# Patient Record
Sex: FEMALE | Race: WHITE | Employment: OTHER | URBAN - METROPOLITAN AREA
[De-identification: names, ages, dates, MRNs, and addresses within clinical notes are randomized per-mention and may not be internally consistent; named-entity substitution may affect disease eponyms.]

---

## 2017-01-06 ENCOUNTER — HOSPITAL ENCOUNTER (OUTPATIENT)
Dept: INFUSION THERAPY | Age: 72
Discharge: HOME | End: 2017-01-06

## 2017-02-01 NOTE — TELEPHONE ENCOUNTER
Patient asked if she needs another year's worth of this medicine, or just a few months to last her till her next office visit? Please call her and let her know what to expect at [phone].

## 2017-02-03 ENCOUNTER — APPOINTMENT (OUTPATIENT)
Dept: INFUSION THERAPY | Age: 72
End: 2017-02-03

## 2017-03-01 RX ORDER — LAMOTRIGINE 25 MG/1
500 TABLET ORAL ONCE
Status: DISPENSED | OUTPATIENT
Start: 2017-03-03 | End: 2017-03-03

## 2017-03-02 NOTE — PROGRESS NOTES
Ms. Olita Fraction called the [facility] this morning, Thursday, 3-2-17, and stated that she is still out of town, and therefore, will NOT be coming to the [facility] tomorrow, Friday, 3-3-17, for her monthly port flush and Faslodex injection. Ms. Emma Coburn did say though, that she will be returning home in time for her April appointment, so, she was made aware, that her April appointment for monthly port flush and Faslodex, is on Friday, 4-7-17, at 1000, and she agreed to the appointment.

## 2017-03-03 ENCOUNTER — HOSPITAL ENCOUNTER (OUTPATIENT)
Dept: INFUSION THERAPY | Age: 72
Discharge: HOME | End: 2017-03-03

## 2017-04-05 RX ORDER — LAMOTRIGINE 25 MG/1
500 TABLET ORAL ONCE
Status: COMPLETED | OUTPATIENT
Start: 2017-04-07 | End: 2017-04-07

## 2017-04-07 ENCOUNTER — HOSPITAL ENCOUNTER (OUTPATIENT)
Dept: INFUSION THERAPY | Age: 72
Discharge: HOME | End: 2017-04-07
Payer: MEDICARE

## 2017-04-07 VITALS
HEIGHT: 64 IN | TEMPERATURE: 97 F | HEART RATE: 82 BPM | WEIGHT: 155 LBS | DIASTOLIC BLOOD PRESSURE: 68 MMHG | SYSTOLIC BLOOD PRESSURE: 141 MMHG | BODY MASS INDEX: 26.46 KG/M2 | RESPIRATION RATE: 16 BRPM

## 2017-04-07 PROCEDURE — 74011250636 HC RX REV CODE- 250/636: Performed by: INTERNAL MEDICINE

## 2017-04-07 PROCEDURE — 77030012965 HC NDL HUBR BBMI -A

## 2017-04-07 PROCEDURE — 96523 IRRIG DRUG DELIVERY DEVICE: CPT

## 2017-04-07 PROCEDURE — 96402 CHEMO HORMON ANTINEOPL SQ/IM: CPT

## 2017-04-07 RX ORDER — HEPARIN SODIUM (PORCINE) LOCK FLUSH IV SOLN 100 UNIT/ML 100 UNIT/ML
500 SOLUTION INTRAVENOUS AS NEEDED
Status: DISPENSED | OUTPATIENT
Start: 2017-04-07 | End: 2017-04-08

## 2017-04-07 RX ORDER — SODIUM CHLORIDE 0.9 % (FLUSH) 0.9 %
10-40 SYRINGE (ML) INJECTION AS NEEDED
Status: DISCONTINUED | OUTPATIENT
Start: 2017-04-07 | End: 2017-04-10 | Stop reason: HOSPADM

## 2017-04-07 RX ADMIN — Medication 20 ML: at 10:45

## 2017-04-07 RX ADMIN — FULVESTRANT 500 MG: 50 INJECTION INTRAMUSCULAR at 10:54

## 2017-04-07 RX ADMIN — HEPARIN SODIUM (PORCINE) LOCK FLUSH IV SOLN 100 UNIT/ML 500 UNITS: 100 SOLUTION at 10:46

## 2017-04-07 NOTE — PROGRESS NOTES
[facility] Progress Note    Date: 2017    Name: Maranda Powell    MRN: [id number]         : 1945      Ms. Figueroa was assessed and education was provided. Ms. Ed De La Torre vitals were reviewed and patient was observed for 5 minutes prior to treatment. Visit Vitals    /68 (BP 1 Location: Left arm, BP Patient Position: At rest;Sitting)    Pulse 82    Temp 97 °F (36.1 °C)    Resp 16    Ht 5' 4\" (1.626 m)    Wt 70.3 kg (155 lb)    Breastfeeding No    BMI 26.61 kg/m2       Faxlodex 500 mg IM given in divided dose , one injection in each gluteus max. Monthly port flush done with brisk blood return obtained. Patient armband removed and shredded. Ms. Víctor Duque was discharged from [place] [address] in stable condition at 1005. She is to return on 17 at 1000 for her next appointment for monthly Faslodex and monthly port flush.

## 2017-04-11 ENCOUNTER — OFFICE VISIT (OUTPATIENT)
Dept: ONCOLOGY | Age: 72
End: 2017-04-11

## 2017-04-11 ENCOUNTER — HOSPITAL ENCOUNTER (OUTPATIENT)
Dept: LAB | Age: 72
Discharge: HOME | End: 2017-04-11
Payer: MEDICARE

## 2017-04-11 ENCOUNTER — HOSPITAL ENCOUNTER (OUTPATIENT)
Dept: ONCOLOGY | Age: 72
Discharge: HOME | End: 2017-04-11

## 2017-04-11 VITALS
BODY MASS INDEX: 26.8 KG/M2 | TEMPERATURE: 98.1 F | HEIGHT: 64 IN | WEIGHT: 157 LBS | SYSTOLIC BLOOD PRESSURE: 130 MMHG | HEART RATE: 87 BPM | DIASTOLIC BLOOD PRESSURE: 59 MMHG

## 2017-04-11 DIAGNOSIS — T45.1X5A ANEMIA DUE TO ANTINEOPLASTIC CHEMOTHERAPY: ICD-10-CM

## 2017-04-11 DIAGNOSIS — C50.311 BREAST CANCER OF LOWER-INNER QUADRANT OF RIGHT FEMALE BREAST (HCC): ICD-10-CM

## 2017-04-11 DIAGNOSIS — G63 NEUROPATHY ASSOCIATED WITH CANCER (HCC): ICD-10-CM

## 2017-04-11 DIAGNOSIS — D64.81 ANEMIA DUE TO ANTINEOPLASTIC CHEMOTHERAPY: ICD-10-CM

## 2017-04-11 DIAGNOSIS — C50.919 METASTATIC BREAST CANCER (HCC): ICD-10-CM

## 2017-04-11 DIAGNOSIS — C80.1 NEUROPATHY ASSOCIATED WITH CANCER (HCC): ICD-10-CM

## 2017-04-11 DIAGNOSIS — D72.819 CHRONIC LEUKOPENIA: ICD-10-CM

## 2017-04-11 DIAGNOSIS — C50.311 BREAST CANCER OF LOWER-INNER QUADRANT OF RIGHT FEMALE BREAST (HCC): Primary | ICD-10-CM

## 2017-04-11 LAB
ALBUMIN SERPL BCP-MCNC: 3.6 G/DL (ref 3.4–5)
ALBUMIN/GLOB SERPL: 1.2 {RATIO} (ref 0.8–1.7)
ALP SERPL-CCNC: 72 U/L (ref 45–117)
ALT SERPL-CCNC: 17 U/L (ref 13–56)
ANION GAP BLD CALC-SCNC: 8 MMOL/L (ref 3–18)
AST SERPL W P-5'-P-CCNC: 18 U/L (ref 15–37)
BASO+EOS+MONOS # BLD AUTO: 0.2 K/UL (ref 0–2.3)
BASO+EOS+MONOS # BLD AUTO: 7 % (ref 0.1–17)
BILIRUB SERPL-MCNC: 0.6 MG/DL (ref 0.2–1)
BUN SERPL-MCNC: 16 MG/DL (ref 7–18)
BUN/CREAT SERPL: 18 (ref 12–20)
CALCIUM SERPL-MCNC: 8.7 MG/DL (ref 8.5–10.1)
CHLORIDE SERPL-SCNC: 104 MMOL/L (ref 100–108)
CO2 SERPL-SCNC: 29 MMOL/L (ref 21–32)
CREAT SERPL-MCNC: 0.89 MG/DL (ref 0.6–1.3)
DIFFERENTIAL METHOD BLD: ABNORMAL
ERYTHROCYTE [DISTWIDTH] IN BLOOD BY AUTOMATED COUNT: 16.8 % (ref 11.5–14.5)
FERRITIN SERPL-MCNC: 174 NG/ML (ref 8–388)
GLOBULIN SER CALC-MCNC: 3 G/DL (ref 2–4)
GLUCOSE SERPL-MCNC: 92 MG/DL (ref 74–99)
HCT VFR BLD AUTO: 27.4 % (ref 36–48)
HGB BLD-MCNC: 9.4 G/DL (ref 12–16)
IRON SATN MFR SERPL: 23 %
IRON SERPL-MCNC: 72 UG/DL (ref 50–175)
LYMPHOCYTES # BLD AUTO: 18 % (ref 14–44)
LYMPHOCYTES # BLD: 0.4 K/UL (ref 1.1–5.9)
MCH RBC QN AUTO: 36.7 PG (ref 25–35)
MCHC RBC AUTO-ENTMCNC: 34.3 G/DL (ref 31–37)
MCV RBC AUTO: 107 FL (ref 78–102)
NEUTS SEG # BLD: 1.8 K/UL (ref 1.8–9.5)
NEUTS SEG NFR BLD AUTO: 75 % (ref 40–70)
PLATELET # BLD AUTO: 224 K/UL (ref 140–440)
POTASSIUM SERPL-SCNC: 4.4 MMOL/L (ref 3.5–5.5)
PROT SERPL-MCNC: 6.6 G/DL (ref 6.4–8.2)
RBC # BLD AUTO: 2.56 M/UL (ref 4.1–5.1)
SODIUM SERPL-SCNC: 141 MMOL/L (ref 136–145)
TIBC SERPL-MCNC: 308 UG/DL (ref 250–450)
WBC # BLD AUTO: 2.4 K/UL (ref 4.5–13)

## 2017-04-11 PROCEDURE — 82728 ASSAY OF FERRITIN: CPT | Performed by: INTERNAL MEDICINE

## 2017-04-11 PROCEDURE — 86300 IMMUNOASSAY TUMOR CA 15-3: CPT | Performed by: INTERNAL MEDICINE

## 2017-04-11 PROCEDURE — 36415 COLL VENOUS BLD VENIPUNCTURE: CPT | Performed by: INTERNAL MEDICINE

## 2017-04-11 PROCEDURE — 80053 COMPREHEN METABOLIC PANEL: CPT | Performed by: INTERNAL MEDICINE

## 2017-04-11 PROCEDURE — 83540 ASSAY OF IRON: CPT | Performed by: INTERNAL MEDICINE

## 2017-04-11 NOTE — PROGRESS NOTES
Hematology/Oncology  Progress Note    Name: Zandra Daniels  Date: 2017  : 1945    PCP: Candelaria Escalera MD     Ms. Denis Lincoln is a 70 y.o.  female who was seen for management of her metastatic breast cancer with associated complications of chemotherapy. Current therapy: Fulvestrant 500 mg subcutaneous monthly and Ibrance 125 mg by mouth daily. Subjective:     Mrs. Denis Lincoln is a 79-year-old woman who has slowly progressive metastatic breast cancer. She has previously completed external beam radiation therapy to lesions in her ribs. Additionally she is currently receiving systemic therapy with fulvestrant and Ibrance. Currently she is tolerating the systemic therapy reasonably well and has not experienced any nausea or emesis. She is no longer complaining of mouth discomfort but she does report some fatigue and occasional weakness. She continues to have SOB intermittently. Her most recent CT scans done in October showed stability with no evidence of disease progression. Since 2016 she has been spending time in [place] and now returns to this area for the summer months. She has no new complaints or concerns to report except for the ongoing fatigue and weakness. Past medical history, family history, and social history: these were reviewed and remains unchanged.     Past Medical History   Diagnosis Date    Biliary colic     Breast CA ([facility] [address] 2012    Cancer [facility])      Right Breast    Chemotherapy convalescence or palliative care     Neuropathy     Radiation therapy complication     Thyroid disease      Past Surgical History   Procedure Laterality Date    Pr breast surgery procedure unlisted  10/2002     Right-Lesion    Hx mastectomy  1991     Right    Pr breast surgery procedure unlisted  2004     Right-Lesion    Hx lap cholecystectomy  13     Social History     Social History    Marital status:      Spouse name: N/A    Number of children: N/A    Years of education: N/A     Occupational History    Not on file. Social History Main Topics    Smoking status: Former Smoker    Smokeless tobacco: Not on file    Alcohol use 0.0 oz/week     1 - 2 Glasses of wine per week      Comment: socially, occassionally    Drug use: No    Sexual activity: Not on file     Other Topics Concern    Not on file     Social History Narrative     Family History   Problem Relation Age of Onset    Cancer Maternal Aunt      Hodgkin's disease    Breast Cancer Paternal Aunt     Cancer Father      Prostate, lung, brain     Current Outpatient Prescriptions   Medication Sig Dispense Refill    gabapentin (NEURONTIN) 300 mg capsule take 1 capsule by mouth three times a day 270 Cap 3    BENZONATATE (TESSALON PERLE PO) Take  by mouth. Uses prn cough      palbociclib (IBRANCE) 125 mg cap Take 125 mg by mouth daily. Take 1 tab po every day for 21 days on and 7 days off q 28 days  Indications: HORMONE RECEPTOR(+), HER2(-) ADVANCED BREAST CANCER 21 Cap 6    albuterol (PROVENTIL HFA, VENTOLIN HFA, PROAIR HFA) 90 mcg/actuation inhaler Take 2 Puffs by inhalation every six (6) hours as needed for Wheezing. Indications: BRONCHOSPASTIC PULMONARY DISEASE 3 Inhaler 3    ibuprofen (MOTRIN) 800 mg tablet Take 800 mg by mouth two (2) times a day. Indications: OSTEOARTHRITIS      Cholecalciferol, Vitamin D3, 5,000 unit Tab Take  by mouth daily.  thyroid, Pork, (ARMOUR THYROID) 60 mg tablet Take 90 mg by mouth daily.  L-Mfolate-B6 Phos-Methyl-B12 (NEURPATH-B) 3-35-2 mg Tab Take  by mouth two (2) times a day.  warfarin (COUMADIN) 1 mg tablet Take 1 mg by mouth daily.          Facility-Administered Medications Ordered in Other Visits   Medication Dose Route Frequency Provider Last Rate Last Dose    sodium chloride (NS) flush 10-40 mL  10-40 mL IntraVENous PRN Juan Nunez MD   40 mL at 12/02/16 1055    heparin (porcine) 100 unit/mL injection 500 Units  500 Units IntraVENous CHRIS Lawton MD   500 Units at 12/02/16 1055       Review of Systems  Constitutional: The patient has complaints of some fatigue and occasional weakness. HEENT: The patient denies recent head trauma, eye pain, blurred vision,  hearing deficit, she report of  oropharyngeal mucosal pain and  throat discomfort. Lymphatics: The patient denies palpable peripheral lymphadenopathy. Hematologic: The patient denies having bruising, bleeding, or progressive fatigue. Respiratory: The patient is currently experiencing shortness of breath. Cardiovascular: The patient denies having leg pain, leg swelling, heart palpitations,  chest pain, or lightheadedness. The patient denies having dyspnea on exertion. Gastrointestinal: The patient denies having nausea, emesis, or diarrhea. The patient denies having any hematemesis or blood in the stool. Genitourinary: Patient denies having urinary urgency, frequency, or dysuria. The patient denies having blood in the urine. Psychological: The patient denies having symptoms of nervousness, anxiety, depression, or thoughts of harming self. Skin: Patient denies having skin rashes, skin, ulcerations, or unexplained itching or pruritus. Musculoskeletal: The patient denies having pain in the joints or bones. Objective:     Visit Vitals    /72    Pulse 78    Temp 98 °F (36.7 °C)    Ht 5' 4\" (1.626 m)    Wt 71.7 kg (158 lb)    BMI 27.12 kg/m2     ECOG PS=1, Pain score=1/10   Physical Exam:   Gen. Appearance: The patient is in no acute distress. Skin: There is no bruise or rash. HEENT: The exam is unremarkable. Neck: Supple without lymphadenopathy or thyromegaly. Lungs: Clear to auscultation and percussion; there are no wheezes or rhonchi. Heart: Regular rate and rhythm; there are no murmurs, gallops, or rubs. Anterior chest wall and breast: There is no evidence of local recurrence of disease. Abdomen:  Bowel sounds are present and normal.  There is no guarding, tenderness, or hepatosplenomegaly. Extremities: There is no clubbing, cyanosis, or edema. Neurologic: There are no focal neurologic deficits. Lymphatics: There is no palpable peripheral lymphadenopathy. Musculoskeletal: The patient has full range of motion at all joints. There is no evidence of joint deformity or effusions. There is no focal joint tenderness. Psychological/psychiatric: There is no clinical evidence of anxiety, depression, or melancholy. Lab data:      Results for orders placed or performed during the hospital encounter of 12/02/16   CBC WITH 3 PART DIFF     Status: Abnormal   Result Value Ref Range Status    WBC 2.0 (L) 4.5 - 13.0 K/uL Final    RBC 2.50 (L) 4.10 - 5.10 M/uL Final    HGB 9.1 (L) 12.0 - 16.0 g/dL Final    HCT 26.8 (L) 36 - 48 % Final    .2 (H) 78 - 102 FL Final    MCH 36.4 (H) 25.0 - 35.0 PG Final    MCHC 34.0 31 - 37 g/dL Final    RDW 17.0 (H) 11.5 - 14.5 % Final    PLATELET 176 (L) 022 - 440 K/uL Final    NEUTROPHILS 78 (H) 40 - 70 % Final    MIXED CELLS 8 0.1 - 17 % Final    LYMPHOCYTES 14 14 - 44 % Final    ABS. NEUTROPHILS 1.5 (L) 1.8 - 9.5 K/UL Final    ABS. MIXED CELLS 0.2 0.0 - 2.3 K/uL Final    ABS. LYMPHOCYTES 0.3 (L) 1.1 - 5.9 K/UL Final     Comment: Test performed at [place] [address] Location. Results Reviewed by Medical Director. DF AUTOMATED   Final           Assessment:     1. Breast cancer of lower-inner quadrant of right female breast ([address]    2. Neuropathy associated with cancer ([address]    3. Metastatic breast cancer ([address]    4. Chemotherapy induced neutropenia (HCC)    5. Antineoplastic chemotherapy induced anemia      Plan:   Metastatic breast cancer: The patient has recently completed radiation treatment to the rib lesions. The combination ofFulvestrant 500 mg subcutaneous monthly  will be continued. Ibrance 140 mg by mouth daily will be continued as well.   I have explained to the patient that the CT scan dated October 13, 2016 showed no new disease. There was no lymphadenopathy and there was stable findings in the right lung. Additionally there was a stable distribution of bone findings with no evidence of disease progression. At this time I will schedule repeat CT scans of the chest, abdomen, and pelvis since it has been 6 months since she last had scans done. Additionally we will also schedule a bone scan to rule out any evidence of disease progression. The most recent CA-27-29 level from 12/3/2016 was 52 U/mL. Neuropathy associated with cancer: I will continue her Neurontin at 400 mg 3 times daily. Chemotherapy-induced neutropenia (persisting problem): The current CBC shows that her WBC count is 2.4. The absolute neutrophil count is 1.8. If the absolute neutrophil count declines to 0.5 she will be started on Neupogen or Neulasta. She will get her labs drawn once a month. Chemotherapy-induced anemia (persisting problem): The new chemotherapy regimen has significantly decreased her hemoglobin to 9.4 g/dL with hematocrit of 27.4%. At this time I will check iron profile and ferritin levels. I have recommended that the patient continue taking ferrous sulfate 1 tablet daily. Procrit at a dose of 60,000 units subcutaneous will be provided now that her hemoglobin has declined below 10 g/dL hematocrit is below 30%. Chemotherapy-induced thrombocytopenia : The patient is continuing the current dose of Ibrance 140 mg daily. We have explained to her that the medication was responsible for her thrombocytopenia. However over the past 4 months her platelet counts have normalized to 224,000. We will monitor her every 2 weeks and if the platelet count should decline below 30,000 the medicine will be held until there is complete recovery. Her current Platelet for today is 128,000    I will plan to have the patient return to the clinic in 3 weeks to review test results.   Orders Placed This Encounter    COMPLETE CBC & AUTO DIFF WBC  InHouse CBC ([employer])     Standing Status:   Future     Number of Occurrences:   1     Standing Expiration Date:   12/9/2016    FERRITIN     Standing Status:   Future     Standing Expiration Date:   68/1/0725    METABOLIC PANEL, COMPREHENSIVE     Standing Status:   Future     Standing Expiration Date:   12/3/2017    IRON PROFILE     Standing Status:   Future     Standing Expiration Date:   12/3/2017    CA 27.29     Standing Status:   Future     Standing Expiration Date:   12/3/2017       Yazmin Treviño MD  4/11/2017      Please note: This document has been produced using voice recognition software. Unrecognized errors in transcription may be present.

## 2017-04-11 NOTE — PATIENT INSTRUCTIONS
Breast Cancer: Care Instructions  Your Care Instructions  Breast cancer occurs when abnormal cells grow out of control in the breast. These cancer cells can spread within the breast, to nearby lymph nodes and other tissues, and to other parts of the body. Being treated for cancer can weaken your body, and you may feel very tired. Get the rest your body needs so you can feel better. Finding out that you have cancer is scary. You may feel many emotions and may need some help coping. Seek out family, friends, and counselors for support. You also can do things at home to make yourself feel better while you go through treatment. Call the [employer] (4-770.652.9983) or visit its website at [employer] for more information. Follow-up care is a key part of your treatment and safety. Be sure to make and go to all appointments, and call your doctor if you are having problems. It's also a good idea to know your test results and keep a list of the medicines you take. How can you care for yourself at home? · Take your medicines exactly as prescribed. Call your doctor if you think you are having a problem with your medicine. You may get medicine for nausea and vomiting if you have these side effects. · Follow your doctor's instructions to relieve pain. Pain from cancer and surgery can almost always be controlled. Use pain medicine when you first notice pain, before it becomes severe. · Eat healthy food. If you do not feel like eating, try to eat food that has protein and extra calories to keep up your strength and prevent weight loss. Drink liquid meal replacements for extra calories and protein. Try to eat your main meal early. · Get some physical activity every day, but do not get too tired. Keep doing the hobbies you enjoy as your energy allows. · Do not smoke. Smoking can make your cancer worse. If you need help quitting, talk to your doctor about stop-smoking programs and medicines.  These can increase your chances of quitting for good. · Take steps to control your stress and workload. Learn relaxation techniques. ¨ Share your feelings. Stress and tension affect our emotions. By expressing your feelings to others, you may be able to understand and cope with them. ¨ Consider joining a support group. Talking about a problem with your spouse, a good friend, or other people with similar problems is a good way to reduce tension and stress. ¨ Express yourself through art. Try writing, crafts, dance, or art to relieve stress. Some dance, writing, or art groups may be available just for people who have cancer. ¨ Be kind to your body and mind. Getting enough sleep, eating a healthy diet, and taking time to do things you enjoy can contribute to an overall feeling of balance in your life and can help reduce stress. ¨ Get help if you need it. Discuss your concerns with your doctor or counselor. · If you are vomiting or have diarrhea:  ¨ Drink plenty of fluids (enough so that your urine is light yellow or clear like water) to prevent dehydration. Choose water and other caffeine-free clear liquids. If you have kidney, heart, or liver disease and have to limit fluids, talk with your doctor before you increase the amount of fluids you drink. ¨ When you are able to eat, try clear soups, mild foods, and liquids until all symptoms are gone for 12 to 48 hours. Other good choices include dry toast, crackers, cooked cereal, and gelatin dessert, such as Jell-O.  · If you have not already done so, prepare a list of advance directives. Advance directives are instructions to your doctor and family members about what kind of care you want if you become unable to speak or express yourself. When should you call for help? Call your doctor now or seek immediate medical care if:  · You have a fever. · Any part of your breast becomes red, tender, swollen, or hot.   · You have pain, redness, or swelling in the arm on the same side as your breast cancer. Watch closely for changes in your health, and be sure to contact your doctor if:  · You have pain that is not controlled by medicine. · You have nausea or vomiting. · You are constipated or have diarrhea. Where can you learn more? Go to http://mandie-elizabeth.info/. Enter V321 in the search box to learn more about \"Breast Cancer: Care Instructions. \"  Current as of: July 26, 2016  Content Version: 11.2  © 7637-6392 [employer]. Care instructions adapted under license by [employer] (which disclaims liability or warranty for this information). If you have questions about a medical condition or this instruction, always ask your healthcare professional. [address] any warranty or liability for your use of this information. Breast Cancer: Care Instructions  Your Care Instructions  Breast cancer occurs when abnormal cells grow out of control in the breast. These cancer cells can spread within the breast, to nearby lymph nodes and other tissues, and to other parts of the body. Being treated for cancer can weaken your body, and you may feel very tired. Get the rest your body needs so you can feel better. Finding out that you have cancer is scary. You may feel many emotions and may need some help coping. Seek out family, friends, and counselors for support. You also can do things at home to make yourself feel better while you go through treatment. Call the [employer] (5-777.228.6481) or visit its website at [employer] [employer] for more information. Follow-up care is a key part of your treatment and safety. Be sure to make and go to all appointments, and call your doctor if you are having problems. It's also a good idea to know your test results and keep a list of the medicines you take. How can you care for yourself at home? · Take your medicines exactly as prescribed.  Call your doctor if you think you are having a problem with your medicine. You may get medicine for nausea and vomiting if you have these side effects. · Follow your doctor's instructions to relieve pain. Pain from cancer and surgery can almost always be controlled. Use pain medicine when you first notice pain, before it becomes severe. · Eat healthy food. If you do not feel like eating, try to eat food that has protein and extra calories to keep up your strength and prevent weight loss. Drink liquid meal replacements for extra calories and protein. Try to eat your main meal early. · Get some physical activity every day, but do not get too tired. Keep doing the hobbies you enjoy as your energy allows. · Do not smoke. Smoking can make your cancer worse. If you need help quitting, talk to your doctor about stop-smoking programs and medicines. These can increase your chances of quitting for good. · Take steps to control your stress and workload. Learn relaxation techniques. ¨ Share your feelings. Stress and tension affect our emotions. By expressing your feelings to others, you may be able to understand and cope with them. ¨ Consider joining a support group. Talking about a problem with your spouse, a good friend, or other people with similar problems is a good way to reduce tension and stress. ¨ Express yourself through art. Try writing, crafts, dance, or art to relieve stress. Some dance, writing, or art groups may be available just for people who have cancer. ¨ Be kind to your body and mind. Getting enough sleep, eating a healthy diet, and taking time to do things you enjoy can contribute to an overall feeling of balance in your life and can help reduce stress. ¨ Get help if you need it. Discuss your concerns with your doctor or counselor. · If you are vomiting or have diarrhea:  ¨ Drink plenty of fluids (enough so that your urine is light yellow or clear like water) to prevent dehydration. Choose water and other caffeine-free clear liquids. If you have kidney, heart, or liver disease and have to limit fluids, talk with your doctor before you increase the amount of fluids you drink. ¨ When you are able to eat, try clear soups, mild foods, and liquids until all symptoms are gone for 12 to 48 hours. Other good choices include dry toast, crackers, cooked cereal, and gelatin dessert, such as Jell-O.  · If you have not already done so, prepare a list of advance directives. Advance directives are instructions to your doctor and family members about what kind of care you want if you become unable to speak or express yourself. When should you call for help? Call your doctor now or seek immediate medical care if:  · You have a fever. · Any part of your breast becomes red, tender, swollen, or hot. · You have pain, redness, or swelling in the arm on the same side as your breast cancer. Watch closely for changes in your health, and be sure to contact your doctor if:  · You have pain that is not controlled by medicine. · You have nausea or vomiting. · You are constipated or have diarrhea. Where can you learn more? Go to http://mandie-elizabeth.info/. Enter V321 in the search box to learn more about \"Breast Cancer: Care Instructions. \"  Current as of: July 26, 2016  Content Version: 11.2  © 9028-1941 [employer]. Care instructions adapted under license by [employer] (which disclaims liability or warranty for this information). If you have questions about a medical condition or this instruction, always ask your healthcare professional. [place] [address] any warranty or liability for your use of this information.

## 2017-04-11 NOTE — MR AVS SNAPSHOT
Visit Information Date & Time Provider Department Dept. Phone Encounter #  
 4/11/2017 11:45 AM Leo Thakkar [facility] Office [phone] [id number] Follow-up Instructions Return in about 3 weeks (around 5/2/2017). Your Appointments 5/2/2017 12:00 PM  
Office Visit with MD Evan Mccle[address] [facility]) Appt Note: CT RESULTS  
 [facility] [address] [facility] [address]  
[phone]  
  
   
 [facility] [address] 5/31/2017 10:00 AM  
Follow Up with Vipul Haji MD  
[address] [facility]) Appt Note: f/u mammo on 5/30  
 [address] [facility] [address] [facility] [address]  
[phone]  
  
   
 [address] [facility] [address] [facility] [address] Upcoming Health Maintenance Date Due Hepatitis C Screening 1945 DTaP/Tdap/Td series (1 - Tdap) 10/18/1966 FOBT Q 1 YEAR AGE 50-75 10/18/1995 ZOSTER VACCINE AGE 60> 10/18/2005 GLAUCOMA SCREENING Q2Y 10/18/2010 Pneumococcal 65+ High/Highest Risk (1 of 2 - PCV13) 10/18/2010 MEDICARE YEARLY EXAM 10/18/2010 BREAST CANCER SCRN MAMMOGRAM 5/24/2018 Allergies as of 4/11/2017  Review Complete On: 4/7/2017 By: Kamryn Gonzales RN Severity Noted Reaction Type Reactions Codeine  08/09/2012    Palpitations Darvocet A500 [Propoxyphene N-acetaminophen]  08/09/2012   Side Effect Nausea and Vomiting Darvon [Propoxyphene]  04/09/2014   Systemic Nausea and Vomiting Current Immunizations  Reviewed on 4/7/2017 Name Date Influenza Vaccine 11/14/2016, 9/14/2015, 1/7/2015, 12/9/2013, 1/16/2013 Influenza Vaccine Whole 2/1/2012 Not reviewed this visit You Were Diagnosed With   
  
 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  
 130/59 87 98.1 °F (36.7 °C) 5' 4\" (1.626 m) 157 lb (71.2 kg) 26.95 kg/m2 OB Status Smoking Status Menopause Former Smoker BMI and BSA Data Body Mass Index Body Surface Area  
 26.95 kg/m 2 1.79 m 2 Preferred Pharmacy Pharmacy Name Phone [address] [facility] [phone] Your Updated Medication List  
  
   
This list is accurate as of: 4/11/17 12:19 PM.  Always use your most recent med list.  
  
  
  
  
 albuterol 90 mcg/actuation inhaler Commonly known as:  PROVENTIL HFA, VENTOLIN HFA, PROAIR HFA Take 2 Puffs by inhalation every six (6) hours as needed for Wheezing. Indications: BRONCHOSPASTIC PULMONARY DISEASE  
  
 ARMOUR THYROID 60 mg tablet Generic drug:  thyroid (Pork) Take 90 mg by mouth daily. cholecalciferol (VITAMIN D3) 5,000 unit Tab tablet Commonly known as:  VITAMIN D3 Take  by mouth daily. * gabapentin 300 mg capsule Commonly known as:  NEURONTIN  
take 1 capsule by mouth three times a day * gabapentin 100 mg capsule Commonly known as:  NEURONTIN Take 1 Cap by mouth three (3) times daily. MOTRIN 800 mg tablet Generic drug:  ibuprofen Take 800 mg by mouth two (2) times a day. Indications: OSTEOARTHRITIS  
  
 NEURPATH-B 3-35-2 mg Tab tab Generic drug:  L-Mfolate-B6 Phos-Methyl-B12 Take  by mouth two (2) times a day. palbociclib 125 mg Cap Commonly known as:  [employer]  
 Take 125 mg by mouth daily. Take 1 tab po every day for 21 days on and 7 days off q 28 days  Indications: HORMONE RECEPTOR(+), HER2(-) ADVANCED BREAST CANCER  
  
 TESSALON PERLE PO Take  by mouth. Uses prn cough  
  
 warfarin 1 mg tablet Commonly known as:  COUMADIN Take 1 mg by mouth daily. * Notice: This list has 2 medication(s) that are the same as other medications prescribed for you. Read the directions carefully, and ask your doctor or other care provider to review them with you. We Performed the Following COMPLETE CBC & AUTO DIFF WBC [49915 CPT(R)] Follow-up Instructions Return in about 3 weeks (around 5/2/2017). To-Do List   
 04/11/2017 Lab:  CBC WITH 3 PART DIFF   
  
 05/05/2017 10:00 AM  
  Appointment with [address] at [place] [address] ([phone])  
  
 05/30/2017 11:30 AM  
  Appointment with [facility] 2 at [address] [place] [address] ([phone]) OUTSIDE FILMS  - Any outside films related to the study being scheduled should be brought with you on the day of the exam.  If this cannot be done there may be a delay in the reading of the study. MEDICATIONS  - Patient must bring a complete list of all medications currently taking to include prescriptions, over-the-counter meds, herbals, vitamins & any dietary supplements  GENERAL INSTRUCTIONS  - On the day of your exam do not use any bath powder, deodorant or lotions on the armpit area. -Tenderness of breasts may cause an increase of discomfort during procedure. If you are experiencing breast tenderness on the day of your appointment and would like to reschedule, please call [phone].  
  
 06/02/2017 10:00 AM  
  Appointment with [address] at [place] [address] ([phone]) Patient Instructions Breast Cancer: Care Instructions Your Care Instructions Breast cancer occurs when abnormal cells grow out of control in the breast. These cancer cells can spread within the breast, to nearby lymph nodes and other tissues, and to other parts of the body. Being treated for cancer can weaken your body, and you may feel very tired. Get the rest your body needs so you can feel better. Finding out that you have cancer is scary. You may feel many emotions and may need some help coping. Seek out family, friends, and counselors for support. You also can do things at home to make yourself feel better while you go through treatment. Call the [employer] Rachell Delong ([phone]) or visit its website at [address] [employer] [employer]. [employer]eeo for more information. Follow-up care is a key part of your treatment and safety. Be sure to make and go to all appointments, and call your doctor if you are having problems. It's also a good idea to know your test results and keep a list of the medicines you take. How can you care for yourself at home? · Take your medicines exactly as prescribed. Call your doctor if you think you are having a problem with your medicine. You may get medicine for nausea and vomiting if you have these side effects. · Follow your doctor's instructions to relieve pain. Pain from cancer and surgery can almost always be controlled. Use pain medicine when you first notice pain, before it becomes severe. · Eat healthy food. If you do not feel like eating, try to eat food that has protein and extra calories to keep up your strength and prevent weight loss. Drink liquid meal replacements for extra calories and protein. Try to eat your main meal early. · Get some physical activity every day, but do not get too tired. Keep doing the hobbies you enjoy as your energy allows. · Do not smoke. Smoking can make your cancer worse. If you need help quitting, talk to your doctor about stop-smoking programs and medicines. These can increase your chances of quitting for good. · Take steps to control your stress and workload. Learn relaxation techniques. ¨ Share your feelings. Stress and tension affect our emotions. By expressing your feelings to others, you may be able to understand and cope with them. ¨ Consider joining a support group. Talking about a problem with your spouse, a good friend, or other people with similar problems is a good way to reduce tension and stress. ¨ Express yourself through art. Try writing, crafts, dance, or art to relieve stress. Some dance, writing, or art groups may be available just for people who have cancer. ¨ Be kind to your body and mind. Getting enough sleep, eating a healthy diet, and taking time to do things you enjoy can contribute to an overall feeling of balance in your life and can help reduce stress. ¨ Get help if you need it. Discuss your concerns with your doctor or counselor. · If you are vomiting or have diarrhea: ¨ Drink plenty of fluids (enough so that your urine is light yellow or clear like water) to prevent dehydration. Choose water and other caffeine-free clear liquids. If you have kidney, heart, or liver disease and have to limit fluids, talk with your doctor before you increase the amount of fluids you drink. ¨ When you are able to eat, try clear soups, mild foods, and liquids until all symptoms are gone for 12 to 48 hours. Other good choices include dry toast, crackers, cooked cereal, and gelatin dessert, such as Jell-O. · If you have not already done so, prepare a list of advance directives. Advance directives are instructions to your doctor and family members about what kind of care you want if you become unable to speak or express yourself. When should you call for help? Call your doctor now or seek immediate medical care if: 
· You have a fever. · Any part of your breast becomes red, tender, swollen, or hot. · You have pain, redness, or swelling in the arm on the same side as your breast cancer. Watch closely for changes in your health, and be sure to contact your doctor if: · You have pain that is not controlled by medicine. · You have nausea or vomiting. · You are constipated or have diarrhea. Where can you learn more? Go to http://mandie-elizabeth.info/. Enter V321 in the search box to learn more about \"Breast Cancer: Care Instructions. \" Current as of: July 26, 2016 Content Version: 11.2 © 8859-1932 [employer]. Care instructions adapted under license by [employer] (which disclaims liability or warranty for this information). If you have questions about a medical condition or this instruction, always ask your healthcare professional. [place] [address] any warranty or liability for your use of this information. Breast Cancer: Care Instructions Your Care Instructions Breast cancer occurs when abnormal cells grow out of control in the breast. These cancer cells can spread within the breast, to nearby lymph nodes and other tissues, and to other parts of the body. Being treated for cancer can weaken your body, and you may feel very tired. Get the rest your body needs so you can feel better. Finding out that you have cancer is scary. You may feel many emotions and may need some help coping. Seek out family, friends, and counselors for support. You also can do things at home to make yourself feel better while you go through treatment. Call the [employer] (6-248.754.3346) or visit its website at [address]. [employer]Dubba for more information. Follow-up care is a key part of your treatment and safety. Be sure to make and go to all appointments, and call your doctor if you are having problems. It's also a good idea to know your test results and keep a list of the medicines you take. How can you care for yourself at home? · Take your medicines exactly as prescribed. Call your doctor if you think you are having a problem with your medicine. You may get medicine for nausea and vomiting if you have these side effects. · Follow your doctor's instructions to relieve pain. Pain from cancer and surgery can almost always be controlled. Use pain medicine when you first notice pain, before it becomes severe. · Eat healthy food. If you do not feel like eating, try to eat food that has protein and extra calories to keep up your strength and prevent weight loss. Drink liquid meal replacements for extra calories and protein. Try to eat your main meal early. · Get some physical activity every day, but do not get too tired. Keep doing the hobbies you enjoy as your energy allows. · Do not smoke. Smoking can make your cancer worse. If you need help quitting, talk to your doctor about stop-smoking programs and medicines. These can increase your chances of quitting for good. · Take steps to control your stress and workload. Learn relaxation techniques. ¨ Share your feelings. Stress and tension affect our emotions. By expressing your feelings to others, you may be able to understand and cope with them. ¨ Consider joining a support group. Talking about a problem with your spouse, a good friend, or other people with similar problems is a good way to reduce tension and stress. ¨ Express yourself through art. Try writing, crafts, dance, or art to relieve stress. Some dance, writing, or art groups may be available just for people who have cancer. ¨ Be kind to your body and mind. Getting enough sleep, eating a healthy diet, and taking time to do things you enjoy can contribute to an overall feeling of balance in your life and can help reduce stress. ¨ Get help if you need it. Discuss your concerns with your doctor or counselor. · If you are vomiting or have diarrhea: ¨ Drink plenty of fluids (enough so that your urine is light yellow or clear like water) to prevent dehydration. Choose water and other caffeine-free clear liquids.  If you have kidney, heart, or liver disease and have to limit fluids, talk with your doctor before you increase the amount of fluids you drink. ¨ When you are able to eat, try clear soups, mild foods, and liquids until all symptoms are gone for 12 to 48 hours. Other good choices include dry toast, crackers, cooked cereal, and gelatin dessert, such as Jell-O. · If you have not already done so, prepare a list of advance directives. Advance directives are instructions to your doctor and family members about what kind of care you want if you become unable to speak or express yourself. When should you call for help? Call your doctor now or seek immediate medical care if: 
· You have a fever. · Any part of your breast becomes red, tender, swollen, or hot. · You have pain, redness, or swelling in the arm on the same side as your breast cancer. Watch closely for changes in your health, and be sure to contact your doctor if: 
· You have pain that is not controlled by medicine. · You have nausea or vomiting. · You are constipated or have diarrhea. Where can you learn more? Go to http://mandie-elizabeth.info/. Enter V321 in the search box to learn more about \"Breast Cancer: Care Instructions. \" Current as of: July 26, 2016 Content Version: 11.2 © 0078-3203 [employer]. Care instructions adapted under license by [employer] (which disclaims liability or warranty for this information). If you have questions about a medical condition or this instruction, always ask your healthcare professional. [place] [address] any warranty or liability for your use of this information. Please provide this summary of care documentation to your next provider. Your primary care clinician is listed as LISA QUINN. If you have any questions after today's visit, please call [phone].

## 2017-04-12 LAB — CANCER AG27-29 SERPL-ACNC: 54.8 U/ML (ref 0–38.6)

## 2017-04-12 NOTE — PROGRESS NOTES
Patient recently had a CT scan of th chest, abdomen and pelvis. Results pending. She has an appointment on 5/2/17 to discuss the results to the recent scan.

## 2017-04-26 ENCOUNTER — HOSPITAL ENCOUNTER (OUTPATIENT)
Dept: CT IMAGING | Age: 72
Discharge: HOME | End: 2017-04-26
Attending: INTERNAL MEDICINE
Payer: MEDICARE

## 2017-04-26 ENCOUNTER — HOSPITAL ENCOUNTER (OUTPATIENT)
Dept: NUCLEAR MEDICINE | Age: 72
Discharge: HOME | End: 2017-04-26
Attending: INTERNAL MEDICINE
Payer: MEDICARE

## 2017-04-26 DIAGNOSIS — C50.919 METASTATIC BREAST CANCER (HCC): ICD-10-CM

## 2017-04-26 PROCEDURE — 74011636320 HC RX REV CODE- 636/320: Performed by: INTERNAL MEDICINE

## 2017-04-26 PROCEDURE — 74177 CT ABD & PELVIS W/CONTRAST: CPT

## 2017-04-26 PROCEDURE — 78306 BONE IMAGING WHOLE BODY: CPT

## 2017-04-26 RX ADMIN — DIATRIZOATE MEGLUMINE AND DIATRIZOATE SODIUM 30 ML: 600; 100 SOLUTION ORAL; RECTAL at 08:12

## 2017-04-26 RX ADMIN — IOPAMIDOL 100 ML: 612 INJECTION, SOLUTION INTRAVENOUS at 11:07

## 2017-05-02 ENCOUNTER — OFFICE VISIT (OUTPATIENT)
Dept: ONCOLOGY | Age: 72
End: 2017-05-02

## 2017-05-02 ENCOUNTER — HOSPITAL ENCOUNTER (OUTPATIENT)
Dept: ONCOLOGY | Age: 72
Discharge: HOME | End: 2017-05-02

## 2017-05-02 VITALS
HEART RATE: 84 BPM | WEIGHT: 153 LBS | SYSTOLIC BLOOD PRESSURE: 117 MMHG | DIASTOLIC BLOOD PRESSURE: 61 MMHG | BODY MASS INDEX: 26.26 KG/M2 | TEMPERATURE: 97.9 F

## 2017-05-02 DIAGNOSIS — C50.311 BREAST CANCER OF LOWER-INNER QUADRANT OF RIGHT FEMALE BREAST (HCC): Primary | ICD-10-CM

## 2017-05-02 DIAGNOSIS — C50.311 BREAST CANCER OF LOWER-INNER QUADRANT OF RIGHT FEMALE BREAST (HCC): ICD-10-CM

## 2017-05-02 NOTE — PROGRESS NOTES
Hematology/medical oncology progress note    5/2/2017  Emani Arredondo  YOB: 1945    PCP: Dr. Linda Krishnan    Diagnosis: Metastatic breast cancer    Mrs. Cristina Pratt is a 70-year-old woman who has a history of metastatic breast cancer. She is currently on therapy with Ibrance in combination with exemestane. She was recently referred for CT scans of the chest, abdomen, pelvis and a bone scan. I have explained to the patient I have personally reviewed the imaging studies. The study show that she still has a small degree of right-sided pleural effusion but it is mildly decreased when compared to her previous CT scans. She has some chronic atelectasis in the posterior basal segments of the right lower lobe in the right middle lobe. There was no evidence of an acute process of metastatic disease in the lungs liver or adrenal glands. Additionally there was no evidence of pathologic lymphadenopathy or new lymphadenopathy in the mediastinum, abdomen, or pelvis. There were some sclerotic changes in the anterior ends of ribs including the right fifth, sixth, and seventh ribs. There were a few sclerotic foci in the pelvic bones which may represent metastatic disease but there was no evidence of any new or lytic metastatic lesions in the bones. These findings were on the CT scan. The bone scan showed no new foci of increased activity. She has previously been treated with radiation therapy to the right fifth, sixth, and seventh ribs. She has also received previous radiation treatment to T12. There was a stable increased activity in the 3 ribs and T12. But no new foci of increased activity was evident. The patient is having a mild degree of discomfort especially following prolonged activity in the lower back.   For this reason and the fact that she has previously received radiation treatment to T12 I have proposed referring her to the proton therapy center to see if she would be a candidate for proton therapy to the lower thoracic spine. The patient had her questions answered to her satisfaction. I have recommended that she continue to take the high brands and exemestane combination. I will plan to see her back in clinic in about 6 weeks. Total time 30 minutes, greater than 50% of the time was in counseling and coordination of care.     Lata Bahena MD, Sheryle Osler

## 2017-05-02 NOTE — PATIENT INSTRUCTIONS
Breast Cancer: Care Instructions  Your Care Instructions  Breast cancer occurs when abnormal cells grow out of control in the breast. These cancer cells can spread within the breast, to nearby lymph nodes and other tissues, and to other parts of the body. Being treated for cancer can weaken your body, and you may feel very tired. Get the rest your body needs so you can feel better. Finding out that you have cancer is scary. You may feel many emotions and may need some help coping. Seek out family, friends, and counselors for support. You also can do things at home to make yourself feel better while you go through treatment. Call the [employer] (4-770.505.4696) or visit its website at [employer] for more information. Follow-up care is a key part of your treatment and safety. Be sure to make and go to all appointments, and call your doctor if you are having problems. It's also a good idea to know your test results and keep a list of the medicines you take. How can you care for yourself at home? · Take your medicines exactly as prescribed. Call your doctor if you think you are having a problem with your medicine. You may get medicine for nausea and vomiting if you have these side effects. · Follow your doctor's instructions to relieve pain. Pain from cancer and surgery can almost always be controlled. Use pain medicine when you first notice pain, before it becomes severe. · Eat healthy food. If you do not feel like eating, try to eat food that has protein and extra calories to keep up your strength and prevent weight loss. Drink liquid meal replacements for extra calories and protein. Try to eat your main meal early. · Get some physical activity every day, but do not get too tired. Keep doing the hobbies you enjoy as your energy allows. · Do not smoke. Smoking can make your cancer worse. If you need help quitting, talk to your doctor about stop-smoking programs and medicines.  These can increase your chances of quitting for good. · Take steps to control your stress and workload. Learn relaxation techniques. ¨ Share your feelings. Stress and tension affect our emotions. By expressing your feelings to others, you may be able to understand and cope with them. ¨ Consider joining a support group. Talking about a problem with your spouse, a good friend, or other people with similar problems is a good way to reduce tension and stress. ¨ Express yourself through art. Try writing, crafts, dance, or art to relieve stress. Some dance, writing, or art groups may be available just for people who have cancer. ¨ Be kind to your body and mind. Getting enough sleep, eating a healthy diet, and taking time to do things you enjoy can contribute to an overall feeling of balance in your life and can help reduce stress. ¨ Get help if you need it. Discuss your concerns with your doctor or counselor. · If you are vomiting or have diarrhea:  ¨ Drink plenty of fluids (enough so that your urine is light yellow or clear like water) to prevent dehydration. Choose water and other caffeine-free clear liquids. If you have kidney, heart, or liver disease and have to limit fluids, talk with your doctor before you increase the amount of fluids you drink. ¨ When you are able to eat, try clear soups, mild foods, and liquids until all symptoms are gone for 12 to 48 hours. Other good choices include dry toast, crackers, cooked cereal, and gelatin dessert, such as Jell-O.  · If you have not already done so, prepare a list of advance directives. Advance directives are instructions to your doctor and family members about what kind of care you want if you become unable to speak or express yourself. When should you call for help? Call your doctor now or seek immediate medical care if:  · You have a fever. · Any part of your breast becomes red, tender, swollen, or hot.   · You have pain, redness, or swelling in the arm on the same side as your breast cancer. Watch closely for changes in your health, and be sure to contact your doctor if:  · You have pain that is not controlled by medicine. · You have nausea or vomiting. · You are constipated or have diarrhea. Where can you learn more? Go to http://mandie-elizabeth.info/. Enter V321 in the search box to learn more about \"Breast Cancer: Care Instructions. \"  Current as of: July 26, 2016  Content Version: 11.2  © 2822-7201 [employer]. Care instructions adapted under license by [employer] (which disclaims liability or warranty for this information). If you have questions about a medical condition or this instruction, always ask your healthcare professional. [address] any warranty or liability for your use of this information.

## 2017-05-02 NOTE — MR AVS SNAPSHOT
Visit Information Date & Time Provider Department Dept. Phone Encounter #  
 5/2/2017 12:00 PM Ez Slade Candelariamelodybonniemohit [address] Office [phone] Your Appointments 5/31/2017 10:00 AM  
Follow Up with Philip Lott MD  
[address]) Appt Note: f/u mammo on 5/30  
 [address] [facility] [address] [facility] [address]  
[phone]  
  
   
 [address] [address]  
  
    
 6/2/2017 11:30 AM  
Office Visit with MD Sarah Kinsey [address]) Appt Note: OV  
 [address] [facility] [address]  
[phone]  
  
   
 [address] Upcoming Health Maintenance Date Due Hepatitis C Screening 1945 DTaP/Tdap/Td series (1 - Tdap) 10/18/1966 FOBT Q 1 YEAR AGE 50-75 10/18/1995 ZOSTER VACCINE AGE 60> 10/18/2005 GLAUCOMA SCREENING Q2Y 10/18/2010 Pneumococcal 65+ High/Highest Risk (1 of 2 - PCV13) 10/18/2010 MEDICARE YEARLY EXAM 10/18/2010 INFLUENZA AGE 9 TO ADULT 8/1/2017 BREAST CANCER SCRN MAMMOGRAM 5/24/2018 Allergies as of 5/2/2017  Review Complete On: 4/23/2017 By: Ez Slade MD  
  
 Severity Noted Reaction Type Reactions Codeine  08/09/2012    Palpitations Darvocet A500 [Propoxyphene N-acetaminophen]  08/09/2012   Side Effect Nausea and Vomiting Darvon [Propoxyphene]  04/09/2014   Systemic Nausea and Vomiting Current Immunizations  Reviewed on 4/7/2017 Name Date Influenza Vaccine 11/14/2016, 9/14/2015, 1/7/2015, 12/9/2013, 1/16/2013 Influenza Vaccine Whole 2/1/2012 Not reviewed this visit You Were Diagnosed With   
  
 Codes Comments Breast cancer of lower-inner quadrant of right female breast ([facility] [address]    -  Primary ICD-10-CM: U57.446 ICD-9-CM: 174.3 Vitals BP Pulse Temp Weight(growth percentile) BMI OB Status 117/61 84 97.9 °F (36.6 °C) 153 lb (69.4 kg) 26.26 kg/m2 Menopause Smoking Status Former Smoker BMI and BSA Data Body Mass Index Body Surface Area  
 26.26 kg/m 2 1.77 m 2 Preferred Pharmacy Pharmacy Name Phone [address] [phone] Your Updated Medication List  
  
   
This list is accurate as of: 5/2/17 12:36 PM.  Always use your most recent med list.  
  
  
  
  
 albuterol 90 mcg/actuation inhaler Commonly known as:  PROVENTIL HFA, VENTOLIN HFA, PROAIR HFA Take 2 Puffs by inhalation every six (6) hours as needed for Wheezing. Indications: BRONCHOSPASTIC PULMONARY DISEASE  
  
 ARMOUR THYROID 60 mg tablet Generic drug:  thyroid (Pork) Take 90 mg by mouth daily. cholecalciferol (VITAMIN D3) 5,000 unit Tab tablet Commonly known as:  VITAMIN D3 Take  by mouth daily. * gabapentin 300 mg capsule Commonly known as:  NEURONTIN  
take 1 capsule by mouth three times a day * gabapentin 100 mg capsule Commonly known as:  NEURONTIN Take 1 Cap by mouth three (3) times daily. * gabapentin 300 mg capsule Commonly known as:  NEURONTIN  
TAKE 1 CAPSULE THREE TIMES A DAY MOTRIN 800 mg tablet Generic drug:  ibuprofen Take 800 mg by mouth two (2) times a day. Indications: OSTEOARTHRITIS  
  
 NEURPATH-B 3-35-2 mg Tab tab Generic drug:  L-Mfolate-B6 Phos-Methyl-B12 Take  by mouth two (2) times a day. palbociclib 125 mg Cap Commonly known as:  [employer] Take 125 mg by mouth daily. Take 1 tab po every day for 21 days on and 7 days off q 28 days  Indications: HORMONE RECEPTOR(+), HER2(-) ADVANCED BREAST CANCER  
  
 TESSALON PERLE PO Take  by mouth. Uses prn cough  
  
 warfarin 1 mg tablet Commonly known as:  COUMADIN Take 1 mg by mouth daily. * Notice:   This list has 3 medication(s) that are the same as other medications prescribed for you. Read the directions carefully, and ask your doctor or other care provider to review them with you. We Performed the Following COMPLETE CBC & AUTO DIFF WBC [78477 CPT(R)] To-Do List   
 05/02/2017 Lab:  CBC WITH 3 PART DIFF   
  
 05/05/2017 10:00 AM  
  Appointment with [address] at [place] [address] ([phone])  
  
 05/30/2017 11:30 AM  
  Appointment with [facility] 2 at [address] [place] [address] ([phone]) OUTSIDE FILMS  - Any outside films related to the study being scheduled should be brought with you on the day of the exam.  If this cannot be done there may be a delay in the reading of the study. MEDICATIONS  - Patient must bring a complete list of all medications currently taking to include prescriptions, over-the-counter meds, herbals, vitamins & any dietary supplements  GENERAL INSTRUCTIONS  - On the day of your exam do not use any bath powder, deodorant or lotions on the armpit area. -Tenderness of breasts may cause an increase of discomfort during procedure. If you are experiencing breast tenderness on the day of your appointment and would like to reschedule, please call [phone].  
  
 06/02/2017 10:00 AM  
  Appointment with [address] at [place] [address] ([phone]) Please provide this summary of care documentation to your next provider. Your primary care clinician is listed as LISA QUINN. If you have any questions after today's visit, please call [phone].

## 2017-05-03 ENCOUNTER — TELEPHONE (OUTPATIENT)
Dept: ONCOLOGY | Age: 72
End: 2017-05-03

## 2017-05-03 RX ORDER — LAMOTRIGINE 25 MG/1
500 TABLET ORAL ONCE
Status: DISPENSED | OUTPATIENT
Start: 2017-05-05 | End: 2017-05-05

## 2017-05-03 NOTE — TELEPHONE ENCOUNTER
Called patient to advise prescription for [facility] is printed, ready for . Explnd per MA she attempted 2 x to send electronically to [facility]; however it was not accepted. Cannot process it electronically. Patient's spouse was upset, he said he can't do anything with it, and that our office will just have to work it out. Spoke to Mrs. Figueroa because she called while I was typing this message, she apologized for her  getting so upset with me and she said she just wants to be sure she gets 2 mo supply. She said the way the prescription is written is important and said it should say 42 instead of 63 for the quantity. She wants you to try it again with this change. Explained to her same info that per MA the electronic prescription was not accepted and we are unable to process it electronically. She said that she doesn't think she can get it filled anywhere and that it won't do her any good to have a printed prescription. Advised her I will give this info to our MA.

## 2017-05-04 NOTE — TELEPHONE ENCOUNTER
I called the number that was provide by the patient and per the Rep at [address] [place] [address] I could do a 90 day supply. But per the pt she wants a 2 month supply. But the RX still PRINTED the pt must  RX and mail it to [facility]. There is NOTHING else I can do.

## 2017-05-05 ENCOUNTER — HOSPITAL ENCOUNTER (OUTPATIENT)
Dept: INFUSION THERAPY | Age: 72
Discharge: HOME | End: 2017-05-05
Payer: MEDICARE

## 2017-05-05 VITALS
HEART RATE: 76 BPM | HEIGHT: 64 IN | DIASTOLIC BLOOD PRESSURE: 72 MMHG | WEIGHT: 152 LBS | SYSTOLIC BLOOD PRESSURE: 135 MMHG | TEMPERATURE: 97.5 F | BODY MASS INDEX: 25.95 KG/M2 | RESPIRATION RATE: 16 BRPM

## 2017-05-05 PROCEDURE — 77030012965 HC NDL HUBR BBMI -A

## 2017-05-05 PROCEDURE — 96523 IRRIG DRUG DELIVERY DEVICE: CPT

## 2017-05-05 PROCEDURE — 96402 CHEMO HORMON ANTINEOPL SQ/IM: CPT

## 2017-05-05 PROCEDURE — 74011250636 HC RX REV CODE- 250/636: Performed by: INTERNAL MEDICINE

## 2017-05-05 RX ORDER — HEPARIN SODIUM (PORCINE) LOCK FLUSH IV SOLN 100 UNIT/ML 100 UNIT/ML
SOLUTION INTRAVENOUS
Status: DISPENSED
Start: 2017-05-05 | End: 2017-05-05

## 2017-05-05 RX ORDER — HEPARIN SODIUM (PORCINE) LOCK FLUSH IV SOLN 100 UNIT/ML 100 UNIT/ML
500 SOLUTION INTRAVENOUS AS NEEDED
Status: ACTIVE | OUTPATIENT
Start: 2017-05-05 | End: 2017-05-06

## 2017-05-05 RX ORDER — SODIUM CHLORIDE 0.9 % (FLUSH) 0.9 %
10-40 SYRINGE (ML) INJECTION AS NEEDED
Status: DISPENSED | OUTPATIENT
Start: 2017-05-05 | End: 2017-05-05

## 2017-05-05 RX ADMIN — HEPARIN SODIUM (PORCINE) LOCK FLUSH IV SOLN 100 UNIT/ML 500 UNITS: 100 SOLUTION at 10:46

## 2017-05-05 RX ADMIN — Medication 20 ML: at 10:44

## 2017-05-05 NOTE — PROGRESS NOTES
[facility] Progress Note    Date: May 5, 2017    Name: Yamilet Augustin    MRN: [id number]         : 1945      Ms. Figueroa was assessed and education was provided. Ms. Felecia Garay vitals were reviewed and patient was observed for 5 minutes prior to treatment. Visit Vitals    /72 (BP 1 Location: Left arm, BP Patient Position: At rest;Sitting)    Pulse 76    Temp 97.5 °F (36.4 °C)    Resp 16    Ht 5' 4\" (1.626 m)    Wt 68.9 kg (152 lb)    Breastfeeding No    BMI 26.09 kg/m2       Monthly port flush done with brisk blood return. Faslodex held for one week per order of May El NP, because of eye infection. Ms Deepti Freedman has gone to her eye doctor and isauro start Rx today. Patient armband removed and shredded. Ms. Deepti Freedman was discharged from [place] [address] in stable condition at 1100. She is to return on 17 at 1000 for her next appointment for monthly faslodex, monthly port flush and lab draw from [facility] for office visit. Margaux Mills RN  May 5, 2017

## 2017-05-10 RX ORDER — LAMOTRIGINE 25 MG/1
500 TABLET ORAL ONCE
Status: COMPLETED | OUTPATIENT
Start: 2017-05-12 | End: 2017-05-12

## 2017-05-12 ENCOUNTER — HOSPITAL ENCOUNTER (OUTPATIENT)
Dept: INFUSION THERAPY | Age: 72
Discharge: HOME | End: 2017-05-12
Payer: MEDICARE

## 2017-05-12 VITALS
HEIGHT: 64 IN | BODY MASS INDEX: 26.29 KG/M2 | TEMPERATURE: 97.9 F | WEIGHT: 154 LBS | DIASTOLIC BLOOD PRESSURE: 71 MMHG | RESPIRATION RATE: 16 BRPM | SYSTOLIC BLOOD PRESSURE: 131 MMHG | HEART RATE: 81 BPM

## 2017-05-12 PROCEDURE — 96402 CHEMO HORMON ANTINEOPL SQ/IM: CPT

## 2017-05-12 PROCEDURE — 74011250636 HC RX REV CODE- 250/636: Performed by: INTERNAL MEDICINE

## 2017-05-12 RX ORDER — SULFACETAMIDE SODIUM 100 MG/G
OINTMENT OPHTHALMIC 3 TIMES DAILY
COMMUNITY
End: 2021-05-06

## 2017-05-12 RX ADMIN — FULVESTRANT 500 MG: 50 INJECTION INTRAMUSCULAR at 15:35

## 2017-05-12 NOTE — PROGRESS NOTES
[facility] Progress Note    Date: May 12, 2017    Name: Susannah Khoury    MRN: [id number]         : 1945      Ms. Figueroa arrived in the [facility] today, at 1515, in stable condition, here for Monthly Faslodex (Fulvestrant) IM injections. She was assessed and education was provided. Upon assessment today, it was noted that her right eye, appeared completely healed. No redness or edema was noted, and Ms. Figueroa had no complaints of pain. She did state however, that her eye MD instructed her to continue the use of the Sulfacetamide Opthalmic Ointment TID, for about 1 more week. Ms. Zazueta Counter vitals were reviewed. Visit Vitals    /71 (BP 1 Location: Left arm, BP Patient Position: At rest;Sitting)    Pulse 81    Temp 97.9 °F (36.6 °C)    Resp 16    Ht 5' 4\" (1.626 m)    Wt 69.9 kg (154 lb)    BMI 26.43 kg/m2             Faslodex (Fulvestrant), Total Dose of 500 mg, was administered IM, in 2 equally divided doses, at 1535, as ordered, and without incident. (250 mg was administered IM, in her right gluteal muscle, and 250 mg was administered IM, in her left gluteal muscle.)           Ms. Alia Duncan tolerated well, and had no complaints. Ms. Alia Duncan was discharged from [place] [address] in stable condition at [phone]. Sanya Souza She is to return in 1 month, on Friday, 17,  at 1000,  for her next appointment, for her next Monthly Faslodex IM injections, and port flush.      Avis Topete RN  May 12, 2017  3:33 PM

## 2017-05-18 DIAGNOSIS — C80.1 NEUROPATHY ASSOCIATED WITH CANCER (HCC): Primary | ICD-10-CM

## 2017-05-18 DIAGNOSIS — G63 NEUROPATHY ASSOCIATED WITH CANCER (HCC): Primary | ICD-10-CM

## 2017-05-18 RX ORDER — GABAPENTIN 100 MG/1
100 CAPSULE ORAL 3 TIMES DAILY
Qty: 90 CAP | Refills: 6 | Status: SHIPPED | OUTPATIENT
Start: 2017-05-18 | End: 2018-06-12 | Stop reason: SDUPTHER

## 2017-05-23 ENCOUNTER — TELEPHONE ANTICOAG (OUTPATIENT)
Dept: ONCOLOGY | Age: 72
End: 2017-05-23

## 2017-05-30 ENCOUNTER — HOSPITAL ENCOUNTER (OUTPATIENT)
Dept: MAMMOGRAPHY | Age: 72
Discharge: HOME | End: 2017-05-30
Payer: MEDICARE

## 2017-05-30 DIAGNOSIS — Z12.31 VISIT FOR SCREENING MAMMOGRAM: ICD-10-CM

## 2017-05-30 PROCEDURE — 77063 BREAST TOMOSYNTHESIS BI: CPT

## 2017-06-02 ENCOUNTER — OFFICE VISIT (OUTPATIENT)
Dept: SURGERY | Age: 72
End: 2017-06-02

## 2017-06-02 ENCOUNTER — APPOINTMENT (OUTPATIENT)
Dept: INFUSION THERAPY | Age: 72
End: 2017-06-02
Payer: MEDICARE

## 2017-06-02 VITALS
DIASTOLIC BLOOD PRESSURE: 70 MMHG | SYSTOLIC BLOOD PRESSURE: 140 MMHG | HEART RATE: 84 BPM | TEMPERATURE: 96.2 F | RESPIRATION RATE: 18 BRPM

## 2017-06-02 DIAGNOSIS — C50.919 METASTATIC BREAST CANCER (HCC): Primary | ICD-10-CM

## 2017-06-02 NOTE — PROGRESS NOTES
Progress Note    Patient: Darling [place]  MRN: [id number]  SSN: [id number]   YOB: 1945  Age: 70 y.o. 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 Overall she looks great and her recent mammogram was normal on the left. She has had a mastectomy on the right and that shows no sign of recurrence. She is in the midst of working up to proton therapy for her spine met. She has no breast health complaints. Past Medical History:   Diagnosis Date    Biliary colic     Breast CA ([facility] [address] 2012    Cancer [facility]) 1991    Right Breast    Chemotherapy convalescence or palliative care     Neuropathy     Radiation therapy complication     Thyroid disease      Past Surgical History:   Procedure Laterality Date    BREAST SURGERY PROCEDURE UNLISTED  10/2002    Right-Lesion    BREAST SURGERY PROCEDURE UNLISTED  11/2004    Right-Lesion    HX LAP CHOLECYSTECTOMY  9-19-13    HX MASTECTOMY  1991    Right     Allergies   Allergen Reactions    Codeine Palpitations    Darvocet A500 [Propoxyphene N-Acetaminophen] Nausea and Vomiting    Darvon [Propoxyphene] Nausea and Vomiting     Current Outpatient Prescriptions   Medication Sig Dispense Refill    gabapentin (NEURONTIN) 100 mg capsule Take 1 Cap by mouth three (3) times daily. 90 Cap 6    palbociclib (IBRANCE) 125 mg cap Take 125 mg by mouth daily. Take 1 tab po every day for 21 days on and 7 days off q 28 days  Indications: HORMONE RECEPTOR(+), HER2(-) ADVANCED BREAST CANCER 42 Cap 6    gabapentin (NEURONTIN) 300 mg capsule TAKE 1 CAPSULE THREE TIMES A  Cap 2    ibuprofen (MOTRIN) 800 mg tablet Take 800 mg by mouth two (2) times a day. Indications: OSTEOARTHRITIS      Cholecalciferol, Vitamin D3, 5,000 unit Tab Take  by mouth daily.         thyroid, Pork, (ARMOUR THYROID) 60 mg tablet Take 90 mg by mouth daily.  L-Mfolate-B6 Phos-Methyl-B12 (NEURPATH-B) 3-35-2 mg Tab Take  by mouth two (2) times a day.  warfarin (COUMADIN) 1 mg tablet Take 1 mg by mouth daily.  sulfacetamide (BLEPH-10) 10 % ophthalmic ointment Administer  to right eye three (3) times daily. Social History     Social History    Marital status:      Spouse name: N/A    Number of children: N/A    Years of education: N/A     Occupational History    Not on file. Social History Main Topics    Smoking status: Former Smoker     Quit date: 6/2/1991    Smokeless tobacco: Not on file    Alcohol use 0.0 oz/week     1 - 2 Glasses of wine per week      Comment: socially, occassionally    Drug use: No    Sexual activity: Not on file     Other Topics Concern    Not on file     Social History Narrative     Family History   Problem Relation Age of Onset    Cancer Maternal Aunt      Hodgkin's disease    Breast Cancer Paternal Aunt     Cancer Father      Prostate, lung, brain         Review of systems:  Patient denies any reflux, emesis, abdominal pain, change in bowel habits, hematochezia, melena, fever, weight loss, fatigue chills, dermatitis, abnormal moles, change in vision, vertigo, epistaxis, dysphagia, hoarseness, chest pain, palpitations, hypertension, edema, cough, shortness of breath, wheezing, hemoptysis, snoring, hematuria, diabetes, thyroid disease, anemia, bruising, history of blood transfusion, dizziness, headache, or fainting.     Physical Examination    Well developed well nourished female in no apparent distress  Visit Vitals    /70    Pulse 84    Temp 96.2 °F (35.7 °C)    Resp 18      Head: normocephalic, atraumatic  Mouth: Clear, no overt lesions, oral mucosa pink and moist  Neck: supple, no masses, no adenopathy or carotid bruits, trachea midline  Resp: clear to auscultation bilaterally, no wheeze, rhonchi or rales, excursions normal and symmetrical  Cardio: Regular rate and rhythm, no murmurs, clicks, gallops or rubs, no edema or varicosities  Abdomen: soft, nontender, nondistended, normoactive bowel sounds, no hernias, no hepatosplenomegaly,   Back: Deferred  Extremeties: warm, well-perfused, no tenderness or swelling, normal gait/station  Neuro: sensation and strength grossly intact and symmetrical  Psych: alert and oriented to person, place and time  Breast exam right breast mastectomy wound without sign of recurrence. Left breast without dominant mass, skin change, nipple discharge, or lymphadenopathy    IMPRESSION  Metastatic breast cancer stable at present    PLAN  No orders of the defined types were placed in this encounter.     Follow-up in a year with a mammogram  Ama Desir MD

## 2017-06-07 RX ORDER — LAMOTRIGINE 25 MG/1
500 TABLET ORAL ONCE
Status: COMPLETED | OUTPATIENT
Start: 2017-06-09 | End: 2017-06-09

## 2017-06-09 ENCOUNTER — HOSPITAL ENCOUNTER (OUTPATIENT)
Dept: INFUSION THERAPY | Age: 72
Discharge: HOME | End: 2017-06-09
Payer: MEDICARE

## 2017-06-09 ENCOUNTER — HOSPITAL ENCOUNTER (OUTPATIENT)
Dept: ONCOLOGY | Age: 72
Discharge: HOME | End: 2017-06-09

## 2017-06-09 ENCOUNTER — HOSPITAL ENCOUNTER (OUTPATIENT)
Dept: LAB | Age: 72
Discharge: HOME | End: 2017-06-09
Payer: MEDICARE

## 2017-06-09 ENCOUNTER — CLINICAL SUPPORT (OUTPATIENT)
Dept: ONCOLOGY | Age: 72
End: 2017-06-09

## 2017-06-09 ENCOUNTER — OFFICE VISIT (OUTPATIENT)
Dept: ONCOLOGY | Age: 72
End: 2017-06-09

## 2017-06-09 VITALS
DIASTOLIC BLOOD PRESSURE: 65 MMHG | RESPIRATION RATE: 18 BRPM | TEMPERATURE: 97.2 F | SYSTOLIC BLOOD PRESSURE: 124 MMHG | BODY MASS INDEX: 26.78 KG/M2 | HEART RATE: 83 BPM | WEIGHT: 156 LBS

## 2017-06-09 VITALS
TEMPERATURE: 97.2 F | BODY MASS INDEX: 25.95 KG/M2 | DIASTOLIC BLOOD PRESSURE: 65 MMHG | HEART RATE: 83 BPM | HEIGHT: 64 IN | RESPIRATION RATE: 18 BRPM | WEIGHT: 152 LBS | SYSTOLIC BLOOD PRESSURE: 124 MMHG

## 2017-06-09 DIAGNOSIS — C50.311 BREAST CANCER OF LOWER-INNER QUADRANT OF RIGHT FEMALE BREAST (HCC): ICD-10-CM

## 2017-06-09 DIAGNOSIS — T45.1X5A CHEMOTHERAPY-INDUCED THROMBOCYTOPENIA: ICD-10-CM

## 2017-06-09 DIAGNOSIS — C50.311 BREAST CANCER OF LOWER-INNER QUADRANT OF RIGHT FEMALE BREAST (HCC): Primary | ICD-10-CM

## 2017-06-09 DIAGNOSIS — G63 NEUROPATHY ASSOCIATED WITH CANCER (HCC): ICD-10-CM

## 2017-06-09 DIAGNOSIS — D64.81 ANEMIA DUE TO ANTINEOPLASTIC CHEMOTHERAPY: ICD-10-CM

## 2017-06-09 DIAGNOSIS — C50.919 METASTATIC BREAST CANCER (HCC): ICD-10-CM

## 2017-06-09 DIAGNOSIS — C80.1 NEUROPATHY ASSOCIATED WITH CANCER (HCC): ICD-10-CM

## 2017-06-09 DIAGNOSIS — T45.1X5A ANEMIA DUE TO ANTINEOPLASTIC CHEMOTHERAPY: ICD-10-CM

## 2017-06-09 DIAGNOSIS — D69.59 CHEMOTHERAPY-INDUCED THROMBOCYTOPENIA: ICD-10-CM

## 2017-06-09 LAB
ALBUMIN SERPL BCP-MCNC: 3.8 G/DL (ref 3.4–5)
ALBUMIN/GLOB SERPL: 1.3 {RATIO} (ref 0.8–1.7)
ALP SERPL-CCNC: 75 U/L (ref 45–117)
ALT SERPL-CCNC: 18 U/L (ref 13–56)
ANION GAP BLD CALC-SCNC: 8 MMOL/L (ref 3–18)
AST SERPL W P-5'-P-CCNC: 16 U/L (ref 15–37)
BASO+EOS+MONOS # BLD AUTO: 0.1 K/UL (ref 0–2.3)
BASO+EOS+MONOS # BLD AUTO: 2 % (ref 0.1–17)
BILIRUB SERPL-MCNC: 0.6 MG/DL (ref 0.2–1)
BUN SERPL-MCNC: 20 MG/DL (ref 7–18)
BUN/CREAT SERPL: 19 (ref 12–20)
CALCIUM SERPL-MCNC: 9.3 MG/DL (ref 8.5–10.1)
CHLORIDE SERPL-SCNC: 105 MMOL/L (ref 100–108)
CO2 SERPL-SCNC: 27 MMOL/L (ref 21–32)
CREAT SERPL-MCNC: 1.03 MG/DL (ref 0.6–1.3)
DIFFERENTIAL METHOD BLD: ABNORMAL
ERYTHROCYTE [DISTWIDTH] IN BLOOD BY AUTOMATED COUNT: 16.3 % (ref 11.5–14.5)
FERRITIN SERPL-MCNC: 156 NG/ML (ref 8–388)
GLOBULIN SER CALC-MCNC: 3 G/DL (ref 2–4)
GLUCOSE SERPL-MCNC: 121 MG/DL (ref 74–99)
HCT VFR BLD AUTO: 28.6 % (ref 36–48)
HGB BLD-MCNC: 9.8 G/DL (ref 12–16)
IRON SATN MFR SERPL: 25 %
IRON SERPL-MCNC: 80 UG/DL (ref 50–175)
LYMPHOCYTES # BLD AUTO: 14 % (ref 14–44)
LYMPHOCYTES # BLD: 0.3 K/UL (ref 1.1–5.9)
MCH RBC QN AUTO: 36.4 PG (ref 25–35)
MCHC RBC AUTO-ENTMCNC: 34.3 G/DL (ref 31–37)
MCV RBC AUTO: 106.3 FL (ref 78–102)
NEUTS SEG # BLD: 2 K/UL (ref 1.8–9.5)
NEUTS SEG NFR BLD AUTO: 84 % (ref 40–70)
PLATELET # BLD AUTO: 238 K/UL (ref 140–440)
POTASSIUM SERPL-SCNC: 3.9 MMOL/L (ref 3.5–5.5)
PROT SERPL-MCNC: 6.8 G/DL (ref 6.4–8.2)
RBC # BLD AUTO: 2.69 M/UL (ref 4.1–5.1)
SODIUM SERPL-SCNC: 140 MMOL/L (ref 136–145)
TIBC SERPL-MCNC: 318 UG/DL (ref 250–450)
WBC # BLD AUTO: 2.4 K/UL (ref 4.5–13)

## 2017-06-09 PROCEDURE — 86300 IMMUNOASSAY TUMOR CA 15-3: CPT | Performed by: INTERNAL MEDICINE

## 2017-06-09 PROCEDURE — 36415 COLL VENOUS BLD VENIPUNCTURE: CPT

## 2017-06-09 PROCEDURE — 77030012965 HC NDL HUBR BBMI -A

## 2017-06-09 PROCEDURE — 83540 ASSAY OF IRON: CPT | Performed by: INTERNAL MEDICINE

## 2017-06-09 PROCEDURE — 96523 IRRIG DRUG DELIVERY DEVICE: CPT

## 2017-06-09 PROCEDURE — 82728 ASSAY OF FERRITIN: CPT | Performed by: INTERNAL MEDICINE

## 2017-06-09 PROCEDURE — 80053 COMPREHEN METABOLIC PANEL: CPT | Performed by: INTERNAL MEDICINE

## 2017-06-09 PROCEDURE — 74011250636 HC RX REV CODE- 250/636

## 2017-06-09 PROCEDURE — 74011250636 HC RX REV CODE- 250/636: Performed by: INTERNAL MEDICINE

## 2017-06-09 PROCEDURE — 96372 THER/PROPH/DIAG INJ SC/IM: CPT

## 2017-06-09 RX ORDER — HEPARIN SODIUM (PORCINE) LOCK FLUSH IV SOLN 100 UNIT/ML 100 UNIT/ML
SOLUTION INTRAVENOUS
Status: COMPLETED
Start: 2017-06-09 | End: 2017-06-09

## 2017-06-09 RX ORDER — SODIUM CHLORIDE 0.9 % (FLUSH) 0.9 %
10-40 SYRINGE (ML) INJECTION AS NEEDED
Status: DISCONTINUED | OUTPATIENT
Start: 2017-06-09 | End: 2017-06-13 | Stop reason: HOSPADM

## 2017-06-09 RX ORDER — HEPARIN SODIUM (PORCINE) LOCK FLUSH IV SOLN 100 UNIT/ML 100 UNIT/ML
500 SOLUTION INTRAVENOUS AS NEEDED
Status: ACTIVE | OUTPATIENT
Start: 2017-06-09 | End: 2017-06-10

## 2017-06-09 RX ADMIN — Medication 20 ML: at 10:23

## 2017-06-09 RX ADMIN — HEPARIN SODIUM (PORCINE) LOCK FLUSH IV SOLN 100 UNIT/ML 500 UNITS: 100 SOLUTION at 10:24

## 2017-06-09 RX ADMIN — Medication 10 ML: at 10:22

## 2017-06-09 RX ADMIN — FULVESTRANT 500 MG: 50 INJECTION INTRAMUSCULAR at 10:50

## 2017-06-09 NOTE — PROGRESS NOTES
Hematology/Oncology  Progress Note    Name: Levon Cooks  Date: 2017  : 1945    PCP: Marizol Mares MD     Ms. Jac Iqbal is a 70 y.o.  female who was seen for management of her metastatic breast cancer with associated complications of chemotherapy. Current therapy: Fulvestrant 500 mg subcutaneous monthly and Ibrance 125 mg by mouth daily. Subjective:     Mrs. Jac Iqbal is a 70-year-old woman who has slowly progressive metastatic breast cancer. She has previously completed external beam radiation therapy to lesions in her ribs. Additionally she is currently receiving systemic therapy with fulvestrant and Ibrance. Currently she is tolerating the systemic therapy reasonably well and has not experienced any nausea or emesis. She is no longer complaining of mouth discomfort but she does report some fatigue and occasional weakness. She continues to have SOB intermittently. Her most recent CT scans done in October showed stability with no evidence of disease progression. The most recent CT scans from 2017 have previously been reviewed with the patient. She has continued her current therapy. She was referred to the proton therapy [place] to see if additional radiation treatment to her areas of bone involvement could be recommended using proton therapy. Past medical history, family history, and social history: these were reviewed and remains unchanged.     Past Medical History   Diagnosis Date    Biliary colic     Breast CA ([address]     Cancer [facility])      Right Breast    Chemotherapy convalescence or palliative care     Neuropathy     Radiation therapy complication     Thyroid disease      Past Surgical History   Procedure Laterality Date    Pr breast surgery procedure unlisted  10/2002     Right-Lesion    Hx mastectomy  1991     Right    Pr breast surgery procedure unlisted  2004     Right-Lesion    Hx lap cholecystectomy  13     Social History     Social History  Marital status:      Spouse name: N/A    Number of children: N/A    Years of education: N/A     Occupational History    Not on file. Social History Main Topics    Smoking status: Former Smoker    Smokeless tobacco: Not on file    Alcohol use 0.0 oz/week     1 - 2 Glasses of wine per week      Comment: socially, occassionally    Drug use: No    Sexual activity: Not on file     Other Topics Concern    Not on file     Social History Narrative     Family History   Problem Relation Age of Onset    Cancer Maternal Aunt      Hodgkin's disease    Breast Cancer Paternal Aunt     Cancer Father      Prostate, lung, brain     Current Outpatient Prescriptions   Medication Sig Dispense Refill    gabapentin (NEURONTIN) 300 mg capsule take 1 capsule by mouth three times a day 270 Cap 3    BENZONATATE (TESSALON PERLE PO) Take  by mouth. Uses prn cough      palbociclib (IBRANCE) 125 mg cap Take 125 mg by mouth daily. Take 1 tab po every day for 21 days on and 7 days off q 28 days  Indications: HORMONE RECEPTOR(+), HER2(-) ADVANCED BREAST CANCER 21 Cap 6    albuterol (PROVENTIL HFA, VENTOLIN HFA, PROAIR HFA) 90 mcg/actuation inhaler Take 2 Puffs by inhalation every six (6) hours as needed for Wheezing. Indications: BRONCHOSPASTIC PULMONARY DISEASE 3 Inhaler 3    ibuprofen (MOTRIN) 800 mg tablet Take 800 mg by mouth two (2) times a day. Indications: OSTEOARTHRITIS      Cholecalciferol, Vitamin D3, 5,000 unit Tab Take  by mouth daily.  thyroid, Pork, (ARMOUR THYROID) 60 mg tablet Take 90 mg by mouth daily.  L-Mfolate-B6 Phos-Methyl-B12 (NEURPATH-B) 3-35-2 mg Tab Take  by mouth two (2) times a day.  warfarin (COUMADIN) 1 mg tablet Take 1 mg by mouth daily.          Facility-Administered Medications Ordered in Other Visits   Medication Dose Route Frequency Provider Last Rate Last Dose    sodium chloride (NS) flush 10-40 mL  10-40 mL IntraVENous PRN Lisa Harvey MD   40 mL at 12/02/16 1055    heparin (porcine) 100 unit/mL injection 500 Units  500 Units IntraVENous PRN Daron Marrero MD   500 Units at 12/02/16 1055       Review of Systems  Constitutional: The patient has complaints of some fatigue and occasional weakness. HEENT: The patient denies recent head trauma, eye pain, blurred vision,  hearing deficit, she report of  oropharyngeal mucosal pain and  throat discomfort. Lymphatics: The patient denies palpable peripheral lymphadenopathy. Hematologic: The patient denies having bruising, bleeding, or progressive fatigue. Respiratory: The patient is currently experiencing shortness of breath. Cardiovascular: The patient denies having leg pain, leg swelling, heart palpitations,  chest pain, or lightheadedness. The patient denies having dyspnea on exertion. Gastrointestinal: The patient denies having nausea, emesis, or diarrhea. The patient denies having any hematemesis or blood in the stool. Genitourinary: Patient denies having urinary urgency, frequency, or dysuria. The patient denies having blood in the urine. Psychological: The patient denies having symptoms of nervousness, anxiety, depression, or thoughts of harming self. Skin: Patient denies having skin rashes, skin, ulcerations, or unexplained itching or pruritus. Musculoskeletal: The patient denies having pain in the joints or bones. Objective:     Visit Vitals    /72    Pulse 78    Temp 98 °F (36.7 °C)    Ht 5' 4\" (1.626 m)    Wt 71.7 kg (158 lb)    BMI 27.12 kg/m2     ECOG PS=1, Pain score=1/10   Physical Exam:   Gen. Appearance: The patient is in no acute distress. Skin: There is no bruise or rash. HEENT: The exam is unremarkable. Neck: Supple without lymphadenopathy or thyromegaly. Lungs: Clear to auscultation and percussion; there are no wheezes or rhonchi. Heart: Regular rate and rhythm; there are no murmurs, gallops, or rubs.   Anterior chest wall and breast: There is no evidence of local recurrence of disease. Abdomen: Bowel sounds are present and normal.  There is no guarding, tenderness, or hepatosplenomegaly. Extremities: There is no clubbing, cyanosis, or edema. Neurologic: There are no focal neurologic deficits. Lymphatics: There is no palpable peripheral lymphadenopathy. Musculoskeletal: The patient has full range of motion at all joints. There is no evidence of joint deformity or effusions. There is no focal joint tenderness. Psychological/psychiatric: There is no clinical evidence of anxiety, depression, or melancholy. Lab data:      Results for orders placed or performed during the hospital encounter of 12/02/16   CBC WITH 3 PART DIFF     Status: Abnormal   Result Value Ref Range Status    WBC 2.0 (L) 4.5 - 13.0 K/uL Final    RBC 2.50 (L) 4.10 - 5.10 M/uL Final    HGB 9.1 (L) 12.0 - 16.0 g/dL Final    HCT 26.8 (L) 36 - 48 % Final    .2 (H) 78 - 102 FL Final    MCH 36.4 (H) 25.0 - 35.0 PG Final    MCHC 34.0 31 - 37 g/dL Final    RDW 17.0 (H) 11.5 - 14.5 % Final    PLATELET 420 (L) 277 - 440 K/uL Final    NEUTROPHILS 78 (H) 40 - 70 % Final    MIXED CELLS 8 0.1 - 17 % Final    LYMPHOCYTES 14 14 - 44 % Final    ABS. NEUTROPHILS 1.5 (L) 1.8 - 9.5 K/UL Final    ABS. MIXED CELLS 0.2 0.0 - 2.3 K/uL Final    ABS. LYMPHOCYTES 0.3 (L) 1.1 - 5.9 K/UL Final     Comment: Test performed at [address]. Results Reviewed by Medical Director. DF AUTOMATED   Final           Assessment:     1. Breast cancer of lower-inner quadrant of right female breast ([address]    2. Neuropathy associated with cancer ([address]    3. Metastatic breast cancer ([address]    4. Chemotherapy induced neutropenia (HCC)    5. Antineoplastic chemotherapy induced anemia      Plan:   Metastatic breast cancer: The combination ofFulvestrant 500 mg subcutaneous monthly  will be continued. Ibrance 140 mg by mouth daily will be continued as well.   The patient was previously referred to the proton therapy center and did have an initial consultation but they have not called her back for follow-up to discuss whether or not additional radiation therapy in the form of protimes can be used to treat the areas of bone involvement. We will contact proton therapy service to find out status on their assessment. I have explained to the patient that her most recent CA-27-29 level from 4/11/2017 was 54.8 U/mL. Neuropathy associated with cancer: I will continue her Neurontin at 400 mg 3 times daily. Chemotherapy-induced neutropenia (persisting problem): The current CBC shows that her WBC count is 2.4. The absolute neutrophil count is 2. If the absolute neutrophil count declines to 0.5 she will be started on Neupogen or Neulasta. She will get her labs drawn once a month. Chemotherapy-induced anemia (persisting problem): The new chemotherapy regimen has significantly decreased her hemoglobin to 9.8 g/dL with hematocrit of 28.6%. At this time I will check iron profile and ferritin levels. I have recommended that the patient continue taking ferrous sulfate 1 tablet daily. Procrit at a dose of 60,000 units subcutaneous will be provided now that her hemoglobin has declined below 10 g/dL hematocrit is below 30%. Chemotherapy-induced thrombocytopenia : The patient is continuing the current dose of Ibrance 140 mg daily. We have explained to her that the medication was responsible for her thrombocytopenia. However over the past 5 months her platelet counts have normalized and remained normal at the current rate of 238,000. We will continue to monitor platelet counts at 6 week intervals. I will plan to have the patient return to the clinic in 3 weeks to review test results.   Orders Placed This Encounter    COMPLETE CBC & AUTO DIFF WBC    InHouse CBC ([email or web address])     Standing Status:   Future     Number of Occurrences:   1     Standing Expiration Date:   12/9/2016    FERRITIN     Standing Status:   Future     Standing Expiration Date:   77/1/0209    METABOLIC PANEL, COMPREHENSIVE     Standing Status:   Future     Standing Expiration Date:   12/3/2017    IRON PROFILE     Standing Status:   Future     Standing Expiration Date:   12/3/2017    CA 27.29     Standing Status:   Future     Standing Expiration Date:   12/3/2017       Dayan Sandy MD  6/9/2017      Please note: This document has been produced using voice recognition software. Unrecognized errors in transcription may be present.

## 2017-06-09 NOTE — PROGRESS NOTES
Hematology/Oncology  Progress Note    Name: Revonda Meigs  Date: 2017  : 1945    PCP: Oanh Gonzales MD     Ms. Jessica Tavera is a 70 y.o.  female who was seen for management of her metastatic breast cancer with associated complications of chemotherapy. Current therapy: Fulvestrant 500 mg subcutaneous monthly and Ibrance 125 mg by mouth daily. Subjective:     Mrs. Jessica Tavera is a 77-year-old woman who has slowly progressive metastatic breast cancer. She has previously completed external beam radiation therapy to lesions in her ribs. Additionally she is currently receiving systemic therapy with fulvestrant and Ibrance. Currently she is tolerating the systemic therapy reasonably well and has not experienced any nausea or emesis. She is no longer complaining of mouth discomfort but she does report some fatigue and occasional weakness. She continues to have SOB intermittently. Her most recent CT scans done in October showed stability with no evidence of disease progression. Since 2016 she has been spending time in [place] and now returns to this area for the summer months. She has no new complaints or concerns to report except for the ongoing fatigue and weakness. Past medical history, family history, and social history: these were reviewed and remains unchanged.     Past Medical History   Diagnosis Date    Biliary colic     Breast CA ([facility] [address] 2012    Cancer [facility])      Right Breast    Chemotherapy convalescence or palliative care     Neuropathy     Radiation therapy complication     Thyroid disease      Past Surgical History   Procedure Laterality Date    Pr breast surgery procedure unlisted  10/2002     Right-Lesion    Hx mastectomy  1991     Right    Pr breast surgery procedure unlisted  2004     Right-Lesion    Hx lap cholecystectomy  13     Social History     Social History    Marital status:      Spouse name: N/A    Number of children: N/A    Years of education: N/A     Occupational History    Not on file. Social History Main Topics    Smoking status: Former Smoker    Smokeless tobacco: Not on file    Alcohol use 0.0 oz/week     1 - 2 Glasses of wine per week      Comment: socially, occassionally    Drug use: No    Sexual activity: Not on file     Other Topics Concern    Not on file     Social History Narrative     Family History   Problem Relation Age of Onset    Cancer Maternal Aunt      Hodgkin's disease    Breast Cancer Paternal Aunt     Cancer Father      Prostate, lung, brain     Current Outpatient Prescriptions   Medication Sig Dispense Refill    gabapentin (NEURONTIN) 300 mg capsule take 1 capsule by mouth three times a day 270 Cap 3    BENZONATATE (TESSALON PERLE PO) Take  by mouth. Uses prn cough      palbociclib (IBRANCE) 125 mg cap Take 125 mg by mouth daily. Take 1 tab po every day for 21 days on and 7 days off q 28 days  Indications: HORMONE RECEPTOR(+), HER2(-) ADVANCED BREAST CANCER 21 Cap 6    albuterol (PROVENTIL HFA, VENTOLIN HFA, PROAIR HFA) 90 mcg/actuation inhaler Take 2 Puffs by inhalation every six (6) hours as needed for Wheezing. Indications: BRONCHOSPASTIC PULMONARY DISEASE 3 Inhaler 3    ibuprofen (MOTRIN) 800 mg tablet Take 800 mg by mouth two (2) times a day. Indications: OSTEOARTHRITIS      Cholecalciferol, Vitamin D3, 5,000 unit Tab Take  by mouth daily.  thyroid, Pork, (ARMOUR THYROID) 60 mg tablet Take 90 mg by mouth daily.  L-Mfolate-B6 Phos-Methyl-B12 (NEURPATH-B) 3-35-2 mg Tab Take  by mouth two (2) times a day.  warfarin (COUMADIN) 1 mg tablet Take 1 mg by mouth daily.          Facility-Administered Medications Ordered in Other Visits   Medication Dose Route Frequency Provider Last Rate Last Dose    sodium chloride (NS) flush 10-40 mL  10-40 mL IntraVENous PRN Laron Newman MD   40 mL at 12/02/16 1055    heparin (porcine) 100 unit/mL injection 500 Units  500 Units IntraVENous CHRIS Alonso MD   500 Units at 12/02/16 1055       Review of Systems  Constitutional: The patient has complaints of some fatigue and occasional weakness. HEENT: The patient denies recent head trauma, eye pain, blurred vision,  hearing deficit, she report of  oropharyngeal mucosal pain and  throat discomfort. Lymphatics: The patient denies palpable peripheral lymphadenopathy. Hematologic: The patient denies having bruising, bleeding, or progressive fatigue. Respiratory: The patient is currently experiencing shortness of breath. Cardiovascular: The patient denies having leg pain, leg swelling, heart palpitations,  chest pain, or lightheadedness. The patient denies having dyspnea on exertion. Gastrointestinal: The patient denies having nausea, emesis, or diarrhea. The patient denies having any hematemesis or blood in the stool. Genitourinary: Patient denies having urinary urgency, frequency, or dysuria. The patient denies having blood in the urine. Psychological: The patient denies having symptoms of nervousness, anxiety, depression, or thoughts of harming self. Skin: Patient denies having skin rashes, skin, ulcerations, or unexplained itching or pruritus. Musculoskeletal: The patient denies having pain in the joints or bones. Objective:     Visit Vitals    /72    Pulse 78    Temp 98 °F (36.7 °C)    Ht 5' 4\" (1.626 m)    Wt 71.7 kg (158 lb)    BMI 27.12 kg/m2     ECOG PS=1, Pain score=1/10   Physical Exam:   Gen. Appearance: The patient is in no acute distress. Skin: There is no bruise or rash. HEENT: The exam is unremarkable. Neck: Supple without lymphadenopathy or thyromegaly. Lungs: Clear to auscultation and percussion; there are no wheezes or rhonchi. Heart: Regular rate and rhythm; there are no murmurs, gallops, or rubs. Anterior chest wall and breast: There is no evidence of local recurrence of disease. Abdomen:  Bowel sounds are present and normal.  There is no guarding, tenderness, or hepatosplenomegaly. Extremities: There is no clubbing, cyanosis, or edema. Neurologic: There are no focal neurologic deficits. Lymphatics: There is no palpable peripheral lymphadenopathy. Musculoskeletal: The patient has full range of motion at all joints. There is no evidence of joint deformity or effusions. There is no focal joint tenderness. Psychological/psychiatric: There is no clinical evidence of anxiety, depression, or melancholy. Lab data:      Results for orders placed or performed during the hospital encounter of 12/02/16   CBC WITH 3 PART DIFF     Status: Abnormal   Result Value Ref Range Status    WBC 2.0 (L) 4.5 - 13.0 K/uL Final    RBC 2.50 (L) 4.10 - 5.10 M/uL Final    HGB 9.1 (L) 12.0 - 16.0 g/dL Final    HCT 26.8 (L) 36 - 48 % Final    .2 (H) 78 - 102 FL Final    MCH 36.4 (H) 25.0 - 35.0 PG Final    MCHC 34.0 31 - 37 g/dL Final    RDW 17.0 (H) 11.5 - 14.5 % Final    PLATELET 223 (L) 801 - 440 K/uL Final    NEUTROPHILS 78 (H) 40 - 70 % Final    MIXED CELLS 8 0.1 - 17 % Final    LYMPHOCYTES 14 14 - 44 % Final    ABS. NEUTROPHILS 1.5 (L) 1.8 - 9.5 K/UL Final    ABS. MIXED CELLS 0.2 0.0 - 2.3 K/uL Final    ABS. LYMPHOCYTES 0.3 (L) 1.1 - 5.9 K/UL Final     Comment: Test performed at [place] [address] Location. Results Reviewed by Medical Director. DF AUTOMATED   Final           Assessment:     1. Breast cancer of lower-inner quadrant of right female breast ([address]    2. Neuropathy associated with cancer ([address]    3. Metastatic breast cancer ([address]    4. Chemotherapy induced neutropenia (HCC)    5. Antineoplastic chemotherapy induced anemia      Plan:   Metastatic breast cancer: The patient has recently completed radiation treatment to the rib lesions. The combination ofFulvestrant 500 mg subcutaneous monthly  will be continued. Ibrance 140 mg by mouth daily will be continued as well.   I have explained to the patient that the CT scan dated October 13, 2016 showed no new disease. There was no lymphadenopathy and there was stable findings in the right lung. Additionally there was a stable distribution of bone findings with no evidence of disease progression. At this time I will schedule repeat CT scans of the chest, abdomen, and pelvis since it has been 6 months since she last had scans done. Additionally we will also schedule a bone scan to rule out any evidence of disease progression. The most recent CA-27-29 level from 12/3/2016 was 52 U/mL. Neuropathy associated with cancer: I will continue her Neurontin at 400 mg 3 times daily. Chemotherapy-induced neutropenia (persisting problem): The current CBC shows that her WBC count is 2.4. The absolute neutrophil count is 1.8. If the absolute neutrophil count declines to 0.5 she will be started on Neupogen or Neulasta. She will get her labs drawn once a month. Chemotherapy-induced anemia (persisting problem): The new chemotherapy regimen has significantly decreased her hemoglobin to 9.4 g/dL with hematocrit of 27.4%. At this time I will check iron profile and ferritin levels. I have recommended that the patient continue taking ferrous sulfate 1 tablet daily. Procrit at a dose of 60,000 units subcutaneous will be provided now that her hemoglobin has declined below 10 g/dL hematocrit is below 30%. Chemotherapy-induced thrombocytopenia : The patient is continuing the current dose of Ibrance 140 mg daily. We have explained to her that the medication was responsible for her thrombocytopenia. However over the past 4 months her platelet counts have normalized to 224,000. We will monitor her every 2 weeks and if the platelet count should decline below 30,000 the medicine will be held until there is complete recovery. Her current Platelet for today is 128,000    I will plan to have the patient return to the clinic in 3 weeks to review test results.   Orders Placed This Encounter    COMPLETE CBC & AUTO DIFF WBC  InHouse CBC ([facility])     Standing Status:   Future     Number of Occurrences:   1     Standing Expiration Date:   12/9/2016    FERRITIN     Standing Status:   Future     Standing Expiration Date:   73/2/3301    METABOLIC PANEL, COMPREHENSIVE     Standing Status:   Future     Standing Expiration Date:   12/3/2017    IRON PROFILE     Standing Status:   Future     Standing Expiration Date:   12/3/2017    CA 27.29     Standing Status:   Future     Standing Expiration Date:   12/3/2017       Petra Cortes MD  4/11/2017      Please note: This document has been produced using voice recognition software. Unrecognized errors in transcription may be present. Hematology/medical oncology progress note    5/2/2017  Jaycee Mack  YOB: 1945    PCP: Dr. Fidelia Vázquez    Diagnosis: Metastatic breast cancer    Mrs. Soheila Geiger is a 77-year-old woman who has a history of metastatic breast cancer. She is currently on therapy with Ibrance in combination with exemestane. She was recently referred for CT scans of the chest, abdomen, pelvis and a bone scan. I have explained to the patient I have personally reviewed the imaging studies. The study show that she still has a small degree of right-sided pleural effusion but it is mildly decreased when compared to her previous CT scans. She has some chronic atelectasis in the posterior basal segments of the right lower lobe in the right middle lobe. There was no evidence of an acute process of metastatic disease in the lungs liver or adrenal glands. Additionally there was no evidence of pathologic lymphadenopathy or new lymphadenopathy in the mediastinum, abdomen, or pelvis. There were some sclerotic changes in the anterior ends of ribs including the right fifth, sixth, and seventh ribs.   There were a few sclerotic foci in the pelvic bones which may represent metastatic disease but there was no evidence of any new or lytic metastatic lesions in the bones. These findings were on the CT scan. The bone scan showed no new foci of increased activity. She has previously been treated with radiation therapy to the right fifth, sixth, and seventh ribs. She has also received previous radiation treatment to T12. There was a stable increased activity in the 3 ribs and T12. But no new foci of increased activity was evident. The patient is having a mild degree of discomfort especially following prolonged activity in the lower back. For this reason and the fact that she has previously received radiation treatment to T12 I have proposed referring her to the proton therapy center to see if she would be a candidate for proton therapy to the lower thoracic spine. The patient had her questions answered to her satisfaction. I have recommended that she continue to take the high brands and exemestane combination. I will plan to see her back in clinic in about 6 weeks. Total time 30 minutes, greater than 50% of the time was in counseling and coordination of care.     Rosi Daley MD, [place]

## 2017-06-09 NOTE — MR AVS SNAPSHOT
Visit Information Date & Time Provider Department Dept. Phone Encounter #  
 6/9/2017 11:15 AM [facility] Office [phone] [id number] Follow-up Instructions Return in about 6 weeks (around 7/21/2017). Your Appointments 7/21/2017  1:45 PM  
Office Visit with All Lincoln MD  
[facility] [address] [place] [address]-[facility]) Appt Note: 6 wk fu  
 [facility] [address] [facility] [address] [facility] [address] [phone]  
  
   
 [facility] [address] [facility] [address]  [address] [facility] Upcoming Health Maintenance Date Due Hepatitis C Screening 1945 DTaP/Tdap/Td series (1 - Tdap) 10/18/1966 FOBT Q 1 YEAR AGE 50-75 10/18/1995 ZOSTER VACCINE AGE 60> 10/18/2005 GLAUCOMA SCREENING Q2Y 10/18/2010 Pneumococcal 65+ High/Highest Risk (1 of 2 - PCV13) 10/18/2010 MEDICARE YEARLY EXAM 10/18/2010 INFLUENZA AGE 9 TO ADULT 8/1/2017 BREAST CANCER SCRN MAMMOGRAM 5/30/2019 Allergies as of 6/9/2017  Review Complete On: 6/9/2017 By: All Lincoln MD  
  
 Severity Noted Reaction Type Reactions Codeine  08/09/2012    Palpitations Darvocet A500 [Propoxyphene N-acetaminophen]  08/09/2012   Side Effect Nausea and Vomiting Darvon [Propoxyphene]  04/09/2014   Systemic Nausea and Vomiting Current Immunizations  Reviewed on 5/12/2017 Name Date Influenza Vaccine 11/14/2016, 9/14/2015, 1/7/2015, 12/9/2013, 1/16/2013 Influenza Vaccine Whole 2/1/2012 Not reviewed this visit You Were Diagnosed With   
  
 Codes Comments Breast cancer of lower-inner quadrant of right female breast ([facility] [address]    -  Primary ICD-10-CM: Z12.384 ICD-9-CM: 174.3 Neuropathy associated with cancer ([facility] [address]     ICD-10-CM: C80.1, G63 ICD-9-CM: 199.1, 357.3 [facility]     ICD-10-CM: C50.919, C79.9 ICD-9-CM: 174.9, 199.1 Anemia due to antineoplastic chemotherapy     ICD-10-CM: D64.81 ICD-9-CM: 285.3, E933.1 Chemotherapy-induced thrombocytopenia     ICD-10-CM: D69.59, T45.1X5A 
ICD-9-CM: 287.49, E933.1 Vitals OB Status Smoking Status Menopause Former Smoker Preferred Pharmacy Pharmacy Name Phone [address] [phone] Your Updated Medication List  
  
   
This list is accurate as of: 6/9/17 11:23 AM.  Always use your most recent med list.  
  
  
  
  
 ARMOUR THYROID 60 mg tablet Generic drug:  thyroid (Pork) Take 90 mg by mouth daily. cholecalciferol (VITAMIN D3) 5,000 unit Tab tablet Commonly known as:  VITAMIN D3 Take  by mouth daily. * gabapentin 300 mg capsule Commonly known as:  NEURONTIN  
TAKE 1 CAPSULE THREE TIMES A DAY  
  
 * gabapentin 100 mg capsule Commonly known as:  NEURONTIN Take 1 Cap by mouth three (3) times daily. MOTRIN 800 mg tablet Generic drug:  ibuprofen Take 800 mg by mouth two (2) times a day. Indications: OSTEOARTHRITIS  
  
 NEURPATH-B 3-35-2 mg Tab tab Generic drug:  L-Mfolate-B6 Phos-Methyl-B12 Take  by mouth two (2) times a day. palbociclib 125 mg Cap Commonly known as:  [employer] Take 125 mg by mouth daily. Take 1 tab po every day for 21 days on and 7 days off q 28 days  Indications: HORMONE RECEPTOR(+), HER2(-) ADVANCED BREAST CANCER  
  
 sulfacetamide 10 % ophthalmic ointment Commonly known as:  BLEPH-10 Administer  to right eye three (3) times daily. warfarin 1 mg tablet Commonly known as:  COUMADIN Take 1 mg by mouth daily. * Notice: This list has 2 medication(s) that are the same as other medications prescribed for you. Read the directions carefully, and ask your doctor or other care provider to review them with you. Follow-up Instructions Return in about 6 weeks (around 7/21/2017). To-Do List   
 06/09/2017 Lab:  CA 27.29   
  
 06/09/2017   Lab:  FERRITIN   
  
 06/09/2017 Lab:  IRON PROFILE   
  
 06/09/2017 Lab:  METABOLIC PANEL, COMPREHENSIVE   
  
 07/07/2017 10:00 AM  
  Appointment with [address] at [facility] 19 ([phone]) Introducing [facility] SERVICES! Akira Sparks introduces [employer] patient portal. Now you can access parts of your medical record, email your doctor's office, and request medication refills online. 1. In your internet browser, go to [email or web address] 2. Click on the First Time User? Click Here link in the Sign In box. You will see the New Member Sign Up page. 3. Enter your [employer] Access Code exactly as it appears below. You will not need to use this code after youve completed the sign-up process. If you do not sign up before the expiration date, you must request a new code. · [employer] Access Code: [email or web address] Expires: 8/31/2017  2:39 PM 
 
4. Enter the last four digits of your Social Security Number (xxxx) and Date of Birth (mm/dd/yyyy) as indicated and click Submit. You will be taken to the next sign-up page. 5. Create a [employer] ID. This will be your [employer] login ID and cannot be changed, so think of one that is secure and easy to remember. 6. Create a [employer] password. You can change your password at any time. 7. Enter your Password Reset Question and Answer. This can be used at a later time if you forget your password. 8. Enter your e-mail address. You will receive e-mail notification when new information is available in [address] 9. Click Sign Up. You can now view and download portions of your medical record. 10. Click the Download Summary menu link to download a portable copy of your medical information. If you have questions, please visit the Frequently Asked Questions section of the [employer] website. Remember, [employer] is NOT to be used for urgent needs. For medical emergencies, dial 911. Now available from your iPhone and Android! Please provide this summary of care documentation to your next provider. Your primary care clinician is listed as LISA QUINN. If you have any questions after today's visit, please call [phone].

## 2017-06-09 NOTE — PROGRESS NOTES
[facility] Progress Note    Date: 2017    Name: Han Gupta    MRN: [id number]         : 1945      Ms. Figueroa was assessed and education was provided. Ms. Vimal Izaguirre vitals were reviewed and patient was observed for 5 minutes prior to treatment. Visit Vitals    /65 (BP 1 Location: Left arm, BP Patient Position: Sitting)    Pulse 83    Temp 97.2 °F (36.2 °C)    Resp 18    Ht 5' 4\" (1.626 m)    Wt 68.9 kg (152 lb)    Breastfeeding No    BMI 26.09 kg/m2       Faxlodex 500 mg IM given in divided dose , one injection in each gluteus max. Gauze and band aid applied to site. Monthly port flush done with no blood return obtained. Patient has a office visit scheduled for 1100 am and will not be able to have cathflo done today. mediport heparinized, and removed. Band aid applied to site. Office visit labs obtained via left AC x 1 attempt using a butterfly needle. Gauze and band aid applied to site. Patient armband removed and shredded. Ms. Kaylene Wynn was discharged from [place] [address] in stable condition at 1055. She is to return on 17 at 1000 for her next appointment for monthly Faslodex and monthly port flush.

## 2017-06-10 LAB — CANCER AG27-29 SERPL-ACNC: 51.8 U/ML (ref 0–38.6)

## 2017-06-23 ENCOUNTER — APPOINTMENT (OUTPATIENT)
Dept: MRI IMAGING | Age: 72
End: 2017-06-23
Attending: RADIOLOGY
Payer: MEDICARE

## 2017-06-23 ENCOUNTER — HOSPITAL ENCOUNTER (OUTPATIENT)
Dept: MRI IMAGING | Age: 72
Discharge: HOME | End: 2017-06-23
Attending: RADIOLOGY
Payer: MEDICARE

## 2017-06-23 VITALS — WEIGHT: 150 LBS | BODY MASS INDEX: 25.75 KG/M2

## 2017-06-23 DIAGNOSIS — C79.51 BONE METASTASIS (HCC): ICD-10-CM

## 2017-06-23 DIAGNOSIS — C50.911 MALIGNANT NEOPLASM OF RIGHT BREAST (HCC): ICD-10-CM

## 2017-06-23 PROCEDURE — 74011250636 HC RX REV CODE- 250/636: Performed by: RADIOLOGY

## 2017-06-23 PROCEDURE — 72157 MRI CHEST SPINE W/O & W/DYE: CPT

## 2017-06-23 PROCEDURE — A9585 GADOBUTROL INJECTION: HCPCS | Performed by: RADIOLOGY

## 2017-06-23 RX ADMIN — GADOBUTROL 6 ML: 604.72 INJECTION INTRAVENOUS at 15:00

## 2017-06-30 ENCOUNTER — HOSPITAL ENCOUNTER (OUTPATIENT)
Dept: MRI IMAGING | Age: 72
Discharge: HOME | End: 2017-06-30
Attending: RADIOLOGY
Payer: MEDICARE

## 2017-06-30 DIAGNOSIS — C50.911 MALIGNANT NEOPLASM OF RIGHT BREAST (HCC): ICD-10-CM

## 2017-06-30 DIAGNOSIS — C79.51 BONE METASTASIS (HCC): ICD-10-CM

## 2017-06-30 PROCEDURE — A9585 GADOBUTROL INJECTION: HCPCS | Performed by: RADIOLOGY

## 2017-06-30 PROCEDURE — 72158 MRI LUMBAR SPINE W/O & W/DYE: CPT

## 2017-06-30 PROCEDURE — 74011250636 HC RX REV CODE- 250/636: Performed by: RADIOLOGY

## 2017-06-30 RX ADMIN — GADOBUTROL 6 ML: 604.72 INJECTION INTRAVENOUS at 18:15

## 2017-07-05 RX ORDER — LAMOTRIGINE 25 MG/1
500 TABLET ORAL ONCE
Status: COMPLETED | OUTPATIENT
Start: 2017-07-07 | End: 2017-07-07

## 2017-07-07 ENCOUNTER — HOSPITAL ENCOUNTER (OUTPATIENT)
Dept: INFUSION THERAPY | Age: 72
Discharge: HOME | End: 2017-07-07
Payer: MEDICARE

## 2017-07-07 VITALS
SYSTOLIC BLOOD PRESSURE: 122 MMHG | WEIGHT: 152 LBS | HEIGHT: 64 IN | RESPIRATION RATE: 18 BRPM | DIASTOLIC BLOOD PRESSURE: 66 MMHG | BODY MASS INDEX: 25.95 KG/M2 | TEMPERATURE: 96.3 F | HEART RATE: 80 BPM

## 2017-07-07 PROCEDURE — 96402 CHEMO HORMON ANTINEOPL SQ/IM: CPT

## 2017-07-07 PROCEDURE — 96523 IRRIG DRUG DELIVERY DEVICE: CPT

## 2017-07-07 PROCEDURE — 74011250636 HC RX REV CODE- 250/636: Performed by: INTERNAL MEDICINE

## 2017-07-07 PROCEDURE — 77030012965 HC NDL HUBR BBMI -A

## 2017-07-07 RX ORDER — SODIUM CHLORIDE 0.9 % (FLUSH) 0.9 %
10-40 SYRINGE (ML) INJECTION AS NEEDED
Status: DISCONTINUED | OUTPATIENT
Start: 2017-07-07 | End: 2017-07-11 | Stop reason: HOSPADM

## 2017-07-07 RX ORDER — HEPARIN SODIUM (PORCINE) LOCK FLUSH IV SOLN 100 UNIT/ML 100 UNIT/ML
SOLUTION INTRAVENOUS
Status: DISCONTINUED
Start: 2017-07-07 | End: 2017-07-07

## 2017-07-07 RX ORDER — HEPARIN SODIUM (PORCINE) LOCK FLUSH IV SOLN 100 UNIT/ML 100 UNIT/ML
500 SOLUTION INTRAVENOUS AS NEEDED
Status: ACTIVE | OUTPATIENT
Start: 2017-07-07 | End: 2017-07-08

## 2017-07-07 RX ADMIN — FULVESTRANT 500 MG: 50 INJECTION INTRAMUSCULAR at 10:50

## 2017-07-07 RX ADMIN — Medication 20 ML: at 10:31

## 2017-07-07 RX ADMIN — HEPARIN SODIUM (PORCINE) LOCK FLUSH IV SOLN 100 UNIT/ML 500 UNITS: 100 SOLUTION at 10:32

## 2017-07-07 NOTE — PROGRESS NOTES
[facility] Progress Note    Date: 2017    Name: Susannah Khoury    MRN: [id number]         : 1945      Ms. Figueroa was assessed and education was provided. Ms. Carlita Mishra vitals were reviewed and patient was observed for 5 minutes prior to treatment. Visit Vitals    /66 (BP 1 Location: Left arm, BP Patient Position: At rest;Sitting)    Pulse 80    Temp 96.3 °F (35.7 °C)    Resp 18    Ht 5' 4\" (1.626 m)    Wt 68.9 kg (152 lb)    Breastfeeding No    BMI 26.09 kg/m2     Faslodex 500 mg divided into two syringes given IM. One injection in right gluteus klaudia and one injection in left gluteus klaudia. Monthly port flush done with brisk blood return obtained. Patient armband removed and shredded. Ms. Alia Duncan was discharged from [place] [address] in stable condition at 12. She is to return on 17 at 1000 for her next appointment for monthly port flush and faslodex injection.     Ana Webb RN  2017  11:17 AM

## 2017-07-21 ENCOUNTER — HOSPITAL ENCOUNTER (OUTPATIENT)
Dept: LAB | Age: 72
Discharge: HOME | End: 2017-07-21
Payer: MEDICARE

## 2017-07-21 ENCOUNTER — HOSPITAL ENCOUNTER (OUTPATIENT)
Dept: ONCOLOGY | Age: 72
Discharge: HOME | End: 2017-07-21

## 2017-07-21 ENCOUNTER — OFFICE VISIT (OUTPATIENT)
Dept: ONCOLOGY | Age: 72
End: 2017-07-21

## 2017-07-21 VITALS
BODY MASS INDEX: 26.12 KG/M2 | TEMPERATURE: 98.3 F | HEART RATE: 77 BPM | SYSTOLIC BLOOD PRESSURE: 125 MMHG | WEIGHT: 153 LBS | DIASTOLIC BLOOD PRESSURE: 59 MMHG | HEIGHT: 64 IN

## 2017-07-21 DIAGNOSIS — T45.1X5A ANEMIA DUE TO ANTINEOPLASTIC CHEMOTHERAPY: ICD-10-CM

## 2017-07-21 DIAGNOSIS — C50.311 BREAST CANCER OF LOWER-INNER QUADRANT OF RIGHT FEMALE BREAST (HCC): ICD-10-CM

## 2017-07-21 DIAGNOSIS — C50.919 METASTATIC BREAST CANCER (HCC): ICD-10-CM

## 2017-07-21 DIAGNOSIS — G63 NEUROPATHY ASSOCIATED WITH CANCER (HCC): Primary | ICD-10-CM

## 2017-07-21 DIAGNOSIS — C80.1 NEUROPATHY ASSOCIATED WITH CANCER (HCC): Primary | ICD-10-CM

## 2017-07-21 DIAGNOSIS — C80.1 NEUROPATHY ASSOCIATED WITH CANCER (HCC): ICD-10-CM

## 2017-07-21 DIAGNOSIS — D69.59 CHEMOTHERAPY-INDUCED THROMBOCYTOPENIA: ICD-10-CM

## 2017-07-21 DIAGNOSIS — D64.81 ANEMIA DUE TO ANTINEOPLASTIC CHEMOTHERAPY: ICD-10-CM

## 2017-07-21 DIAGNOSIS — T45.1X5A CHEMOTHERAPY-INDUCED THROMBOCYTOPENIA: ICD-10-CM

## 2017-07-21 DIAGNOSIS — T45.1X5A CHEMOTHERAPY INDUCED NEUTROPENIA (HCC): ICD-10-CM

## 2017-07-21 DIAGNOSIS — D72.819 CHRONIC LEUKOPENIA: ICD-10-CM

## 2017-07-21 DIAGNOSIS — D70.1 CHEMOTHERAPY INDUCED NEUTROPENIA (HCC): ICD-10-CM

## 2017-07-21 DIAGNOSIS — G63 NEUROPATHY ASSOCIATED WITH CANCER (HCC): ICD-10-CM

## 2017-07-21 LAB
ALBUMIN SERPL BCP-MCNC: 3.8 G/DL (ref 3.4–5)
ALBUMIN/GLOB SERPL: 1.2 {RATIO} (ref 0.8–1.7)
ALP SERPL-CCNC: 73 U/L (ref 45–117)
ALT SERPL-CCNC: 17 U/L (ref 13–56)
ANION GAP BLD CALC-SCNC: 9 MMOL/L (ref 3–18)
AST SERPL W P-5'-P-CCNC: 12 U/L (ref 15–37)
BASO+EOS+MONOS # BLD AUTO: 0.1 K/UL (ref 0–2.3)
BASO+EOS+MONOS # BLD AUTO: 7 % (ref 0.1–17)
BILIRUB SERPL-MCNC: 0.4 MG/DL (ref 0.2–1)
BUN SERPL-MCNC: 22 MG/DL (ref 7–18)
BUN/CREAT SERPL: 21 (ref 12–20)
CALCIUM SERPL-MCNC: 8.8 MG/DL (ref 8.5–10.1)
CHLORIDE SERPL-SCNC: 106 MMOL/L (ref 100–108)
CO2 SERPL-SCNC: 26 MMOL/L (ref 21–32)
CREAT SERPL-MCNC: 1.05 MG/DL (ref 0.6–1.3)
DIFFERENTIAL METHOD BLD: ABNORMAL
ERYTHROCYTE [DISTWIDTH] IN BLOOD BY AUTOMATED COUNT: 16.7 % (ref 11.5–14.5)
FERRITIN SERPL-MCNC: 179 NG/ML (ref 8–388)
GLOBULIN SER CALC-MCNC: 3.2 G/DL (ref 2–4)
GLUCOSE SERPL-MCNC: 84 MG/DL (ref 74–99)
HCT VFR BLD AUTO: 28.1 % (ref 36–48)
HGB BLD-MCNC: 9.8 G/DL (ref 12–16)
IRON SATN MFR SERPL: 28 %
IRON SERPL-MCNC: 89 UG/DL (ref 50–175)
LYMPHOCYTES # BLD AUTO: 19 % (ref 14–44)
LYMPHOCYTES # BLD: 0.4 K/UL (ref 1.1–5.9)
MCH RBC QN AUTO: 37.7 PG (ref 25–35)
MCHC RBC AUTO-ENTMCNC: 34.9 G/DL (ref 31–37)
MCV RBC AUTO: 108.1 FL (ref 78–102)
NEUTS SEG # BLD: 1.6 K/UL (ref 1.8–9.5)
NEUTS SEG NFR BLD AUTO: 74 % (ref 40–70)
PLATELET # BLD AUTO: 118 K/UL (ref 140–440)
POTASSIUM SERPL-SCNC: 4.1 MMOL/L (ref 3.5–5.5)
PROT SERPL-MCNC: 7 G/DL (ref 6.4–8.2)
RBC # BLD AUTO: 2.6 M/UL (ref 4.1–5.1)
SODIUM SERPL-SCNC: 141 MMOL/L (ref 136–145)
TIBC SERPL-MCNC: 323 UG/DL (ref 250–450)
WBC # BLD AUTO: 2.1 K/UL (ref 4.5–13)

## 2017-07-21 PROCEDURE — 80053 COMPREHEN METABOLIC PANEL: CPT | Performed by: INTERNAL MEDICINE

## 2017-07-21 PROCEDURE — 86300 IMMUNOASSAY TUMOR CA 15-3: CPT | Performed by: INTERNAL MEDICINE

## 2017-07-21 PROCEDURE — 82728 ASSAY OF FERRITIN: CPT | Performed by: INTERNAL MEDICINE

## 2017-07-21 PROCEDURE — 83540 ASSAY OF IRON: CPT | Performed by: INTERNAL MEDICINE

## 2017-07-21 PROCEDURE — 36415 COLL VENOUS BLD VENIPUNCTURE: CPT | Performed by: INTERNAL MEDICINE

## 2017-07-21 NOTE — PATIENT INSTRUCTIONS
Breast Cancer: Care Instructions  Your Care Instructions  Breast cancer occurs when abnormal cells grow out of control in the breast. These cancer cells can spread within the breast, to nearby lymph nodes and other tissues, and to other parts of the body. Being treated for cancer can weaken your body, and you may feel very tired. Get the rest your body needs so you can feel better. Finding out that you have cancer is scary. You may feel many emotions and may need some help coping. Seek out family, friends, and counselors for support. You also can do things at home to make yourself feel better while you go through treatment. Call the [employer] (5-112.409.5357) or visit its website at [employer] for more information. Follow-up care is a key part of your treatment and safety. Be sure to make and go to all appointments, and call your doctor if you are having problems. It's also a good idea to know your test results and keep a list of the medicines you take. How can you care for yourself at home? · Take your medicines exactly as prescribed. Call your doctor if you think you are having a problem with your medicine. You may get medicine for nausea and vomiting if you have these side effects. · Follow your doctor's instructions to relieve pain. Pain from cancer and surgery can almost always be controlled. Use pain medicine when you first notice pain, before it becomes severe. · Eat healthy food. If you do not feel like eating, try to eat food that has protein and extra calories to keep up your strength and prevent weight loss. Drink liquid meal replacements for extra calories and protein. Try to eat your main meal early. · Get some physical activity every day, but do not get too tired. Keep doing the hobbies you enjoy as your energy allows. · Do not smoke. Smoking can make your cancer worse. If you need help quitting, talk to your doctor about stop-smoking programs and medicines.  These can increase your chances of quitting for good. · Take steps to control your stress and workload. Learn relaxation techniques. ¨ Share your feelings. Stress and tension affect our emotions. By expressing your feelings to others, you may be able to understand and cope with them. ¨ Consider joining a support group. Talking about a problem with your spouse, a good friend, or other people with similar problems is a good way to reduce tension and stress. ¨ Express yourself through art. Try writing, crafts, dance, or art to relieve stress. Some dance, writing, or art groups may be available just for people who have cancer. ¨ Be kind to your body and mind. Getting enough sleep, eating a healthy diet, and taking time to do things you enjoy can contribute to an overall feeling of balance in your life and can help reduce stress. ¨ Get help if you need it. Discuss your concerns with your doctor or counselor. · If you are vomiting or have diarrhea:  ¨ Drink plenty of fluids (enough so that your urine is light yellow or clear like water) to prevent dehydration. Choose water and other caffeine-free clear liquids. If you have kidney, heart, or liver disease and have to limit fluids, talk with your doctor before you increase the amount of fluids you drink. ¨ When you are able to eat, try clear soups, mild foods, and liquids until all symptoms are gone for 12 to 48 hours. Other good choices include dry toast, crackers, cooked cereal, and gelatin dessert, such as Jell-O.  · If you have not already done so, prepare a list of advance directives. Advance directives are instructions to your doctor and family members about what kind of care you want if you become unable to speak or express yourself. When should you call for help? Call your doctor now or seek immediate medical care if:  · You have a fever. · Any part of your breast becomes red, tender, swollen, or hot.   · You have pain, redness, or swelling in the arm on the same side as your breast cancer. Watch closely for changes in your health, and be sure to contact your doctor if:  · You have pain that is not controlled by medicine. · You have nausea or vomiting. · You are constipated or have diarrhea. Where can you learn more? Go to http://mandie-elizabeth.info/. Enter V321 in the search box to learn more about \"Breast Cancer: Care Instructions. \"  Current as of: July 26, 2016  Content Version: 11.3  © 8329-6155 [employer]. Care instructions adapted under license by [employer] (which disclaims liability or warranty for this information). If you have questions about a medical condition or this instruction, always ask your healthcare professional. [address] any warranty or liability for your use of this information.

## 2017-07-21 NOTE — MR AVS SNAPSHOT
Visit Information Date & Time Provider Department Dept. Phone Encounter #  
 7/21/2017  1:45 PM [facility] Office [phone] [id number] Follow-up Instructions Return in about 8 weeks (around 9/15/2017). Your Appointments 9/22/2017 11:15 AM  
[facility] Appt Note: 2 month check [facility] [address] [address]  
[phone]  
  
   
 [facility] [address] Upcoming Health Maintenance Date Due Hepatitis C Screening 1945 DTaP/Tdap/Td series (1 - Tdap) 10/18/1966 FOBT Q 1 YEAR AGE 50-75 10/18/1995 ZOSTER VACCINE AGE 60> 8/18/2005 GLAUCOMA SCREENING Q2Y 10/18/2010 Pneumococcal 65+ High/Highest Risk (1 of 2 - PCV13) 10/18/2010 MEDICARE YEARLY EXAM 10/18/2010 INFLUENZA AGE 9 TO ADULT 8/1/2017 BREAST CANCER SCRN MAMMOGRAM 5/30/2019 Allergies as of 7/21/2017  Review Complete On: 7/7/2017 By: Colleen Wayne RN Severity Noted Reaction Type Reactions Codeine  08/09/2012    Palpitations Darvocet A500 [Propoxyphene N-acetaminophen]  08/09/2012   Side Effect Nausea and Vomiting Darvon [Propoxyphene]  04/09/2014   Systemic Nausea and Vomiting Current Immunizations  Reviewed on 7/7/2017 Name Date Influenza Vaccine 11/14/2016, 9/14/2015, 1/7/2015, 12/9/2013, 1/16/2013 Influenza Vaccine Whole 2/1/2012 Not reviewed this visit You Were Diagnosed With   
  
 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 
ICD-9-CM: 288.03, E933.1 Anemia due to antineoplastic chemotherapy     ICD-10-CM: D64.81 ICD-9-CM: 285.3, E933.1 Chemotherapy-induced thrombocytopenia     ICD-10-CM: D69.59, T45.1X5A 
ICD-9-CM: 287.49, E933.1 Vitals BP Pulse Temp Height(growth percentile) Weight(growth percentile) BMI  
 125/59 77 98.3 °F (36.8 °C) 5' 4\" (1.626 m) 153 lb (69.4 kg) 26.26 kg/m2 OB Status Smoking Status Menopause Former Smoker BMI and BSA Data Body Mass Index Body Surface Area  
 26.26 kg/m 2 1.77 m 2 Preferred Pharmacy Pharmacy Name Phone [address] [facility] [phone] Your Updated Medication List  
  
   
This list is accurate as of: 7/21/17  3:16 PM.  Always use your most recent med list.  
  
  
  
  
 ARMOUR THYROID 60 mg tablet Generic drug:  thyroid (Pork) Take 90 mg by mouth daily. cholecalciferol (VITAMIN D3) 5,000 unit Tab tablet Commonly known as:  VITAMIN D3 Take  by mouth daily. * gabapentin 300 mg capsule Commonly known as:  NEURONTIN  
TAKE 1 CAPSULE THREE TIMES A DAY  
  
 * gabapentin 100 mg capsule Commonly known as:  NEURONTIN Take 1 Cap by mouth three (3) times daily. MOTRIN 800 mg tablet Generic drug:  ibuprofen Take 800 mg by mouth two (2) times a day. Indications: OSTEOARTHRITIS  
  
 NEURPATH-B 3-35-2 mg Tab tab Generic drug:  L-Mfolate-B6 Phos-Methyl-B12 Take  by mouth two (2) times a day. palbociclib 125 mg Cap Commonly known as:  [employer] Take 125 mg by mouth daily. Take 1 tab po every day for 21 days on and 7 days off q 28 days  Indications: HORMONE RECEPTOR(+), HER2(-) ADVANCED BREAST CANCER  
  
 sulfacetamide 10 % ophthalmic ointment Commonly known as:  BLEPH-10 Administer  to right eye three (3) times daily. warfarin 1 mg tablet Commonly known as:  COUMADIN Take 1 mg by mouth daily. * Notice: This list has 2 medication(s) that are the same as other medications prescribed for you. Read the directions carefully, and ask your doctor or other care provider to review them with you. We Performed the Following CA 27.29 [56340 CPT(R)] COMPLETE CBC & AUTO DIFF WBC [23024 CPT(R)] FERRITIN [01008 CPT(R)] IRON PROFILE [id number] CPT(R)] METABOLIC PANEL, COMPREHENSIVE [69829 CPT(R)] Follow-up Instructions Return in about 8 weeks (around 9/15/2017). To-Do List   
 07/21/2017 Lab:  CBC WITH 3 PART DIFF   
  
 08/04/2017 10:00 AM  
  Appointment with [address] at [place] [address] ([phone]) 09/01/2017 10:00 AM  
  Appointment with [address] at [place] [address] ([phone])  
  
 05/31/2018 11:00 AM  
  Appointment with [facility] 2 at [address] [place] [address] ([phone]) OUTSIDE FILMS  - Any outside films related to the study being scheduled should be brought with you on the day of the exam.  If this cannot be done there may be a delay in the reading of the study. MEDICATIONS  - Patient must bring a complete list of all medications currently taking to include prescriptions, over-the-counter meds, herbals, vitamins & any dietary supplements  GENERAL INSTRUCTIONS  - On the day of your exam do not use any bath powder, deodorant or lotions on the armpit area. -Tenderness of breasts may cause an increase of discomfort during procedure. If you are experiencing breast tenderness on the day of your appointment and would like to reschedule, please call [phone]. Patient Instructions Breast Cancer: Care Instructions Your Care Instructions Breast cancer occurs when abnormal cells grow out of control in the breast. These cancer cells can spread within the breast, to nearby lymph nodes and other tissues, and to other parts of the body. Being treated for cancer can weaken your body, and you may feel very tired. Get the rest your body needs so you can feel better. Finding out that you have cancer is scary. You may feel many emotions and may need some help coping. Seek out family, friends, and counselors for support. You also can do things at home to make yourself feel better while you go through treatment. Call the [employer] (1-421.290.5570) or visit its website at [address] [employer] for more information. Follow-up care is a key part of your treatment and safety. Be sure to make and go to all appointments, and call your doctor if you are having problems. It's also a good idea to know your test results and keep a list of the medicines you take. How can you care for yourself at home? · Take your medicines exactly as prescribed. Call your doctor if you think you are having a problem with your medicine. You may get medicine for nausea and vomiting if you have these side effects. · Follow your doctor's instructions to relieve pain. Pain from cancer and surgery can almost always be controlled. Use pain medicine when you first notice pain, before it becomes severe. · Eat healthy food. If you do not feel like eating, try to eat food that has protein and extra calories to keep up your strength and prevent weight loss. Drink liquid meal replacements for extra calories and protein. Try to eat your main meal early. · Get some physical activity every day, but do not get too tired. Keep doing the hobbies you enjoy as your energy allows. · Do not smoke. Smoking can make your cancer worse. If you need help quitting, talk to your doctor about stop-smoking programs and medicines. These can increase your chances of quitting for good. · Take steps to control your stress and workload. Learn relaxation techniques. ¨ Share your feelings. Stress and tension affect our emotions.  By expressing your feelings to others, you may be able to understand and cope with them. ¨ Consider joining a support group. Talking about a problem with your spouse, a good friend, or other people with similar problems is a good way to reduce tension and stress. ¨ Express yourself through art. Try writing, crafts, dance, or art to relieve stress. Some dance, writing, or art groups may be available just for people who have cancer. ¨ Be kind to your body and mind. Getting enough sleep, eating a healthy diet, and taking time to do things you enjoy can contribute to an overall feeling of balance in your life and can help reduce stress. ¨ Get help if you need it. Discuss your concerns with your doctor or counselor. · If you are vomiting or have diarrhea: ¨ Drink plenty of fluids (enough so that your urine is light yellow or clear like water) to prevent dehydration. Choose water and other caffeine-free clear liquids. If you have kidney, heart, or liver disease and have to limit fluids, talk with your doctor before you increase the amount of fluids you drink. ¨ When you are able to eat, try clear soups, mild foods, and liquids until all symptoms are gone for 12 to 48 hours. Other good choices include dry toast, crackers, cooked cereal, and gelatin dessert, such as Jell-O. · If you have not already done so, prepare a list of advance directives. Advance directives are instructions to your doctor and family members about what kind of care you want if you become unable to speak or express yourself. When should you call for help? Call your doctor now or seek immediate medical care if: 
· You have a fever. · Any part of your breast becomes red, tender, swollen, or hot. · You have pain, redness, or swelling in the arm on the same side as your breast cancer. Watch closely for changes in your health, and be sure to contact your doctor if: 
· You have pain that is not controlled by medicine. · You have nausea or vomiting. · You are constipated or have diarrhea. Where can you learn more? Go to http://mandie-elizabeth.info/. Enter V321 in the search box to learn more about \"Breast Cancer: Care Instructions. \" Current as of: July 26, 2016 Content Version: 11.3 © 9975-4199 [employer]. Care instructions adapted under license by [employer] (which disclaims liability or warranty for this information). If you have questions about a medical condition or this instruction, always ask your healthcare professional. [address] any warranty or liability for your use of this information. Please provide this summary of care documentation to your next provider. Your primary care clinician is listed as LISA QUINN. If you have any questions after today's visit, please call [phone].

## 2017-07-21 NOTE — PROGRESS NOTES
Hematology/Oncology  Progress Note    Name: Amalia Clark  Date: 2017  : 1945    PCP: Darrin Arce MD     Ms. Lottie Mckeon is a 70 y.o.  female who was seen for management of her metastatic breast cancer with associated complications of chemotherapy. Current therapy: Fulvestrant 500 mg subcutaneous monthly and Ibrance 125 mg by mouth daily. Subjective:     Mrs. Lottie Mckeon is a 19-year-old woman who has slowly progressive metastatic breast cancer. She has previously completed external beam radiation therapy to lesions in her ribs. Additionally she is currently receiving systemic therapy with fulvestrant and Ibrance. Currently she is tolerating the systemic therapy reasonably well and has not experienced any nausea or emesis. She is no longer complaining of mouth discomfort but she does report some fatigue and occasional weakness. She continues to have SOB intermittently. Her most recent CT scans done in October showed stability with no evidence of disease progression. The most recent MRI revealed involvement of her thoracic spine at T12 with malignancy. She is currently undergoing proton therapy. Past medical history, family history, and social history: these were reviewed and remains unchanged. Past Medical History   Diagnosis Date    Biliary colic     Breast CA ([facility] [address] 2012    Cancer [facility])      Right Breast    Chemotherapy convalescence or palliative care     Neuropathy     Radiation therapy complication     Thyroid disease      Past Surgical History   Procedure Laterality Date    Pr breast surgery procedure unlisted  10/2002     Right-Lesion    Hx mastectomy       Right    Pr breast surgery procedure unlisted  2004     Right-Lesion    Hx lap cholecystectomy  13     Social History     Social History    Marital status:      Spouse name: N/A    Number of children: N/A    Years of education: N/A     Occupational History    Not on file.      Social History Main Topics    Smoking status: Former Smoker    Smokeless tobacco: Not on file    Alcohol use 0.0 oz/week     1 - 2 Glasses of wine per week      Comment: socially, occassionally    Drug use: No    Sexual activity: Not on file     Other Topics Concern    Not on file     Social History Narrative     Family History   Problem Relation Age of Onset    Cancer Maternal Aunt      Hodgkin's disease    Breast Cancer Paternal Aunt     Cancer Father      Prostate, lung, brain     Current Outpatient Prescriptions   Medication Sig Dispense Refill    gabapentin (NEURONTIN) 300 mg capsule take 1 capsule by mouth three times a day 270 Cap 3    BENZONATATE (TESSALON PERLE PO) Take  by mouth. Uses prn cough      palbociclib (IBRANCE) 125 mg cap Take 125 mg by mouth daily. Take 1 tab po every day for 21 days on and 7 days off q 28 days  Indications: HORMONE RECEPTOR(+), HER2(-) ADVANCED BREAST CANCER 21 Cap 6    albuterol (PROVENTIL HFA, VENTOLIN HFA, PROAIR HFA) 90 mcg/actuation inhaler Take 2 Puffs by inhalation every six (6) hours as needed for Wheezing. Indications: BRONCHOSPASTIC PULMONARY DISEASE 3 Inhaler 3    ibuprofen (MOTRIN) 800 mg tablet Take 800 mg by mouth two (2) times a day. Indications: OSTEOARTHRITIS      Cholecalciferol, Vitamin D3, 5,000 unit Tab Take  by mouth daily.  thyroid, Pork, (ARMOUR THYROID) 60 mg tablet Take 90 mg by mouth daily.  L-Mfolate-B6 Phos-Methyl-B12 (NEURPATH-B) 3-35-2 mg Tab Take  by mouth two (2) times a day.  warfarin (COUMADIN) 1 mg tablet Take 1 mg by mouth daily.          Facility-Administered Medications Ordered in Other Visits   Medication Dose Route Frequency Provider Last Rate Last Dose    sodium chloride (NS) flush 10-40 mL  10-40 mL IntraVENous PRN Gustavo Lamb MD   40 mL at 12/02/16 1055    heparin (porcine) 100 unit/mL injection 500 Units  500 Units IntraVENous PRN Gustavo Lamb MD   500 Units at 12/02/16 1055       Review of Systems  Constitutional: The patient has complaints of some fatigue and occasional weakness. HEENT: The patient denies recent head trauma, eye pain, blurred vision,  hearing deficit, she report of  oropharyngeal mucosal pain and  throat discomfort. Lymphatics: The patient denies palpable peripheral lymphadenopathy. Hematologic: The patient denies having bruising, bleeding, or progressive fatigue. Respiratory: The patient is currently experiencing shortness of breath. Cardiovascular: The patient denies having leg pain, leg swelling, heart palpitations,  chest pain, or lightheadedness. The patient denies having dyspnea on exertion. Gastrointestinal: The patient denies having nausea, emesis, or diarrhea. The patient denies having any hematemesis or blood in the stool. Genitourinary: Patient denies having urinary urgency, frequency, or dysuria. The patient denies having blood in the urine. Psychological: The patient denies having symptoms of nervousness, anxiety, depression, or thoughts of harming self. Skin: Patient denies having skin rashes, skin, ulcerations, or unexplained itching or pruritus. Musculoskeletal: The patient denies having pain in the joints or bones. Objective:     Visit Vitals    /72    Pulse 78    Temp 98 °F (36.7 °C)    Ht 5' 4\" (1.626 m)    Wt 71.7 kg (158 lb)    BMI 27.12 kg/m2     ECOG PS=1, Pain score=1/10   Physical Exam:   Gen. Appearance: The patient is in no acute distress. Skin: There is no bruise or rash. HEENT: The exam is unremarkable. Neck: Supple without lymphadenopathy or thyromegaly. Lungs: Clear to auscultation and percussion; there are no wheezes or rhonchi. Heart: Regular rate and rhythm; there are no murmurs, gallops, or rubs. Anterior chest wall and breast: There is no evidence of local recurrence of disease. Abdomen: Bowel sounds are present and normal.  There is no guarding, tenderness, or hepatosplenomegaly. Extremities:  There is no clubbing, cyanosis, or edema. Neurologic: There are no focal neurologic deficits. Lymphatics: There is no palpable peripheral lymphadenopathy. Musculoskeletal: The patient has full range of motion at all joints. There is no evidence of joint deformity or effusions. There is no focal joint tenderness. Psychological/psychiatric: There is no clinical evidence of anxiety, depression, or melancholy. Lab data:      Results for orders placed or performed during the hospital encounter of 12/02/16   CBC WITH 3 PART DIFF     Status: Abnormal   Result Value Ref Range Status    WBC 2.0 (L) 4.5 - 13.0 K/uL Final    RBC 2.50 (L) 4.10 - 5.10 M/uL Final    HGB 9.1 (L) 12.0 - 16.0 g/dL Final    HCT 26.8 (L) 36 - 48 % Final    .2 (H) 78 - 102 FL Final    MCH 36.4 (H) 25.0 - 35.0 PG Final    MCHC 34.0 31 - 37 g/dL Final    RDW 17.0 (H) 11.5 - 14.5 % Final    PLATELET 194 (L) 143 - 440 K/uL Final    NEUTROPHILS 78 (H) 40 - 70 % Final    MIXED CELLS 8 0.1 - 17 % Final    LYMPHOCYTES 14 14 - 44 % Final    ABS. NEUTROPHILS 1.5 (L) 1.8 - 9.5 K/UL Final    ABS. MIXED CELLS 0.2 0.0 - 2.3 K/uL Final    ABS. LYMPHOCYTES 0.3 (L) 1.1 - 5.9 K/UL Final     Comment: Test performed at [address] Results Reviewed by Medical Director. DF AUTOMATED   Final           Assessment:     1. Breast cancer of lower-inner quadrant of right female breast ([address]    2. Neuropathy associated with cancer ([address]    3. Metastatic breast cancer ([address]    4. Chemotherapy induced neutropenia (HCC)    5. Antineoplastic chemotherapy induced anemia      Plan:   Metastatic breast cancer: The combination ofFulvestrant 500 mg subcutaneous monthly  will be continued. Ibrance 140 mg by mouth daily will be continued as well. The patient was previously referred to the proton therapy center and is currently undergoing treatment. She has completed 4 of the 14 treatments that have been recommended.   She is tolerating the proton therapy reasonably well. Her most recent CA 27.29 level was 51.8 U/mL. Neuropathy associated with cancer: I will continue her Neurontin at 400 mg 3 times daily. Chemotherapy-induced neutropenia (persisting problem): The current CBC shows that her WBC count is 2.4. The absolute neutrophil count is 2. If the absolute neutrophil count declines to 0.5 she will be started on Neupogen or Neulasta. She will get her labs drawn once a month. Chemotherapy-induced anemia (persisting problem): The new chemotherapy regimen has significantly decreased her hemoglobin to 9.8 g/dL with hematocrit of 28.1 at this time I will check iron profile and ferritin levels. I have recommended that the patient continue taking ferrous sulfate 1 tablet daily. Procrit at a dose of 60,000 units subcutaneous will be provided now that her hemoglobin has declined below 10 g/dL hematocrit is below 30%. Chemotherapy-induced thrombocytopenia : The patient is continuing the current dose of Ibrance 140 mg daily. We have explained to her that the medication was responsible for her thrombocytopenia. However over the past 5 months her platelet counts have normalized and remained normal at the current rate of 238,000. We will continue to monitor platelet counts at 6 week intervals. The patient return to clinic in 8 weeks. Orders Placed This Encounter    COMPLETE CBC & AUTO DIFF WBC    InHouse CBC (Dot)     Standing Status:   Future     Number of Occurrences:   1     Standing Expiration Date:   12/9/2016    FERRITIN     Standing Status:   Future     Standing Expiration Date:   15/5/9359    METABOLIC PANEL, COMPREHENSIVE     Standing Status:   Future     Standing Expiration Date:   12/3/2017    IRON PROFILE     Standing Status:   Future     Standing Expiration Date:   12/3/2017    CA 27.29     Standing Status:   Future     Standing Expiration Date:   12/3/2017       Josue Pierre MD  7/21/2017      Please note:  This document has been produced using voice recognition software. Unrecognized errors in transcription may be present.

## 2017-07-22 LAB — CANCER AG27-29 SERPL-ACNC: 44.6 U/ML (ref 0–38.6)

## 2017-07-24 ENCOUNTER — TELEPHONE (OUTPATIENT)
Dept: ONCOLOGY | Age: 72
End: 2017-07-24

## 2017-07-24 NOTE — TELEPHONE ENCOUNTER
Pt called to inform me that she will be out of town for the second procrit injection.   Pt procrit injection will start 8/4/17

## 2017-07-24 NOTE — TELEPHONE ENCOUNTER
Patient called asked to speak to Akbar November, she said it regards PROCRIT for August 4. Patient is at [phone], she said please call her as soon as possible.

## 2017-08-02 RX ORDER — LAMOTRIGINE 25 MG/1
500 TABLET ORAL ONCE
Status: COMPLETED | OUTPATIENT
Start: 2017-08-04 | End: 2017-08-04

## 2017-08-04 ENCOUNTER — HOSPITAL ENCOUNTER (OUTPATIENT)
Dept: ONCOLOGY | Age: 72
Discharge: HOME | End: 2017-08-04

## 2017-08-04 ENCOUNTER — CLINICAL SUPPORT (OUTPATIENT)
Dept: ONCOLOGY | Age: 72
End: 2017-08-04

## 2017-08-04 ENCOUNTER — HOSPITAL ENCOUNTER (OUTPATIENT)
Dept: INFUSION THERAPY | Age: 72
Discharge: HOME | End: 2017-08-04
Payer: MEDICARE

## 2017-08-04 VITALS
BODY MASS INDEX: 25.61 KG/M2 | HEIGHT: 64 IN | HEART RATE: 69 BPM | WEIGHT: 150 LBS | SYSTOLIC BLOOD PRESSURE: 113 MMHG | OXYGEN SATURATION: 97 % | RESPIRATION RATE: 16 BRPM | TEMPERATURE: 97.4 F | DIASTOLIC BLOOD PRESSURE: 58 MMHG

## 2017-08-04 DIAGNOSIS — T45.1X5A ANEMIA DUE TO CHEMOTHERAPY: Primary | ICD-10-CM

## 2017-08-04 DIAGNOSIS — T45.1X5A ANEMIA DUE TO CHEMOTHERAPY: ICD-10-CM

## 2017-08-04 DIAGNOSIS — D64.81 ANEMIA DUE TO CHEMOTHERAPY: Primary | ICD-10-CM

## 2017-08-04 DIAGNOSIS — D64.81 ANEMIA DUE TO CHEMOTHERAPY: ICD-10-CM

## 2017-08-04 LAB
BASO+EOS+MONOS # BLD AUTO: 0.2 K/UL (ref 0–2.3)
BASO+EOS+MONOS # BLD AUTO: 7 % (ref 0.1–17)
DIFFERENTIAL METHOD BLD: ABNORMAL
ERYTHROCYTE [DISTWIDTH] IN BLOOD BY AUTOMATED COUNT: 16.8 % (ref 11.5–14.5)
HCT VFR BLD AUTO: 27.8 % (ref 36–48)
HGB BLD-MCNC: 9.4 G/DL (ref 12–16)
LYMPHOCYTES # BLD AUTO: 14 % (ref 14–44)
LYMPHOCYTES # BLD: 0.4 K/UL (ref 1.1–5.9)
MCH RBC QN AUTO: 36.3 PG (ref 25–35)
MCHC RBC AUTO-ENTMCNC: 33.8 G/DL (ref 31–37)
MCV RBC AUTO: 107.3 FL (ref 78–102)
NEUTS SEG # BLD: 2 K/UL (ref 1.8–9.5)
NEUTS SEG NFR BLD AUTO: 79 % (ref 40–70)
PLATELET # BLD AUTO: 214 K/UL (ref 140–440)
RBC # BLD AUTO: 2.59 M/UL (ref 4.1–5.1)
WBC # BLD AUTO: 2.6 K/UL (ref 4.5–13)

## 2017-08-04 PROCEDURE — 96402 CHEMO HORMON ANTINEOPL SQ/IM: CPT

## 2017-08-04 PROCEDURE — 77030012965 HC NDL HUBR BBMI -A

## 2017-08-04 PROCEDURE — 74011250636 HC RX REV CODE- 250/636: Performed by: INTERNAL MEDICINE

## 2017-08-04 PROCEDURE — 36591 DRAW BLOOD OFF VENOUS DEVICE: CPT

## 2017-08-04 PROCEDURE — 96372 THER/PROPH/DIAG INJ SC/IM: CPT

## 2017-08-04 RX ORDER — HEPARIN SODIUM (PORCINE) LOCK FLUSH IV SOLN 100 UNIT/ML 100 UNIT/ML
SOLUTION INTRAVENOUS
Status: DISCONTINUED
Start: 2017-08-04 | End: 2017-08-04

## 2017-08-04 RX ORDER — FEXOFENADINE HCL AND PSEUDOEPHEDRINE HCI 180; 240 MG/1; MG/1
1 TABLET, EXTENDED RELEASE ORAL
COMMUNITY
End: 2022-01-01

## 2017-08-04 RX ORDER — HEPARIN SODIUM (PORCINE) LOCK FLUSH IV SOLN 100 UNIT/ML 100 UNIT/ML
500 SOLUTION INTRAVENOUS AS NEEDED
Status: ACTIVE | OUTPATIENT
Start: 2017-08-04 | End: 2017-08-05

## 2017-08-04 RX ORDER — SODIUM CHLORIDE 0.9 % (FLUSH) 0.9 %
10-40 SYRINGE (ML) INJECTION AS NEEDED
Status: DISCONTINUED | OUTPATIENT
Start: 2017-08-04 | End: 2017-08-08 | Stop reason: HOSPADM

## 2017-08-04 RX ADMIN — Medication 20 ML: at 11:06

## 2017-08-04 RX ADMIN — FULVESTRANT 500 MG: 50 INJECTION INTRAMUSCULAR at 11:21

## 2017-08-04 RX ADMIN — ERYTHROPOIETIN 40000 UNITS: 40000 INJECTION, SOLUTION INTRAVENOUS; SUBCUTANEOUS at 11:38

## 2017-08-04 RX ADMIN — ERYTHROPOIETIN 20000 UNITS: 20000 INJECTION, SOLUTION INTRAVENOUS; SUBCUTANEOUS at 11:39

## 2017-08-04 RX ADMIN — HEPARIN SODIUM (PORCINE) LOCK FLUSH IV SOLN 100 UNIT/ML 500 UNITS: 100 SOLUTION at 11:06

## 2017-08-04 NOTE — PROGRESS NOTES
[facility] Progress Note    Date: 2017    Name: Adali Shultz    MRN: [id number]         : 1945      Ms. Figueroa was assessed and education was provided. Ms. Merritt Elliott vitals were reviewed and patient was observed for 5 minutes prior to treatment. Visit Vitals    /58 (BP 1 Location: Left arm, BP Patient Position: At rest;Sitting)    Pulse 69    Temp 97.4 °F (36.3 °C)    Resp 16    Ht 5' 4\" (1.626 m)    Wt 68 kg (150 lb)    SpO2 97%    Breastfeeding No    BMI 25.75 kg/m2       Lab results were obtained and reviewed. Recent Results (from the past 12 hour(s))   CBC WITH 3 PART DIFF    Collection Time: 17 11:17 AM   Result Value Ref Range    WBC 2.6 (L) 4.5 - 13.0 K/uL    RBC 2.59 (L) 4.10 - 5.10 M/uL    HGB 9.4 (L) 12.0 - 16.0 g/dL    HCT 27.8 (L) 36 - 48 %    .3 (H) 78 - 102 FL    MCH 36.3 (H) 25.0 - 35.0 PG    MCHC 33.8 31 - 37 g/dL    RDW 16.8 (H) 11.5 - 14.5 %    PLATELET 803 471 - 907 K/uL    NEUTROPHILS 79 (H) 40 - 70 %    MIXED CELLS 7 0.1 - 17 %    LYMPHOCYTES 14 14 - 44 %    ABS. NEUTROPHILS 2.0 1.8 - 9.5 K/UL    ABS. MIXED CELLS 0.2 0.0 - 2.3 K/uL    ABS. LYMPHOCYTES 0.4 (L) 1.1 - 5.9 K/UL    DF AUTOMATED       CBC drawn from port after brisk blood return obtained. Procrit 60,000 units SQ given for H&H 9.4/27. 8. Faslodex 500 mg IM given in divided dose. 250 mg syringe given IM in right gluteus max and 250 mg syringe given IM in left gluteus max. Patient armband removed and shredded. Ms. Tana Rothman was discharged from [place] [address] in stable condition at 1145. She is to return on 17 at 1100 for her next appointment however, she states she likely will be out of town. Her next appt after that is 17 at 1000 for monthly port flush, CBC/Procrit and Faslodex monthly.     Ericka Eisenmenger, RN  2017  3:09 PM

## 2017-08-29 RX ORDER — LAMOTRIGINE 25 MG/1
500 TABLET ORAL ONCE
Status: DISPENSED | OUTPATIENT
Start: 2017-09-01 | End: 2017-09-01

## 2017-09-01 ENCOUNTER — HOSPITAL ENCOUNTER (OUTPATIENT)
Dept: ONCOLOGY | Age: 72
Discharge: HOME | End: 2017-09-01

## 2017-09-01 ENCOUNTER — HOSPITAL ENCOUNTER (OUTPATIENT)
Dept: INFUSION THERAPY | Age: 72
Discharge: HOME | End: 2017-09-01
Payer: MEDICARE

## 2017-09-01 ENCOUNTER — CLINICAL SUPPORT (OUTPATIENT)
Dept: ONCOLOGY | Age: 72
End: 2017-09-01

## 2017-09-01 VITALS
SYSTOLIC BLOOD PRESSURE: 123 MMHG | TEMPERATURE: 97.3 F | BODY MASS INDEX: 26.12 KG/M2 | WEIGHT: 153 LBS | HEART RATE: 79 BPM | HEIGHT: 64 IN | DIASTOLIC BLOOD PRESSURE: 61 MMHG | RESPIRATION RATE: 16 BRPM

## 2017-09-01 DIAGNOSIS — D64.81 ANEMIA DUE TO CHEMOTHERAPY: Primary | ICD-10-CM

## 2017-09-01 DIAGNOSIS — D64.81 ANEMIA DUE TO CHEMOTHERAPY: ICD-10-CM

## 2017-09-01 DIAGNOSIS — T45.1X5A ANEMIA DUE TO CHEMOTHERAPY: ICD-10-CM

## 2017-09-01 DIAGNOSIS — T45.1X5A ANEMIA DUE TO CHEMOTHERAPY: Primary | ICD-10-CM

## 2017-09-01 LAB
BASO+EOS+MONOS # BLD AUTO: 0.2 K/UL (ref 0–2.3)
BASO+EOS+MONOS # BLD AUTO: 7 % (ref 0.1–17)
DIFFERENTIAL METHOD BLD: ABNORMAL
ERYTHROCYTE [DISTWIDTH] IN BLOOD BY AUTOMATED COUNT: 16.4 % (ref 11.5–14.5)
HCT VFR BLD AUTO: 26.5 % (ref 36–48)
HGB BLD-MCNC: 8.9 G/DL (ref 12–16)
LYMPHOCYTES # BLD: 0.3 K/UL (ref 1.1–5.9)
LYMPHOCYTES NFR BLD: 12 % (ref 14–44)
MCH RBC QN AUTO: 36.2 PG (ref 25–35)
MCHC RBC AUTO-ENTMCNC: 33.6 G/DL (ref 31–37)
MCV RBC AUTO: 107.7 FL (ref 78–102)
NEUTS SEG # BLD: 2.2 K/UL (ref 1.8–9.5)
NEUTS SEG NFR BLD: 81 % (ref 40–70)
PLATELET # BLD AUTO: 213 K/UL (ref 140–440)
RBC # BLD AUTO: 2.46 M/UL (ref 4.1–5.1)
WBC # BLD AUTO: 2.7 K/UL (ref 4.5–13)

## 2017-09-01 PROCEDURE — 96372 THER/PROPH/DIAG INJ SC/IM: CPT

## 2017-09-01 PROCEDURE — 36591 DRAW BLOOD OFF VENOUS DEVICE: CPT

## 2017-09-01 PROCEDURE — 77030012965 HC NDL HUBR BBMI -A

## 2017-09-01 PROCEDURE — 74011250636 HC RX REV CODE- 250/636: Performed by: INTERNAL MEDICINE

## 2017-09-01 RX ORDER — HEPARIN SODIUM (PORCINE) LOCK FLUSH IV SOLN 100 UNIT/ML 100 UNIT/ML
500 SOLUTION INTRAVENOUS AS NEEDED
Status: ACTIVE | OUTPATIENT
Start: 2017-09-01 | End: 2017-09-02

## 2017-09-01 RX ORDER — SODIUM CHLORIDE 0.9 % (FLUSH) 0.9 %
10-40 SYRINGE (ML) INJECTION AS NEEDED
Status: DISCONTINUED | OUTPATIENT
Start: 2017-09-01 | End: 2017-09-05 | Stop reason: HOSPADM

## 2017-09-01 RX ORDER — HEPARIN SODIUM (PORCINE) LOCK FLUSH IV SOLN 100 UNIT/ML 100 UNIT/ML
SOLUTION INTRAVENOUS
Status: DISPENSED
Start: 2017-09-01 | End: 2017-09-02

## 2017-09-01 RX ADMIN — ERYTHROPOIETIN 20000 UNITS: 20000 INJECTION, SOLUTION INTRAVENOUS; SUBCUTANEOUS at 14:59

## 2017-09-01 RX ADMIN — Medication 20 ML: at 14:37

## 2017-09-01 RX ADMIN — HEPARIN SODIUM (PORCINE) LOCK FLUSH IV SOLN 100 UNIT/ML 500 UNITS: 100 SOLUTION at 14:37

## 2017-09-01 RX ADMIN — ERYTHROPOIETIN 40000 UNITS: 40000 INJECTION, SOLUTION INTRAVENOUS; SUBCUTANEOUS at 14:59

## 2017-09-01 NOTE — PROGRESS NOTES
[address] [facility] Progress Note    Date: 2017    Name: Shakir Acosta    MRN: [id number]         : 1945      Ms. Figueroa was assessed and education was provided. Ms. Isabela [place] vitals were reviewed and patient was observed for 5 minutes prior to treatment. Visit Vitals    /61 (BP 1 Location: Left leg, BP Patient Position: At rest;Sitting)    Pulse 79    Temp 97.3 °F (36.3 °C)    Resp 16    Ht 5' 4\" (1.626 m)    Wt 69.4 kg (153 lb)    Breastfeeding No    BMI 26.26 kg/m2       Lab results were obtained and reviewed. Recent Results (from the past 12 hour(s))   CBC WITH 3 PART DIFF    Collection Time: 17  2:45 PM   Result Value Ref Range    WBC 2.7 (L) 4.5 - 13.0 K/uL    RBC 2.46 (L) 4.10 - 5.10 M/uL    HGB 8.9 (L) 12.0 - 16.0 g/dL    HCT 26.5 (L) 36 - 48 %    .7 (H) 78 - 102 FL    MCH 36.2 (H) 25.0 - 35.0 PG    MCHC 33.6 31 - 37 g/dL    RDW 16.4 (H) 11.5 - 14.5 %    PLATELET 873 317 - 781 K/uL    NEUTROPHILS 81 (H) 40 - 70 %    MIXED CELLS 7 0.1 - 17 %    LYMPHOCYTES 12 (L) 14 - 44 %    ABS. NEUTROPHILS 2.2 1.8 - 9.5 K/UL    ABS. MIXED CELLS 0.2 0.0 - 2.3 K/uL    ABS. LYMPHOCYTES 0.3 (L) 1.1 - 5.9 K/UL    DF AUTOMATED         Procrit 60,000 units given SQ for H&H 8.9/26.5    Faslodex held and will resume 10/6/17 at 1000 because of hardness and discomfort at previous injection sites both upper outer quadrants of buttocks. Order obtained from Dr De La Cruz Delay to hold Faslodex today and resume on 10/6/17. Patient armband removed and shredded. Ms. Kori Avila was discharged from [place] [address] in stable condition at 1510. She is to return on 9/15/17 at 1100 for her next appointment for CBC/Procrit Q 2 weeks.     Nikkie Lawson RN  2017  3:43 PM

## 2017-09-15 ENCOUNTER — CLINICAL SUPPORT (OUTPATIENT)
Dept: ONCOLOGY | Age: 72
End: 2017-09-15

## 2017-09-15 ENCOUNTER — HOSPITAL ENCOUNTER (OUTPATIENT)
Dept: INFUSION THERAPY | Age: 72
Discharge: HOME | End: 2017-09-15
Payer: MEDICARE

## 2017-09-15 ENCOUNTER — HOSPITAL ENCOUNTER (OUTPATIENT)
Dept: ONCOLOGY | Age: 72
Discharge: HOME | End: 2017-09-15

## 2017-09-15 VITALS
OXYGEN SATURATION: 98 % | HEIGHT: 64 IN | HEART RATE: 77 BPM | BODY MASS INDEX: 25.44 KG/M2 | RESPIRATION RATE: 16 BRPM | TEMPERATURE: 97.1 F | DIASTOLIC BLOOD PRESSURE: 60 MMHG | WEIGHT: 149 LBS | SYSTOLIC BLOOD PRESSURE: 106 MMHG

## 2017-09-15 DIAGNOSIS — D64.81 ANTINEOPLASTIC CHEMOTHERAPY INDUCED ANEMIA: ICD-10-CM

## 2017-09-15 DIAGNOSIS — T45.1X5A ANTINEOPLASTIC CHEMOTHERAPY INDUCED ANEMIA: Primary | ICD-10-CM

## 2017-09-15 DIAGNOSIS — T45.1X5A ANTINEOPLASTIC CHEMOTHERAPY INDUCED ANEMIA: ICD-10-CM

## 2017-09-15 DIAGNOSIS — D64.81 ANTINEOPLASTIC CHEMOTHERAPY INDUCED ANEMIA: Primary | ICD-10-CM

## 2017-09-15 LAB
BASO+EOS+MONOS # BLD AUTO: 0.1 K/UL (ref 0–2.3)
BASO+EOS+MONOS # BLD AUTO: 8 % (ref 0.1–17)
DIFFERENTIAL METHOD BLD: ABNORMAL
ERYTHROCYTE [DISTWIDTH] IN BLOOD BY AUTOMATED COUNT: 17 % (ref 11.5–14.5)
HCT VFR BLD AUTO: 28.4 % (ref 36–48)
HGB BLD-MCNC: 9.6 G/DL (ref 12–16)
LYMPHOCYTES # BLD: 0.3 K/UL (ref 1.1–5.9)
LYMPHOCYTES NFR BLD: 15 % (ref 14–44)
MCH RBC QN AUTO: 36.5 PG (ref 25–35)
MCHC RBC AUTO-ENTMCNC: 33.8 G/DL (ref 31–37)
MCV RBC AUTO: 108 FL (ref 78–102)
NEUTS SEG # BLD: 1.3 K/UL (ref 1.8–9.5)
NEUTS SEG NFR BLD: 77 % (ref 40–70)
PLATELET # BLD AUTO: 106 K/UL (ref 140–440)
RBC # BLD AUTO: 2.63 M/UL (ref 4.1–5.1)
WBC # BLD AUTO: 1.7 K/UL (ref 4.5–13)

## 2017-09-15 PROCEDURE — 74011250636 HC RX REV CODE- 250/636: Performed by: INTERNAL MEDICINE

## 2017-09-15 PROCEDURE — 36415 COLL VENOUS BLD VENIPUNCTURE: CPT

## 2017-09-15 PROCEDURE — 96372 THER/PROPH/DIAG INJ SC/IM: CPT

## 2017-09-15 RX ADMIN — ERYTHROPOIETIN 40000 UNITS: 40000 INJECTION, SOLUTION INTRAVENOUS; SUBCUTANEOUS at 11:32

## 2017-09-15 RX ADMIN — ERYTHROPOIETIN 20000 UNITS: 20000 INJECTION, SOLUTION INTRAVENOUS; SUBCUTANEOUS at 11:32

## 2017-09-15 NOTE — PROGRESS NOTES
[facility] Progress Note    Date: September 15, 2017    Name: Nikos Aceves    MRN: [id number]         : 1945      Ms. Figueroa was assessed and education was provided. Ms. Earline More vitals were reviewed and patient was observed for 5 minutes prior to treatment. Visit Vitals    /60 (BP 1 Location: Left arm, BP Patient Position: At rest;Sitting)    Pulse 77    Temp 97.1 °F (36.2 °C)    Resp 16    Ht 5' 4\" (1.626 m)    Wt 67.6 kg (149 lb)    SpO2 98%    BMI 25.58 kg/m2       Lab results were obtained and reviewed. Recent Results (from the past 12 hour(s))   CBC WITH 3 PART DIFF    Collection Time: 09/15/17 11:13 AM   Result Value Ref Range    WBC 1.7 (L) 4.5 - 13.0 K/uL    RBC 2.63 (L) 4.10 - 5.10 M/uL    HGB 9.6 (L) 12.0 - 16.0 g/dL    HCT 28.4 (L) 36 - 48 %    .0 (H) 78 - 102 FL    MCH 36.5 (H) 25.0 - 35.0 PG    MCHC 33.8 31 - 37 g/dL    RDW 17.0 (H) 11.5 - 14.5 %    PLATELET 132 (L) 457 - 440 K/uL    NEUTROPHILS 77 (H) 40 - 70 %    MIXED CELLS 8 0.1 - 17 %    LYMPHOCYTES 15 14 - 44 %    ABS. NEUTROPHILS 1.3 (L) 1.8 - 9.5 K/UL    ABS. MIXED CELLS 0.1 0.0 - 2.3 K/uL    ABS. LYMPHOCYTES 0.3 (L) 1.1 - 5.9 K/UL    DF AUTOMATED       Procrit 60,000 units given SQ for H&H 9.6/28.4. Low WBC count shown to Aram Hassan NP. No orders given. Ms Jarvis Ahmadi stated that she is on day 23 of 21 day oral chemo cycle. Patient armband removed and shredded. Ms. Jarvis Ahmadi was discharged from [place] [address] in stable condition at [phone]. She is to return on 17 at 1000 for her next appointment for CBC/Procrit Q 2 weeks.     Juarez Howard RN  September 15, 2017  11:40 AM

## 2017-09-20 ENCOUNTER — HOSPITAL ENCOUNTER (OUTPATIENT)
Dept: NUCLEAR MEDICINE | Age: 72
Discharge: HOME | End: 2017-09-20
Attending: RADIOLOGY
Payer: MEDICARE

## 2017-09-20 DIAGNOSIS — C79.52 SECONDARY MALIGNANT NEOPLASM OF BONE AND BONE MARROW (HCC): ICD-10-CM

## 2017-09-20 DIAGNOSIS — C50.911 MALIGNANT NEOPLASM OF RIGHT BREAST (HCC): ICD-10-CM

## 2017-09-20 DIAGNOSIS — C79.51 SECONDARY MALIGNANT NEOPLASM OF BONE AND BONE MARROW (HCC): ICD-10-CM

## 2017-09-20 PROCEDURE — 78306 BONE IMAGING WHOLE BODY: CPT

## 2017-09-26 ENCOUNTER — HOSPITAL ENCOUNTER (OUTPATIENT)
Dept: ONCOLOGY | Age: 72
Discharge: HOME | End: 2017-09-26

## 2017-09-26 ENCOUNTER — OFFICE VISIT (OUTPATIENT)
Dept: ONCOLOGY | Age: 72
End: 2017-09-26

## 2017-09-26 VITALS
TEMPERATURE: 98.2 F | WEIGHT: 150.8 LBS | BODY MASS INDEX: 25.88 KG/M2 | DIASTOLIC BLOOD PRESSURE: 56 MMHG | HEART RATE: 86 BPM | SYSTOLIC BLOOD PRESSURE: 123 MMHG

## 2017-09-26 DIAGNOSIS — C50.919 METASTATIC BREAST CANCER (HCC): ICD-10-CM

## 2017-09-26 DIAGNOSIS — D72.819 CHRONIC LEUKOPENIA: ICD-10-CM

## 2017-09-26 DIAGNOSIS — C80.1 NEUROPATHY ASSOCIATED WITH CANCER (HCC): ICD-10-CM

## 2017-09-26 DIAGNOSIS — G63 NEUROPATHY ASSOCIATED WITH CANCER (HCC): Primary | ICD-10-CM

## 2017-09-26 DIAGNOSIS — G63 NEUROPATHY ASSOCIATED WITH CANCER (HCC): ICD-10-CM

## 2017-09-26 DIAGNOSIS — T45.1X5A ANEMIA DUE TO ANTINEOPLASTIC CHEMOTHERAPY: ICD-10-CM

## 2017-09-26 DIAGNOSIS — D64.81 ANEMIA DUE TO ANTINEOPLASTIC CHEMOTHERAPY: ICD-10-CM

## 2017-09-26 DIAGNOSIS — C50.311 BREAST CANCER OF LOWER-INNER QUADRANT OF RIGHT FEMALE BREAST (HCC): ICD-10-CM

## 2017-09-26 DIAGNOSIS — C80.1 NEUROPATHY ASSOCIATED WITH CANCER (HCC): Primary | ICD-10-CM

## 2017-09-26 NOTE — PROGRESS NOTES
Hematology/medical oncology progress note    9/26/2017  Dina Figueroa  YOB: 1945    PCP: Dr. Allen Arreaga    Diagnosis: Metastatic breast cancer    Mrs. Lottie Mckeon is a 70-year-old woman with metastatic breast with bone involvement. Recently she completed proton therapy to areas of involved bone for symptomatic relief. A posttreatment whole body bone scan was completed on 9/20/2017. I have explained to the patient that the bone scan shows decreased overall intensity of uptake in multiple foci suggestive of a favorable response to therapy. This involved the right fifth, sixth, and seventh ribs anteriorly also the uptake noted at T12-L1 was significantly decreased as well. I have recommended that the patient continue her current treatment with Ibrance and exemestane. She had her questions answered to her satisfaction. I will plan to see her back in clinic in 2 months. Total time 25 minutes, greater than 50% of the time was in counseling and coordination of care. Rolo Patel MD, Zaira Dia

## 2017-09-26 NOTE — PATIENT INSTRUCTIONS
Breast Cancer: Care Instructions  Your Care Instructions  Breast cancer occurs when abnormal cells grow out of control in the breast. These cancer cells can spread within the breast, to nearby lymph nodes and other tissues, and to other parts of the body. Being treated for cancer can weaken your body, and you may feel very tired. Get the rest your body needs so you can feel better. Finding out that you have cancer is scary. You may feel many emotions and may need some help coping. Seek out family, friends, and counselors for support. You also can do things at home to make yourself feel better while you go through treatment. Call the [facility] (6-237.177.8830) or visit its website at [employer] for more information. Follow-up care is a key part of your treatment and safety. Be sure to make and go to all appointments, and call your doctor if you are having problems. It's also a good idea to know your test results and keep a list of the medicines you take. How can you care for yourself at home? · Take your medicines exactly as prescribed. Call your doctor if you think you are having a problem with your medicine. You may get medicine for nausea and vomiting if you have these side effects. · Follow your doctor's instructions to relieve pain. Pain from cancer and surgery can almost always be controlled. Use pain medicine when you first notice pain, before it becomes severe. · Eat healthy food. If you do not feel like eating, try to eat food that has protein and extra calories to keep up your strength and prevent weight loss. Drink liquid meal replacements for extra calories and protein. Try to eat your main meal early. · Get some physical activity every day, but do not get too tired. Keep doing the hobbies you enjoy as your energy allows. · Do not smoke. Smoking can make your cancer worse. If you need help quitting, talk to your doctor about stop-smoking programs and medicines.  These can increase your chances of quitting for good. · Take steps to control your stress and workload. Learn relaxation techniques. ¨ Share your feelings. Stress and tension affect our emotions. By expressing your feelings to others, you may be able to understand and cope with them. ¨ Consider joining a support group. Talking about a problem with your spouse, a good friend, or other people with similar problems is a good way to reduce tension and stress. ¨ Express yourself through art. Try writing, crafts, dance, or art to relieve stress. Some dance, writing, or art groups may be available just for people who have cancer. ¨ Be kind to your body and mind. Getting enough sleep, eating a healthy diet, and taking time to do things you enjoy can contribute to an overall feeling of balance in your life and can help reduce stress. ¨ Get help if you need it. Discuss your concerns with your doctor or counselor. · If you are vomiting or have diarrhea:  ¨ Drink plenty of fluids (enough so that your urine is light yellow or clear like water) to prevent dehydration. Choose water and other caffeine-free clear liquids. If you have kidney, heart, or liver disease and have to limit fluids, talk with your doctor before you increase the amount of fluids you drink. ¨ When you are able to eat, try clear soups, mild foods, and liquids until all symptoms are gone for 12 to 48 hours. Other good choices include dry toast, crackers, cooked cereal, and gelatin dessert, such as Jell-O.  · If you have not already done so, prepare a list of advance directives. Advance directives are instructions to your doctor and family members about what kind of care you want if you become unable to speak or express yourself. When should you call for help? Call your doctor now or seek immediate medical care if:  · You have a fever. · Any part of your breast becomes red, tender, swollen, or hot.   · You have pain, redness, or swelling in the arm on the same side as your breast cancer. Watch closely for changes in your health, and be sure to contact your doctor if:  · You have pain that is not controlled by medicine. · You have nausea or vomiting. · You are constipated or have diarrhea. Where can you learn more? Go to http://mandie-elizabeth.info/. Enter V321 in the search box to learn more about \"Breast Cancer: Care Instructions. \"  Current as of: July 26, 2016  Content Version: 11.3  © 7995-8360 [employer]. Care instructions adapted under license by [employer] (which disclaims liability or warranty for this information). If you have questions about a medical condition or this instruction, always ask your healthcare professional. [address] any warranty or liability for your use of this information.

## 2017-09-26 NOTE — MR AVS SNAPSHOT
Visit Information Date & Time Provider Department Dept. Phone Encounter #  
 9/26/2017  3:30 PM Marylee Harms, [place] [address] Office [phone] [id number] Follow-up Instructions Return in about 2 months (around 11/26/2017). Your Appointments 11/28/2017  3:30 PM  
Office Visit with Marylee Harms, MD [address] [place] [address]) Appt Note: 1 [facility] [address] [place] [id number] [phone]  
  
   
 [facility] [address] Upcoming Health Maintenance Date Due Hepatitis C Screening 1945 DTaP/Tdap/Td series (1 - Tdap) 10/18/1966 FOBT Q 1 YEAR AGE 50-75 10/18/1995 ZOSTER VACCINE AGE 60> 8/18/2005 GLAUCOMA SCREENING Q2Y 10/18/2010 Pneumococcal 65+ High/Highest Risk (1 of 2 - PCV13) 10/18/2010 MEDICARE YEARLY EXAM 10/18/2010 INFLUENZA AGE 9 TO ADULT 8/1/2017 BREAST CANCER SCRN MAMMOGRAM 5/30/2019 Allergies as of 9/26/2017  Review Complete On: 9/15/2017 By: Kim Keyes RN Severity Noted Reaction Type Reactions Codeine  08/09/2012    Palpitations Darvocet A500 [Propoxyphene N-acetaminophen]  08/09/2012   Side Effect Nausea and Vomiting Darvon [Propoxyphene]  04/09/2014   Systemic Nausea and Vomiting Current Immunizations  Reviewed on 9/15/2017 Name Date Influenza Vaccine 11/14/2016, 9/14/2015, 1/7/2015, 12/9/2013, 1/16/2013 Influenza Vaccine Whole 2/1/2012 Not reviewed this visit You Were Diagnosed With   
  
 Codes Comments Neuropathy associated with cancer ([facility]    -  Primary ICD-10-CM: C80.1, G63 ICD-9-CM: 199.1, 357.3 Breast cancer of lower-inner quadrant of right female breast [facility])     ICD-10-CM: C50.311 ICD-9-CM: 174.3 Metastatic breast cancer ([facility]     ICD-10-CM: C50.919, C79.9 ICD-9-CM: 174.9, 199.1 Chronic leukopenia     ICD-10-CM: D72.819 ICD-9-CM: 288.50 Anemia due to antineoplastic chemotherapy     ICD-10-CM: D64.81 ICD-9-CM: 285.3, E933.1 Vitals BP Pulse Temp Weight(growth percentile) BMI OB Status 123/56 (BP 1 Location: Left arm, BP Patient Position: Sitting) 86 98.2 °F (36.8 °C) (Oral) 150 lb 12.8 oz (68.4 kg) 25.88 kg/m2 Menopause Smoking Status Former Smoker BMI and BSA Data Body Mass Index Body Surface Area  
 25.88 kg/m 2 1.76 m 2 Preferred Pharmacy Pharmacy Name Phone [facility] [phone] Your Updated Medication List  
  
   
This list is accurate as of: 9/26/17 11:59 PM.  Always use your most recent med list. ALLEGRA-D 24 HOUR 180-240 mg per tablet Generic drug:  fexofenadine-pseudoephedrine Take 1 Tab by mouth daily. ARMOUR THYROID 60 mg tablet Generic drug:  thyroid (Pork) Take 90 mg by mouth daily. cholecalciferol (VITAMIN D3) 5,000 unit Tab tablet Commonly known as:  VITAMIN D3 Take  by mouth daily. * gabapentin 300 mg capsule Commonly known as:  NEURONTIN  
TAKE 1 CAPSULE THREE TIMES A DAY  
  
 * gabapentin 100 mg capsule Commonly known as:  NEURONTIN Take 1 Cap by mouth three (3) times daily. MOTRIN 800 mg tablet Generic drug:  ibuprofen Take 800 mg by mouth two (2) times a day. Indications: OSTEOARTHRITIS  
  
 NEURPATH-B 3-35-2 mg Tab tab Generic drug:  L-Mfolate-B6 Phos-Methyl-B12 Take  by mouth two (2) times a day. palbociclib 125 mg Cap Commonly known as:  [employer] Take 125 mg by mouth daily. Take 1 tab po every day for 21 days on and 7 days off q 28 days  Indications: HORMONE RECEPTOR(+), HER2(-) ADVANCED BREAST CANCER  
  
 sulfacetamide 10 % ophthalmic ointment Commonly known as:  BLEPH-10 Administer  to right eye three (3) times daily. warfarin 1 mg tablet Commonly known as:  COUMADIN Take 1 mg by mouth daily. * Notice: This list has 2 medication(s) that are the same as other medications prescribed for you. Read the directions carefully, and ask your doctor or other care provider to review them with you. We Performed the Following COMPLETE CBC & AUTO DIFF WBC [34274 CPT(R)] Follow-up Instructions Return in about 2 months (around 11/26/2017). To-Do List   
 09/26/2017 Lab:  CBC WITH 3 PART DIFF   
  
 09/29/2017 10:00 AM  
  Appointment with [address] at [place] [address] ([phone]) 10/06/2017 10:00 AM  
  Appointment with [address] at [place] [address] ([phone]) 10/13/2017 11:00 AM  
  Appointment with [address] at [place] [address] ([phone])  
  
 10/27/2017 11:00 AM  
  Appointment with [address] at [place] [address] ([phone])  
  
 11/03/2017 11:00 AM  
  Appointment with [address] at [place] [address] ([phone])  
  
 11/10/2017 11:00 AM  
  Appointment with [address] at [place] [address] ([phone])  
  
 05/31/2018 11:00 AM  
  Appointment with [facility] 2 at [address] [place] [address] ([phone]) OUTSIDE FILMS  - Any outside films related to the study being scheduled should be brought with you on the day of the exam.  If this cannot be done there may be a delay in the reading of the study. MEDICATIONS  - Patient must bring a complete list of all medications currently taking to include prescriptions, over-the-counter meds, herbals, vitamins & any dietary supplements  GENERAL INSTRUCTIONS  - On the day of your exam do not use any bath powder, deodorant or lotions on the armpit area. -Tenderness of breasts may cause an increase of discomfort during procedure. If you are experiencing breast tenderness on the day of your appointment and would like to reschedule, please call [phone]. Patient Instructions Breast Cancer: Care Instructions Your Care Instructions Breast cancer occurs when abnormal cells grow out of control in the breast. These cancer cells can spread within the breast, to nearby lymph nodes and other tissues, and to other parts of the body. Being treated for cancer can weaken your body, and you may feel very tired. Get the rest your body needs so you can feel better. Finding out that you have cancer is scary. You may feel many emotions and may need some help coping. Seek out family, friends, and counselors for support. You also can do things at home to make yourself feel better while you go through treatment. Call the [employer] (3-714.777.1834) or visit its website at [address] for more information. Follow-up care is a key part of your treatment and safety. Be sure to make and go to all appointments, and call your doctor if you are having problems. It's also a good idea to know your test results and keep a list of the medicines you take. How can you care for yourself at home? · Take your medicines exactly as prescribed. Call your doctor if you think you are having a problem with your medicine. You may get medicine for nausea and vomiting if you have these side effects. · Follow your doctor's instructions to relieve pain. Pain from cancer and surgery can almost always be controlled. Use pain medicine when you first notice pain, before it becomes severe. · Eat healthy food. If you do not feel like eating, try to eat food that has protein and extra calories to keep up your strength and prevent weight loss. Drink liquid meal replacements for extra calories and protein. Try to eat your main meal early. · Get some physical activity every day, but do not get too tired. Keep doing the hobbies you enjoy as your energy allows. · Do not smoke. Smoking can make your cancer worse.  If you need help quitting, talk to your doctor about stop-smoking programs and medicines. These can increase your chances of quitting for good. · Take steps to control your stress and workload. Learn relaxation techniques. ¨ Share your feelings. Stress and tension affect our emotions. By expressing your feelings to others, you may be able to understand and cope with them. ¨ Consider joining a support group. Talking about a problem with your spouse, a good friend, or other people with similar problems is a good way to reduce tension and stress. ¨ Express yourself through art. Try writing, crafts, dance, or art to relieve stress. Some dance, writing, or art groups may be available just for people who have cancer. ¨ Be kind to your body and mind. Getting enough sleep, eating a healthy diet, and taking time to do things you enjoy can contribute to an overall feeling of balance in your life and can help reduce stress. ¨ Get help if you need it. Discuss your concerns with your doctor or counselor. · If you are vomiting or have diarrhea: ¨ Drink plenty of fluids (enough so that your urine is light yellow or clear like water) to prevent dehydration. Choose water and other caffeine-free clear liquids. If you have kidney, heart, or liver disease and have to limit fluids, talk with your doctor before you increase the amount of fluids you drink. ¨ When you are able to eat, try clear soups, mild foods, and liquids until all symptoms are gone for 12 to 48 hours. Other good choices include dry toast, crackers, cooked cereal, and gelatin dessert, such as Jell-O. · If you have not already done so, prepare a list of advance directives. Advance directives are instructions to your doctor and family members about what kind of care you want if you become unable to speak or express yourself. When should you call for help? Call your doctor now or seek immediate medical care if: 
· You have a fever. · Any part of your breast becomes red, tender, swollen, or hot. · You have pain, redness, or swelling in the arm on the same side as your breast cancer. Watch closely for changes in your health, and be sure to contact your doctor if: 
· You have pain that is not controlled by medicine. · You have nausea or vomiting. · You are constipated or have diarrhea. Where can you learn more? Go to http://mandie-elizabeth.info/. Enter V321 in the search box to learn more about \"Breast Cancer: Care Instructions. \" Current as of: July 26, 2016 Content Version: 11.3 © 1149-8073 [employer]. Care instructions adapted under license by [employer] (which disclaims liability or warranty for this information). If you have questions about a medical condition or this instruction, always ask your healthcare professional. [address] any warranty or liability for your use of this information. Please provide this summary of care documentation to your next provider. Your primary care clinician is listed as Andrei Aguirre. If you have any questions after today's visit, please call [phone].

## 2017-09-29 ENCOUNTER — HOSPITAL ENCOUNTER (OUTPATIENT)
Dept: ONCOLOGY | Age: 72
Discharge: HOME | End: 2017-09-29

## 2017-09-29 ENCOUNTER — HOSPITAL ENCOUNTER (OUTPATIENT)
Dept: INFUSION THERAPY | Age: 72
Discharge: HOME | End: 2017-09-29
Payer: MEDICARE

## 2017-09-29 ENCOUNTER — CLINICAL SUPPORT (OUTPATIENT)
Dept: ONCOLOGY | Age: 72
End: 2017-09-29

## 2017-09-29 VITALS
WEIGHT: 152 LBS | RESPIRATION RATE: 16 BRPM | HEIGHT: 64 IN | SYSTOLIC BLOOD PRESSURE: 104 MMHG | TEMPERATURE: 96.9 F | HEART RATE: 78 BPM | DIASTOLIC BLOOD PRESSURE: 58 MMHG | BODY MASS INDEX: 25.95 KG/M2

## 2017-09-29 DIAGNOSIS — T45.1X5A ANEMIA DUE TO CHEMOTHERAPY: Primary | ICD-10-CM

## 2017-09-29 DIAGNOSIS — T45.1X5A ANEMIA DUE TO CHEMOTHERAPY: ICD-10-CM

## 2017-09-29 DIAGNOSIS — D64.81 ANEMIA DUE TO CHEMOTHERAPY: Primary | ICD-10-CM

## 2017-09-29 DIAGNOSIS — D64.81 ANEMIA DUE TO CHEMOTHERAPY: ICD-10-CM

## 2017-09-29 LAB
BASO+EOS+MONOS # BLD AUTO: 0.2 K/UL (ref 0–2.3)
BASO+EOS+MONOS # BLD AUTO: 9 % (ref 0.1–17)
DIFFERENTIAL METHOD BLD: ABNORMAL
ERYTHROCYTE [DISTWIDTH] IN BLOOD BY AUTOMATED COUNT: 16.7 % (ref 11.5–14.5)
HCT VFR BLD AUTO: 29.3 % (ref 36–48)
HGB BLD-MCNC: 9.8 G/DL (ref 12–16)
LYMPHOCYTES # BLD: 0.3 K/UL (ref 1.1–5.9)
LYMPHOCYTES NFR BLD: 14 % (ref 14–44)
MCH RBC QN AUTO: 35.6 PG (ref 25–35)
MCHC RBC AUTO-ENTMCNC: 33.4 G/DL (ref 31–37)
MCV RBC AUTO: 106.5 FL (ref 78–102)
NEUTS SEG # BLD: 1.8 K/UL (ref 1.8–9.5)
NEUTS SEG NFR BLD: 77 % (ref 40–70)
PLATELET # BLD AUTO: 218 K/UL (ref 140–440)
RBC # BLD AUTO: 2.75 M/UL (ref 4.1–5.1)
WBC # BLD AUTO: 2.3 K/UL (ref 4.5–13)

## 2017-09-29 PROCEDURE — 74011250636 HC RX REV CODE- 250/636: Performed by: INTERNAL MEDICINE

## 2017-09-29 PROCEDURE — 96372 THER/PROPH/DIAG INJ SC/IM: CPT

## 2017-09-29 PROCEDURE — 36415 COLL VENOUS BLD VENIPUNCTURE: CPT

## 2017-09-29 RX ADMIN — ERYTHROPOIETIN 40000 UNITS: 40000 INJECTION, SOLUTION INTRAVENOUS; SUBCUTANEOUS at 10:42

## 2017-09-29 RX ADMIN — ERYTHROPOIETIN 20000 UNITS: 20000 INJECTION, SOLUTION INTRAVENOUS; SUBCUTANEOUS at 10:42

## 2017-09-29 NOTE — PROGRESS NOTES
[facility] Progress Note    Date: 2017    Name: Hakeem Velasquez    MRN: [id number]         : 1945      Ms. Figueroa was assessed and education was provided. Ms. Padma Chaudhary vitals were reviewed and patient was observed for 5 minutes prior to treatment. Visit Vitals    /58 (BP 1 Location: Left arm, BP Patient Position: At rest;Sitting)    Pulse 78    Temp 96.9 °F (36.1 °C)    Resp 16    Ht 5' 4\" (1.626 m)    Wt 68.9 kg (152 lb)    BMI 26.09 kg/m2       Lab results were obtained and reviewed. Recent Results (from the past 12 hour(s))   CBC WITH 3 PART DIFF    Collection Time: 17 10:30 AM   Result Value Ref Range    WBC 2.3 (L) 4.5 - 13.0 K/uL    RBC 2.75 (L) 4.10 - 5.10 M/uL    HGB 9.8 (L) 12.0 - 16.0 g/dL    HCT 29.3 (L) 36 - 48 %    .5 (H) 78 - 102 FL    MCH 35.6 (H) 25.0 - 35.0 PG    MCHC 33.4 31 - 37 g/dL    RDW 16.7 (H) 11.5 - 14.5 %    PLATELET 092 622 - 543 K/uL    NEUTROPHILS 77 (H) 40 - 70 %    MIXED CELLS 9 0.1 - 17 %    LYMPHOCYTES 14 14 - 44 %    ABS. NEUTROPHILS 1.8 1.8 - 9.5 K/UL    ABS. MIXED CELLS 0.2 0.0 - 2.3 K/uL    ABS. LYMPHOCYTES 0.3 (L) 1.1 - 5.9 K/UL    DF AUTOMATED         Procrit 60,000 units given for H&H 9.8/29. 3. Patient armband removed and shredded. Ms. Lamon Denver was discharged from [place] [address] in stable condition at 1050. She is to return on 10/6/17 at 1000 for her next appointment for Faslodex monthly, Monthly port flush.     Reba Witt RN  2017  10:51 AM

## 2017-10-04 RX ORDER — LAMOTRIGINE 25 MG/1
500 TABLET ORAL ONCE
Status: COMPLETED | OUTPATIENT
Start: 2017-10-06 | End: 2017-10-06

## 2017-10-06 ENCOUNTER — HOSPITAL ENCOUNTER (OUTPATIENT)
Dept: LAB | Age: 72
Discharge: HOME | End: 2017-10-06
Payer: MEDICARE

## 2017-10-06 ENCOUNTER — HOSPITAL ENCOUNTER (OUTPATIENT)
Dept: INFUSION THERAPY | Age: 72
Discharge: HOME | End: 2017-10-06
Payer: MEDICARE

## 2017-10-06 VITALS
BODY MASS INDEX: 25.95 KG/M2 | WEIGHT: 152 LBS | HEART RATE: 68 BPM | DIASTOLIC BLOOD PRESSURE: 65 MMHG | RESPIRATION RATE: 16 BRPM | SYSTOLIC BLOOD PRESSURE: 115 MMHG | TEMPERATURE: 97.2 F | HEIGHT: 64 IN

## 2017-10-06 LAB
ALBUMIN SERPL-MCNC: 3.7 G/DL (ref 3.4–5)
ALBUMIN/GLOB SERPL: 1.3 {RATIO} (ref 0.8–1.7)
ALP SERPL-CCNC: 66 U/L (ref 45–117)
ALT SERPL-CCNC: 17 U/L (ref 13–56)
ANION GAP SERPL CALC-SCNC: 10 MMOL/L (ref 3–18)
AST SERPL-CCNC: 17 U/L (ref 15–37)
BILIRUB SERPL-MCNC: 0.4 MG/DL (ref 0.2–1)
BUN SERPL-MCNC: 21 MG/DL (ref 7–18)
BUN/CREAT SERPL: 20 (ref 12–20)
CALCIUM SERPL-MCNC: 8.8 MG/DL (ref 8.5–10.1)
CHLORIDE SERPL-SCNC: 106 MMOL/L (ref 100–108)
CO2 SERPL-SCNC: 24 MMOL/L (ref 21–32)
CREAT SERPL-MCNC: 1.07 MG/DL (ref 0.6–1.3)
GLOBULIN SER CALC-MCNC: 2.9 G/DL (ref 2–4)
GLUCOSE SERPL-MCNC: 81 MG/DL (ref 74–99)
POTASSIUM SERPL-SCNC: 4.3 MMOL/L (ref 3.5–5.5)
PROT SERPL-MCNC: 6.6 G/DL (ref 6.4–8.2)
SODIUM SERPL-SCNC: 140 MMOL/L (ref 136–145)

## 2017-10-06 PROCEDURE — 74011250636 HC RX REV CODE- 250/636: Performed by: INTERNAL MEDICINE

## 2017-10-06 PROCEDURE — 77030012965 HC NDL HUBR BBMI -A

## 2017-10-06 PROCEDURE — 96402 CHEMO HORMON ANTINEOPL SQ/IM: CPT

## 2017-10-06 RX ORDER — HEPARIN SODIUM (PORCINE) LOCK FLUSH IV SOLN 100 UNIT/ML 100 UNIT/ML
SOLUTION INTRAVENOUS
Status: DISPENSED
Start: 2017-10-06 | End: 2017-10-06

## 2017-10-06 RX ORDER — HEPARIN SODIUM (PORCINE) LOCK FLUSH IV SOLN 100 UNIT/ML 100 UNIT/ML
500 SOLUTION INTRAVENOUS AS NEEDED
Status: ACTIVE | OUTPATIENT
Start: 2017-10-06 | End: 2017-10-07

## 2017-10-06 RX ORDER — SODIUM CHLORIDE 0.9 % (FLUSH) 0.9 %
10-40 SYRINGE (ML) INJECTION AS NEEDED
Status: DISCONTINUED | OUTPATIENT
Start: 2017-10-06 | End: 2017-10-10 | Stop reason: HOSPADM

## 2017-10-06 RX ADMIN — FULVESTRANT 500 MG: 50 INJECTION INTRAMUSCULAR at 11:08

## 2017-10-06 RX ADMIN — HEPARIN SODIUM (PORCINE) LOCK FLUSH IV SOLN 100 UNIT/ML 500 UNITS: 100 SOLUTION at 10:56

## 2017-10-06 RX ADMIN — Medication 20 ML: at 10:56

## 2017-10-06 NOTE — PROGRESS NOTES
[facility] Progress Note    Date: 2017    Name: Gilmer Ricketts              MRN: [id number]              : 1945    Chemotherapy Cycle: C14. Ms. Ethan Leal was assessed and education was provided. Ms. Daphne Moreno vitals were reviewed and patient was observed for 5 minutes prior to treatment. Visit Vitals    /65 (BP 1 Location: Left arm, BP Patient Position: At rest;Sitting)    Pulse 68    Temp 97.2 °F (36.2 °C)    Resp 16    Ht 5' 4\" (1.626 m)    Wt 68.9 kg (152 lb)    Breastfeeding No    BMI 26.09 kg/m2       Lab results from 17 were obtained and reviewed. CMP drawn and given to lab. Monthly port flush done and brisk blood return obtained. Faslodex 500 mg given IM. This was administered as two 5 ml injections IM, slowly over at least 2 minutes, one in each buttock. Patient armband removed and shredded. Ms. Ethan Leal was discharged from [place] [address] in stable condition at 1125. She is to return on 10/13/17 at 1100 for her next appointment for CBC/Procrit Q 2 weeks.      Aaliyah Jimenes RN  2017  3:06 PM

## 2017-10-13 ENCOUNTER — HOSPITAL ENCOUNTER (OUTPATIENT)
Dept: INFUSION THERAPY | Age: 72
Discharge: HOME | End: 2017-10-13
Payer: MEDICARE

## 2017-10-13 ENCOUNTER — HOSPITAL ENCOUNTER (OUTPATIENT)
Dept: ONCOLOGY | Age: 72
Discharge: HOME | End: 2017-10-13

## 2017-10-13 ENCOUNTER — CLINICAL SUPPORT (OUTPATIENT)
Dept: ONCOLOGY | Age: 72
End: 2017-10-13

## 2017-10-13 VITALS
TEMPERATURE: 97.3 F | SYSTOLIC BLOOD PRESSURE: 115 MMHG | HEART RATE: 71 BPM | RESPIRATION RATE: 16 BRPM | DIASTOLIC BLOOD PRESSURE: 66 MMHG

## 2017-10-13 DIAGNOSIS — D64.81 ANEMIA DUE TO CHEMOTHERAPY: Primary | ICD-10-CM

## 2017-10-13 DIAGNOSIS — T45.1X5A ANEMIA DUE TO CHEMOTHERAPY: ICD-10-CM

## 2017-10-13 DIAGNOSIS — T45.1X5A ANEMIA DUE TO CHEMOTHERAPY: Primary | ICD-10-CM

## 2017-10-13 DIAGNOSIS — D64.81 ANEMIA DUE TO CHEMOTHERAPY: ICD-10-CM

## 2017-10-13 LAB
BASO+EOS+MONOS # BLD AUTO: 0.1 K/UL (ref 0–2.3)
BASO+EOS+MONOS # BLD AUTO: 6 % (ref 0.1–17)
DIFFERENTIAL METHOD BLD: ABNORMAL
ERYTHROCYTE [DISTWIDTH] IN BLOOD BY AUTOMATED COUNT: 16.7 % (ref 11.5–14.5)
HCT VFR BLD AUTO: 30 % (ref 36–48)
HGB BLD-MCNC: 10.4 G/DL (ref 12–16)
LYMPHOCYTES # BLD: 0.3 K/UL (ref 1.1–5.9)
LYMPHOCYTES NFR BLD: 14 % (ref 14–44)
MCH RBC QN AUTO: 37 PG (ref 25–35)
MCHC RBC AUTO-ENTMCNC: 34.7 G/DL (ref 31–37)
MCV RBC AUTO: 106.8 FL (ref 78–102)
NEUTS SEG # BLD: 1.8 K/UL (ref 1.8–9.5)
NEUTS SEG NFR BLD: 80 % (ref 40–70)
PLATELET # BLD AUTO: 119 K/UL (ref 140–440)
RBC # BLD AUTO: 2.81 M/UL (ref 4.1–5.1)
WBC # BLD AUTO: 2.2 K/UL (ref 4.5–13)

## 2017-10-13 PROCEDURE — 36415 COLL VENOUS BLD VENIPUNCTURE: CPT

## 2017-10-13 RX ORDER — TRIAMCINOLONE ACETONIDE 5 MG/G
CREAM TOPICAL 2 TIMES DAILY
COMMUNITY
End: 2017-12-01

## 2017-10-13 NOTE — PROGRESS NOTES
[facility] Progress Note    Date: 2017    Name: Edward Doe    MRN: [id number]         : 1945      Ms. Figueroa was assessed and education was provided. Ms. Diana Huitron vitals were reviewed and patient was observed for 5 minutes prior to treatment. Visit Vitals    /66 (BP 1 Location: Left arm, BP Patient Position: At rest;Sitting)    Pulse 71    Temp 97.3 °F (36.3 °C)    Resp 16       Lab results were obtained and reviewed. Recent Results (from the past 12 hour(s))   CBC WITH 3 PART DIFF    Collection Time: 10/13/17 11:12 AM   Result Value Ref Range    WBC 2.2 (L) 4.5 - 13.0 K/uL    RBC 2.81 (L) 4.10 - 5.10 M/uL    HGB 10.4 (L) 12.0 - 16.0 g/dL    HCT 30.0 (L) 36 - 48 %    .8 (H) 78 - 102 FL    MCH 37.0 (H) 25.0 - 35.0 PG    MCHC 34.7 31 - 37 g/dL    RDW 16.7 (H) 11.5 - 14.5 %    PLATELET 721 (L) 284 - 440 K/uL    NEUTROPHILS 80 (H) 40 - 70 %    MIXED CELLS 6 0.1 - 17 %    LYMPHOCYTES 14 14 - 44 %    ABS. NEUTROPHILS 1.8 1.8 - 9.5 K/UL    ABS. MIXED CELLS 0.1 0.0 - 2.3 K/uL    ABS. LYMPHOCYTES 0.3 (L) 1.1 - 5.9 K/UL    DF AUTOMATED       Procrit held for H&H 10.4/30.0. Ms. Gilbert Graff tolerated well, and had no complaints. Patient armband removed and shredded. Ms. Gilbert Graff was discharged from [place] [address] in stable condition at 1130. She is to return on 10/27/17 at 1100 for her next appointment for CBC/Procrit.     Sixto Brunson RN  2017  11:32 AM

## 2017-10-27 ENCOUNTER — HOSPITAL ENCOUNTER (OUTPATIENT)
Dept: INFUSION THERAPY | Age: 72
Discharge: HOME | End: 2017-10-27
Payer: MEDICARE

## 2017-10-27 ENCOUNTER — HOSPITAL ENCOUNTER (OUTPATIENT)
Dept: ONCOLOGY | Age: 72
Discharge: HOME | End: 2017-10-27

## 2017-10-27 ENCOUNTER — OFFICE VISIT (OUTPATIENT)
Dept: ONCOLOGY | Age: 72
End: 2017-10-27

## 2017-10-27 ENCOUNTER — HOSPITAL ENCOUNTER (OUTPATIENT)
Dept: LAB | Age: 72
Discharge: HOME | End: 2017-10-27
Payer: MEDICARE

## 2017-10-27 ENCOUNTER — CLINICAL SUPPORT (OUTPATIENT)
Dept: ONCOLOGY | Age: 72
End: 2017-10-27

## 2017-10-27 VITALS
WEIGHT: 155 LBS | RESPIRATION RATE: 16 BRPM | SYSTOLIC BLOOD PRESSURE: 113 MMHG | DIASTOLIC BLOOD PRESSURE: 67 MMHG | HEART RATE: 82 BPM | TEMPERATURE: 96.6 F | BODY MASS INDEX: 26.61 KG/M2

## 2017-10-27 VITALS
SYSTOLIC BLOOD PRESSURE: 113 MMHG | DIASTOLIC BLOOD PRESSURE: 67 MMHG | HEART RATE: 76 BPM | TEMPERATURE: 96.6 F | RESPIRATION RATE: 16 BRPM

## 2017-10-27 DIAGNOSIS — C50.311 MALIGNANT NEOPLASM OF LOWER-INNER QUADRANT OF RIGHT BREAST OF FEMALE, ESTROGEN RECEPTOR POSITIVE (HCC): Primary | ICD-10-CM

## 2017-10-27 DIAGNOSIS — D64.81 ANTINEOPLASTIC CHEMOTHERAPY INDUCED ANEMIA: ICD-10-CM

## 2017-10-27 DIAGNOSIS — T45.1X5A ANTINEOPLASTIC CHEMOTHERAPY INDUCED ANEMIA: ICD-10-CM

## 2017-10-27 DIAGNOSIS — T45.1X5A ANEMIA DUE TO ANTINEOPLASTIC CHEMOTHERAPY: ICD-10-CM

## 2017-10-27 DIAGNOSIS — Z17.0 MALIGNANT NEOPLASM OF LOWER-INNER QUADRANT OF RIGHT BREAST OF FEMALE, ESTROGEN RECEPTOR POSITIVE (HCC): Primary | ICD-10-CM

## 2017-10-27 DIAGNOSIS — D64.81 ANTINEOPLASTIC CHEMOTHERAPY INDUCED ANEMIA: Primary | ICD-10-CM

## 2017-10-27 DIAGNOSIS — D64.81 ANEMIA DUE TO ANTINEOPLASTIC CHEMOTHERAPY: ICD-10-CM

## 2017-10-27 DIAGNOSIS — T45.1X5A CHEMOTHERAPY INDUCED NEUTROPENIA (HCC): ICD-10-CM

## 2017-10-27 DIAGNOSIS — D72.819 CHRONIC LEUKOPENIA: ICD-10-CM

## 2017-10-27 DIAGNOSIS — C50.311 MALIGNANT NEOPLASM OF LOWER-INNER QUADRANT OF RIGHT BREAST OF FEMALE, ESTROGEN RECEPTOR POSITIVE (HCC): ICD-10-CM

## 2017-10-27 DIAGNOSIS — D70.1 CHEMOTHERAPY INDUCED NEUTROPENIA (HCC): ICD-10-CM

## 2017-10-27 DIAGNOSIS — T45.1X5A ANTINEOPLASTIC CHEMOTHERAPY INDUCED ANEMIA: Primary | ICD-10-CM

## 2017-10-27 DIAGNOSIS — C50.919 METASTATIC BREAST CANCER (HCC): ICD-10-CM

## 2017-10-27 DIAGNOSIS — Z17.0 MALIGNANT NEOPLASM OF LOWER-INNER QUADRANT OF RIGHT BREAST OF FEMALE, ESTROGEN RECEPTOR POSITIVE (HCC): ICD-10-CM

## 2017-10-27 DIAGNOSIS — D69.59 CHEMOTHERAPY-INDUCED THROMBOCYTOPENIA: ICD-10-CM

## 2017-10-27 DIAGNOSIS — T45.1X5A CHEMOTHERAPY-INDUCED THROMBOCYTOPENIA: ICD-10-CM

## 2017-10-27 LAB
ALBUMIN SERPL-MCNC: 3.8 G/DL (ref 3.4–5)
ALBUMIN/GLOB SERPL: 1.2 {RATIO} (ref 0.8–1.7)
ALP SERPL-CCNC: 72 U/L (ref 45–117)
ALT SERPL-CCNC: 19 U/L (ref 13–56)
ANION GAP SERPL CALC-SCNC: 7 MMOL/L (ref 3–18)
AST SERPL-CCNC: 13 U/L (ref 15–37)
BASO+EOS+MONOS # BLD AUTO: 0.2 K/UL (ref 0–2.3)
BASO+EOS+MONOS # BLD AUTO: 7 % (ref 0.1–17)
BILIRUB SERPL-MCNC: 0.5 MG/DL (ref 0.2–1)
BUN SERPL-MCNC: 16 MG/DL (ref 7–18)
BUN/CREAT SERPL: 14 (ref 12–20)
CALCIUM SERPL-MCNC: 9.3 MG/DL (ref 8.5–10.1)
CHLORIDE SERPL-SCNC: 105 MMOL/L (ref 100–108)
CO2 SERPL-SCNC: 28 MMOL/L (ref 21–32)
CREAT SERPL-MCNC: 1.16 MG/DL (ref 0.6–1.3)
DIFFERENTIAL METHOD BLD: ABNORMAL
ERYTHROCYTE [DISTWIDTH] IN BLOOD BY AUTOMATED COUNT: 16.9 % (ref 11.5–14.5)
FERRITIN SERPL-MCNC: 121 NG/ML (ref 8–388)
GLOBULIN SER CALC-MCNC: 3.3 G/DL (ref 2–4)
GLUCOSE SERPL-MCNC: 85 MG/DL (ref 74–99)
HCT VFR BLD AUTO: 30.9 % (ref 36–48)
HGB BLD-MCNC: 10.5 G/DL (ref 12–16)
IRON SATN MFR SERPL: 23 %
IRON SERPL-MCNC: 74 UG/DL (ref 50–175)
LYMPHOCYTES # BLD: 0.3 K/UL (ref 1.1–5.9)
LYMPHOCYTES NFR BLD: 11 % (ref 14–44)
MCH RBC QN AUTO: 36.7 PG (ref 25–35)
MCHC RBC AUTO-ENTMCNC: 34 G/DL (ref 31–37)
MCV RBC AUTO: 108 FL (ref 78–102)
NEUTS SEG # BLD: 2.1 K/UL (ref 1.8–9.5)
NEUTS SEG NFR BLD: 82 % (ref 40–70)
PLATELET # BLD AUTO: 187 K/UL (ref 140–440)
POTASSIUM SERPL-SCNC: 4.1 MMOL/L (ref 3.5–5.5)
PROT SERPL-MCNC: 7.1 G/DL (ref 6.4–8.2)
RBC # BLD AUTO: 2.86 M/UL (ref 4.1–5.1)
SODIUM SERPL-SCNC: 140 MMOL/L (ref 136–145)
TIBC SERPL-MCNC: 326 UG/DL (ref 250–450)
WBC # BLD AUTO: 2.6 K/UL (ref 4.5–13)

## 2017-10-27 PROCEDURE — 36415 COLL VENOUS BLD VENIPUNCTURE: CPT

## 2017-10-27 PROCEDURE — 36415 COLL VENOUS BLD VENIPUNCTURE: CPT | Performed by: INTERNAL MEDICINE

## 2017-10-27 PROCEDURE — 80053 COMPREHEN METABOLIC PANEL: CPT | Performed by: INTERNAL MEDICINE

## 2017-10-27 PROCEDURE — 83540 ASSAY OF IRON: CPT | Performed by: INTERNAL MEDICINE

## 2017-10-27 PROCEDURE — 86300 IMMUNOASSAY TUMOR CA 15-3: CPT | Performed by: INTERNAL MEDICINE

## 2017-10-27 PROCEDURE — 82728 ASSAY OF FERRITIN: CPT | Performed by: INTERNAL MEDICINE

## 2017-10-27 NOTE — PROGRESS NOTES
Hematology/Oncology  Progress Note    Name: Love Leon  Date: 2017  : 1945    PCP: Alyssa Hidalgo MD         Subjective:         Past medical history, family history, and social history: these were reviewed and remains unchanged. Past Medical History   Diagnosis Date    Biliary colic     Breast CA ([facility]     Cancer [facility])      Right Breast    Chemotherapy convalescence or palliative care     Neuropathy     Radiation therapy complication     Thyroid disease      Past Surgical History   Procedure Laterality Date    Pr breast surgery procedure unlisted  10/2002     Right-Lesion    Hx mastectomy       Right    Pr breast surgery procedure unlisted  2004     Right-Lesion    Hx lap cholecystectomy  13     Social History     Social History    Marital status:      Spouse name: N/A    Number of children: N/A    Years of education: N/A     Occupational History    Not on file. Social History Main Topics    Smoking status: Former Smoker    Smokeless tobacco: Not on file    Alcohol use 0.0 oz/week     1 - 2 Glasses of wine per week      Comment: socially, occassionally    Drug use: No    Sexual activity: Not on file     Other Topics Concern    Not on file     Social History Narrative     Family History   Problem Relation Age of Onset    Cancer Maternal Aunt      Hodgkin's disease    Breast Cancer Paternal Aunt     Cancer Father      Prostate, lung, brain     Current Outpatient Prescriptions   Medication Sig Dispense Refill    gabapentin (NEURONTIN) 300 mg capsule take 1 capsule by mouth three times a day 270 Cap 3    BENZONATATE (TESSALON PERLE PO) Take  by mouth. Uses prn cough      palbociclib (IBRANCE) 125 mg cap Take 125 mg by mouth daily.  Take 1 tab po every day for 21 days on and 7 days off q 28 days  Indications: HORMONE RECEPTOR(+), HER2(-) ADVANCED BREAST CANCER 21 Cap 6    albuterol (PROVENTIL HFA, VENTOLIN HFA, PROAIR HFA) 90 mcg/actuation inhaler Take 2 Puffs by inhalation every six (6) hours as needed for Wheezing. Indications: BRONCHOSPASTIC PULMONARY DISEASE 3 Inhaler 3    ibuprofen (MOTRIN) 800 mg tablet Take 800 mg by mouth two (2) times a day. Indications: OSTEOARTHRITIS      Cholecalciferol, Vitamin D3, 5,000 unit Tab Take  by mouth daily.  thyroid, [employer], (ARMOUR THYROID) 60 mg tablet Take 90 mg by mouth daily.  L-Mfolate-B6 Phos-Methyl-B12 (NEURPATH-B) 3-35-2 mg Tab Take  by mouth two (2) times a day.  warfarin (COUMADIN) 1 mg tablet Take 1 mg by mouth daily. Facility-Administered Medications Ordered in Other Visits   Medication Dose Route Frequency Provider Last Rate Last Dose    sodium chloride (NS) flush 10-40 mL  10-40 mL IntraVENous PRN Codi Rodriguez MD   40 mL at 12/02/16 1055    heparin (porcine) 100 unit/mL injection 500 Units  500 Units IntraVENous PRN Codi Rodriguez MD   500 Units at 12/02/16 1055       Review of Systems  Constitutional: The patient has complaints of some fatigue and occasional weakness. HEENT: The patient denies recent head trauma, eye pain, blurred vision,  hearing deficit, she report of  oropharyngeal mucosal pain and  throat discomfort. Lymphatics: The patient denies palpable peripheral lymphadenopathy. Hematologic: The patient denies having bruising, bleeding, or progressive fatigue. Respiratory: The patient is currently experiencing shortness of breath. Cardiovascular: The patient denies having leg pain, leg swelling, heart palpitations,  chest pain, or lightheadedness. The patient denies having dyspnea on exertion. Gastrointestinal: The patient denies having nausea, emesis, or diarrhea. The patient denies having any hematemesis or blood in the stool. Genitourinary: Patient denies having urinary urgency, frequency, or dysuria. The patient denies having blood in the urine.   Psychological: The patient denies having symptoms of nervousness, anxiety, depression, or thoughts of harming self. Skin: Patient denies having skin rashes, skin, ulcerations, or unexplained itching or pruritus. Musculoskeletal: The patient denies having pain in the joints or bones. Objective:     Visit Vitals    /72    Pulse 78    Temp 98 °F (36.7 °C)    Ht 5' 4\" (1.626 m)    Wt 71.7 kg (158 lb)    BMI 27.12 kg/m2     ECOG PS=1, Pain score=1/10   Physical Exam:   Gen. Appearance: The patient is in no acute distress. Skin: There is no bruise or rash. HEENT: The exam is unremarkable. Neck: Supple without lymphadenopathy or thyromegaly. Lungs: Clear to auscultation and percussion; there are no wheezes or rhonchi. Heart: Regular rate and rhythm; there are no murmurs, gallops, or rubs. Anterior chest wall and breast: There is no evidence of local recurrence of disease. Abdomen: Bowel sounds are present and normal.  There is no guarding, tenderness, or hepatosplenomegaly. Extremities: There is no clubbing, cyanosis, or edema. Neurologic: There are no focal neurologic deficits. Lymphatics: There is no palpable peripheral lymphadenopathy. Musculoskeletal: The patient has full range of motion at all joints. There is no evidence of joint deformity or effusions. There is no focal joint tenderness. Psychological/psychiatric: There is no clinical evidence of anxiety, depression, or melancholy.     Lab data:      Results for orders placed or performed during the hospital encounter of 12/02/16   CBC WITH 3 PART DIFF     Status: Abnormal   Result Value Ref Range Status    WBC 2.0 (L) 4.5 - 13.0 K/uL Final    RBC 2.50 (L) 4.10 - 5.10 M/uL Final    HGB 9.1 (L) 12.0 - 16.0 g/dL Final    HCT 26.8 (L) 36 - 48 % Final    .2 (H) 78 - 102 FL Final    MCH 36.4 (H) 25.0 - 35.0 PG Final    MCHC 34.0 31 - 37 g/dL Final    RDW 17.0 (H) 11.5 - 14.5 % Final    PLATELET 889 (L) 686 - 440 K/uL Final    NEUTROPHILS 78 (H) 40 - 70 % Final    MIXED CELLS 8 0.1 - 17 % Final LYMPHOCYTES 14 14 - 44 % Final    ABS. NEUTROPHILS 1.5 (L) 1.8 - 9.5 K/UL Final    ABS. MIXED CELLS 0.2 0.0 - 2.3 K/uL Final    ABS. LYMPHOCYTES 0.3 (L) 1.1 - 5.9 K/UL Final     Comment: Test performed at [place] [address] Location. Results Reviewed by Medical Director. DF AUTOMATED   Final           Assessment:     1. Breast cancer of lower-inner quadrant of right female breast ([facility]    2. Neuropathy associated with cancer ([facility]    3. Metastatic breast cancer ([facility]    4. Chemotherapy induced neutropenia (HCC)    5. Antineoplastic chemotherapy induced anemia      Plan:       The patient return to clinic in 8 weeks. Orders Placed This Encounter    COMPLETE CBC & AUTO DIFF WBC    InHouse CBC ([facility])     Standing Status:   Future     Number of Occurrences:   1     Standing Expiration Date:   12/9/2016    FERRITIN     Standing Status:   Future     Standing Expiration Date:   98/9/4784    METABOLIC PANEL, COMPREHENSIVE     Standing Status:   Future     Standing Expiration Date:   12/3/2017    IRON PROFILE     Standing Status:   Future     Standing Expiration Date:   12/3/2017    CA 27.29     Standing Status:   Future     Standing Expiration Date:   12/3/2017       Vonnie Toribio MD  7/21/2017      Please note: This document has been produced using voice recognition software. Unrecognized errors in transcription may be present.

## 2017-10-27 NOTE — PROGRESS NOTES
[facility] Progress Note    Date: 2017    Name: Luis A Lux    MRN: [id number]         : 1945      Ms. Figueroa was assessed and education was provided. Ms. Yohan Bray vitals were reviewed and patient was observed for 5 minutes prior to treatment. Visit Vitals    /67 (BP 1 Location: Left arm, BP Patient Position: At rest;Sitting)    Pulse 76    Temp 96.6 °F (35.9 °C)    Resp 16       Lab results were obtained and reviewed. Recent Results (from the past 12 hour(s))   CBC WITH 3 PART DIFF    Collection Time: 10/27/17 11:12 AM   Result Value Ref Range    WBC 2.6 (L) 4.5 - 13.0 K/uL    RBC 2.86 (L) 4.10 - 5.10 M/uL    HGB 10.5 (L) 12.0 - 16.0 g/dL    HCT 30.9 (L) 36 - 48 %    .0 (H) 78 - 102 FL    MCH 36.7 (H) 25.0 - 35.0 PG    MCHC 34.0 31 - 37 g/dL    RDW 16.9 (H) 11.5 - 14.5 %    PLATELET 309 905 - 287 K/uL    NEUTROPHILS 82 (H) 40 - 70 %    MIXED CELLS 7 0.1 - 17 %    LYMPHOCYTES 11 (L) 14 - 44 %    ABS. NEUTROPHILS 2.1 1.8 - 9.5 K/UL    ABS. MIXED CELLS 0.2 0.0 - 2.3 K/uL    ABS. LYMPHOCYTES 0.3 (L) 1.1 - 5.9 K/UL    DF AUTOMATED         Procrit held for H&H 10.530.9     Patient armband removed and shredded. Ms. Emma Coburn was discharged from [place] [address] in stable condition at 1135. She is to return on 11/10/17 at 1100 for her next appointment for CBC/Procrit.     Rox Norman RN  2017  1:59 PM

## 2017-10-27 NOTE — PROGRESS NOTES
Hematology/Oncology  Progress Note    Name: Moris Aly  Date: 10/27/2017  : 1945    PCP: Delvin Joe MD     Ms. Venkata Garcia is a 67 y.o.  female who was seen for management of her metastatic breast cancer with associated complications of chemotherapy. Current therapy: Fulvestrant 500 mg subcutaneous monthly and Ibrance 125 mg by mouth daily. Subjective:     Mrs. Venkata Garcia is a 66-year-old woman who has slowly progressive metastatic breast cancer. She has previously completed external beam radiation therapy to lesions in her ribs and more recently she completed proton therapy to areas of bone involvement with a significant benefit with regards to pain control. The posttherapy imaging studies showed a significant decrease in the intensity of uptake on the bone scan. .  Additionally she is currently receiving systemic therapy with fulvestrant and Ibrance. Currently she is tolerating the systemic therapy reasonably well and has not experienced any nausea or emesis. She is no longer complaining of mouth discomfort but she does report some fatigue and occasional weakness. She continues to have SOB intermittently. Past medical history, family history, and social history: these were reviewed and remains unchanged. Past Medical History:   Diagnosis Date    Biliary colic     Breast CA ([facility] [address] 2012    Cancer [facility])     Right Breast    Chemotherapy convalescence or palliative care     Neuropathy     Radiation therapy complication     Thyroid disease      Past Surgical History:   Procedure Laterality Date    BREAST SURGERY PROCEDURE UNLISTED  10/2002    Right-Lesion    BREAST SURGERY PROCEDURE UNLISTED  2004    Right-Lesion    HX LAP CHOLECYSTECTOMY  13    HX MASTECTOMY  1991    Right     Social History     Social History    Marital status:      Spouse name: N/A    Number of children: N/A    Years of education: N/A     Occupational History    Not on file. Social History Main Topics    Smoking status: Former Smoker     Quit date: 6/2/1991    Smokeless tobacco: Never Used    Alcohol use 0.0 oz/week     1 - 2 Glasses of wine per week      Comment: socially, occassionally    Drug use: No    Sexual activity: Not on file     Other Topics Concern    Not on file     Social History Narrative     Family History   Problem Relation Age of Onset    Cancer Maternal Aunt      Hodgkin's disease    Breast Cancer Paternal Aunt     Cancer Father      Prostate, lung, brain     Current Outpatient Prescriptions   Medication Sig Dispense Refill    triamcinolone (ARISTOCORT) 0.5 % topical cream two (2) times a day. use thin layer   Indications: apply to face bid      fexofenadine-pseudoephedrine (ALLEGRA-D 24 HOUR) 180-240 mg per tablet Take 1 Tab by mouth daily.  gabapentin (NEURONTIN) 100 mg capsule Take 1 Cap by mouth three (3) times daily. 90 Cap 6    sulfacetamide (BLEPH-10) 10 % ophthalmic ointment Administer  to right eye three (3) times daily.  palbociclib (IBRANCE) 125 mg cap Take 125 mg by mouth daily. Take 1 tab po every day for 21 days on and 7 days off q 28 days  Indications: HORMONE RECEPTOR(+), HER2(-) ADVANCED BREAST CANCER 42 Cap 6    gabapentin (NEURONTIN) 300 mg capsule TAKE 1 CAPSULE THREE TIMES A  Cap 2    ibuprofen (MOTRIN) 800 mg tablet Take 800 mg by mouth two (2) times a day. Indications: OSTEOARTHRITIS      Cholecalciferol, Vitamin D3, 5,000 unit Tab Take  by mouth daily.  thyroid, Pork, (ARMOUR THYROID) 60 mg tablet Take 90 mg by mouth daily.  L-Mfolate-B6 Phos-Methyl-B12 (NEURPATH-B) 3-35-2 mg Tab Take  by mouth two (2) times a day.  warfarin (COUMADIN) 1 mg tablet Take 1 mg by mouth daily.          Facility-Administered Medications Ordered in Other Visits   Medication Dose Route Frequency Provider Last Rate Last Dose    epoetin zac (EPOGEN;PROCRIT) injection 40,000 Units  40,000 Units SubCUTAneous ONCE Anjel Calvert MD        And    epoetin zac (EPOGEN;PROCRIT) injection 20,000 Units  20,000 Units SubCUTAneous Marylu Hopkins MD           Review of Systems  Constitutional: The patient has no acute distress or discomfort. HEENT: The patient denies recent head trauma, eye pain, blurred vision,  hearing deficit, oropharyngeal mucosal pain or lesions, and the patient denies throat pain or discomfort. Lymphatics: The patient denies palpable peripheral lymphadenopathy. Hematologic: The patient denies having bruising, bleeding, or progressive fatigue. Respiratory: Patient denies having shortness of breath, cough, sputum production, fever, or dyspnea on exertion. Cardiovascular: The patient denies having leg pain, leg swelling, heart palpitations, chest permit, chest pain, or lightheadedness. The patient denies having dyspnea on exertion. Gastrointestinal: The patient denies having nausea, emesis, or diarrhea. The patient denies having any hematemesis or blood in the stool. Genitourinary: Patient denies having urinary urgency, frequency, or dysuria. The patient denies having blood in the urine. Psychological: The patient denies having symptoms of nervousness, anxiety, depression, or thoughts of harming self. Skin: Patient denies having skin rashes, skin, ulcerations, or unexplained itching or pruritus. Musculoskeletal: The patient denies having pain in the joints or bones. Objective:     Visit Vitals    /67 (BP Patient Position: Sitting)    Pulse 82    Temp 96.6 °F (35.9 °C) (Oral)    Resp 16    Wt 70.3 kg (155 lb)    BMI 26.61 kg/m2     ECOG PS=0; Pain score=0/10    Physical Exam:   Gen. Appearance: The patient is in no acute distress. Skin: There is no bruise or rash. HEENT: The exam is unremarkable. Neck: Supple without lymphadenopathy or thyromegaly. Lungs: Clear to auscultation and percussion; there are no wheezes or rhonchi.   Heart: Regular rate and rhythm; there are no murmurs, gallops, or rubs. Abdomen: Bowel sounds are present and normal.  There is no guarding, tenderness, or hepatosplenomegaly. Extremities: There is no clubbing, cyanosis, or edema. Neurologic: There are no focal neurologic deficits. Lymphatics: There is no palpable peripheral lymphadenopathy. Musculoskeletal: The patient has full range of motion at all joints. There is no evidence of joint deformity or effusions. There is no focal joint tenderness. Psychological/psychiatric: There is no clinical evidence of anxiety, depression, or melancholy. Lab data:      Results for orders placed or performed during the hospital encounter of 10/27/17   CBC WITH 3 PART DIFF     Status: Abnormal   Result Value Ref Range Status    WBC 2.6 (L) 4.5 - 13.0 K/uL Final    RBC 2.86 (L) 4.10 - 5.10 M/uL Final    HGB 10.5 (L) 12.0 - 16.0 g/dL Final    HCT 30.9 (L) 36 - 48 % Final    .0 (H) 78 - 102 FL Final    MCH 36.7 (H) 25.0 - 35.0 PG Final    MCHC 34.0 31 - 37 g/dL Final    RDW 16.9 (H) 11.5 - 14.5 % Final    PLATELET 130 136 - 671 K/uL Final    NEUTROPHILS 82 (H) 40 - 70 % Final    MIXED CELLS 7 0.1 - 17 % Final    LYMPHOCYTES 11 (L) 14 - 44 % Final    ABS. NEUTROPHILS 2.1 1.8 - 9.5 K/UL Final    ABS. MIXED CELLS 0.2 0.0 - 2.3 K/uL Final    ABS. LYMPHOCYTES 0.3 (L) 1.1 - 5.9 K/UL Final     Comment: Test performed at [place] [address] Location. Results Reviewed by Medical Director. DF AUTOMATED   Final           Assessment:     1. Malignant neoplasm of lower-inner quadrant of right breast of female, estrogen receptor positive ([facility] [address]    2. Metastatic breast cancer ([address]    3. Chronic leukopenia    4. Chemotherapy induced neutropenia (HCC)    5. Anemia due to antineoplastic chemotherapy    6. Chemotherapy-induced thrombocytopenia      Plan:   Metastatic breast cancer: The combination ofFulvestrant 500 mg subcutaneous monthly  will be continued. Ibrance 140 mg by mouth daily will be continued as well. Since completing the proton therapy the patient has done well. Her current systemic treatment will be continued. The most recent CA-27-29 level from 7/21/2017 was 44.6 U/mL. I will recheck her values at this time. Neuropathy associated with cancer: I will continue her Neurontin at 400 mg 3 times daily. Chemotherapy-induced neutropenia/chronic leukopenia (persisting problem): The current CBC shows that her WBC count is 2.6. The absolute neutrophil count is 2.1. Her current hemoglobin and hematocrit are 10.5 g/dL and 30.9% respectively. .  If the absolute neutrophil count declines to 0.5 she will be started on Neupogen or Neulasta. She will get her labs drawn once a month. Chemotherapy-induced anemia (persisting problem): The new chemotherapy regimen has significantly decreased her hemoglobin. Her current hemoglobin is 10.5 g/dL with hematocrit of 30.9%. At this time I will check iron profile and ferritin levels. I have recommended that the patient continue taking ferrous sulfate 1 tablet daily. Procrit at a dose of 60,000 units subcutaneous will be provided now that her hemoglobin has declined below 10 g/dL hematocrit is below 30%. Chemotherapy-induced thrombocytopenia : The patient is continuing the current dose of Ibrance 140 mg daily. We have explained to her that the medication was responsible for her thrombocytopenia. However over the past 7 months her platelet counts have normalized and remained normal at the current rate of 187,000. We will continue to monitor platelet counts at 6 week intervals.     Orders Placed This Encounter    METABOLIC PANEL, COMPREHENSIVE     Standing Status:   Future     Standing Expiration Date:   10/28/2018    IRON PROFILE     Standing Status:   Future     Standing Expiration Date:   10/28/2018    FERRITIN     Standing Status:   Future     Standing Expiration Date:   10/28/2018    CA 27.29     Standing Status:   Future     Standing Expiration Date: 10/28/2018       Cristel Bustillos MD  10/27/2017      Please note: This document has been produced using voice recognition software. Unrecognized errors in transcription may be present.

## 2017-10-27 NOTE — PATIENT INSTRUCTIONS
Breast Cancer: Care Instructions  Your Care Instructions    Breast cancer occurs when abnormal cells grow out of control in the breast. These cancer cells can spread within the breast, to nearby lymph nodes and other tissues, and to other parts of the body. Being treated for cancer can weaken your body, and you may feel very tired. Get the rest your body needs so you can feel better. Finding out that you have cancer is scary. You may feel many emotions and may need some help coping. Seek out family, friends, and counselors for support. You also can do things at home to make yourself feel better while you go through treatment. Call the [employer] ([phone]) or visit its website at [email or web address] for more information. Follow-up care is a key part of your treatment and safety. Be sure to make and go to all appointments, and call your doctor if you are having problems. It's also a good idea to know your test results and keep a list of the medicines you take. How can you care for yourself at home? · Take your medicines exactly as prescribed. Call your doctor if you think you are having a problem with your medicine. You may get medicine for nausea and vomiting if you have these side effects. · Follow your doctor's instructions to relieve pain. Pain from cancer and surgery can almost always be controlled. Use pain medicine when you first notice pain, before it becomes severe. · Eat healthy food. If you do not feel like eating, try to eat food that has protein and extra calories to keep up your strength and prevent weight loss. Drink liquid meal replacements for extra calories and protein. Try to eat your main meal early. · Get some physical activity every day, but do not get too tired. Keep doing the hobbies you enjoy as your energy allows. · Do not smoke. Smoking can make your cancer worse. If you need help quitting, talk to your doctor about stop-smoking programs and medicines.  These can increase your chances of quitting for good. · Take steps to control your stress and workload. Learn relaxation techniques. ¨ Share your feelings. Stress and tension affect our emotions. By expressing your feelings to others, you may be able to understand and cope with them. ¨ Consider joining a support group. Talking about a problem with your spouse, a good friend, or other people with similar problems is a good way to reduce tension and stress. ¨ Express yourself through art. Try writing, crafts, dance, or art to relieve stress. Some dance, writing, or art groups may be available just for people who have cancer. ¨ Be kind to your body and mind. Getting enough sleep, eating a healthy diet, and taking time to do things you enjoy can contribute to an overall feeling of balance in your life and can help reduce stress. ¨ Get help if you need it. Discuss your concerns with your doctor or counselor. · If you are vomiting or have diarrhea:  ¨ Drink plenty of fluids (enough so that your urine is light yellow or clear like water) to prevent dehydration. Choose water and other caffeine-free clear liquids. If you have kidney, heart, or liver disease and have to limit fluids, talk with your doctor before you increase the amount of fluids you drink. ¨ When you are able to eat, try clear soups, mild foods, and liquids until all symptoms are gone for 12 to 48 hours. Other good choices include dry toast, crackers, cooked cereal, and gelatin dessert, such as Jell-O.  · If you have not already done so, prepare a list of advance directives. Advance directives are instructions to your doctor and family members about what kind of care you want if you become unable to speak or express yourself. When should you call for help? Call 911 anytime you think you may need emergency care. For example, call if:  ? · You passed out (lost consciousness). ?Call your doctor now or seek immediate medical care if:  ? · You have a fever. ? · You have abnormal bleeding. ? · You think you have an infection. ? · You have new or worse pain. ? · You have new symptoms, such as a cough, belly pain, vomiting, diarrhea, or a rash. ? Watch closely for changes in your health, and be sure to contact your doctor if:  ? · You are much more tired than usual.   ? · You have swollen glands in your armpits, groin, or neck. ? · You do not get better as expected. Where can you learn more? Go to http://mandie-elizabeth.info/. Enter V321 in the search box to learn more about \"Breast Cancer: Care Instructions. \"  Current as of: May 12, 2017  Content Version: 11.4  © 3714-4385 [employer]. Care instructions adapted under license by [employer] (which disclaims liability or warranty for this information). If you have questions about a medical condition or this instruction, always ask your healthcare professional. [address] any warranty or liability for your use of this information.

## 2017-10-27 NOTE — MR AVS SNAPSHOT
Visit Information Date & Time Provider Department Dept. Phone Encounter #  
 10/27/2017 12:00 PM Alina Becker 71 Office [id number] Follow-up Instructions Return in about 8 weeks (around 12/22/2017). Your Appointments 12/22/2017 12:00 PM  
Office Visit with MD Sarah Becker [address] [place] [address]-[facility]) Appt Note: 2 mo fu  
 [facility] [address] [place] [address] [address] [place] [address]  
[phone]  
  
   
 [address] Upcoming Health Maintenance Date Due Hepatitis C Screening 1945 DTaP/Tdap/Td series (1 - Tdap) 10/18/1966 FOBT Q 1 YEAR AGE 50-75 10/18/1995 ZOSTER VACCINE AGE 60> 8/18/2005 GLAUCOMA SCREENING Q2Y 10/18/2010 Pneumococcal 65+ High/Highest Risk (1 of 2 - PCV13) 10/18/2010 MEDICARE YEARLY EXAM 10/18/2010 INFLUENZA AGE 9 TO ADULT 8/1/2017 BREAST CANCER SCRN MAMMOGRAM 5/30/2019 Allergies as of 10/27/2017  Review Complete On: 10/27/2017 By: Heidi Beauchamp RN Severity Noted Reaction Type Reactions Codeine  08/09/2012    Palpitations Darvocet A500 [Propoxyphene N-acetaminophen]  08/09/2012   Side Effect Nausea and Vomiting Darvon [Propoxyphene]  04/09/2014   Systemic Nausea and Vomiting Current Immunizations  Reviewed on 10/27/2017 Name Date Influenza Vaccine 11/14/2016, 9/14/2015, 1/7/2015, 12/9/2013, 1/16/2013 Influenza Vaccine Whole 2/1/2012 Reviewed by Heidi Beauchamp RN on 10/27/2017 at 11:18 AM  
You Were Diagnosed With   
  
 Codes Comments Malignant neoplasm of lower-inner quadrant of right breast of female, estrogen receptor positive ([facility] [address] [facility]    -  Primary ICD-10-CM: C50.311, Z17.0 ICD-9-CM: 174.3, V86.0 Metastatic breast cancer ([facility] [address]     ICD-10-CM: D17.056 ICD-9-CM: 174.9 Chronic leukopenia     ICD-10-CM: D72.819 ICD-9-CM: 288.50 Chemotherapy induced neutropenia (HCC)     ICD-10-CM: D70.1, T45.1X5A 
ICD-9-CM: 288.03, E933.1 Anemia due to antineoplastic chemotherapy     ICD-10-CM: D64.81, T45.1X5A 
ICD-9-CM: 285.3, E933.1 Vitals BP Pulse Temp Resp Weight(growth percentile) BMI  
 113/67 (BP Patient Position: Sitting) 82 96.6 °F (35.9 °C) (Oral) 16 155 lb (70.3 kg) 26.61 kg/m2 OB Status Smoking Status Menopause Former Smoker BMI and BSA Data Body Mass Index Body Surface Area  
 26.61 kg/m 2 1.78 m 2 Preferred Pharmacy Pharmacy Name Phone [address] [facility] [phone] Your Updated Medication List  
  
   
This list is accurate as of: 10/27/17 12:21 PM.  Always use your most recent med list. ALLEGRA-D 24 HOUR 180-240 mg per tablet Generic drug:  fexofenadine-pseudoephedrine Take 1 Tab by mouth daily. ARMOUR THYROID 60 mg tablet Generic drug:  thyroid (Pork) Take 90 mg by mouth daily. cholecalciferol (VITAMIN D3) 5,000 unit Tab tablet Commonly known as:  VITAMIN D3 Take  by mouth daily. * gabapentin 300 mg capsule Commonly known as:  NEURONTIN  
TAKE 1 CAPSULE THREE TIMES A DAY  
  
 * gabapentin 100 mg capsule Commonly known as:  NEURONTIN Take 1 Cap by mouth three (3) times daily. MOTRIN 800 mg tablet Generic drug:  ibuprofen Take 800 mg by mouth two (2) times a day. Indications: OSTEOARTHRITIS  
  
 NEURPATH-B 3-35-2 mg Tab tab Generic drug:  L-Mfolate-B6 Phos-Methyl-B12 Take  by mouth two (2) times a day. palbociclib 125 mg Cap Commonly known as:  [employer] Take 125 mg by mouth daily. Take 1 tab po every day for 21 days on and 7 days off q 28 days  Indications: HORMONE RECEPTOR(+), HER2(-) ADVANCED BREAST CANCER  
  
 sulfacetamide 10 % ophthalmic ointment Commonly known as:  BLEPH-10 Administer  to right eye three (3) times daily. triamcinolone 0.5 % topical cream  
Commonly known as:  ARISTOCORT  
two (2) times a day. use thin layer   Indications: apply to face bid  
  
 warfarin 1 mg tablet Commonly known as:  COUMADIN Take 1 mg by mouth daily. * Notice: This list has 2 medication(s) that are the same as other medications prescribed for you. Read the directions carefully, and ask your doctor or other care provider to review them with you. Follow-up Instructions Return in about 8 weeks (around 12/22/2017). To-Do List   
 10/27/2017 Lab:  CA 27.29   
  
 10/27/2017 Lab:  FERRITIN   
  
 10/27/2017 Lab:  IRON PROFILE   
  
 10/27/2017 Lab:  METABOLIC PANEL, COMPREHENSIVE   
  
 11/03/2017 11:00 AM  
  Appointment [address] at [place] [address] ([phone])  
  
 11/10/2017 11:00 AM  
  Appointment with [address] at [place] [address] ([phone])  
  
 05/31/2018 11:00 AM  
  Appointment with [facility] 2 at [address] [place] [address] ([phone]) OUTSIDE FILMS  - Any outside films related to the study being scheduled should be brought with you on the day of the exam.  If this cannot be done there may be a delay in the reading of the study. MEDICATIONS  - Patient must bring a complete list of all medications currently taking to include prescriptions, over-the-counter meds, herbals, vitamins & any dietary supplements  GENERAL INSTRUCTIONS  - On the day of your exam do not use any bath powder, deodorant or lotions on the armpit area. -Tenderness of breasts may cause an increase of discomfort during procedure. If you are experiencing breast tenderness on the day of your appointment and would like to reschedule, please call [phone]. Patient Instructions Breast Cancer: Care Instructions Your Care Instructions Breast cancer occurs when abnormal cells grow out of control in the breast. These cancer cells can spread within the breast, to nearby lymph nodes and other tissues, and to other parts of the body. Being treated for cancer can weaken your body, and you may feel very tired. Get the rest your body needs so you can feel better. Finding out that you have cancer is scary. You may feel many emotions and may need some help coping. Seek out family, friends, and counselors for support. You also can do things at home to make yourself feel better while you go through treatment. Call the [employer] (5-421.324.5142) or visit its website at [address] for more information. Follow-up care is a key part of your treatment and safety. Be sure to make and go to all appointments, and call your doctor if you are having problems. It's also a good idea to know your test results and keep a list of the medicines you take. How can you care for yourself at home? · Take your medicines exactly as prescribed. Call your doctor if you think you are having a problem with your medicine. You may get medicine for nausea and vomiting if you have these side effects. · Follow your doctor's instructions to relieve pain. Pain from cancer and surgery can almost always be controlled. Use pain medicine when you first notice pain, before it becomes severe. · Eat healthy food. If you do not feel like eating, try to eat food that has protein and extra calories to keep up your strength and prevent weight loss. Drink liquid meal replacements for extra calories and protein. Try to eat your main meal early. · Get some physical activity every day, but do not get too tired. Keep doing the hobbies you enjoy as your energy allows. · Do not smoke. Smoking can make your cancer worse. If you need help quitting, talk to your doctor about stop-smoking programs and medicines. These can increase your chances of quitting for good. · Take steps to control your stress and workload. Learn relaxation techniques. ¨ Share your feelings. Stress and tension affect our emotions. By expressing your feelings to others, you may be able to understand and cope with them. ¨ Consider joining a support group. Talking about a problem with your spouse, a good friend, or other people with similar problems is a good way to reduce tension and stress. ¨ Express yourself through art. Try writing, crafts, dance, or art to relieve stress. Some dance, writing, or art groups may be available just for people who have cancer. ¨ Be kind to your body and mind. Getting enough sleep, eating a healthy diet, and taking time to do things you enjoy can contribute to an overall feeling of balance in your life and can help reduce stress. ¨ Get help if you need it. Discuss your concerns with your doctor or counselor. · If you are vomiting or have diarrhea: ¨ Drink plenty of fluids (enough so that your urine is light yellow or clear like water) to prevent dehydration. Choose water and other caffeine-free clear liquids. If you have kidney, heart, or liver disease and have to limit fluids, talk with your doctor before you increase the amount of fluids you drink. ¨ When you are able to eat, try clear soups, mild foods, and liquids until all symptoms are gone for 12 to 48 hours. Other good choices include dry toast, crackers, cooked cereal, and gelatin dessert, such as Jell-O. · If you have not already done so, prepare a list of advance directives. Advance directives are instructions to your doctor and family members about what kind of care you want if you become unable to speak or express yourself. When should you call for help? Call 911 anytime you think you may need emergency care. For example, call if: 
? · You passed out (lost consciousness). ?Call your doctor now or seek immediate medical care if: 
? · You have a fever. ? · You have abnormal bleeding. ? · You think you have an infection. ? · You have new or worse pain. ? · You have new symptoms, such as a cough, belly pain, vomiting, diarrhea, or a rash. ? Watch closely for changes in your health, and be sure to contact your doctor if: 
? · You are much more tired than usual.  
? · You have swollen glands in your armpits, groin, or neck. ? · You do not get better as expected. Where can you learn more? Go to http://mandie-elizabeth.info/. Enter V321 in the search box to learn more about \"Breast Cancer: Care Instructions. \" Current as of: May 12, 2017 Content Version: 11.4 © 2422-0384 [employer]. Care instructions adapted under license by [employer] (which disclaims liability or warranty for this information). If you have questions about a medical condition or this instruction, always ask your healthcare professional. [place] [address] any warranty or liability for your use of this information. Please provide this summary of care documentation to your next provider. Your primary care clinician is listed as [address] [place] [address]. If you have any questions after today's visit, please call [phone].

## 2017-10-28 LAB — CANCER AG27-29 SERPL-ACNC: 53.1 U/ML (ref 0–38.6)

## 2017-11-01 RX ORDER — LAMOTRIGINE 25 MG/1
500 TABLET ORAL ONCE
Status: COMPLETED | OUTPATIENT
Start: 2017-11-03 | End: 2017-11-03

## 2017-11-03 ENCOUNTER — APPOINTMENT (OUTPATIENT)
Dept: INFUSION THERAPY | Age: 72
End: 2017-11-03
Payer: MEDICARE

## 2017-11-03 ENCOUNTER — HOSPITAL ENCOUNTER (OUTPATIENT)
Dept: INFUSION THERAPY | Age: 72
Discharge: HOME | End: 2017-11-03
Payer: MEDICARE

## 2017-11-03 VITALS
TEMPERATURE: 98.3 F | HEIGHT: 64 IN | DIASTOLIC BLOOD PRESSURE: 69 MMHG | RESPIRATION RATE: 16 BRPM | BODY MASS INDEX: 26.12 KG/M2 | HEART RATE: 72 BPM | SYSTOLIC BLOOD PRESSURE: 129 MMHG | WEIGHT: 153 LBS

## 2017-11-03 PROCEDURE — 96523 IRRIG DRUG DELIVERY DEVICE: CPT

## 2017-11-03 PROCEDURE — 96402 CHEMO HORMON ANTINEOPL SQ/IM: CPT

## 2017-11-03 PROCEDURE — 74011250636 HC RX REV CODE- 250/636: Performed by: INTERNAL MEDICINE

## 2017-11-03 RX ORDER — HEPARIN SODIUM (PORCINE) LOCK FLUSH IV SOLN 100 UNIT/ML 100 UNIT/ML
500 SOLUTION INTRAVENOUS AS NEEDED
Status: DISPENSED | OUTPATIENT
Start: 2017-11-03 | End: 2017-11-04

## 2017-11-03 RX ORDER — SODIUM CHLORIDE 0.9 % (FLUSH) 0.9 %
10-40 SYRINGE (ML) INJECTION AS NEEDED
Status: DISCONTINUED | OUTPATIENT
Start: 2017-11-03 | End: 2017-11-07 | Stop reason: HOSPADM

## 2017-11-03 RX ADMIN — HEPARIN SODIUM (PORCINE) LOCK FLUSH IV SOLN 100 UNIT/ML 500 UNITS: 100 SOLUTION at 12:02

## 2017-11-03 RX ADMIN — Medication 20 ML: at 12:02

## 2017-11-03 RX ADMIN — FULVESTRANT 500 MG: 50 INJECTION INTRAMUSCULAR at 12:14

## 2017-11-03 NOTE — PROGRESS NOTES
[facility] Progress Note    Date: November 3, 2017    Name: Vinh Real              MRN: [id number]              : 1945    Chemotherapy Cycle:C15. Ms. Ravindra Quinonez was assessed and education was provided. Ms. Reilly Burnette vitals were reviewed and patient was observed for 5 minutes prior to treatment. Visit Vitals    /69 (BP 1 Location: Left arm, BP Patient Position: At rest;Sitting)    Pulse 72    Temp 98.3 °F (36.8 °C)    Resp 16    Ht 5' 4.17\" (1.63 m)    Wt 69.4 kg (153 lb)    Breastfeeding Yes    BMI 26.12 kg/m2       Monthly port flush done with brisk blood return obtained. Faslodex 500 mg given; 250 mg IM given in right gluteus klaudia and 250 mg IM given in left gluteus klaudia. Patient armband removed and shredded. Ms. Ravindra Quinonez was discharged from [place] [address] in stable condition at 1230. She is to return on 11/10/17 at 1100 for her next appointment for CBC/Procrit Q 2 wks.     Abhishek Paulino RN  November 3, 2017  12:34 PM

## 2017-11-10 ENCOUNTER — CLINICAL SUPPORT (OUTPATIENT)
Dept: ONCOLOGY | Age: 72
End: 2017-11-10

## 2017-11-10 ENCOUNTER — HOSPITAL ENCOUNTER (OUTPATIENT)
Dept: ONCOLOGY | Age: 72
Discharge: HOME | End: 2017-11-10

## 2017-11-10 ENCOUNTER — HOSPITAL ENCOUNTER (OUTPATIENT)
Dept: INFUSION THERAPY | Age: 72
Discharge: HOME | End: 2017-11-10
Payer: MEDICARE

## 2017-11-10 VITALS
TEMPERATURE: 97.1 F | SYSTOLIC BLOOD PRESSURE: 113 MMHG | HEART RATE: 73 BPM | DIASTOLIC BLOOD PRESSURE: 61 MMHG | RESPIRATION RATE: 16 BRPM

## 2017-11-10 DIAGNOSIS — T45.1X5A ANEMIA DUE TO CHEMOTHERAPY: Primary | ICD-10-CM

## 2017-11-10 DIAGNOSIS — D64.81 ANEMIA DUE TO CHEMOTHERAPY: Primary | ICD-10-CM

## 2017-11-10 DIAGNOSIS — D64.81 ANEMIA DUE TO CHEMOTHERAPY: ICD-10-CM

## 2017-11-10 DIAGNOSIS — T45.1X5A ANEMIA DUE TO CHEMOTHERAPY: ICD-10-CM

## 2017-11-10 LAB
BASO+EOS+MONOS # BLD AUTO: 0.1 K/UL (ref 0–2.3)
BASO+EOS+MONOS # BLD AUTO: 7 % (ref 0.1–17)
DIFFERENTIAL METHOD BLD: ABNORMAL
ERYTHROCYTE [DISTWIDTH] IN BLOOD BY AUTOMATED COUNT: 16.4 % (ref 11.5–14.5)
HCT VFR BLD AUTO: 28.2 % (ref 36–48)
HGB BLD-MCNC: 9.6 G/DL (ref 12–16)
LYMPHOCYTES # BLD: 0.3 K/UL (ref 1.1–5.9)
LYMPHOCYTES NFR BLD: 16 % (ref 14–44)
MCH RBC QN AUTO: 36.5 PG (ref 25–35)
MCHC RBC AUTO-ENTMCNC: 34 G/DL (ref 31–37)
MCV RBC AUTO: 107.2 FL (ref 78–102)
NEUTS SEG # BLD: 1.2 K/UL (ref 1.8–9.5)
NEUTS SEG NFR BLD: 76 % (ref 40–70)
PLATELET # BLD AUTO: 111 K/UL (ref 140–440)
RBC # BLD AUTO: 2.63 M/UL (ref 4.1–5.1)
WBC # BLD AUTO: 1.6 K/UL (ref 4.5–13)

## 2017-11-10 PROCEDURE — 74011250636 HC RX REV CODE- 250/636: Performed by: INTERNAL MEDICINE

## 2017-11-10 PROCEDURE — 96372 THER/PROPH/DIAG INJ SC/IM: CPT

## 2017-11-10 PROCEDURE — 36415 COLL VENOUS BLD VENIPUNCTURE: CPT

## 2017-11-10 RX ADMIN — ERYTHROPOIETIN 20000 UNITS: 20000 INJECTION, SOLUTION INTRAVENOUS; SUBCUTANEOUS at 12:02

## 2017-11-10 RX ADMIN — ERYTHROPOIETIN 40000 UNITS: 40000 INJECTION, SOLUTION INTRAVENOUS; SUBCUTANEOUS at 12:02

## 2017-11-10 NOTE — PROGRESS NOTES
[facility] Progress Note    Date: November 10, 2017    Name: Peyton Callow    MRN: [id number]         : 1945      Ms. Figueroa was assessed and education was provided. Ms. Tresa Gregg vitals were reviewed and patient was observed for 5 minutes prior to treatment. Visit Vitals    /61 (BP 1 Location: Left arm, BP Patient Position: At rest;Sitting)    Pulse 73    Temp 97.1 °F (36.2 °C)    Resp 16       Lab results were obtained and reviewed. Recent Results (from the past 12 hour(s))   CBC WITH 3 PART DIFF    Collection Time: 11/10/17 11:50 AM   Result Value Ref Range    WBC 1.6 (L) 4.5 - 13.0 K/uL    RBC 2.63 (L) 4.10 - 5.10 M/uL    HGB 9.6 (L) 12.0 - 16.0 g/dL    HCT 28.2 (L) 36 - 48 %    .2 (H) 78 - 102 FL    MCH 36.5 (H) 25.0 - 35.0 PG    MCHC 34.0 31 - 37 g/dL    RDW 16.4 (H) 11.5 - 14.5 %    PLATELET 481 (L) 508 - 440 K/uL    NEUTROPHILS 76 (H) 40 - 70 %    MIXED CELLS 7 0.1 - 17 %    LYMPHOCYTES 16 14 - 44 %    ABS. NEUTROPHILS 1.2 (L) 1.8 - 9.5 K/UL    ABS. MIXED CELLS 0.1 0.0 - 2.3 K/uL    ABS. LYMPHOCYTES 0.3 (L) 1.1 - 5.9 K/UL    DF AUTOMATED         Procrit 60,000 units given SQ for H&H 9.6/28.2. Patient armband removed and shredded. Ms. Jovita Velazquez was discharged from [place] [address] in stable condition at 1210. She is to return on 17 at 1300 for her next appointment for monthly Faslodex, monthly port flush and CBC/Procrit Q 2 wks.     Jeison Mathew RN  November 10, 2017  2:00 PM

## 2017-11-28 RX ORDER — LAMOTRIGINE 25 MG/1
500 TABLET ORAL ONCE
Status: COMPLETED | OUTPATIENT
Start: 2017-12-01 | End: 2017-12-01

## 2017-12-01 ENCOUNTER — HOSPITAL ENCOUNTER (OUTPATIENT)
Dept: LAB | Age: 72
Discharge: HOME | End: 2017-12-01
Payer: MEDICARE

## 2017-12-01 ENCOUNTER — APPOINTMENT (OUTPATIENT)
Dept: INFUSION THERAPY | Age: 72
End: 2017-12-01
Payer: MEDICARE

## 2017-12-01 ENCOUNTER — HOSPITAL ENCOUNTER (OUTPATIENT)
Dept: ONCOLOGY | Age: 72
Discharge: HOME | End: 2017-12-01

## 2017-12-01 ENCOUNTER — HOSPITAL ENCOUNTER (OUTPATIENT)
Dept: INFUSION THERAPY | Age: 72
Discharge: HOME | End: 2017-12-01
Payer: MEDICARE

## 2017-12-01 ENCOUNTER — CLINICAL SUPPORT (OUTPATIENT)
Dept: ONCOLOGY | Age: 72
End: 2017-12-01

## 2017-12-01 VITALS
BODY MASS INDEX: 26.46 KG/M2 | HEIGHT: 64 IN | SYSTOLIC BLOOD PRESSURE: 111 MMHG | TEMPERATURE: 97 F | RESPIRATION RATE: 16 BRPM | WEIGHT: 155 LBS | HEART RATE: 75 BPM | DIASTOLIC BLOOD PRESSURE: 67 MMHG

## 2017-12-01 DIAGNOSIS — T45.1X5A ANEMIA DUE TO CHEMOTHERAPY: Primary | ICD-10-CM

## 2017-12-01 DIAGNOSIS — T45.1X5A ANEMIA DUE TO CHEMOTHERAPY: ICD-10-CM

## 2017-12-01 DIAGNOSIS — D64.81 ANEMIA DUE TO CHEMOTHERAPY: Primary | ICD-10-CM

## 2017-12-01 DIAGNOSIS — D64.81 ANEMIA DUE TO CHEMOTHERAPY: ICD-10-CM

## 2017-12-01 LAB
ALBUMIN SERPL-MCNC: 3.7 G/DL (ref 3.4–5)
ALBUMIN/GLOB SERPL: 1.2 {RATIO} (ref 0.8–1.7)
ALP SERPL-CCNC: 68 U/L (ref 45–117)
ALT SERPL-CCNC: 17 U/L (ref 13–56)
AST SERPL-CCNC: 16 U/L (ref 15–37)
BASO+EOS+MONOS # BLD AUTO: 0.1 K/UL (ref 0–2.3)
BASO+EOS+MONOS # BLD AUTO: 7 % (ref 0.1–17)
BILIRUB DIRECT SERPL-MCNC: 0.2 MG/DL (ref 0–0.2)
BILIRUB SERPL-MCNC: 0.5 MG/DL (ref 0.2–1)
DIFFERENTIAL METHOD BLD: ABNORMAL
ERYTHROCYTE [DISTWIDTH] IN BLOOD BY AUTOMATED COUNT: 16.8 % (ref 11.5–14.5)
GLOBULIN SER CALC-MCNC: 3.1 G/DL (ref 2–4)
HCT VFR BLD AUTO: 27.1 % (ref 36–48)
HGB BLD-MCNC: 9.2 G/DL (ref 12–16)
LYMPHOCYTES # BLD: 0.3 K/UL (ref 1.1–5.9)
LYMPHOCYTES NFR BLD: 16 % (ref 14–44)
MCH RBC QN AUTO: 37.2 PG (ref 25–35)
MCHC RBC AUTO-ENTMCNC: 33.9 G/DL (ref 31–37)
MCV RBC AUTO: 109.7 FL (ref 78–102)
NEUTS SEG # BLD: 1.6 K/UL (ref 1.8–9.5)
NEUTS SEG NFR BLD: 77 % (ref 40–70)
PLATELET # BLD AUTO: 241 K/UL (ref 140–440)
PROT SERPL-MCNC: 6.8 G/DL (ref 6.4–8.2)
RBC # BLD AUTO: 2.47 M/UL (ref 4.1–5.1)
WBC # BLD AUTO: 2 K/UL (ref 4.5–13)

## 2017-12-01 PROCEDURE — 96372 THER/PROPH/DIAG INJ SC/IM: CPT

## 2017-12-01 PROCEDURE — 80076 HEPATIC FUNCTION PANEL: CPT | Performed by: INTERNAL MEDICINE

## 2017-12-01 PROCEDURE — 74011250636 HC RX REV CODE- 250/636: Performed by: INTERNAL MEDICINE

## 2017-12-01 PROCEDURE — 96402 CHEMO HORMON ANTINEOPL SQ/IM: CPT

## 2017-12-01 PROCEDURE — 36591 DRAW BLOOD OFF VENOUS DEVICE: CPT

## 2017-12-01 PROCEDURE — 74011250636 HC RX REV CODE- 250/636

## 2017-12-01 RX ORDER — HEPARIN SODIUM (PORCINE) LOCK FLUSH IV SOLN 100 UNIT/ML 100 UNIT/ML
SOLUTION INTRAVENOUS
Status: COMPLETED
Start: 2017-12-01 | End: 2017-12-01

## 2017-12-01 RX ORDER — SODIUM CHLORIDE 0.9 % (FLUSH) 0.9 %
10-40 SYRINGE (ML) INJECTION AS NEEDED
Status: DISCONTINUED | OUTPATIENT
Start: 2017-12-01 | End: 2017-12-05 | Stop reason: HOSPADM

## 2017-12-01 RX ORDER — HEPARIN SODIUM (PORCINE) LOCK FLUSH IV SOLN 100 UNIT/ML 100 UNIT/ML
500 SOLUTION INTRAVENOUS AS NEEDED
Status: ACTIVE | OUTPATIENT
Start: 2017-12-01 | End: 2017-12-02

## 2017-12-01 RX ADMIN — HEPARIN SODIUM (PORCINE) LOCK FLUSH IV SOLN 100 UNIT/ML 500 UNITS: 100 SOLUTION at 13:33

## 2017-12-01 RX ADMIN — FULVESTRANT 500 MG: 50 INJECTION INTRAMUSCULAR at 13:46

## 2017-12-01 RX ADMIN — ERYTHROPOIETIN 40000 UNITS: 40000 INJECTION, SOLUTION INTRAVENOUS; SUBCUTANEOUS at 14:00

## 2017-12-01 RX ADMIN — Medication 30 ML: at 13:32

## 2017-12-01 RX ADMIN — ERYTHROPOIETIN 20000 UNITS: 20000 INJECTION, SOLUTION INTRAVENOUS; SUBCUTANEOUS at 14:00

## 2017-12-01 NOTE — PROGRESS NOTES
[address] \[facility]\"" Progress Note    Date: 2017    Name: Melissa N[place]              MRN: [id number]              : 1945    Chemotherapy Cycle: C16 . Ms. Sita Griggs was assessed and education was provided. Ms. Opal Wu vitals were reviewed and patient was observed for 5 minutes prior to treatment. Visit Vitals    /67 (BP 1 Location: Left arm, BP Patient Position: At rest;Sitting)    Pulse 75    Temp 97 °F (36.1 °C)    Resp 16    Ht 5' 4\" (1.626 m)    Wt 70.3 kg (155 lb)    Breastfeeding Yes    BMI 26.61 kg/m2       Lab results were obtained and reviewed. Recent Results (from the past 12 hour(s))   CBC WITH 3 PART DIFF    Collection Time: 17  1:39 PM   Result Value Ref Range    WBC 2.0 (L) 4.5 - 13.0 K/uL    RBC 2.47 (L) 4.10 - 5.10 M/uL    HGB 9.2 (L) 12.0 - 16.0 g/dL    HCT 27.1 (L) 36 - 48 %    .7 (H) 78 - 102 FL    MCH 37.2 (H) 25.0 - 35.0 PG    MCHC 33.9 31 - 37 g/dL    RDW 16.8 (H) 11.5 - 14.5 %    PLATELET 015 924 - 578 K/uL    NEUTROPHILS 77 (H) 40 - 70 %    MIXED CELLS 7 0.1 - 17 %    LYMPHOCYTES 16 14 - 44 %    ABS. NEUTROPHILS 1.6 (L) 1.8 - 9.5 K/UL    ABS. MIXED CELLS 0.1 0.0 - 2.3 K/uL    ABS. LYMPHOCYTES 0.3 (L) 1.1 - 5.9 K/UL    DF AUTOMATED         Faslodex 500 mg in two pre-filled syringes given. Each syringe had 250 mg and given one in right upper outer gluteus klaudia and one in left upper outer gluteus klaudia    Procrit 60,000 units SQ given for H&H 9.227. 1. Patient armband removed and shredded. Ms. Sita Griggs was discharged from [place] [address] in stable condition at 1410. She is to return on 17 at 1600 for her next appointment for CBC/Procrit Q 2 weeks.      Guido Fraire RN  2017  3:09 PM

## 2017-12-14 ENCOUNTER — HOSPITAL ENCOUNTER (OUTPATIENT)
Dept: INFUSION THERAPY | Age: 72
Discharge: HOME | End: 2017-12-14
Payer: MEDICARE

## 2017-12-14 ENCOUNTER — HOSPITAL ENCOUNTER (OUTPATIENT)
Dept: ONCOLOGY | Age: 72
Discharge: HOME | End: 2017-12-14

## 2017-12-14 ENCOUNTER — CLINICAL SUPPORT (OUTPATIENT)
Dept: ONCOLOGY | Age: 72
End: 2017-12-14

## 2017-12-14 VITALS
HEART RATE: 85 BPM | OXYGEN SATURATION: 97 % | RESPIRATION RATE: 18 BRPM | DIASTOLIC BLOOD PRESSURE: 65 MMHG | SYSTOLIC BLOOD PRESSURE: 145 MMHG | TEMPERATURE: 96.9 F

## 2017-12-14 DIAGNOSIS — D64.81 ANEMIA DUE TO CHEMOTHERAPY: Primary | ICD-10-CM

## 2017-12-14 DIAGNOSIS — D64.81 ANEMIA DUE TO CHEMOTHERAPY: ICD-10-CM

## 2017-12-14 DIAGNOSIS — T45.1X5A ANEMIA DUE TO CHEMOTHERAPY: ICD-10-CM

## 2017-12-14 DIAGNOSIS — T45.1X5A ANEMIA DUE TO CHEMOTHERAPY: Primary | ICD-10-CM

## 2017-12-14 LAB
BASO+EOS+MONOS # BLD AUTO: 0.2 K/UL (ref 0–2.3)
BASO+EOS+MONOS # BLD AUTO: 10 % (ref 0.1–17)
DIFFERENTIAL METHOD BLD: ABNORMAL
ERYTHROCYTE [DISTWIDTH] IN BLOOD BY AUTOMATED COUNT: 17.3 % (ref 11.5–14.5)
HCT VFR BLD AUTO: 27.4 % (ref 36–48)
HGB BLD-MCNC: 9.3 G/DL (ref 12–16)
LYMPHOCYTES # BLD: 0.4 K/UL (ref 1.1–5.9)
LYMPHOCYTES NFR BLD: 21 % (ref 14–44)
MCH RBC QN AUTO: 36.9 PG (ref 25–35)
MCHC RBC AUTO-ENTMCNC: 33.9 G/DL (ref 31–37)
MCV RBC AUTO: 108.7 FL (ref 78–102)
NEUTS SEG # BLD: 1.1 K/UL (ref 1.8–9.5)
NEUTS SEG NFR BLD: 69 % (ref 40–70)
PLATELET # BLD AUTO: 125 K/UL (ref 140–440)
RBC # BLD AUTO: 2.52 M/UL (ref 4.1–5.1)
WBC # BLD AUTO: 1.7 K/UL (ref 4.5–13)

## 2017-12-14 PROCEDURE — 36415 COLL VENOUS BLD VENIPUNCTURE: CPT

## 2017-12-14 PROCEDURE — 74011250636 HC RX REV CODE- 250/636: Performed by: INTERNAL MEDICINE

## 2017-12-14 PROCEDURE — 96372 THER/PROPH/DIAG INJ SC/IM: CPT

## 2017-12-14 RX ADMIN — ERYTHROPOIETIN 40000 UNITS: 40000 INJECTION, SOLUTION INTRAVENOUS; SUBCUTANEOUS at 16:04

## 2017-12-14 RX ADMIN — ERYTHROPOIETIN 20000 UNITS: 20000 INJECTION, SOLUTION INTRAVENOUS; SUBCUTANEOUS at 16:04

## 2017-12-14 NOTE — PROGRESS NOTES
[address] [facility] Progress Note    Date: 2017    Name: Edward Doe    MRN: [id number]         : 1945     Procrit      Ms. Figueroa was assessed and education was provided. Ms. Diana Huitron vitals were reviewed and patient was observed for 5 minutes prior to treatment. Visit Vitals    /65 (BP 1 Location: Left arm, BP Patient Position: Sitting)    Pulse 85    Temp 96.9 °F (36.1 °C)    Resp 18    SpO2 97%     Blood drawn via left AC x 1 attempt for CBC. Gauze and tape applied. Lab results were obtained and reviewed. Recent Results (from the past 12 hour(s))   CBC WITH 3 PART DIFF    Collection Time: 17  3:47 PM   Result Value Ref Range    WBC 1.7 (L) 4.5 - 13.0 K/uL    RBC 2.52 (L) 4.10 - 5.10 M/uL    HGB 9.3 (L) 12.0 - 16.0 g/dL    HCT 27.4 (L) 36 - 48 %    .7 (H) 78 - 102 FL    MCH 36.9 (H) 25.0 - 35.0 PG    MCHC 33.9 31 - 37 g/dL    RDW 17.3 (H) 11.5 - 14.5 %    PLATELET 973 (L) 530 - 440 K/uL    NEUTROPHILS 69 40 - 70 %    MIXED CELLS 10 0.1 - 17 %    LYMPHOCYTES 21 14 - 44 %    ABS. NEUTROPHILS 1.1 (L) 1.8 - 9.5 K/UL    ABS. MIXED CELLS 0.2 0.0 - 2.3 K/uL    ABS. LYMPHOCYTES 0.4 (L) 1.1 - 5.9 K/UL    DF AUTOMATED         Procrit 60,000 units given SQ in the back of the left arm. Band-aid applied. Patient armband removed and shredded. Ms. Gilbert Graff was discharged from [place] [address] in stable condition at [phone]. She is to return on 17 at 1400 for her next appointment.     Sebastián Bach RN  2017

## 2017-12-19 NOTE — PROGRESS NOTES
I called Ms. Debbie Patel today, and made her aware, that [facility] for Friday, 12[facility]-17, here at the [facility] has been moved to the [place] [address] location, at 1400, and she agreed to the appointment location change.

## 2017-12-22 ENCOUNTER — OFFICE VISIT (OUTPATIENT)
Dept: ONCOLOGY | Age: 72
End: 2017-12-22

## 2017-12-22 ENCOUNTER — HOSPITAL ENCOUNTER (OUTPATIENT)
Dept: ONCOLOGY | Age: 72
Discharge: HOME | End: 2017-12-22

## 2017-12-22 ENCOUNTER — HOSPITAL ENCOUNTER (OUTPATIENT)
Dept: LAB | Age: 72
Discharge: HOME | End: 2017-12-22
Payer: MEDICARE

## 2017-12-22 VITALS
TEMPERATURE: 98 F | SYSTOLIC BLOOD PRESSURE: 118 MMHG | WEIGHT: 157 LBS | HEART RATE: 87 BPM | BODY MASS INDEX: 26.95 KG/M2 | DIASTOLIC BLOOD PRESSURE: 60 MMHG

## 2017-12-22 DIAGNOSIS — D64.81 ANEMIA DUE TO ANTINEOPLASTIC CHEMOTHERAPY: ICD-10-CM

## 2017-12-22 DIAGNOSIS — T45.1X5A CHEMOTHERAPY INDUCED NEUTROPENIA (HCC): ICD-10-CM

## 2017-12-22 DIAGNOSIS — T45.1X5A ANEMIA DUE TO ANTINEOPLASTIC CHEMOTHERAPY: ICD-10-CM

## 2017-12-22 DIAGNOSIS — G63 NEUROPATHY ASSOCIATED WITH CANCER (HCC): ICD-10-CM

## 2017-12-22 DIAGNOSIS — C50.919 METASTATIC BREAST CANCER (HCC): ICD-10-CM

## 2017-12-22 DIAGNOSIS — C50.311 MALIGNANT NEOPLASM OF LOWER-INNER QUADRANT OF RIGHT FEMALE BREAST, UNSPECIFIED ESTROGEN RECEPTOR STATUS (HCC): ICD-10-CM

## 2017-12-22 DIAGNOSIS — D72.819 CHRONIC LEUKOPENIA: ICD-10-CM

## 2017-12-22 DIAGNOSIS — C80.1 NEUROPATHY ASSOCIATED WITH CANCER (HCC): ICD-10-CM

## 2017-12-22 DIAGNOSIS — C50.311 MALIGNANT NEOPLASM OF LOWER-INNER QUADRANT OF RIGHT FEMALE BREAST, UNSPECIFIED ESTROGEN RECEPTOR STATUS (HCC): Primary | ICD-10-CM

## 2017-12-22 DIAGNOSIS — D70.1 CHEMOTHERAPY INDUCED NEUTROPENIA (HCC): ICD-10-CM

## 2017-12-22 LAB
ALBUMIN SERPL-MCNC: 3.7 G/DL (ref 3.4–5)
ALBUMIN/GLOB SERPL: 1.3 {RATIO} (ref 0.8–1.7)
ALP SERPL-CCNC: 76 U/L (ref 45–117)
ALT SERPL-CCNC: 18 U/L (ref 13–56)
ANION GAP SERPL CALC-SCNC: 8 MMOL/L (ref 3–18)
AST SERPL-CCNC: 17 U/L (ref 15–37)
BASO+EOS+MONOS # BLD AUTO: 0.3 K/UL (ref 0–2.3)
BASO+EOS+MONOS # BLD AUTO: 12 % (ref 0.1–17)
BILIRUB SERPL-MCNC: 0.5 MG/DL (ref 0.2–1)
BUN SERPL-MCNC: 19 MG/DL (ref 7–18)
BUN/CREAT SERPL: 17 (ref 12–20)
CALCIUM SERPL-MCNC: 8.7 MG/DL (ref 8.5–10.1)
CHLORIDE SERPL-SCNC: 107 MMOL/L (ref 100–108)
CO2 SERPL-SCNC: 28 MMOL/L (ref 21–32)
CREAT SERPL-MCNC: 1.13 MG/DL (ref 0.6–1.3)
DIFFERENTIAL METHOD BLD: ABNORMAL
ERYTHROCYTE [DISTWIDTH] IN BLOOD BY AUTOMATED COUNT: 18.1 % (ref 11.5–14.5)
FERRITIN SERPL-MCNC: 89 NG/ML (ref 8–388)
GLOBULIN SER CALC-MCNC: 2.8 G/DL (ref 2–4)
GLUCOSE SERPL-MCNC: 101 MG/DL (ref 74–99)
HCT VFR BLD AUTO: 30.1 % (ref 36–48)
HGB BLD-MCNC: 9.9 G/DL (ref 12–16)
IRON SATN MFR SERPL: 22 %
IRON SERPL-MCNC: 65 UG/DL (ref 50–175)
LYMPHOCYTES # BLD: 0.3 K/UL (ref 1.1–5.9)
LYMPHOCYTES NFR BLD: 14 % (ref 14–44)
MCH RBC QN AUTO: 36.3 PG (ref 25–35)
MCHC RBC AUTO-ENTMCNC: 32.9 G/DL (ref 31–37)
MCV RBC AUTO: 110.3 FL (ref 78–102)
NEUTS SEG # BLD: 1.7 K/UL (ref 1.8–9.5)
NEUTS SEG NFR BLD: 74 % (ref 40–70)
PLATELET # BLD AUTO: 181 K/UL (ref 140–440)
POTASSIUM SERPL-SCNC: 4.1 MMOL/L (ref 3.5–5.5)
PROT SERPL-MCNC: 6.5 G/DL (ref 6.4–8.2)
RBC # BLD AUTO: 2.73 M/UL (ref 4.1–5.1)
SODIUM SERPL-SCNC: 143 MMOL/L (ref 136–145)
TIBC SERPL-MCNC: 296 UG/DL (ref 250–450)
WBC # BLD AUTO: 2.3 K/UL (ref 4.5–13)

## 2017-12-22 PROCEDURE — 83540 ASSAY OF IRON: CPT | Performed by: INTERNAL MEDICINE

## 2017-12-22 PROCEDURE — 86300 IMMUNOASSAY TUMOR CA 15-3: CPT | Performed by: INTERNAL MEDICINE

## 2017-12-22 PROCEDURE — 80053 COMPREHEN METABOLIC PANEL: CPT | Performed by: INTERNAL MEDICINE

## 2017-12-22 PROCEDURE — 82728 ASSAY OF FERRITIN: CPT | Performed by: INTERNAL MEDICINE

## 2017-12-22 PROCEDURE — 36415 COLL VENOUS BLD VENIPUNCTURE: CPT | Performed by: INTERNAL MEDICINE

## 2017-12-22 NOTE — PATIENT INSTRUCTIONS
Breast Cancer: Care Instructions  Your Care Instructions    Breast cancer occurs when abnormal cells grow out of control in the breast. These cancer cells can spread within the breast, to nearby lymph nodes and other tissues, and to other parts of the body. Being treated for cancer can weaken your body, and you may feel very tired. Get the rest your body needs so you can feel better. Finding out that you have cancer is scary. You may feel many emotions and may need some help coping. Seek out family, friends, and counselors for support. You also can do things at home to make yourself feel better while you go through treatment. Call the [employer] (0-989.434.1439) or visit its website at [employer] for more information. Follow-up care is a key part of your treatment and safety. Be sure to make and go to all appointments, and call your doctor if you are having problems. It's also a good idea to know your test results and keep a list of the medicines you take. How can you care for yourself at home? · Take your medicines exactly as prescribed. Call your doctor if you think you are having a problem with your medicine. You may get medicine for nausea and vomiting if you have these side effects. · Follow your doctor's instructions to relieve pain. Pain from cancer and surgery can almost always be controlled. Use pain medicine when you first notice pain, before it becomes severe. · Eat healthy food. If you do not feel like eating, try to eat food that has protein and extra calories to keep up your strength and prevent weight loss. Drink liquid meal replacements for extra calories and protein. Try to eat your main meal early. · Get some physical activity every day, but do not get too tired. Keep doing the hobbies you enjoy as your energy allows. · Do not smoke. Smoking can make your cancer worse. If you need help quitting, talk to your doctor about stop-smoking programs and medicines.  These can increase your chances of quitting for good. · Take steps to control your stress and workload. Learn relaxation techniques. ¨ Share your feelings. Stress and tension affect our emotions. By expressing your feelings to others, you may be able to understand and cope with them. ¨ Consider joining a support group. Talking about a problem with your spouse, a good friend, or other people with similar problems is a good way to reduce tension and stress. ¨ Express yourself through art. Try writing, crafts, dance, or art to relieve stress. Some dance, writing, or art groups may be available just for people who have cancer. ¨ Be kind to your body and mind. Getting enough sleep, eating a healthy diet, and taking time to do things you enjoy can contribute to an overall feeling of balance in your life and can help reduce stress. ¨ Get help if you need it. Discuss your concerns with your doctor or counselor. · If you are vomiting or have diarrhea:  ¨ Drink plenty of fluids (enough so that your urine is light yellow or clear like water) to prevent dehydration. Choose water and other caffeine-free clear liquids. If you have kidney, heart, or liver disease and have to limit fluids, talk with your doctor before you increase the amount of fluids you drink. ¨ When you are able to eat, try clear soups, mild foods, and liquids until all symptoms are gone for 12 to 48 hours. Other good choices include dry toast, crackers, cooked cereal, and gelatin dessert, such as Jell-O.  · If you have not already done so, prepare a list of advance directives. Advance directives are instructions to your doctor and family members about what kind of care you want if you become unable to speak or express yourself. When should you call for help? Call 911 anytime you think you may need emergency care. For example, call if:  ? · You passed out (lost consciousness). ?Call your doctor now or seek immediate medical care if:  ? · You have a fever. ? · You have abnormal bleeding. ? · You think you have an infection. ? · You have new or worse pain. ? · You have new symptoms, such as a cough, belly pain, vomiting, diarrhea, or a rash. ? Watch closely for changes in your health, and be sure to contact your doctor if:  ? · You are much more tired than usual.   ? · You have swollen glands in your armpits, groin, or neck. ? · You do not get better as expected. Where can you learn more? Go to http://mandie-elizabeth.info/. Enter V321 in the search box to learn more about \"Breast Cancer: Care Instructions. \"  Current as of: May 12, 2017  Content Version: 11.4  © 3570-0598 [employer]. Care instructions adapted under license by [employer] (which disclaims liability or warranty for this information). If you have questions about a medical condition or this instruction, always ask your healthcare professional. [address] any warranty or liability for your use of this information.

## 2017-12-22 NOTE — MR AVS SNAPSHOT
Visit Information Date & Time Provider Department Dept. Phone Encounter #  
 12/22/2017 12:00 PM José Ruff [address] Office [phone] [id number] Follow-up Instructions Return in about 2 weeks (around 1/5/2018). Your Appointments 1/12/2018 11:45 AM  
Office Visit with José Ruff MD  
[address]) Appt Note: 3 week results [facility] [address] [place] [address]  
[phone]  
  
   
 [facility] [address] Upcoming Health Maintenance Date Due Hepatitis C Screening 1945 DTaP/Tdap/Td series (1 - Tdap) 10/18/1966 FOBT Q 1 YEAR AGE 50-75 10/18/1995 ZOSTER VACCINE AGE 60> 8/18/2005 GLAUCOMA SCREENING Q2Y 10/18/2010 Pneumococcal 65+ High/Highest Risk (1 of 2 - PCV13) 10/18/2010 MEDICARE YEARLY EXAM 10/18/2010 Allergies as of 12/22/2017  Review Complete On: 12/14/2017 By: Yung Ritchie RN Severity Noted Reaction Type Reactions Codeine  08/09/2012    Palpitations Darvocet A500 [Propoxyphene N-acetaminophen]  08/09/2012   Side Effect Nausea and Vomiting Darvon [Propoxyphene]  04/09/2014   Systemic Nausea and Vomiting Current Immunizations  Reviewed on 12/1/2017 Name Date Influenza Vaccine 10/18/2017, 11/14/2016, 9/14/2015, 1/7/2015, 12/9/2013, 1/16/2013 Influenza Vaccine Whole 2/1/2012 Not reviewed this visit You Were Diagnosed With   
  
 Codes Comments Malignant neoplasm of lower-inner quadrant of right female breast, unspecified estrogen receptor status ([facility] [address]    -  Primary ICD-10-CM: C50.311 ICD-9-CM: 174.3 Metastatic breast cancer ([facility] [address]     ICD-10-CM: I78.649 ICD-9-CM: 174.9 Chemotherapy induced neutropenia (HCC)     ICD-10-CM: D70.1, T45.1X5A 
ICD-9-CM: 288.03, E933.1 Anemia due to antineoplastic chemotherapy     ICD-10-CM: D64.81, T45.1X5A 
ICD-9-CM: 285.3, E933.1 Chronic leukopenia     ICD-10-CM: D72.819 ICD-9-CM: 288.50 Vitals BP Pulse Temp Weight(growth percentile) BMI OB Status 118/60 (BP 1 Location: Left arm, BP Patient Position: Sitting) 87 98 °F (36.7 °C) (Oral) 157 lb (71.2 kg) 26.95 kg/m2 Menopause Smoking Status Former Smoker BMI and BSA Data Body Mass Index Body Surface Area  
 26.95 kg/m 2 1.79 m 2 Preferred Pharmacy Pharmacy Name Phone [address] [facility] [phone] Your Updated Medication List  
  
   
This list is accurate as of: 12/22/17 12:54 PM.  Always use your most recent med list. ALLEGRA-D 24 HOUR 180-240 mg per tablet Generic drug:  fexofenadine-pseudoephedrine Take 1 Tab by mouth daily. ARMOUR THYROID 60 mg tablet Generic drug:  thyroid (Pork) Take 90 mg by mouth daily. cholecalciferol (VITAMIN D3) 5,000 unit Tab tablet Commonly known as:  VITAMIN D3 Take  by mouth daily. * gabapentin 300 mg capsule Commonly known as:  NEURONTIN  
TAKE 1 CAPSULE THREE TIMES A DAY  
  
 * gabapentin 100 mg capsule Commonly known as:  NEURONTIN Take 1 Cap by mouth three (3) times daily. MOTRIN 800 mg tablet Generic drug:  ibuprofen Take 800 mg by mouth two (2) times a day. Indications: OSTEOARTHRITIS  
  
 NEURPATH-B 3-35-2 mg Tab tab Generic drug:  L-Mfolate-B6 Phos-Methyl-B12 Take  by mouth two (2) times a day. palbociclib 125 mg Cap Commonly known as:  [employer] Take 125 mg by mouth daily. Take 1 tab po every day for 21 days on and 7 days off q 28 days  Indications: HORMONE RECEPTOR(+), HER2(-) ADVANCED BREAST CANCER  
  
 sulfacetamide 10 % ophthalmic ointment Commonly known as:  BLEPH-10 Administer  to right eye three (3) times daily. TETRACYCLINE PO Take  by mouth.  
  
 warfarin 1 mg tablet Commonly known as:  COUMADIN Take 1 mg by mouth daily. * Notice: This list has 2 medication(s) that are the same as other medications prescribed for you. Read the directions carefully, and ask your doctor or other care provider to review them with you. We Performed the Following COMPLETE CBC & AUTO DIFF WBC [72530 CPT(R)] Follow-up Instructions Return in about 2 weeks (around 1/5/2018). To-Do List   
 12/22/2017 Lab:  CBC WITH 3 PART DIFF   
  
 12/22/2017 Imaging:  CT CHEST ABD PELV W WO CONT   
  
 12/29/2017  2:00 PM  
  Appointment with HBV INFUSION NURSE 4 at [facility] OP INFUSION ([phone])  
  
 01/05/2018 Imaging:  NM BONE SCAN [facility] BODY   
  
 01/12/2018 1:00 PM  
  Appointment with [address] at [facility] ([phone])  
  
 01/26/2018 1:00 PM  
  Appointment with [address] at [facility] ([phone])  
  
 05/31/2018 11:00 AM  
  Appointment with [facility] RM 2 at [address] [place] [address] ([phone]) OUTSIDE FILMS  - Any outside films related to the study being scheduled should be brought with you on the day of the exam.  If this cannot be done there may be a delay in the reading of the study. MEDICATIONS  - Patient must bring a complete list of all medications currently taking to include prescriptions, over-the-counter meds, herbals, vitamins & any dietary supplements  GENERAL INSTRUCTIONS  - On the day of your exam do not use any bath powder, deodorant or lotions on the armpit area. -Tenderness of breasts may cause an increase of discomfort during procedure. If you are experiencing breast tenderness on the day of your appointment and would like to reschedule, please call [phone]. Patient Instructions Breast Cancer: Care Instructions Your Care Instructions Breast cancer occurs when abnormal cells grow out of control in the breast. These cancer cells can spread within the breast, to nearby lymph nodes and other tissues, and to other parts of the body. Being treated for cancer can weaken your body, and you may feel very tired. Get the rest your body needs so you can feel better. Finding out that you have cancer is scary. You may feel many emotions and may need some help coping. Seek out family, friends, and counselors for support. You also can do things at home to make yourself feel better while you go through treatment. Call the [employer] Rachell Prizzmbrittany ([phone]) or visit its website at [employer] [employer] 88tc88 for more information. Follow-up care is a key part of your treatment and safety. Be sure to make and go to all appointments, and call your doctor if you are having problems. It's also a good idea to know your test results and keep a list of the medicines you take. How can you care for yourself at home? · Take your medicines exactly as prescribed. Call your doctor if you think you are having a problem with your medicine. You may get medicine for nausea and vomiting if you have these side effects. · Follow your doctor's instructions to relieve pain. Pain from cancer and surgery can almost always be controlled. Use pain medicine when you first notice pain, before it becomes severe. · Eat healthy food. If you do not feel like eating, try to eat food that has protein and extra calories to keep up your strength and prevent weight loss. Drink liquid meal replacements for extra calories and protein. Try to eat your main meal early. · Get some physical activity every day, but do not get too tired. Keep doing the hobbies you enjoy as your energy allows. · Do not smoke. Smoking can make your cancer worse. If you need help quitting, talk to your doctor about stop-smoking programs and medicines. These can increase your chances of quitting for good. · Take steps to control your stress and workload. Learn relaxation techniques. ¨ Share your feelings. Stress and tension affect our emotions.  By expressing your feelings to others, you may be able to understand and cope with them. ¨ Consider joining a support group. Talking about a problem with your spouse, a good friend, or other people with similar problems is a good way to reduce tension and stress. ¨ Express yourself through art. Try writing, crafts, dance, or art to relieve stress. Some dance, writing, or art groups may be available just for people who have cancer. ¨ Be kind to your body and mind. Getting enough sleep, eating a healthy diet, and taking time to do things you enjoy can contribute to an overall feeling of balance in your life and can help reduce stress. ¨ Get help if you need it. Discuss your concerns with your doctor or counselor. · If you are vomiting or have diarrhea: ¨ Drink plenty of fluids (enough so that your urine is light yellow or clear like water) to prevent dehydration. Choose water and other caffeine-free clear liquids. If you have kidney, heart, or liver disease and have to limit fluids, talk with your doctor before you increase the amount of fluids you drink. ¨ When you are able to eat, try clear soups, mild foods, and liquids until all symptoms are gone for 12 to 48 hours. Other good choices include dry toast, crackers, cooked cereal, and gelatin dessert, such as Jell-O. · If you have not already done so, prepare a list of advance directives. Advance directives are instructions to your doctor and family members about what kind of care you want if you become unable to speak or express yourself. When should you call for help? Call 911 anytime you think you may need emergency care. For example, call if: 
? · You passed out (lost consciousness). ?Call your doctor now or seek immediate medical care if: 
? · You have a fever. ? · You have abnormal bleeding. ? · You think you have an infection. ? · You have new or worse pain.   
? · You have new symptoms, such as a cough, belly pain, vomiting, diarrhea, or a rash. ? Watch closely for changes in your health, and be sure to contact your doctor if: 
? · You are much more tired than usual.  
? · You have swollen glands in your armpits, groin, or neck. ? · You do not get better as expected. Where can you learn more? Go to http://mandie-elizabeth.info/. Enter V321 in the search box to learn more about \"Breast Cancer: Care Instructions. \" Current as of: May 12, 2017 Content Version: 11.4 © 0168-3941 [employer]. Care instructions adapted under license by [employer] (which disclaims liability or warranty for this information). If you have questions about a medical condition or this instruction, always ask your healthcare professional. [address] any warranty or liability for your use of this information. Please provide this summary of care documentation to your next provider. Your primary care clinician is listed as [address] [place] [address]. If you have any questions after today's visit, please call [phone].

## 2017-12-22 NOTE — PROGRESS NOTES
Hematology/Oncology  Progress Note    Name: Dayton Zamora  Date: 2017  : 1945    PCP: Silva Hill MD     Ms. Megan Holly is a 67 y.o.  female who was seen for management of her metastatic breast cancer with associated complications of chemotherapy. Current therapy: Fulvestrant 500 mg subcutaneous monthly and Ibrance 125 mg by mouth daily. Subjective:     Mrs. Megan Holly is a 79-year-old woman who has slowly progressive metastatic breast cancer. She has previously completed external beam radiation therapy to lesions in her ribs and more recently she completed proton therapy to areas of bone involvement with a significant benefit with regards to pain control. The posttherapy imaging studies showed a significant decrease in the intensity of uptake on the bone scan. .  Additionally she is currently receiving systemic therapy with fulvestrant and Ibrance. Currently she is tolerating the systemic therapy reasonably well and has not experienced any nausea or emesis. She is no longer complaining of mouth discomfort but she does report some fatigue and occasional weakness. She continues to have SOB intermittently. She also receives Procrit at 2 week intervals whenever her hemoglobin is below 10 g/dL and hematocrit is below 30%. Past medical history, family history, and social history: these were reviewed and remains unchanged.     Past Medical History:   Diagnosis Date    Biliary colic     Breast CA ([facility] [address] 2012    Cancer [facility])     Right Breast    Chemotherapy convalescence or palliative care     Neuropathy     Radiation therapy complication     Thyroid disease      Past Surgical History:   Procedure Laterality Date    BREAST SURGERY PROCEDURE UNLISTED  10/2002    Right-Lesion    BREAST SURGERY PROCEDURE UNLISTED  2004    Right-Lesion    HX LAP CHOLECYSTECTOMY  13    HX MASTECTOMY  1991    Right     Social History     Social History    Marital status:      Spouse name: N/A    Number of children: N/A    Years of education: N/A     Occupational History    Not on file. Social History Main Topics    Smoking status: Former Smoker     Quit date: 6/2/1991    Smokeless tobacco: Never Used    Alcohol use 0.0 oz/week     1 - 2 Glasses of wine per week      Comment: socially, occassionally    Drug use: No    Sexual activity: Not on file     Other Topics Concern    Not on file     Social History Narrative     Family History   Problem Relation Age of Onset    Cancer Maternal Aunt      Hodgkin's disease    Breast Cancer Paternal Aunt     Cancer Father      Prostate, lung, brain     Current Outpatient Prescriptions   Medication Sig Dispense Refill    TETRACYCLINE HCL (TETRACYCLINE PO) Take  by mouth.  fexofenadine-pseudoephedrine (ALLEGRA-D 24 HOUR) 180-240 mg per tablet Take 1 Tab by mouth daily.  gabapentin (NEURONTIN) 100 mg capsule Take 1 Cap by mouth three (3) times daily. 90 Cap 6    sulfacetamide (BLEPH-10) 10 % ophthalmic ointment Administer  to right eye three (3) times daily.  palbociclib (IBRANCE) 125 mg cap Take 125 mg by mouth daily. Take 1 tab po every day for 21 days on and 7 days off q 28 days  Indications: HORMONE RECEPTOR(+), HER2(-) ADVANCED BREAST CANCER 42 Cap 6    gabapentin (NEURONTIN) 300 mg capsule TAKE 1 CAPSULE THREE TIMES A  Cap 2    ibuprofen (MOTRIN) 800 mg tablet Take 800 mg by mouth two (2) times a day. Indications: OSTEOARTHRITIS      Cholecalciferol, Vitamin D3, 5,000 unit Tab Take  by mouth daily.  thyroid, [employer]ork, (ARMOUR THYROID) 60 mg tablet Take 90 mg by mouth daily.  L-Mfolate-B6 Phos-Methyl-B12 (NEURPATH-B) 3-35-2 mg Tab Take  by mouth two (2) times a day.  warfarin (COUMADIN) 1 mg tablet Take 1 mg by mouth daily. Review of Systems  Constitutional: The patient has no acute distress or discomfort.   HEENT: The patient denies recent head trauma, eye pain, blurred vision,  hearing deficit, oropharyngeal mucosal pain or lesions, and the patient denies throat pain or discomfort. Lymphatics: The patient denies palpable peripheral lymphadenopathy. Hematologic: The patient denies having bruising, bleeding, or progressive fatigue. Respiratory: Patient denies having shortness of breath, cough, sputum production, fever, or dyspnea on exertion. Cardiovascular: The patient denies having leg pain, leg swelling, heart palpitations, chest permit, chest pain, or lightheadedness. The patient denies having dyspnea on exertion. Gastrointestinal: The patient denies having nausea, emesis, or diarrhea. The patient denies having any hematemesis or blood in the stool. Genitourinary: Patient denies having urinary urgency, frequency, or dysuria. The patient denies having blood in the urine. Psychological: The patient denies having symptoms of nervousness, anxiety, depression, or thoughts of harming self. Skin: Patient denies having skin rashes, skin, ulcerations, or unexplained itching or pruritus. Musculoskeletal: The patient denies having pain in the joints or bones. Objective:     Visit Vitals    /60 (BP 1 Location: Left arm, BP Patient Position: Sitting)    Pulse 87    Temp 98 °F (36.7 °C) (Oral)    Wt 71.2 kg (157 lb)    BMI 26.95 kg/m2     ECOG PS=0; Pain score=0/10    Physical Exam:   Gen. Appearance: The patient is in no acute distress. Skin: There is no bruise or rash. HEENT: The exam is unremarkable. Neck: Supple without lymphadenopathy or thyromegaly. Lungs: Clear to auscultation and percussion; there are no wheezes or rhonchi. Heart: Regular rate and rhythm; there are no murmurs, gallops, or rubs. Abdomen: Bowel sounds are present and normal.  There is no guarding, tenderness, or hepatosplenomegaly. Extremities: There is no clubbing, cyanosis, or edema. Neurologic: There are no focal neurologic deficits. Lymphatics: There is no palpable peripheral lymphadenopathy. Musculoskeletal: The patient has full range of motion at all joints. There is no evidence of joint deformity or effusions. There is no focal joint tenderness. Psychological/psychiatric: There is no clinical evidence of anxiety, depression, or melancholy. Lab data:      Results for orders placed or performed during the hospital encounter of 12/22/17   CBC WITH 3 PART DIFF     Status: Abnormal   Result Value Ref Range Status    WBC 2.3 (L) 4.5 - 13.0 K/uL Final    RBC 2.73 (L) 4.10 - 5.10 M/uL Final    HGB 9.9 (L) 12.0 - 16.0 g/dL Final    HCT 30.1 (L) 36 - 48 % Final    .3 (H) 78 - 102 FL Final    MCH 36.3 (H) 25.0 - 35.0 PG Final    MCHC 32.9 31 - 37 g/dL Final    RDW 18.1 (H) 11.5 - 14.5 % Final    PLATELET 561 410 - 623 K/uL Final    NEUTROPHILS 74 (H) 40 - 70 % Final    MIXED CELLS 12 0.1 - 17 % Final    LYMPHOCYTES 14 14 - 44 % Final    ABS. NEUTROPHILS 1.7 (L) 1.8 - 9.5 K/UL Final    ABS. MIXED CELLS 0.3 0.0 - 2.3 K/uL Final    ABS. LYMPHOCYTES 0.3 (L) 1.1 - 5.9 K/UL Final     Comment: Test performed at [place] [address] Location. Results Reviewed by Medical Director. DF AUTOMATED   Final           Assessment:     1. Malignant neoplasm of lower-inner quadrant of right female breast, unspecified estrogen receptor status ([facility] [address]    2. Metastatic breast cancer ([facility] [address]    3. Chemotherapy induced neutropenia (HCC)    4. Anemia due to antineoplastic chemotherapy    5. Chronic leukopenia    6. Neuropathy associated with cancer [facility])      Plan:   Metastatic breast cancer: The combination ofFulvestrant 500 mg subcutaneous monthly  will be continued. Ibrance 140 mg by mouth daily will be continued as well. Since completing the proton therapy the patient has done well. Her current systemic treatment will be continued. The most recent CA-27-29 level from 10/27/2017 was 53.1 U/mL. I will recheck her values at this time.   The patient will be scheduled for CT scans through the chest, abdomen, and pelvis. Additionally a bone scan will also be requested. I will see her back in 2 weeks to review test results. Neuropathy associated with cancer: I will continue her Neurontin at 400 mg 3 times daily. Chemotherapy-induced neutropenia/chronic leukopenia (persisting problem): The current CBC shows that her WBC count is 2.3. The absolute neutrophil count is 1.7. Her current hemoglobin and hematocrit are 9.9 g/dL and 30.1`% respectively. .  If the absolute neutrophil count declines to 0.5 she will be started on Neupogen or Neulasta. She will get her labs drawn once a month. Chemotherapy-induced anemia (persisting problem): The new chemotherapy regimen has significantly decreased her hemoglobin. Her current hemoglobin is 10.9 g/dL with hematocrit of 30.1%. At this time I will check iron profile and ferritin levels. I have recommended that the patient continue taking ferrous sulfate 1 tablet daily. Procrit at a dose of 60,000 units subcutaneous will be provided now that her hemoglobin has declined below 10 g/dL hematocrit is below 30%. Chemotherapy-induced thrombocytopenia : The patient is continuing the current dose of Ibrance 140 mg daily. We have explained to her that the medication was responsible for her thrombocytopenia. However over the past 7 months her platelet counts have normalized and remained normal at the current rate of 181,000. We will continue to monitor platelet counts at 6 week intervals. Follow-up in 2 weeks to review test results    Orders Placed This Encounter    COMPLETE CBC & AUTO DIFF WBC    CT CHEST ABD PELV W WO CONT     IV contrast only per MD     Standing Status:   Future     Standing Expiration Date:   12/23/2018     Order Specific Question:   Is Patient Allergic to Contrast Dye? Answer:   No     Order Specific Question:   STAT Creatinine as indicated     Answer:   Yes     Order Specific Question:    This order utilizes IV contrast.  What additional contrast is needed? Answer:   None    NM BONE SCAN [facility] BODY     Standing Status:   Future     Standing Expiration Date:   1/22/2019    InHouse CBC ("[employer]     Standing Status:   Future     Number of Occurrences:   1     Standing Expiration Date:   28/34/2912    METABOLIC PANEL, COMPREHENSIVE     Standing Status:   Future     Standing Expiration Date:   12/23/2018    CA 27.29     Standing Status:   Future     Standing Expiration Date:   12/23/2018    IRON PROFILE     Standing Status:   Future     Standing Expiration Date:   12/23/2018    FERRITIN     Standing Status:   Future     Standing Expiration Date:   12/23/2018       Moris Ren MD  12/22/2017      Please note: This document has been produced using voice recognition software. Unrecognized errors in transcription may be present.

## 2017-12-23 LAB — CANCER AG27-29 SERPL-ACNC: 49.5 U/ML (ref 0–38.6)

## 2017-12-27 RX ORDER — LAMOTRIGINE 25 MG/1
500 TABLET ORAL ONCE
Status: COMPLETED | OUTPATIENT
Start: 2017-12-29 | End: 2017-12-29

## 2017-12-29 ENCOUNTER — HOSPITAL ENCOUNTER (OUTPATIENT)
Dept: INFUSION THERAPY | Age: 72
Discharge: HOME | End: 2017-12-29
Payer: MEDICARE

## 2017-12-29 VITALS
HEART RATE: 81 BPM | HEIGHT: 64 IN | SYSTOLIC BLOOD PRESSURE: 143 MMHG | DIASTOLIC BLOOD PRESSURE: 68 MMHG | TEMPERATURE: 98.5 F | WEIGHT: 155.4 LBS | BODY MASS INDEX: 26.53 KG/M2 | RESPIRATION RATE: 16 BRPM

## 2017-12-29 LAB
ALBUMIN SERPL-MCNC: 3.6 G/DL (ref 3.4–5)
ALBUMIN/GLOB SERPL: 1.2 {RATIO} (ref 0.8–1.7)
ALP SERPL-CCNC: 68 U/L (ref 45–117)
ALT SERPL-CCNC: 16 U/L (ref 13–56)
AST SERPL-CCNC: 17 U/L (ref 15–37)
BASO+EOS+MONOS # BLD AUTO: 0.1 K/UL (ref 0–2.3)
BASO+EOS+MONOS # BLD AUTO: 7 % (ref 0.1–17)
BILIRUB DIRECT SERPL-MCNC: 0.1 MG/DL (ref 0–0.2)
BILIRUB SERPL-MCNC: 0.5 MG/DL (ref 0.2–1)
DIFFERENTIAL METHOD BLD: ABNORMAL
ERYTHROCYTE [DISTWIDTH] IN BLOOD BY AUTOMATED COUNT: 17 % (ref 11.5–14.5)
GLOBULIN SER CALC-MCNC: 3.1 G/DL (ref 2–4)
HCT VFR BLD AUTO: 28.5 % (ref 36–48)
HGB BLD-MCNC: 9.6 G/DL (ref 12–16)
LYMPHOCYTES # BLD: 0.4 K/UL (ref 1.1–5.9)
LYMPHOCYTES NFR BLD: 17 % (ref 14–44)
MCH RBC QN AUTO: 36.9 PG (ref 25–35)
MCHC RBC AUTO-ENTMCNC: 33.7 G/DL (ref 31–37)
MCV RBC AUTO: 109.6 FL (ref 78–102)
NEUTS SEG # BLD: 1.6 K/UL (ref 1.8–9.5)
NEUTS SEG NFR BLD: 76 % (ref 40–70)
PLATELET # BLD AUTO: 251 K/UL (ref 140–440)
PROT SERPL-MCNC: 6.7 G/DL (ref 6.4–8.2)
RBC # BLD AUTO: 2.6 M/UL (ref 4.1–5.1)
WBC # BLD AUTO: 2.1 K/UL (ref 4.5–13)

## 2017-12-29 PROCEDURE — 74011250636 HC RX REV CODE- 250/636: Performed by: INTERNAL MEDICINE

## 2017-12-29 PROCEDURE — 96372 THER/PROPH/DIAG INJ SC/IM: CPT

## 2017-12-29 PROCEDURE — 80076 HEPATIC FUNCTION PANEL: CPT | Performed by: INTERNAL MEDICINE

## 2017-12-29 PROCEDURE — 85025 COMPLETE CBC W/AUTO DIFF WBC: CPT | Performed by: INTERNAL MEDICINE

## 2017-12-29 PROCEDURE — 77030012965 HC NDL HUBR BBMI -A

## 2017-12-29 PROCEDURE — 36591 DRAW BLOOD OFF VENOUS DEVICE: CPT

## 2017-12-29 PROCEDURE — 96402 CHEMO HORMON ANTINEOPL SQ/IM: CPT

## 2017-12-29 PROCEDURE — 74011250636 HC RX REV CODE- 250/636

## 2017-12-29 RX ORDER — HEPARIN SODIUM (PORCINE) LOCK FLUSH IV SOLN 100 UNIT/ML 100 UNIT/ML
500 SOLUTION INTRAVENOUS AS NEEDED
Status: ACTIVE | OUTPATIENT
Start: 2017-12-29 | End: 2017-12-30

## 2017-12-29 RX ORDER — HEPARIN SODIUM (PORCINE) LOCK FLUSH IV SOLN 100 UNIT/ML 100 UNIT/ML
SOLUTION INTRAVENOUS
Status: COMPLETED
Start: 2017-12-29 | End: 2017-12-29

## 2017-12-29 RX ORDER — SODIUM CHLORIDE 0.9 % (FLUSH) 0.9 %
10-40 SYRINGE (ML) INJECTION AS NEEDED
Status: DISCONTINUED | OUTPATIENT
Start: 2017-12-29 | End: 2018-01-02 | Stop reason: HOSPADM

## 2017-12-29 RX ADMIN — HEPARIN SODIUM (PORCINE) LOCK FLUSH IV SOLN 100 UNIT/ML 500 UNITS: 100 SOLUTION at 14:30

## 2017-12-29 RX ADMIN — Medication 40 ML: at 14:30

## 2017-12-29 RX ADMIN — ERYTHROPOIETIN 40000 UNITS: 40000 INJECTION, SOLUTION INTRAVENOUS; SUBCUTANEOUS at 15:00

## 2017-12-29 RX ADMIN — FULVESTRANT 500 MG: 50 INJECTION INTRAMUSCULAR at 14:45

## 2017-12-29 RX ADMIN — ERYTHROPOIETIN 20000 UNITS: 20000 INJECTION, SOLUTION INTRAVENOUS; SUBCUTANEOUS at 15:00

## 2017-12-29 NOTE — PROGRESS NOTES
[facility] Progress Note    Date: 2017    Name: Jolene Mckeon    MRN: [id number]         : 1945      Ms. Figueroa arrived in the [facility] today, at 1400, in stable condition, here for Q 28 Day (Q 4 Week) IM Faslodex Injections (Cycle 17 Faslodex), Q 2 Week, CBC/Procrit injection, and to have her [employer] She was assessed and education was provided. Ms. Tracey Lozano vitals were reviewed. Visit Vitals    /68 (BP 1 Location: Left arm, BP Patient Position: At rest;Sitting)    Pulse 81    Temp 98.5 °F (36.9 °C)    Resp 16    Ht 5' 4\" (1.626 m)    Wt 70.5 kg (155 lb 6.4 oz)    BMI 26.67 kg/m2           Her left chest single lumen port was accessed without incident at 1430, and blood was drawn for a CBC & Hepatic Function Panel, per order. Then, the port was flushed very well with NS & Heparin per protocol, and then the carbone needle was removed and gauze/bandaid was applied. (The CBC was processed in house, and the Hepatic Function Panel was sent out to be processed.)        Lab results were obtained and reviewed. Recent Results (from the past 12 hour(s))   CBC WITH 3 PART DIFF    Collection Time: 17  2:30 PM   Result Value Ref Range    WBC 2.1 (L) 4.5 - 13.0 K/uL    RBC 2.60 (L) 4.10 - 5.10 M/uL    HGB 9.6 (L) 12.0 - 16 g/dL    HCT 28.5 (L) 36 - 48 %    .6 (H) 78 - 102 FL    MCH 36.9 (H) 25.0 - 35.0 PG    MCHC 33.7 31 - 37 g/dL    RDW 17.0 (H) 11.5 - 14.5 %    PLATELET 111 607 - 375 K/uL    NEUTROPHILS 76 (H) 40 - 70 %    MIXED CELLS 7 0.1 - 17 %    LYMPHOCYTES 17 14 - 44 %    ABS. NEUTROPHILS 1.6 (L) 1.8 - 9.5 K/UL    ABS. MIXED CELLS 0.1 0.0 - 2.3 K/uL    ABS. LYMPHOCYTES 0.4 (L) 1.1 - 5.9 K/UL    DF AUTOMATED               Faslodex Total Dose of 500 mg, was administered IM at 1445, in 2 equally divided doses of 250 mg.  (The 1st 250 mg was administered IM in her right gluteal muscle, and the 2nd 250 mg was administered IM, in her left gluteal muscle.)          Procrit 60,000 units, was administered SQ, in her left arm, at 1500, as ordered, and without incident. Ms. Ravindra Quinonez tolerated well, and had no complaints. Ms. Ravindra Quinonez was discharged from [place] [address] in stable condition at 1510. Nikki Tsang She is to return in 2 weeks, on Friday, 1-12-18, at 1300,  for her next appointment, for Q 2 Week CBC/Procrit injection, in the [facility] location. And then, she is to return in 4 weeks, on Friday, 1-26-18, at 1300, for CBC/Procrit injection, IM Faslodex Injections, and to have her port flushed.      Eddi Moreno RN  December 29, 2017  2:15 PM

## 2018-01-03 ENCOUNTER — HOSPITAL ENCOUNTER (OUTPATIENT)
Dept: CT IMAGING | Age: 73
Discharge: HOME | End: 2018-01-03
Attending: INTERNAL MEDICINE
Payer: MEDICARE

## 2018-01-03 ENCOUNTER — HOSPITAL ENCOUNTER (OUTPATIENT)
Dept: NUCLEAR MEDICINE | Age: 73
Discharge: HOME | End: 2018-01-03
Attending: INTERNAL MEDICINE
Payer: MEDICARE

## 2018-01-03 DIAGNOSIS — C50.919 METASTATIC BREAST CANCER (HCC): ICD-10-CM

## 2018-01-03 DIAGNOSIS — C50.311 MALIGNANT NEOPLASM OF LOWER-INNER QUADRANT OF RIGHT FEMALE BREAST, UNSPECIFIED ESTROGEN RECEPTOR STATUS (HCC): ICD-10-CM

## 2018-01-03 PROCEDURE — 78306 BONE IMAGING WHOLE BODY: CPT

## 2018-01-03 PROCEDURE — 71260 CT THORAX DX C+: CPT

## 2018-01-03 PROCEDURE — 74011636320 HC RX REV CODE- 636/320: Performed by: INTERNAL MEDICINE

## 2018-01-03 RX ADMIN — IOPAMIDOL 80 ML: 612 INJECTION, SOLUTION INTRAVENOUS at 08:50

## 2018-01-12 ENCOUNTER — OFFICE VISIT (OUTPATIENT)
Dept: ONCOLOGY | Age: 73
End: 2018-01-12

## 2018-01-12 ENCOUNTER — HOSPITAL ENCOUNTER (OUTPATIENT)
Dept: ONCOLOGY | Age: 73
Discharge: HOME | End: 2018-01-12

## 2018-01-12 ENCOUNTER — HOSPITAL ENCOUNTER (OUTPATIENT)
Dept: INFUSION THERAPY | Age: 73
Discharge: HOME | End: 2018-01-12
Payer: MEDICARE

## 2018-01-12 VITALS
DIASTOLIC BLOOD PRESSURE: 66 MMHG | TEMPERATURE: 98.3 F | HEART RATE: 88 BPM | SYSTOLIC BLOOD PRESSURE: 128 MMHG | BODY MASS INDEX: 26.95 KG/M2 | WEIGHT: 157 LBS

## 2018-01-12 VITALS
SYSTOLIC BLOOD PRESSURE: 123 MMHG | HEART RATE: 72 BPM | RESPIRATION RATE: 16 BRPM | TEMPERATURE: 97.1 F | DIASTOLIC BLOOD PRESSURE: 63 MMHG

## 2018-01-12 DIAGNOSIS — T45.1X5A ANEMIA DUE TO CHEMOTHERAPY: ICD-10-CM

## 2018-01-12 DIAGNOSIS — D64.81 ANEMIA DUE TO CHEMOTHERAPY: ICD-10-CM

## 2018-01-12 DIAGNOSIS — C50.919 METASTATIC BREAST CANCER (HCC): ICD-10-CM

## 2018-01-12 DIAGNOSIS — C50.919 METASTATIC BREAST CANCER (HCC): Primary | ICD-10-CM

## 2018-01-12 LAB
BASO+EOS+MONOS # BLD AUTO: 0.2 K/UL (ref 0–2.3)
BASO+EOS+MONOS # BLD AUTO: 11 % (ref 0.1–17)
DIFFERENTIAL METHOD BLD: ABNORMAL
ERYTHROCYTE [DISTWIDTH] IN BLOOD BY AUTOMATED COUNT: 17.2 % (ref 11.5–14.5)
HCT VFR BLD AUTO: 29.2 % (ref 36–48)
HGB BLD-MCNC: 10.1 G/DL (ref 12–16)
LYMPHOCYTES # BLD: 0.3 K/UL (ref 1.1–5.9)
LYMPHOCYTES NFR BLD: 16 % (ref 14–44)
MCH RBC QN AUTO: 37.5 PG (ref 25–35)
MCHC RBC AUTO-ENTMCNC: 34.6 G/DL (ref 31–37)
MCV RBC AUTO: 108.6 FL (ref 78–102)
NEUTS SEG # BLD: 1.5 K/UL (ref 1.8–9.5)
NEUTS SEG NFR BLD: 73 % (ref 40–70)
PLATELET # BLD AUTO: 126 K/UL (ref 140–440)
RBC # BLD AUTO: 2.69 M/UL (ref 4.1–5.1)
WBC # BLD AUTO: 2 K/UL (ref 4.5–13)

## 2018-01-12 PROCEDURE — 36415 COLL VENOUS BLD VENIPUNCTURE: CPT

## 2018-01-12 NOTE — PROGRESS NOTES
[facility] Progress Note    Date: 2018    Name: Jennie Moreno    MRN: [id number]         : 1945      Ms. Figueroa was assessed and education was provided. Ms. Nhi Avila vitals were reviewed and patient was observed for 5 minutes prior to treatment. Visit Vitals    /63 (BP 1 Location: Left arm, BP Patient Position: At rest;Sitting)    Pulse 72    Temp 97.1 °F (36.2 °C)    Resp 16    Breastfeeding No       Lab results were obtained and reviewed. Recent Results (from the past 12 hour(s))   CBC WITH 3 PART DIFF    Collection Time: 18 11:57 AM   Result Value Ref Range    WBC 2.0 (L) 4.5 - 13.0 K/uL    RBC 2.69 (L) 4.10 - 5.10 M/uL    HGB 10.1 (L) 12.0 - 16.0 g/dL    HCT 29.2 (L) 36 - 48 %    .6 (H) 78 - 102 FL    MCH 37.5 (H) 25.0 - 35.0 PG    MCHC 34.6 31 - 37 g/dL    RDW 17.2 (H) 11.5 - 14.5 %    PLATELET 352 (L) 706 - 440 K/uL    NEUTROPHILS 73 (H) 40 - 70 %    MIXED CELLS 11 0.1 - 17 %    LYMPHOCYTES 16 14 - 44 %    ABS. NEUTROPHILS 1.5 (L) 1.8 - 9.5 K/UL    ABS. MIXED CELLS 0.2 0.0 - 2.3 K/uL    ABS. LYMPHOCYTES 0.3 (L) 1.1 - 5.9 K/UL    DF AUTOMATED         Procrit held for H&H 10..2. Patient armband removed and shredded. Ms. Christi Seals was discharged from [place] [address] in stable condition at 1220. She will return whenever she returns from [place] approx the middle of March. She will call when she returns to town.     Alanna Cruz RN  2018  12:21 PM

## 2018-01-12 NOTE — MR AVS SNAPSHOT
Visit Information Date & Time Provider Department Dept. Phone Encounter #  
 1/12/2018 11:45 AM Charles Mohs, [address] Office [phone] [id number] Follow-up Instructions Return in about 3 months (around 4/12/2018). Your Appointments 1/12/2018  1:00 PM  
Nurse Visit with Mignon AGUIRRE Office ([address]) Appt Note: [address] [facility] [address]  
[phone]  
  
   
 [facility] [address] [place] [address] 4/13/2018 11:45 AM  
Office Visit with Charles Mohs, MD [address]) Appt Note: 3 mo fu  
 [facility] [address] [facility] [address]  
[phone]  
  
   
 [facility] [address] [place] [address] Upcoming Health Maintenance Date Due Hepatitis C Screening 1945 DTaP/Tdap/Td series (1 - Tdap) 10/18/1966 FOBT Q 1 YEAR AGE 50-75 10/18/1995 ZOSTER VACCINE AGE 60> 8/18/2005 GLAUCOMA SCREENING Q2Y 10/18/2010 Pneumococcal 65+ High/Highest Risk (1 of 2 - PCV13) 10/18/2010 MEDICARE YEARLY EXAM 10/18/2010 BREAST CANCER SCRN MAMMOGRAM 5/30/2019 Allergies as of 1/12/2018  Review Complete On: 1/3/2018 By: Dawit Murray Severity Noted Reaction Type Reactions Codeine  08/09/2012    Palpitations Darvocet A500 [Propoxyphene N-acetaminophen]  08/09/2012   Side Effect Nausea and Vomiting Darvon [Propoxyphene]  04/09/2014   Systemic Nausea and Vomiting Current Immunizations  Reviewed on 12/29/2017 Name Date Influenza Vaccine 10/18/2017, 11/14/2016, 9/14/2015, 1/7/2015, 12/9/2013, 1/16/2013 Influenza Vaccine Whole 2/1/2012 Not reviewed this visit You Were Diagnosed With   
  
 Codes Comments Metastatic breast cancer ([facility] [address]    -  Primary ICD-10-CM: L44.154 ICD-9-CM: 174.9 Vitals BP Pulse Temp Weight(growth percentile) BMI OB Status 128/66 (BP 1 Location: Left arm) 88 98.3 °F (36.8 °C) 157 lb (71.2 kg) 26.95 kg/m2 Menopause Smoking Status Former Smoker BMI and BSA Data Body Mass Index Body Surface Area  
 26.95 kg/m 2 1.79 m 2 Preferred Pharmacy Pharmacy Name Phone [address] [phone] Your Updated Medication List  
  
   
This list is accurate as of: 1/12/18 11:52 AM.  Always use your most recent med list. ALLEGRA-D 24 HOUR 180-240 mg per tablet Generic drug:  fexofenadine-pseudoephedrine Take 1 Tab by mouth daily. ARMOUR THYROID 60 mg tablet Generic drug:  thyroid (Pork) Take 90 mg by mouth daily. cholecalciferol (VITAMIN D3) 5,000 unit Tab tablet Commonly known as:  VITAMIN D3 Take  by mouth daily. * gabapentin 300 mg capsule Commonly known as:  NEURONTIN  
TAKE 1 CAPSULE THREE TIMES A DAY  
  
 * gabapentin 100 mg capsule Commonly known as:  NEURONTIN Take 1 Cap by mouth three (3) times daily. MOTRIN 800 mg tablet Generic drug:  ibuprofen Take 800 mg by mouth two (2) times a day. Indications: OSTEOARTHRITIS  
  
 NEURPATH-B 3-35-2 mg Tab tab Generic drug:  L-Mfolate-B6 Phos-Methyl-B12 Take  by mouth two (2) times a day. palbociclib 125 mg Cap Commonly known as:  [employer] Take 125 mg by mouth daily. Take 1 tab po every day for 21 days on and 7 days off q 28 days  Indications: HORMONE RECEPTOR(+), HER2(-) ADVANCED BREAST CANCER  
  
 sulfacetamide 10 % ophthalmic ointment Commonly known as:  BLEPH-10 Administer  to right eye three (3) times daily. TETRACYCLINE PO Take  by mouth.  
  
 warfarin 1 mg tablet Commonly known as:  COUMADIN Take 1 mg by mouth daily. * Notice: This list has 2 medication(s) that are the same as other medications prescribed for you.  Read the directions carefully, and ask your doctor or other care provider to review them with you. Follow-up Instructions Return in about 3 months (around 4/12/2018). To-Do List   
 01/12/2018  1:00 PM  
  Appointment with [address] at [place] [address] ([phone])  
  
 01/26/2018 1:00 PM  
  Appointment with [address] at [place] [address] ([phone])  
  
 05/31/2018 11:00 AM  
  Appointment with [facility] at [address] [place] [address] ([phone]) OUTSIDE FILMS  - Any outside films related to the study being scheduled should be brought with you on the day of the exam.  If this cannot be done there may be a delay in the reading of the study. MEDICATIONS  - Patient must bring a complete list of all medications currently taking to include prescriptions, over-the-counter meds, herbals, vitamins & any dietary supplements  GENERAL INSTRUCTIONS  - On the day of your exam do not use any bath powder, deodorant or lotions on the armpit area. -Tenderness of breasts may cause an increase of discomfort during procedure. If you are experiencing breast tenderness on the day of your appointment and would like to reschedule, please call [phone]. Patient Instructions Breast Cancer: Care Instructions Your Care Instructions Breast cancer occurs when abnormal cells grow out of control in the breast. These cancer cells can spread within the breast, to nearby lymph nodes and other tissues, and to other parts of the body. Being treated for cancer can weaken your body, and you may feel very tired. Get the rest your body needs so you can feel better. Finding out that you have cancer is scary. You may feel many emotions and may need some help coping. Seek out family, friends, and counselors for support. You also can do things at home to make yourself feel better while you go through treatment.  Call the [employer] (0-429.413.4386) or visit its website at [employer]. org for more information. Follow-up care is a key part of your treatment and safety. Be sure to make and go to all appointments, and call your doctor if you are having problems. It's also a good idea to know your test results and keep a list of the medicines you take. How can you care for yourself at home? · Take your medicines exactly as prescribed. Call your doctor if you think you are having a problem with your medicine. You may get medicine for nausea and vomiting if you have these side effects. · Follow your doctor's instructions to relieve pain. Pain from cancer and surgery can almost always be controlled. Use pain medicine when you first notice pain, before it becomes severe. · Eat healthy food. If you do not feel like eating, try to eat food that has protein and extra calories to keep up your strength and prevent weight loss. Drink liquid meal replacements for extra calories and protein. Try to eat your main meal early. · Get some physical activity every day, but do not get too tired. Keep doing the hobbies you enjoy as your energy allows. · Do not smoke. Smoking can make your cancer worse. If you need help quitting, talk to your doctor about stop-smoking programs and medicines. These can increase your chances of quitting for good. · Take steps to control your stress and workload. Learn relaxation techniques. ¨ Share your feelings. Stress and tension affect our emotions. By expressing your feelings to others, you may be able to understand and cope with them. ¨ Consider joining a support group. Talking about a problem with your spouse, a good friend, or other people with similar problems is a good way to reduce tension and stress. ¨ Express yourself through art. Try writing, crafts, dance, or art to relieve stress. Some dance, writing, or art groups may be available just for people who have cancer. ¨ Be kind to your body and mind. Getting enough sleep, eating a healthy diet, and taking time to do things you enjoy can contribute to an overall feeling of balance in your life and can help reduce stress. ¨ Get help if you need it. Discuss your concerns with your doctor or counselor. · If you are vomiting or have diarrhea: ¨ Drink plenty of fluids (enough so that your urine is light yellow or clear like water) to prevent dehydration. Choose water and other caffeine-free clear liquids. If you have kidney, heart, or liver disease and have to limit fluids, talk with your doctor before you increase the amount of fluids you drink. ¨ When you are able to eat, try clear soups, mild foods, and liquids until all symptoms are gone for 12 to 48 hours. Other good choices include dry toast, crackers, cooked cereal, and gelatin dessert, such as Jell-O. · If you have not already done so, prepare a list of advance directives. Advance directives are instructions to your doctor and family members about what kind of care you want if you become unable to speak or express yourself. When should you call for help? Call 911 anytime you think you may need emergency care. For example, call if: 
? · You passed out (lost consciousness). ?Call your doctor now or seek immediate medical care if: 
? · You have a fever. ? · You have abnormal bleeding. ? · You think you have an infection. ? · You have new or worse pain. ? · You have new symptoms, such as a cough, belly pain, vomiting, diarrhea, or a rash. ? Watch closely for changes in your health, and be sure to contact your doctor if: 
? · You are much more tired than usual.  
? · You have swollen glands in your armpits, groin, or neck. ? · You do not get better as expected. Where can you learn more? Go to http://mandie-elizabeth.info/. Enter V321 in the search box to learn more about \"Breast Cancer: Care Instructions. \" Current as of: May 12, 2017 Content Version: 11.4 © 9825-7313 [employer]. Care instructions adapted under license by [employer] (which disclaims liability or warranty for this information). If you have questions about a medical condition or this instruction, always ask your healthcare professional. [address] any warranty or liability for your use of this information. Please provide this summary of care documentation to your next provider. Your primary care clinician is listed as Leonid Sal. If you have any questions after today's visit, please call [phone].

## 2018-01-12 NOTE — PROGRESS NOTES
Hematology/medical oncology progress note    1/12/2018  Sourav Jacobson  YOB: 1945    PCP: Dr. Austin Jane    Diagnosis: Metastatic breast cancer    Ms. Idania Aleman is a 70-year-old woman with metastatic breast cancer. On 1/3/2018 she had CT scans and a bone scan done to determine whether or not there was any evidence of disease progression. The whole body bone scan was compared to the bone scan done 9/20/2017 and the study showed a stable appearance of osseous metastatic disease. There were no new findings. The CT scan through the chest, abdomen, and pelvis done on 1/3/2018 again showed an unchanged small right pleural effusion. There was some evidence of volume loss with associated right middle lobe and right lung base opacities. There was unchanged nonobstructing bilateral nephrolithiasis. There was evidence of left colon and proximal sigmoid diverticulosis without diverticulitis. There was no change. The CT scan also documented stable bone metastasis. Based on these findings I have informed the patient that she should continue her current treatments. She is scheduled to go on her winter vacation to [place] and we will therefore expect her to be followed up in the clinic in [place] during her stay. She had her questions answered to her satisfaction. Total time 30 minutes, greater than 50% of the time was in counseling and coordination of care. Rolo Marin MD, Yifan Landry

## 2018-01-12 NOTE — PATIENT INSTRUCTIONS
Breast Cancer: Care Instructions  Your Care Instructions    Breast cancer occurs when abnormal cells grow out of control in the breast. These cancer cells can spread within the breast, to nearby lymph nodes and other tissues, and to other parts of the body. Being treated for cancer can weaken your body, and you may feel very tired. Get the rest your body needs so you can feel better. Finding out that you have cancer is scary. You may feel many emotions and may need some help coping. Seek out family, friends, and counselors for support. You also can do things at home to make yourself feel better while you go through treatment. Call the [employer] (0-883.138.3941) or visit its website at [employer] for more information. Follow-up care is a key part of your treatment and safety. Be sure to make and go to all appointments, and call your doctor if you are having problems. It's also a good idea to know your test results and keep a list of the medicines you take. How can you care for yourself at home? · Take your medicines exactly as prescribed. Call your doctor if you think you are having a problem with your medicine. You may get medicine for nausea and vomiting if you have these side effects. · Follow your doctor's instructions to relieve pain. Pain from cancer and surgery can almost always be controlled. Use pain medicine when you first notice pain, before it becomes severe. · Eat healthy food. If you do not feel like eating, try to eat food that has protein and extra calories to keep up your strength and prevent weight loss. Drink liquid meal replacements for extra calories and protein. Try to eat your main meal early. · Get some physical activity every day, but do not get too tired. Keep doing the hobbies you enjoy as your energy allows. · Do not smoke. Smoking can make your cancer worse. If you need help quitting, talk to your doctor about stop-smoking programs and medicines.  These can increase your chances of quitting for good. · Take steps to control your stress and workload. Learn relaxation techniques. ¨ Share your feelings. Stress and tension affect our emotions. By expressing your feelings to others, you may be able to understand and cope with them. ¨ Consider joining a support group. Talking about a problem with your spouse, a good friend, or other people with similar problems is a good way to reduce tension and stress. ¨ Express yourself through art. Try writing, crafts, dance, or art to relieve stress. Some dance, writing, or art groups may be available just for people who have cancer. ¨ Be kind to your body and mind. Getting enough sleep, eating a healthy diet, and taking time to do things you enjoy can contribute to an overall feeling of balance in your life and can help reduce stress. ¨ Get help if you need it. Discuss your concerns with your doctor or counselor. · If you are vomiting or have diarrhea:  ¨ Drink plenty of fluids (enough so that your urine is light yellow or clear like water) to prevent dehydration. Choose water and other caffeine-free clear liquids. If you have kidney, heart, or liver disease and have to limit fluids, talk with your doctor before you increase the amount of fluids you drink. ¨ When you are able to eat, try clear soups, mild foods, and liquids until all symptoms are gone for 12 to 48 hours. Other good choices include dry toast, crackers, cooked cereal, and gelatin dessert, such as Jell-O.  · If you have not already done so, prepare a list of advance directives. Advance directives are instructions to your doctor and family members about what kind of care you want if you become unable to speak or express yourself. When should you call for help? Call 911 anytime you think you may need emergency care. For example, call if:  ? · You passed out (lost consciousness). ?Call your doctor now or seek immediate medical care if:  ? · You have a fever. ? · You have abnormal bleeding. ? · You think you have an infection. ? · You have new or worse pain. ? · You have new symptoms, such as a cough, belly pain, vomiting, diarrhea, or a rash. ? Watch closely for changes in your health, and be sure to contact your doctor if:  ? · You are much more tired than usual.   ? · You have swollen glands in your armpits, groin, or neck. ? · You do not get better as expected. Where can you learn more? Go to http://mandie-elizabeth.info/. Enter V321 in the search box to learn more about \"Breast Cancer: Care Instructions. \"  Current as of: May 12, 2017  Content Version: 11.4  © 2015-4897 [employer]. Care instructions adapted under license by [employer] (which disclaims liability or warranty for this information). If you have questions about a medical condition or this instruction, always ask your healthcare professional. [address] any warranty or liability for your use of this information.

## 2018-01-26 ENCOUNTER — HOSPITAL ENCOUNTER (OUTPATIENT)
Dept: INFUSION THERAPY | Age: 73
End: 2018-01-26
Payer: MEDICARE

## 2018-03-19 RX ORDER — LAMOTRIGINE 25 MG/1
500 TABLET ORAL ONCE
Status: COMPLETED | OUTPATIENT
Start: 2018-03-23 | End: 2018-03-23

## 2018-03-21 RX ORDER — GABAPENTIN 300 MG/1
CAPSULE ORAL
Qty: 270 CAP | Refills: 2 | Status: SHIPPED | OUTPATIENT
Start: 2018-03-21 | End: 2019-11-06 | Stop reason: SDUPTHER

## 2018-03-23 ENCOUNTER — CLINICAL SUPPORT (OUTPATIENT)
Dept: ONCOLOGY | Age: 73
End: 2018-03-23

## 2018-03-23 ENCOUNTER — HOSPITAL ENCOUNTER (OUTPATIENT)
Dept: ONCOLOGY | Age: 73
Discharge: HOME | End: 2018-03-23

## 2018-03-23 ENCOUNTER — HOSPITAL ENCOUNTER (OUTPATIENT)
Dept: LAB | Age: 73
Discharge: HOME | End: 2018-03-23
Payer: MEDICARE

## 2018-03-23 ENCOUNTER — HOSPITAL ENCOUNTER (OUTPATIENT)
Dept: INFUSION THERAPY | Age: 73
Discharge: HOME | End: 2018-03-23
Payer: MEDICARE

## 2018-03-23 VITALS
HEIGHT: 64 IN | DIASTOLIC BLOOD PRESSURE: 80 MMHG | HEART RATE: 81 BPM | BODY MASS INDEX: 26.46 KG/M2 | TEMPERATURE: 97.5 F | SYSTOLIC BLOOD PRESSURE: 159 MMHG | RESPIRATION RATE: 16 BRPM | WEIGHT: 155 LBS

## 2018-03-23 DIAGNOSIS — T45.1X5A ANEMIA DUE TO CHEMOTHERAPY: Primary | ICD-10-CM

## 2018-03-23 DIAGNOSIS — D64.81 ANEMIA DUE TO CHEMOTHERAPY: Primary | ICD-10-CM

## 2018-03-23 DIAGNOSIS — D64.81 ANEMIA DUE TO CHEMOTHERAPY: ICD-10-CM

## 2018-03-23 DIAGNOSIS — T45.1X5A ANEMIA DUE TO CHEMOTHERAPY: ICD-10-CM

## 2018-03-23 LAB
ALBUMIN SERPL-MCNC: 3.9 G/DL (ref 3.4–5)
ALBUMIN/GLOB SERPL: 1.3 {RATIO} (ref 0.8–1.7)
ALP SERPL-CCNC: 75 U/L (ref 45–117)
ALT SERPL-CCNC: 20 U/L (ref 13–56)
AST SERPL-CCNC: 22 U/L (ref 15–37)
BASO+EOS+MONOS # BLD AUTO: 0.5 K/UL (ref 0–2.3)
BASO+EOS+MONOS # BLD AUTO: 15 % (ref 0.1–17)
BILIRUB DIRECT SERPL-MCNC: 0.2 MG/DL (ref 0–0.2)
BILIRUB SERPL-MCNC: 0.7 MG/DL (ref 0.2–1)
DIFFERENTIAL METHOD BLD: ABNORMAL
ERYTHROCYTE [DISTWIDTH] IN BLOOD BY AUTOMATED COUNT: 15.8 % (ref 11.5–14.5)
GLOBULIN SER CALC-MCNC: 3 G/DL (ref 2–4)
HCT VFR BLD AUTO: 29.5 % (ref 36–48)
HGB BLD-MCNC: 9.8 G/DL (ref 12–16)
LYMPHOCYTES # BLD: 0.5 K/UL (ref 1.1–5.9)
LYMPHOCYTES NFR BLD: 14 % (ref 14–44)
MCH RBC QN AUTO: 35 PG (ref 25–35)
MCHC RBC AUTO-ENTMCNC: 33.2 G/DL (ref 31–37)
MCV RBC AUTO: 105.4 FL (ref 78–102)
NEUTS SEG # BLD: 2.6 K/UL (ref 1.8–9.5)
NEUTS SEG NFR BLD: 71 % (ref 40–70)
PLATELET # BLD AUTO: 298 K/UL (ref 140–440)
PROT SERPL-MCNC: 6.9 G/DL (ref 6.4–8.2)
RBC # BLD AUTO: 2.8 M/UL (ref 4.1–5.1)
WBC # BLD AUTO: 3.6 K/UL (ref 4.5–13)

## 2018-03-23 PROCEDURE — 96523 IRRIG DRUG DELIVERY DEVICE: CPT

## 2018-03-23 PROCEDURE — 80076 HEPATIC FUNCTION PANEL: CPT | Performed by: INTERNAL MEDICINE

## 2018-03-23 PROCEDURE — 74011250636 HC RX REV CODE- 250/636: Performed by: INTERNAL MEDICINE

## 2018-03-23 PROCEDURE — 77030012965 HC NDL HUBR BBMI -A

## 2018-03-23 PROCEDURE — 96372 THER/PROPH/DIAG INJ SC/IM: CPT

## 2018-03-23 PROCEDURE — 96402 CHEMO HORMON ANTINEOPL SQ/IM: CPT

## 2018-03-23 RX ORDER — HEPARIN SODIUM (PORCINE) LOCK FLUSH IV SOLN 100 UNIT/ML 100 UNIT/ML
500 SOLUTION INTRAVENOUS AS NEEDED
Status: ACTIVE | OUTPATIENT
Start: 2018-03-23 | End: 2018-03-24

## 2018-03-23 RX ORDER — HEPARIN SODIUM (PORCINE) LOCK FLUSH IV SOLN 100 UNIT/ML 100 UNIT/ML
SOLUTION INTRAVENOUS
Status: DISPENSED
Start: 2018-03-23 | End: 2018-03-24

## 2018-03-23 RX ORDER — SODIUM CHLORIDE 0.9 % (FLUSH) 0.9 %
10-40 SYRINGE (ML) INJECTION AS NEEDED
Status: DISCONTINUED | OUTPATIENT
Start: 2018-03-23 | End: 2018-03-27 | Stop reason: HOSPADM

## 2018-03-23 RX ADMIN — ERYTHROPOIETIN 40000 UNITS: 40000 INJECTION, SOLUTION INTRAVENOUS; SUBCUTANEOUS at 17:12

## 2018-03-23 RX ADMIN — HEPARIN SODIUM (PORCINE) LOCK FLUSH IV SOLN 100 UNIT/ML 500 UNITS: 100 SOLUTION at 16:31

## 2018-03-23 RX ADMIN — ERYTHROPOIETIN 20000 UNITS: 20000 INJECTION, SOLUTION INTRAVENOUS; SUBCUTANEOUS at 17:12

## 2018-03-23 RX ADMIN — Medication 30 ML: at 16:31

## 2018-03-23 RX ADMIN — FULVESTRANT 500 MG: 50 INJECTION INTRAMUSCULAR at 17:00

## 2018-03-23 NOTE — PROGRESS NOTES
[facility] Progress Note    Date: 2018    Name: Rony Hernandez    MRN: [id number]         : 1945    Chemo C19 of . Ms. Taylor Benedict was assessed and education was provided. Ms. Ayana Dang vitals were reviewed and patient was observed for 5 minutes prior to treatment. Visit Vitals    /80 (BP 1 Location: Left arm, BP Patient Position: At rest;Sitting)    Pulse 81    Temp 97.5 °F (36.4 °C)    Resp 16    Ht 5' 4\" (1.626 m)    Wt 70.3 kg (155 lb)    Breastfeeding No    BMI 26.61 kg/m2       Lab results were obtained and reviewed. Recent Results (from the past 12 hour(s))   CBC WITH 3 PART DIFF    Collection Time: 18  4:53 PM   Result Value Ref Range    WBC 3.6 (L) 4.5 - 13.0 K/uL    RBC 2.80 (L) 4.10 - 5.10 M/uL    HGB 9.8 (L) 12.0 - 16.0 g/dL    HCT 29.5 (L) 36 - 48 %    .4 (H) 78 - 102 FL    MCH 35.0 25.0 - 35.0 PG    MCHC 33.2 31 - 37 g/dL    RDW 15.8 (H) 11.5 - 14.5 %    PLATELET 592 175 - 909 K/uL    NEUTROPHILS 71 (H) 40 - 70 %    MIXED CELLS 15 0.1 - 17 %    LYMPHOCYTES 14 14 - 44 %    ABS. NEUTROPHILS 2.6 1.8 - 9.5 K/UL    ABS. MIXED CELLS 0.5 0.0 - 2.3 K/uL    ABS. LYMPHOCYTES 0.5 (L) 1.1 - 5.9 K/UL    DF AUTOMATED         Procrit 60,000 units SQ given for H&H 9.8/29.5. Faslodex 500 mg given in prepackaged divided dose; 250 mg IM  in right gluteus max and 250 mg IM in left gluteus max    Ms Taylor Benedict stated that she had only one Faslodex injection in [place]. This was cycle 19. Hepatic panel drawn and given to lab. Patient armband removed and shredded. Ms. Taylor Benedict was discharged from [address] in stable condition at 1725. She is to return on 18 at 1000 for her next appointment.     Andrei Carreno RN  2018

## 2018-04-06 ENCOUNTER — HOSPITAL ENCOUNTER (OUTPATIENT)
Dept: ONCOLOGY | Age: 73
Discharge: HOME | End: 2018-04-06

## 2018-04-06 ENCOUNTER — CLINICAL SUPPORT (OUTPATIENT)
Dept: ONCOLOGY | Age: 73
End: 2018-04-06

## 2018-04-06 ENCOUNTER — HOSPITAL ENCOUNTER (OUTPATIENT)
Dept: INFUSION THERAPY | Age: 73
Discharge: HOME | End: 2018-04-06
Payer: MEDICARE

## 2018-04-06 VITALS
RESPIRATION RATE: 16 BRPM | TEMPERATURE: 97.5 F | HEART RATE: 74 BPM | SYSTOLIC BLOOD PRESSURE: 120 MMHG | DIASTOLIC BLOOD PRESSURE: 62 MMHG

## 2018-04-06 DIAGNOSIS — D64.81 ANEMIA DUE TO CHEMOTHERAPY: Primary | ICD-10-CM

## 2018-04-06 DIAGNOSIS — T45.1X5A ANEMIA DUE TO CHEMOTHERAPY: Primary | ICD-10-CM

## 2018-04-06 DIAGNOSIS — T45.1X5A ANEMIA DUE TO CHEMOTHERAPY: ICD-10-CM

## 2018-04-06 DIAGNOSIS — D64.81 ANEMIA DUE TO CHEMOTHERAPY: ICD-10-CM

## 2018-04-06 LAB
BASO+EOS+MONOS # BLD AUTO: 0.1 K/UL (ref 0–2.3)
BASO+EOS+MONOS # BLD AUTO: 3 % (ref 0.1–17)
DIFFERENTIAL METHOD BLD: ABNORMAL
ERYTHROCYTE [DISTWIDTH] IN BLOOD BY AUTOMATED COUNT: 16.2 % (ref 11.5–14.5)
HCT VFR BLD AUTO: 32.3 % (ref 36–48)
HGB BLD-MCNC: 10.8 G/DL (ref 12–16)
LYMPHOCYTES # BLD: 0.4 K/UL (ref 1.1–5.9)
LYMPHOCYTES NFR BLD: 16 % (ref 14–44)
MCH RBC QN AUTO: 35 PG (ref 25–35)
MCHC RBC AUTO-ENTMCNC: 33.4 G/DL (ref 31–37)
MCV RBC AUTO: 104.5 FL (ref 78–102)
NEUTS SEG # BLD: 1.8 K/UL (ref 1.8–9.5)
NEUTS SEG NFR BLD: 81 % (ref 40–70)
PLATELET # BLD AUTO: 225 K/UL (ref 140–440)
RBC # BLD AUTO: 3.09 M/UL (ref 4.1–5.1)
WBC # BLD AUTO: 2.3 K/UL (ref 4.5–13)

## 2018-04-06 PROCEDURE — 36415 COLL VENOUS BLD VENIPUNCTURE: CPT

## 2018-04-06 NOTE — PROGRESS NOTES
[facility] Progress Note    Date: 2018    Name: Toya Becerra    MRN: [id number]         : 1945      Ms. Figueroa was assessed and education was provided. Ms. Michael Chery vitals were reviewed and patient was observed for 5 minutes prior to treatment. Visit Vitals    /62 (BP 1 Location: Left arm, BP Patient Position: At rest;Sitting)    Pulse 74    Temp 97.5 °F (36.4 °C)    Resp 16    Breastfeeding No       Lab results were obtained and reviewed. Recent Results (from the past 12 hour(s))   CBC WITH 3 PART DIFF    Collection Time: 18 12:44 PM   Result Value Ref Range    WBC 2.3 (L) 4.5 - 13.0 K/uL    RBC 3.09 (L) 4.10 - 5.10 M/uL    HGB 10.8 (L) 12.0 - 16.0 g/dL    HCT 32.3 (L) 36 - 48 %    .5 (H) 78 - 102 FL    MCH 35.0 25.0 - 35.0 PG    MCHC 33.4 31 - 37 g/dL    RDW 16.2 (H) 11.5 - 14.5 %    PLATELET 164 330 - 173 K/uL    NEUTROPHILS 81 (H) 40 - 70 %    MIXED CELLS 3 0.1 - 17 %    LYMPHOCYTES 16 14 - 44 %    ABS. NEUTROPHILS 1.8 1.8 - 9.5 K/UL    ABS. MIXED CELLS 0.1 0.0 - 2.3 K/uL    ABS. LYMPHOCYTES 0.4 (L) 1.1 - 5.9 K/UL    DF AUTOMATED       Procrit not given for H&H 10.8/32. 3. Patient armband removed and shredded. Ms. Quita Villalobos was discharged from [place] [address] in stable condition at 1325. She is to return on 18 at 1000 for her next appointment for CBC/Procrit, Faslodex, monthly port flush.     Sarai Voung RN  2018

## 2018-04-13 ENCOUNTER — HOSPITAL ENCOUNTER (OUTPATIENT)
Dept: LAB | Age: 73
Discharge: HOME | End: 2018-04-13
Payer: MEDICARE

## 2018-04-13 ENCOUNTER — HOSPITAL ENCOUNTER (OUTPATIENT)
Dept: ONCOLOGY | Age: 73
Discharge: HOME | End: 2018-04-13

## 2018-04-13 ENCOUNTER — OFFICE VISIT (OUTPATIENT)
Dept: ONCOLOGY | Age: 73
End: 2018-04-13

## 2018-04-13 VITALS
SYSTOLIC BLOOD PRESSURE: 127 MMHG | HEART RATE: 77 BPM | TEMPERATURE: 97.9 F | WEIGHT: 153 LBS | BODY MASS INDEX: 26.26 KG/M2 | DIASTOLIC BLOOD PRESSURE: 68 MMHG

## 2018-04-13 DIAGNOSIS — C50.919 METASTATIC BREAST CANCER (HCC): ICD-10-CM

## 2018-04-13 DIAGNOSIS — T45.1X5A ANEMIA DUE TO ANTINEOPLASTIC CHEMOTHERAPY: ICD-10-CM

## 2018-04-13 DIAGNOSIS — C50.919 METASTATIC BREAST CANCER (HCC): Primary | ICD-10-CM

## 2018-04-13 DIAGNOSIS — Z17.0 MALIGNANT NEOPLASM OF LOWER-INNER QUADRANT OF RIGHT BREAST OF FEMALE, ESTROGEN RECEPTOR POSITIVE (HCC): ICD-10-CM

## 2018-04-13 DIAGNOSIS — T45.1X5A CHEMOTHERAPY-INDUCED THROMBOCYTOPENIA: ICD-10-CM

## 2018-04-13 DIAGNOSIS — D64.81 ANEMIA DUE TO ANTINEOPLASTIC CHEMOTHERAPY: ICD-10-CM

## 2018-04-13 DIAGNOSIS — G63 NEUROPATHY ASSOCIATED WITH CANCER (HCC): ICD-10-CM

## 2018-04-13 DIAGNOSIS — E55.9 VITAMIN D DEFICIENCY: ICD-10-CM

## 2018-04-13 DIAGNOSIS — D72.819 CHRONIC LEUKOPENIA: ICD-10-CM

## 2018-04-13 DIAGNOSIS — C50.311 MALIGNANT NEOPLASM OF LOWER-INNER QUADRANT OF RIGHT BREAST OF FEMALE, ESTROGEN RECEPTOR POSITIVE (HCC): ICD-10-CM

## 2018-04-13 DIAGNOSIS — D69.59 CHEMOTHERAPY-INDUCED THROMBOCYTOPENIA: ICD-10-CM

## 2018-04-13 DIAGNOSIS — C80.1 NEUROPATHY ASSOCIATED WITH CANCER (HCC): ICD-10-CM

## 2018-04-13 LAB
ALBUMIN SERPL-MCNC: 3.8 G/DL (ref 3.4–5)
ALBUMIN/GLOB SERPL: 1.3 {RATIO} (ref 0.8–1.7)
ALP SERPL-CCNC: 71 U/L (ref 45–117)
ALT SERPL-CCNC: 17 U/L (ref 13–56)
ANION GAP SERPL CALC-SCNC: 8 MMOL/L (ref 3–18)
AST SERPL-CCNC: 14 U/L (ref 15–37)
BASO+EOS+MONOS # BLD AUTO: 0.1 K/UL (ref 0–2.3)
BASO+EOS+MONOS # BLD AUTO: 5 % (ref 0.1–17)
BILIRUB SERPL-MCNC: 0.5 MG/DL (ref 0.2–1)
BUN SERPL-MCNC: 17 MG/DL (ref 7–18)
BUN/CREAT SERPL: 17 (ref 12–20)
CALCIUM SERPL-MCNC: 9 MG/DL (ref 8.5–10.1)
CHLORIDE SERPL-SCNC: 107 MMOL/L (ref 100–108)
CO2 SERPL-SCNC: 27 MMOL/L (ref 21–32)
CREAT SERPL-MCNC: 1.03 MG/DL (ref 0.6–1.3)
DIFFERENTIAL METHOD BLD: ABNORMAL
ERYTHROCYTE [DISTWIDTH] IN BLOOD BY AUTOMATED COUNT: 16.7 % (ref 11.5–14.5)
FERRITIN SERPL-MCNC: 168 NG/ML (ref 8–388)
GLOBULIN SER CALC-MCNC: 3 G/DL (ref 2–4)
GLUCOSE SERPL-MCNC: 86 MG/DL (ref 74–99)
HCT VFR BLD AUTO: 31.2 % (ref 36–48)
HGB BLD-MCNC: 10.6 G/DL (ref 12–16)
IRON SATN MFR SERPL: 38 %
IRON SERPL-MCNC: 127 UG/DL (ref 50–175)
LYMPHOCYTES # BLD: 0.4 K/UL (ref 1.1–5.9)
LYMPHOCYTES NFR BLD: 18 % (ref 14–44)
MCH RBC QN AUTO: 34.8 PG (ref 25–35)
MCHC RBC AUTO-ENTMCNC: 34 G/DL (ref 31–37)
MCV RBC AUTO: 102.3 FL (ref 78–102)
NEUTS SEG # BLD: 1.6 K/UL (ref 1.8–9.5)
NEUTS SEG NFR BLD: 77 % (ref 40–70)
PLATELET # BLD AUTO: 104 K/UL (ref 140–440)
POTASSIUM SERPL-SCNC: 4.1 MMOL/L (ref 3.5–5.5)
PROT SERPL-MCNC: 6.8 G/DL (ref 6.4–8.2)
RBC # BLD AUTO: 3.05 M/UL (ref 4.1–5.1)
SODIUM SERPL-SCNC: 142 MMOL/L (ref 136–145)
TIBC SERPL-MCNC: 338 UG/DL (ref 250–450)
WBC # BLD AUTO: 2.1 K/UL (ref 4.5–13)

## 2018-04-13 PROCEDURE — 82728 ASSAY OF FERRITIN: CPT | Performed by: INTERNAL MEDICINE

## 2018-04-13 PROCEDURE — 36415 COLL VENOUS BLD VENIPUNCTURE: CPT | Performed by: INTERNAL MEDICINE

## 2018-04-13 PROCEDURE — 86300 IMMUNOASSAY TUMOR CA 15-3: CPT | Performed by: INTERNAL MEDICINE

## 2018-04-13 PROCEDURE — 82306 VITAMIN D 25 HYDROXY: CPT | Performed by: INTERNAL MEDICINE

## 2018-04-13 PROCEDURE — 83540 ASSAY OF IRON: CPT | Performed by: INTERNAL MEDICINE

## 2018-04-13 PROCEDURE — 80053 COMPREHEN METABOLIC PANEL: CPT | Performed by: INTERNAL MEDICINE

## 2018-04-13 RX ORDER — LAMOTRIGINE 25 MG/1
500 TABLET ORAL ONCE
Status: DISCONTINUED | OUTPATIENT
Start: 2018-04-20 | End: 2018-04-20

## 2018-04-13 NOTE — PROGRESS NOTES
Hematology/Oncology  Progress Note    Name: José Jean Baptiste  Date: 2018  : 1945    PCP: Carrie Cook MD     Ms. Army Aguirre is a 67 y.o.  female who was seen for management of her metastatic breast cancer with associated complications of chemotherapy. Current therapy: Fulvestrant 500 mg subcutaneous monthly and Ibrance 125 mg by mouth daily. Subjective:     Mrs. Army Aguirre is a 60-year-old woman who has slowly progressive metastatic breast cancer. She has previously completed external beam radiation therapy to lesions in her ribs and more recently she completed proton therapy to areas of bone involvement with a significant benefit with regards to pain control. The posttherapy imaging studies showed a significant decrease in the intensity of uptake on the bone scan. .  Additionally she is currently receiving systemic therapy with fulvestrant and Ibrance. Currently she is tolerating the systemic therapy reasonably well and has not experienced any nausea or emesis. She is no longer complaining of mouth discomfort but she does report some fatigue and occasional weakness. She continues to have SOB intermittently. She also receives Procrit at 2 week intervals whenever her hemoglobin is below 10 g/dL and hematocrit is below 30%. She recently returned from a long vacation in [place] where she spent the winter months. Today she is complaining of some mild back discomfort and is wondering if she is developing shingles. Past medical history, family history, and social history: these were reviewed and remains unchanged.     Past Medical History:   Diagnosis Date    Biliary colic     Breast CA ([facility] 2012    Cancer [facility])     Right Breast    Chemotherapy convalescence or palliative care     Neuropathy     Radiation therapy complication     Thyroid disease      Past Surgical History:   Procedure Laterality Date    BREAST SURGERY PROCEDURE UNLISTED  10/2002    Right-Lesion    BREAST SURGERY PROCEDURE UNLISTED  11/2004    Right-Lesion    HX LAP CHOLECYSTECTOMY  9-19-13    HX MASTECTOMY  1991    Right     Social History     Social History    Marital status:      Spouse name: N/A    Number of children: N/A    Years of education: N/A     Occupational History    Not on file. Social History Main Topics    Smoking status: Former Smoker     Quit date: 6/2/1991    Smokeless tobacco: Never Used    Alcohol use 0.0 oz/week     1 - 2 Glasses of wine per week      Comment: socially, occassionally    Drug use: No    Sexual activity: Not on file     Other Topics Concern    Not on file     Social History Narrative     Family History   Problem Relation Age of Onset    Cancer Maternal Aunt      Hodgkin's disease    Breast Cancer Paternal Aunt     Cancer Father      Prostate, lung, brain     Current Outpatient Prescriptions   Medication Sig Dispense Refill    gabapentin (NEURONTIN) 300 mg capsule TAKE 1 CAPSULE THREE TIMES A  Cap 2    TETRACYCLINE HCL (TETRACYCLINE PO) Take  by mouth.  fexofenadine-pseudoephedrine (ALLEGRA-D 24 HOUR) 180-240 mg per tablet Take 1 Tab by mouth daily.  gabapentin (NEURONTIN) 100 mg capsule Take 1 Cap by mouth three (3) times daily. 90 Cap 6    sulfacetamide (BLEPH-10) 10 % ophthalmic ointment Administer  to right eye three (3) times daily.  palbociclib (IBRANCE) 125 mg cap Take 125 mg by mouth daily. Take 1 tab po every day for 21 days on and 7 days off q 28 days  Indications: HORMONE RECEPTOR(+), HER2(-) ADVANCED BREAST CANCER 42 Cap 6    ibuprofen (MOTRIN) 800 mg tablet Take 800 mg by mouth two (2) times a day. Indications: OSTEOARTHRITIS      Cholecalciferol, Vitamin D3, 5,000 unit Tab Take  by mouth daily.  thyroid, Pork, (ARMOUR THYROID) 60 mg tablet Take 90 mg by mouth daily.  L-Mfolate-B6 Phos-Methyl-B12 (NEURPATH-B) 3-35-2 mg Tab Take  by mouth two (2) times a day.         warfarin (COUMADIN) 1 mg tablet Take 1 mg by mouth daily. Facility-Administered Medications Ordered in Other Visits   Medication Dose Route Frequency Provider Last Rate Last Dose    [START ON 4/20/2018] fulvestrant (FASLODEX) injection 500 mg  500 mg IntraMUSCular ONCE Tray Ugalde MD           Review of Systems  Constitutional: The patient has no acute distress or discomfort. HEENT: The patient denies recent head trauma, eye pain, blurred vision,  hearing deficit, oropharyngeal mucosal pain or lesions, and the patient denies throat pain or discomfort. Lymphatics: The patient denies palpable peripheral lymphadenopathy. Hematologic: The patient denies having bruising, bleeding, or progressive fatigue. Respiratory: Patient denies having shortness of breath, cough, sputum production, fever, or dyspnea on exertion. Cardiovascular: The patient denies having leg pain, leg swelling, heart palpitations, chest permit, chest pain, or lightheadedness. The patient denies having dyspnea on exertion. Gastrointestinal: The patient denies having nausea, emesis, or diarrhea. The patient denies having any hematemesis or blood in the stool. Genitourinary: Patient denies having urinary urgency, frequency, or dysuria. The patient denies having blood in the urine. Psychological: The patient denies having symptoms of nervousness, anxiety, depression, or thoughts of harming self. Skin: Patient denies having skin rashes, skin, ulcerations, or unexplained itching or pruritus. Musculoskeletal: The patient denies having pain in the joints or bones. Objective:     Visit Vitals    /68    Pulse 77    Temp 97.9 °F (36.6 °C) (Oral)    Wt 69.4 kg (153 lb)    BMI 26.26 kg/m2     ECOG PS=0; Pain score=0/10    Physical Exam:   Gen. Appearance: The patient is in no acute distress. Skin: There is no bruise or rash. There is no evidence of cutaneous shingles over the lower back and flank. HEENT: The exam is unremarkable.   Neck: Supple without lymphadenopathy or thyromegaly. Lungs: Clear to auscultation and percussion; there are no wheezes or rhonchi. Heart: Regular rate and rhythm; there are no murmurs, gallops, or rubs. Abdomen: Bowel sounds are present and normal.  There is no guarding, tenderness, or hepatosplenomegaly. Extremities: There is no clubbing, cyanosis, or edema. Neurologic: There are no focal neurologic deficits. Lymphatics: There is no palpable peripheral lymphadenopathy. Musculoskeletal: The patient has full range of motion at all joints. There is no evidence of joint deformity or effusions. There is no focal joint tenderness. Psychological/psychiatric: There is no clinical evidence of anxiety, depression, or melancholy. Lab data:      Results for orders placed or performed during the hospital encounter of 04/13/18   CBC WITH 3 PART DIFF     Status: Abnormal   Result Value Ref Range Status    WBC 2.1 (L) 4.5 - 13.0 K/uL Final    RBC 3.05 (L) 4.10 - 5.10 M/uL Final    HGB 10.6 (L) 12.0 - 16.0 g/dL Final    HCT 31.2 (L) 36 - 48 % Final    .3 (H) 78 - 102 FL Final    MCH 34.8 25.0 - 35.0 PG Final    MCHC 34.0 31 - 37 g/dL Final    RDW 16.7 (H) 11.5 - 14.5 % Final    PLATELET 898 (L) 073 - 440 K/uL Final    NEUTROPHILS 77 (H) 40 - 70 % Final    MIXED CELLS 5 0.1 - 17 % Final    LYMPHOCYTES 18 14 - 44 % Final    ABS. NEUTROPHILS 1.6 (L) 1.8 - 9.5 K/UL Final    ABS. MIXED CELLS 0.1 0.0 - 2.3 K/uL Final    ABS. LYMPHOCYTES 0.4 (L) 1.1 - 5.9 K/UL Final     Comment: Test performed at [place] [address] Location. Results Reviewed by Medical Director. DF AUTOMATED   Final           Assessment:     1. Metastatic breast cancer ([address]    2. Chronic leukopenia    3. Malignant neoplasm of lower-inner quadrant of right breast of female, estrogen receptor positive ([address]    4. Neuropathy associated with cancer ([address]    5. Chemotherapy-induced thrombocytopenia    6. Anemia due to antineoplastic chemotherapy    7.  Vitamin D deficiency      Plan: Metastatic breast cancer: The combination ofFulvestrant 500 mg subcutaneous monthly  will be continued. Ibrance 140 mg by mouth daily will be continued as well. Since completing the proton therapy the patient has done well. Her current systemic treatment will be continued. The most recent CA-27-29 level from 10/27/2017 was 53.1 U/mL. I will recheck her values at this time. The patient will be started on monthly Xgeva due to the metastatic disease involving her bones. Pending results of her CA-27-29 level we may need to schedule her for repeat CT scans of the chest, abdomen, and pelvis. Neuropathy associated with cancer: I will continue her Neurontin at 400 mg 3 times daily. Chemotherapy-induced neutropenia/chronic leukopenia (persisting problem): The current CBC shows that her WBC count is 2.3. The absolute neutrophil count is 1.7. Her current hemoglobin and hematocrit are 9.9 g/dL and 30.1`% respectively. .  If the absolute neutrophil count declines to 0.5 she will be started on Neupogen or Neulasta. She will get her labs drawn once a month. Chemotherapy-induced anemia (persisting problem): The new chemotherapy regimen has significantly decreased her hemoglobin. Her current hemoglobin is 10.6 g/dL with hematocrit of 31.2 %. At this time I will check iron profile and ferritin levels. I have recommended that the patient continue taking ferrous sulfate 1 tablet daily. Procrit at a dose of 60,000 units subcutaneous will be provided now that her hemoglobin has declined below 10 g/dL hematocrit is below 30%. Chemotherapy-induced thrombocytopenia : The patient is continuing the current dose of Ibrance 140 mg daily. We have explained to her that the medication was responsible for her thrombocytopenia. The current platelet count is 259,570 we will continue to monitor platelet counts at 6 week intervals.     Follow-up in 6 weeks to review test results    Orders Placed This Encounter    COMPLETE CBC & AUTO DIFF WBC    InHouse CBC ([facility])     Standing Status:   Future     Number of Occurrences:   1     Standing Expiration Date:   1/16/5332    METABOLIC PANEL, COMPREHENSIVE     Standing Status:   Future     Standing Expiration Date:   4/14/2019    IRON PROFILE     Standing Status:   Future     Standing Expiration Date:   4/14/2019    FERRITIN     Standing Status:   Future     Standing Expiration Date:   4/14/2019    CA 27.29     Standing Status:   Future     Standing Expiration Date:   4/14/2019    VITAMIN D, 25 HYDROXY     Standing Status:   Future     Standing Expiration Date:   4/14/2019       Lavell Wise MD  4/13/2018      Please note: This document has been produced using voice recognition software. Unrecognized errors in transcription may be present.

## 2018-04-13 NOTE — MR AVS SNAPSHOT
[address] [facility] [address] [facility] [address] 
[phone] Patient: Pasha Dawson MRN: [id number] :1945 Visit Information Date & Time Provider Department Dept. Phone Encounter #  
 2018 11:45 AM [facility] Office [phone] [id number] Follow-up Instructions Return in about 3 months (around 2018). Your Appointments 2018 11:00 AM  
Follow Up with Petar Renner MD  
[address] [facility]) Appt Note: 1 year f/u after mammo [address] [facility] [address]  
[phone]  
  
   
 [address] [facility] [address] 2018 10:30 AM  
Office Visit with Joe Baez MD  
[address] [facility]) Appt Note: [address] [facility] [address]  
[phone]  
  
   
 [facility] [address] Upcoming Health Maintenance Date Due Hepatitis C Screening 1945 DTaP/Tdap/Td series (1 - Tdap) 10/18/1966 FOBT Q 1 YEAR AGE 50-75 10/18/1995 ZOSTER VACCINE AGE 60> 2005 GLAUCOMA SCREENING Q2Y 10/18/2010 Pneumococcal 65+ High/Highest Risk (1 of 2 - PCV13) 10/18/2010 MEDICARE YEARLY EXAM 3/14/2018 BREAST CANCER SCRN MAMMOGRAM 2019 Allergies as of 2018  Review Complete On: 2018 By: Joe Baez MD  
  
 Severity Noted Reaction Type Reactions Codeine  2012    Palpitations Darvocet A500 [Propoxyphene N-acetaminophen]  2012   Side Effect Nausea and Vomiting Darvon [Propoxyphene]  2014   Systemic Nausea and Vomiting Current Immunizations  Reviewed on 2018 Name Date Influenza Vaccine 10/18/2017, 2016, 2015, 2015, 2013, 2013 Influenza Vaccine Whole 2012 Not reviewed this visit You Were Diagnosed With   
  
 Codes Comments Metastatic breast cancer ([facility]    -  Primary ICD-10-CM: J59.774 ICD-9-CM: 174.9 Chronic leukopenia     ICD-10-CM: D72.819 ICD-9-CM: 288.50 Malignant neoplasm of lower-inner quadrant of right breast of female, estrogen receptor positive ([facility]     ICD-10-CM: C50.311, Z17.0 ICD-9-CM: 174.3, V86.0 Neuropathy associated with cancer ([facility]     ICD-10-CM: C80.1, G63 ICD-9-CM: 199.1, 357.3 Chemotherapy-induced thrombocytopenia     ICD-10-CM: D69.59, T45.1X5A 
ICD-9-CM: 287.49, E933.1 Anemia due to antineoplastic chemotherapy     ICD-10-CM: D64.81, T45.1X5A 
ICD-9-CM: 285.3, E933.1 Vitamin D deficiency     ICD-10-CM: E55.9 ICD-9-CM: 268.9 Vitals BP Pulse Temp Weight(growth percentile) BMI OB Status 127/68 77 97.9 °F (36.6 °C) (Oral) 153 lb (69.4 kg) 26.26 kg/m2 Menopause Smoking Status Former Smoker BMI and BSA Data Body Mass Index Body Surface Area  
 26.26 kg/m 2 1.77 m 2 Preferred Pharmacy Pharmacy Name Phone [facility], [facility] [phone] Your Updated Medication List  
  
   
This list is accurate as of 4/13/18 12:39 PM.  Always use your most recent med list. ALLEGRA-D 24 HOUR 180-240 mg per tablet Generic drug:  fexofenadine-pseudoephedrine Take 1 Tab by mouth daily. ARMOUR THYROID 60 mg tablet Generic drug:  thyroid (Pork) Take 90 mg by mouth daily. cholecalciferol (VITAMIN D3) 5,000 unit Tab tablet Commonly known as:  VITAMIN D3 Take  by mouth daily. * gabapentin 100 mg capsule Commonly known as:  NEURONTIN Take 1 Cap by mouth three (3) times daily. * gabapentin 300 mg capsule Commonly known as:  NEURONTIN  
TAKE 1 CAPSULE THREE TIMES A DAY MOTRIN 800 mg tablet Generic drug:  ibuprofen Take 800 mg by mouth two (2) times a day. Indications: OSTEOARTHRITIS  
  
 NEURPATH-B 3-35-2 mg Tab tab Generic drug:  L-Mfolate-B6 Phos-Methyl-B12 Take  by mouth two (2) times a day. palbociclib 125 mg Cap Commonly known as:  [employer] Take 125 mg by mouth daily. Take 1 tab po every day for 21 days on and 7 days off q 28 days  Indications: HORMONE RECEPTOR(+), HER2(-) ADVANCED BREAST CANCER  
  
 sulfacetamide 10 % ophthalmic ointment Commonly known as:  BLEPH-10 Administer  to right eye three (3) times daily. TETRACYCLINE PO Take  by mouth.  
  
 warfarin 1 mg tablet Commonly known as:  COUMADIN Take 1 mg by mouth daily. * Notice: This list has 2 medication(s) that are the same as other medications prescribed for you. Read the directions carefully, and ask your doctor or other care provider to review them with you. We Performed the Following COMPLETE CBC & AUTO DIFF WBC [87780 CPT(R)] Follow-up Instructions Return in about 3 months (around 7/13/2018). To-Do List   
 04/13/2018 Lab:  CBC WITH 3 PART DIFF   
  
 04/20/2018 10:00 AM  
  Appointment with [address] at [place] [address] ([phone])  
  
 05/04/2018 10:00 AM  
  Appointment with [address] at [place] [address] ([phone])  
  
 05/18/2018 10:00 AM  
  Appointment with [address] at [place] [address] ([phone])  
  
 05/31/2018 11:00 AM  
  Appointment with [facility] 2 at [address] [place] [address] ([phone]) OUTSIDE FILMS  - Any outside films related to the study being scheduled should be brought with you on the day of the exam.  If this cannot be done there may be a delay in the reading of the study.   MEDICATIONS  - Patient must bring a complete list of all medications currently taking to include prescriptions, over-the-counter meds, herbals, vitamins & any dietary supplements  GENERAL INSTRUCTIONS  - On the day of your exam do not use any bath powder, deodorant or lotions on the armpit area. -Tenderness of breasts may cause an increase of discomfort during procedure. If you are experiencing breast tenderness on the day of your appointment and would like to reschedule, please call [phone]. Please provide this summary of care documentation to your next provider. Your primary care clinician is listed as [address] [place] [address]. If you have any questions after today's visit, please call [phone].

## 2018-04-14 LAB
25(OH)D3 SERPL-MCNC: 48 NG/ML (ref 30–100)
CANCER AG27-29 SERPL-ACNC: 47.2 U/ML (ref 0–38.6)

## 2018-04-17 NOTE — PROGRESS NOTES
Ms. Luis Rodríguez called me back, as a result of the message that I left her earlier today, regarding changing her OPIC appointment location from here at the [facility] to the [place] [address] location, on Friday, 4-20-18. Ms. Luis Rodríguez stated that she will be going out of town on Thursday, early AM (4-19-18), and will not be able to make her 4-20-18 OPIC appointment at any location. Per her request, the [facility] appointment has been rescheduled to Tuesday, 4-24-18, at 1600, here at the [facility].

## 2018-04-17 NOTE — PROGRESS NOTES
I called and left a message on Ms. Figueroa's voicemail today, making her aware, that her OPIC appointment scheduled for Friday, 4-20-18, at 1000, here at the [facility], has been changed to the [place] [address] location at the same time.

## 2018-04-20 ENCOUNTER — HOSPITAL ENCOUNTER (OUTPATIENT)
Dept: INFUSION THERAPY | Age: 73
Discharge: HOME | End: 2018-04-20
Payer: MEDICARE

## 2018-04-20 PROBLEM — C79.52 SECONDARY MALIGNANT NEOPLASM OF BONE AND BONE MARROW (HCC): Status: ACTIVE | Noted: 2018-04-20

## 2018-04-20 PROBLEM — C79.51 SECONDARY MALIGNANT NEOPLASM OF BONE AND BONE MARROW (HCC): Status: ACTIVE | Noted: 2018-04-20

## 2018-04-20 RX ORDER — LAMOTRIGINE 25 MG/1
500 TABLET ORAL ONCE
Status: COMPLETED | OUTPATIENT
Start: 2018-04-24 | End: 2018-04-24

## 2018-04-24 ENCOUNTER — HOSPITAL ENCOUNTER (OUTPATIENT)
Dept: LAB | Age: 73
Discharge: HOME | End: 2018-04-24
Payer: MEDICARE

## 2018-04-24 ENCOUNTER — HOSPITAL ENCOUNTER (OUTPATIENT)
Dept: INFUSION THERAPY | Age: 73
Discharge: HOME | End: 2018-04-24
Payer: MEDICARE

## 2018-04-24 ENCOUNTER — HOSPITAL ENCOUNTER (OUTPATIENT)
Dept: ONCOLOGY | Age: 73
Discharge: HOME | End: 2018-04-24

## 2018-04-24 ENCOUNTER — CLINICAL SUPPORT (OUTPATIENT)
Dept: ONCOLOGY | Age: 73
End: 2018-04-24

## 2018-04-24 VITALS
DIASTOLIC BLOOD PRESSURE: 67 MMHG | WEIGHT: 156 LBS | HEART RATE: 74 BPM | RESPIRATION RATE: 16 BRPM | HEIGHT: 64 IN | TEMPERATURE: 96.7 F | SYSTOLIC BLOOD PRESSURE: 122 MMHG | BODY MASS INDEX: 26.63 KG/M2

## 2018-04-24 DIAGNOSIS — C50.919 METASTATIC BREAST CANCER (HCC): ICD-10-CM

## 2018-04-24 DIAGNOSIS — C50.919 METASTATIC BREAST CANCER (HCC): Primary | ICD-10-CM

## 2018-04-24 LAB
BASOPHILS # BLD: 0 K/UL (ref 0–0.06)
BASOPHILS NFR BLD: 2 % (ref 0–2)
DIFFERENTIAL METHOD BLD: ABNORMAL
EOSINOPHIL # BLD: 0 K/UL (ref 0–0.4)
EOSINOPHIL NFR BLD: 1 % (ref 0–5)
ERYTHROCYTE [DISTWIDTH] IN BLOOD BY AUTOMATED COUNT: 19 % (ref 11.6–14.5)
HCT VFR BLD AUTO: 27 % (ref 35–45)
HGB BLD-MCNC: 9.1 G/DL (ref 12–16)
LYMPHOCYTES # BLD: 0.4 K/UL (ref 0.9–3.6)
LYMPHOCYTES NFR BLD: 20 % (ref 21–52)
MCH RBC QN AUTO: 34.6 PG (ref 24–34)
MCHC RBC AUTO-ENTMCNC: 33.7 G/DL (ref 31–37)
MCV RBC AUTO: 102.7 FL (ref 74–97)
MONOCYTES # BLD: 0.4 K/UL (ref 0.05–1.2)
MONOCYTES NFR BLD: 20 % (ref 3–10)
NEUTS SEG # BLD: 1.1 K/UL (ref 1.8–8)
NEUTS SEG NFR BLD: 57 % (ref 40–73)
PLATELET # BLD AUTO: 152 K/UL (ref 135–420)
PMV BLD AUTO: 10.5 FL (ref 9.2–11.8)
RBC # BLD AUTO: 2.63 M/UL (ref 4.2–5.3)
WBC # BLD AUTO: 1.9 K/UL (ref 4.6–13.2)

## 2018-04-24 PROCEDURE — 96372 THER/PROPH/DIAG INJ SC/IM: CPT

## 2018-04-24 PROCEDURE — 80053 COMPREHEN METABOLIC PANEL: CPT | Performed by: INTERNAL MEDICINE

## 2018-04-24 PROCEDURE — 96402 CHEMO HORMON ANTINEOPL SQ/IM: CPT

## 2018-04-24 PROCEDURE — 74011250636 HC RX REV CODE- 250/636: Performed by: INTERNAL MEDICINE

## 2018-04-24 PROCEDURE — 77030012965 HC NDL HUBR BBMI -A

## 2018-04-24 PROCEDURE — 74011250636 HC RX REV CODE- 250/636

## 2018-04-24 RX ORDER — HEPARIN SODIUM (PORCINE) LOCK FLUSH IV SOLN 100 UNIT/ML 100 UNIT/ML
500 SOLUTION INTRAVENOUS AS NEEDED
Status: ACTIVE | OUTPATIENT
Start: 2018-04-24 | End: 2018-04-25

## 2018-04-24 RX ORDER — HEPARIN SODIUM (PORCINE) LOCK FLUSH IV SOLN 100 UNIT/ML 100 UNIT/ML
SOLUTION INTRAVENOUS
Status: COMPLETED
Start: 2018-04-24 | End: 2018-04-24

## 2018-04-24 RX ORDER — SODIUM CHLORIDE 0.9 % (FLUSH) 0.9 %
10-40 SYRINGE (ML) INJECTION AS NEEDED
Status: DISCONTINUED | OUTPATIENT
Start: 2018-04-24 | End: 2018-04-28 | Stop reason: HOSPADM

## 2018-04-24 RX ORDER — HEPARIN SODIUM (PORCINE) LOCK FLUSH IV SOLN 100 UNIT/ML 100 UNIT/ML
SOLUTION INTRAVENOUS
Status: DISPENSED
Start: 2018-04-24 | End: 2018-04-25

## 2018-04-24 RX ADMIN — FULVESTRANT 500 MG: 50 INJECTION INTRAMUSCULAR at 14:56

## 2018-04-24 RX ADMIN — Medication 30 ML: at 14:40

## 2018-04-24 RX ADMIN — ERYTHROPOIETIN 20000 UNITS: 20000 INJECTION, SOLUTION INTRAVENOUS; SUBCUTANEOUS at 15:12

## 2018-04-24 RX ADMIN — HEPARIN SODIUM (PORCINE) LOCK FLUSH IV SOLN 100 UNIT/ML 500 UNITS: 100 SOLUTION at 14:40

## 2018-04-24 RX ADMIN — ERYTHROPOIETIN 40000 UNITS: 40000 INJECTION, SOLUTION INTRAVENOUS; SUBCUTANEOUS at 15:12

## 2018-04-24 NOTE — PROGRESS NOTES
[address] [facility] Progress Note    Date: 2018    Name: Jessica Goncalves    MRN: [id number]         : 1945      Ms. Figueroa was assessed and education was provided. Ms. Frank Zhu vitals were reviewed and patient was observed for 5 minutes prior to treatment. Visit Vitals    /67 (BP 1 Location: Left arm, BP Patient Position: At rest;Sitting)    Pulse 74    Temp 96.7 °F (35.9 °C)    Resp 16    Ht 5' 4\" (1.626 m)    Wt 70.8 kg (156 lb)    BMI 26.78 kg/m2     Preliminary CBC results reviewed and scanned into chart. CMP drawn and scanned into chart. Procrit 60,000 SQ given for H&H 9.0/26.8. Faslodex 500 mg given in divided dose, 250 mg injected in right gluteus klaudia and 250 mg injected into left gluteus klaudia. Ms Zhen Voss refused Trav Sorensen. She stated that she wants to talk with Dr Agueda De Anda and research Trav Sorensen before starting on this med. I gave her CareNotes concerning Xgeva. Patient armband removed and shredded. Ms. Zhen Voss was discharged from [place] [address] in stable condition at 1530. She is to return on 18 at 1100 for her next appointment for Procrit Q 2 weeks.     Claus Evans RN  2018  3:47 PM

## 2018-04-25 LAB
ALBUMIN SERPL-MCNC: 3.7 G/DL (ref 3.4–5)
ALBUMIN/GLOB SERPL: 1.3 {RATIO} (ref 0.8–1.7)
ALP SERPL-CCNC: 70 U/L (ref 45–117)
ALT SERPL-CCNC: 15 U/L (ref 13–56)
ANION GAP SERPL CALC-SCNC: 8 MMOL/L (ref 3–18)
AST SERPL-CCNC: 18 U/L (ref 15–37)
BILIRUB SERPL-MCNC: 0.4 MG/DL (ref 0.2–1)
BUN SERPL-MCNC: 16 MG/DL (ref 7–18)
BUN/CREAT SERPL: 16 (ref 12–20)
CALCIUM SERPL-MCNC: 8.8 MG/DL (ref 8.5–10.1)
CHLORIDE SERPL-SCNC: 109 MMOL/L (ref 100–108)
CO2 SERPL-SCNC: 26 MMOL/L (ref 21–32)
CREAT SERPL-MCNC: 1.02 MG/DL (ref 0.6–1.3)
GLOBULIN SER CALC-MCNC: 2.8 G/DL (ref 2–4)
GLUCOSE SERPL-MCNC: 98 MG/DL (ref 74–99)
POTASSIUM SERPL-SCNC: 4 MMOL/L (ref 3.5–5.5)
PROT SERPL-MCNC: 6.5 G/DL (ref 6.4–8.2)
SODIUM SERPL-SCNC: 143 MMOL/L (ref 136–145)

## 2018-04-27 DIAGNOSIS — C50.919 METASTATIC BREAST CANCER (HCC): ICD-10-CM

## 2018-04-27 DIAGNOSIS — C79.52 SECONDARY MALIGNANT NEOPLASM OF BONE AND BONE MARROW (HCC): Primary | ICD-10-CM

## 2018-04-27 DIAGNOSIS — Z17.0 MALIGNANT NEOPLASM OF LOWER-INNER QUADRANT OF RIGHT BREAST OF FEMALE, ESTROGEN RECEPTOR POSITIVE (HCC): ICD-10-CM

## 2018-04-27 DIAGNOSIS — C79.51 SECONDARY MALIGNANT NEOPLASM OF BONE AND BONE MARROW (HCC): ICD-10-CM

## 2018-04-27 DIAGNOSIS — C79.52 SECONDARY MALIGNANT NEOPLASM OF BONE AND BONE MARROW (HCC): ICD-10-CM

## 2018-04-27 DIAGNOSIS — C79.51 SECONDARY MALIGNANT NEOPLASM OF BONE AND BONE MARROW (HCC): Primary | ICD-10-CM

## 2018-04-27 DIAGNOSIS — C50.311 MALIGNANT NEOPLASM OF LOWER-INNER QUADRANT OF RIGHT BREAST OF FEMALE, ESTROGEN RECEPTOR POSITIVE (HCC): ICD-10-CM

## 2018-05-01 NOTE — PROGRESS NOTES
Ms. Carter Fraction called today, to verify her next  [facility] appointment date & time. She was made aware, that her next [facility] appointment for Q 2 Week, CBC/Procrit injection is on Thursday, 5-10-18 at 1100, and she agreed to the appointment.

## 2018-05-04 ENCOUNTER — HOSPITAL ENCOUNTER (OUTPATIENT)
Dept: INFUSION THERAPY | Age: 73
Discharge: HOME | End: 2018-05-04
Payer: MEDICARE

## 2018-05-08 ENCOUNTER — APPOINTMENT (OUTPATIENT)
Dept: INFUSION THERAPY | Age: 73
End: 2018-05-08
Payer: MEDICARE

## 2018-05-10 ENCOUNTER — CLINICAL SUPPORT (OUTPATIENT)
Dept: ONCOLOGY | Age: 73
End: 2018-05-10

## 2018-05-10 ENCOUNTER — HOSPITAL ENCOUNTER (OUTPATIENT)
Dept: ONCOLOGY | Age: 73
Discharge: HOME | End: 2018-05-10

## 2018-05-10 ENCOUNTER — HOSPITAL ENCOUNTER (OUTPATIENT)
Dept: INFUSION THERAPY | Age: 73
Discharge: HOME | End: 2018-05-10
Payer: MEDICARE

## 2018-05-10 VITALS
SYSTOLIC BLOOD PRESSURE: 116 MMHG | DIASTOLIC BLOOD PRESSURE: 69 MMHG | RESPIRATION RATE: 16 BRPM | TEMPERATURE: 97.5 F | HEART RATE: 72 BPM

## 2018-05-10 DIAGNOSIS — T45.1X5A ANTINEOPLASTIC CHEMOTHERAPY INDUCED ANEMIA: ICD-10-CM

## 2018-05-10 DIAGNOSIS — D64.81 ANTINEOPLASTIC CHEMOTHERAPY INDUCED ANEMIA: Primary | ICD-10-CM

## 2018-05-10 DIAGNOSIS — D64.81 ANTINEOPLASTIC CHEMOTHERAPY INDUCED ANEMIA: ICD-10-CM

## 2018-05-10 DIAGNOSIS — T45.1X5A ANTINEOPLASTIC CHEMOTHERAPY INDUCED ANEMIA: Primary | ICD-10-CM

## 2018-05-10 LAB
BASO+EOS+MONOS # BLD AUTO: 0.2 K/UL (ref 0–2.3)
BASO+EOS+MONOS # BLD AUTO: 9 % (ref 0.1–17)
DIFFERENTIAL METHOD BLD: ABNORMAL
ERYTHROCYTE [DISTWIDTH] IN BLOOD BY AUTOMATED COUNT: 19.3 % (ref 11.5–14.5)
HCT VFR BLD AUTO: 29.2 % (ref 36–48)
HGB BLD-MCNC: 10 G/DL (ref 12–16)
LYMPHOCYTES # BLD: 0.3 K/UL (ref 1.1–5.9)
LYMPHOCYTES NFR BLD: 16 % (ref 14–44)
MCH RBC QN AUTO: 35.3 PG (ref 25–35)
MCHC RBC AUTO-ENTMCNC: 34.2 G/DL (ref 31–37)
MCV RBC AUTO: 103.2 FL (ref 78–102)
NEUTS SEG # BLD: 1.5 K/UL (ref 1.8–9.5)
NEUTS SEG NFR BLD: 75 % (ref 40–70)
PLATELET # BLD AUTO: 118 K/UL (ref 140–440)
RBC # BLD AUTO: 2.83 M/UL (ref 4.1–5.1)
WBC # BLD AUTO: 2 K/UL (ref 4.5–13)

## 2018-05-10 PROCEDURE — 36415 COLL VENOUS BLD VENIPUNCTURE: CPT

## 2018-05-10 NOTE — IP AVS SNAPSHOT
[facility] 
[address] [address] 
[phone] Patient: Davina Holly MRN: [id number] :1945 About your hospitalization You were admitted on:  May 10, 2018 You last received care in the:  [facility] You were discharged on:  May 10, 2018 Why you were hospitalized Your primary diagnosis was:  Not on File Follow-up Information None Your Scheduled Appointments Thursday May 24, 2018 11:00 AM EDT INFUSION 30 with [place] INFUSION CHAIR 10 [facility] ([address]) [facility] 
[address] [address] [phone] Thursday May 31, 2018 11:00 AM EDT  
RODOLFO MAMMO SCREENING with HBV RODOLFO RM 2 [address] [address] [address] [address] [phone] OUTSIDE FILMS  - Any outside films related to the study being scheduled should be brought with you on the day of the exam.  If this cannot be done there may be a delay in the reading of the study. MEDICATIONS  - Patient must bring a complete list of all medications currently taking to include prescriptions, over-the-counter meds, herbals, vitamins & any dietary supplements  GENERAL INSTRUCTIONS  - On the day of your exam do not use any bath powder, deodorant or lotions on the armpit area. -Tenderness of breasts may cause an increase of discomfort during procedure. If you are experiencing breast tenderness on the day of your appointment and would like to reschedule, please call [phone]. CHECK IN INSTRUCTIONS  Check in to Registration desk 15 minutes prior to your appointment. [address] (located on the second floor) Native, [address] 2018 11:00 AM EDT Follow Up with Doreen Henley MD  
 [address] [facility] (LINH King) [address] [facility] [address] [facility] [address]  
[phone] Thursday June 07, 2018 11:00 AM EDT INFUSION 30 with [place] INFUSION CHAIR [address] ([address]) [address] 
[facility] [address]  
[phone] Thursday June 21, 2018 11:00 AM EDT INFUSION 30 with [place] INFUSION CHAIR [address] ([address]) [address] 
[facility] [address]  
[phone] Discharge Orders None A check christina indicates which time of day the medication should be taken. My Medications ASK your doctor about these medications Instructions Each Dose to Equal  
 Morning Noon Evening Bedtime ALLEGRA-D 24 HOUR 180-240 mg per tablet Generic drug:  fexofenadine-pseudoephedrine Your last dose was: Your next dose is: Take 1 Tab by mouth daily. 1 Tab ARMOUR THYROID 60 mg tablet Generic drug:  thyroid (Pork) Your last dose was: Your next dose is: Take 90 mg by mouth daily. 90 mg  
    
   
   
   
  
 cholecalciferol (VITAMIN D3) 5,000 unit Tab tablet Commonly known as:  VITAMIN D3 Your last dose was: Your next dose is: Take  by mouth daily. * gabapentin 100 mg capsule Commonly known as:  NEURONTIN Your last dose was: Your next dose is: Take 1 Cap by mouth three (3) times daily. 100 mg  
    
   
   
   
  
 * gabapentin 300 mg capsule Commonly known as:  NEURONTIN Your last dose was: Your next dose is: TAKE 1 CAPSULE THREE TIMES A DAY MOTRIN 800 mg tablet Generic drug:  ibuprofen Your last dose was: Your next dose is: Take 800 mg by mouth two (2) times a day. Indications: OSTEOARTHRITIS  
 800 mg NEURPATH-B 3-35-2 mg Tab tab Generic drug:  L-Mfolate-B6 Phos-Methyl-B12 Your last dose was: Your next dose is: Take  by mouth two (2) times a day. * palbociclib 125 mg Cap Commonly known as:  [employer] Your last dose was: Your next dose is: Take 125 mg by mouth daily. Take 1 tab po every day for 21 days on and 7 days off q 28 days  Indications: HORMONE RECEPTOR(+), HER2(-) ADVANCED BREAST CANCER  
 125 mg  
    
   
   
   
  
 * palbociclib 100 mg Cap Commonly known as:  [employer] Your last dose was: Your next dose is: Take 1 Cap by mouth daily. For 21 days on and 7 days off every 28 days. 100 mg  
    
   
   
   
  
 sulfacetamide 10 % ophthalmic ointment Commonly known as:  BLEPH-10 Your last dose was: Your next dose is:    
   
   
 Administer  to right eye three (3) times daily. TETRACYCLINE PO Your last dose was: Your next dose is: Take  by mouth.  
     
   
   
   
  
 warfarin 1 mg tablet Commonly known as:  COUMADIN Your last dose was: Your next dose is: Take 1 mg by mouth daily. 1 mg * Notice: This list has 4 medication(s) that are the same as other medications prescribed for you. Read the directions carefully, and ask your doctor or other care provider to review them with you. Discharge Instructions None Introducing Janusz Kim As a [employer] patient, I wanted to make you aware of our electronic visit tool called Janusz Kim. [employer] 24/7 allows you to connect within minutes with a medical provider 24 hours a day, seven days a week via a mobile device or tablet or logging into a secure website from your computer. You can access [employer] from anywhere in the [place]. A virtual visit might be right for you when you have a simple condition and feel like you just dont want to get out of bed, or cant get away from work for an appointment, when your regular [facility] provider is not available (evenings, weekends or holidays), or when youre out of town and need minor care. Electronic visits cost only $49 and if the [employer] 24/[employer] provider determines a prescription is needed to treat your condition, one can be electronically transmitted to a nearby pharmacy*. Please take a moment to enroll today if you have not already done so. The enrollment process is free and takes just a few minutes. To enroll, please download the [employer] karina to your tablet or phone, or visit [email or web address] to enroll on your computer. And, as an [address] [place] [address] patient with a [facility] account, the results of your visits will be scanned into your electronic medical record and your primary care provider will be able to view the scanned results. We urge you to continue to see your regular [facility] provider for your ongoing medical care. And while your primary care provider may not be the one available when you seek a [employer] virtual visit, the peace of mind you get from getting a real diagnosis real time can be priceless. For more information on [employer], view our Frequently Asked Questions (FAQs) at [email or web address]. Sincerely, 
 
Georgi Webster MD 
Chief Medical Officer Kelle Velazquez *:  certain medications cannot be prescribed via [employer] Your Primary Care Physician (PCP) Primary Care Physician Office Phone Office Fax Estevan Vance [phone] You are allergic to the following Allergen Reactions Codeine Palpitations Darvocet A500 (Propoxyphene N-Acetaminophen) Nausea and Vomiting Darvon (Propoxyphene) Nausea and Vomiting Recent Documentation OB Status Smoking Status Menopause Former Smoker Emergency Contacts Name Discharge Info Relation Home Work Mobile Regis Figueroa DISCHARGE CAREGIVER [3] Spouse [3] [phone] Patient Belongings The following personal items are in your possession at time of discharge: 
                             
 
  
  
 Please provide this summary of care documentation to your next provider. Signatures-by signing, you are acknowledging that this After Visit Summary has been reviewed with you and you have received a copy. Patient Signature:  ____________________________________________________________ Date:  ____________________________________________________________  
  
Audrey [place] Provider Signature:  ____________________________________________________________ Date:  ____________________________________________________________

## 2018-05-10 NOTE — IP AVS SNAPSHOT
[address] [place] [address] Levon Gaspar [address] [phone] Patient: Jessica Goncalves MRN: [id number] :1945 A check christina indicates which time of day the medication should be taken. My Medications ASK your doctor about these medications Instructions Each Dose to Equal  
 Morning Noon Evening Bedtime ALLEGRA-D 24 HOUR 180-240 mg per tablet Generic drug:  fexofenadine-pseudoephedrine Your last dose was: Your next dose is: Take 1 Tab by mouth daily. 1 Tab ARMOUR THYROID 60 mg tablet Generic drug:  thyroid (Pork) Your last dose was: Your next dose is: Take 90 mg by mouth daily. 90 mg  
    
   
   
   
  
 cholecalciferol (VITAMIN D3) 5,000 unit Tab tablet Commonly known as:  VITAMIN D3 Your last dose was: Your next dose is: Take  by mouth daily. * gabapentin 100 mg capsule Commonly known as:  NEURONTIN Your last dose was: Your next dose is: Take 1 Cap by mouth three (3) times daily. 100 mg  
    
   
   
   
  
 * gabapentin 300 mg capsule Commonly known as:  NEURONTIN Your last dose was: Your next dose is: TAKE 1 CAPSULE THREE TIMES A DAY MOTRIN 800 mg tablet Generic drug:  ibuprofen Your last dose was: Your next dose is: Take 800 mg by mouth two (2) times a day. Indications: OSTEOARTHRITIS  
 800 mg NEURPATH-B 3-35-2 mg Tab tab Generic drug:  L-Mfolate-B6 Phos-Methyl-B12 Your last dose was: Your next dose is: Take  by mouth two (2) times a day. * palbociclib 125 mg Cap Commonly known as:  [employer] Your last dose was: Your next dose is: Take 125 mg by mouth daily. Take 1 tab po every day for 21 days on and 7 days off q 28 days  Indications: HORMONE RECEPTOR(+), HER2(-) ADVANCED BREAST CANCER  
 125 mg  
    
   
   
   
  
 * palbociclib 100 mg Cap Commonly known as:  [employer] Your last dose was: Your next dose is: Take 1 Cap by mouth daily. For 21 days on and 7 days off every 28 days. 100 mg  
    
   
   
   
  
 sulfacetamide 10 % ophthalmic ointment Commonly known as:  BLEPH-10 Your last dose was: Your next dose is:    
   
   
 Administer  to right eye three (3) times daily. TETRACYCLINE PO Your last dose was: Your next dose is: Take  by mouth.  
     
   
   
   
  
 warfarin 1 mg tablet Commonly known as:  COUMADIN Your last dose was: Your next dose is: Take 1 mg by mouth daily. 1 mg * Notice: This list has 4 medication(s) that are the same as other medications prescribed for you. Read the directions carefully, and ask your doctor or other care provider to review them with you.

## 2018-05-10 NOTE — PROGRESS NOTES
[facility] Progress Note    Date: May 10, 2018    Name: Guero Mueller    MRN: [id number]         : 1945      Ms. Figueroa was assessed and education was provided. Ms. Nicolas Ruiz vitals were reviewed and patient was observed for 5 minutes prior to treatment. Visit Vitals    /69 (BP 1 Location: Left arm, BP Patient Position: At rest;Sitting)    Pulse 72    Temp 97.5 °F (36.4 °C)    Resp 16    Breastfeeding No       Lab results were obtained and reviewed. Recent Results (from the past 12 hour(s))   CBC WITH 3 PART DIFF    Collection Time: 05/10/18 11:52 AM   Result Value Ref Range    WBC 2.0 (L) 4.5 - 13.0 K/uL    RBC 2.83 (L) 4.10 - 5.10 M/uL    HGB 10.0 (L) 12.0 - 16.0 g/dL    HCT 29.2 (L) 36 - 48 %    .2 (H) 78 - 102 FL    MCH 35.3 (H) 25.0 - 35.0 PG    MCHC 34.2 31 - 37 g/dL    RDW 19.3 (H) 11.5 - 14.5 %    PLATELET 147 (L) 362 - 440 K/uL    NEUTROPHILS 75 (H) 40 - 70 %    MIXED CELLS 9 0.1 - 17 %    LYMPHOCYTES 16 14 - 44 %    ABS. NEUTROPHILS 1.5 (L) 1.8 - 9.5 K/UL    ABS. MIXED CELLS 0.2 0.0 - 2.3 K/uL    ABS. LYMPHOCYTES 0.3 (L) 1.1 - 5.9 K/UL    DF AUTOMATED         Procrit not given. H&H = 10.0/29.2. Patient armband removed and shredded. Ms. Luis Rodríguez was discharged from [place] [address] in stable condition at 1205. She is to return on 18 at 1100 for her next appointment for CBC/Procrit.     Lillian Wray RN  May 10, 2018  2:08 PM

## 2018-05-17 RX ORDER — LAMOTRIGINE 25 MG/1
500 TABLET ORAL ONCE
Status: COMPLETED | OUTPATIENT
Start: 2018-05-24 | End: 2018-05-24

## 2018-05-18 ENCOUNTER — APPOINTMENT (OUTPATIENT)
Dept: INFUSION THERAPY | Age: 73
End: 2018-05-18
Payer: MEDICARE

## 2018-05-22 ENCOUNTER — APPOINTMENT (OUTPATIENT)
Dept: INFUSION THERAPY | Age: 73
End: 2018-05-22
Payer: MEDICARE

## 2018-05-24 ENCOUNTER — HOSPITAL ENCOUNTER (OUTPATIENT)
Dept: LAB | Age: 73
Discharge: HOME | End: 2018-05-24
Payer: MEDICARE

## 2018-05-24 ENCOUNTER — HOSPITAL ENCOUNTER (OUTPATIENT)
Dept: ONCOLOGY | Age: 73
Discharge: HOME | End: 2018-05-24

## 2018-05-24 ENCOUNTER — HOSPITAL ENCOUNTER (OUTPATIENT)
Dept: INFUSION THERAPY | Age: 73
Discharge: HOME | End: 2018-05-24
Payer: MEDICARE

## 2018-05-24 ENCOUNTER — CLINICAL SUPPORT (OUTPATIENT)
Dept: ONCOLOGY | Age: 73
End: 2018-05-24

## 2018-05-24 VITALS
SYSTOLIC BLOOD PRESSURE: 114 MMHG | RESPIRATION RATE: 16 BRPM | WEIGHT: 155 LBS | DIASTOLIC BLOOD PRESSURE: 63 MMHG | HEART RATE: 73 BPM | HEIGHT: 64 IN | TEMPERATURE: 97 F | BODY MASS INDEX: 26.46 KG/M2

## 2018-05-24 DIAGNOSIS — C50.919 METASTATIC BREAST CANCER (HCC): ICD-10-CM

## 2018-05-24 DIAGNOSIS — C50.919 METASTATIC BREAST CANCER (HCC): Primary | ICD-10-CM

## 2018-05-24 LAB
ALBUMIN SERPL-MCNC: 3.6 G/DL (ref 3.4–5)
ALBUMIN/GLOB SERPL: 1.3 {RATIO} (ref 0.8–1.7)
ALP SERPL-CCNC: 66 U/L (ref 45–117)
ALT SERPL-CCNC: 17 U/L (ref 13–56)
ANION GAP SERPL CALC-SCNC: 9 MMOL/L (ref 3–18)
AST SERPL-CCNC: 16 U/L (ref 15–37)
BASO+EOS+MONOS # BLD AUTO: 0.2 K/UL (ref 0–2.3)
BASO+EOS+MONOS # BLD AUTO: 8 % (ref 0.1–17)
BILIRUB SERPL-MCNC: 0.4 MG/DL (ref 0.2–1)
BUN SERPL-MCNC: 16 MG/DL (ref 7–18)
BUN/CREAT SERPL: 18 (ref 12–20)
CALCIUM SERPL-MCNC: 8.8 MG/DL (ref 8.5–10.1)
CHLORIDE SERPL-SCNC: 104 MMOL/L (ref 100–108)
CO2 SERPL-SCNC: 28 MMOL/L (ref 21–32)
CREAT SERPL-MCNC: 0.91 MG/DL (ref 0.6–1.3)
DIFFERENTIAL METHOD BLD: ABNORMAL
ERYTHROCYTE [DISTWIDTH] IN BLOOD BY AUTOMATED COUNT: 18.1 % (ref 11.5–14.5)
GLOBULIN SER CALC-MCNC: 2.7 G/DL (ref 2–4)
GLUCOSE SERPL-MCNC: 88 MG/DL (ref 74–99)
HCT VFR BLD AUTO: 27.9 % (ref 36–48)
HGB BLD-MCNC: 9.4 G/DL (ref 12–16)
LYMPHOCYTES # BLD: 0.3 K/UL (ref 1.1–5.9)
LYMPHOCYTES NFR BLD: 14 % (ref 14–44)
MCH RBC QN AUTO: 34.8 PG (ref 25–35)
MCHC RBC AUTO-ENTMCNC: 33.7 G/DL (ref 31–37)
MCV RBC AUTO: 103.3 FL (ref 78–102)
NEUTS SEG # BLD: 1.8 K/UL (ref 1.8–9.5)
NEUTS SEG NFR BLD: 78 % (ref 40–70)
PLATELET # BLD AUTO: 252 K/UL (ref 140–440)
POTASSIUM SERPL-SCNC: 3.9 MMOL/L (ref 3.5–5.5)
PROT SERPL-MCNC: 6.3 G/DL (ref 6.4–8.2)
RBC # BLD AUTO: 2.7 M/UL (ref 4.1–5.1)
SODIUM SERPL-SCNC: 141 MMOL/L (ref 136–145)
WBC # BLD AUTO: 2.3 K/UL (ref 4.5–13)

## 2018-05-24 PROCEDURE — 77030012965 HC NDL HUBR BBMI -A

## 2018-05-24 PROCEDURE — 96372 THER/PROPH/DIAG INJ SC/IM: CPT

## 2018-05-24 PROCEDURE — 80053 COMPREHEN METABOLIC PANEL: CPT | Performed by: INTERNAL MEDICINE

## 2018-05-24 PROCEDURE — 96402 CHEMO HORMON ANTINEOPL SQ/IM: CPT

## 2018-05-24 PROCEDURE — 36591 DRAW BLOOD OFF VENOUS DEVICE: CPT

## 2018-05-24 PROCEDURE — 74011250636 HC RX REV CODE- 250/636: Performed by: INTERNAL MEDICINE

## 2018-05-24 RX ORDER — SODIUM CHLORIDE 0.9 % (FLUSH) 0.9 %
10-40 SYRINGE (ML) INJECTION AS NEEDED
Status: DISCONTINUED | OUTPATIENT
Start: 2018-05-24 | End: 2018-05-28 | Stop reason: HOSPADM

## 2018-05-24 RX ORDER — HEPARIN 100 UNIT/ML
500 SYRINGE INTRAVENOUS ONCE
Status: COMPLETED | OUTPATIENT
Start: 2018-05-24 | End: 2018-05-24

## 2018-05-24 RX ADMIN — ERYTHROPOIETIN 40000 UNITS: 40000 INJECTION, SOLUTION INTRAVENOUS; SUBCUTANEOUS at 11:51

## 2018-05-24 RX ADMIN — Medication 30 ML: at 11:33

## 2018-05-24 RX ADMIN — ERYTHROPOIETIN 20000 UNITS: 20000 INJECTION, SOLUTION INTRAVENOUS; SUBCUTANEOUS at 11:51

## 2018-05-24 RX ADMIN — FULVESTRANT 500 MG: 50 INJECTION INTRAMUSCULAR at 12:10

## 2018-05-24 RX ADMIN — HEPARIN 500 UNITS: 100 SYRINGE at 11:34

## 2018-05-24 NOTE — PROGRESS NOTES
[facility] Progress Note    Date: May 24, 2018    Name: Codi Martinez    MRN: [id number]         : 1945      Ms. Figueroa was assessed and education was provided. Ms. Felix Tyler vitals were reviewed and patient was observed for 5 minutes prior to treatment. Visit Vitals    /63 (BP 1 Location: Left arm, BP Patient Position: At rest;Sitting)    Pulse 73    Temp 97 °F (36.1 °C)    Resp 16    Ht 5' 4\" (1.626 m)    Wt 70.3 kg (155 lb)    BMI 26.61 kg/m2       Lab results were obtained and reviewed. Recent Results (from the past 12 hour(s))   CBC WITH 3 PART DIFF    Collection Time: 18 11:40 AM   Result Value Ref Range    WBC 2.3 (L) 4.5 - 13.0 K/uL    RBC 2.70 (L) 4.10 - 5.10 M/uL    HGB 9.4 (L) 12.0 - 16.0 g/dL    HCT 27.9 (L) 36 - 48 %    .3 (H) 78 - 102 FL    MCH 34.8 25.0 - 35.0 PG    MCHC 33.7 31 - 37 g/dL    RDW 18.1 (H) 11.5 - 14.5 %    PLATELET 932 117 - 277 K/uL    NEUTROPHILS 78 (H) 40 - 70 %    MIXED CELLS 8 0.1 - 17 %    LYMPHOCYTES 14 14 - 44 %    ABS. NEUTROPHILS 1.8 1.8 - 9.5 K/UL    ABS. MIXED CELLS 0.2 0.0 - 2.3 K/uL    ABS. LYMPHOCYTES 0.3 (L) 1.1 - 5.9 K/UL    DF AUTOMATED         Procrit 60,000 units SQ given for H&H 9.4/27.9. Faslodex 500 mg given in divided dose 250 mg IM right gluteus max and 250 mg IM left glutues max. Ms Elenita Nichols refused Dayday Pastor until she can further discuss it with Dr Almanza. Patient armband removed and shredded. Ms. Marsh Snellen was discharged from [place] [address] in stable condition at 1225. She is to return on 18 at 1100 for her next appointment for CBC/Procrit Q 2 wks.     Roshni Carrizales RN  May 24, 2018  2:51 PM

## 2018-05-31 ENCOUNTER — HOSPITAL ENCOUNTER (OUTPATIENT)
Dept: MAMMOGRAPHY | Age: 73
Discharge: HOME | End: 2018-05-31
Payer: MEDICARE

## 2018-05-31 DIAGNOSIS — C50.919 METASTATIC BREAST CANCER (HCC): ICD-10-CM

## 2018-05-31 PROCEDURE — 77067 SCR MAMMO BI INCL CAD: CPT

## 2018-06-05 ENCOUNTER — APPOINTMENT (OUTPATIENT)
Dept: INFUSION THERAPY | Age: 73
End: 2018-06-05
Payer: MEDICARE

## 2018-06-07 ENCOUNTER — HOSPITAL ENCOUNTER (OUTPATIENT)
Dept: ONCOLOGY | Age: 73
Discharge: HOME | End: 2018-06-07

## 2018-06-07 ENCOUNTER — CLINICAL SUPPORT (OUTPATIENT)
Dept: ONCOLOGY | Age: 73
End: 2018-06-07

## 2018-06-07 ENCOUNTER — HOSPITAL ENCOUNTER (OUTPATIENT)
Dept: INFUSION THERAPY | Age: 73
Discharge: HOME | End: 2018-06-07
Payer: MEDICARE

## 2018-06-07 VITALS
HEIGHT: 64 IN | RESPIRATION RATE: 16 BRPM | HEART RATE: 71 BPM | WEIGHT: 155 LBS | BODY MASS INDEX: 26.46 KG/M2 | DIASTOLIC BLOOD PRESSURE: 68 MMHG | SYSTOLIC BLOOD PRESSURE: 118 MMHG | TEMPERATURE: 97.5 F

## 2018-06-07 DIAGNOSIS — T45.1X5A ANTINEOPLASTIC CHEMOTHERAPY INDUCED ANEMIA: ICD-10-CM

## 2018-06-07 DIAGNOSIS — D64.81 ANTINEOPLASTIC CHEMOTHERAPY INDUCED ANEMIA: Primary | ICD-10-CM

## 2018-06-07 DIAGNOSIS — T45.1X5A ANTINEOPLASTIC CHEMOTHERAPY INDUCED ANEMIA: Primary | ICD-10-CM

## 2018-06-07 DIAGNOSIS — D64.81 ANTINEOPLASTIC CHEMOTHERAPY INDUCED ANEMIA: ICD-10-CM

## 2018-06-07 LAB
BASO+EOS+MONOS # BLD AUTO: 0.2 K/UL (ref 0–2.3)
BASO+EOS+MONOS # BLD AUTO: 8 % (ref 0.1–17)
DIFFERENTIAL METHOD BLD: ABNORMAL
ERYTHROCYTE [DISTWIDTH] IN BLOOD BY AUTOMATED COUNT: 18.6 % (ref 11.5–14.5)
HCT VFR BLD AUTO: 30.1 % (ref 36–48)
HGB BLD-MCNC: 10.4 G/DL (ref 12–16)
LYMPHOCYTES # BLD: 0.4 K/UL (ref 1.1–5.9)
LYMPHOCYTES NFR BLD: 19 % (ref 14–44)
MCH RBC QN AUTO: 35.6 PG (ref 25–35)
MCHC RBC AUTO-ENTMCNC: 34.6 G/DL (ref 31–37)
MCV RBC AUTO: 103.1 FL (ref 78–102)
NEUTS SEG # BLD: 1.4 K/UL (ref 1.8–9.5)
NEUTS SEG NFR BLD: 74 % (ref 40–70)
PLATELET # BLD AUTO: 155 K/UL (ref 140–440)
RBC # BLD AUTO: 2.92 M/UL (ref 4.1–5.1)
WBC # BLD AUTO: 2 K/UL (ref 4.5–13)

## 2018-06-07 PROCEDURE — 36415 COLL VENOUS BLD VENIPUNCTURE: CPT

## 2018-06-07 NOTE — PROGRESS NOTES
[facility] Progress Note    Date: 2018    Name: Davina Holly    MRN: [id number]         : 1945      Ms. Figueroa was assessed and education was provided. Ms. Nata Gandhi vitals were reviewed and patient was observed for 5 minutes prior to treatment. Visit Vitals    /68 (BP 1 Location: Left arm, BP Patient Position: At rest;Sitting)    Pulse 71    Temp 97.5 °F (36.4 °C)    Resp 16    Ht 5' 4\" (1.626 m)    Wt 70.3 kg (155 lb)    Breastfeeding No    BMI 26.61 kg/m2       Lab results were obtained and reviewed. Recent Results (from the past 12 hour(s))   CBC WITH 3 PART DIFF    Collection Time: 18 11:07 AM   Result Value Ref Range    WBC 2.0 (L) 4.5 - 13.0 K/uL    RBC 2.92 (L) 4.10 - 5.10 M/uL    HGB 10.4 (L) 12.0 - 16.0 g/dL    HCT 30.1 (L) 36 - 48 %    .1 (H) 78 - 102 FL    MCH 35.6 (H) 25.0 - 35.0 PG    MCHC 34.6 31 - 37 g/dL    RDW 18.6 (H) 11.5 - 14.5 %    PLATELET 454 346 - 471 K/uL    NEUTROPHILS 74 (H) 40 - 70 %    MIXED CELLS 8 0.1 - 17 %    LYMPHOCYTES 19 14 - 44 %    ABS. NEUTROPHILS 1.4 (L) 1.8 - 9.5 K/UL    ABS. MIXED CELLS 0.2 0.0 - 2.3 K/uL    ABS. LYMPHOCYTES 0.4 (L) 1.1 - 5.9 K/UL    DF AUTOMATED         Procrit not given because H&H 10.4/30. 1. Patient armband removed and shredded. Ms. Olimpia Martinez was discharged from [place] [address] in stable condition at 1030. She is to return on 18 at 1100 for her next appointment for CBC/Procrit, Xgeva/CMP, Faslodex and monthy port flush.     Mayte Robert RN  2018  11:42 AM

## 2018-06-08 ENCOUNTER — OFFICE VISIT (OUTPATIENT)
Dept: SURGERY | Age: 73
End: 2018-06-08

## 2018-06-08 VITALS
BODY MASS INDEX: 26.46 KG/M2 | OXYGEN SATURATION: 97 % | HEIGHT: 64 IN | DIASTOLIC BLOOD PRESSURE: 60 MMHG | SYSTOLIC BLOOD PRESSURE: 128 MMHG | WEIGHT: 155 LBS | HEART RATE: 85 BPM | RESPIRATION RATE: 16 BRPM

## 2018-06-08 DIAGNOSIS — Z85.3 PERSONAL HISTORY OF MALIGNANT NEOPLASM OF BREAST: Primary | ICD-10-CM

## 2018-06-08 NOTE — PROGRESS NOTES
Progress Note    Patient: Sushil Regalado  MRN: [id number]  SSN: [id number]   YOB: 1945  Age: 67 y.o. Sex: female     Chief Complaint   Patient presents with    Follow-up     mammo       HPI    Ms Shruthi Obrien is a 77-year-old woman with history of a right mastectomy in 1991 for a T2 tumor but metastatic disease later in her life and so has gotten a lot of treatment for that. She recently had proton therapy which made her back feel much better and overall she is doing quite well. She is followed by Dr. Jamey Thompson and is under his treatment present. Her latest mammography shows a normal left breast.  Her right chest wall wound looks great and shows no sign of recurrence locally. Past Medical History:   Diagnosis Date    Biliary colic     Breast CA ([address] 2012    Cancer [facility]) 1991    Right Breast    Chemotherapy convalescence or palliative care     Neuropathy     Radiation therapy complication     Thyroid disease      Past Surgical History:   Procedure Laterality Date    BREAST SURGERY PROCEDURE UNLISTED  10/2002    Right-Lesion    BREAST SURGERY PROCEDURE UNLISTED  11/2004    Right-Lesion    HX LAP CHOLECYSTECTOMY  9-19-13    HX MASTECTOMY  1991    Right     Allergies   Allergen Reactions    Codeine Palpitations    Darvocet A500 [Propoxyphene N-Acetaminophen] Nausea and Vomiting    Darvon [Propoxyphene] Nausea and Vomiting     Current Outpatient Prescriptions   Medication Sig Dispense Refill    palbociclib (IBRANCE) 100 mg cap Take 1 Cap by mouth daily. For 21 days on and 7 days off every 28 days. 63 Cap 5    gabapentin (NEURONTIN) 300 mg capsule TAKE 1 CAPSULE THREE TIMES A  Cap 2    gabapentin (NEURONTIN) 100 mg capsule Take 1 Cap by mouth three (3) times daily. 90 Cap 6    Cholecalciferol, Vitamin D3, 5,000 unit Tab Take  by mouth daily.  thyroid, Pork, (ARMOUR THYROID) 60 mg tablet Take 90 mg by mouth daily.       L-Mfolate-B6 Phos-Methyl-B12 (NEURPATH-B) 3-35-2 mg Tab Take  by mouth two (2) times a day.  warfarin (COUMADIN) 1 mg tablet Take 1 mg by mouth daily.  TETRACYCLINE HCL (TETRACYCLINE PO) Take  by mouth.  fexofenadine-pseudoephedrine (ALLEGRA-D 24 HOUR) 180-240 mg per tablet Take 1 Tab by mouth daily.  sulfacetamide (BLEPH-10) 10 % ophthalmic ointment Administer  to right eye three (3) times daily.  palbociclib (IBRANCE) 125 mg cap Take 125 mg by mouth daily. Take 1 tab po every day for 21 days on and 7 days off q 28 days  Indications: HORMONE RECEPTOR(+), HER2(-) ADVANCED BREAST CANCER 42 Cap 6    ibuprofen (MOTRIN) 800 mg tablet Take 800 mg by mouth two (2) times a day. Indications: OSTEOARTHRITIS       Social History     Social History    Marital status:      Spouse name: N/A    Number of children: N/A    Years of education: N/A     Occupational History    Not on file. Social History Main Topics    Smoking status: Former Smoker     Quit date: 6/2/1991    Smokeless tobacco: Never Used    Alcohol use 0.0 oz/week     1 - 2 Glasses of wine per week      Comment: socially, occassionally    Drug use: No    Sexual activity: Not on file     Other Topics Concern    Not on file     Social History Narrative     Family History   Problem Relation Age of Onset    Cancer Maternal Aunt      Hodgkin's disease    Breast Cancer Paternal Aunt     Cancer Father      Prostate, lung, brain         Review of systems:  Patient denies any reflux, emesis, abdominal pain, change in bowel habits, hematochezia, melena, fever, weight loss, fatigue chills, dermatitis, abnormal moles, change in vision, vertigo, epistaxis, dysphagia, hoarseness, chest pain, palpitations, hypertension, edema, cough, shortness of breath, wheezing, hemoptysis, snoring, hematuria, diabetes, thyroid disease, anemia, bruising, history of blood transfusion, dizziness, headache, or fainting.     Physical Examination    Well developed well nourished female in no apparent distress  Visit Vitals    /60    Pulse 85    Resp 16    Ht 5' 4\" (1.626 m)    Wt 70.3 kg (155 lb)    SpO2 97%    BMI 26.61 kg/m2      Head: normocephalic, atraumatic  Mouth: Clear, no overt lesions, oral mucosa pink and moist  Neck: supple, no masses, no adenopathy or carotid bruits, trachea midline  Resp: clear to auscultation bilaterally, no wheeze, rhonchi or rales, excursions normal and symmetrical  Cardio: Regular rate and rhythm, no murmurs, clicks, gallops or rubs, no edema or varicosities  Abdomen: soft, nontender, nondistended, normoactive bowel sounds, no hernias, no hepatosplenomegaly,   Back: Deferred  Extremeties: warm, well-perfused, no tenderness or swelling, normal gait/station  Neuro: sensation and strength grossly intact and symmetrical  Psych: alert and oriented to person, place and time  Breast exam left breast exam without dominant mass, skin change, nipple discharge, or lymphadenopathy    IMPRESSION  Metastatic right breast cancer currently doing well    PLAN  No orders of the defined types were placed in this encounter.     Follow-up in 1 year with mammogram  Jared Aranda MD

## 2018-06-08 NOTE — MR AVS SNAPSHOT
[address] [facility] [address] 
[phone] Patient: Steven Bean MRN: [id number] :1945 Visit Information Date & Time Provider Department Dept. Phone Encounter #  
 2018 11:30 AM Jovani Dickerson MD [facility] [phone] Follow-up Instructions Return in about 1 year (around 2019). Follow-up and Disposition History Your Appointments 2018 10:30 AM  
Office Visit with Kristopher Pruett MD  
[address] [place]) Appt Note: [address] [facility] [address]  
[phone]  
  
   
 [facility] [address] [place] [address] Upcoming Health Maintenance Date Due Hepatitis C Screening 1945 DTaP/Tdap/Td series (1 - Tdap) 10/18/1966 FOBT Q 1 YEAR AGE 50-75 10/18/1995 ZOSTER VACCINE AGE 60> 2005 GLAUCOMA SCREENING Q2Y 10/18/2010 Pneumococcal 65+ High/Highest Risk (1 of 2 - PCV13) 10/18/2010 MEDICARE YEARLY EXAM 3/14/2018 Influenza Age 5 to Adult 2018 BREAST CANCER SCRN MAMMOGRAM 2020 Allergies as of 2018  Review Complete On: 2018 By: Jovani Dickerson MD  
  
 Severity Noted Reaction Type Reactions Codeine  2012    Palpitations Darvocet A500 [Propoxyphene N-acetaminophen]  2012   Side Effect Nausea and Vomiting Darvon [Propoxyphene]  2014   Systemic Nausea and Vomiting Current Immunizations  Reviewed on 2018 Name Date Influenza Vaccine 10/18/2017, 2016, 2015, 2015, 2013, 2013 Influenza Vaccine Whole 2012 Not reviewed this visit You Were Diagnosed With   
  
 Codes Comments Personal history of malignant neoplasm of breast    -  Primary ICD-10-CM: Z85.3 ICD-9-CM: V10.3 Vitals BP Pulse Resp Height(growth percentile) Weight(growth percentile) SpO2  
 128/60 85 16 5' 4\" (1.626 m) 155 lb (70.3 kg) 97% BMI OB Status Smoking Status 26.61 kg/m2 Menopause Former Smoker Vitals History BMI and BSA Data Body Mass Index Body Surface Area  
 26.61 kg/m 2 1.78 m 2 Preferred Pharmacy Pharmacy Name Phone April Velazquez [facility] [phone] Your Updated Medication List  
  
   
This list is accurate as of 6/8/18  1:32 PM.  Always use your most recent med list. ALLEGRA-D 24 HOUR 180-240 mg per tablet Generic drug:  fexofenadine-pseudoephedrine Take 1 Tab by mouth daily. ARMOUR THYROID 60 mg tablet Generic drug:  thyroid (Pork) Take 90 mg by mouth daily. cholecalciferol (VITAMIN D3) 5,000 unit Tab tablet Commonly known as:  VITAMIN D3 Take  by mouth daily. * gabapentin 100 mg capsule Commonly known as:  NEURONTIN Take 1 Cap by mouth three (3) times daily. * gabapentin 300 mg capsule Commonly known as:  NEURONTIN  
TAKE 1 CAPSULE THREE TIMES A DAY MOTRIN 800 mg tablet Generic drug:  ibuprofen Take 800 mg by mouth two (2) times a day. Indications: OSTEOARTHRITIS  
  
 NEURPATH-B 3-35-2 mg Tab tab Generic drug:  L-Mfolate-B6 Phos-Methyl-B12 Take  by mouth two (2) times a day. * palbociclib 125 mg Cap Commonly known as:  [employer] Take 125 mg by mouth daily. Take 1 tab po every day for 21 days on and 7 days off q 28 days  Indications: HORMONE RECEPTOR(+), HER2(-) ADVANCED BREAST CANCER * palbociclib 100 mg Cap Commonly known as:  [employer] Take 1 Cap by mouth daily. For 21 days on and 7 days off every 28 days. sulfacetamide 10 % ophthalmic ointment Commonly known as:  BLEPH-10 Administer  to right eye three (3) times daily. TETRACYCLINE PO Take  by mouth.  
  
 warfarin 1 mg tablet Commonly known as:  COUMADIN Take 1 mg by mouth daily. * Notice: This list has 4 medication(s) that are the same as other medications prescribed for you. Read the directions carefully, and ask your doctor or other care provider to review them with you. Follow-up Instructions Return in about 1 year (around 6/8/2019). To-Do List   
 06/21/2018 11:00 AM  
  Appointment with [employer] [address] [place] [address] ([phone]) 07/05/2018 1:00 PM  
  Appointment with [employer] [address] [facility] ([phone])  
  
 07/19/2018 11:00 AM  
  Appointment with [address] [place] [address] ([phone]) 06/08/2019 Imaging:  RODOLFO MAMMO LT DX INCL CAD Please provide this summary of care documentation to your next provider. Your primary care clinician is listed as Charleen Curiel. If you have any questions after today's visit, please call [phone].

## 2018-06-12 DIAGNOSIS — G63 NEUROPATHY ASSOCIATED WITH CANCER (HCC): ICD-10-CM

## 2018-06-12 DIAGNOSIS — C80.1 NEUROPATHY ASSOCIATED WITH CANCER (HCC): ICD-10-CM

## 2018-06-12 RX ORDER — GABAPENTIN 100 MG/1
100 CAPSULE ORAL 3 TIMES DAILY
Qty: 90 CAP | Refills: 6 | Status: SHIPPED | OUTPATIENT
Start: 2018-06-12 | End: 2018-11-29 | Stop reason: SDUPTHER

## 2018-06-14 RX ORDER — LAMOTRIGINE 25 MG/1
500 TABLET ORAL ONCE
Status: COMPLETED | OUTPATIENT
Start: 2018-06-21 | End: 2018-06-21

## 2018-06-19 ENCOUNTER — APPOINTMENT (OUTPATIENT)
Dept: INFUSION THERAPY | Age: 73
End: 2018-06-19
Payer: MEDICARE

## 2018-06-21 ENCOUNTER — HOSPITAL ENCOUNTER (OUTPATIENT)
Dept: ONCOLOGY | Age: 73
Discharge: HOME | End: 2018-06-21

## 2018-06-21 ENCOUNTER — HOSPITAL ENCOUNTER (OUTPATIENT)
Dept: INFUSION THERAPY | Age: 73
Discharge: HOME | End: 2018-06-21
Payer: MEDICARE

## 2018-06-21 ENCOUNTER — CLINICAL SUPPORT (OUTPATIENT)
Dept: ONCOLOGY | Age: 73
End: 2018-06-21

## 2018-06-21 ENCOUNTER — HOSPITAL ENCOUNTER (OUTPATIENT)
Dept: LAB | Age: 73
Discharge: HOME | End: 2018-06-21
Payer: MEDICARE

## 2018-06-21 VITALS
DIASTOLIC BLOOD PRESSURE: 62 MMHG | RESPIRATION RATE: 16 BRPM | HEART RATE: 77 BPM | SYSTOLIC BLOOD PRESSURE: 103 MMHG | TEMPERATURE: 97.2 F

## 2018-06-21 DIAGNOSIS — D64.81 ANTINEOPLASTIC CHEMOTHERAPY INDUCED ANEMIA: Primary | ICD-10-CM

## 2018-06-21 DIAGNOSIS — D64.81 ANTINEOPLASTIC CHEMOTHERAPY INDUCED ANEMIA: ICD-10-CM

## 2018-06-21 DIAGNOSIS — T45.1X5A ANTINEOPLASTIC CHEMOTHERAPY INDUCED ANEMIA: ICD-10-CM

## 2018-06-21 DIAGNOSIS — T45.1X5A ANTINEOPLASTIC CHEMOTHERAPY INDUCED ANEMIA: Primary | ICD-10-CM

## 2018-06-21 LAB
ALBUMIN SERPL-MCNC: 3.8 G/DL (ref 3.4–5)
ALBUMIN/GLOB SERPL: 1.2 {RATIO} (ref 0.8–1.7)
ALP SERPL-CCNC: 71 U/L (ref 45–117)
ALT SERPL-CCNC: 17 U/L (ref 13–56)
ANION GAP SERPL CALC-SCNC: 9 MMOL/L (ref 3–18)
AST SERPL-CCNC: 15 U/L (ref 15–37)
BASO+EOS+MONOS # BLD AUTO: 0.1 K/UL (ref 0–2.3)
BASO+EOS+MONOS # BLD AUTO: 4 % (ref 0.1–17)
BILIRUB SERPL-MCNC: 0.7 MG/DL (ref 0.2–1)
BUN SERPL-MCNC: 17 MG/DL (ref 7–18)
BUN/CREAT SERPL: 16 (ref 12–20)
CALCIUM SERPL-MCNC: 9.2 MG/DL (ref 8.5–10.1)
CHLORIDE SERPL-SCNC: 105 MMOL/L (ref 100–108)
CO2 SERPL-SCNC: 28 MMOL/L (ref 21–32)
CREAT SERPL-MCNC: 1.08 MG/DL (ref 0.6–1.3)
DIFFERENTIAL METHOD BLD: ABNORMAL
ERYTHROCYTE [DISTWIDTH] IN BLOOD BY AUTOMATED COUNT: 16.4 % (ref 11.5–14.5)
GLOBULIN SER CALC-MCNC: 3.1 G/DL (ref 2–4)
GLUCOSE SERPL-MCNC: 85 MG/DL (ref 74–99)
HCT VFR BLD AUTO: 31.1 % (ref 36–48)
HGB BLD-MCNC: 10.5 G/DL (ref 12–16)
LYMPHOCYTES # BLD: 0.3 K/UL (ref 1.1–5.9)
LYMPHOCYTES NFR BLD: 14 % (ref 14–44)
MAGNESIUM SERPL-MCNC: 2 MG/DL (ref 1.6–2.6)
MCH RBC QN AUTO: 35.2 PG (ref 25–35)
MCHC RBC AUTO-ENTMCNC: 33.8 G/DL (ref 31–37)
MCV RBC AUTO: 104.4 FL (ref 78–102)
NEUTS SEG # BLD: 2 K/UL (ref 1.8–9.5)
NEUTS SEG NFR BLD: 82 % (ref 40–70)
PHOSPHATE SERPL-MCNC: 2.9 MG/DL (ref 2.5–4.9)
PLATELET # BLD AUTO: 282 K/UL (ref 140–440)
POTASSIUM SERPL-SCNC: 4.4 MMOL/L (ref 3.5–5.5)
PROT SERPL-MCNC: 6.9 G/DL (ref 6.4–8.2)
RBC # BLD AUTO: 2.98 M/UL (ref 4.1–5.1)
SODIUM SERPL-SCNC: 142 MMOL/L (ref 136–145)
WBC # BLD AUTO: 2.4 K/UL (ref 4.5–13)

## 2018-06-21 PROCEDURE — 74011250636 HC RX REV CODE- 250/636

## 2018-06-21 PROCEDURE — 84100 ASSAY OF PHOSPHORUS: CPT | Performed by: INTERNAL MEDICINE

## 2018-06-21 PROCEDURE — 80053 COMPREHEN METABOLIC PANEL: CPT | Performed by: INTERNAL MEDICINE

## 2018-06-21 PROCEDURE — 74011250636 HC RX REV CODE- 250/636: Performed by: INTERNAL MEDICINE

## 2018-06-21 PROCEDURE — 83735 ASSAY OF MAGNESIUM: CPT | Performed by: INTERNAL MEDICINE

## 2018-06-21 PROCEDURE — 36591 DRAW BLOOD OFF VENOUS DEVICE: CPT

## 2018-06-21 PROCEDURE — 96402 CHEMO HORMON ANTINEOPL SQ/IM: CPT

## 2018-06-21 RX ORDER — SODIUM CHLORIDE 0.9 % (FLUSH) 0.9 %
10-40 SYRINGE (ML) INJECTION AS NEEDED
Status: DISCONTINUED | OUTPATIENT
Start: 2018-06-21 | End: 2018-06-25 | Stop reason: HOSPADM

## 2018-06-21 RX ORDER — HEPARIN 100 UNIT/ML
SYRINGE INTRAVENOUS
Status: COMPLETED
Start: 2018-06-21 | End: 2018-06-21

## 2018-06-21 RX ORDER — HEPARIN 100 UNIT/ML
500 SYRINGE INTRAVENOUS AS NEEDED
Status: DISCONTINUED | OUTPATIENT
Start: 2018-06-21 | End: 2018-06-25 | Stop reason: HOSPADM

## 2018-06-21 RX ADMIN — FULVESTRANT 500 MG: 50 INJECTION INTRAMUSCULAR at 11:43

## 2018-06-21 RX ADMIN — Medication 30 ML: at 11:19

## 2018-06-21 RX ADMIN — SODIUM CHLORIDE, PRESERVATIVE FREE 500 UNITS: 5 INJECTION INTRAVENOUS at 11:19

## 2018-06-21 RX ADMIN — HEPARIN 500 UNITS: 100 SYRINGE at 11:19

## 2018-06-21 NOTE — PROGRESS NOTES
[facility] Progress Note    Date: 2018    Name: Jennie Moreno    MRN: [id number]         : 1945      Ms. Figueroa was assessed and education was provided. Ms. Nhi Avila vitals were reviewed and patient was observed for 5 minutes prior to treatment. Visit Vitals    /62 (BP 1 Location: Left arm, BP Patient Position: Sitting)    Pulse 77    Temp 97.2 °F (36.2 °C)    Resp 16       Lab results were obtained and reviewed. Recent Results (from the past 12 hour(s))   CBC WITH 3 PART DIFF    Collection Time: 18 11:19 AM   Result Value Ref Range    WBC 2.4 (L) 4.5 - 13.0 K/uL    RBC 2.98 (L) 4.10 - 5.10 M/uL    HGB 10.5 (L) 12.0 - 16.0 g/dL    HCT 31.1 (L) 36 - 48 %    .4 (H) 78 - 102 FL    MCH 35.2 (H) 25.0 - 35.0 PG    MCHC 33.8 31 - 37 g/dL    RDW 16.4 (H) 11.5 - 14.5 %    PLATELET 547 932 - 810 K/uL    NEUTROPHILS 82 (H) 40 - 70 %    MIXED CELLS 4 0.1 - 17 %    LYMPHOCYTES 14 14 - 44 %    ABS. NEUTROPHILS 2.0 1.8 - 9.5 K/UL    ABS. MIXED CELLS 0.1 0.0 - 2.3 K/uL    ABS. LYMPHOCYTES 0.3 (L) 1.1 - 5.9 K/UL    DF AUTOMATED         Ms Maribell Thakkar refused Xgeva injection and stated that she wants to talk with Dr Ebonie Vu before she starts Breana Mandeep. Procrit not given because H&H = 10.5/31.1    Faslodex 500 mg total given: 250 mg IM right gluteus max and 250 mg IM in left gluteus max. Monthly port flush done when blood drawn from port. CMP, Magnesium and Phosphorous bloodwork sent to hospital via [employer]. Patient armband removed and shredded. Ms. Christi Seals was discharged from [place] [address] in stable condition at 1200. She is to return on 18 at 1300 for her next appointment for CBC/Procrit Q 2 wks.     Alanna Cruz RN  2018  5:19 PM

## 2018-07-03 ENCOUNTER — APPOINTMENT (OUTPATIENT)
Dept: INFUSION THERAPY | Age: 73
End: 2018-07-03
Payer: MEDICARE

## 2018-07-05 ENCOUNTER — HOSPITAL ENCOUNTER (OUTPATIENT)
Dept: INFUSION THERAPY | Age: 73
Discharge: HOME | End: 2018-07-05
Payer: MEDICARE

## 2018-07-05 ENCOUNTER — HOSPITAL ENCOUNTER (OUTPATIENT)
Dept: ONCOLOGY | Age: 73
Discharge: HOME | End: 2018-07-05

## 2018-07-05 ENCOUNTER — CLINICAL SUPPORT (OUTPATIENT)
Dept: ONCOLOGY | Age: 73
End: 2018-07-05

## 2018-07-05 VITALS
BODY MASS INDEX: 26.09 KG/M2 | WEIGHT: 152 LBS | SYSTOLIC BLOOD PRESSURE: 124 MMHG | DIASTOLIC BLOOD PRESSURE: 68 MMHG | RESPIRATION RATE: 16 BRPM | TEMPERATURE: 97.2 F | HEART RATE: 71 BPM

## 2018-07-05 DIAGNOSIS — T45.1X5A ANEMIA DUE TO CHEMOTHERAPY: ICD-10-CM

## 2018-07-05 DIAGNOSIS — D64.81 ANEMIA DUE TO CHEMOTHERAPY: ICD-10-CM

## 2018-07-05 DIAGNOSIS — D64.81 ANEMIA DUE TO CHEMOTHERAPY: Primary | ICD-10-CM

## 2018-07-05 DIAGNOSIS — T45.1X5A ANEMIA DUE TO CHEMOTHERAPY: Primary | ICD-10-CM

## 2018-07-05 LAB
BASO+EOS+MONOS # BLD AUTO: 0.2 K/UL (ref 0–2.3)
BASO+EOS+MONOS # BLD AUTO: 8 % (ref 0.1–17)
DIFFERENTIAL METHOD BLD: ABNORMAL
ERYTHROCYTE [DISTWIDTH] IN BLOOD BY AUTOMATED COUNT: 15.9 % (ref 11.5–14.5)
HCT VFR BLD AUTO: 28.1 % (ref 36–48)
HGB BLD-MCNC: 9.8 G/DL (ref 12–16)
LYMPHOCYTES # BLD: 0.4 K/UL (ref 1.1–5.9)
LYMPHOCYTES NFR BLD: 19 % (ref 14–44)
MCH RBC QN AUTO: 36.6 PG (ref 25–35)
MCHC RBC AUTO-ENTMCNC: 34.9 G/DL (ref 31–37)
MCV RBC AUTO: 104.9 FL (ref 78–102)
NEUTS SEG # BLD: 1.4 K/UL (ref 1.8–9.5)
NEUTS SEG NFR BLD: 73 % (ref 40–70)
PLATELET # BLD AUTO: 115 K/UL (ref 140–440)
RBC # BLD AUTO: 2.68 M/UL (ref 4.1–5.1)
WBC # BLD AUTO: 2 K/UL (ref 4.5–13)

## 2018-07-05 PROCEDURE — 36415 COLL VENOUS BLD VENIPUNCTURE: CPT

## 2018-07-05 PROCEDURE — 99211 OFF/OP EST MAY X REQ PHY/QHP: CPT

## 2018-07-05 PROCEDURE — 74011250636 HC RX REV CODE- 250/636: Performed by: INTERNAL MEDICINE

## 2018-07-05 PROCEDURE — 96372 THER/PROPH/DIAG INJ SC/IM: CPT

## 2018-07-05 RX ORDER — LIDOCAINE HYDROCHLORIDE AND HYDROCORTISONE ACETATE 30; 5 MG/G; MG/G
CREAM TOPICAL 2 TIMES DAILY
Qty: 85 G | Refills: 0 | Status: SHIPPED | OUTPATIENT
Start: 2018-07-05 | End: 2018-07-10 | Stop reason: SDUPTHER

## 2018-07-05 RX ORDER — LIDOCAINE HYDROCHLORIDE AND HYDROCORTISONE ACETATE 30; 5 MG/G; MG/G
CREAM TOPICAL 2 TIMES DAILY
Qty: 85 G | Refills: 0 | Status: SHIPPED | OUTPATIENT
Start: 2018-07-05 | End: 2018-07-05 | Stop reason: CLARIF

## 2018-07-05 RX ADMIN — ERYTHROPOIETIN 20000 UNITS: 20000 INJECTION, SOLUTION INTRAVENOUS; SUBCUTANEOUS at 13:55

## 2018-07-05 RX ADMIN — ERYTHROPOIETIN 40000 UNITS: 40000 INJECTION, SOLUTION INTRAVENOUS; SUBCUTANEOUS at 13:55

## 2018-07-05 NOTE — PROGRESS NOTES
[facility] Progress Note    Date: 2018    Name: Dominik Aguirre    MRN: [id number]         : 1945      Ms. Figueroa arrived in the [facility] today, at 1310, in stable condition, here for Q 2 Week, CBC/Procrit injection. She was assessed and education was provided. Upon arrival to the [facility] today, Ms. Ulysses Patel complained of a new onset intermittent pain/burning sensation to her Right hip & Right lower back region, which she stated started about 3 days after her last Faslodex injections. (Last Faslodex Injections were administered on 18). Edward Rdz NP was made aware, and came over to the [facility] to assess Ms. Figueroa. No redness, edema, or rashes were noted in the area where Ms. Figueroa complained of the pain & burning sensations. Jennie prescribed a topical lidocaine/hydrocortisone cream to be applied to the region prn, and Ms. Figueroa was made aware. Ms. Ulysses Patel was also instructed to report any worsening of the pain or any rashes or redness immediately, and she verbalized understanding. Ms. Antonella Mendieta vitals were reviewed. Visit Vitals    /68 (BP 1 Location: Left arm, BP Patient Position: At rest;Sitting)    Pulse 71    Temp 97.2 °F (36.2 °C)    Resp 16    Wt 68.9 kg (152 lb)    Breastfeeding No    BMI 26.09 kg/m2           Blood for a CBC was drawn from her left AC at 1340, without incident. Lab results were obtained and reviewed. Recent Results (from the past 12 hour(s))   CBC WITH 3 PART DIFF    Collection Time: 18  1:40 PM   Result Value Ref Range    WBC 2.0 (L) 4.5 - 13.0 K/uL    RBC 2.68 (L) 4.10 - 5.10 M/uL    HGB 9.8 (L) 12.0 - 16.0 g/dL    HCT 28.1 (L) 36 - 48 %    .9 (H) 78 - 102 FL    MCH 36.6 (H) 25.0 - 35.0 PG    MCHC 34.9 31 - 37 g/dL    RDW 15.9 (H) 11.5 - 14.5 %    PLATELET 506 (L) 295 - 440 K/uL    NEUTROPHILS 73 (H) 40 - 70 %    MIXED CELLS 8 0.1 - 17 %    LYMPHOCYTES 19 14 - 44 %    ABS. NEUTROPHILS 1.4 (L) 1.8 - 9.5 K/UL    ABS. MIXED CELLS 0.2 0.0 - 2.3 K/uL    ABS. LYMPHOCYTES 0.4 (L) 1.1 - 5.9 K/UL    DF AUTOMATED               Procrit 60,000 units, was administered SQ, in her left arm, at 1355, as ordered, and without incident. Ms. Laura Segal tolerated well, and had no complaints. Ms. Laura Segal was discharged from [place] [address] in stable condition at 1405. Benny Gillis She is to return in 2 weeks, on Friday, 7-19-18, at 1100,  for her next appointment, for Q 2 Week CBC/Procrit injection, and Q 4 Week, Faslodex Injections, Xgeva Injection, and Port Flush.      Milly Hyde RN  July 5, 2018  2:15 PM

## 2018-07-10 RX ORDER — LIDOCAINE HYDROCHLORIDE AND HYDROCORTISONE ACETATE 30; 5 MG/G; MG/G
CREAM TOPICAL 2 TIMES DAILY
Qty: 85 G | Refills: 3 | Status: ON HOLD | OUTPATIENT
Start: 2018-07-10 | End: 2022-01-01

## 2018-07-12 ENCOUNTER — TELEPHONE (OUTPATIENT)
Dept: ONCOLOGY | Age: 73
End: 2018-07-12

## 2018-07-12 RX ORDER — LAMOTRIGINE 25 MG/1
500 TABLET ORAL ONCE
Status: DISPENSED | OUTPATIENT
Start: 2018-07-19 | End: 2018-07-19

## 2018-07-12 NOTE — TELEPHONE ENCOUNTER
[facility] calling to see if you can prescribe an alternative for lydocaine cream 5% for the patient, prescribed by [place]. Please call the pharmacy at [phone] reference# [id number] within 48 hours to ensure refill. This was a voice mail message left on our answering machine.

## 2018-07-17 ENCOUNTER — APPOINTMENT (OUTPATIENT)
Dept: INFUSION THERAPY | Age: 73
End: 2018-07-17
Payer: MEDICARE

## 2018-07-17 ENCOUNTER — OFFICE VISIT (OUTPATIENT)
Dept: ONCOLOGY | Age: 73
End: 2018-07-17

## 2018-07-17 ENCOUNTER — HOSPITAL ENCOUNTER (OUTPATIENT)
Dept: LAB | Age: 73
Discharge: HOME | End: 2018-07-17
Payer: MEDICARE

## 2018-07-17 ENCOUNTER — HOSPITAL ENCOUNTER (OUTPATIENT)
Dept: ONCOLOGY | Age: 73
Discharge: HOME | End: 2018-07-17

## 2018-07-17 VITALS
HEART RATE: 80 BPM | SYSTOLIC BLOOD PRESSURE: 122 MMHG | DIASTOLIC BLOOD PRESSURE: 60 MMHG | WEIGHT: 150 LBS | BODY MASS INDEX: 25.75 KG/M2 | TEMPERATURE: 98.3 F

## 2018-07-17 DIAGNOSIS — C50.919 METASTATIC BREAST CANCER (HCC): Primary | ICD-10-CM

## 2018-07-17 DIAGNOSIS — G63 NEUROPATHY ASSOCIATED WITH CANCER (HCC): ICD-10-CM

## 2018-07-17 DIAGNOSIS — T45.1X5A CHEMOTHERAPY INDUCED NEUTROPENIA (HCC): ICD-10-CM

## 2018-07-17 DIAGNOSIS — D72.819 CHRONIC LEUKOPENIA: ICD-10-CM

## 2018-07-17 DIAGNOSIS — C50.919 METASTATIC BREAST CANCER (HCC): ICD-10-CM

## 2018-07-17 DIAGNOSIS — D64.81 ANEMIA DUE TO ANTINEOPLASTIC CHEMOTHERAPY: ICD-10-CM

## 2018-07-17 DIAGNOSIS — E55.9 VITAMIN D DEFICIENCY: ICD-10-CM

## 2018-07-17 DIAGNOSIS — T45.1X5A CHEMOTHERAPY-INDUCED THROMBOCYTOPENIA: ICD-10-CM

## 2018-07-17 DIAGNOSIS — D70.1 CHEMOTHERAPY INDUCED NEUTROPENIA (HCC): ICD-10-CM

## 2018-07-17 DIAGNOSIS — T45.1X5A ANEMIA DUE TO ANTINEOPLASTIC CHEMOTHERAPY: ICD-10-CM

## 2018-07-17 DIAGNOSIS — D69.59 CHEMOTHERAPY-INDUCED THROMBOCYTOPENIA: ICD-10-CM

## 2018-07-17 DIAGNOSIS — C80.1 NEUROPATHY ASSOCIATED WITH CANCER (HCC): ICD-10-CM

## 2018-07-17 LAB
ALBUMIN SERPL-MCNC: 3.8 G/DL (ref 3.4–5)
ALBUMIN/GLOB SERPL: 1.3 {RATIO} (ref 0.8–1.7)
ALP SERPL-CCNC: 68 U/L (ref 45–117)
ALT SERPL-CCNC: 22 U/L (ref 13–56)
ANION GAP SERPL CALC-SCNC: 6 MMOL/L (ref 3–18)
AST SERPL-CCNC: 22 U/L (ref 15–37)
BASO+EOS+MONOS # BLD AUTO: 0.3 K/UL (ref 0–2.3)
BASO+EOS+MONOS # BLD AUTO: 12 % (ref 0.1–17)
BILIRUB SERPL-MCNC: 0.6 MG/DL (ref 0.2–1)
BUN SERPL-MCNC: 16 MG/DL (ref 7–18)
BUN/CREAT SERPL: 17 (ref 12–20)
CALCIUM SERPL-MCNC: 9.1 MG/DL (ref 8.5–10.1)
CHLORIDE SERPL-SCNC: 108 MMOL/L (ref 100–108)
CO2 SERPL-SCNC: 28 MMOL/L (ref 21–32)
CREAT SERPL-MCNC: 0.95 MG/DL (ref 0.6–1.3)
DIFFERENTIAL METHOD BLD: ABNORMAL
ERYTHROCYTE [DISTWIDTH] IN BLOOD BY AUTOMATED COUNT: 15.8 % (ref 11.5–14.5)
FERRITIN SERPL-MCNC: 91 NG/ML (ref 8–388)
GLOBULIN SER CALC-MCNC: 2.9 G/DL (ref 2–4)
GLUCOSE SERPL-MCNC: 102 MG/DL (ref 74–99)
HCT VFR BLD AUTO: 32 % (ref 36–48)
HGB BLD-MCNC: 10.9 G/DL (ref 12–16)
IRON SATN MFR SERPL: 20 %
IRON SERPL-MCNC: 60 UG/DL (ref 50–175)
LYMPHOCYTES # BLD: 0.4 K/UL (ref 1.1–5.9)
LYMPHOCYTES NFR BLD: 16 % (ref 14–44)
MCH RBC QN AUTO: 35.9 PG (ref 25–35)
MCHC RBC AUTO-ENTMCNC: 34.1 G/DL (ref 31–37)
MCV RBC AUTO: 105.3 FL (ref 78–102)
NEUTS SEG # BLD: 2.1 K/UL (ref 1.8–9.5)
NEUTS SEG NFR BLD: 72 % (ref 40–70)
PLATELET # BLD AUTO: 239 K/UL (ref 140–440)
POTASSIUM SERPL-SCNC: 4.4 MMOL/L (ref 3.5–5.5)
PROT SERPL-MCNC: 6.7 G/DL (ref 6.4–8.2)
RBC # BLD AUTO: 3.04 M/UL (ref 4.1–5.1)
SODIUM SERPL-SCNC: 142 MMOL/L (ref 136–145)
TIBC SERPL-MCNC: 307 UG/DL (ref 250–450)
WBC # BLD AUTO: 2.8 K/UL (ref 4.5–13)

## 2018-07-17 PROCEDURE — 80053 COMPREHEN METABOLIC PANEL: CPT | Performed by: INTERNAL MEDICINE

## 2018-07-17 PROCEDURE — 82728 ASSAY OF FERRITIN: CPT | Performed by: INTERNAL MEDICINE

## 2018-07-17 PROCEDURE — 83540 ASSAY OF IRON: CPT | Performed by: INTERNAL MEDICINE

## 2018-07-17 PROCEDURE — 36415 COLL VENOUS BLD VENIPUNCTURE: CPT | Performed by: INTERNAL MEDICINE

## 2018-07-17 PROCEDURE — 86300 IMMUNOASSAY TUMOR CA 15-3: CPT | Performed by: INTERNAL MEDICINE

## 2018-07-17 PROCEDURE — 82306 VITAMIN D 25 HYDROXY: CPT | Performed by: INTERNAL MEDICINE

## 2018-07-17 NOTE — MR AVS SNAPSHOT
[address] [facility] [address] [facility] [address] 
[phone] Patient: Matias Sauceda MRN: [id number] :1945 Visit Information Date & Time Provider Department Dept. Phone Encounter #  
 2018 10:30 AM Glendon Hammans, MD [address]  [phone] [id number] Follow-up Instructions Return in about 3 months (around 10/17/2018). Your Appointments 10/16/2018 10:30 AM  
Office Visit with Glendon Hammans, MD [address]  [facility]) Appt Note: OV  
 [facility] [address] [facility] [address]  
[phone]  
  
   
 [facility] [address] Upcoming Health Maintenance Date Due Hepatitis C Screening 1945 DTaP/Tdap/Td series (1 - Tdap) 10/18/1966 FOBT Q 1 YEAR AGE 50-75 10/18/1995 ZOSTER VACCINE AGE 60> 2005 GLAUCOMA SCREENING Q2Y 10/18/2010 Pneumococcal 65+ High/Highest Risk (1 of 2 - PCV13) 10/18/2010 MEDICARE YEARLY EXAM 3/14/2018 Influenza Age 5 to Adult 2018 BREAST CANCER SCRN MAMMOGRAM 2020 Allergies as of 2018  Review Complete On: 2018 By: Glendon Hammans, MD  
  
 Severity Noted Reaction Type Reactions Codeine  2012    Palpitations Darvocet A500 [Propoxyphene N-acetaminophen]  2012   Side Effect Nausea and Vomiting Darvon [Propoxyphene]  2014   Systemic Nausea and Vomiting Current Immunizations  Reviewed on 2018 Name Date Influenza Vaccine 10/18/2017, 2016, 2015, 2015, 2013, 2013 Influenza Vaccine Whole 2012 Not reviewed this visit You Were Diagnosed With   
  
 Codes Comments [facility]    -  Primary ICD-10-CM: Z63.412 ICD-9-CM: 174.9 Neuropathy associated with cancer ([facility]     ICD-10-CM: C80.1, G63 ICD-9-CM: 199.1, 357.3 Vitamin D deficiency     ICD-10-CM: E55.9 ICD-9-CM: 268.9 Chronic leukopenia     ICD-10-CM: D72.819 ICD-9-CM: 288.50 Anemia due to antineoplastic chemotherapy     ICD-10-CM: D64.81, T45.1X5A 
ICD-9-CM: 285.3, E933.1 Chemotherapy induced neutropenia (HCC)     ICD-10-CM: D70.1, T45.1X5A 
ICD-9-CM: 288.03, E933.1 Chemotherapy-induced thrombocytopenia     ICD-10-CM: D69.59, T45.1X5A 
ICD-9-CM: 287.49, E933.1 Vitals BP Pulse Temp Weight(growth percentile) BMI OB Status 122/60 80 98.3 °F (36.8 °C) (Oral) 150 lb (68 kg) 25.75 kg/m2 Menopause Smoking Status Former Smoker BMI and BSA Data Body Mass Index Body Surface Area 25.75 kg/m 2 1.75 m 2 Preferred Pharmacy Pharmacy Name Phone [facility] [phone] Your Updated Medication List  
  
   
This list is accurate as of 7/17/18 11:49 AM.  Always use your most recent med list. ALLEGRA-D 24 HOUR 180-240 mg per tablet Generic drug:  fexofenadine-pseudoephedrine Take 1 Tab by mouth daily. ARMOUR THYROID 60 mg tablet Generic drug:  thyroid (Pork) Take 90 mg by mouth daily. cholecalciferol (VITAMIN D3) 5,000 unit Tab tablet Commonly known as:  VITAMIN D3 Take  by mouth daily. * gabapentin 300 mg capsule Commonly known as:  NEURONTIN  
TAKE 1 CAPSULE THREE TIMES A DAY  
  
 * gabapentin 100 mg capsule Commonly known as:  NEURONTIN Take 1 Cap by mouth three (3) times daily. lidocaine HCl-hydrocortison ac topical cream  
Apply  to affected area two (2) times a day. MOTRIN 800 mg tablet Generic drug:  ibuprofen Take 800 mg by mouth two (2) times a day. Indications: OSTEOARTHRITIS  
  
 NEURPATH-B 3-35-2 mg Tab tab Generic drug:  L-Mfolate-B6 Phos-Methyl-B12  
 Take  by mouth two (2) times a day. * palbociclib 125 mg Cap Commonly known as:  [employer] Take 125 mg by mouth daily. Take 1 tab po every day for 21 days on and 7 days off q 28 days  Indications: HORMONE RECEPTOR(+), HER2(-) ADVANCED BREAST CANCER * palbociclib 100 mg Cap Commonly known as:  [employer] Take 1 Cap by mouth daily. For 21 days on and 7 days off every 28 days. sulfacetamide 10 % ophthalmic ointment Commonly known as:  BLEPH-10 Administer  to right eye three (3) times daily. TETRACYCLINE PO Take  by mouth.  
  
 warfarin 1 mg tablet Commonly known as:  COUMADIN Take 1 mg by mouth daily. * Notice: This list has 4 medication(s) that are the same as other medications prescribed for you. Read the directions carefully, and ask your doctor or other care provider to review them with you. We Performed the Following COMPLETE CBC & AUTO DIFF WBC [28546 CPT(R)] Follow-up Instructions Return in about 3 months (around 10/17/2018). To-Do List   
 07/17/2018 Lab:  CBC WITH 3 PART DIFF   
  
 07/19/2018 11:00 AM  
  Appointment [address] at [place] [address] ([phone])  
  
 08/02/2018 11:00 AM  
  Appointment with [address] at [place] [address] ([phone])  
  
 08/16/2018 11:00 AM  
  Appointment with [address] at [place] [address] ([phone])  
  
 08/30/2018 11:00 AM  
  Appointment with [address] at [place] [address] ([phone]) 06/03/2019 10:00 AM  
  Appointment with [facility] 2 at [address] [place] [address] ([phone]) OUTSIDE FILMS  - Any outside films related to the study being scheduled should be brought with you on the day of the exam.  If this cannot be done there may be a delay in the reading of the study.   MEDICATIONS  - Patient must bring a complete list of all medications currently taking to include prescriptions, over-the-counter meds, herbals, vitamins & any dietary supplements  GENERAL INSTRUCTIONS  - On the day of your exam do not use any bath powder, deodorant or lotions on the armpit area. Patient Instructions Breast Cancer: Care Instructions Your Care Instructions Breast cancer occurs when abnormal cells grow out of control in the breast. These cancer cells can spread within the breast, to nearby lymph nodes and other tissues, and to other parts of the body. Being treated for cancer can weaken your body, and you may feel very tired. Get the rest your body needs so you can feel better. Finding out that you have cancer is scary. You may feel many emotions and may need some help coping. Seek out family, friends, and counselors for support. You also can do things at home to make yourself feel better while you go through treatment. Call the [employer] (4-947.289.6893) or visit its website at [address] [employer] for more information. Follow-up care is a key part of your treatment and safety. Be sure to make and go to all appointments, and call your doctor if you are having problems. It's also a good idea to know your test results and keep a list of the medicines you take. How can you care for yourself at home? · Take your medicines exactly as prescribed. Call your doctor if you think you are having a problem with your medicine. You may get medicine for nausea and vomiting if you have these side effects. · Follow your doctor's instructions to relieve pain. Pain from cancer and surgery can almost always be controlled. Use pain medicine when you first notice pain, before it becomes severe. · Eat healthy food. If you do not feel like eating, try to eat food that has protein and extra calories to keep up your strength and prevent weight loss. Drink liquid meal replacements for extra calories and protein.  Try to eat your main meal early. · Get some physical activity every day, but do not get too tired. Keep doing the hobbies you enjoy as your energy allows. · Do not smoke. Smoking can make your cancer worse. If you need help quitting, talk to your doctor about stop-smoking programs and medicines. These can increase your chances of quitting for good. · Take steps to control your stress and workload. Learn relaxation techniques. ¨ Share your feelings. Stress and tension affect our emotions. By expressing your feelings to others, you may be able to understand and cope with them. ¨ Consider joining a support group. Talking about a problem with your spouse, a good friend, or other people with similar problems is a good way to reduce tension and stress. ¨ Express yourself through art. Try writing, crafts, dance, or art to relieve stress. Some dance, writing, or art groups may be available just for people who have cancer. ¨ Be kind to your body and mind. Getting enough sleep, eating a healthy diet, and taking time to do things you enjoy can contribute to an overall feeling of balance in your life and can help reduce stress. ¨ Get help if you need it. Discuss your concerns with your doctor or counselor. · If you are vomiting or have diarrhea: ¨ Drink plenty of fluids (enough so that your urine is light yellow or clear like water) to prevent dehydration. Choose water and other caffeine-free clear liquids. If you have kidney, heart, or liver disease and have to limit fluids, talk with your doctor before you increase the amount of fluids you drink. ¨ When you are able to eat, try clear soups, mild foods, and liquids until all symptoms are gone for 12 to 48 hours. Other good choices include dry toast, crackers, cooked cereal, and gelatin dessert, such as Jell-O. · If you have not already done so, prepare a list of advance directives.  Advance directives are instructions to your doctor and family members about what kind of care you want if you become unable to speak or express yourself. When should you call for help? Call 911 anytime you think you may need emergency care. For example, call if: 
  · You passed out (lost consciousness).  
 Call your doctor now or seek immediate medical care if: 
  · You have a fever.  
  · You have abnormal bleeding.  
  · You think you have an infection.  
  · You have new or worse pain.  
  · You have new symptoms, such as a cough, belly pain, vomiting, diarrhea, or a rash.  
 Watch closely for changes in your health, and be sure to contact your doctor if: 
  · You are much more tired than usual.  
  · You have swollen glands in your armpits, groin, or neck.  
  · You do not get better as expected. Where can you learn more? Go to http://mandie-elizabeth.info/. Enter V321 in the search box to learn more about \"Breast Cancer: Care Instructions. \" Current as of: May 12, 2017 Content Version: 11.7 © 0993-2521 [employer]. Care instructions adapted under license by [employer] (which disclaims liability or warranty for this information). If you have questions about a medical condition or this instruction, always ask your healthcare professional. [place] [address] any warranty or liability for your use of this information. Introducing [facility] & HEALTH SERVICES! Dear Moni Rasmussen: 
Thank you for requesting a [employer] account. Our records indicate that you have previously registered for a [employer] account but its currently inactive. Please call our [employer] support line at [phone]. Additional Information If you have questions, please visit the Frequently Asked Questions section of the [employer] website at [email or web address] Remember, [employer] is NOT to be used for urgent needs. For medical emergencies, dial 911. Now available from your iPhone and Android! Please provide this summary of care documentation to your next provider. Your primary care clinician is listed as Allison Chacon. If you have any questions after today's visit, please call [phone].

## 2018-07-17 NOTE — PATIENT INSTRUCTIONS
Breast Cancer: Care Instructions Your Care Instructions Breast cancer occurs when abnormal cells grow out of control in the breast. These cancer cells can spread within the breast, to nearby lymph nodes and other tissues, and to other parts of the body. Being treated for cancer can weaken your body, and you may feel very tired. Get the rest your body needs so you can feel better. Finding out that you have cancer is scary. You may feel many emotions and may need some help coping. Seek out family, friends, and counselors for support. You also can do things at home to make yourself feel better while you go through treatment. Call the [employer] (1-284.700.8411) or visit its website at [employer] for more information. Follow-up care is a key part of your treatment and safety. Be sure to make and go to all appointments, and call your doctor if you are having problems. It's also a good idea to know your test results and keep a list of the medicines you take. How can you care for yourself at home? · Take your medicines exactly as prescribed. Call your doctor if you think you are having a problem with your medicine. You may get medicine for nausea and vomiting if you have these side effects. · Follow your doctor's instructions to relieve pain. Pain from cancer and surgery can almost always be controlled. Use pain medicine when you first notice pain, before it becomes severe. · Eat healthy food. If you do not feel like eating, try to eat food that has protein and extra calories to keep up your strength and prevent weight loss. Drink liquid meal replacements for extra calories and protein. Try to eat your main meal early. · Get some physical activity every day, but do not get too tired. Keep doing the hobbies you enjoy as your energy allows. · Do not smoke. Smoking can make your cancer worse. If you need help quitting, talk to your doctor about stop-smoking programs and medicines.  These can increase your chances of quitting for good. · Take steps to control your stress and workload. Learn relaxation techniques. ¨ Share your feelings. Stress and tension affect our emotions. By expressing your feelings to others, you may be able to understand and cope with them. ¨ Consider joining a support group. Talking about a problem with your spouse, a good friend, or other people with similar problems is a good way to reduce tension and stress. ¨ Express yourself through art. Try writing, crafts, dance, or art to relieve stress. Some dance, writing, or art groups may be available just for people who have cancer. ¨ Be kind to your body and mind. Getting enough sleep, eating a healthy diet, and taking time to do things you enjoy can contribute to an overall feeling of balance in your life and can help reduce stress. ¨ Get help if you need it. Discuss your concerns with your doctor or counselor. · If you are vomiting or have diarrhea: ¨ Drink plenty of fluids (enough so that your urine is light yellow or clear like water) to prevent dehydration. Choose water and other caffeine-free clear liquids. If you have kidney, heart, or liver disease and have to limit fluids, talk with your doctor before you increase the amount of fluids you drink. ¨ When you are able to eat, try clear soups, mild foods, and liquids until all symptoms are gone for 12 to 48 hours. Other good choices include dry toast, crackers, cooked cereal, and gelatin dessert, such as Jell-O. · If you have not already done so, prepare a list of advance directives. Advance directives are instructions to your doctor and family members about what kind of care you want if you become unable to speak or express yourself. When should you call for help? Call 911 anytime you think you may need emergency care.  For example, call if: 
  · You passed out (lost consciousness).  
 Call your doctor now or seek immediate medical care if: 
  · You have a fever.  
  · You have abnormal bleeding.  
  · You think you have an infection.  
  · You have new or worse pain.  
  · You have new symptoms, such as a cough, belly pain, vomiting, diarrhea, or a rash.  
 Watch closely for changes in your health, and be sure to contact your doctor if: 
  · You are much more tired than usual.  
  · You have swollen glands in your armpits, groin, or neck.  
  · You do not get better as expected. Where can you learn more? Go to http://mandie-elizabeth.info/. Enter V321 in the search box to learn more about \"Breast Cancer: Care Instructions. \" Current as of: May 12, 2017 Content Version: 11.7 © 3989-5521 [employer]. Care instructions adapted under license by [employer] (which disclaims liability or warranty for this information). If you have questions about a medical condition or this instruction, always ask your healthcare professional. [address] any warranty or liability for your use of this information.

## 2018-07-17 NOTE — PROGRESS NOTES
Hematology/Oncology  Progress Note    Name: Jorge A Luna  Date: 2018  : 1945    PCP: Timothy Vanessa MD     Ms. Georgiana Pineda is a 67 y.o.  female who was seen for management of her metastatic breast cancer with associated complications of chemotherapy. Current therapy: Fulvestrant 500 mg subcutaneous monthly and Ibrance 125 mg by mouth daily. Subjective:     Mrs. Georgiana Pineda is a 66-year-old woman who has slowly progressive metastatic breast cancer. She has previously completed external beam radiation therapy to lesions in her ribs and more recently she completed proton therapy to areas of bone involvement with a significant benefit with regards to pain control. The posttherapy imaging studies showed a significant decrease in the intensity of uptake on the bone scan. .  Additionally she is currently receiving systemic therapy with fulvestrant and Ibrance. Currently she is tolerating the systemic therapy reasonably well and has not experienced any nausea or emesis. She is no longer complaining of mouth discomfort but she does report some fatigue and occasional weakness. She continues to have SOB intermittently. She also receives Procrit at 2 week intervals whenever her hemoglobin is below 10 g/dL and hematocrit is below 30%. Today she is complaining that [facility] has not sent her medication in the mail. She is very angry. Additionally she feels that she is having some musculoskeletal pain at the site of her recent Faslodex injection and is requesting a delay in getting her next treatment. Past medical history, family history, and social history: these were reviewed and remains unchanged.     Past Medical History:   Diagnosis Date    Biliary colic     Breast CA ([facility] [address] 2012    Cancer [facility])     Right Breast    Chemotherapy convalescence or palliative care     Neuropathy     Radiation therapy complication     Thyroid disease      Past Surgical History:   Procedure Laterality Date    BREAST SURGERY PROCEDURE UNLISTED  10/2002    Right-Lesion    BREAST SURGERY PROCEDURE UNLISTED  11/2004    Right-Lesion    HX LAP CHOLECYSTECTOMY  9-19-13    HX MASTECTOMY  1991    Right     Social History     Social History    Marital status:      Spouse name: N/A    Number of children: N/A    Years of education: N/A     Occupational History    Not on file. Social History Main Topics    Smoking status: Former Smoker     Quit date: 6/2/1991    Smokeless tobacco: Never Used    Alcohol use 0.0 oz/week     1 - 2 Glasses of wine per week      Comment: socially, occassionally    Drug use: No    Sexual activity: Not on file     Other Topics Concern    Not on file     Social History Narrative     Family History   Problem Relation Age of Onset    Cancer Maternal Aunt      Hodgkin's disease    Breast Cancer Paternal Aunt     Cancer Father      Prostate, lung, brain     Current Outpatient Prescriptions   Medication Sig Dispense Refill    lidocaine HCl-hydrocortison ac topical cream Apply  to affected area two (2) times a day. 85 g 3    gabapentin (NEURONTIN) 100 mg capsule Take 1 Cap by mouth three (3) times daily. 90 Cap 6    palbociclib (IBRANCE) 100 mg cap Take 1 Cap by mouth daily. For 21 days on and 7 days off every 28 days. 63 Cap 5    gabapentin (NEURONTIN) 300 mg capsule TAKE 1 CAPSULE THREE TIMES A  Cap 2    TETRACYCLINE HCL (TETRACYCLINE PO) Take  by mouth.  fexofenadine-pseudoephedrine (ALLEGRA-D 24 HOUR) 180-240 mg per tablet Take 1 Tab by mouth daily.  sulfacetamide (BLEPH-10) 10 % ophthalmic ointment Administer  to right eye three (3) times daily.  palbociclib (IBRANCE) 125 mg cap Take 125 mg by mouth daily. Take 1 tab po every day for 21 days on and 7 days off q 28 days  Indications: HORMONE RECEPTOR(+), HER2(-) ADVANCED BREAST CANCER 42 Cap 6    ibuprofen (MOTRIN) 800 mg tablet Take 800 mg by mouth two (2) times a day.  Indications: OSTEOARTHRITIS  Cholecalciferol, Vitamin D3, 5,000 unit Tab Take  by mouth daily.  thyroid, Pork, (ARMOUR THYROID) 60 mg tablet Take 90 mg by mouth daily.  L-Mfolate-B6 Phos-Methyl-B12 (NEURPATH-B) 3-35-2 mg Tab Take  by mouth two (2) times a day.  warfarin (COUMADIN) 1 mg tablet Take 1 mg by mouth daily. Facility-Administered Medications Ordered in Other Visits   Medication Dose Route Frequency Provider Last Rate Last Dose    [START ON 7/19/2018] fulvestrant (FASLODEX) injection 500 mg  500 mg IntraMUSCular ONCE Asif Hollingsworth MD           Review of Systems  Constitutional: The patient has no acute distress or discomfort. HEENT: The patient denies recent head trauma, eye pain, blurred vision,  hearing deficit, oropharyngeal mucosal pain or lesions, and the patient denies throat pain or discomfort. Lymphatics: The patient denies palpable peripheral lymphadenopathy. Hematologic: The patient denies having bruising, bleeding, or progressive fatigue. Respiratory: Patient denies having shortness of breath, cough, sputum production, fever, or dyspnea on exertion. Cardiovascular: The patient denies having leg pain, leg swelling, heart palpitations, chest permit, chest pain, or lightheadedness. The patient denies having dyspnea on exertion. Gastrointestinal: The patient denies having nausea, emesis, or diarrhea. The patient denies having any hematemesis or blood in the stool. Genitourinary: Patient denies having urinary urgency, frequency, or dysuria. The patient denies having blood in the urine. Psychological: The patient denies having symptoms of nervousness, anxiety, depression, or thoughts of harming self. Skin: Patient denies having skin rashes, skin, ulcerations, or unexplained itching or pruritus. Musculoskeletal: The patient denies having pain in the joints or bones.       Objective:     Visit Vitals    /60    Pulse 80    Temp 98.3 °F (36.8 °C) (Oral)    Wt 68 kg (150 lb)    BMI 25.75 kg/m2     ECOG PS=0; Pain score=0/10    Physical Exam:   Gen. Appearance: The patient is in no acute distress. Skin: There is no bruise or rash. There is no evidence of cutaneous shingles over the lower back and flank. HEENT: The exam is unremarkable. Neck: Supple without lymphadenopathy or thyromegaly. Lungs: Clear to auscultation and percussion; there are no wheezes or rhonchi. Heart: Regular rate and rhythm; there are no murmurs, gallops, or rubs. Abdomen: Bowel sounds are present and normal.  There is no guarding, tenderness, or hepatosplenomegaly. Extremities: There is no clubbing, cyanosis, or edema. Neurologic: There are no focal neurologic deficits. Lymphatics: There is no palpable peripheral lymphadenopathy. Musculoskeletal: The patient has full range of motion at all joints. There is no evidence of joint deformity or effusions. There is no focal joint tenderness. Psychological/psychiatric: There is no clinical evidence of anxiety, depression, or melancholy. Lab data:      Results for orders placed or performed during the hospital encounter of 07/17/18   CBC WITH 3 PART DIFF     Status: Abnormal   Result Value Ref Range Status    WBC 2.8 (L) 4.5 - 13.0 K/uL Final    RBC 3.04 (L) 4.10 - 5.10 M/uL Final    HGB 10.9 (L) 12.0 - 16.0 g/dL Final    HCT 32.0 (L) 36 - 48 % Final    .3 (H) 78 - 102 FL Final    MCH 35.9 (H) 25.0 - 35.0 PG Final    MCHC 34.1 31 - 37 g/dL Final    RDW 15.8 (H) 11.5 - 14.5 % Final    PLATELET 991 344 - 144 K/uL Final    NEUTROPHILS 72 (H) 40 - 70 % Final    MIXED CELLS 12 0.1 - 17 % Final    LYMPHOCYTES 16 14 - 44 % Final    ABS. NEUTROPHILS 2.1 1.8 - 9.5 K/UL Final    ABS. MIXED CELLS 0.3 0.0 - 2.3 K/uL Final    ABS. LYMPHOCYTES 0.4 (L) 1.1 - 5.9 K/UL Final     Comment: Test performed at [address]. Results Reviewed by Medical Director. DF AUTOMATED   Final           Assessment:     1. Metastatic breast cancer ([facility]    2. Neuropathy associated with cancer ([facility] [address]    3. Vitamin D deficiency    4. Chronic leukopenia    5. Anemia due to antineoplastic chemotherapy    6. Chemotherapy induced neutropenia (HCC)    7. Chemotherapy-induced thrombocytopenia      Plan:   Metastatic breast cancer: The combination of Fulvestrant 500 mg subcutaneous monthly  will be continued. Ibrance 140 mg by mouth daily will be continued as well. We will contact [facility] to inquire about the delay in her medication. Because she is having pain over the injection site for her fulvestrant, she has been concerned that she may have shingles. We will hold off on giving the fulvestrant injections for 1 month. I have explained to the patient her most recent CA-27-29 level from 4/14/2018 was 47.2. We will recheck her values at this time. Neuropathy associated with cancer: I will continue her Neurontin at 400 mg 3 times daily. Chemotherapy-induced neutropenia/chronic leukopenia (persisting problem): The current CBC shows that her WBC count is 2.8. The absolute neutrophil count is 2.1. Her current hemoglobin and hematocrit are 10.9 and 32 `% respectively. .  If the absolute neutrophil count declines to 0.5 she will be started on Neupogen or Neulasta. She will get her labs drawn once a month. Chemotherapy-induced anemia (persisting problem): The new chemotherapy regimen has significantly decreased her hemoglobin. Her current hemoglobin is 10.6 g/dL with hematocrit of 31.2 %. At this time I will check iron profile and ferritin levels. I have recommended that the patient continue taking ferrous sulfate 1 tablet daily. Procrit at a dose of 60,000 units subcutaneous will be provided now that her hemoglobin has declined below 10 g/dL hematocrit is below 30%. Chemotherapy-induced thrombocytopenia : The patient is continuing the current dose of Ibrance 140 mg daily.   We have explained to her that the medication was responsible for her thrombocytopenia. The current platelet count is 850,717 we will continue to monitor platelet counts at 6 week intervals. Follow-up in 6 weeks to review test results    Orders Placed This Encounter    COMPLETE CBC & AUTO DIFF WBC    InHouse CBC ([facility])     Standing Status:   Future     Number of Occurrences:   1     Standing Expiration Date:   7/24/2018       Rohini Prieto MD  7/17/2018      Please note: This document has been produced using voice recognition software. Unrecognized errors in transcription may be present.

## 2018-07-18 ENCOUNTER — TELEPHONE (OUTPATIENT)
Dept: ONCOLOGY | Age: 73
End: 2018-07-18

## 2018-07-18 LAB — 25(OH)D3 SERPL-MCNC: 49.2 NG/ML (ref 30–100)

## 2018-07-19 ENCOUNTER — HOSPITAL ENCOUNTER (OUTPATIENT)
Dept: INFUSION THERAPY | Age: 73
Discharge: HOME | End: 2018-07-19
Payer: MEDICARE

## 2018-07-19 VITALS
RESPIRATION RATE: 16 BRPM | TEMPERATURE: 97.8 F | DIASTOLIC BLOOD PRESSURE: 66 MMHG | HEART RATE: 70 BPM | SYSTOLIC BLOOD PRESSURE: 129 MMHG

## 2018-07-19 LAB — CANCER AG27-29 SERPL-ACNC: 48.7 U/ML (ref 0–38.6)

## 2018-07-19 PROCEDURE — 77030012965 HC NDL HUBR BBMI -A

## 2018-07-19 PROCEDURE — 74011250636 HC RX REV CODE- 250/636

## 2018-07-19 PROCEDURE — 96523 IRRIG DRUG DELIVERY DEVICE: CPT

## 2018-07-19 PROCEDURE — 99211 OFF/OP EST MAY X REQ PHY/QHP: CPT

## 2018-07-19 RX ORDER — SODIUM CHLORIDE 0.9 % (FLUSH) 0.9 %
10-40 SYRINGE (ML) INJECTION AS NEEDED
Status: DISCONTINUED | OUTPATIENT
Start: 2018-07-19 | End: 2018-07-23 | Stop reason: HOSPADM

## 2018-07-19 RX ORDER — HEPARIN 100 UNIT/ML
SYRINGE INTRAVENOUS
Status: COMPLETED
Start: 2018-07-19 | End: 2018-07-19

## 2018-07-19 RX ORDER — HEPARIN 100 UNIT/ML
500 SYRINGE INTRAVENOUS AS NEEDED
Status: DISCONTINUED | OUTPATIENT
Start: 2018-07-19 | End: 2018-07-23 | Stop reason: HOSPADM

## 2018-07-19 RX ADMIN — HEPARIN 500 UNITS: 100 SYRINGE at 12:30

## 2018-07-19 RX ADMIN — Medication 30 ML: at 12:30

## 2018-07-19 RX ADMIN — SODIUM CHLORIDE, PRESERVATIVE FREE 500 UNITS: 5 INJECTION INTRAVENOUS at 12:30

## 2018-07-19 NOTE — PROGRESS NOTES
[facility] Progress Note    Date: 2018    Name: Fallon Rdz    MRN: [id number]         : 1945      Ms. Figueroa arrived in the [facility] today, at 1125, in stable condition, here for Q 2 Week, CBC/Procrit injection, Q 4 Week Faslodex Injections, Q 4 Week Xgeva Injection, and Monthly Port Flush. She was assessed and education was provided. Upon arrival to the [facility] today, Ms. Neo Edmond complained again of continued intermittent pain/burning sensation to her Right hip & Right lower back region, which she stated started about 2-3 days after her last Faslodex injections. (Last Faslodex Injections were administered on 18). Angel Rivera NP was made aware, and came over to the [facility] to assess Ms. Figueroa. No redness, edema, or rashes were noted in the area where Ms. Figueroa complained of the pain & burning sensations. Jennie had prescribed a topical lidocaine/hydrocortisone cream to be applied to the region prn on Ms. Figueroa's last [facility] visit, but Ms. Figueroa stated today, that the cream had provided little to no relief to her pain. Ms. Neo Edmond stated that she really didn't want the Faslodex injections today, because she was afraid they would add further to her pain and discomfort. Therefore, order was received from Angel Rivera NP, to [facility] the Faslodex Injections for today, and resume in 4 weeks, per original order. Also, Ms. Neo Edmond stated that she also didn't want to take the Xgeva injection today, because she stated that she already had too many medications in her system and didn't want to complicate her symptoms any further. Angel Rivera NP was made aware, that Ms. Neo Edmond has continued to refuse the Laban Pies since it was ordered to begin in 2018. No new order was received. Ms. Gerardo Perez vitals were reviewed.   Visit Vitals    /66 (BP 1 Location: Left arm, BP Patient Position: At rest;Sitting)    Pulse 70    Temp 97.8 °F (36.6 °C)    Resp 16           No blood for a CBC or CMP was drawn today, because Ms. Imelda Das had just had a CBC & CMP drawn on Tuesday, 7-17-18, during her office visit with Dr. Juan. The 7-17-18 CBC results were as follows:      Results for PRANEETH FOSTER ANG    Ref. Range 7/17/2018 10:53   WBC Latest Ref Range: 4.5 - 13.0 K/uL 2.8 (L)   RBC Latest Ref Range: 4.10 - 5.10 M/uL 3.04 (L)   HGB Latest Ref Range: 12.0 - 16.0 g/dL 10.9 (L)   HCT Latest Ref Range: 36 - 48 % 32.0 (L)   MCV Latest Ref Range: 78 - 102 .3 (H)   MCH Latest Ref Range: 25.0 - 35.0 PG 35.9 (H)   MCHC Latest Ref Range: 31 - 37 g/dL 34.1   RDW Latest Ref Range: 11.5 - 14.5 % 15.8 (H)   PLATELET Latest Ref Range: 140 - 440 K/uL 239   NEUTROPHILS Latest Ref Range: 40 - 70 % 72 (H)   MIXED CELLS Latest Ref Range: 0.1 - 17 % 12   LYMPHOCYTES Latest Ref Range: 14 - 44 % 16   DF Latest Units:   AUTOMATED   ABS. NEUTROPHILS Latest Ref Range: 1.8 - 9.5 K/UL 2.1   ABS. LYMPHOCYTES Latest Ref Range: 1.1 - 5.9 K/UL 0.4 (L)   ABS. MIXED CELLS Latest Ref Range: 0.0 - 2.3 K/uL 0.3             Procrit Injection was HELD today, per order, based on the 7-17-18 CBC results. Faslodex Injections were HELD today, per order. Xgeva injection was REFUSED today. Her left chest single lumen port was accessed and flushed well per protocol and without incident at 1230. Then, the carbone needle was removed and gauze/bandaid was applied. Ms. Imelda Das tolerated well, and had no further complaints. Ms. Imelda Das was discharged from [place] [address] in stable condition at 1240. Yarely Morales She is to return in 2 weeks, on Thursday, 8-2-18, at 1100,  for her next appointment, for Q 2 Week CBC/Procrit injection. And then, she is to return in 4 weeks, on Thursday, 8-16-18 at 1100, for CBC/Procrit injection, and Q 4 Week, Faslodex Injections, Xgeva Injection, and Port Flush.      Issac Bhatia RN  July 19, 2018  2:15 PM

## 2018-07-31 NOTE — PROGRESS NOTES
Ms. Lorene Aburto was called and made aware, that her OPIC appointment that was scheduled for Thursday, 8-2-18 at 1100 for CBC/Procrit injection, has been changed to Friday, 8-3-18 at 1500, and she agreed to the appointment date & time changes.

## 2018-08-02 ENCOUNTER — HOSPITAL ENCOUNTER (OUTPATIENT)
Dept: INFUSION THERAPY | Age: 73
End: 2018-08-02
Payer: MEDICARE

## 2018-08-03 ENCOUNTER — HOSPITAL ENCOUNTER (OUTPATIENT)
Dept: INFUSION THERAPY | Age: 73
Discharge: HOME | End: 2018-08-03
Payer: MEDICARE

## 2018-08-03 ENCOUNTER — HOSPITAL ENCOUNTER (OUTPATIENT)
Dept: ONCOLOGY | Age: 73
Discharge: HOME | End: 2018-08-03

## 2018-08-03 ENCOUNTER — CLINICAL SUPPORT (OUTPATIENT)
Dept: ONCOLOGY | Age: 73
End: 2018-08-03

## 2018-08-03 VITALS
HEART RATE: 75 BPM | TEMPERATURE: 97.9 F | DIASTOLIC BLOOD PRESSURE: 62 MMHG | SYSTOLIC BLOOD PRESSURE: 126 MMHG | RESPIRATION RATE: 16 BRPM

## 2018-08-03 DIAGNOSIS — D64.81 ANTINEOPLASTIC CHEMOTHERAPY INDUCED ANEMIA: ICD-10-CM

## 2018-08-03 DIAGNOSIS — T45.1X5A ANTINEOPLASTIC CHEMOTHERAPY INDUCED ANEMIA: Primary | ICD-10-CM

## 2018-08-03 DIAGNOSIS — T45.1X5A ANTINEOPLASTIC CHEMOTHERAPY INDUCED ANEMIA: ICD-10-CM

## 2018-08-03 DIAGNOSIS — D64.81 ANTINEOPLASTIC CHEMOTHERAPY INDUCED ANEMIA: Primary | ICD-10-CM

## 2018-08-03 LAB
BASO+EOS+MONOS # BLD AUTO: 0.1 K/UL (ref 0–2.3)
BASO+EOS+MONOS # BLD AUTO: 5 % (ref 0.1–17)
DIFFERENTIAL METHOD BLD: ABNORMAL
ERYTHROCYTE [DISTWIDTH] IN BLOOD BY AUTOMATED COUNT: 14.7 % (ref 11.5–14.5)
HCT VFR BLD AUTO: 31.4 % (ref 36–48)
HGB BLD-MCNC: 10.7 G/DL (ref 12–16)
LYMPHOCYTES # BLD: 0.5 K/UL (ref 1.1–5.9)
LYMPHOCYTES NFR BLD: 19 % (ref 14–44)
MCH RBC QN AUTO: 34.7 PG (ref 25–35)
MCHC RBC AUTO-ENTMCNC: 34.1 G/DL (ref 31–37)
MCV RBC AUTO: 101.9 FL (ref 78–102)
NEUTS SEG # BLD: 1.9 K/UL (ref 1.8–9.5)
NEUTS SEG NFR BLD: 77 % (ref 40–70)
PLATELET # BLD AUTO: 177 K/UL (ref 140–440)
RBC # BLD AUTO: 3.08 M/UL (ref 4.1–5.1)
WBC # BLD AUTO: 2.5 K/UL (ref 4.5–13)

## 2018-08-03 PROCEDURE — 36415 COLL VENOUS BLD VENIPUNCTURE: CPT

## 2018-08-03 NOTE — PROGRESS NOTES
[facility] Progress Note Date: August 3, 2018 Name: Peyton Callow MRN: [id number] : 1945 Ms. Figueroa arrived in the [facility] today, at 1510, in stable condition, here for Q 2 Week, CBC/Procrit injection. She was assessed and education was provided. Ms. Tresa Gregg vitals were reviewed. Visit Vitals  /62 (BP 1 Location: Left arm, BP Patient Position: At rest;Sitting)  Pulse 75  Temp 97.9 °F (36.6 °C)  Resp 16  Breastfeeding No  
 
 
 
 
Blood for a CBC was drawn from her left AC at 1516, without incident. Lab results were obtained and reviewed. Recent Results (from the past 12 hour(s)) CBC WITH 3 PART DIFF Collection Time: 18  3:16 PM  
Result Value Ref Range WBC 2.5 (L) 4.5 - 13.0 K/uL  
 RBC 3.08 (L) 4.10 - 5.10 M/uL  
 HGB 10.7 (L) 12.0 - 16.0 g/dL HCT 31.4 (L) 36 - 48 % .9 78 - 102 FL  
 MCH 34.7 25.0 - 35.0 PG  
 MCHC 34.1 31 - 37 g/dL  
 RDW 14.7 (H) 11.5 - 14.5 % PLATELET 473 985 - 524 K/uL NEUTROPHILS 77 (H) 40 - 70 % MIXED CELLS 5 0.1 - 17 % LYMPHOCYTES 19 14 - 44 % ABS. NEUTROPHILS 1.9 1.8 - 9.5 K/UL  
 ABS. MIXED CELLS 0.1 0.0 - 2.3 K/uL  
 ABS. LYMPHOCYTES 0.5 (L) 1.1 - 5.9 K/UL  
 DF AUTOMATED Procrit 60,000 units was HELD, per order. Ms. Jovita Velazquez tolerated well, and had no complaints. Ms. Jovita Velazquez was discharged from [place] [address] in stable condition at 1530. Tania Hutchinson She is to return in 2 weeks, on Thursday, 18, at 1100,  for her next appointment, for Q 2 Week CBC/Procrit injection, and Q 4 Week, Faslodex Injections, Xgeva Injection, and Port Flush. Jeanne Macdonald RN August 3, 2018 2:15 PM

## 2018-08-09 RX ORDER — LAMOTRIGINE 25 MG/1
500 TABLET ORAL ONCE
Status: COMPLETED | OUTPATIENT
Start: 2018-08-16 | End: 2018-08-16

## 2018-08-16 ENCOUNTER — HOSPITAL ENCOUNTER (OUTPATIENT)
Dept: ONCOLOGY | Age: 73
Discharge: HOME | End: 2018-08-16

## 2018-08-16 ENCOUNTER — HOSPITAL ENCOUNTER (OUTPATIENT)
Dept: LAB | Age: 73
Discharge: HOME | End: 2018-08-16
Payer: MEDICARE

## 2018-08-16 ENCOUNTER — HOSPITAL ENCOUNTER (OUTPATIENT)
Dept: INFUSION THERAPY | Age: 73
Discharge: HOME | End: 2018-08-16
Payer: MEDICARE

## 2018-08-16 ENCOUNTER — CLINICAL SUPPORT (OUTPATIENT)
Dept: ONCOLOGY | Age: 73
End: 2018-08-16

## 2018-08-16 VITALS
DIASTOLIC BLOOD PRESSURE: 62 MMHG | HEART RATE: 78 BPM | WEIGHT: 150 LBS | HEIGHT: 65 IN | BODY MASS INDEX: 24.99 KG/M2 | RESPIRATION RATE: 16 BRPM | TEMPERATURE: 98.2 F | SYSTOLIC BLOOD PRESSURE: 127 MMHG

## 2018-08-16 DIAGNOSIS — T45.1X5A ANEMIA DUE TO CHEMOTHERAPY: Primary | ICD-10-CM

## 2018-08-16 DIAGNOSIS — D64.81 ANEMIA DUE TO CHEMOTHERAPY: ICD-10-CM

## 2018-08-16 DIAGNOSIS — D64.81 ANEMIA DUE TO CHEMOTHERAPY: Primary | ICD-10-CM

## 2018-08-16 DIAGNOSIS — T45.1X5A ANEMIA DUE TO CHEMOTHERAPY: ICD-10-CM

## 2018-08-16 LAB
ALBUMIN SERPL-MCNC: 3.6 G/DL (ref 3.4–5)
ALBUMIN/GLOB SERPL: 1.2 {RATIO} (ref 0.8–1.7)
ALP SERPL-CCNC: 60 U/L (ref 45–117)
ALT SERPL-CCNC: 18 U/L (ref 13–56)
ANION GAP SERPL CALC-SCNC: 5 MMOL/L (ref 3–18)
AST SERPL-CCNC: 16 U/L (ref 15–37)
BASOPHILS # BLD: 0 K/UL (ref 0–0.1)
BASOPHILS NFR BLD: 1 % (ref 0–2)
BILIRUB SERPL-MCNC: 0.5 MG/DL (ref 0.2–1)
BUN SERPL-MCNC: 17 MG/DL (ref 7–18)
BUN/CREAT SERPL: 24 (ref 12–20)
CALCIUM SERPL-MCNC: 8.8 MG/DL (ref 8.5–10.1)
CHLORIDE SERPL-SCNC: 108 MMOL/L (ref 100–108)
CO2 SERPL-SCNC: 30 MMOL/L (ref 21–32)
CREAT SERPL-MCNC: 0.71 MG/DL (ref 0.6–1.3)
DIFFERENTIAL METHOD BLD: ABNORMAL
EOSINOPHIL # BLD: 0 K/UL (ref 0–0.4)
EOSINOPHIL NFR BLD: 1 % (ref 0–5)
ERYTHROCYTE [DISTWIDTH] IN BLOOD BY AUTOMATED COUNT: 16.2 % (ref 11.6–14.5)
GLOBULIN SER CALC-MCNC: 3 G/DL (ref 2–4)
GLUCOSE SERPL-MCNC: 86 MG/DL (ref 74–99)
HCT VFR BLD AUTO: 26.7 % (ref 35–45)
HGB BLD-MCNC: 9.1 G/DL (ref 12–16)
LYMPHOCYTES # BLD: 0.4 K/UL (ref 0.9–3.6)
LYMPHOCYTES NFR BLD: 22 % (ref 21–52)
MAGNESIUM SERPL-MCNC: 1.7 MG/DL (ref 1.6–2.6)
MCH RBC QN AUTO: 34.1 PG (ref 24–34)
MCHC RBC AUTO-ENTMCNC: 34.1 G/DL (ref 31–37)
MCV RBC AUTO: 100 FL (ref 74–97)
MONOCYTES # BLD: 0.3 K/UL (ref 0.05–1.2)
MONOCYTES NFR BLD: 17 % (ref 3–10)
NEUTS SEG # BLD: 1 K/UL (ref 1.8–8)
NEUTS SEG NFR BLD: 59 % (ref 40–73)
PHOSPHATE SERPL-MCNC: 2.5 MG/DL (ref 2.5–4.9)
PLATELET # BLD AUTO: 127 K/UL (ref 135–420)
PMV BLD AUTO: 11.1 FL (ref 9.2–11.8)
POTASSIUM SERPL-SCNC: 4 MMOL/L (ref 3.5–5.5)
PROT SERPL-MCNC: 6.6 G/DL (ref 6.4–8.2)
RBC # BLD AUTO: 2.67 M/UL (ref 4.2–5.3)
SODIUM SERPL-SCNC: 143 MMOL/L (ref 136–145)
WBC # BLD AUTO: 1.7 K/UL (ref 4.6–13.2)

## 2018-08-16 PROCEDURE — 80053 COMPREHEN METABOLIC PANEL: CPT | Performed by: INTERNAL MEDICINE

## 2018-08-16 PROCEDURE — 77030012965 HC NDL HUBR BBMI -A

## 2018-08-16 PROCEDURE — 74011250636 HC RX REV CODE- 250/636

## 2018-08-16 PROCEDURE — 36591 DRAW BLOOD OFF VENOUS DEVICE: CPT

## 2018-08-16 PROCEDURE — 96372 THER/PROPH/DIAG INJ SC/IM: CPT

## 2018-08-16 PROCEDURE — 99211 OFF/OP EST MAY X REQ PHY/QHP: CPT

## 2018-08-16 PROCEDURE — 84100 ASSAY OF PHOSPHORUS: CPT | Performed by: INTERNAL MEDICINE

## 2018-08-16 PROCEDURE — 83735 ASSAY OF MAGNESIUM: CPT | Performed by: INTERNAL MEDICINE

## 2018-08-16 PROCEDURE — 96402 CHEMO HORMON ANTINEOPL SQ/IM: CPT

## 2018-08-16 PROCEDURE — 74011250636 HC RX REV CODE- 250/636: Performed by: NURSE PRACTITIONER

## 2018-08-16 PROCEDURE — 74011250636 HC RX REV CODE- 250/636: Performed by: INTERNAL MEDICINE

## 2018-08-16 RX ORDER — HEPARIN 100 UNIT/ML
500 SYRINGE INTRAVENOUS AS NEEDED
Status: DISCONTINUED | OUTPATIENT
Start: 2018-08-16 | End: 2018-08-20 | Stop reason: HOSPADM

## 2018-08-16 RX ORDER — HEPARIN 100 UNIT/ML
SYRINGE INTRAVENOUS
Status: COMPLETED
Start: 2018-08-16 | End: 2018-08-16

## 2018-08-16 RX ORDER — SODIUM CHLORIDE 0.9 % (FLUSH) 0.9 %
10-40 SYRINGE (ML) INJECTION AS NEEDED
Status: DISCONTINUED | OUTPATIENT
Start: 2018-08-16 | End: 2018-08-20 | Stop reason: HOSPADM

## 2018-08-16 RX ADMIN — Medication 40 ML: at 12:25

## 2018-08-16 RX ADMIN — ERYTHROPOIETIN 40000 UNITS: 40000 INJECTION, SOLUTION INTRAVENOUS; SUBCUTANEOUS at 12:56

## 2018-08-16 RX ADMIN — DENOSUMAB 120 MG: 120 INJECTION SUBCUTANEOUS at 13:00

## 2018-08-16 RX ADMIN — SODIUM CHLORIDE, PRESERVATIVE FREE 500 UNITS: 5 INJECTION INTRAVENOUS at 12:25

## 2018-08-16 RX ADMIN — FULVESTRANT 500 MG: 50 INJECTION INTRAMUSCULAR at 13:05

## 2018-08-16 RX ADMIN — HEPARIN 500 UNITS: 100 SYRINGE at 12:25

## 2018-08-16 RX ADMIN — ERYTHROPOIETIN 20000 UNITS: 20000 INJECTION, SOLUTION INTRAVENOUS; SUBCUTANEOUS at 12:56

## 2018-08-16 NOTE — PROGRESS NOTES
[facility] Progress Note    Date: 2018    Name: Guero Mueller    MRN: [id number]         : 1945      Ms. Figueroa arrived in the [facility] today, at 1145, in stable condition, here for Q 2 Week, CBC/Procrit injection, Q 4 Week Faslodex Injections, Q 4 Week Xgeva Injection, and Monthly Port Flush. She was assessed and education was provided. Upon arrival to the [facility] today, Ms. Luis Rodríguez voiced no complaints, and stated that she felt pretty good today. She also stated that she was ready to start her ordered Xgeva Injection today, that she had been previously holding off on since around 2018. Ms. Nicolas Ruiz vitals were reviewed. Visit Vitals    /62 (BP 1 Location: Left arm, BP Patient Position: At rest;Sitting)    Pulse 78    Temp 98.2 °F (36.8 °C)    Resp 16    Ht 5' 4.5\" (1.638 m)    Wt 68 kg (150 lb)    BMI 25.35 kg/m2       Her most recent CMP results from 18 were reviewed, and results were noted to be satisfactory for treatment today. Results for Radha Villalba (MRN [id number])    Ref.  Range 2018 10:53   Sodium Latest Ref Range: 136 - 145 mmol/L 142   Potassium Latest Ref Range: 3.5 - 5.5 mmol/L 4.4   Chloride Latest Ref Range: 100 - 108 mmol/L 108   CO2 Latest Ref Range: 21 - 32 mmol/L 28   Anion gap Latest Ref Range: 3.0 - 18 mmol/L 6   Glucose Latest Ref Range: 74 - 99 mg/dL 102 (H)   BUN Latest Ref Range: 7.0 - 18 MG/DL 16   Creatinine Latest Ref Range: 0.6 - 1.3 MG/DL 0.95   BUN/Creatinine ratio Latest Ref Range: 12 - 20   17   Calcium Latest Ref Range: 8.5 - 10.1 MG/DL 9.1   GFR est non-AA Latest Ref Range: >60 ml/min/1.73m2 58 (L)   GFR est AA Latest Ref Range: >60 ml/min/1.73m2 >60   Bilirubin, total Latest Ref Range: 0.2 - 1.0 MG/DL 0.6   Protein, total Latest Ref Range: 6.4 - 8.2 g/dL 6.7   Albumin Latest Ref Range: 3.4 - 5.0 g/dL 3.8   Globulin Latest Ref Range: 2.0 - 4.0 g/dL 2.9   A-G Ratio Latest Ref Range: 0.8 - 1.7   1.3   ALT (SGPT) Latest Ref Range: 13 - 56 U/L 22   AST Latest Ref Range: 15 - 37 U/L 22   Alk. phosphatase Latest Ref Range: 45 - 117 U/L 68   Iron Latest Ref Range: 50 - 175 ug/dL 60   TIBC Latest Ref Range: 250 - 450 ug/dL 307   Iron % saturation Latest Units: % 20   Ferritin Latest Ref Range: 8 - 388 NG/ML 91           Her left chest single lumen port was accessed without incident at 1225, and blood was drawn from the port for a CBC, CMP, Magnesium, and Phosphorus, per order. And then, the port was flushed well per protocol and without incident, and the carbone needle was removed and gauze/bandaid was applied. (The CBC was processed in house via [employer], and the remaining labs were sent out to be processed. )        Recent Results (from the past 12 hour(s))   CBC WITH AUTOMATED DIFF    Collection Time: 08/16/18 12:25 PM   Result Value Ref Range    WBC 1.7 (L) 4.6 - 13.2 K/uL    RBC 2.67 (L) 4.20 - 5.30 M/uL    HGB 9.1 (L) 12.0 - 16.0 g/dL    HCT 26.7 (L) 35.0 - 45.0 %    .0 (H) 74.0 - 97.0 FL    MCH 34.1 (H) 24.0 - 34.0 PG    MCHC 34.1 31.0 - 37.0 g/dL    RDW 16.2 (H) 11.6 - 14.5 %    PLATELET 778 (L) 131 - 420 K/uL    MPV 11.1 9.2 - 11.8 FL    NEUTROPHILS 59 40 - 73 %    LYMPHOCYTES 22 21 - 52 %    MONOCYTES 17 (H) 3 - 10 %    EOSINOPHILS 1 0 - 5 %    BASOPHILS 1 0 - 2 %    ABS. NEUTROPHILS 1.0 (L) 1.8 - 8.0 K/UL    ABS. LYMPHOCYTES 0.4 (L) 0.9 - 3.6 K/UL    ABS. MONOCYTES 0.3 0.05 - 1.2 K/UL    ABS. EOSINOPHILS 0.0 0.0 - 0.4 K/UL    ABS. BASOPHILS 0.0 0.0 - 0.1 K/UL    DF AUTOMATED         The above CBC results from today, were reviewed, and the low WBC/ANC results were reported to Miguel Bach NP. Also, Sameera Rincon was made aware that, per Ms. Figueroa, she was due to resume her oral Ibrance tomorrow. No new orders were received, and Ms. Figueroa was instructed per Jennie, to proceed with her Ibrance on schedule.      Ms. Quita Villalobos was instructed on Neutropenic Precautions, and was given instruction on Xgeva, to be started today. She verbalized understanding. Procrit 60,000 units, was administered SQ, in her left arm at 1256, per order, and without incident. Xgeva 120 mg, was administered SQ, in her left arm at 1300, per order, and without incident. Faslodex 500 mg Total Dose, was administered IM, in 2 equally divided doses of 250 mg, at 1305, per order, and without incident. (250 mg was administered IM, in her right gluteal muscle, and 250 mg was administered IM, in her left gluteal muscle)           Ms. John Downing tolerated well, and had no complaints. Ms. John Downing was discharged from [place] [address] in stable condition at 1315. Lucian Marino She is to return in 2 weeks, on Thursday, 8-30-18, at 1100,  for her next appointment, for Q 2 Week CBC/Procrit injection. And then, she is to return in 4 weeks, on Thursday, 9-13-18 at 1100, for CBC/Procrit injection, and Q 4 Week, Faslodex Injections, Xgeva Injection, and Port Flush.      Juan C Fernández RN  August 16, 2018  2:15 PM

## 2018-08-30 ENCOUNTER — CLINICAL SUPPORT (OUTPATIENT)
Dept: ONCOLOGY | Age: 73
End: 2018-08-30

## 2018-08-30 ENCOUNTER — HOSPITAL ENCOUNTER (OUTPATIENT)
Dept: INFUSION THERAPY | Age: 73
Discharge: HOME | End: 2018-08-30
Payer: MEDICARE

## 2018-08-30 ENCOUNTER — HOSPITAL ENCOUNTER (OUTPATIENT)
Dept: ONCOLOGY | Age: 73
Discharge: HOME | End: 2018-08-30

## 2018-08-30 VITALS
HEART RATE: 72 BPM | RESPIRATION RATE: 16 BRPM | DIASTOLIC BLOOD PRESSURE: 58 MMHG | TEMPERATURE: 97.2 F | SYSTOLIC BLOOD PRESSURE: 127 MMHG

## 2018-08-30 DIAGNOSIS — T45.1X5A ANEMIA DUE TO CHEMOTHERAPY: Primary | ICD-10-CM

## 2018-08-30 DIAGNOSIS — T45.1X5A ANEMIA DUE TO CHEMOTHERAPY: ICD-10-CM

## 2018-08-30 DIAGNOSIS — D64.81 ANEMIA DUE TO CHEMOTHERAPY: Primary | ICD-10-CM

## 2018-08-30 DIAGNOSIS — D64.81 ANEMIA DUE TO CHEMOTHERAPY: ICD-10-CM

## 2018-08-30 LAB
BASO+EOS+MONOS # BLD AUTO: 0.1 K/UL (ref 0–2.3)
BASO+EOS+MONOS # BLD AUTO: 5 % (ref 0.1–17)
DIFFERENTIAL METHOD BLD: ABNORMAL
ERYTHROCYTE [DISTWIDTH] IN BLOOD BY AUTOMATED COUNT: 17 % (ref 11.5–14.5)
HCT VFR BLD AUTO: 31.4 % (ref 36–48)
HGB BLD-MCNC: 10.6 G/DL (ref 12–16)
LYMPHOCYTES # BLD: 0.3 K/UL (ref 1.1–5.9)
LYMPHOCYTES NFR BLD: 17 % (ref 14–44)
MCH RBC QN AUTO: 34.5 PG (ref 25–35)
MCHC RBC AUTO-ENTMCNC: 33.8 G/DL (ref 31–37)
MCV RBC AUTO: 102.3 FL (ref 78–102)
NEUTS SEG # BLD: 1.4 K/UL (ref 1.8–9.5)
NEUTS SEG NFR BLD: 78 % (ref 40–70)
PLATELET # BLD AUTO: 221 K/UL (ref 140–440)
RBC # BLD AUTO: 3.07 M/UL (ref 4.1–5.1)
WBC # BLD AUTO: 1.8 K/UL (ref 4.5–13)

## 2018-08-30 PROCEDURE — 36415 COLL VENOUS BLD VENIPUNCTURE: CPT

## 2018-08-30 NOTE — PROGRESS NOTES
[facility] Progress Note Date: 2018 Name: Melissa Naylor MRN: [id number] : 1945 Ms. Figueroa arrived in the [place] today, at 1130, in stable condition, here for Q 2 Week, CBC/Procrit injection. She was assessed and education was provided. Ms. Opal Wu vitals were reviewed. Visit Vitals  /58 (BP 1 Location: Left arm, BP Patient Position: At rest;Sitting)  Pulse 72  Temp 97.2 °F (36.2 °C)  Resp 16 Blood for a CBC was drawn from her left AC at 1150, without incident. Lab results were obtained and reviewed. Recent Results (from the past 12 hour(s)) CBC WITH 3 PART DIFF Collection Time: 18 11:50 AM  
Result Value Ref Range WBC 1.8 (L) 4.5 - 13.0 K/uL  
 RBC 3.07 (L) 4.10 - 5.10 M/uL  
 HGB 10.6 (L) 12.0 - 16.0 g/dL HCT 31.4 (L) 36 - 48 % .3 (H) 78 - 102 FL  
 MCH 34.5 25.0 - 35.0 PG  
 MCHC 33.8 31 - 37 g/dL  
 RDW 17.0 (H) 11.5 - 14.5 % PLATELET 574 857 - 224 K/uL NEUTROPHILS 78 (H) 40 - 70 % MIXED CELLS 5 0.1 - 17 % LYMPHOCYTES 17 14 - 44 % ABS. NEUTROPHILS 1.4 (L) 1.8 - 9.5 K/UL  
 ABS. MIXED CELLS 0.1 0.0 - 2.3 K/uL  
 ABS. LYMPHOCYTES 0.3 (L) 1.1 - 5.9 K/UL  
 DF AUTOMATED Procrit 60,000 units was HELD, per order. Ms. Sita Griggs tolerated well, and had no complaints. Ms. Sita Griggs was discharged from [place] [address] in stable condition at 1200. Chelo Dougherty She is to return in 2 weeks, on Thursday, 18, at 1100,  for her next appointment, for Q 2 Week CBC/Procrit injection, and Q 4 Week, Faslodex Injections, Xgeva Injection, and Port Flush. Melania Hightower, MARA 2018 2:15 PM

## 2018-09-06 RX ORDER — LAMOTRIGINE 25 MG/1
500 TABLET ORAL ONCE
Status: DISPENSED | OUTPATIENT
Start: 2018-09-13 | End: 2018-09-13

## 2018-09-07 NOTE — PROGRESS NOTES
Ms. Luis Rodríguez was called and [facility] aware, [facility] from 1100, to 1300, and she agreed to the time change.

## 2018-09-11 NOTE — PROGRESS NOTES
Ms. Neo Edmond was called today and made aware, that her [facility] appointment scheduled for this Thursday, 9-13-18, for Q 2 Week, CBC/Procrit, and Q 4 week, Xgeva & Faslodex Injections and Monthly port flush has been cancelled due to the impending inclement weather. And, per her choice, she will return to the [facility] in 2 weeks, on Thursday, 9-27-18 at 1100, for all of the above.

## 2018-09-13 ENCOUNTER — HOSPITAL ENCOUNTER (OUTPATIENT)
Dept: INFUSION THERAPY | Age: 73
Discharge: HOME | End: 2018-09-13
Payer: MEDICARE

## 2018-09-20 RX ORDER — LAMOTRIGINE 25 MG/1
500 TABLET ORAL ONCE
Status: COMPLETED | OUTPATIENT
Start: 2018-09-27 | End: 2018-09-27

## 2018-09-27 ENCOUNTER — HOSPITAL ENCOUNTER (OUTPATIENT)
Dept: LAB | Age: 73
Discharge: HOME | End: 2018-09-27
Payer: MEDICARE

## 2018-09-27 ENCOUNTER — HOSPITAL ENCOUNTER (OUTPATIENT)
Dept: INFUSION THERAPY | Age: 73
Discharge: HOME | End: 2018-09-27
Payer: MEDICARE

## 2018-09-27 ENCOUNTER — HOSPITAL ENCOUNTER (OUTPATIENT)
Dept: ONCOLOGY | Age: 73
Discharge: HOME | End: 2018-09-27

## 2018-09-27 ENCOUNTER — CLINICAL SUPPORT (OUTPATIENT)
Dept: ONCOLOGY | Age: 73
End: 2018-09-27

## 2018-09-27 VITALS
OXYGEN SATURATION: 97 % | DIASTOLIC BLOOD PRESSURE: 66 MMHG | RESPIRATION RATE: 16 BRPM | HEART RATE: 76 BPM | TEMPERATURE: 97.9 F | SYSTOLIC BLOOD PRESSURE: 122 MMHG | WEIGHT: 150 LBS | BODY MASS INDEX: 24.99 KG/M2 | HEIGHT: 65 IN

## 2018-09-27 DIAGNOSIS — D64.81 ANTINEOPLASTIC CHEMOTHERAPY INDUCED ANEMIA: Primary | ICD-10-CM

## 2018-09-27 DIAGNOSIS — D64.81 ANTINEOPLASTIC CHEMOTHERAPY INDUCED ANEMIA: ICD-10-CM

## 2018-09-27 DIAGNOSIS — T45.1X5A ANTINEOPLASTIC CHEMOTHERAPY INDUCED ANEMIA: ICD-10-CM

## 2018-09-27 DIAGNOSIS — T45.1X5A ANTINEOPLASTIC CHEMOTHERAPY INDUCED ANEMIA: Primary | ICD-10-CM

## 2018-09-27 LAB
ALBUMIN SERPL-MCNC: 3.7 G/DL (ref 3.4–5)
ALBUMIN/GLOB SERPL: 1.3 {RATIO} (ref 0.8–1.7)
ALP SERPL-CCNC: 61 U/L (ref 45–117)
ALP SERPL-CCNC: 62 U/L (ref 45–117)
ALT SERPL-CCNC: 17 U/L (ref 13–56)
ANION GAP SERPL CALC-SCNC: 7 MMOL/L (ref 3–18)
AST SERPL-CCNC: 15 U/L (ref 15–37)
BASO+EOS+MONOS # BLD AUTO: 0.2 K/UL (ref 0–2.3)
BASO+EOS+MONOS # BLD AUTO: 8 % (ref 0.1–17)
BILIRUB SERPL-MCNC: 0.5 MG/DL (ref 0.2–1)
BUN SERPL-MCNC: 20 MG/DL (ref 7–18)
BUN/CREAT SERPL: 16 (ref 12–20)
CALCIUM SERPL-MCNC: 8.7 MG/DL (ref 8.5–10.1)
CHLORIDE SERPL-SCNC: 110 MMOL/L (ref 100–108)
CO2 SERPL-SCNC: 26 MMOL/L (ref 21–32)
CREAT SERPL-MCNC: 1.25 MG/DL (ref 0.6–1.3)
DIFFERENTIAL METHOD BLD: ABNORMAL
ERYTHROCYTE [DISTWIDTH] IN BLOOD BY AUTOMATED COUNT: 16.1 % (ref 11.5–14.5)
GLOBULIN SER CALC-MCNC: 2.8 G/DL (ref 2–4)
GLUCOSE SERPL-MCNC: 91 MG/DL (ref 74–99)
HCT VFR BLD AUTO: 29.1 % (ref 36–48)
HGB BLD-MCNC: 9.9 G/DL (ref 12–16)
LYMPHOCYTES # BLD: 0.3 K/UL (ref 1.1–5.9)
LYMPHOCYTES NFR BLD: 16 % (ref 14–44)
MAGNESIUM SERPL-MCNC: 1.6 MG/DL (ref 1.6–2.6)
MCH RBC QN AUTO: 34.5 PG (ref 25–35)
MCHC RBC AUTO-ENTMCNC: 34 G/DL (ref 31–37)
MCV RBC AUTO: 101.4 FL (ref 78–102)
NEUTS SEG # BLD: 1.5 K/UL (ref 1.8–9.5)
NEUTS SEG NFR BLD: 76 % (ref 40–70)
PLATELET # BLD AUTO: 282 K/UL (ref 140–440)
POTASSIUM SERPL-SCNC: 4.3 MMOL/L (ref 3.5–5.5)
PROT SERPL-MCNC: 6.5 G/DL (ref 6.4–8.2)
RBC # BLD AUTO: 2.87 M/UL (ref 4.1–5.1)
SODIUM SERPL-SCNC: 143 MMOL/L (ref 136–145)
WBC # BLD AUTO: 2 K/UL (ref 4.5–13)

## 2018-09-27 PROCEDURE — 96372 THER/PROPH/DIAG INJ SC/IM: CPT

## 2018-09-27 PROCEDURE — 74011250636 HC RX REV CODE- 250/636: Performed by: INTERNAL MEDICINE

## 2018-09-27 PROCEDURE — 36591 DRAW BLOOD OFF VENOUS DEVICE: CPT

## 2018-09-27 PROCEDURE — 83735 ASSAY OF MAGNESIUM: CPT | Performed by: INTERNAL MEDICINE

## 2018-09-27 PROCEDURE — 84075 ASSAY ALKALINE PHOSPHATASE: CPT | Performed by: INTERNAL MEDICINE

## 2018-09-27 PROCEDURE — 80053 COMPREHEN METABOLIC PANEL: CPT | Performed by: INTERNAL MEDICINE

## 2018-09-27 PROCEDURE — 74011250636 HC RX REV CODE- 250/636: Performed by: NURSE PRACTITIONER

## 2018-09-27 PROCEDURE — 77030012965 HC NDL HUBR BBMI -A

## 2018-09-27 RX ORDER — HEPARIN 100 UNIT/ML
500 SYRINGE INTRAVENOUS AS NEEDED
Status: DISCONTINUED | OUTPATIENT
Start: 2018-09-27 | End: 2018-10-01 | Stop reason: HOSPADM

## 2018-09-27 RX ORDER — SODIUM CHLORIDE 0.9 % (FLUSH) 0.9 %
10-40 SYRINGE (ML) INJECTION AS NEEDED
Status: DISCONTINUED | OUTPATIENT
Start: 2018-09-27 | End: 2018-10-01 | Stop reason: HOSPADM

## 2018-09-27 RX ADMIN — ERYTHROPOIETIN 20000 UNITS: 20000 INJECTION, SOLUTION INTRAVENOUS; SUBCUTANEOUS at 11:53

## 2018-09-27 RX ADMIN — Medication 30 ML: at 11:30

## 2018-09-27 RX ADMIN — DENOSUMAB 120 MG: 120 INJECTION SUBCUTANEOUS at 11:53

## 2018-09-27 RX ADMIN — SODIUM CHLORIDE, PRESERVATIVE FREE 500 UNITS: 5 INJECTION INTRAVENOUS at 11:32

## 2018-09-27 RX ADMIN — FULVESTRANT 500 MG: 50 INJECTION INTRAMUSCULAR at 11:54

## 2018-09-27 RX ADMIN — ERYTHROPOIETIN 40000 UNITS: 40000 INJECTION, SOLUTION INTRAVENOUS; SUBCUTANEOUS at 11:53

## 2018-09-27 NOTE — PROGRESS NOTES
[facility] Progress Note Date: 2018 Name: Adali Shultz MRN: [id number] : 1945 Ms. Figueroa arrived in the [facility] today, at 1105, in stable condition, here for Q 2 Week, CBC/Procrit injection, Q 4 Week Faslodex Injections, Q 4 Week Xgeva Injection, and Monthly Port Flush. She was assessed and education was provided. Upon arrival to the [facility] today, Ms. Tana Rothman voiced no complaints, and stated that she felt pretty good today. Ms. Merritt Elliott vitals were reviewed. Visit Vitals  /66 (BP 1 Location: Left arm, BP Patient Position: Sitting)  Pulse 76  Temp 97.9 °F (36.6 °C)  Resp 16  
 Ht 5' 4.5\" (1.638 m)  Wt 68 kg (150 lb)  SpO2 97%  BMI 25.35 kg/m2 Her most recent CMP results from 18 were reviewed, and results were noted to be satisfactory for treatment today. Ca 8.8 Mag 1.7 Phos 2.5 Her left chest single lumen port was accessed without incident, and blood was drawn from the port for a CBC, CMP, Magnesium, and Phosphorus, per order. And then, the port was flushed well per protocol and without incident, and the carbone needle was removed and gauze/bandaid was applied. (The CBC was processed in house via [employer], and the remaining labs were sent out to be processed. ) Recent Results (from the past 12 hour(s)) CBC WITH 3 PART DIFF Collection Time: 18 11:32 AM  
Result Value Ref Range WBC 2.0 (L) 4.5 - 13.0 K/uL  
 RBC 2.87 (L) 4.10 - 5.10 M/uL HGB 9.9 (L) 12.0 - 16.0 g/dL HCT 29.1 (L) 36 - 48 % .4 78 - 102 FL  
 MCH 34.5 25.0 - 35.0 PG  
 MCHC 34.0 31 - 37 g/dL  
 RDW 16.1 (H) 11.5 - 14.5 % PLATELET 158 948 - 280 K/uL NEUTROPHILS 76 (H) 40 - 70 % MIXED CELLS 8 0.1 - 17 % LYMPHOCYTES 16 14 - 44 % ABS. NEUTROPHILS 1.5 (L) 1.8 - 9.5 K/UL  
 ABS. MIXED CELLS 0.2 0.0 - 2.3 K/uL  
 ABS. LYMPHOCYTES 0.3 (L) 1.1 - 5.9 K/UL  
 DF AUTOMATED    
 
 
WBC and ANC improved since last visit. Procrit 60,000 units, was administered SQ, in her left arm, per order, and without incident. Xgeva 120 mg, was administered SQ, in her left arm, per order, and without incident. Faslodex 500 mg Total Dose, was administered IM, in 2 equally divided doses of 250 mg, at 1305, per order, and without incident. (250 mg was administered IM, in her right gluteal muscle, and 250 mg was administered IM, in her left gluteal muscle) Ms. Diony Sparks tolerated well, and had no complaints. Ms. Diony Sparks was discharged from [place] [address] in stable condition at 1215. She is to return in 2 weeks, on Thursday, 10-11-18, at 1100,  for her next appointment, for Q 2 Week CBC/Procrit injection. Timothy Briones RN September 27, 2018 [address] [place] [address]

## 2018-10-11 ENCOUNTER — CLINICAL SUPPORT (OUTPATIENT)
Dept: ONCOLOGY | Age: 73
End: 2018-10-11

## 2018-10-11 ENCOUNTER — HOSPITAL ENCOUNTER (OUTPATIENT)
Dept: INFUSION THERAPY | Age: 73
Discharge: HOME | End: 2018-10-11
Payer: MEDICARE

## 2018-10-11 ENCOUNTER — HOSPITAL ENCOUNTER (OUTPATIENT)
Dept: ONCOLOGY | Age: 73
Discharge: HOME | End: 2018-10-11

## 2018-10-11 VITALS
RESPIRATION RATE: 20 BRPM | DIASTOLIC BLOOD PRESSURE: 63 MMHG | TEMPERATURE: 97.6 F | HEART RATE: 74 BPM | SYSTOLIC BLOOD PRESSURE: 128 MMHG

## 2018-10-11 DIAGNOSIS — D64.81 ANEMIA DUE TO CHEMOTHERAPY: ICD-10-CM

## 2018-10-11 DIAGNOSIS — T45.1X5A ANEMIA DUE TO CHEMOTHERAPY: ICD-10-CM

## 2018-10-11 DIAGNOSIS — T45.1X5A ANEMIA DUE TO CHEMOTHERAPY: Primary | ICD-10-CM

## 2018-10-11 DIAGNOSIS — D64.81 ANEMIA DUE TO CHEMOTHERAPY: Primary | ICD-10-CM

## 2018-10-11 LAB
BASO+EOS+MONOS # BLD AUTO: 0.2 K/UL (ref 0–2.3)
BASO+EOS+MONOS # BLD AUTO: 7 % (ref 0.1–17)
DIFFERENTIAL METHOD BLD: ABNORMAL
ERYTHROCYTE [DISTWIDTH] IN BLOOD BY AUTOMATED COUNT: 17.2 % (ref 11.5–14.5)
HCT VFR BLD AUTO: 29.2 % (ref 36–48)
HGB BLD-MCNC: 9.8 G/DL (ref 12–16)
LYMPHOCYTES # BLD: 0.3 K/UL (ref 1.1–5.9)
LYMPHOCYTES NFR BLD: 15 % (ref 14–44)
MCH RBC QN AUTO: 34.4 PG (ref 25–35)
MCHC RBC AUTO-ENTMCNC: 33.6 G/DL (ref 31–37)
MCV RBC AUTO: 102.5 FL (ref 78–102)
NEUTS SEG # BLD: 1.7 K/UL (ref 1.8–9.5)
NEUTS SEG NFR BLD: 78 % (ref 40–70)
PLATELET # BLD AUTO: 102 K/UL (ref 140–440)
RBC # BLD AUTO: 2.85 M/UL (ref 4.1–5.1)
WBC # BLD AUTO: 2.2 K/UL (ref 4.5–13)

## 2018-10-11 PROCEDURE — 74011250636 HC RX REV CODE- 250/636: Performed by: INTERNAL MEDICINE

## 2018-10-11 PROCEDURE — 36415 COLL VENOUS BLD VENIPUNCTURE: CPT

## 2018-10-11 PROCEDURE — 96372 THER/PROPH/DIAG INJ SC/IM: CPT

## 2018-10-11 RX ADMIN — ERYTHROPOIETIN 40000 UNITS: 40000 INJECTION, SOLUTION INTRAVENOUS; SUBCUTANEOUS at 12:22

## 2018-10-11 RX ADMIN — ERYTHROPOIETIN 20000 UNITS: 20000 INJECTION, SOLUTION INTRAVENOUS; SUBCUTANEOUS at 12:22

## 2018-10-11 NOTE — PROGRESS NOTES
[facility] Progress Note Date: 2018 Name: Fallon Rdz MRN: [id number] : 1945 Ms. Figueroa arrived in the [facility] today, at 1130, in stable condition, here for Q 2 Week, CBC/Procrit injection. She was assessed and education was provided. Ms. Gerardo Perez vitals were reviewed. Visit Vitals  /63 (BP 1 Location: Left arm, BP Patient Position: Sitting)  Pulse 74  Temp 97.6 °F (36.4 °C)  Resp 20  Breastfeeding No  
 
 
 
 
Blood for a CBC was drawn from her left AC at 1148, without incident. Lab results were obtained and reviewed. Recent Results (from the past 12 hour(s)) CBC WITH 3 PART DIFF Collection Time: 10/11/18 11:48 AM  
Result Value Ref Range WBC 2.2 (L) 4.5 - 13.0 K/uL  
 RBC 2.85 (L) 4.10 - 5.10 M/uL HGB 9.8 (L) 12.0 - 16.0 g/dL HCT 29.2 (L) 36 - 48 % .5 (H) 78 - 102 FL  
 MCH 34.4 25.0 - 35.0 PG  
 MCHC 33.6 31 - 37 g/dL  
 RDW 17.2 (H) 11.5 - 14.5 % PLATELET 394 (L) 662 - 440 K/uL NEUTROPHILS 78 (H) 40 - 70 % MIXED CELLS 7 0.1 - 17 % LYMPHOCYTES 15 14 - 44 % ABS. NEUTROPHILS 1.7 (L) 1.8 - 9.5 K/UL  
 ABS. MIXED CELLS 0.2 0.0 - 2.3 K/uL  
 ABS. LYMPHOCYTES 0.3 (L) 1.1 - 5.9 K/UL  
 DF AUTOMATED Procrit 60,000 units was administered SQ, in her left arm at 1222, per order, and without incident. Ms. Neo Edmond tolerated well, and had no complaints. Ms. Neo Edmond was discharged from [place] [address] in stable condition at 1230. Marisol Healy She is to return in 2 weeks, on Thursday, 10-25-18, at 1100,  for her next appointment, for Q 2 Week CBC/Procrit injection, and Q 4 Week, Faslodex Injections, Xgeva Injection, and Port Flush. Sam Hoff RN 2018 2:15 PM

## 2018-10-16 ENCOUNTER — OFFICE VISIT (OUTPATIENT)
Dept: ONCOLOGY | Age: 73
End: 2018-10-16

## 2018-10-16 ENCOUNTER — HOSPITAL ENCOUNTER (OUTPATIENT)
Dept: LAB | Age: 73
Discharge: HOME | End: 2018-10-16
Payer: MEDICARE

## 2018-10-16 ENCOUNTER — HOSPITAL ENCOUNTER (OUTPATIENT)
Dept: ONCOLOGY | Age: 73
Discharge: HOME | End: 2018-10-16

## 2018-10-16 VITALS
WEIGHT: 152 LBS | HEIGHT: 64 IN | TEMPERATURE: 98.3 F | RESPIRATION RATE: 16 BRPM | HEART RATE: 87 BPM | BODY MASS INDEX: 25.95 KG/M2 | DIASTOLIC BLOOD PRESSURE: 63 MMHG | SYSTOLIC BLOOD PRESSURE: 121 MMHG

## 2018-10-16 DIAGNOSIS — C50.919 METASTATIC BREAST CANCER (HCC): ICD-10-CM

## 2018-10-16 DIAGNOSIS — D64.81 ANEMIA DUE TO ANTINEOPLASTIC CHEMOTHERAPY: ICD-10-CM

## 2018-10-16 DIAGNOSIS — T45.1X5A ANEMIA DUE TO ANTINEOPLASTIC CHEMOTHERAPY: ICD-10-CM

## 2018-10-16 DIAGNOSIS — C80.1 NEUROPATHY ASSOCIATED WITH CANCER (HCC): ICD-10-CM

## 2018-10-16 DIAGNOSIS — G63 NEUROPATHY ASSOCIATED WITH CANCER (HCC): ICD-10-CM

## 2018-10-16 DIAGNOSIS — T45.1X5A CHEMOTHERAPY-INDUCED THROMBOCYTOPENIA: ICD-10-CM

## 2018-10-16 DIAGNOSIS — E55.9 VITAMIN D DEFICIENCY: ICD-10-CM

## 2018-10-16 DIAGNOSIS — D72.819 CHRONIC LEUKOPENIA: ICD-10-CM

## 2018-10-16 DIAGNOSIS — D69.59 CHEMOTHERAPY-INDUCED THROMBOCYTOPENIA: ICD-10-CM

## 2018-10-16 DIAGNOSIS — C50.919 METASTATIC BREAST CANCER (HCC): Primary | ICD-10-CM

## 2018-10-16 LAB
ALBUMIN SERPL-MCNC: 3.6 G/DL (ref 3.4–5)
ALBUMIN/GLOB SERPL: 1.3 {RATIO} (ref 0.8–1.7)
ALP SERPL-CCNC: 57 U/L (ref 45–117)
ALT SERPL-CCNC: 16 U/L (ref 13–56)
ANION GAP SERPL CALC-SCNC: 7 MMOL/L (ref 3–18)
AST SERPL-CCNC: 17 U/L (ref 15–37)
BASO+EOS+MONOS # BLD AUTO: 0.2 K/UL (ref 0–2.3)
BASO+EOS+MONOS # BLD AUTO: 13 % (ref 0.1–17)
BILIRUB SERPL-MCNC: 0.5 MG/DL (ref 0.2–1)
BUN SERPL-MCNC: 13 MG/DL (ref 7–18)
BUN/CREAT SERPL: 14 (ref 12–20)
CALCIUM SERPL-MCNC: 8.4 MG/DL (ref 8.5–10.1)
CHLORIDE SERPL-SCNC: 111 MMOL/L (ref 100–108)
CO2 SERPL-SCNC: 25 MMOL/L (ref 21–32)
CREAT SERPL-MCNC: 0.9 MG/DL (ref 0.6–1.3)
DIFFERENTIAL METHOD BLD: ABNORMAL
ERYTHROCYTE [DISTWIDTH] IN BLOOD BY AUTOMATED COUNT: 18.5 % (ref 11.5–14.5)
FERRITIN SERPL-MCNC: 99 NG/ML (ref 8–388)
GLOBULIN SER CALC-MCNC: 2.8 G/DL (ref 2–4)
GLUCOSE SERPL-MCNC: 129 MG/DL (ref 74–99)
HCT VFR BLD AUTO: 29.3 % (ref 36–48)
HGB BLD-MCNC: 9.6 G/DL (ref 12–16)
IRON SATN MFR SERPL: 16 %
IRON SERPL-MCNC: 50 UG/DL (ref 50–175)
LYMPHOCYTES # BLD: 0.4 K/UL (ref 1.1–5.9)
LYMPHOCYTES NFR BLD: 21 % (ref 14–44)
MCH RBC QN AUTO: 34.2 PG (ref 25–35)
MCHC RBC AUTO-ENTMCNC: 32.8 G/DL (ref 31–37)
MCV RBC AUTO: 104.3 FL (ref 78–102)
NEUTS SEG # BLD: 1.2 K/UL (ref 1.8–9.5)
NEUTS SEG NFR BLD: 66 % (ref 40–70)
PLATELET # BLD AUTO: 153 K/UL (ref 140–440)
POTASSIUM SERPL-SCNC: 3.3 MMOL/L (ref 3.5–5.5)
PROT SERPL-MCNC: 6.4 G/DL (ref 6.4–8.2)
RBC # BLD AUTO: 2.81 M/UL (ref 4.1–5.1)
SODIUM SERPL-SCNC: 143 MMOL/L (ref 136–145)
TIBC SERPL-MCNC: 307 UG/DL (ref 250–450)
WBC # BLD AUTO: 1.8 K/UL (ref 4.5–13)

## 2018-10-16 PROCEDURE — 83550 IRON BINDING TEST: CPT | Performed by: INTERNAL MEDICINE

## 2018-10-16 PROCEDURE — 80053 COMPREHEN METABOLIC PANEL: CPT | Performed by: INTERNAL MEDICINE

## 2018-10-16 PROCEDURE — 86300 IMMUNOASSAY TUMOR CA 15-3: CPT | Performed by: INTERNAL MEDICINE

## 2018-10-16 PROCEDURE — 82728 ASSAY OF FERRITIN: CPT | Performed by: INTERNAL MEDICINE

## 2018-10-16 PROCEDURE — 36415 COLL VENOUS BLD VENIPUNCTURE: CPT | Performed by: INTERNAL MEDICINE

## 2018-10-16 NOTE — PROGRESS NOTES
Hematology/Oncology  Progress Note    Name: Kwasi Reyes  Date: 10/16/2018  : 1945    PCP: Timmy Arredondo MD     Ms. Sabina Krishnan is a 67 y.o.  female who was seen for management of her metastatic breast cancer with associated complications of chemotherapy. Current therapy: Fulvestrant 500 mg subcutaneous monthly and Ibrance 125 mg by mouth daily. Subjective:     Mrs. Sabina Krishnan is a 42-year-old woman who has slowly progressive metastatic breast cancer. She has previously completed external beam radiation therapy to lesions in her ribs and more recently she completed proton therapy to areas of bone involvement with a significant benefit with regards to pain control. The posttherapy imaging studies showed a significant decrease in the intensity of uptake on the bone scan. .  Additionally she is currently receiving systemic therapy with fulvestrant and Ibrance. Currently she is tolerating the systemic therapy reasonably well and has not experienced any nausea or emesis. She is no longer complaining of mouth discomfort but she does report some fatigue and occasional weakness. She continues to have SOB intermittently. She also receives Procrit at 2 week intervals whenever her hemoglobin is below 10 g/dL and hematocrit is below 30%. Today she is complaining that [facility] has not sent her medication in the mail. She is very angry. Additionally she feels that she is having some musculoskeletal pain at the site of her recent Faslodex injection and is requesting a delay in getting her next treatment. Past medical history, family history, and social history: these were reviewed and remains unchanged.     Past Medical History:   Diagnosis Date    Biliary colic     Breast CA ([facility] [address] 2012    Cancer [facility])     Right Breast    Chemotherapy convalescence or palliative care     Neuropathy     Radiation therapy complication     Thyroid disease      Past Surgical History:   Procedure Laterality Date    BREAST SURGERY PROCEDURE UNLISTED  10/2002    Right-Lesion    BREAST SURGERY PROCEDURE UNLISTED  11/2004    Right-Lesion    HX LAP CHOLECYSTECTOMY  9-19-13    HX MASTECTOMY  1991    Right     Social History     Social History    Marital status:      Spouse name: N/A    Number of children: N/A    Years of education: N/A     Occupational History    Not on file. Social History Main Topics    Smoking status: Former Smoker     Quit date: 6/2/1991    Smokeless tobacco: Never Used    Alcohol use 0.0 oz/week     1 - 2 Glasses of wine per week      Comment: socially, occassionally    Drug use: No    Sexual activity: Not on file     Other Topics Concern    Not on file     Social History Narrative     Family History   Problem Relation Age of Onset    Cancer Maternal Aunt      Hodgkin's disease    Breast Cancer Paternal Aunt     Cancer Father      Prostate, lung, brain     Current Outpatient Prescriptions   Medication Sig Dispense Refill    lidocaine HCl-hydrocortison ac topical cream Apply  to affected area two (2) times a day. 85 g 3    gabapentin (NEURONTIN) 100 mg capsule Take 1 Cap by mouth three (3) times daily. 90 Cap 6    palbociclib (IBRANCE) 100 mg cap Take 1 Cap by mouth daily. For 21 days on and 7 days off every 28 days. 63 Cap 5    gabapentin (NEURONTIN) 300 mg capsule TAKE 1 CAPSULE THREE TIMES A  Cap 2    TETRACYCLINE HCL (TETRACYCLINE PO) Take  by mouth.  fexofenadine-pseudoephedrine (ALLEGRA-D 24 HOUR) 180-240 mg per tablet Take 1 Tab by mouth daily.  sulfacetamide (BLEPH-10) 10 % ophthalmic ointment Administer  to right eye three (3) times daily.  palbociclib (IBRANCE) 125 mg cap Take 125 mg by mouth daily. Take 1 tab po every day for 21 days on and 7 days off q 28 days  Indications: HORMONE RECEPTOR(+), HER2(-) ADVANCED BREAST CANCER 42 Cap 6    ibuprofen (MOTRIN) 800 mg tablet Take 800 mg by mouth two (2) times a day.  Indications: OSTEOARTHRITIS  Cholecalciferol, Vitamin D3, 5,000 unit Tab Take  by mouth daily.  thyroid, Pork, (ARMOUR THYROID) 60 mg tablet Take 90 mg by mouth daily.  L-Mfolate-B6 Phos-Methyl-B12 (NEURPATH-B) 3-35-2 mg Tab Take  by mouth two (2) times a day.  warfarin (COUMADIN) 1 mg tablet Take 1 mg by mouth daily. Review of Systems  Constitutional: The patient has no acute distress or discomfort. HEENT: The patient denies recent head trauma, eye pain, blurred vision,  hearing deficit, oropharyngeal mucosal pain or lesions, and the patient denies throat pain or discomfort. Lymphatics: The patient denies palpable peripheral lymphadenopathy. Hematologic: The patient denies having bruising, bleeding, or progressive fatigue. Respiratory: Patient denies having shortness of breath, cough, sputum production, fever, or dyspnea on exertion. Cardiovascular: The patient denies having leg pain, leg swelling, heart palpitations, chest permit, chest pain, or lightheadedness. The patient denies having dyspnea on exertion. Gastrointestinal: The patient denies having nausea, emesis, or diarrhea. The patient denies having any hematemesis or blood in the stool. Genitourinary: Patient denies having urinary urgency, frequency, or dysuria. The patient denies having blood in the urine. Psychological: The patient denies having symptoms of nervousness, anxiety, depression, or thoughts of harming self. Skin: Patient denies having skin rashes, skin, ulcerations, or unexplained itching or pruritus. Musculoskeletal: The patient denies having pain in the joints or bones. Objective:     Visit Vitals    /63 (BP 1 Location: Left arm, BP Patient Position: Sitting)    Pulse 87    Temp 98.3 °F (36.8 °C) (Oral)    Resp 16    Ht 5' 4\" (1.626 m)    Wt 68.9 kg (152 lb)    BMI 26.09 kg/m2     ECOG PS=0; Pain score=0/10    Physical Exam:   Gen. Appearance: The patient is in no acute distress.   Skin: There is no bruise or rash. There is no evidence of cutaneous shingles over the lower back and flank. HEENT: The exam is unremarkable. Neck: Supple without lymphadenopathy or thyromegaly. Lungs: Clear to auscultation and percussion; there are no wheezes or rhonchi. Heart: Regular rate and rhythm; there are no murmurs, gallops, or rubs. Abdomen: Bowel sounds are present and normal.  There is no guarding, tenderness, or hepatosplenomegaly. Extremities: There is no clubbing, cyanosis, or edema. Neurologic: There are no focal neurologic deficits. Lymphatics: There is no palpable peripheral lymphadenopathy. Musculoskeletal: The patient has full range of motion at all joints. There is no evidence of joint deformity or effusions. There is no focal joint tenderness. Psychological/psychiatric: There is no clinical evidence of anxiety, depression, or melancholy. Lab data:      Results for orders placed or performed during the hospital encounter of 10/16/18   CBC WITH 3 PART DIFF     Status: Abnormal   Result Value Ref Range Status    WBC 1.8 (L) 4.5 - 13.0 K/uL Final    RBC 2.81 (L) 4.10 - 5.10 M/uL Final    HGB 9.6 (L) 12.0 - 16.0 g/dL Final    HCT 29.3 (L) 36 - 48 % Final    .3 (H) 78 - 102 FL Final    MCH 34.2 25.0 - 35.0 PG Final    MCHC 32.8 31 - 37 g/dL Final    RDW 18.5 (H) 11.5 - 14.5 % Final    PLATELET 651 150 - 531 K/uL Final    NEUTROPHILS 66 40 - 70 % Final    MIXED CELLS 13 0.1 - 17 % Final    LYMPHOCYTES 21 14 - 44 % Final    ABS. NEUTROPHILS 1.2 (L) 1.8 - 9.5 K/UL Final    ABS. MIXED CELLS 0.2 0.0 - 2.3 K/uL Final    ABS. LYMPHOCYTES 0.4 (L) 1.1 - 5.9 K/UL Final     Comment: Test performed at [address] Results Reviewed by Medical Director. DF AUTOMATED   Final           Assessment:     1. Metastatic breast cancer ([facility] [address]    2. Anemia due to antineoplastic chemotherapy    3. [facility]    4. Vitamin D deficiency    5. Chronic leukopenia    6. Chemotherapy-induced thrombocytopenia      Plan:   Metastatic breast cancer: The combination of Fulvestrant 500 mg subcutaneous monthly  will be continued. Ibrance 140 mg by mouth daily will be continued as well. We will contact [facility] to inquire about the delay in her medication. Because she is having pain over the injection site for her fulvestrant, she has been concerned that she may have shingles. We will hold off on giving the fulvestrant injections for 1 month. I have explained to the patient her most recent CA-27-29 level from 7/12/2018 was 48.7 units/mL. We will recheck her values at this time. The patient will now be scheduled for CT scans through the chest, abdomen, and pelvis. A bone scan will also be checked for restaging purposes to determine whether or not she is continuing to get a positive benefit from the current therapeutic regimen. Neuropathy associated with cancer: I will continue her Neurontin at 400 mg 3 times daily. Chemotherapy-induced neutropenia/chronic leukopenia (persisting problem): The current CBC shows that her WBC count is 1.8. The absolute neutrophil count is 1.2. Her current hemoglobin and hematocrit are 9.6 g/dL and 29.3 % respectively. .  If the absolute neutrophil count declines to 0.5 she will be started on Neupogen or Neulasta. She will get her labs drawn once a month. Chemotherapy-induced anemia (persisting problem): The new chemotherapy regimen has significantly decreased her hemoglobin. Her current hemoglobin is 9.6 g/dL with hematocrit of 29.3 %. At this time I will check iron profile and ferritin levels. I have recommended that the patient continue taking ferrous sulfate 1 tablet daily. Procrit at a dose of 60,000 units subcutaneous will be provided now that her hemoglobin has declined below 10 g/dL hematocrit is below 30%. Chemotherapy-induced thrombocytopenia : The patient is continuing the current dose of Ibrance 140 mg daily.   We have explained to her that the medication was responsible for her thrombocytopenia. The current platelet count is 096,118 we will continue to monitor platelet counts at 6 week intervals. Follow-up in 3 weeks to review the bone scan results and the CT results. Orders Placed This Encounter    COMPLETE CBC & AUTO DIFF WBC    InHouse CBC ([facility])     Standing Status:   Future     Number of Occurrences:   1     Standing Expiration Date:   56/35/3983    METABOLIC PANEL, COMPREHENSIVE     Standing Status:   Future     Standing Expiration Date:   10/17/2019    IRON PROFILE     Standing Status:   Future     Standing Expiration Date:   10/17/2019    FERRITIN     Standing Status:   Future     Standing Expiration Date:   10/17/2019    CA 27.29     Standing Status:   Future     Standing Expiration Date:   10/17/2019       Guillaume Larsen MD  10/16/2018      Please note: This document has been produced using voice recognition software. Unrecognized errors in transcription may be present.

## 2018-10-16 NOTE — MR AVS SNAPSHOT
[address] [facility] [address] [facility] [address] 
[phone] Patient: Argentina Miranda MRN: [id number] :1945 Visit Information Date & Time Provider Department Dept. Phone Encounter #  
 10/16/2018 10:30 AM MD Jay [facility]  [phone] [id number] Follow-up Instructions Return in about 3 weeks (around 2018). Your Appointments 2018  1:15 PM  
Office Visit with MD Jay Phillips Doctors  [facility]) Appt Note: [address] [facility] [address]  
[phone]  
  
   
 [facility] [address] Upcoming Health Maintenance Date Due Hepatitis C Screening 1945 DTaP/Tdap/Td series (1 - Tdap) 10/18/1966 Shingrix Vaccine Age 50> (1 of 2) 10/18/1995 FOBT Q 1 YEAR AGE 50-75 10/18/1995 GLAUCOMA SCREENING Q2Y 10/18/2010 Pneumococcal 65+ High/Highest Risk (1 of 2 - PCV13) 10/18/2010 MEDICARE YEARLY EXAM 3/14/2018 Influenza Age 5 to Adult 2018 BREAST CANCER SCRN MAMMOGRAM 2020 Allergies as of 10/16/2018  Review Complete On: 10/16/2018 By: Baron Mayank MD  
  
 Severity Noted Reaction Type Reactions Codeine  2012    Palpitations Darvocet A500 [Propoxyphene N-acetaminophen]  2012   Side Effect Nausea and Vomiting Darvon [Propoxyphene]  2014   Systemic Nausea and Vomiting Current Immunizations  Reviewed on 10/11/2018 Name Date Influenza Vaccine 10/18/2017, 2016, 2015, 2015, 2013, 2013 Influenza Vaccine Whole 2012 Not reviewed this visit You Were Diagnosed With   
  
 Codes Comments [facility]    -  Primary ICD-10-CM: Y55.469 ICD-9-CM: 174.9 Anemia due to antineoplastic chemotherapy     ICD-10-CM: D64.81, T45.1X5A 
ICD-9-CM: 285.3, E933.1 Neuropathy associated with cancer ([facility]     ICD-10-CM: C80.1, G63 ICD-9-CM: 199.1, 357.3 Vitamin D deficiency     ICD-10-CM: E55.9 ICD-9-CM: 268.9 Chronic leukopenia     ICD-10-CM: D72.819 ICD-9-CM: 288.50 Chemotherapy-induced thrombocytopenia     ICD-10-CM: D69.59, T45.1X5A 
ICD-9-CM: 287.49, E933.1 Vitals BP Pulse Temp Resp Height(growth percentile) Weight(growth percentile) 121/63 (BP 1 Location: Left arm, BP Patient Position: Sitting) 87 98.3 °F (36.8 °C) (Oral) 16 5' 4\" (1.626 m) 152 lb (68.9 kg) BMI OB Status Smoking Status 26.09 kg/m2 Menopause Former Smoker Vitals History BMI and BSA Data Body Mass Index Body Surface Area 26.09 kg/m 2 1.76 m 2 Preferred Pharmacy Pharmacy Name Phone Cornelius Andrews, [facility] [phone] Your Updated Medication List  
  
   
This list is accurate as of 10/16/18 12:00 PM.  Always use your most recent med list. ALLEGRA-D 24 HOUR 180-240 mg per tablet Generic drug:  fexofenadine-pseudoephedrine Take 1 Tab by mouth daily. ARMOUR THYROID 60 mg tablet Generic drug:  thyroid (Pork) Take 90 mg by mouth daily. cholecalciferol (VITAMIN D3) 5,000 unit Tab tablet Commonly known as:  VITAMIN D3 Take  by mouth daily. * gabapentin 300 mg capsule Commonly known as:  NEURONTIN  
TAKE 1 CAPSULE THREE TIMES A DAY  
  
 * gabapentin 100 mg capsule Commonly known as:  NEURONTIN Take 1 Cap by mouth three (3) times daily. lidocaine HCl-hydrocortison ac topical cream  
Apply  to affected area two (2) times a day. MOTRIN 800 mg tablet Generic drug:  ibuprofen Take 800 mg by mouth two (2) times a day. Indications: OSTEOARTHRITIS  
  
 NEURPATH-B 3-35-2 mg Tab tab Generic drug:  L-Mfolate-B6 Phos-Methyl-B12 Take  by mouth two (2) times a day. * palbociclib 125 mg Cap Commonly known as:  [employer] Take 125 mg by mouth daily. Take 1 tab po every day for 21 days on and 7 days off q 28 days  Indications: HORMONE RECEPTOR(+), HER2(-) ADVANCED BREAST CANCER * palbociclib 100 mg Cap Commonly known as:  [employer] Take 1 Cap by mouth daily. For 21 days on and 7 days off every 28 days. sulfacetamide 10 % ophthalmic ointment Commonly known as:  BLEPH-10 Administer  to right eye three (3) times daily. TETRACYCLINE PO Take  by mouth.  
  
 warfarin 1 mg tablet Commonly known as:  COUMADIN Take 1 mg by mouth daily. * Notice: This list has 4 medication(s) that are the same as other medications prescribed for you. Read the directions carefully, and ask your doctor or other care provider to review them with you. We Performed the Following COMPLETE CBC & AUTO DIFF WBC [58356 CPT(R)] Follow-up Instructions Return in about 3 weeks (around 11/6/2018). To-Do List   
 10/16/2018 Lab:  CBC WITH 3 PART DIFF   
  
 10/25/2018 11:00 AM  
  Appointment with [address] at [place] [address] ([phone]) 11/08/2018 11:00 AM  
  Appointment with [address] at [place] [address] ([phone])  
  
 11/29/2018 1:00 PM  
  Appointment with [address] at [place] [address] ([phone]) 06/03/2019 10:00 AM  
  Appointment with [facility] 2 at [address] [place] [address] ([phone]) OUTSIDE FILMS  - Any outside films related to the study being scheduled should be brought with you on the day of the exam.  If this cannot be done there may be a delay in the reading of the study.   MEDICATIONS  - Patient must bring a complete list of all medications currently taking to include prescriptions, over-the-counter meds, herbals, vitamins & any dietary supplements  GENERAL INSTRUCTIONS  - On the day of your exam do not use any bath powder, deodorant or lotions on the armpit area. Patient Instructions Breast Cancer: Care Instructions Your Care Instructions Breast cancer occurs when abnormal cells grow out of control in the breast. These cancer cells can spread within the breast, to nearby lymph nodes and other tissues, and to other parts of the body. Being treated for cancer can weaken your body, and you may feel very tired. Get the rest your body needs so you can feel better. Finding out that you have cancer is scary. You may feel many emotions and may need some help coping. Seek out family, friends, and counselors for support. You also can do things at home to make yourself feel better while you go through treatment. Call the [employer] (7-663.927.8801) or visit its website at [address] for more information. Follow-up care is a key part of your treatment and safety. Be sure to make and go to all appointments, and call your doctor if you are having problems. It's also a good idea to know your test results and keep a list of the medicines you take. How can you care for yourself at home? · Take your medicines exactly as prescribed. Call your doctor if you think you are having a problem with your medicine. You may get medicine for nausea and vomiting if you have these side effects. · Follow your doctor's instructions to relieve pain. Pain from cancer and surgery can almost always be controlled. Use pain medicine when you first notice pain, before it becomes severe. · Eat healthy food. If you do not feel like eating, try to eat food that has protein and extra calories to keep up your strength and prevent weight loss. Drink liquid meal replacements for extra calories and protein. Try to eat your main meal early. · Get some physical activity every day, but do not get too tired. Keep doing the hobbies you enjoy as your energy allows. · Do not smoke. Smoking can make your cancer worse. If you need help quitting, talk to your doctor about stop-smoking programs and medicines. These can increase your chances of quitting for good. · Take steps to control your stress and workload. Learn relaxation techniques. ¨ Share your feelings. Stress and tension affect our emotions. By expressing your feelings to others, you may be able to understand and cope with them. ¨ Consider joining a support group. Talking about a problem with your spouse, a good friend, or other people with similar problems is a good way to reduce tension and stress. ¨ Express yourself through art. Try writing, crafts, dance, or art to relieve stress. Some dance, writing, or art groups may be available just for people who have cancer. ¨ Be kind to your body and mind. Getting enough sleep, eating a healthy diet, and taking time to do things you enjoy can contribute to an overall feeling of balance in your life and can help reduce stress. ¨ Get help if you need it. Discuss your concerns with your doctor or counselor. · If you are vomiting or have diarrhea: ¨ Drink plenty of fluids (enough so that your urine is light yellow or clear like water) to prevent dehydration. Choose water and other caffeine-free clear liquids. If you have kidney, heart, or liver disease and have to limit fluids, talk with your doctor before you increase the amount of fluids you drink. ¨ When you are able to eat, try clear soups, mild foods, and liquids until all symptoms are gone for 12 to 48 hours. Other good choices include dry toast, crackers, cooked cereal, and gelatin dessert, such as Jell-O. · If you have not already done so, prepare a list of advance directives.  Advance directives are instructions to your doctor and family members about what kind of care you want if you become unable to speak or express yourself. When should you call for help? Call 911 anytime you think you may need emergency care. For example, call if: 
  · You passed out (lost consciousness).  
 Call your doctor now or seek immediate medical care if: 
  · You have a fever.  
  · You have abnormal bleeding.  
  · You think you have an infection.  
  · You have new or worse pain.  
  · You have new symptoms, such as a cough, belly pain, vomiting, diarrhea, or a rash.  
 Watch closely for changes in your health, and be sure to contact your doctor if: 
  · You are much more tired than usual.  
  · You have swollen glands in your armpits, groin, or neck.  
  · You do not get better as expected. Where can you learn more? Go to http://mandie-elizabeth.info/. Enter V321 in the search box to learn more about \"Breast Cancer: Care Instructions. \" Current as of: March 28, 2018 Content Version: 11.8 © 6180-1044 [employer]. Care instructions adapted under license by [employer] (which disclaims liability or warranty for this information). If you have questions about a medical condition or this instruction, always ask your healthcare professional. [place] [address] any warranty or liability for your use of this information. Introducing [facility] [place] [facility]! [facility] introduces [employer] patient portal. Now you can access parts of your medical record, email your doctor's office, and request medication refills online. 1. In your internet browser, go to [email or web address] 2. Click on the First Time User? Click Here link in the Sign In box. You will see the New Member Sign Up page. 3. Enter your [employer] Access Code exactly as it appears below. You will not need to use this code after youve completed the sign-up process.  If you do not sign up before the expiration date, you must request a new code. 
 
· [employer] Access Code: [email or web address] Expires: 11/4/2018  9:56 AM 
 
4. Enter the last four digits of your Social Security Number (xxxx) and Date of Birth (mm/dd/yyyy) as indicated and click Submit. You will be taken to the next sign-up page. 5. Create a [employer] ID. This will be your [employer] login ID and cannot be changed, so think of one that is secure and easy to remember. 6. Create a [employer] password. You can change your password at any time. 7. Enter your Password Reset Question and Answer. This can be used at a later time if you forget your password. 8. Enter your e-mail address. You will receive e-mail notification when new information is available in [address] 9. Click Sign Up. You can now view and download portions of your medical record. 10. Click the Download Summary menu link to download a portable copy of your medical information. If you have questions, please visit the Frequently Asked Questions section of the [employer] website. Remember, [employer] is NOT to be used for urgent needs. For medical emergencies, dial 911. Now available from your iPhone and Android! Please provide this summary of care documentation to your next provider. Your primary care clinician is listed as Hema Junior. If you have any questions after today's visit, please call [phone].

## 2018-10-16 NOTE — PATIENT INSTRUCTIONS
Breast Cancer: Care Instructions  Your Care Instructions    Breast cancer occurs when abnormal cells grow out of control in the breast. These cancer cells can spread within the breast, to nearby lymph nodes and other tissues, and to other parts of the body. Being treated for cancer can weaken your body, and you may feel very tired. Get the rest your body needs so you can feel better. Finding out that you have cancer is scary. You may feel many emotions and may need some help coping. Seek out family, friends, and counselors for support. You also can do things at home to make yourself feel better while you go through treatment. Call the [employer] (1-459.496.2608) or visit its website at [employer] for more information. Follow-up care is a key part of your treatment and safety. Be sure to make and go to all appointments, and call your doctor if you are having problems. It's also a good idea to know your test results and keep a list of the medicines you take. How can you care for yourself at home? · Take your medicines exactly as prescribed. Call your doctor if you think you are having a problem with your medicine. You may get medicine for nausea and vomiting if you have these side effects. · Follow your doctor's instructions to relieve pain. Pain from cancer and surgery can almost always be controlled. Use pain medicine when you first notice pain, before it becomes severe. · Eat healthy food. If you do not feel like eating, try to eat food that has protein and extra calories to keep up your strength and prevent weight loss. Drink liquid meal replacements for extra calories and protein. Try to eat your main meal early. · Get some physical activity every day, but do not get too tired. Keep doing the hobbies you enjoy as your energy allows. · Do not smoke. Smoking can make your cancer worse. If you need help quitting, talk to your doctor about stop-smoking programs and medicines.  These can increase your chances of quitting for good. · Take steps to control your stress and workload. Learn relaxation techniques. ¨ Share your feelings. Stress and tension affect our emotions. By expressing your feelings to others, you may be able to understand and cope with them. ¨ Consider joining a support group. Talking about a problem with your spouse, a good friend, or other people with similar problems is a good way to reduce tension and stress. ¨ Express yourself through art. Try writing, crafts, dance, or art to relieve stress. Some dance, writing, or art groups may be available just for people who have cancer. ¨ Be kind to your body and mind. Getting enough sleep, eating a healthy diet, and taking time to do things you enjoy can contribute to an overall feeling of balance in your life and can help reduce stress. ¨ Get help if you need it. Discuss your concerns with your doctor or counselor. · If you are vomiting or have diarrhea:  ¨ Drink plenty of fluids (enough so that your urine is light yellow or clear like water) to prevent dehydration. Choose water and other caffeine-free clear liquids. If you have kidney, heart, or liver disease and have to limit fluids, talk with your doctor before you increase the amount of fluids you drink. ¨ When you are able to eat, try clear soups, mild foods, and liquids until all symptoms are gone for 12 to 48 hours. Other good choices include dry toast, crackers, cooked cereal, and gelatin dessert, such as Jell-O.  · If you have not already done so, prepare a list of advance directives. Advance directives are instructions to your doctor and family members about what kind of care you want if you become unable to speak or express yourself. When should you call for help? Call 911 anytime you think you may need emergency care.  For example, call if:    · You passed out (lost consciousness).    Call your doctor now or seek immediate medical care if:    · You have a fever.     · You have abnormal bleeding.     · You think you have an infection.     · You have new or worse pain.     · You have new symptoms, such as a cough, belly pain, vomiting, diarrhea, or a rash.    Watch closely for changes in your health, and be sure to contact your doctor if:    · You are much more tired than usual.     · You have swollen glands in your armpits, groin, or neck.     · You do not get better as expected. Where can you learn more? Go to http://mandie-elizabeth.info/. Enter V321 in the search box to learn more about \"Breast Cancer: Care Instructions. \"  Current as of: March 28, 2018  Content Version: 11.8  © 7973-9324 [employer]. Care instructions adapted under license by [employer] (which disclaims liability or warranty for this information). If you have questions about a medical condition or this instruction, always ask your healthcare professional. [address] any warranty or liability for your use of this information.

## 2018-10-18 LAB — CANCER AG27-29 SERPL-ACNC: 45.4 U/ML (ref 0–38.6)

## 2018-10-24 ENCOUNTER — TELEPHONE (OUTPATIENT)
Dept: ONCOLOGY | Age: 73
End: 2018-10-24

## 2018-10-24 RX ORDER — LAMOTRIGINE 25 MG/1
500 TABLET ORAL ONCE
Status: COMPLETED | OUTPATIENT
Start: 2018-10-25 | End: 2018-10-25

## 2018-10-24 NOTE — TELEPHONE ENCOUNTER
[facility], [address]  Bonita Paulino said you ordered CT scan for abdomen & pelvis on the patient, and it triggered her Medicare. She said please correct the Dx in [facility], you don't realy need to call Rachid back unless needed.

## 2018-10-25 ENCOUNTER — HOSPITAL ENCOUNTER (OUTPATIENT)
Dept: ONCOLOGY | Age: 73
Discharge: HOME | End: 2018-10-25

## 2018-10-25 ENCOUNTER — CLINICAL SUPPORT (OUTPATIENT)
Dept: ONCOLOGY | Age: 73
End: 2018-10-25

## 2018-10-25 ENCOUNTER — HOSPITAL ENCOUNTER (OUTPATIENT)
Dept: INFUSION THERAPY | Age: 73
Discharge: HOME | End: 2018-10-25
Payer: MEDICARE

## 2018-10-25 VITALS
TEMPERATURE: 97 F | HEART RATE: 75 BPM | SYSTOLIC BLOOD PRESSURE: 133 MMHG | OXYGEN SATURATION: 96 % | DIASTOLIC BLOOD PRESSURE: 67 MMHG | RESPIRATION RATE: 18 BRPM

## 2018-10-25 DIAGNOSIS — D64.81 ANEMIA DUE TO CHEMOTHERAPY: ICD-10-CM

## 2018-10-25 DIAGNOSIS — D64.81 ANEMIA DUE TO CHEMOTHERAPY: Primary | ICD-10-CM

## 2018-10-25 DIAGNOSIS — T45.1X5A ANEMIA DUE TO CHEMOTHERAPY: ICD-10-CM

## 2018-10-25 DIAGNOSIS — T45.1X5A ANEMIA DUE TO CHEMOTHERAPY: Primary | ICD-10-CM

## 2018-10-25 LAB
ALBUMIN SERPL-MCNC: 3.7 G/DL (ref 3.4–5)
ALBUMIN/GLOB SERPL: 1.3 {RATIO} (ref 0.8–1.7)
ALP SERPL-CCNC: 71 U/L (ref 45–117)
ALT SERPL-CCNC: 17 U/L (ref 13–56)
ANION GAP SERPL CALC-SCNC: 6 MMOL/L (ref 3–18)
AST SERPL-CCNC: 15 U/L (ref 15–37)
BASO+EOS+MONOS # BLD AUTO: 0.2 K/UL (ref 0–2.3)
BASO+EOS+MONOS # BLD AUTO: 8 % (ref 0.1–17)
BILIRUB SERPL-MCNC: 0.6 MG/DL (ref 0.2–1)
BUN SERPL-MCNC: 19 MG/DL (ref 7–18)
BUN/CREAT SERPL: 22 (ref 12–20)
CALCIUM SERPL-MCNC: 8.4 MG/DL (ref 8.5–10.1)
CHLORIDE SERPL-SCNC: 109 MMOL/L (ref 100–108)
CO2 SERPL-SCNC: 29 MMOL/L (ref 21–32)
CREAT SERPL-MCNC: 0.87 MG/DL (ref 0.6–1.3)
DIFFERENTIAL METHOD BLD: ABNORMAL
ERYTHROCYTE [DISTWIDTH] IN BLOOD BY AUTOMATED COUNT: 16.6 % (ref 11.5–14.5)
GLOBULIN SER CALC-MCNC: 2.8 G/DL (ref 2–4)
GLUCOSE SERPL-MCNC: 99 MG/DL (ref 74–99)
HCT VFR BLD AUTO: 29.1 % (ref 36–48)
HGB BLD-MCNC: 9.7 G/DL (ref 12–16)
LYMPHOCYTES # BLD: 0.4 K/UL (ref 1.1–5.9)
LYMPHOCYTES NFR BLD: 17 % (ref 14–44)
MAGNESIUM SERPL-MCNC: 1.6 MG/DL (ref 1.6–2.6)
MCH RBC QN AUTO: 34 PG (ref 25–35)
MCHC RBC AUTO-ENTMCNC: 33.3 G/DL (ref 31–37)
MCV RBC AUTO: 102.1 FL (ref 78–102)
NEUTS SEG # BLD: 1.6 K/UL (ref 1.8–9.5)
NEUTS SEG NFR BLD: 76 % (ref 40–70)
PHOSPHATE SERPL-MCNC: 2.7 MG/DL (ref 2.5–4.9)
PLATELET # BLD AUTO: 300 K/UL (ref 140–440)
POTASSIUM SERPL-SCNC: 3.8 MMOL/L (ref 3.5–5.5)
PROT SERPL-MCNC: 6.5 G/DL (ref 6.4–8.2)
RBC # BLD AUTO: 2.85 M/UL (ref 4.1–5.1)
SODIUM SERPL-SCNC: 144 MMOL/L (ref 136–145)
WBC # BLD AUTO: 2.2 K/UL (ref 4.5–13)

## 2018-10-25 PROCEDURE — 96402 CHEMO HORMON ANTINEOPL SQ/IM: CPT

## 2018-10-25 PROCEDURE — 96523 IRRIG DRUG DELIVERY DEVICE: CPT

## 2018-10-25 PROCEDURE — 80053 COMPREHEN METABOLIC PANEL: CPT | Performed by: INTERNAL MEDICINE

## 2018-10-25 PROCEDURE — 96372 THER/PROPH/DIAG INJ SC/IM: CPT

## 2018-10-25 PROCEDURE — 74011250636 HC RX REV CODE- 250/636: Performed by: INTERNAL MEDICINE

## 2018-10-25 PROCEDURE — 77030012965 HC NDL HUBR BBMI -A

## 2018-10-25 PROCEDURE — 84100 ASSAY OF PHOSPHORUS: CPT | Performed by: INTERNAL MEDICINE

## 2018-10-25 PROCEDURE — 83735 ASSAY OF MAGNESIUM: CPT | Performed by: INTERNAL MEDICINE

## 2018-10-25 PROCEDURE — 36591 DRAW BLOOD OFF VENOUS DEVICE: CPT

## 2018-10-25 RX ORDER — SODIUM CHLORIDE 0.9 % (FLUSH) 0.9 %
10-40 SYRINGE (ML) INJECTION AS NEEDED
Status: DISCONTINUED | OUTPATIENT
Start: 2018-10-25 | End: 2018-10-29 | Stop reason: HOSPADM

## 2018-10-25 RX ORDER — HEPARIN 100 UNIT/ML
500 SYRINGE INTRAVENOUS ONCE
Status: COMPLETED | OUTPATIENT
Start: 2018-10-25 | End: 2018-10-25

## 2018-10-25 RX ADMIN — Medication 500 UNITS: at 11:31

## 2018-10-25 RX ADMIN — ERYTHROPOIETIN 20000 UNITS: 20000 INJECTION, SOLUTION INTRAVENOUS; SUBCUTANEOUS at 12:01

## 2018-10-25 RX ADMIN — Medication 10 ML: at 11:30

## 2018-10-25 RX ADMIN — FULVESTRANT 500 MG: 50 INJECTION INTRAMUSCULAR at 11:45

## 2018-10-25 RX ADMIN — ERYTHROPOIETIN 40000 UNITS: 40000 INJECTION, SOLUTION INTRAVENOUS; SUBCUTANEOUS at 12:01

## 2018-10-25 NOTE — PROGRESS NOTES
[facility] Progress Note Date: 2018 Name: Codi Martinez MRN: [id number] : 1945 Ms. Figueroa arrived in the [facility] today, at 1115, in stable condition, here for Q 2 Week, CBC/Procrit injection, Q 4 Week Faslodex Injections, Q 4 Week Xgeva (HELD) Injection, and Monthly Port Flush. She was assessed and education was provided. Upon arrival to the [facility] today, Ms. Marsh Snellen stated she feels she's getting a lot of injections and will like to skip her xgeva injection. She was made aware that xgeva was going to be held today for a low calcium level on 10/16/18. She was also informed to begin taking calcium supplements to help with calcium level. Ms. Felix Tyler vitals were reviewed. Visit Vitals /67 (BP 1 Location: Left arm, BP Patient Position: Sitting) Pulse 75 Temp 97 °F (36.1 °C) Resp 18 SpO2 96% Her most recent CMP results from 10/16/18 were reviewed, and results were noted to be satisfactory for treatment today. Ca 8.4 Potassium 3.3. She was informed to eat a banana a day to help improve her K+ level. Will monitor today's labs drawn. If K+ level is lower than the previous, will notify provider. Her left chest single lumen port was accessed without incident, and blood was drawn from the port for a CBC, CMP, Magnesium, and Phosphorus, per order. And then, the port was flushed well per protocol and without incident, and the carbone needle was removed and gauze/bandaid was applied. (The CBC was processed in house via [employer], and the remaining labs were sent out to be processed. ) Recent Results (from the past 12 hour(s)) CBC WITH 3 PART DIFF Collection Time: 10/25/18 11:30 AM  
Result Value Ref Range WBC 2.2 (L) 4.5 - 13.0 K/uL  
 RBC 2.85 (L) 4.10 - 5.10 M/uL HGB 9.7 (L) 12.0 - 16.0 g/dL HCT 29.1 (L) 36 - 48 % .1 (H) 78 - 102 FL  
 MCH 34.0 25.0 - 35.0 PG  
 MCHC 33.3 31 - 37 g/dL  
 RDW 16.6 (H) 11.5 - 14.5 % PLATELET 091 024 - 112 K/uL NEUTROPHILS 76 (H) 40 - 70 % MIXED CELLS 8 0.1 - 17 % LYMPHOCYTES 17 14 - 44 % ABS. NEUTROPHILS 1.6 (L) 1.8 - 9.5 K/UL  
 ABS. MIXED CELLS 0.2 0.0 - 2.3 K/uL  
 ABS. LYMPHOCYTES 0.4 (L) 1.1 - 5.9 K/UL  
 DF AUTOMATED Procrit 60,000 units, was administered SQ, in her left arm, per order, and without incident. Xgeva (HELD for Low Calcium). Faslodex 500 mg Total Dose, was administered IM, in 2 equally divided doses of 250 mg per order, and without incident. (250 mg was administered IM, in her right gluteal muscle, and 250 mg was administered IM, in her left gluteal muscle) Ms. Imelda Das tolerated well, and had no complaints. Ms. Imelda Das was discharged from [place] [address] in stable condition at 1205. She is to return in 2 weeks, on 11/08/18, at 1100,  for her next appointment, for Q 2 Week CBC/Procrit injection. Guevara Carver October 25, 2018 [address]

## 2018-10-29 NOTE — TELEPHONE ENCOUNTER
Sheldon Godfrey @ [employer] [facility] called about this today. Aurelibanyaa Marzenayaa is out today and patient is due to have CT tomorrow. The corrected order is needed for patient due to an ABN issue, she said. Please call Olivia Lopez at [phone] today as soon as possible she said.

## 2018-10-30 ENCOUNTER — HOSPITAL ENCOUNTER (OUTPATIENT)
Dept: NUCLEAR MEDICINE | Age: 73
Discharge: HOME | End: 2018-10-30
Attending: INTERNAL MEDICINE
Payer: MEDICARE

## 2018-10-30 ENCOUNTER — APPOINTMENT (OUTPATIENT)
Dept: CT IMAGING | Age: 73
End: 2018-10-30
Attending: INTERNAL MEDICINE
Payer: MEDICARE

## 2018-10-30 DIAGNOSIS — C50.919 METASTATIC BREAST CANCER (HCC): ICD-10-CM

## 2018-10-30 PROCEDURE — A9503 TC99M MEDRONATE: HCPCS

## 2018-10-31 ENCOUNTER — TELEPHONE (OUTPATIENT)
Dept: ONCOLOGY | Age: 73
End: 2018-10-31

## 2018-10-31 NOTE — TELEPHONE ENCOUNTER
Pt was supposed to have CT and Bone scans done, but the CT scan was cx by Akash and she was told it was because the script had the wrong code on it and Medicare would not accept it. She did get the Bone scan done. She said please call her & let her know what is being done to correct it, she isn't sure if she will be able to get the CT scan prior to her next visit, which is 11/6/18.      Pt is at [phone]

## 2018-11-01 DIAGNOSIS — C79.52 SECONDARY MALIGNANT NEOPLASM OF BONE AND BONE MARROW (HCC): ICD-10-CM

## 2018-11-01 DIAGNOSIS — C79.51 SECONDARY MALIGNANT NEOPLASM OF BONE AND BONE MARROW (HCC): ICD-10-CM

## 2018-11-01 DIAGNOSIS — C50.919 METASTATIC BREAST CANCER (HCC): Primary | ICD-10-CM

## 2018-11-01 DIAGNOSIS — Z85.3 PERSONAL HISTORY OF MALIGNANT NEOPLASM OF BREAST: ICD-10-CM

## 2018-11-02 ENCOUNTER — DOCUMENTATION ONLY (OUTPATIENT)
Dept: ONCOLOGY | Age: 73
End: 2018-11-02

## 2018-11-02 NOTE — PROGRESS NOTES
The patient called to say she has been scheduled for the CT on Monday 11/5 at [facility].

## 2018-11-05 ENCOUNTER — HOSPITAL ENCOUNTER (OUTPATIENT)
Dept: CT IMAGING | Age: 73
Discharge: HOME | End: 2018-11-05
Attending: INTERNAL MEDICINE
Payer: MEDICARE

## 2018-11-05 DIAGNOSIS — C50.919 METASTATIC BREAST CANCER (HCC): ICD-10-CM

## 2018-11-05 DIAGNOSIS — C79.52 SECONDARY MALIGNANT NEOPLASM OF BONE AND BONE MARROW (HCC): ICD-10-CM

## 2018-11-05 DIAGNOSIS — C79.51 SECONDARY MALIGNANT NEOPLASM OF BONE AND BONE MARROW (HCC): ICD-10-CM

## 2018-11-05 DIAGNOSIS — Z85.3 PERSONAL HISTORY OF MALIGNANT NEOPLASM OF BREAST: ICD-10-CM

## 2018-11-05 PROCEDURE — 74177 CT ABD & PELVIS W/CONTRAST: CPT

## 2018-11-05 PROCEDURE — 74011636320 HC RX REV CODE- 636/320: Performed by: INTERNAL MEDICINE

## 2018-11-05 RX ADMIN — IOPAMIDOL 100 ML: 612 INJECTION, SOLUTION INTRAVENOUS at 10:57

## 2018-11-06 ENCOUNTER — HOSPITAL ENCOUNTER (OUTPATIENT)
Dept: ONCOLOGY | Age: 73
Discharge: HOME | End: 2018-11-06

## 2018-11-06 ENCOUNTER — OFFICE VISIT (OUTPATIENT)
Dept: ONCOLOGY | Age: 73
End: 2018-11-06

## 2018-11-06 VITALS
DIASTOLIC BLOOD PRESSURE: 66 MMHG | HEART RATE: 77 BPM | OXYGEN SATURATION: 98 % | BODY MASS INDEX: 25.81 KG/M2 | HEIGHT: 64 IN | SYSTOLIC BLOOD PRESSURE: 147 MMHG | WEIGHT: 151.2 LBS | TEMPERATURE: 98.1 F

## 2018-11-06 DIAGNOSIS — C50.919 METASTATIC BREAST CANCER (HCC): ICD-10-CM

## 2018-11-06 DIAGNOSIS — C50.919 METASTATIC BREAST CANCER (HCC): Primary | ICD-10-CM

## 2018-11-06 NOTE — PROGRESS NOTES
Hematology/medical oncology progress note    11/6/2018  Yoselyn Monoey  YOB: 1945    PCP: Dr. Abdifatah Ashraf    Diagnosis: Metastatic breast cancer    Mrs. Carlene Yates is a 29-year-old woman with metastaticila breast cancer. She is currently being treated with a combination of fulvestrant and Ibrance. On 11/5/2018 the patient had CT scan done of the chest, abdomen, and pelvis. The imaging studies showed an increase in the right pleural effusion with some pleural thickening which could be related to inflammatory changes versus metastatic disease. She had some stable chronic atelectasis in the posterior and anterior right lung. There was no lymphadenopathy or metastatic disease identified in the abdomen and pelvis. On 10/30/2018 a bone scan was completed and this showed grossly stable appearance of areas of bone metastasis. There was no evidence of any new bone metastatic lesions. I have explained these findings to the patient. We will continue her current treatment modalities. I will plan to see her back in clinic during the second week of December to discuss her additional treatment that she will continue once she takes her winter long trip in [place]. Total time 30 minutes, greater than 50% of the time was in counseling and coordination of care. Rolo Mari MD, Kathy Cali

## 2018-11-08 ENCOUNTER — HOSPITAL ENCOUNTER (OUTPATIENT)
Dept: INFUSION THERAPY | Age: 73
Discharge: HOME | End: 2018-11-08
Payer: MEDICARE

## 2018-11-08 ENCOUNTER — HOSPITAL ENCOUNTER (OUTPATIENT)
Dept: ONCOLOGY | Age: 73
Discharge: HOME | End: 2018-11-08

## 2018-11-08 ENCOUNTER — CLINICAL SUPPORT (OUTPATIENT)
Dept: ONCOLOGY | Age: 73
End: 2018-11-08

## 2018-11-08 VITALS
SYSTOLIC BLOOD PRESSURE: 140 MMHG | OXYGEN SATURATION: 100 % | HEART RATE: 68 BPM | RESPIRATION RATE: 18 BRPM | TEMPERATURE: 97.7 F | DIASTOLIC BLOOD PRESSURE: 67 MMHG

## 2018-11-08 DIAGNOSIS — D64.81 ANEMIA DUE TO CHEMOTHERAPY: ICD-10-CM

## 2018-11-08 DIAGNOSIS — T45.1X5A ANEMIA DUE TO CHEMOTHERAPY: ICD-10-CM

## 2018-11-08 DIAGNOSIS — T45.1X5A ANEMIA DUE TO CHEMOTHERAPY: Primary | ICD-10-CM

## 2018-11-08 DIAGNOSIS — D64.81 ANEMIA DUE TO CHEMOTHERAPY: Primary | ICD-10-CM

## 2018-11-08 LAB
BASO+EOS+MONOS # BLD AUTO: 0.1 K/UL (ref 0–2.3)
BASO+EOS+MONOS # BLD AUTO: 6 % (ref 0.1–17)
DIFFERENTIAL METHOD BLD: ABNORMAL
ERYTHROCYTE [DISTWIDTH] IN BLOOD BY AUTOMATED COUNT: 17 % (ref 11.5–14.5)
HCT VFR BLD AUTO: 31.5 % (ref 36–48)
HGB BLD-MCNC: 10.5 G/DL (ref 12–16)
LYMPHOCYTES # BLD: 0.5 K/UL (ref 1.1–5.9)
LYMPHOCYTES NFR BLD: 22 % (ref 14–44)
MCH RBC QN AUTO: 34.4 PG (ref 25–35)
MCHC RBC AUTO-ENTMCNC: 33.3 G/DL (ref 31–37)
MCV RBC AUTO: 103.3 FL (ref 78–102)
NEUTS SEG # BLD: 1.5 K/UL (ref 1.8–9.5)
NEUTS SEG NFR BLD: 72 % (ref 40–70)
PLATELET # BLD AUTO: 149 K/UL (ref 140–440)
RBC # BLD AUTO: 3.05 M/UL (ref 4.1–5.1)
WBC # BLD AUTO: 2.1 K/UL (ref 4.5–13)

## 2018-11-08 PROCEDURE — 36415 COLL VENOUS BLD VENIPUNCTURE: CPT

## 2018-11-08 NOTE — PROGRESS NOTES
[address] [facility] Progress Note Date: 2018 Name: Guero Mueller MRN: [id number] : 1945 Ms. Figueroa arrived in the [facility] today, at 1115, in stable condition, here for Q 2 Week, CBC/Procrit injection. She was assessed and education was provided. Ms. Nicolas Ruiz vitals were reviewed. Visit Vitals /67 (BP 1 Location: Left arm, BP Patient Position: Sitting) Pulse 68 Temp 97.7 °F (36.5 °C) Resp 18 SpO2 100% Breastfeeding? No  
 
 
 
 
Blood for a CBC was drawn from her left AC without incident. Lab results were obtained and reviewed. Recent Results (from the past 12 hour(s)) CBC WITH 3 PART DIFF Collection Time: 18 11:25 AM  
Result Value Ref Range WBC 2.1 (L) 4.5 - 13.0 K/uL  
 RBC 3.05 (L) 4.10 - 5.10 M/uL  
 HGB 10.5 (L) 12.0 - 16.0 g/dL HCT 31.5 (L) 36 - 48 % .3 (H) 78 - 102 FL  
 MCH 34.4 25.0 - 35.0 PG  
 MCHC 33.3 31 - 37 g/dL  
 RDW 17.0 (H) 11.5 - 14.5 % PLATELET 641 523 - 617 K/uL NEUTROPHILS 72 (H) 40 - 70 % MIXED CELLS 6 0.1 - 17 % LYMPHOCYTES 22 14 - 44 % ABS. NEUTROPHILS 1.5 (L) 1.8 - 9.5 K/UL  
 ABS. MIXED CELLS 0.1 0.0 - 2.3 K/uL  
 ABS. LYMPHOCYTES 0.5 (L) 1.1 - 5.9 K/UL  
 DF AUTOMATED Procrit 60,000 units was HELD, per order. Ms. Luis Rodríguez tolerated well, and had no complaints. Ms. Luis Rodríguez was discharged from [place] [address] in stable condition at 1200. She is to return in 2 weeks, on Thursday, 18, at 1100,  for her next appointment, for Q 2 Week CBC/Procrit injection, and Q 4 Week, Faslodex Injections, Xgeva Injection, and Port Flush. Leonor Jung 2018 2:15 PM

## 2018-11-26 RX ORDER — LAMOTRIGINE 25 MG/1
500 TABLET ORAL ONCE
Status: COMPLETED | OUTPATIENT
Start: 2018-11-29 | End: 2018-11-29

## 2018-11-29 ENCOUNTER — HOSPITAL ENCOUNTER (OUTPATIENT)
Dept: ONCOLOGY | Age: 73
Discharge: HOME | End: 2018-11-29

## 2018-11-29 ENCOUNTER — CLINICAL SUPPORT (OUTPATIENT)
Dept: ONCOLOGY | Age: 73
End: 2018-11-29

## 2018-11-29 ENCOUNTER — HOSPITAL ENCOUNTER (OUTPATIENT)
Dept: INFUSION THERAPY | Age: 73
Discharge: HOME | End: 2018-11-29
Payer: MEDICARE

## 2018-11-29 VITALS
DIASTOLIC BLOOD PRESSURE: 76 MMHG | HEART RATE: 83 BPM | BODY MASS INDEX: 26.33 KG/M2 | RESPIRATION RATE: 20 BRPM | WEIGHT: 158 LBS | SYSTOLIC BLOOD PRESSURE: 151 MMHG | TEMPERATURE: 97.3 F | HEIGHT: 65 IN | OXYGEN SATURATION: 99 %

## 2018-11-29 DIAGNOSIS — C80.1 NEUROPATHY ASSOCIATED WITH CANCER (HCC): ICD-10-CM

## 2018-11-29 DIAGNOSIS — T45.1X5A ANEMIA DUE TO CHEMOTHERAPY: ICD-10-CM

## 2018-11-29 DIAGNOSIS — D64.81 ANEMIA DUE TO CHEMOTHERAPY: Primary | ICD-10-CM

## 2018-11-29 DIAGNOSIS — T45.1X5A ANEMIA DUE TO CHEMOTHERAPY: Primary | ICD-10-CM

## 2018-11-29 DIAGNOSIS — D64.81 ANEMIA DUE TO CHEMOTHERAPY: ICD-10-CM

## 2018-11-29 DIAGNOSIS — G63 NEUROPATHY ASSOCIATED WITH CANCER (HCC): ICD-10-CM

## 2018-11-29 LAB
ALBUMIN SERPL-MCNC: 3.7 G/DL (ref 3.4–5)
ALBUMIN/GLOB SERPL: 1.3 {RATIO} (ref 0.8–1.7)
ALP SERPL-CCNC: 64 U/L (ref 45–117)
ALT SERPL-CCNC: 22 U/L (ref 13–56)
ANION GAP SERPL CALC-SCNC: 5 MMOL/L (ref 3–18)
AST SERPL-CCNC: 23 U/L (ref 15–37)
BASO+EOS+MONOS # BLD AUTO: 0.2 K/UL (ref 0–2.3)
BASO+EOS+MONOS # BLD AUTO: 7 % (ref 0.1–17)
BILIRUB SERPL-MCNC: 0.4 MG/DL (ref 0.2–1)
BUN SERPL-MCNC: 18 MG/DL (ref 7–18)
BUN/CREAT SERPL: 16 (ref 12–20)
CALCIUM SERPL-MCNC: 8.8 MG/DL (ref 8.5–10.1)
CHLORIDE SERPL-SCNC: 110 MMOL/L (ref 100–108)
CO2 SERPL-SCNC: 26 MMOL/L (ref 21–32)
CREAT SERPL-MCNC: 1.13 MG/DL (ref 0.6–1.3)
DIFFERENTIAL METHOD BLD: ABNORMAL
ERYTHROCYTE [DISTWIDTH] IN BLOOD BY AUTOMATED COUNT: 16.2 % (ref 11.5–14.5)
GLOBULIN SER CALC-MCNC: 2.9 G/DL (ref 2–4)
GLUCOSE SERPL-MCNC: 126 MG/DL (ref 74–99)
HCT VFR BLD AUTO: 29 % (ref 36–48)
HGB BLD-MCNC: 9.8 G/DL (ref 12–16)
LYMPHOCYTES # BLD: 0.4 K/UL (ref 1.1–5.9)
LYMPHOCYTES NFR BLD: 15 % (ref 14–44)
MAGNESIUM SERPL-MCNC: 1.6 MG/DL (ref 1.6–2.6)
MCH RBC QN AUTO: 35.3 PG (ref 25–35)
MCHC RBC AUTO-ENTMCNC: 33.8 G/DL (ref 31–37)
MCV RBC AUTO: 104.3 FL (ref 78–102)
NEUTS SEG # BLD: 1.8 K/UL (ref 1.8–9.5)
NEUTS SEG NFR BLD: 78 % (ref 40–70)
PHOSPHATE SERPL-MCNC: 2.5 MG/DL (ref 2.5–4.9)
PLATELET # BLD AUTO: 215 K/UL (ref 140–440)
POTASSIUM SERPL-SCNC: 4 MMOL/L (ref 3.5–5.5)
PROT SERPL-MCNC: 6.6 G/DL (ref 6.4–8.2)
RBC # BLD AUTO: 2.78 M/UL (ref 4.1–5.1)
SODIUM SERPL-SCNC: 141 MMOL/L (ref 136–145)
WBC # BLD AUTO: 2.4 K/UL (ref 4.5–13)

## 2018-11-29 PROCEDURE — 96372 THER/PROPH/DIAG INJ SC/IM: CPT

## 2018-11-29 PROCEDURE — 77030012965 HC NDL HUBR BBMI -A

## 2018-11-29 PROCEDURE — 96402 CHEMO HORMON ANTINEOPL SQ/IM: CPT

## 2018-11-29 PROCEDURE — 74011250636 HC RX REV CODE- 250/636: Performed by: INTERNAL MEDICINE

## 2018-11-29 PROCEDURE — 36591 DRAW BLOOD OFF VENOUS DEVICE: CPT

## 2018-11-29 PROCEDURE — 83735 ASSAY OF MAGNESIUM: CPT

## 2018-11-29 PROCEDURE — 84100 ASSAY OF PHOSPHORUS: CPT

## 2018-11-29 PROCEDURE — 80053 COMPREHEN METABOLIC PANEL: CPT

## 2018-11-29 PROCEDURE — 74011250636 HC RX REV CODE- 250/636

## 2018-11-29 RX ORDER — SODIUM CHLORIDE 0.9 % (FLUSH) 0.9 %
10-40 SYRINGE (ML) INJECTION AS NEEDED
Status: DISCONTINUED | OUTPATIENT
Start: 2018-11-29 | End: 2018-12-03 | Stop reason: HOSPADM

## 2018-11-29 RX ORDER — GABAPENTIN 100 MG/1
200 CAPSULE ORAL 3 TIMES DAILY
Qty: 180 CAP | Refills: 2 | Status: SHIPPED | OUTPATIENT
Start: 2018-11-29 | End: 2019-03-08 | Stop reason: SDUPTHER

## 2018-11-29 RX ORDER — HEPARIN 100 UNIT/ML
SYRINGE INTRAVENOUS
Status: COMPLETED
Start: 2018-11-29 | End: 2018-11-29

## 2018-11-29 RX ORDER — HEPARIN 100 UNIT/ML
500 SYRINGE INTRAVENOUS ONCE
Status: ACTIVE | OUTPATIENT
Start: 2018-11-29 | End: 2018-11-30

## 2018-11-29 RX ADMIN — FULVESTRANT 500 MG: 50 INJECTION INTRAMUSCULAR at 13:43

## 2018-11-29 RX ADMIN — Medication 20 ML: at 13:34

## 2018-11-29 RX ADMIN — ERYTHROPOIETIN 40000 UNITS: 40000 INJECTION, SOLUTION INTRAVENOUS; SUBCUTANEOUS at 13:53

## 2018-11-29 RX ADMIN — ERYTHROPOIETIN 20000 UNITS: 20000 INJECTION, SOLUTION INTRAVENOUS; SUBCUTANEOUS at 13:53

## 2018-11-29 RX ADMIN — SODIUM CHLORIDE, PRESERVATIVE FREE 500 UNITS: 5 INJECTION INTRAVENOUS at 13:35

## 2018-11-29 NOTE — PROGRESS NOTES
[facility] Progress Note Date: 2018 Name: Adali Shultz MRN: [id number] : 1945 Ms. Figueroa arrived in the [facility] today, at 1315, in stable condition, here for Q 2 Week, CBC/Procrit injection, Q 4 Week Faslodex Injections, Q 4 Week Xgeva (HELD) Injection, and Monthly Port Flush. She was assessed and education was provided. Ms. Merritt Elliott vitals were reviewed. Visit Vitals /76 Pulse 83 Temp 97.3 °F (36.3 °C) Resp 20 Ht 5' 4.5\" (1.638 m) Wt 71.7 kg (158 lb) SpO2 99% BMI 26.70 kg/m² Her most recent CMP results from 10/25/18 were reviewed, and results were noted to be satisfactory for treatment today. Ca 8.4 Anola Ramandeep held today. Her left chest single lumen port was accessed without incident, and blood was drawn from the port for a CBC, CMP, Magnesium, and Phosphorus, per order. And then, the port was flushed well per protocol and without incident, and the carbone needle was removed and gauze/bandaid was applied. (The CBC was processed in house via [employer], and the remaining labs were sent out to be processed. ) Recent Results (from the past 12 hour(s)) CBC WITH 3 PART DIFF Collection Time: 18  1:30 PM  
Result Value Ref Range WBC 2.4 (L) 4.5 - 13.0 K/uL  
 RBC 2.78 (L) 4.10 - 5.10 M/uL HGB 9.8 (L) 12.0 - 16.0 g/dL HCT 29.0 (L) 36 - 48 % .3 (H) 78 - 102 FL  
 MCH 35.3 (H) 25.0 - 35.0 PG  
 MCHC 33.8 31 - 37 g/dL  
 RDW 16.2 (H) 11.5 - 14.5 % PLATELET 675 049 - 042 K/uL NEUTROPHILS 78 (H) 40 - 70 % MIXED CELLS 7 0.1 - 17 % LYMPHOCYTES 15 14 - 44 % ABS. NEUTROPHILS 1.8 1.8 - 9.5 K/UL  
 ABS. MIXED CELLS 0.2 0.0 - 2.3 K/uL  
 ABS. LYMPHOCYTES 0.4 (L) 1.1 - 5.9 K/UL  
 DF AUTOMATED Procrit 60,000 units, was administered SQ, in her left arm, per order, and without incident. Xgeva (HELD for Low Calcium).  
 
Faslodex 500 mg Total Dose, was administered IM, in 2 equally divided doses of 250 mg per order, and without incident. (250 mg was administered IM, in her right gluteal muscle, and 250 mg was administered IM, in her left gluteal muscle) Ms. Taylor Benedict tolerated well, and had no complaints. Ms. Taylor Benedict was discharged from [place] [address] in stable condition at 1400. She is to return in 2 weeks, on 12/13/18, at 1100,  for her next appointment, for Q 2 Week CBC/Procrit injection. Moni Verde RN November 29, 2018

## 2018-11-29 NOTE — TELEPHONE ENCOUNTER
Patient requested GABAPENTIN refill; however she wants to know if we can change the dosage from 300 mg to 200 mg,  Because she said it is so strong it upsets her stomach badly.

## 2018-11-30 RX ORDER — GABAPENTIN 300 MG/1
CAPSULE ORAL
Qty: 270 CAP | Refills: 2 | OUTPATIENT
Start: 2018-11-30

## 2018-12-13 ENCOUNTER — HOSPITAL ENCOUNTER (OUTPATIENT)
Dept: ONCOLOGY | Age: 73
Discharge: HOME | End: 2018-12-13

## 2018-12-13 ENCOUNTER — CLINICAL SUPPORT (OUTPATIENT)
Dept: ONCOLOGY | Age: 73
End: 2018-12-13

## 2018-12-13 ENCOUNTER — HOSPITAL ENCOUNTER (OUTPATIENT)
Dept: INFUSION THERAPY | Age: 73
Discharge: HOME | End: 2018-12-13
Payer: MEDICARE

## 2018-12-13 VITALS
TEMPERATURE: 96.8 F | RESPIRATION RATE: 18 BRPM | DIASTOLIC BLOOD PRESSURE: 70 MMHG | HEART RATE: 77 BPM | OXYGEN SATURATION: 97 % | SYSTOLIC BLOOD PRESSURE: 138 MMHG

## 2018-12-13 DIAGNOSIS — D64.81 ANEMIA DUE TO CHEMOTHERAPY: Primary | ICD-10-CM

## 2018-12-13 DIAGNOSIS — T45.1X5A ANEMIA DUE TO CHEMOTHERAPY: Primary | ICD-10-CM

## 2018-12-13 DIAGNOSIS — D64.81 ANEMIA DUE TO CHEMOTHERAPY: ICD-10-CM

## 2018-12-13 DIAGNOSIS — T45.1X5A ANEMIA DUE TO CHEMOTHERAPY: ICD-10-CM

## 2018-12-13 LAB
BASOPHILS # BLD: 0 K/UL (ref 0–0.06)
BASOPHILS NFR BLD: 2 % (ref 0–3)
DIFFERENTIAL METHOD BLD: ABNORMAL
EOSINOPHIL # BLD: 0 K/UL (ref 0–0.4)
EOSINOPHIL NFR BLD: 0 % (ref 0–5)
ERYTHROCYTE [DISTWIDTH] IN BLOOD BY AUTOMATED COUNT: 17.4 % (ref 11.6–14.5)
HCT VFR BLD AUTO: 31.2 % (ref 35–45)
HGB BLD-MCNC: 10.3 G/DL (ref 12–16)
LYMPHOCYTES # BLD: 0.2 K/UL (ref 0.8–3.5)
LYMPHOCYTES NFR BLD: 13 % (ref 20–51)
MCH RBC QN AUTO: 34.9 PG (ref 24–34)
MCHC RBC AUTO-ENTMCNC: 33 G/DL (ref 31–37)
MCV RBC AUTO: 105.8 FL (ref 74–97)
MONOCYTES # BLD: 0.4 K/UL (ref 0–1)
MONOCYTES NFR BLD: 20 % (ref 2–9)
NEUTS BAND NFR BLD MANUAL: 6 % (ref 0–5)
NEUTS SEG # BLD: 1.3 K/UL (ref 1.8–8)
NEUTS SEG NFR BLD: 59 % (ref 42–75)
PLATELET # BLD AUTO: 154 K/UL (ref 135–420)
PLATELET COMMENTS,PCOM: ABNORMAL
PMV BLD AUTO: 10.4 FL (ref 9.2–11.8)
RBC # BLD AUTO: 2.95 M/UL (ref 4.2–5.3)
RBC MORPH BLD: ABNORMAL
WBC # BLD AUTO: 1.9 K/UL (ref 4.6–13.2)

## 2018-12-13 PROCEDURE — 36415 COLL VENOUS BLD VENIPUNCTURE: CPT

## 2018-12-13 NOTE — PROGRESS NOTES
[facility] Progress Note    Date: 2018    Name: Radha Taylor    MRN: [id number]         : 1945      Ms. Figueroa arrived in the [facility] today, at 1100, in stable condition, here for Q 2 Week, CBC/Procrit injection. She was assessed and education was provided. Ms. Carmine Montague vitals were reviewed. Visit Vitals  /70 (BP 1 Location: Left arm, BP Patient Position: Sitting)   Pulse 77   Temp 96.8 °F (36 °C)   Resp 18   SpO2 97%   Breastfeeding? No       Blood for a CBC was drawn from her left AC without incident. Lab results were obtained and reviewed. Hgb 10.2, Hct 30.2. Procrit 60,000 units was HELD, per order. Ms. Chan Garcia tolerated well, and had no complaints. Ms. Chan Garcia was discharged from [place] [address] in stable condition at 1145. She is to return in 2 weeks, on Thursday, 18, at 1300,  for her next appointment.      Sonja Fraire RN  2018  2:15 PM

## 2018-12-18 ENCOUNTER — OFFICE VISIT (OUTPATIENT)
Dept: ONCOLOGY | Age: 73
End: 2018-12-18

## 2018-12-18 ENCOUNTER — HOSPITAL ENCOUNTER (OUTPATIENT)
Dept: ONCOLOGY | Age: 73
Discharge: HOME | End: 2018-12-18

## 2018-12-18 ENCOUNTER — HOSPITAL ENCOUNTER (OUTPATIENT)
Dept: LAB | Age: 73
Discharge: HOME | End: 2018-12-18
Payer: MEDICARE

## 2018-12-18 VITALS
WEIGHT: 153.2 LBS | TEMPERATURE: 98 F | DIASTOLIC BLOOD PRESSURE: 66 MMHG | BODY MASS INDEX: 25.52 KG/M2 | HEIGHT: 65 IN | HEART RATE: 81 BPM | SYSTOLIC BLOOD PRESSURE: 130 MMHG | OXYGEN SATURATION: 97 %

## 2018-12-18 DIAGNOSIS — T45.1X5A CHEMOTHERAPY INDUCED NEUTROPENIA (HCC): ICD-10-CM

## 2018-12-18 DIAGNOSIS — E55.9 VITAMIN D DEFICIENCY: ICD-10-CM

## 2018-12-18 DIAGNOSIS — C50.919 METASTATIC BREAST CANCER (HCC): ICD-10-CM

## 2018-12-18 DIAGNOSIS — C80.1 NEUROPATHY ASSOCIATED WITH CANCER (HCC): ICD-10-CM

## 2018-12-18 DIAGNOSIS — T45.1X5A ANEMIA DUE TO CHEMOTHERAPY: ICD-10-CM

## 2018-12-18 DIAGNOSIS — D64.81 ANEMIA DUE TO CHEMOTHERAPY: ICD-10-CM

## 2018-12-18 DIAGNOSIS — C50.919 METASTATIC BREAST CANCER (HCC): Primary | ICD-10-CM

## 2018-12-18 DIAGNOSIS — T45.1X5A CHEMOTHERAPY-INDUCED THROMBOCYTOPENIA: ICD-10-CM

## 2018-12-18 DIAGNOSIS — D70.1 CHEMOTHERAPY INDUCED NEUTROPENIA (HCC): ICD-10-CM

## 2018-12-18 DIAGNOSIS — D69.59 CHEMOTHERAPY-INDUCED THROMBOCYTOPENIA: ICD-10-CM

## 2018-12-18 DIAGNOSIS — G63 NEUROPATHY ASSOCIATED WITH CANCER (HCC): ICD-10-CM

## 2018-12-18 LAB
ALBUMIN SERPL-MCNC: 4 G/DL (ref 3.4–5)
ALBUMIN/GLOB SERPL: 1.4 {RATIO} (ref 0.8–1.7)
ALP SERPL-CCNC: 61 U/L (ref 45–117)
ALT SERPL-CCNC: 21 U/L (ref 13–56)
ANION GAP SERPL CALC-SCNC: 6 MMOL/L (ref 3–18)
AST SERPL-CCNC: 21 U/L (ref 15–37)
BASO+EOS+MONOS # BLD AUTO: 0.2 K/UL (ref 0–2.3)
BASO+EOS+MONOS # BLD AUTO: 7 % (ref 0.1–17)
BILIRUB SERPL-MCNC: 0.5 MG/DL (ref 0.2–1)
BUN SERPL-MCNC: 18 MG/DL (ref 7–18)
BUN/CREAT SERPL: 14 (ref 12–20)
CALCIUM SERPL-MCNC: 8.7 MG/DL (ref 8.5–10.1)
CHLORIDE SERPL-SCNC: 106 MMOL/L (ref 100–108)
CO2 SERPL-SCNC: 29 MMOL/L (ref 21–32)
CREAT SERPL-MCNC: 1.31 MG/DL (ref 0.6–1.3)
DIFFERENTIAL METHOD BLD: ABNORMAL
ERYTHROCYTE [DISTWIDTH] IN BLOOD BY AUTOMATED COUNT: 15.9 % (ref 11.5–14.5)
FERRITIN SERPL-MCNC: 102 NG/ML (ref 8–388)
GLOBULIN SER CALC-MCNC: 2.8 G/DL (ref 2–4)
GLUCOSE SERPL-MCNC: 139 MG/DL (ref 74–99)
HCT VFR BLD AUTO: 30.2 % (ref 36–48)
HGB BLD-MCNC: 10 G/DL (ref 12–16)
IRON SATN MFR SERPL: 24 %
IRON SERPL-MCNC: 81 UG/DL (ref 50–175)
LYMPHOCYTES # BLD: 0.3 K/UL (ref 1.1–5.9)
LYMPHOCYTES NFR BLD: 13 % (ref 14–44)
MCH RBC QN AUTO: 34.6 PG (ref 25–35)
MCHC RBC AUTO-ENTMCNC: 33.1 G/DL (ref 31–37)
MCV RBC AUTO: 104.5 FL (ref 78–102)
NEUTS SEG # BLD: 1.9 K/UL (ref 1.8–9.5)
NEUTS SEG NFR BLD: 80 % (ref 40–70)
PLATELET # BLD AUTO: 287 K/UL (ref 140–440)
POTASSIUM SERPL-SCNC: 4.1 MMOL/L (ref 3.5–5.5)
PROT SERPL-MCNC: 6.8 G/DL (ref 6.4–8.2)
RBC # BLD AUTO: 2.89 M/UL (ref 4.1–5.1)
SODIUM SERPL-SCNC: 141 MMOL/L (ref 136–145)
TIBC SERPL-MCNC: 334 UG/DL (ref 250–450)
WBC # BLD AUTO: 2.4 K/UL (ref 4.5–13)

## 2018-12-18 PROCEDURE — 83540 ASSAY OF IRON: CPT

## 2018-12-18 PROCEDURE — 36415 COLL VENOUS BLD VENIPUNCTURE: CPT

## 2018-12-18 PROCEDURE — 82728 ASSAY OF FERRITIN: CPT

## 2018-12-18 PROCEDURE — 80053 COMPREHEN METABOLIC PANEL: CPT

## 2018-12-18 PROCEDURE — 82306 VITAMIN D 25 HYDROXY: CPT

## 2018-12-18 PROCEDURE — 86300 IMMUNOASSAY TUMOR CA 15-3: CPT

## 2018-12-18 NOTE — PATIENT INSTRUCTIONS

## 2018-12-18 NOTE — PROGRESS NOTES
Hematology/Oncology  Progress Note    Name: Everardo Jose  Date: 2018  : 1945    PCP: Abiodun Vasquez MD     Ms. Danii Ortega is a 68 y.o.  female who was seen for management of her metastatic breast cancer with associated complications of chemotherapy. Current therapy: Fulvestrant 500 mg subcutaneous monthly and Ibrance 125 mg by mouth daily. Subjective:     Mrs. Danii Ortega is a 70-year-old woman who has slowly progressive metastatic breast cancer. She has previously completed external beam radiation therapy to lesions in her ribs and more recently she completed proton therapy to areas of bone involvement with a significant benefit with regards to pain control. The posttherapy imaging studies showed a significant decrease in the intensity of uptake on the bone scan. .  Additionally she is currently receiving systemic therapy with fulvestrant and Ibrance. Currently she is tolerating the systemic therapy reasonably well and has not experienced any nausea or emesis. She is no longer complaining of mouth discomfort but she does report some fatigue and occasional weakness. She continues to have SOB intermittently. She also receives Procrit at 2 week intervals whenever her hemoglobin is below 10 g/dL and hematocrit is below 30%. Today, the patient informed me that she and her  are planning to leave for [place] at the end of the month. She will be there until mid 2019. She has no new complaints to report. Past medical history, family history, and social history: these were reviewed and remains unchanged.     Past Medical History:   Diagnosis Date    Biliary colic     Breast CA ([address] 2012    Cancer [facility])     Right Breast    Chemotherapy convalescence or palliative care     Neuropathy     Radiation therapy complication     Thyroid disease      Past Surgical History:   Procedure Laterality Date    BREAST SURGERY PROCEDURE UNLISTED  10/2002    Right-Lesion    BREAST SURGERY PROCEDURE UNLISTED  2004    Right-Lesion    HX LAP CHOLECYSTECTOMY  13    HX MASTECTOMY      Right     Social History     Socioeconomic History    Marital status:      Spouse name: Not on file    Number of children: Not on file    Years of education: Not on file    Highest education level: Not on file   Social Needs    Financial resource strain: Not on file    Food insecurity - worry: Not on file    Food insecurity - inability: Not on file    Transportation needs - medical: Not on file   [employer] needs - non-medical: Not on file   Occupational History    Not on file   Tobacco Use    Smoking status: Former Smoker     Last attempt to quit: 1991     Years since quittin.5    Smokeless tobacco: Never Used   Substance and Sexual Activity    Alcohol use: Yes     Alcohol/week: 0.0 oz     Types: 1 - 2 Glasses of wine per week     Comment: socially, occassionally    Drug use: No    Sexual activity: Not on file   Other Topics Concern    Not on file   Social History Narrative    Not on file     Family History   Problem Relation Age of Onset    Cancer Maternal Aunt         Hodgkin's disease    Breast Cancer Paternal Aunt     Cancer Father         Prostate, lung, brain     Current Outpatient Medications   Medication Sig Dispense Refill    gabapentin (NEURONTIN) 100 mg capsule Take 2 Caps by mouth three (3) times daily. 180 Cap 2    lidocaine HCl-hydrocortison ac topical cream Apply  to affected area two (2) times a day. 85 g 3    palbociclib (IBRANCE) 100 mg cap Take 1 Cap by mouth daily. For 21 days on and 7 days off every 28 days. 63 Cap 5    gabapentin (NEURONTIN) 300 mg capsule TAKE 1 CAPSULE THREE TIMES A  Cap 2    TETRACYCLINE HCL (TETRACYCLINE PO) Take  by mouth.  fexofenadine-pseudoephedrine (ALLEGRA-D 24 HOUR) 180-240 mg per tablet Take 1 Tab by mouth daily.       sulfacetamide (BLEPH-10) 10 % ophthalmic ointment Administer  to right eye three (3) times daily.  palbociclib (IBRANCE) 125 mg cap Take 125 mg by mouth daily. Take 1 tab po every day for 21 days on and 7 days off q 28 days  Indications: HORMONE RECEPTOR(+), HER2(-) ADVANCED BREAST CANCER 42 Cap 6    ibuprofen (MOTRIN) 800 mg tablet Take 800 mg by mouth two (2) times a day. Indications: OSTEOARTHRITIS      Cholecalciferol, Vitamin D3, 5,000 unit Tab Take  by mouth daily.  thyroid, Pork, (ARMOUR THYROID) 60 mg tablet Take 90 mg by mouth daily.  L-Mfolate-B6 Phos-Methyl-B12 (NEURPATH-B) 3-35-2 mg Tab Take  by mouth two (2) times a day.  warfarin (COUMADIN) 1 mg tablet Take 1 mg by mouth daily. Review of Systems  Constitutional: The patient has no acute distress or discomfort. HEENT: The patient denies recent head trauma, eye pain, blurred vision,  hearing deficit, oropharyngeal mucosal pain or lesions, and the patient denies throat pain or discomfort. Lymphatics: The patient denies palpable peripheral lymphadenopathy. Hematologic: The patient denies having bruising, bleeding, or progressive fatigue. Respiratory: Patient denies having shortness of breath, cough, sputum production, fever, or dyspnea on exertion. Cardiovascular: The patient denies having leg pain, leg swelling, heart palpitations, chest permit, chest pain, or lightheadedness. The patient denies having dyspnea on exertion. Gastrointestinal: The patient denies having nausea, emesis, or diarrhea. The patient denies having any hematemesis or blood in the stool. Genitourinary: Patient denies having urinary urgency, frequency, or dysuria. The patient denies having blood in the urine. Psychological: The patient denies having symptoms of nervousness, anxiety, depression, or thoughts of harming self. Skin: Patient denies having skin rashes, skin, ulcerations, or unexplained itching or pruritus. Musculoskeletal: The patient denies having pain in the joints or bones.       Objective: Visit Vitals  /66   Pulse 81   Temp 98 °F (36.7 °C)   Ht 5' 4.5\" (1.638 m)   Wt 69.5 kg (153 lb 3.2 oz)   SpO2 97%   BMI 25.89 kg/m²     ECOG PS=0; Pain score=0/10    Physical Exam:   Gen. Appearance: The patient is in no acute distress. Skin: There is no bruise or rash. There is no evidence of cutaneous shingles over the lower back and flank. HEENT: The exam is unremarkable. Neck: Supple without lymphadenopathy or thyromegaly. Lungs: Clear to auscultation and percussion; there are no wheezes or rhonchi. Heart: Regular rate and rhythm; there are no murmurs, gallops, or rubs. Abdomen: Bowel sounds are present and normal.  There is no guarding, tenderness, or hepatosplenomegaly. Extremities: There is no clubbing, cyanosis, or edema. Neurologic: There are no focal neurologic deficits. Lymphatics: There is no palpable peripheral lymphadenopathy. Musculoskeletal: The patient has full range of motion at all joints. There is no evidence of joint deformity or effusions. There is no focal joint tenderness. Psychological/psychiatric: There is no clinical evidence of anxiety, depression, or melancholy. Lab data:      Results for orders placed or performed during the hospital encounter of 12/18/18   CBC WITH 3 PART DIFF     Status: Abnormal   Result Value Ref Range Status    WBC 2.4 (L) 4.5 - 13.0 K/uL Final    RBC 2.89 (L) 4.10 - 5.10 M/uL Final    HGB 10.0 (L) 12.0 - 16.0 g/dL Final    HCT 30.2 (L) 36 - 48 % Final    .5 (H) 78 - 102 FL Final    MCH 34.6 25.0 - 35.0 PG Final    MCHC 33.1 31 - 37 g/dL Final    RDW 15.9 (H) 11.5 - 14.5 % Final    PLATELET 218 819 - 748 K/uL Final    NEUTROPHILS 80 (H) 40 - 70 % Final    MIXED CELLS 7 0.1 - 17 % Final    LYMPHOCYTES 13 (L) 14 - 44 % Final    ABS. NEUTROPHILS 1.9 1.8 - 9.5 K/UL Final    ABS. MIXED CELLS 0.2 0.0 - 2.3 K/uL Final    ABS. LYMPHOCYTES 0.3 (L) 1.1 - 5.9 K/UL Final     Comment: Test performed at [address]. Results Reviewed by Medical Director. DF AUTOMATED   Final           Assessment:     1. Metastatic breast cancer ([facility] [address]    2. Neuropathy associated with cancer ([facility] [address]    3. Anemia due to chemotherapy      Plan:   Metastatic breast cancer: The combination of Fulvestrant 500 mg subcutaneous monthly  will be continued. Ibrance 140 mg by mouth daily will be continued as well. I will contact her physician in [place] and notify him that she will be coming down at the end of the month with plans to stay until mid March. Clinically she is doing reasonably well. The most recent CT scans of the chest, abdomen, and pelvis showed stable findings with no new evidence of disease progression. Neuropathy associated with cancer: I will continue her Neurontin at 400 mg 3 times daily. Chemotherapy-induced neutropenia/chronic leukopenia (persisting problem): The current CBC shows that her WBC count is 2.4. The absolute neutrophil count is 1.9. Her current hemoglobin and hematocrit are 10 g/dL and 30.2 % respectively. .  If the absolute neutrophil count declines to 0.5 she will be started on Neupogen or Neulasta. She will get her labs drawn once a month. Chemotherapy-induced anemia (persisting problem): The new chemotherapy regimen has significantly decreased her hemoglobin. Her current hemoglobin is 10 g/dL with hematocrit of 30.2 %. At this time I will check iron profile and ferritin levels. I have recommended that the patient continue taking ferrous sulfate 1 tablet daily. Procrit at a dose of 60,000 units subcutaneous will be provided now that her hemoglobin has declined below 10 g/dL hematocrit is below 30%. Chemotherapy-induced thrombocytopenia : The patient is continuing the current dose of Ibrance 140 mg daily. We have explained to her that the medication was responsible for her thrombocytopenia.   The current platelet count is 973,761 we will continue to monitor platelet counts at 6 week intervals. Follow-up on March 26, 2019. Orders Placed This Encounter    COMPLETE CBC & AUTO DIFF WBC    InHouse CBC ([employer])     Standing Status:   Future     Number of Occurrences:   1     Standing Expiration Date:   12/25/2018       Gabe Wilde MD  12/18/2018      Please note: This document has been produced using voice recognition software. Unrecognized errors in transcription may be present.

## 2018-12-19 LAB
25(OH)D3 SERPL-MCNC: 54.9 NG/ML (ref 30–100)
CANCER AG27-29 SERPL-ACNC: 47.6 U/ML (ref 0–38.6)

## 2018-12-19 NOTE — PROGRESS NOTES
Most recent CT scans of the chest, abdomen, and pelvis showed stable findings with no new evidence of disease progression. Patient aware.

## 2018-12-21 RX ORDER — LAMOTRIGINE 25 MG/1
500 TABLET ORAL ONCE
Status: COMPLETED | OUTPATIENT
Start: 2018-12-27 | End: 2018-12-27

## 2018-12-27 ENCOUNTER — HOSPITAL ENCOUNTER (OUTPATIENT)
Dept: LAB | Age: 73
Discharge: HOME | End: 2018-12-27
Payer: MEDICARE

## 2018-12-27 ENCOUNTER — CLINICAL SUPPORT (OUTPATIENT)
Dept: ONCOLOGY | Age: 73
End: 2018-12-27

## 2018-12-27 ENCOUNTER — HOSPITAL ENCOUNTER (OUTPATIENT)
Dept: ONCOLOGY | Age: 73
Discharge: HOME | End: 2018-12-27

## 2018-12-27 ENCOUNTER — HOSPITAL ENCOUNTER (OUTPATIENT)
Dept: INFUSION THERAPY | Age: 73
Discharge: HOME | End: 2018-12-27
Payer: MEDICARE

## 2018-12-27 VITALS
TEMPERATURE: 97.4 F | DIASTOLIC BLOOD PRESSURE: 65 MMHG | OXYGEN SATURATION: 96 % | WEIGHT: 153 LBS | HEART RATE: 80 BPM | RESPIRATION RATE: 16 BRPM | BODY MASS INDEX: 25.86 KG/M2 | SYSTOLIC BLOOD PRESSURE: 133 MMHG

## 2018-12-27 DIAGNOSIS — D64.81 ANEMIA DUE TO CHEMOTHERAPY: ICD-10-CM

## 2018-12-27 DIAGNOSIS — D64.81 ANEMIA DUE TO CHEMOTHERAPY: Primary | ICD-10-CM

## 2018-12-27 DIAGNOSIS — T45.1X5A ANEMIA DUE TO CHEMOTHERAPY: ICD-10-CM

## 2018-12-27 DIAGNOSIS — T45.1X5A ANEMIA DUE TO CHEMOTHERAPY: Primary | ICD-10-CM

## 2018-12-27 LAB
ALBUMIN SERPL-MCNC: 3.7 G/DL (ref 3.4–5)
ALBUMIN/GLOB SERPL: 1.3 {RATIO} (ref 0.8–1.7)
ALP SERPL-CCNC: 61 U/L (ref 45–117)
ALT SERPL-CCNC: 18 U/L (ref 13–56)
ANION GAP SERPL CALC-SCNC: 5 MMOL/L (ref 3–18)
AST SERPL-CCNC: 20 U/L (ref 15–37)
BASO+EOS+MONOS # BLD AUTO: 0.1 K/UL (ref 0–2.3)
BASO+EOS+MONOS NFR BLD AUTO: 5 % (ref 0.1–17)
BILIRUB SERPL-MCNC: 0.6 MG/DL (ref 0.2–1)
BUN SERPL-MCNC: 19 MG/DL (ref 7–18)
BUN/CREAT SERPL: 20 (ref 12–20)
CALCIUM SERPL-MCNC: 8.8 MG/DL (ref 8.5–10.1)
CHLORIDE SERPL-SCNC: 107 MMOL/L (ref 100–108)
CO2 SERPL-SCNC: 27 MMOL/L (ref 21–32)
CREAT SERPL-MCNC: 0.95 MG/DL (ref 0.6–1.3)
DIFFERENTIAL METHOD BLD: ABNORMAL
ERYTHROCYTE [DISTWIDTH] IN BLOOD BY AUTOMATED COUNT: 15.9 % (ref 11.5–14.5)
GLOBULIN SER CALC-MCNC: 2.9 G/DL (ref 2–4)
GLUCOSE SERPL-MCNC: 92 MG/DL (ref 74–99)
HCT VFR BLD AUTO: 28.1 % (ref 36–48)
HGB BLD-MCNC: 9.4 G/DL (ref 12–16)
LYMPHOCYTES # BLD: 0.3 K/UL (ref 1.1–5.9)
LYMPHOCYTES NFR BLD: 17 % (ref 14–44)
MAGNESIUM SERPL-MCNC: 1.7 MG/DL (ref 1.6–2.6)
MCH RBC QN AUTO: 34.8 PG (ref 25–35)
MCHC RBC AUTO-ENTMCNC: 33.5 G/DL (ref 31–37)
MCV RBC AUTO: 104.1 FL (ref 78–102)
NEUTS SEG # BLD: 1.5 K/UL (ref 1.8–9.5)
NEUTS SEG NFR BLD: 78 % (ref 40–70)
PHOSPHATE SERPL-MCNC: 2.9 MG/DL (ref 2.5–4.9)
PLATELET # BLD AUTO: 268 K/UL (ref 140–440)
POTASSIUM SERPL-SCNC: 4.1 MMOL/L (ref 3.5–5.5)
PROT SERPL-MCNC: 6.6 G/DL (ref 6.4–8.2)
RBC # BLD AUTO: 2.7 M/UL (ref 4.1–5.1)
SODIUM SERPL-SCNC: 139 MMOL/L (ref 136–145)
WBC # BLD AUTO: 1.9 K/UL (ref 4.5–13)

## 2018-12-27 PROCEDURE — 36591 DRAW BLOOD OFF VENOUS DEVICE: CPT

## 2018-12-27 PROCEDURE — 74011250636 HC RX REV CODE- 250/636

## 2018-12-27 PROCEDURE — 96372 THER/PROPH/DIAG INJ SC/IM: CPT

## 2018-12-27 PROCEDURE — 80053 COMPREHEN METABOLIC PANEL: CPT

## 2018-12-27 PROCEDURE — 77030012965 HC NDL HUBR BBMI -A

## 2018-12-27 PROCEDURE — 74011250636 HC RX REV CODE- 250/636: Performed by: INTERNAL MEDICINE

## 2018-12-27 PROCEDURE — 96402 CHEMO HORMON ANTINEOPL SQ/IM: CPT

## 2018-12-27 PROCEDURE — 83735 ASSAY OF MAGNESIUM: CPT

## 2018-12-27 PROCEDURE — 84100 ASSAY OF PHOSPHORUS: CPT

## 2018-12-27 RX ORDER — HEPARIN 100 UNIT/ML
SYRINGE INTRAVENOUS
Status: COMPLETED
Start: 2018-12-27 | End: 2018-12-27

## 2018-12-27 RX ORDER — HEPARIN 100 UNIT/ML
500 SYRINGE INTRAVENOUS AS NEEDED
Status: DISCONTINUED | OUTPATIENT
Start: 2018-12-27 | End: 2018-12-31 | Stop reason: HOSPADM

## 2018-12-27 RX ORDER — SODIUM CHLORIDE 0.9 % (FLUSH) 0.9 %
10-40 SYRINGE (ML) INJECTION AS NEEDED
Status: DISCONTINUED | OUTPATIENT
Start: 2018-12-27 | End: 2018-12-31 | Stop reason: HOSPADM

## 2018-12-27 RX ADMIN — ERYTHROPOIETIN 20000 UNITS: 20000 INJECTION, SOLUTION INTRAVENOUS; SUBCUTANEOUS at 13:50

## 2018-12-27 RX ADMIN — HEPARIN 500 UNITS: 100 SYRINGE at 13:30

## 2018-12-27 RX ADMIN — FULVESTRANT 500 MG: 50 INJECTION INTRAMUSCULAR at 13:55

## 2018-12-27 RX ADMIN — ERYTHROPOIETIN 40000 UNITS: 40000 INJECTION, SOLUTION INTRAVENOUS; SUBCUTANEOUS at 13:50

## 2018-12-27 RX ADMIN — DENOSUMAB 120 MG: 120 INJECTION SUBCUTANEOUS at 13:52

## 2018-12-27 RX ADMIN — Medication 40 ML: at 13:30

## 2018-12-27 NOTE — PROGRESS NOTES
[facility] Progress Note    Date: 2018    Name: Jolene Mckeon    MRN: [id number]         : 1945      Ms. Figueroa arrived in the [facility] today, at 1305, in stable condition, here for Q 2 Week, CBC/Procrit injection, Q 4 Week Faslodex Injections, Q 4 Week Xgeva Injection, and Monthly Port Flush. She was assessed and education was provided. Ms. Tracey Lozano vitals were reviewed. Visit Vitals  /65 (BP 1 Location: Left arm, BP Patient Position: Sitting)   Pulse 80   Temp 97.4 °F (36.3 °C)   Resp 16   Wt 69.4 kg (153 lb)   SpO2 96%   BMI 25.86 kg/m²       Her most recent CMP results from 18 were reviewed, and results were noted to be satisfactory for treatment today. Her left chest single lumen port was accessed without incident at 1330, and blood was drawn from the port for a CBC, CMP, Magnesium, and Phosphorus, per order. And then, the port was flushed well per protocol and without incident, and the carbone needle was removed and gauze/bandaid was applied. (The CBC was processed in house via [email or web address], and the remaining labs were sent out to be processed. )        Recent Results (from the past 12 hour(s))   CBC WITH 3 PART DIFF    Collection Time: 18  1:30 PM   Result Value Ref Range    WBC 1.9 (L) 4.5 - 13.0 K/uL    RBC 2.70 (L) 4.10 - 5.10 M/uL    HGB 9.4 (L) 12.0 - 16.0 g/dL    HCT 28.1 (L) 36 - 48 %    .1 (H) 78 - 102 FL    MCH 34.8 25.0 - 35.0 PG    MCHC 33.5 31 - 37 g/dL    RDW 15.9 (H) 11.5 - 14.5 %    PLATELET 767 711 - 956 K/uL    NEUTROPHILS 78 (H) 40 - 70 %    MIXED CELLS 5 0.1 - 17 %    LYMPHOCYTES 17 14 - 44 %    ABS. NEUTROPHILS 1.5 (L) 1.8 - 9.5 K/UL    ABS. MIXED CELLS 0.1 0.0 - 2.3 K/uL    ABS. LYMPHOCYTES 0.3 (L) 1.1 - 5.9 K/UL    DF AUTOMATED                 Procrit 60,000 units, was administered SQ, in her left arm at 1350, per order, and without incident.        Xgeva 120 mg, was administered SQ, in her left arm at 1352, per order, and without incident. Faslodex 500 mg Total Dose, was administered IM, in 2 equally divided doses of 250 mg, at 1355, per order, and without incident. (250 mg was administered IM, in her right gluteal muscle, and 250 mg was administered IM, in her left gluteal muscle)           Ms. Víctor Duque tolerated well, and had no complaints. Ms. Víctor Duque was discharged from [place] [address] in stable condition at 1405. .. She  is to return on Thursday, 3-21-19, at 1100, for her next [facility] appointment,  for CBC/Procrit injection, and Q 4 Week, Faslodex Injections, Xgeva Injection, and Port Flush.  (Please note that she will be out of town until 3-21-19, and will be receiving her treatments at another facility while she is away.)    Rajendra Faulkner RN  December 27, 2018  2:15 PM

## 2019-01-10 ENCOUNTER — APPOINTMENT (OUTPATIENT)
Dept: INFUSION THERAPY | Age: 74
End: 2019-01-10

## 2019-01-24 ENCOUNTER — HOSPITAL ENCOUNTER (OUTPATIENT)
Dept: INFUSION THERAPY | Age: 74
End: 2019-01-24

## 2019-03-08 DIAGNOSIS — G63 NEUROPATHY ASSOCIATED WITH CANCER (HCC): ICD-10-CM

## 2019-03-08 DIAGNOSIS — C80.1 NEUROPATHY ASSOCIATED WITH CANCER (HCC): ICD-10-CM

## 2019-03-08 RX ORDER — GABAPENTIN 100 MG/1
CAPSULE ORAL
Qty: 180 CAP | Refills: 2 | Status: SHIPPED | OUTPATIENT
Start: 2019-03-08 | End: 2019-11-06 | Stop reason: SDUPTHER

## 2019-03-19 RX ORDER — LAMOTRIGINE 25 MG/1
500 TABLET ORAL ONCE
Status: CANCELLED | OUTPATIENT
Start: 2019-03-21 | End: 2019-03-21

## 2019-03-21 ENCOUNTER — CLINICAL SUPPORT (OUTPATIENT)
Dept: ONCOLOGY | Age: 74
End: 2019-03-21

## 2019-03-21 ENCOUNTER — HOSPITAL ENCOUNTER (OUTPATIENT)
Dept: ONCOLOGY | Age: 74
Discharge: HOME | End: 2019-03-21

## 2019-03-21 ENCOUNTER — HOSPITAL ENCOUNTER (OUTPATIENT)
Dept: INFUSION THERAPY | Age: 74
Discharge: HOME | End: 2019-03-21
Payer: MEDICARE

## 2019-03-21 VITALS
WEIGHT: 153 LBS | SYSTOLIC BLOOD PRESSURE: 125 MMHG | BODY MASS INDEX: 26.12 KG/M2 | OXYGEN SATURATION: 98 % | HEIGHT: 64 IN | RESPIRATION RATE: 16 BRPM | DIASTOLIC BLOOD PRESSURE: 67 MMHG | TEMPERATURE: 97 F | HEART RATE: 76 BPM

## 2019-03-21 DIAGNOSIS — C50.919 METASTATIC BREAST CANCER (HCC): ICD-10-CM

## 2019-03-21 DIAGNOSIS — T45.1X5A ANEMIA DUE TO CHEMOTHERAPY: ICD-10-CM

## 2019-03-21 DIAGNOSIS — Z17.0 MALIGNANT NEOPLASM OF LOWER-INNER QUADRANT OF RIGHT BREAST OF FEMALE, ESTROGEN RECEPTOR POSITIVE (HCC): Primary | ICD-10-CM

## 2019-03-21 DIAGNOSIS — D64.81 ANEMIA DUE TO CHEMOTHERAPY: ICD-10-CM

## 2019-03-21 DIAGNOSIS — D64.81 ANEMIA DUE TO CHEMOTHERAPY: Primary | ICD-10-CM

## 2019-03-21 DIAGNOSIS — T45.1X5A ANEMIA DUE TO CHEMOTHERAPY: Primary | ICD-10-CM

## 2019-03-21 DIAGNOSIS — C50.311 MALIGNANT NEOPLASM OF LOWER-INNER QUADRANT OF RIGHT BREAST OF FEMALE, ESTROGEN RECEPTOR POSITIVE (HCC): Primary | ICD-10-CM

## 2019-03-21 LAB
ALBUMIN SERPL-MCNC: 3.8 G/DL (ref 3.4–5)
ALBUMIN/GLOB SERPL: 1.4 {RATIO} (ref 0.8–1.7)
ALP SERPL-CCNC: 64 U/L (ref 45–117)
ALT SERPL-CCNC: 18 U/L (ref 13–56)
ANION GAP SERPL CALC-SCNC: 6 MMOL/L (ref 3–18)
AST SERPL-CCNC: 16 U/L (ref 15–37)
BILIRUB SERPL-MCNC: 0.5 MG/DL (ref 0.2–1)
BUN SERPL-MCNC: 23 MG/DL (ref 7–18)
BUN/CREAT SERPL: 20 (ref 12–20)
CALCIUM SERPL-MCNC: 8.5 MG/DL (ref 8.5–10.1)
CHLORIDE SERPL-SCNC: 110 MMOL/L (ref 100–108)
CO2 SERPL-SCNC: 26 MMOL/L (ref 21–32)
CREAT SERPL-MCNC: 1.16 MG/DL (ref 0.6–1.3)
GLOBULIN SER CALC-MCNC: 2.8 G/DL (ref 2–4)
GLUCOSE SERPL-MCNC: 99 MG/DL (ref 74–99)
MAGNESIUM SERPL-MCNC: 1.7 MG/DL (ref 1.6–2.6)
PHOSPHATE SERPL-MCNC: 2.3 MG/DL (ref 2.5–4.9)
POTASSIUM SERPL-SCNC: 4 MMOL/L (ref 3.5–5.5)
PROT SERPL-MCNC: 6.6 G/DL (ref 6.4–8.2)
SODIUM SERPL-SCNC: 142 MMOL/L (ref 136–145)

## 2019-03-21 PROCEDURE — 84100 ASSAY OF PHOSPHORUS: CPT

## 2019-03-21 PROCEDURE — 77030012965 HC NDL HUBR BBMI -A

## 2019-03-21 PROCEDURE — 96372 THER/PROPH/DIAG INJ SC/IM: CPT

## 2019-03-21 PROCEDURE — 96402 CHEMO HORMON ANTINEOPL SQ/IM: CPT

## 2019-03-21 PROCEDURE — 74011250636 HC RX REV CODE- 250/636

## 2019-03-21 PROCEDURE — 74011250636 HC RX REV CODE- 250/636: Performed by: INTERNAL MEDICINE

## 2019-03-21 PROCEDURE — 83735 ASSAY OF MAGNESIUM: CPT

## 2019-03-21 PROCEDURE — 36591 DRAW BLOOD OFF VENOUS DEVICE: CPT

## 2019-03-21 PROCEDURE — 74011250636 HC RX REV CODE- 250/636: Performed by: NURSE PRACTITIONER

## 2019-03-21 PROCEDURE — 80053 COMPREHEN METABOLIC PANEL: CPT

## 2019-03-21 RX ORDER — HEPARIN 100 UNIT/ML
SYRINGE INTRAVENOUS
Status: COMPLETED
Start: 2019-03-21 | End: 2019-03-21

## 2019-03-21 RX ORDER — LAMOTRIGINE 25 MG/1
500 TABLET ORAL ONCE
Status: COMPLETED | OUTPATIENT
Start: 2019-03-21 | End: 2019-03-21

## 2019-03-21 RX ORDER — HEPARIN 100 UNIT/ML
500 SYRINGE INTRAVENOUS AS NEEDED
Status: DISCONTINUED | OUTPATIENT
Start: 2019-03-21 | End: 2019-03-24 | Stop reason: HOSPADM

## 2019-03-21 RX ORDER — SODIUM CHLORIDE 0.9 % (FLUSH) 0.9 %
10-40 SYRINGE (ML) INJECTION AS NEEDED
Status: DISCONTINUED | OUTPATIENT
Start: 2019-03-21 | End: 2019-03-24 | Stop reason: HOSPADM

## 2019-03-21 RX ADMIN — EPOETIN ALFA-EPBX 60000 UNITS: 40000 INJECTION, SOLUTION INTRAVENOUS; SUBCUTANEOUS at 14:00

## 2019-03-21 RX ADMIN — HEPARIN 500 UNITS: 100 SYRINGE at 13:40

## 2019-03-21 RX ADMIN — FULVESTRANT 500 MG: 50 INJECTION INTRAMUSCULAR at 14:10

## 2019-03-21 RX ADMIN — Medication 40 ML: at 13:40

## 2019-03-21 NOTE — PROGRESS NOTES
[facility] Progress Note    Date: 2019    Name: Gilmer Ricketts    MRN: [id number]         : 1945      Ms. Figueroa arrived in the [facility] today, at 1310, in stable condition, here for Q 2 Week, CBC/Retacrit injection, Q 4 Week Faslodex Injections, Q 4 Week Xgeva Injection & Labs, and Monthly Port Flush. She was assessed and education was provided. Ms. Ethan Leal stated that her most recent port flush &  Xgeva & Faslodex Injections were  on 19, while she was still out of town. Also, she stated that her most recent Procrit Injection was on 3-7-19, while she was still out of town. Ms. Daphne Moreno vitals were reviewed. Visit Vitals  /67 (BP 1 Location: Left arm, BP Patient Position: Sitting)   Pulse 76   Temp 97 °F (36.1 °C)   Resp 16   Ht 5' 4\" (1.626 m)   Wt 69.4 kg (153 lb)   SpO2 98%   Breastfeeding? No   BMI 26.26 kg/m²         She had no recent CMP results to be reviewed, and therefore, the Xgeva Injection was NOT administered today. Her left chest single lumen port was accessed without incident at 1340, and blood was drawn from the port for a CBC, CMP, Magnesium, and Phosphorus, per order. And then, the port was flushed well per protocol and without incident, and the carbone needle was removed and gauze/bandaid was applied.  (The CBC was processed in house via [employer], and the remaining labs were sent out to be processed. )        The preliminary CBC results from today, were as follows:    WBC 1.9  ANC 1.3  HGB 9.9  HCT 28.9            Retacrit (epoetin zac-epbx)  60,000 units, was administered SQ, in her left arm at 1400, per order, and without incident. (40,000 units in the 1st injection, and 20,000 units in the 2nd injection)             Faslodex 500 mg Total Dose, was administered IM, in 2 equally divided doses of 250 mg, at 1410, per order, and without incident. (250 mg was administered IM, in her right gluteal muscle, and 250 mg was administered IM, in her left gluteal muscle)           Ms. Emma Coburn tolerated well, and had no complaints. Ms. Emma Coburn was [facility] from [place] [address] [facility] [address] [facility] .. She  is to return on Thursday, 4-4-19, at 1100, for her next [facility] appointment,  for CBC/Retacrit injection, and Q 4 Week, Xgeva Injection. And then, she is scheduled to return in 4 weeks, on Thursday, 4-18-19, at 1100, for her port flush, and her next Faslodex Injections.      Amanda Feuntes RN  March 21, 2019  2:15 PM

## 2019-03-22 LAB
BASO+EOS+MONOS # BLD AUTO: 0.2 K/UL (ref 0–2.3)
BASO+EOS+MONOS NFR BLD AUTO: 8 % (ref 0.1–17)
DIFFERENTIAL METHOD BLD: ABNORMAL
ERYTHROCYTE [DISTWIDTH] IN BLOOD BY AUTOMATED COUNT: 16 % (ref 11.5–14.5)
HCT VFR BLD AUTO: 28.9 % (ref 36–48)
HGB BLD-MCNC: 9.9 G/DL (ref 12–16)
LYMPHOCYTES # BLD: 0.4 K/UL (ref 1.1–5.9)
LYMPHOCYTES NFR BLD: 19 % (ref 14–44)
MCH RBC QN AUTO: 37.2 PG (ref 25–35)
MCHC RBC AUTO-ENTMCNC: 34.3 G/DL (ref 31–37)
MCV RBC AUTO: 108.6 FL (ref 78–102)
NEUTS SEG # BLD: 1.3 K/UL (ref 1.8–9.5)
NEUTS SEG NFR BLD: 73 % (ref 40–70)
PLATELET # BLD AUTO: 252 K/UL (ref 140–440)
RBC # BLD AUTO: 2.66 M/UL (ref 4.1–5.1)
WBC # BLD AUTO: 1.9 K/UL (ref 4.5–13)

## 2019-03-29 ENCOUNTER — OFFICE VISIT (OUTPATIENT)
Dept: ONCOLOGY | Age: 74
End: 2019-03-29

## 2019-03-29 ENCOUNTER — HOSPITAL ENCOUNTER (OUTPATIENT)
Dept: LAB | Age: 74
Discharge: HOME | End: 2019-03-29
Payer: MEDICARE

## 2019-03-29 ENCOUNTER — HOSPITAL ENCOUNTER (OUTPATIENT)
Dept: ONCOLOGY | Age: 74
Discharge: HOME | End: 2019-03-29

## 2019-03-29 VITALS
SYSTOLIC BLOOD PRESSURE: 132 MMHG | BODY MASS INDEX: 26.05 KG/M2 | TEMPERATURE: 98.1 F | WEIGHT: 152.6 LBS | HEART RATE: 80 BPM | HEIGHT: 64 IN | OXYGEN SATURATION: 100 % | DIASTOLIC BLOOD PRESSURE: 60 MMHG | RESPIRATION RATE: 16 BRPM

## 2019-03-29 DIAGNOSIS — D64.81 ANEMIA DUE TO CHEMOTHERAPY: ICD-10-CM

## 2019-03-29 DIAGNOSIS — E55.9 VITAMIN D DEFICIENCY: ICD-10-CM

## 2019-03-29 DIAGNOSIS — D70.1 CHEMOTHERAPY INDUCED NEUTROPENIA (HCC): ICD-10-CM

## 2019-03-29 DIAGNOSIS — T45.1X5A CHEMOTHERAPY INDUCED NEUTROPENIA (HCC): ICD-10-CM

## 2019-03-29 DIAGNOSIS — C79.51 SECONDARY MALIGNANT NEOPLASM OF BONE AND BONE MARROW (HCC): ICD-10-CM

## 2019-03-29 DIAGNOSIS — C50.919 METASTATIC BREAST CANCER (HCC): ICD-10-CM

## 2019-03-29 DIAGNOSIS — D64.81 ANEMIA DUE TO ANTINEOPLASTIC CHEMOTHERAPY: ICD-10-CM

## 2019-03-29 DIAGNOSIS — C50.311 MALIGNANT NEOPLASM OF LOWER-INNER QUADRANT OF RIGHT BREAST OF FEMALE, ESTROGEN RECEPTOR POSITIVE (HCC): Primary | ICD-10-CM

## 2019-03-29 DIAGNOSIS — D69.59 CHEMOTHERAPY-INDUCED THROMBOCYTOPENIA: ICD-10-CM

## 2019-03-29 DIAGNOSIS — T45.1X5A ANEMIA DUE TO ANTINEOPLASTIC CHEMOTHERAPY: ICD-10-CM

## 2019-03-29 DIAGNOSIS — T45.1X5A CHEMOTHERAPY-INDUCED THROMBOCYTOPENIA: ICD-10-CM

## 2019-03-29 DIAGNOSIS — T45.1X5A ANEMIA DUE TO CHEMOTHERAPY: ICD-10-CM

## 2019-03-29 DIAGNOSIS — Z17.0 MALIGNANT NEOPLASM OF LOWER-INNER QUADRANT OF RIGHT BREAST OF FEMALE, ESTROGEN RECEPTOR POSITIVE (HCC): Primary | ICD-10-CM

## 2019-03-29 DIAGNOSIS — Z85.3 PERSONAL HISTORY OF MALIGNANT NEOPLASM OF BREAST: ICD-10-CM

## 2019-03-29 DIAGNOSIS — C79.52 SECONDARY MALIGNANT NEOPLASM OF BONE AND BONE MARROW (HCC): ICD-10-CM

## 2019-03-29 DIAGNOSIS — C80.1 NEUROPATHY ASSOCIATED WITH CANCER (HCC): ICD-10-CM

## 2019-03-29 DIAGNOSIS — D72.819 CHRONIC LEUKOPENIA: ICD-10-CM

## 2019-03-29 DIAGNOSIS — G63 NEUROPATHY ASSOCIATED WITH CANCER (HCC): ICD-10-CM

## 2019-03-29 LAB
ALBUMIN SERPL-MCNC: 3.7 G/DL (ref 3.4–5)
ALBUMIN/GLOB SERPL: 1.3 {RATIO} (ref 0.8–1.7)
ALP SERPL-CCNC: 59 U/L (ref 45–117)
ALT SERPL-CCNC: 16 U/L (ref 13–56)
ANION GAP SERPL CALC-SCNC: 6 MMOL/L (ref 3–18)
AST SERPL-CCNC: 16 U/L (ref 15–37)
BASO+EOS+MONOS # BLD AUTO: 0.1 K/UL (ref 0–2.3)
BASO+EOS+MONOS NFR BLD AUTO: 7 % (ref 0.1–17)
BILIRUB SERPL-MCNC: 0.4 MG/DL (ref 0.2–1)
BUN SERPL-MCNC: 19 MG/DL (ref 7–18)
BUN/CREAT SERPL: 17 (ref 12–20)
CALCIUM SERPL-MCNC: 8.8 MG/DL (ref 8.5–10.1)
CHLORIDE SERPL-SCNC: 109 MMOL/L (ref 100–108)
CO2 SERPL-SCNC: 27 MMOL/L (ref 21–32)
CREAT SERPL-MCNC: 1.15 MG/DL (ref 0.6–1.3)
DIFFERENTIAL METHOD BLD: ABNORMAL
ERYTHROCYTE [DISTWIDTH] IN BLOOD BY AUTOMATED COUNT: 16.6 % (ref 11.5–14.5)
FERRITIN SERPL-MCNC: 84 NG/ML (ref 8–388)
GLOBULIN SER CALC-MCNC: 2.8 G/DL (ref 2–4)
GLUCOSE SERPL-MCNC: 103 MG/DL (ref 74–99)
HCT VFR BLD AUTO: 31.4 % (ref 36–48)
HGB BLD-MCNC: 10.7 G/DL (ref 12–16)
IRON SATN MFR SERPL: 27 %
IRON SERPL-MCNC: 89 UG/DL (ref 50–175)
LYMPHOCYTES # BLD: 0.3 K/UL (ref 1.1–5.9)
LYMPHOCYTES NFR BLD: 17 % (ref 14–44)
MCH RBC QN AUTO: 37 PG (ref 25–35)
MCHC RBC AUTO-ENTMCNC: 34.1 G/DL (ref 31–37)
MCV RBC AUTO: 108.7 FL (ref 78–102)
NEUTS SEG # BLD: 1.5 K/UL (ref 1.8–9.5)
NEUTS SEG NFR BLD: 77 % (ref 40–70)
PLATELET # BLD AUTO: 146 K/UL (ref 140–440)
POTASSIUM SERPL-SCNC: 4 MMOL/L (ref 3.5–5.5)
PROT SERPL-MCNC: 6.5 G/DL (ref 6.4–8.2)
RBC # BLD AUTO: 2.89 M/UL (ref 4.1–5.1)
SODIUM SERPL-SCNC: 142 MMOL/L (ref 136–145)
TIBC SERPL-MCNC: 327 UG/DL (ref 250–450)
WBC # BLD AUTO: 1.9 K/UL (ref 4.5–13)

## 2019-03-29 PROCEDURE — 86300 IMMUNOASSAY TUMOR CA 15-3: CPT

## 2019-03-29 PROCEDURE — 36415 COLL VENOUS BLD VENIPUNCTURE: CPT

## 2019-03-29 PROCEDURE — 83540 ASSAY OF IRON: CPT

## 2019-03-29 PROCEDURE — 80053 COMPREHEN METABOLIC PANEL: CPT

## 2019-03-29 PROCEDURE — 82306 VITAMIN D 25 HYDROXY: CPT

## 2019-03-29 PROCEDURE — 82728 ASSAY OF FERRITIN: CPT

## 2019-03-29 NOTE — PROGRESS NOTES
Hematology/Oncology  Progress Note Name: Marylu Ford Date: 3/29/2019 : 1945 PCP: Hannah Teran MD  
 
Ms. Juan Romo is a 68 y.o.  female who was seen for management of her metastatic breast cancer with associated complications of chemotherapy. Current therapy: Fulvestrant 500 mg subcutaneous monthly and Ibrance 125 mg by mouth daily. Subjective:  
 
Mrs. Juan Romo is a 70-year-old woman who has slowly progressive metastatic breast cancer. She has previously completed external beam radiation therapy to lesions in her ribs and more recently she completed proton therapy to areas of bone involvement with a significant benefit with regards to pain control. The posttherapy imaging studies showed a significant decrease in the intensity of uptake on the bone scan. .  Additionally she is currently receiving systemic therapy with fulvestrant and Ibrance. Currently she is tolerating the systemic therapy reasonably well and has not experienced any nausea or emesis. She is no longer complaining of mouth discomfort but she does report some fatigue and occasional weakness. She continues to have SOB intermittently. She also receives Procrit at 2 week intervals whenever her hemoglobin is below 10 g/dL and hematocrit is below 30%. The patient and her  just returned from spending the winter in [place]. She did have a skin tag removed from her right eyelid. Past medical history, family history, and social history: these were reviewed and remains unchanged. Past Medical History:  
Diagnosis Date  Biliary colic  Breast CA ([facility] [facility] 2012  Cancer ([facility] [address] [facility]  Right Breast  
 Chemotherapy convalescence or palliative care  Neuropathy  Radiation therapy complication  Thyroid disease Past Surgical History:  
Procedure Laterality Date  BREAST SURGERY PROCEDURE UNLISTED  10/2002 Jessica Leal  BREAST SURGERY PROCEDURE UNLISTED  2004 Jessica Leal  HX LAP CHOLECYSTECTOMY  13  
 HX MASTECTOMY   Right Social History Socioeconomic History  Marital status:  Spouse name: Not on file  Number of children: Not on file  Years of education: Not on file  Highest education level: Not on file Occupational History  Not on file Social Needs  Financial resource strain: Not on file  Food insecurity:  
  Worry: Not on file Inability: Not on file  Transportation needs:  
  Medical: Not on file Non-medical: Not on file Tobacco Use  Smoking status: Former Smoker Last attempt to quit: 1991 Years since quittin.8  Smokeless tobacco: Never Used Substance and Sexual Activity  Alcohol use: Yes Alcohol/week: 0.0 oz Types: 1 - 2 Glasses of wine per week Comment: socially, occassionally  Drug use: No  
 Sexual activity: Not on file Lifestyle  Physical activity:  
  Days per week: Not on file Minutes per session: Not on file  Stress: Not on file Relationships  Social connections:  
  Talks on phone: Not on file Gets together: Not on file Attends Anabaptism service: Not on file Active member of club or organization: Not on file Attends meetings of clubs or organizations: Not on file Relationship status: Not on file  Intimate partner violence:  
  Fear of current or ex partner: Not on file Emotionally abused: Not on file Physically abused: Not on file Forced sexual activity: Not on file Other Topics Concern  Not on file Social History Narrative  Not on file Family History Problem Relation Age of Onset  Cancer Maternal Aunt Hodgkin's disease  Breast Cancer Paternal Aunt  Cancer Father Prostate, lung, brain Current Outpatient Medications Medication Sig Dispense Refill  gabapentin (NEURONTIN) 100 mg capsule TAKE 2 CAPSULES THREE TIMES A  Cap 2  
  lidocaine HCl-hydrocortison ac topical cream Apply  to affected area two (2) times a day. 85 g 3  palbociclib (IBRANCE) 100 mg cap Take 1 Cap by mouth daily. For 21 days on and 7 days off every 28 days. 63 Cap 5  
 gabapentin (NEURONTIN) 300 mg capsule TAKE 1 CAPSULE THREE TIMES A  Cap 2  
 TETRACYCLINE HCL (TETRACYCLINE PO) Take  by mouth.  fexofenadine-pseudoephedrine (ALLEGRA-D 24 HOUR) 180-240 mg per tablet Take 1 Tab by mouth daily.  sulfacetamide (BLEPH-10) 10 % ophthalmic ointment Administer  to right eye three (3) times daily.  palbociclib (IBRANCE) 125 mg cap Take 125 mg by mouth daily. Take 1 tab po every day for 21 days on and 7 days off q 28 days  Indications: HORMONE RECEPTOR(+), HER2(-) ADVANCED BREAST CANCER 42 Cap 6  ibuprofen (MOTRIN) 800 mg tablet Take 800 mg by mouth two (2) times a day. Indications: OSTEOARTHRITIS  Cholecalciferol, Vitamin D3, 5,000 unit Tab Take  by mouth daily.  thyroid, Pork, (ARMOUR THYROID) 60 mg tablet Take 90 mg by mouth daily.  L-Mfolate-B6 Phos-Methyl-B12 (NEURPATH-B) 3-35-2 mg Tab Take  by mouth two (2) times a day.  warfarin (COUMADIN) 1 mg tablet Take 1 mg by mouth daily. Review of Systems Constitutional: The patient has no acute distress or discomfort. HEENT: The patient denies recent head trauma, eye pain, blurred vision,  hearing deficit, oropharyngeal mucosal pain or lesions, and the patient denies throat pain or discomfort. Lymphatics: The patient denies palpable peripheral lymphadenopathy. Hematologic: The patient denies having bruising, bleeding, or progressive fatigue. Respiratory: Patient denies having shortness of breath, cough, sputum production, fever, or dyspnea on exertion. Cardiovascular: The patient denies having leg pain, leg swelling, heart palpitations, chest permit, chest pain, or lightheadedness. The patient denies having dyspnea on exertion. Gastrointestinal: The patient denies having nausea, emesis, or diarrhea. The patient denies having any hematemesis or blood in the stool. Genitourinary: Patient denies having urinary urgency, frequency, or dysuria. The patient denies having blood in the urine. Psychological: The patient denies having symptoms of nervousness, anxiety, depression, or thoughts of harming self. Skin: Patient denies having skin rashes, skin, ulcerations, or unexplained itching or pruritus. Musculoskeletal: The patient denies having pain in the joints or bones. Objective:  
 
Visit Vitals /60 Pulse 80 Temp 98.1 °F (36.7 °C) (Oral) Resp 16 Ht 5' 4\" (1.626 m) Wt 69.2 kg (152 lb 9.6 oz) SpO2 100% BMI 26.19 kg/m² ECOG PS=0; Pain score=0/10 Physical Exam:  
Gen. Appearance: The patient is in no acute distress. Skin: There is no bruise or rash. There is no evidence of cutaneous shingles over the lower back and flank. HEENT: The exam is unremarkable. Neck: Supple without lymphadenopathy or thyromegaly. Lungs: Clear to auscultation and percussion; there are no wheezes or rhonchi. Heart: Regular rate and rhythm; there are no murmurs, gallops, or rubs. Abdomen: Bowel sounds are present and normal.  There is no guarding, tenderness, or hepatosplenomegaly. Extremities: There is no clubbing, cyanosis, or edema. Neurologic: There are no focal neurologic deficits. Lymphatics: There is no palpable peripheral lymphadenopathy. Musculoskeletal: The patient has full range of motion at all joints. There is no evidence of joint deformity or effusions. There is no focal joint tenderness. Psychological/psychiatric: There is no clinical evidence of anxiety, depression, or melancholy. Lab data: 
   
Results for orders placed or performed during the hospital encounter of 03/29/19 CBC WITH 3 PART DIFF     Status: Abnormal  
Result Value Ref Range Status  WBC 1.9 (L) 4.5 - 13.0 K/uL Final  
 RBC 2.89 (L) 4.10 - 5.10 M/uL Final  
 HGB 10.7 (L) 12.0 - 16.0 g/dL Final  
 HCT 31.4 (L) 36 - 48 % Final  
 .7 (H) 78 - 102 FL Final  
 MCH 37.0 (H) 25.0 - 35.0 PG Final  
 MCHC 34.1 31 - 37 g/dL Final  
 RDW 16.6 (H) 11.5 - 14.5 % Final  
 PLATELET 406 452 - 025 K/uL Final  
 NEUTROPHILS 77 (H) 40 - 70 % Final  
 MIXED CELLS 7 0.1 - 17 % Final  
 LYMPHOCYTES 17 14 - 44 % Final  
 ABS. NEUTROPHILS 1.5 (L) 1.8 - 9.5 K/UL Final  
 ABS. MIXED CELLS 0.1 0.0 - 2.3 K/uL Final  
 ABS. LYMPHOCYTES 0.3 (L) 1.1 - 5.9 K/UL Final  
  Comment: Test performed at [address] [place] [address] or Outpatient [facility]fusion [facility] Location. Reviewed by Medical Director. DF AUTOMATED   Final  
 
 
   
Assessment:  
 
1. Malignant neoplasm of lower-inner quadrant of right breast of female, estrogen receptor positive ([facility] [address] [facility] 2. Metastatic breast cancer ([facility] [address] [facility] 3. Anemia due to chemotherapy 4. Neuropathy associated with cancer ([facility] [address] [facility] 5. Chemotherapy induced neutropenia (HCC) 6. Chemotherapy-induced thrombocytopenia 7. Vitamin D deficiency 8. Anemia due to antineoplastic chemotherapy 9. Chronic leukopenia Plan: Metastatic breast cancer: The combination of Fulvestrant 500 mg subcutaneous monthly  will be continued. Ibrance 140 mg by mouth daily will be continued as well. I will contact her physician in [place] and notify him that she will be coming down at the end of the month with plans to stay until mid March. Clinically she is doing reasonably well. The most recent CT scans of the chest, abdomen, and pelvis showed stable findings with no new evidence of disease progression, from November 2018. At this time we will reschedule follow-up CT scans to assess for disease progression. Neuropathy associated with cancer: I will continue her Neurontin at 400 mg 3 times daily.  
 
Chemotherapy-induced neutropenia/chronic leukopenia (persisting problem): The current CBC shows that her WBC count is 1.9 the hemoglobin and hematocrit are 10.7 g/dL and 31.4% respectively. If the absolute neutrophil count declines to 0.5 she will be started on Neupogen or Neulasta. She will get her labs drawn once a month. Chemotherapy-induced anemia (persisting problem): The new chemotherapy regimen has significantly decreased her hemoglobin. Her current hemoglobin is 10 g/dL with hematocrit of 30.2 %. At this time I will check iron profile and ferritin levels. I have recommended that the patient continue taking ferrous sulfate 1 tablet daily. Procrit at a dose of 60,000 units subcutaneous will be provided now that her hemoglobin has declined below 10 g/dL hematocrit is below 30%. Chemotherapy-induced thrombocytopenia : The patient is continuing the current dose of Ibrance 140 mg daily. We have explained to her that the medication was responsible for her thrombocytopenia. The current platelet count is currently 146,000. We will continue to monitor platelet counts at 6 week intervals. Follow-up in 3 weeks to review CT scans and lab data. Orders Placed This Encounter  COMPLETE CBC & AUTO DIFF WBC  InHouse CBC ([email or web address]) Standing Status:   Future Number of Occurrences:   1 Standing Expiration Date:   4/5/2019  METABOLIC PANEL, COMPREHENSIVE Standing Status:   Future Standing Expiration Date:   3/29/2020  FERRITIN Standing Status:   Future Standing Expiration Date:   3/29/2020  
 IRON PROFILE Standing Status:   Future Standing Expiration Date:   3/29/2020  CA 27.29 Standing Status:   Future Standing Expiration Date:   3/29/2020  VITAMIN D, 25 HYDROXY Standing Status:   Future Standing Expiration Date:   3/29/2020 Kristopher Pruett MD 
3/29/2019 Please note: This document has been produced using voice recognition software. Unrecognized errors in transcription may be present.

## 2019-03-30 LAB
25(OH)D3 SERPL-MCNC: 61.1 NG/ML (ref 30–100)
CANCER AG27-29 SERPL-ACNC: 51.1 U/ML (ref 0–38.6)

## 2019-04-04 ENCOUNTER — HOSPITAL ENCOUNTER (OUTPATIENT)
Dept: LAB | Age: 74
Discharge: HOME | End: 2019-04-04
Payer: MEDICARE

## 2019-04-04 ENCOUNTER — HOSPITAL ENCOUNTER (OUTPATIENT)
Dept: ONCOLOGY | Age: 74
Discharge: HOME | End: 2019-04-04

## 2019-04-04 ENCOUNTER — HOSPITAL ENCOUNTER (OUTPATIENT)
Dept: INFUSION THERAPY | Age: 74
Discharge: HOME | End: 2019-04-04
Payer: MEDICARE

## 2019-04-04 ENCOUNTER — CLINICAL SUPPORT (OUTPATIENT)
Dept: ONCOLOGY | Age: 74
End: 2019-04-04

## 2019-04-04 VITALS
TEMPERATURE: 97 F | SYSTOLIC BLOOD PRESSURE: 115 MMHG | HEART RATE: 71 BPM | DIASTOLIC BLOOD PRESSURE: 67 MMHG | RESPIRATION RATE: 16 BRPM | OXYGEN SATURATION: 98 %

## 2019-04-04 DIAGNOSIS — T45.1X5A ANEMIA DUE TO CHEMOTHERAPY: ICD-10-CM

## 2019-04-04 DIAGNOSIS — D64.81 ANEMIA DUE TO CHEMOTHERAPY: ICD-10-CM

## 2019-04-04 DIAGNOSIS — D64.81 ANEMIA DUE TO CHEMOTHERAPY: Primary | ICD-10-CM

## 2019-04-04 DIAGNOSIS — T45.1X5A ANEMIA DUE TO CHEMOTHERAPY: Primary | ICD-10-CM

## 2019-04-04 LAB
ALBUMIN SERPL-MCNC: 3.9 G/DL (ref 3.4–5)
ALBUMIN/GLOB SERPL: 1.3 {RATIO} (ref 0.8–1.7)
ALP SERPL-CCNC: 59 U/L (ref 45–117)
ALT SERPL-CCNC: 15 U/L (ref 13–56)
ANION GAP SERPL CALC-SCNC: 5 MMOL/L (ref 3–18)
AST SERPL-CCNC: 18 U/L (ref 15–37)
BASO+EOS+MONOS # BLD AUTO: 0.3 K/UL (ref 0–2.3)
BASO+EOS+MONOS NFR BLD AUTO: 17 % (ref 0.1–17)
BILIRUB SERPL-MCNC: 0.7 MG/DL (ref 0.2–1)
BUN SERPL-MCNC: 17 MG/DL (ref 7–18)
BUN/CREAT SERPL: 18 (ref 12–20)
CALCIUM SERPL-MCNC: 8.8 MG/DL (ref 8.5–10.1)
CHLORIDE SERPL-SCNC: 105 MMOL/L (ref 100–108)
CO2 SERPL-SCNC: 31 MMOL/L (ref 21–32)
CREAT SERPL-MCNC: 0.96 MG/DL (ref 0.6–1.3)
DIFFERENTIAL METHOD BLD: ABNORMAL
ERYTHROCYTE [DISTWIDTH] IN BLOOD BY AUTOMATED COUNT: 17.1 % (ref 11.5–14.5)
GLOBULIN SER CALC-MCNC: 2.9 G/DL (ref 2–4)
GLUCOSE SERPL-MCNC: 88 MG/DL (ref 74–99)
HCT VFR BLD AUTO: 29.8 % (ref 36–48)
HGB BLD-MCNC: 10.1 G/DL (ref 12–16)
LYMPHOCYTES # BLD: 0.4 K/UL (ref 1.1–5.9)
LYMPHOCYTES NFR BLD: 18 % (ref 14–44)
MAGNESIUM SERPL-MCNC: 1.7 MG/DL (ref 1.6–2.6)
MCH RBC QN AUTO: 36.7 PG (ref 25–35)
MCHC RBC AUTO-ENTMCNC: 33.9 G/DL (ref 31–37)
MCV RBC AUTO: 108.4 FL (ref 78–102)
NEUTS SEG # BLD: 1.3 K/UL (ref 1.8–9.5)
NEUTS SEG NFR BLD: 66 % (ref 40–70)
PHOSPHATE SERPL-MCNC: 2.6 MG/DL (ref 2.5–4.9)
PLATELET # BLD AUTO: 157 K/UL (ref 140–440)
POTASSIUM SERPL-SCNC: 3.9 MMOL/L (ref 3.5–5.5)
PROT SERPL-MCNC: 6.8 G/DL (ref 6.4–8.2)
RBC # BLD AUTO: 2.75 M/UL (ref 4.1–5.1)
SODIUM SERPL-SCNC: 141 MMOL/L (ref 136–145)
WBC # BLD AUTO: 2 K/UL (ref 4.5–13)

## 2019-04-04 PROCEDURE — 74011250636 HC RX REV CODE- 250/636: Performed by: NURSE PRACTITIONER

## 2019-04-04 PROCEDURE — 36415 COLL VENOUS BLD VENIPUNCTURE: CPT

## 2019-04-04 PROCEDURE — 84100 ASSAY OF PHOSPHORUS: CPT

## 2019-04-04 PROCEDURE — 80053 COMPREHEN METABOLIC PANEL: CPT

## 2019-04-04 PROCEDURE — 83735 ASSAY OF MAGNESIUM: CPT

## 2019-04-04 PROCEDURE — 96372 THER/PROPH/DIAG INJ SC/IM: CPT

## 2019-04-04 RX ADMIN — DENOSUMAB 120 MG: 120 INJECTION SUBCUTANEOUS at 11:57

## 2019-04-04 NOTE — PROGRESS NOTES
[facility] Progress Note Date: 2019 Name: Perry Prado MRN: [id number] : 1945 Ms. Figueroa was assessed and education was provided. Ms. Corrinne Sailors vitals were reviewed and patient was observed for 5 minutes prior to treatment. Visit Vitals /67 (BP 1 Location: Left arm, BP Patient Position: At rest;Sitting) Pulse 71 Temp 97 °F (36.1 °C) Resp 16 SpO2 98% Breastfeeding? No  
 
 
Lab results were obtained and reviewed. Recent Results (from the past 12 hour(s)) CBC WITH 3 PART DIFF Collection Time: 19 11:41 AM  
Result Value Ref Range WBC 2.0 (L) 4.5 - 13.0 K/uL  
 RBC 2.75 (L) 4.10 - 5.10 M/uL  
 HGB 10.1 (L) 12.0 - 16.0 g/dL HCT 29.8 (L) 36 - 48 % .4 (H) 78 - 102 FL  
 MCH 36.7 (H) 25.0 - 35.0 PG  
 MCHC 33.9 31 - 37 g/dL  
 RDW 17.1 (H) 11.5 - 14.5 % PLATELET 380 721 - 675 K/uL NEUTROPHILS 66 40 - 70 % MIXED CELLS 17 0.1 - 17 % LYMPHOCYTES 18 14 - 44 % ABS. NEUTROPHILS 1.3 (L) 1.8 - 9.5 K/UL  
 ABS. MIXED CELLS 0.3 0.0 - 2.3 K/uL  
 ABS. LYMPHOCYTES 0.4 (L) 1.1 - 5.9 K/UL  
 DF AUTOMATED Retacrit not given for H&H 10.1/29. 8. Xgeva 120 mg SQ given. Order obtained from Castro Razo NP, to proceed with xgeva with phosphorous level of 2.3. CMP with mag and phos drawn and sent to hospital. 
 
Patient armband removed and shredded. Ms. Laura Segal was discharged from [place] [address] in stable condition at 1205. She is to return on 19 at 1100 for her next appointment for CBC/Retacrit, Faslodex and monthly port flush. Sissy Mcqueen RN 2019 
1:05 PM

## 2019-04-09 ENCOUNTER — HOSPITAL ENCOUNTER (OUTPATIENT)
Dept: CT IMAGING | Age: 74
Discharge: HOME | End: 2019-04-09
Attending: INTERNAL MEDICINE
Payer: MEDICARE

## 2019-04-09 DIAGNOSIS — Z17.0 MALIGNANT NEOPLASM OF LOWER-INNER QUADRANT OF RIGHT BREAST OF FEMALE, ESTROGEN RECEPTOR POSITIVE (HCC): ICD-10-CM

## 2019-04-09 DIAGNOSIS — C79.52 SECONDARY MALIGNANT NEOPLASM OF BONE AND BONE MARROW (HCC): ICD-10-CM

## 2019-04-09 DIAGNOSIS — C79.51 SECONDARY MALIGNANT NEOPLASM OF BONE AND BONE MARROW (HCC): ICD-10-CM

## 2019-04-09 DIAGNOSIS — Z85.3 PERSONAL HISTORY OF MALIGNANT NEOPLASM OF BREAST: ICD-10-CM

## 2019-04-09 DIAGNOSIS — C50.311 MALIGNANT NEOPLASM OF LOWER-INNER QUADRANT OF RIGHT BREAST OF FEMALE, ESTROGEN RECEPTOR POSITIVE (HCC): ICD-10-CM

## 2019-04-09 DIAGNOSIS — C50.919 METASTATIC BREAST CANCER (HCC): ICD-10-CM

## 2019-04-09 LAB — CREAT UR-MCNC: 1.2 MG/DL (ref 0.6–1.3)

## 2019-04-09 PROCEDURE — 74177 CT ABD & PELVIS W/CONTRAST: CPT

## 2019-04-09 PROCEDURE — 74011636320 HC RX REV CODE- 636/320: Performed by: INTERNAL MEDICINE

## 2019-04-09 PROCEDURE — 82565 ASSAY OF CREATININE: CPT

## 2019-04-09 RX ADMIN — IOPAMIDOL 100 ML: 612 INJECTION, SOLUTION INTRAVENOUS at 10:47

## 2019-04-09 NOTE — PROGRESS NOTES
[facility] Progress Note    Date: 2019    Name: Juliette Pedraza    MRN: [id number]         : 1945      I called and left a message on Ms. Figueroa's voicemail today, to make her aware, that her [facility] appointment scheduled for Thursday, 19 at 1100, had to be moved to Friday, 19 at 1130.          Lavell Ventura RN  2019  5:14 PM

## 2019-04-12 RX ORDER — LAMOTRIGINE 25 MG/1
500 TABLET ORAL ONCE
Status: CANCELLED | OUTPATIENT
Start: 2019-05-16 | End: 2019-05-16

## 2019-04-12 RX ORDER — LAMOTRIGINE 25 MG/1
500 TABLET ORAL ONCE
Status: CANCELLED | OUTPATIENT
Start: 2019-06-13 | End: 2019-06-13

## 2019-04-18 ENCOUNTER — HOSPITAL ENCOUNTER (OUTPATIENT)
Dept: INFUSION THERAPY | Age: 74
End: 2019-04-18
Payer: MEDICARE

## 2019-04-19 ENCOUNTER — HOSPITAL ENCOUNTER (OUTPATIENT)
Dept: ONCOLOGY | Age: 74
Discharge: HOME | End: 2019-04-19

## 2019-04-19 ENCOUNTER — HOSPITAL ENCOUNTER (OUTPATIENT)
Dept: INFUSION THERAPY | Age: 74
Discharge: HOME | End: 2019-04-19
Payer: MEDICARE

## 2019-04-19 ENCOUNTER — OFFICE VISIT (OUTPATIENT)
Dept: ONCOLOGY | Age: 74
End: 2019-04-19

## 2019-04-19 ENCOUNTER — CLINICAL SUPPORT (OUTPATIENT)
Dept: ONCOLOGY | Age: 74
End: 2019-04-19

## 2019-04-19 VITALS
TEMPERATURE: 98 F | HEIGHT: 64 IN | WEIGHT: 155 LBS | HEART RATE: 97 BPM | BODY MASS INDEX: 26.46 KG/M2 | OXYGEN SATURATION: 98 % | SYSTOLIC BLOOD PRESSURE: 124 MMHG | RESPIRATION RATE: 18 BRPM | DIASTOLIC BLOOD PRESSURE: 68 MMHG

## 2019-04-19 VITALS
DIASTOLIC BLOOD PRESSURE: 68 MMHG | SYSTOLIC BLOOD PRESSURE: 124 MMHG | RESPIRATION RATE: 16 BRPM | TEMPERATURE: 97 F | WEIGHT: 155 LBS | BODY MASS INDEX: 26.46 KG/M2 | OXYGEN SATURATION: 98 % | HEIGHT: 64 IN

## 2019-04-19 DIAGNOSIS — G63 NEUROPATHY ASSOCIATED WITH CANCER (HCC): ICD-10-CM

## 2019-04-19 DIAGNOSIS — C50.919 METASTATIC BREAST CANCER (HCC): Primary | ICD-10-CM

## 2019-04-19 DIAGNOSIS — C80.1 NEUROPATHY ASSOCIATED WITH CANCER (HCC): ICD-10-CM

## 2019-04-19 DIAGNOSIS — C50.311 MALIGNANT NEOPLASM OF LOWER-INNER QUADRANT OF RIGHT BREAST OF FEMALE, ESTROGEN RECEPTOR POSITIVE (HCC): Primary | ICD-10-CM

## 2019-04-19 DIAGNOSIS — T45.1X5A ANEMIA DUE TO CHEMOTHERAPY: ICD-10-CM

## 2019-04-19 DIAGNOSIS — C50.919 METASTATIC BREAST CANCER (HCC): ICD-10-CM

## 2019-04-19 DIAGNOSIS — D64.81 ANEMIA DUE TO CHEMOTHERAPY: ICD-10-CM

## 2019-04-19 DIAGNOSIS — Z17.0 MALIGNANT NEOPLASM OF LOWER-INNER QUADRANT OF RIGHT BREAST OF FEMALE, ESTROGEN RECEPTOR POSITIVE (HCC): Primary | ICD-10-CM

## 2019-04-19 LAB
ALBUMIN SERPL-MCNC: 3.6 G/DL (ref 3.4–5)
ALBUMIN/GLOB SERPL: 1.2 {RATIO} (ref 0.8–1.7)
ALP SERPL-CCNC: 61 U/L (ref 45–117)
ALT SERPL-CCNC: 18 U/L (ref 13–56)
ANION GAP SERPL CALC-SCNC: 6 MMOL/L (ref 3–18)
AST SERPL-CCNC: 15 U/L (ref 15–37)
BASO+EOS+MONOS # BLD AUTO: 0.1 K/UL (ref 0–2.3)
BASO+EOS+MONOS NFR BLD AUTO: 6 % (ref 0.1–17)
BILIRUB SERPL-MCNC: 0.5 MG/DL (ref 0.2–1)
BUN SERPL-MCNC: 17 MG/DL (ref 7–18)
BUN/CREAT SERPL: 16 (ref 12–20)
CALCIUM SERPL-MCNC: 8.5 MG/DL (ref 8.5–10.1)
CHLORIDE SERPL-SCNC: 106 MMOL/L (ref 100–108)
CO2 SERPL-SCNC: 27 MMOL/L (ref 21–32)
CREAT SERPL-MCNC: 1.09 MG/DL (ref 0.6–1.3)
DIFFERENTIAL METHOD BLD: ABNORMAL
ERYTHROCYTE [DISTWIDTH] IN BLOOD BY AUTOMATED COUNT: 15.4 % (ref 11.5–14.5)
GLOBULIN SER CALC-MCNC: 3 G/DL (ref 2–4)
GLUCOSE SERPL-MCNC: 85 MG/DL (ref 74–99)
HCT VFR BLD AUTO: 29.5 % (ref 36–48)
HGB BLD-MCNC: 10 G/DL (ref 12–16)
LYMPHOCYTES # BLD: 0.4 K/UL (ref 1.1–5.9)
LYMPHOCYTES NFR BLD: 18 % (ref 14–44)
MAGNESIUM SERPL-MCNC: 1.8 MG/DL (ref 1.6–2.6)
MCH RBC QN AUTO: 36.8 PG (ref 25–35)
MCHC RBC AUTO-ENTMCNC: 33.9 G/DL (ref 31–37)
MCV RBC AUTO: 108.5 FL (ref 78–102)
NEUTS SEG # BLD: 1.6 K/UL (ref 1.8–9.5)
NEUTS SEG NFR BLD: 76 % (ref 40–70)
PHOSPHATE SERPL-MCNC: 2.2 MG/DL (ref 2.5–4.9)
PLATELET # BLD AUTO: 289 K/UL (ref 140–440)
POTASSIUM SERPL-SCNC: 4.3 MMOL/L (ref 3.5–5.5)
PROT SERPL-MCNC: 6.6 G/DL (ref 6.4–8.2)
RBC # BLD AUTO: 2.72 M/UL (ref 4.1–5.1)
SODIUM SERPL-SCNC: 139 MMOL/L (ref 136–145)
WBC # BLD AUTO: 2.1 K/UL (ref 4.5–13)

## 2019-04-19 PROCEDURE — 84100 ASSAY OF PHOSPHORUS: CPT

## 2019-04-19 PROCEDURE — 96402 CHEMO HORMON ANTINEOPL SQ/IM: CPT

## 2019-04-19 PROCEDURE — 83735 ASSAY OF MAGNESIUM: CPT

## 2019-04-19 PROCEDURE — 36591 DRAW BLOOD OFF VENOUS DEVICE: CPT

## 2019-04-19 PROCEDURE — 77030012965 HC NDL HUBR BBMI -A

## 2019-04-19 PROCEDURE — 74011250636 HC RX REV CODE- 250/636: Performed by: INTERNAL MEDICINE

## 2019-04-19 PROCEDURE — 74011250636 HC RX REV CODE- 250/636

## 2019-04-19 PROCEDURE — 80053 COMPREHEN METABOLIC PANEL: CPT

## 2019-04-19 RX ORDER — SODIUM CHLORIDE 9 MG/ML
25 INJECTION, SOLUTION INTRAVENOUS AS NEEDED
Status: DISCONTINUED | OUTPATIENT
Start: 2019-04-19 | End: 2019-04-22 | Stop reason: HOSPADM

## 2019-04-19 RX ORDER — HEPARIN 100 UNIT/ML
SYRINGE INTRAVENOUS
Status: COMPLETED
Start: 2019-04-19 | End: 2019-04-19

## 2019-04-19 RX ORDER — HEPARIN 100 UNIT/ML
500 SYRINGE INTRAVENOUS AS NEEDED
Status: DISCONTINUED | OUTPATIENT
Start: 2019-04-19 | End: 2019-04-22 | Stop reason: HOSPADM

## 2019-04-19 RX ORDER — LAMOTRIGINE 25 MG/1
500 TABLET ORAL ONCE
Status: COMPLETED | OUTPATIENT
Start: 2019-04-19 | End: 2019-04-19

## 2019-04-19 RX ORDER — SODIUM CHLORIDE 0.9 % (FLUSH) 0.9 %
10-40 SYRINGE (ML) INJECTION AS NEEDED
Status: DISCONTINUED | OUTPATIENT
Start: 2019-04-19 | End: 2019-04-22 | Stop reason: HOSPADM

## 2019-04-19 RX ADMIN — FULVESTRANT 500 MG: 50 INJECTION INTRAMUSCULAR at 12:20

## 2019-04-19 RX ADMIN — Medication 30 ML: at 11:41

## 2019-04-19 RX ADMIN — HEPARIN 500 UNITS: 100 SYRINGE at 11:41

## 2019-04-19 NOTE — PROGRESS NOTES
Hematology/medical oncology progress note    4/19/2019  Sabina Arce  YOB: 1945    PCP: Dr. Khan Covert    Diagnosis: Metastatic breast cancer    Ms. Joanna Lemon is a 70-year-old woman who has metastatic breast cancer. She is currently receiving fulvestrant and Ibrance. A CT scan of the chest, abdomen, and pelvis was recently completed on 4/9/2019. I have explained to the patient that the scan showed that the right lung has a small pleural effusion with pleural thickening and subpleural opacities. These are significantly unchanged since the study that was done on 11/5/2018. The test also showed multiple stable metastasis involving multiple ribs and T12 posterior element. The right iliac crest and S1 bone lesions are similar to prior findings consistent with stable metastasis. There were no new or worsening metastatic disease identified in the abdomen and pelvis. She is status post cholecystectomy. There is evidence of sigmoid diverticulosis. I have explained to the patient that with stability of disease we should continue with the current level of management. Therefore we will continue with her treatments as previously ordered. I will see her back in clinic in 3 months. The patient had her questions answered to her satisfaction. Total time 25 minutes, greater than 50% of the time was in counseling and coronation of care. Rolo Toney MD, [address]

## 2019-04-19 NOTE — PATIENT INSTRUCTIONS

## 2019-04-19 NOTE — PROGRESS NOTES
[facility] Progress Note    Date: 2019    Name: Jessica Goncalves    MRN: [id number]         : 1945      Ms. Figueroa was assessed and education was provided. Ms. Frank Zhu vitals were reviewed and patient was observed for 5 minutes prior to treatment. Visit Vitals  /68 (BP 1 Location: Left arm, BP Patient Position: At rest;Sitting)   Temp 97 °F (36.1 °C)   Resp 16   Ht 5' 4\" (1.626 m)   Wt 70.3 kg (155 lb)   SpO2 98%   Breastfeeding? No   BMI 26.61 kg/m²       Lab results were obtained and reviewed. Recent Results (from the past 12 hour(s))   CBC WITH 3 PART DIFF    Collection Time: 19 11:42 AM   Result Value Ref Range    WBC 2.1 (L) 4.5 - 13.0 K/uL    RBC 2.72 (L) 4.10 - 5.10 M/uL    HGB 10.0 (L) 12.0 - 16.0 g/dL    HCT 29.5 (L) 36 - 48 %    .5 (H) 78 - 102 FL    MCH 36.8 (H) 25.0 - 35.0 PG    MCHC 33.9 31 - 37 g/dL    RDW 15.4 (H) 11.5 - 14.5 %    PLATELET 289 501 - 459 K/uL    NEUTROPHILS 76 (H) 40 - 70 %    MIXED CELLS 6 0.1 - 17 %    LYMPHOCYTES 18 14 - 44 %    ABS. NEUTROPHILS 1.6 (L) 1.8 - 9.5 K/UL    ABS. MIXED CELLS 0.1 0.0 - 2.3 K/uL    ABS. LYMPHOCYTES 0.4 (L) 1.1 - 5.9 K/UL    DF AUTOMATED         Monthly port flush done with brisk blood return obtained. Office visit labs drawn. CMP, Mag and Phos drawn and taken to lab. Retacrit held for H&H 10.0/29.5. Faslodex 500 mg IM given using prepackaged syringes - 250 mg right gluteus max and 250 mg left gluteus max. Ms. Zhen Voss tolerated the injection, and had no complaints. Patient armband removed and shredded. Ms. Zhen Voss was discharged from [place] [address] in stable condition at 1235. She is to return on 19 at 1100 for her next appointment for CBC/Retacrit and Xgeva.     Claus Evans RN  2019  3:00 PM

## 2019-04-22 DIAGNOSIS — C50.919 METASTATIC BREAST CANCER (HCC): ICD-10-CM

## 2019-04-22 DIAGNOSIS — C79.52 SECONDARY MALIGNANT NEOPLASM OF BONE AND BONE MARROW (HCC): ICD-10-CM

## 2019-04-22 DIAGNOSIS — C79.51 SECONDARY MALIGNANT NEOPLASM OF BONE AND BONE MARROW (HCC): ICD-10-CM

## 2019-04-22 DIAGNOSIS — C50.311 MALIGNANT NEOPLASM OF LOWER-INNER QUADRANT OF RIGHT BREAST OF FEMALE, ESTROGEN RECEPTOR POSITIVE (HCC): ICD-10-CM

## 2019-04-22 DIAGNOSIS — Z17.0 MALIGNANT NEOPLASM OF LOWER-INNER QUADRANT OF RIGHT BREAST OF FEMALE, ESTROGEN RECEPTOR POSITIVE (HCC): ICD-10-CM

## 2019-05-02 ENCOUNTER — HOSPITAL ENCOUNTER (OUTPATIENT)
Dept: INFUSION THERAPY | Age: 74
Discharge: HOME | End: 2019-05-02
Payer: MEDICARE

## 2019-05-02 ENCOUNTER — CLINICAL SUPPORT (OUTPATIENT)
Dept: ONCOLOGY | Age: 74
End: 2019-05-02

## 2019-05-02 ENCOUNTER — HOSPITAL ENCOUNTER (OUTPATIENT)
Dept: ONCOLOGY | Age: 74
Discharge: HOME | End: 2019-05-02

## 2019-05-02 VITALS
SYSTOLIC BLOOD PRESSURE: 130 MMHG | RESPIRATION RATE: 18 BRPM | DIASTOLIC BLOOD PRESSURE: 71 MMHG | OXYGEN SATURATION: 97 % | TEMPERATURE: 97.3 F | HEART RATE: 81 BPM

## 2019-05-02 DIAGNOSIS — D64.81 ANEMIA DUE TO CHEMOTHERAPY: ICD-10-CM

## 2019-05-02 DIAGNOSIS — D64.81 ANEMIA DUE TO CHEMOTHERAPY: Primary | ICD-10-CM

## 2019-05-02 DIAGNOSIS — T45.1X5A ANEMIA DUE TO CHEMOTHERAPY: Primary | ICD-10-CM

## 2019-05-02 DIAGNOSIS — T45.1X5A ANEMIA DUE TO CHEMOTHERAPY: ICD-10-CM

## 2019-05-02 LAB
ALBUMIN SERPL-MCNC: 3.8 G/DL (ref 3.4–5)
ALBUMIN/GLOB SERPL: 1.4 {RATIO} (ref 0.8–1.7)
ALP SERPL-CCNC: 60 U/L (ref 45–117)
ALT SERPL-CCNC: 15 U/L (ref 13–56)
ANION GAP SERPL CALC-SCNC: 6 MMOL/L (ref 3–18)
AST SERPL-CCNC: 19 U/L (ref 15–37)
BASO+EOS+MONOS # BLD AUTO: 0.2 K/UL (ref 0–2.3)
BASO+EOS+MONOS NFR BLD AUTO: 10 % (ref 0.1–17)
BILIRUB SERPL-MCNC: 0.4 MG/DL (ref 0.2–1)
BUN SERPL-MCNC: 14 MG/DL (ref 7–18)
BUN/CREAT SERPL: 14 (ref 12–20)
CALCIUM SERPL-MCNC: 8.6 MG/DL (ref 8.5–10.1)
CHLORIDE SERPL-SCNC: 109 MMOL/L (ref 100–108)
CO2 SERPL-SCNC: 27 MMOL/L (ref 21–32)
CREAT SERPL-MCNC: 0.97 MG/DL (ref 0.6–1.3)
DIFFERENTIAL METHOD BLD: ABNORMAL
ERYTHROCYTE [DISTWIDTH] IN BLOOD BY AUTOMATED COUNT: 16.3 % (ref 11.5–14.5)
GLOBULIN SER CALC-MCNC: 2.7 G/DL (ref 2–4)
GLUCOSE SERPL-MCNC: 96 MG/DL (ref 74–99)
HCT VFR BLD AUTO: 29.4 % (ref 36–48)
HGB BLD-MCNC: 9.9 G/DL (ref 12–16)
LYMPHOCYTES # BLD: 0.3 K/UL (ref 1.1–5.9)
LYMPHOCYTES NFR BLD: 16 % (ref 14–44)
MAGNESIUM SERPL-MCNC: 1.6 MG/DL (ref 1.6–2.6)
MCH RBC QN AUTO: 36.4 PG (ref 25–35)
MCHC RBC AUTO-ENTMCNC: 33.7 G/DL (ref 31–37)
MCV RBC AUTO: 108.1 FL (ref 78–102)
NEUTS SEG # BLD: 1.4 K/UL (ref 1.8–9.5)
NEUTS SEG NFR BLD: 74 % (ref 40–70)
PHOSPHATE SERPL-MCNC: 1.8 MG/DL (ref 2.5–4.9)
PLATELET # BLD AUTO: 145 K/UL (ref 140–440)
POTASSIUM SERPL-SCNC: 3.9 MMOL/L (ref 3.5–5.5)
PROT SERPL-MCNC: 6.5 G/DL (ref 6.4–8.2)
RBC # BLD AUTO: 2.72 M/UL (ref 4.1–5.1)
SODIUM SERPL-SCNC: 142 MMOL/L (ref 136–145)
WBC # BLD AUTO: 1.9 K/UL (ref 4.5–13)

## 2019-05-02 PROCEDURE — 36415 COLL VENOUS BLD VENIPUNCTURE: CPT

## 2019-05-02 PROCEDURE — 83735 ASSAY OF MAGNESIUM: CPT

## 2019-05-02 PROCEDURE — 96372 THER/PROPH/DIAG INJ SC/IM: CPT

## 2019-05-02 PROCEDURE — 80053 COMPREHEN METABOLIC PANEL: CPT

## 2019-05-02 PROCEDURE — 74011250636 HC RX REV CODE- 250/636: Performed by: INTERNAL MEDICINE

## 2019-05-02 PROCEDURE — 84100 ASSAY OF PHOSPHORUS: CPT

## 2019-05-02 RX ADMIN — DENOSUMAB 120 MG: 120 INJECTION SUBCUTANEOUS at 11:34

## 2019-05-02 RX ADMIN — EPOETIN ALFA-EPBX 60000 UNITS: 40000 INJECTION, SOLUTION INTRAVENOUS; SUBCUTANEOUS at 11:34

## 2019-05-02 NOTE — PROGRESS NOTES
[facility] Progress Note Date: May 2, 2019 Name: Austyn Rodriguez MRN: [id number] : 1945 Retacrit/Xgeva Ms. Figueroa was assessed and education was provided. Ms. Aftab Rai vitals were reviewed and patient was observed for 5 minutes prior to treatment. Visit Vitals /71 (BP 1 Location: Left arm, BP Patient Position: Sitting) Pulse 81 Temp 97.3 °F (36.3 °C) Resp 18 SpO2 97% Breastfeeding? No  
 
 
Lab results were obtained from [facility] x 1 attempt for CBC, CMP, MAG and PHOS. CBC ran in house and reviewed. Remaining labs sent to [facility] lab via . Recent Results (from the past 12 hour(s)) CBC WITH 3 PART DIFF Collection Time: 19 11:20 AM  
Result Value Ref Range WBC 1.9 (L) 4.5 - 13.0 K/uL  
 RBC 2.72 (L) 4.10 - 5.10 M/uL HGB 9.9 (L) 12.0 - 16.0 g/dL HCT 29.4 (L) 36 - 48 % .1 (H) 78 - 102 FL  
 MCH 36.4 (H) 25.0 - 35.0 PG  
 MCHC 33.7 31 - 37 g/dL  
 RDW 16.3 (H) 11.5 - 14.5 % PLATELET 684 038 - 163 K/uL NEUTROPHILS 74 (H) 40 - 70 % MIXED CELLS 10 0.1 - 17 % LYMPHOCYTES 16 14 - 44 % ABS. NEUTROPHILS 1.4 (L) 1.8 - 9.5 K/UL  
 ABS. MIXED CELLS 0.2 0.0 - 2.3 K/uL  
 ABS. LYMPHOCYTES 0.3 (L) 1.1 - 5.9 K/UL  
 DF AUTOMATED Retacrit 60,000 units given to the back of patients left arm in two divided doses( 1.5 ml in each syringe). Band aid applied to each site. CMP, MAG and PHOS from 19 reviewed. Xgeva 120 mg SQ given to the back of patients left arm. Band aid applied to site. Patient armband removed and shredded. Ms. Imelda Das was discharged from [place] [address] in stable condition at 1145. She is to return on 19 at 1100 for her next appointment for CBC/Retacrit, Faslodex and monthly port flush. Guevara Carver May 2, 2019 
1:05 PM

## 2019-05-16 ENCOUNTER — HOSPITAL ENCOUNTER (OUTPATIENT)
Dept: INFUSION THERAPY | Age: 74
Discharge: HOME | End: 2019-05-16
Payer: MEDICARE

## 2019-05-16 ENCOUNTER — CLINICAL SUPPORT (OUTPATIENT)
Dept: ONCOLOGY | Age: 74
End: 2019-05-16

## 2019-05-16 ENCOUNTER — HOSPITAL ENCOUNTER (OUTPATIENT)
Dept: ONCOLOGY | Age: 74
Discharge: HOME | End: 2019-05-16

## 2019-05-16 VITALS
RESPIRATION RATE: 18 BRPM | DIASTOLIC BLOOD PRESSURE: 73 MMHG | SYSTOLIC BLOOD PRESSURE: 127 MMHG | OXYGEN SATURATION: 99 % | HEART RATE: 73 BPM | TEMPERATURE: 97 F

## 2019-05-16 DIAGNOSIS — C50.311 MALIGNANT NEOPLASM OF LOWER-INNER QUADRANT OF RIGHT BREAST OF FEMALE, ESTROGEN RECEPTOR POSITIVE (HCC): Primary | ICD-10-CM

## 2019-05-16 DIAGNOSIS — T45.1X5A ANEMIA DUE TO CHEMOTHERAPY: Primary | ICD-10-CM

## 2019-05-16 DIAGNOSIS — D64.81 ANEMIA DUE TO CHEMOTHERAPY: Primary | ICD-10-CM

## 2019-05-16 DIAGNOSIS — C50.919 METASTATIC BREAST CANCER (HCC): ICD-10-CM

## 2019-05-16 DIAGNOSIS — D64.81 ANEMIA DUE TO CHEMOTHERAPY: ICD-10-CM

## 2019-05-16 DIAGNOSIS — Z17.0 MALIGNANT NEOPLASM OF LOWER-INNER QUADRANT OF RIGHT BREAST OF FEMALE, ESTROGEN RECEPTOR POSITIVE (HCC): Primary | ICD-10-CM

## 2019-05-16 DIAGNOSIS — T45.1X5A ANEMIA DUE TO CHEMOTHERAPY: ICD-10-CM

## 2019-05-16 LAB
BASO+EOS+MONOS # BLD AUTO: 0.1 K/UL (ref 0–2.3)
BASO+EOS+MONOS NFR BLD AUTO: 6 % (ref 0.1–17)
DIFFERENTIAL METHOD BLD: ABNORMAL
ERYTHROCYTE [DISTWIDTH] IN BLOOD BY AUTOMATED COUNT: 15.2 % (ref 11.5–14.5)
HCT VFR BLD AUTO: 30.2 % (ref 36–48)
HGB BLD-MCNC: 10.2 G/DL (ref 12–16)
LYMPHOCYTES # BLD: 0.3 K/UL (ref 1.1–5.9)
LYMPHOCYTES NFR BLD: 16 % (ref 14–44)
MCH RBC QN AUTO: 36.4 PG (ref 25–35)
MCHC RBC AUTO-ENTMCNC: 33.8 G/DL (ref 31–37)
MCV RBC AUTO: 107.9 FL (ref 78–102)
NEUTS SEG # BLD: 1.8 K/UL (ref 1.8–9.5)
NEUTS SEG NFR BLD: 78 % (ref 40–70)
PLATELET # BLD AUTO: 316 K/UL (ref 140–440)
RBC # BLD AUTO: 2.8 M/UL (ref 4.1–5.1)
WBC # BLD AUTO: 2.2 K/UL (ref 4.5–13)

## 2019-05-16 PROCEDURE — 96523 IRRIG DRUG DELIVERY DEVICE: CPT

## 2019-05-16 PROCEDURE — 36591 DRAW BLOOD OFF VENOUS DEVICE: CPT

## 2019-05-16 PROCEDURE — 77030012965 HC NDL HUBR BBMI -A

## 2019-05-16 PROCEDURE — 74011250636 HC RX REV CODE- 250/636: Performed by: INTERNAL MEDICINE

## 2019-05-16 PROCEDURE — 96402 CHEMO HORMON ANTINEOPL SQ/IM: CPT

## 2019-05-16 RX ORDER — SODIUM CHLORIDE 0.9 % (FLUSH) 0.9 %
10-40 SYRINGE (ML) INJECTION AS NEEDED
Status: DISCONTINUED | OUTPATIENT
Start: 2019-05-16 | End: 2019-05-20 | Stop reason: HOSPADM

## 2019-05-16 RX ORDER — LAMOTRIGINE 25 MG/1
500 TABLET ORAL ONCE
Status: COMPLETED | OUTPATIENT
Start: 2019-05-16 | End: 2019-05-16

## 2019-05-16 RX ORDER — HEPARIN 100 UNIT/ML
500 SYRINGE INTRAVENOUS ONCE
Status: COMPLETED | OUTPATIENT
Start: 2019-05-16 | End: 2019-05-16

## 2019-05-16 RX ADMIN — SODIUM CHLORIDE, PRESERVATIVE FREE 500 UNITS: 5 INJECTION INTRAVENOUS at 11:20

## 2019-05-16 RX ADMIN — Medication 10 ML: at 11:20

## 2019-05-16 RX ADMIN — FULVESTRANT 500 MG: 50 INJECTION INTRAMUSCULAR at 11:50

## 2019-05-16 NOTE — PROGRESS NOTES
[facility] Progress [facility]    Date: May 16, 2019    Name: Nannette Voss    MRN: [id number]         : 1945      Ms. Figueroa arrived in the [facility] today, at 1115, in stable condition, here for Q 2 Weeks CBC/Retacrit injection, Q 4 Week Faslodex Injections,and Monthly Port Flush. She was assessed and education was provided. Ms. Maxwell Zhu vitals were reviewed. Visit Vitals  /73 (BP 1 Location: Left arm, BP Patient Position: Sitting)   Pulse 73   Temp 97 °F (36.1 °C)   Resp 18   SpO2 99%         Her left chest single lumen port was accessed without incident, and blood was drawn from the port for a CBC per order. And then, the port was flushed well per protocol and without incident, and the carbone needle was removed and gauze/bandaid was applied. (The CBC was processed in house via [employer], and the remaining labs were sent out to be processed. )    Recent Results (from the past 12 hour(s))   CBC WITH 3 PART DIFF    Collection Time: 19 11:35 AM   Result Value Ref Range    WBC 2.2 (L) 4.5 - 13.0 K/uL    RBC 2.80 (L) 4.10 - 5.10 M/uL    HGB 10.2 (L) 12.0 - 16.0 g/dL    HCT 30.2 (L) 36 - 48 %    .9 (H) 78 - 102 FL    MCH 36.4 (H) 25.0 - 35.0 PG    MCHC 33.8 31 - 37 g/dL    RDW 15.2 (H) 11.5 - 14.5 %    PLATELET 837 830 - 899 K/uL    NEUTROPHILS 78 (H) 40 - 70 %    MIXED CELLS 6 0.1 - 17 %    LYMPHOCYTES 16 14 - 44 %    ABS. NEUTROPHILS 1.8 1.8 - 9.5 K/UL    ABS. MIXED CELLS 0.1 0.0 - 2.3 K/uL    ABS. LYMPHOCYTES 0.3 (L) 1.1 - 5.9 K/UL    DF AUTOMATED         Retacrit (HELD). Faslodex 500 mg Total Dose, was administered IM, in 2 equally divided doses of 250 mg per order, and without incident. (250 mg was administered IM, in her right gluteal muscle, and 250 mg was administered IM, in her left gluteal muscle)     Ms. Giulia Harris tolerated well, and had no complaints. Ms. Giulia Harris was discharged from [place] [address] in stable condition at 1200.  She is to return in 2 weeks, on 19, at 1100,  for her next appointment, for Q 2 Week CBC/Retacrit injection.      Ximena Perla  May 16, 2019  1212

## 2019-05-30 ENCOUNTER — HOSPITAL ENCOUNTER (OUTPATIENT)
Dept: INFUSION THERAPY | Age: 74
Discharge: HOME | End: 2019-05-30
Payer: MEDICARE

## 2019-05-30 ENCOUNTER — CLINICAL SUPPORT (OUTPATIENT)
Dept: ONCOLOGY | Age: 74
End: 2019-05-30

## 2019-05-30 ENCOUNTER — HOSPITAL ENCOUNTER (OUTPATIENT)
Dept: ONCOLOGY | Age: 74
Discharge: HOME | End: 2019-05-30

## 2019-05-30 VITALS
TEMPERATURE: 98.4 F | OXYGEN SATURATION: 97 % | RESPIRATION RATE: 18 BRPM | SYSTOLIC BLOOD PRESSURE: 117 MMHG | DIASTOLIC BLOOD PRESSURE: 76 MMHG | HEART RATE: 75 BPM

## 2019-05-30 DIAGNOSIS — T45.1X5A ANEMIA DUE TO CHEMOTHERAPY: Primary | ICD-10-CM

## 2019-05-30 DIAGNOSIS — T45.1X5A ANEMIA DUE TO CHEMOTHERAPY: ICD-10-CM

## 2019-05-30 DIAGNOSIS — D64.81 ANEMIA DUE TO CHEMOTHERAPY: ICD-10-CM

## 2019-05-30 DIAGNOSIS — D64.81 ANEMIA DUE TO CHEMOTHERAPY: Primary | ICD-10-CM

## 2019-05-30 LAB
ALBUMIN SERPL-MCNC: 3.9 G/DL (ref 3.4–5)
ALBUMIN/GLOB SERPL: 1.4 {RATIO} (ref 0.8–1.7)
ALP SERPL-CCNC: 62 U/L (ref 45–117)
ALT SERPL-CCNC: 17 U/L (ref 13–56)
ANION GAP SERPL CALC-SCNC: 6 MMOL/L (ref 3–18)
AST SERPL-CCNC: 15 U/L (ref 15–37)
BASO+EOS+MONOS # BLD AUTO: 0.2 K/UL (ref 0–2.3)
BASO+EOS+MONOS NFR BLD AUTO: 10 % (ref 0.1–17)
BILIRUB SERPL-MCNC: 0.7 MG/DL (ref 0.2–1)
BUN SERPL-MCNC: 19 MG/DL (ref 7–18)
BUN/CREAT SERPL: 18 (ref 12–20)
CALCIUM SERPL-MCNC: 8.7 MG/DL (ref 8.5–10.1)
CHLORIDE SERPL-SCNC: 109 MMOL/L (ref 100–108)
CO2 SERPL-SCNC: 26 MMOL/L (ref 21–32)
CREAT SERPL-MCNC: 1.03 MG/DL (ref 0.6–1.3)
DIFFERENTIAL METHOD BLD: ABNORMAL
ERYTHROCYTE [DISTWIDTH] IN BLOOD BY AUTOMATED COUNT: 15.9 % (ref 11.5–14.5)
GLOBULIN SER CALC-MCNC: 2.7 G/DL (ref 2–4)
GLUCOSE SERPL-MCNC: 83 MG/DL (ref 74–99)
HCT VFR BLD AUTO: 31.5 % (ref 36–48)
HGB BLD-MCNC: 10.7 G/DL (ref 12–16)
LYMPHOCYTES # BLD: 0.3 K/UL (ref 1.1–5.9)
LYMPHOCYTES NFR BLD: 18 % (ref 14–44)
MCH RBC QN AUTO: 36.6 PG (ref 25–35)
MCHC RBC AUTO-ENTMCNC: 34 G/DL (ref 31–37)
MCV RBC AUTO: 107.9 FL (ref 78–102)
NEUTS SEG # BLD: 1.3 K/UL (ref 1.8–9.5)
NEUTS SEG NFR BLD: 71 % (ref 40–70)
PLATELET # BLD AUTO: 157 K/UL (ref 140–440)
POTASSIUM SERPL-SCNC: 4.8 MMOL/L (ref 3.5–5.5)
PROT SERPL-MCNC: 6.6 G/DL (ref 6.4–8.2)
RBC # BLD AUTO: 2.92 M/UL (ref 4.1–5.1)
SODIUM SERPL-SCNC: 141 MMOL/L (ref 136–145)
WBC # BLD AUTO: 1.8 K/UL (ref 4.5–13)

## 2019-05-30 PROCEDURE — 80053 COMPREHEN METABOLIC PANEL: CPT

## 2019-05-30 PROCEDURE — 74011250636 HC RX REV CODE- 250/636: Performed by: NURSE PRACTITIONER

## 2019-05-30 PROCEDURE — 96372 THER/PROPH/DIAG INJ SC/IM: CPT

## 2019-05-30 PROCEDURE — 36415 COLL VENOUS BLD VENIPUNCTURE: CPT

## 2019-05-30 RX ADMIN — DENOSUMAB 120 MG: 120 INJECTION SUBCUTANEOUS at 11:38

## 2019-06-03 ENCOUNTER — HOSPITAL ENCOUNTER (OUTPATIENT)
Dept: MAMMOGRAPHY | Age: 74
Discharge: HOME | End: 2019-06-03
Payer: MEDICARE

## 2019-06-03 DIAGNOSIS — Z85.3 PERSONAL HISTORY OF MALIGNANT NEOPLASM OF BREAST: ICD-10-CM

## 2019-06-03 PROCEDURE — 77061 BREAST TOMOSYNTHESIS UNI: CPT

## 2019-06-13 ENCOUNTER — TELEPHONE (OUTPATIENT)
Dept: SURGERY | Age: 74
End: 2019-06-13

## 2019-06-13 ENCOUNTER — HOSPITAL ENCOUNTER (OUTPATIENT)
Dept: INFUSION THERAPY | Age: 74
Discharge: HOME | End: 2019-06-13
Payer: MEDICARE

## 2019-06-13 ENCOUNTER — CLINICAL SUPPORT (OUTPATIENT)
Dept: ONCOLOGY | Age: 74
End: 2019-06-13

## 2019-06-13 ENCOUNTER — HOSPITAL ENCOUNTER (OUTPATIENT)
Dept: ONCOLOGY | Age: 74
Discharge: HOME | End: 2019-06-13

## 2019-06-13 VITALS
WEIGHT: 154 LBS | SYSTOLIC BLOOD PRESSURE: 125 MMHG | HEIGHT: 64 IN | OXYGEN SATURATION: 98 % | BODY MASS INDEX: 26.29 KG/M2 | HEART RATE: 72 BPM | DIASTOLIC BLOOD PRESSURE: 64 MMHG | RESPIRATION RATE: 16 BRPM | TEMPERATURE: 97.2 F

## 2019-06-13 DIAGNOSIS — D64.81 ANTINEOPLASTIC CHEMOTHERAPY INDUCED ANEMIA: ICD-10-CM

## 2019-06-13 DIAGNOSIS — C50.919 METASTATIC BREAST CANCER (HCC): ICD-10-CM

## 2019-06-13 DIAGNOSIS — D64.81 ANTINEOPLASTIC CHEMOTHERAPY INDUCED ANEMIA: Primary | ICD-10-CM

## 2019-06-13 DIAGNOSIS — T45.1X5A ANTINEOPLASTIC CHEMOTHERAPY INDUCED ANEMIA: Primary | ICD-10-CM

## 2019-06-13 DIAGNOSIS — C50.311 MALIGNANT NEOPLASM OF LOWER-INNER QUADRANT OF RIGHT BREAST OF FEMALE, ESTROGEN RECEPTOR POSITIVE (HCC): Primary | ICD-10-CM

## 2019-06-13 DIAGNOSIS — T45.1X5A ANTINEOPLASTIC CHEMOTHERAPY INDUCED ANEMIA: ICD-10-CM

## 2019-06-13 DIAGNOSIS — Z17.0 MALIGNANT NEOPLASM OF LOWER-INNER QUADRANT OF RIGHT BREAST OF FEMALE, ESTROGEN RECEPTOR POSITIVE (HCC): Primary | ICD-10-CM

## 2019-06-13 PROCEDURE — 85025 COMPLETE CBC W/AUTO DIFF WBC: CPT

## 2019-06-13 PROCEDURE — 74011250636 HC RX REV CODE- 250/636

## 2019-06-13 PROCEDURE — 96402 CHEMO HORMON ANTINEOPL SQ/IM: CPT

## 2019-06-13 PROCEDURE — 36591 DRAW BLOOD OFF VENOUS DEVICE: CPT

## 2019-06-13 PROCEDURE — 74011250636 HC RX REV CODE- 250/636: Performed by: INTERNAL MEDICINE

## 2019-06-13 PROCEDURE — 77030012965 HC NDL HUBR BBMI -A

## 2019-06-13 RX ORDER — HEPARIN 100 UNIT/ML
500 SYRINGE INTRAVENOUS AS NEEDED
Status: DISCONTINUED | OUTPATIENT
Start: 2019-06-13 | End: 2019-06-17 | Stop reason: HOSPADM

## 2019-06-13 RX ORDER — HEPARIN 100 UNIT/ML
SYRINGE INTRAVENOUS
Status: COMPLETED
Start: 2019-06-13 | End: 2019-06-13

## 2019-06-13 RX ORDER — SODIUM CHLORIDE 0.9 % (FLUSH) 0.9 %
10-40 SYRINGE (ML) INJECTION AS NEEDED
Status: DISCONTINUED | OUTPATIENT
Start: 2019-06-13 | End: 2019-06-17 | Stop reason: HOSPADM

## 2019-06-13 RX ORDER — LAMOTRIGINE 25 MG/1
500 TABLET ORAL ONCE
Status: COMPLETED | OUTPATIENT
Start: 2019-06-13 | End: 2019-06-13

## 2019-06-13 RX ADMIN — HEPARIN 500 UNITS: 100 SYRINGE at 12:05

## 2019-06-13 RX ADMIN — FULVESTRANT 500 MG: 50 INJECTION INTRAMUSCULAR at 12:30

## 2019-06-13 RX ADMIN — Medication 30 ML: at 12:05

## 2019-06-13 NOTE — PROGRESS NOTES
[facility] Progress Note    Date: 2019    Name: Paty Brasher    MRN: [id number]         : 1945      Ms. Figueroa arrived in the [facility] today, at 1135, in stable condition, here for Q 2 Week, CBC/Retacrit injection, Q 4 Week Faslodex Injections, and Monthly Port Flush. She was assessed and education was provided. Ms. Dupree Cord vitals were reviewed. Visit Vitals  /64 (BP 1 Location: Left arm, BP Patient Position: Sitting)   Pulse 72   Temp 97.2 °F (36.2 °C)   Resp 16   Ht 5' 4\" (1.626 m)   Wt 69.9 kg (154 lb)   SpO2 98%   Breastfeeding? No   BMI 26.43 kg/m²               Her left chest single lumen port was accessed without incident at 1205, and blood was drawn from the port for a CBC, per order. And then, the port was flushed well per protocol and without incident with NS and Heparin, and the carbone needle was removed and gauze/bandaid was applied. The CBC results from today, were as follows:    Recent Results (from the past 12 hour(s))   CBC WITH 3 PART DIFF    Collection Time: 19 12:05 PM   Result Value Ref Range    WBC 2.4 (L) 4.5 - 13.0 K/uL    RBC 2.83 (L) 4.10 - 5.10 M/uL    HGB 10.4 (L) 12.0 - 16.0 g/dL    HCT 30.1 (L) 36 - 48 %    .4 (H) 78 - 102 FL    MCH 36.7 (H) 25.0 - 35.0 PG    MCHC 34.6 31 - 37 g/dL    RDW 14.8 (H) 11.5 - 14.5 %    PLATELET 026 409 - 276 K/uL    NEUTROPHILS 78 (H) 40 - 70 %    MIXED CELLS 6 0.1 - 17 %    LYMPHOCYTES 16 14 - 44 %    ABS. NEUTROPHILS 1.9 1.8 - 9.5 K/UL    ABS. MIXED CELLS 0.1 0.0 - 2.3 K/uL    ABS. LYMPHOCYTES 0.4 (L) 1.1 - 5.9 K/UL    DF AUTOMATED               Retacrit injection was HELD today per order.             Faslodex 500 mg Total Dose, was administered IM, in 2 equally divided doses of 250 mg each, at 1230, per order, and without incident. (250 mg was administered IM, in her right gluteal muscle, and 250 mg was administered IM, in her left gluteal muscle)           Ms. Lj Guzmán tolerated well, and had no complaints. Ms. Deepti Freedman was discharged from [place] [address] in stable condition at 1240. .. She  is to return in 2 weeks, on Thursday, 6-27-19, at 1100, for her next [facility] appointment,  for CBC/Retacrit injection, and Q 4 Week, Xgeva Injection. And then, she is scheduled to return in 4 weeks, on Thursday, 7-11-19, at 1100, for her port flush, and her next Faslodex Injections.      Sylvester Martinez RN  June 13, 2019  2:15 PM

## 2019-06-14 LAB
BASO+EOS+MONOS # BLD AUTO: 0.1 K/UL (ref 0–2.3)
BASO+EOS+MONOS NFR BLD AUTO: 6 % (ref 0.1–17)
DIFFERENTIAL METHOD BLD: ABNORMAL
ERYTHROCYTE [DISTWIDTH] IN BLOOD BY AUTOMATED COUNT: 14.8 % (ref 11.5–14.5)
HCT VFR BLD AUTO: 30.1 % (ref 36–48)
HGB BLD-MCNC: 10.4 G/DL (ref 12–16)
LYMPHOCYTES # BLD: 0.4 K/UL (ref 1.1–5.9)
LYMPHOCYTES NFR BLD: 16 % (ref 14–44)
MCH RBC QN AUTO: 36.7 PG (ref 25–35)
MCHC RBC AUTO-ENTMCNC: 34.6 G/DL (ref 31–37)
MCV RBC AUTO: 106.4 FL (ref 78–102)
NEUTS SEG # BLD: 1.9 K/UL (ref 1.8–9.5)
NEUTS SEG NFR BLD: 78 % (ref 40–70)
PLATELET # BLD AUTO: 259 K/UL (ref 140–440)
RBC # BLD AUTO: 2.83 M/UL (ref 4.1–5.1)
WBC # BLD AUTO: 2.4 K/UL (ref 4.5–13)

## 2019-06-21 ENCOUNTER — OFFICE VISIT (OUTPATIENT)
Dept: SURGERY | Age: 74
End: 2019-06-21

## 2019-06-21 VITALS
WEIGHT: 155 LBS | BODY MASS INDEX: 26.46 KG/M2 | DIASTOLIC BLOOD PRESSURE: 63 MMHG | TEMPERATURE: 97 F | SYSTOLIC BLOOD PRESSURE: 134 MMHG | RESPIRATION RATE: 18 BRPM | HEART RATE: 77 BPM | OXYGEN SATURATION: 95 % | HEIGHT: 64 IN

## 2019-06-21 DIAGNOSIS — Z85.3 PERSONAL HISTORY OF MALIGNANT NEOPLASM OF BREAST: Primary | ICD-10-CM

## 2019-06-21 NOTE — PROGRESS NOTES
Chief Complaint   Patient presents with    Follow-up     mammo Birads 2 benign. hx malignant neoplasm of right breast/right mastectomy. PT states no breast issues at this time.

## 2019-06-21 NOTE — PROGRESS NOTES
Progress Note    Patient: Jorge A Luna  MRN: [id number]  SSN: [id number]   YOB: 1945  Age: 68 y.o. Sex: female     Chief Complaint   Patient presents with    Follow-up     mammo Birads 2 benign. hx malignant neoplasm of right breast/right mastectomy. HPI    Ms. Georgiana Pineda returns for her yearly mammogram.  She had a mastectomy in 1991 and further other breast surgery in 2002 and 4 she had a laparoscopic cholecystectomy in 2013. She is got metastatic breast cancer for a long time and is held in check by chronic chemotherapy. She is back today without breast health complaints and she has a normal mammogram.    Past Medical History:   Diagnosis Date    Biliary colic     Breast CA ([facility] [address] 2012    Cancer [facility]) 1991    Right Breast    Chemotherapy convalescence or palliative care     Neuropathy     Radiation therapy complication     Thyroid disease      Past Surgical History:   Procedure Laterality Date    BREAST SURGERY PROCEDURE UNLISTED  10/2002    Right-Lesion    BREAST SURGERY PROCEDURE UNLISTED  11/2004    Right-Lesion    HX LAP CHOLECYSTECTOMY  9-19-13    HX MASTECTOMY  1991    Right     Allergies   Allergen Reactions    Codeine Palpitations    Darvocet A500 [Propoxyphene N-Acetaminophen] Nausea and Vomiting    Darvon [Propoxyphene] Nausea and Vomiting     Current Outpatient Medications   Medication Sig Dispense Refill    palbociclib (IBRANCE) 100 mg cap Take 1 Cap by mouth daily. For 21 days on and 7 days off every 28 days. 63 Cap 5    gabapentin (NEURONTIN) 100 mg capsule TAKE 2 CAPSULES THREE TIMES A  Cap 2    lidocaine HCl-hydrocortison ac topical cream Apply  to affected area two (2) times a day. 85 g 3    gabapentin (NEURONTIN) 300 mg capsule TAKE 1 CAPSULE THREE TIMES A  Cap 2    TETRACYCLINE HCL (TETRACYCLINE PO) Take 250 mg by mouth two (2) times a day.       fexofenadine-pseudoephedrine (ALLEGRA-D 24 HOUR) 180-240 mg per tablet Take 1 Tab by mouth daily.  ibuprofen (MOTRIN) 800 mg tablet Take 800 mg by mouth two (2) times a day. Indications: OSTEOARTHRITIS      Cholecalciferol, Vitamin D3, 5,000 unit Tab Take  by mouth daily.  thyroid, Pork, (ARMOUR THYROID) 60 mg tablet Take 90 mg by mouth daily.  L-Mfolate-B6 Phos-Methyl-B12 (NEURPATH-B) 3-35-2 mg Tab Take  by mouth two (2) times a day.  warfarin (COUMADIN) 1 mg tablet Take 1 mg by mouth daily.  sulfacetamide (BLEPH-10) 10 % ophthalmic ointment Administer  to right eye three (3) times daily.  palbociclib (IBRANCE) 125 mg cap Take 125 mg by mouth daily. Take 1 tab po every day for 21 days on and 7 days off q 28 days  Indications: HORMONE RECEPTOR(+), HER2(-) ADVANCED BREAST CANCER 42 Cap 6     Social History     Socioeconomic History    Marital status:      Spouse name: Not on file    Number of children: Not on file    Years of education: Not on file    Highest education level: Not on file   Occupational History    Not on file   Social Needs    Financial resource strain: Not on file    Food insecurity:     Worry: Not on file     Inability: Not on file    Transportation needs:     Medical: Not on file     Non-medical: Not on file   Tobacco Use    Smoking status: Former Smoker     Last attempt to quit: 1991     Years since quittin.0    Smokeless tobacco: Never Used   Substance and Sexual Activity    Alcohol use:  Yes     Alcohol/week: 0.0 oz     Types: 1 - 2 Glasses of wine per week     Comment: socially, occassionally    Drug use: No    Sexual activity: Not on file   Lifestyle    Physical activity:     Days per week: Not on file     Minutes per session: Not on file    Stress: Not on file   Relationships    Social connections:     Talks on phone: Not on file     Gets together: Not on file     Attends Judaism service: Not on file     Active member of club or organization: Not on file     Attends meetings of clubs or organizations: Not on file     Relationship status: Not on file    Intimate partner violence:     Fear of current or ex partner: Not on file     Emotionally abused: Not on file     Physically abused: Not on file     Forced sexual activity: Not on file   Other Topics Concern    Not on file   Social History Narrative    Not on file     Family History   Problem Relation Age of Onset    Cancer Maternal Aunt         Hodgkin's disease    Breast Cancer Paternal Aunt     Cancer Father         Prostate, lung, brain         Review of systems:  Patient denies any reflux, emesis, abdominal pain, change in bowel habits, hematochezia, melena, fever, weight loss, fatigue chills, dermatitis, abnormal moles, change in vision, vertigo, epistaxis, dysphagia, hoarseness, chest pain, palpitations, hypertension, edema, cough, shortness of breath, wheezing, hemoptysis, snoring, hematuria, diabetes, thyroid disease, anemia, bruising, history of blood transfusion, dizziness, headache, or fainting.     Physical Examination    Well developed well nourished female in no apparent distress  Visit Vitals  /63   Pulse 77   Temp 97 °F (36.1 °C) (Oral)   Resp 18   Ht 5' 4\" (1.626 m)   Wt 70.3 kg (155 lb)   SpO2 95%   BMI 26.61 kg/m²      Head: normocephalic, atraumatic  Mouth: Clear, no overt lesions, oral mucosa pink and moist  Neck: supple, no masses, no adenopathy or carotid bruits, trachea midline  Resp: clear to auscultation bilaterally, no wheeze, rhonchi or rales, excursions normal and symmetrical  Cardio: Regular rate and rhythm, no murmurs, clicks, gallops or rubs, no edema or varicosities  Abdomen: soft, nontender, nondistended, normoactive bowel sounds, no hernias, no hepatosplenomegaly,   Back: Deferred  Extremeties: warm, well-perfused, no tenderness or swelling, normal gait/station  Neuro: sensation and strength grossly intact and symmetrical  Psych: alert and oriented to person, place and time  Breast exam left breast without evidence of dominant mass, skin change, nipple discharge, or lymphadenopathy. Right mastectomy scar without evidence of recurrence or lymphadenopathy. IMPRESSION  Long-standing metastatic disease of the breast doing well with normal mammogram and exam this year    PLAN  No orders of the defined types were placed in this encounter.     Follow-up in a year with a mammogram  Pasquale Key MD

## 2019-06-27 ENCOUNTER — HOSPITAL ENCOUNTER (OUTPATIENT)
Dept: ONCOLOGY | Age: 74
Discharge: HOME | End: 2019-06-27

## 2019-06-27 ENCOUNTER — HOSPITAL ENCOUNTER (OUTPATIENT)
Dept: INFUSION THERAPY | Age: 74
Discharge: HOME | End: 2019-06-27
Payer: MEDICARE

## 2019-06-27 ENCOUNTER — CLINICAL SUPPORT (OUTPATIENT)
Dept: ONCOLOGY | Age: 74
End: 2019-06-27

## 2019-06-27 VITALS
OXYGEN SATURATION: 96 % | HEART RATE: 75 BPM | TEMPERATURE: 97 F | RESPIRATION RATE: 20 BRPM | SYSTOLIC BLOOD PRESSURE: 132 MMHG | DIASTOLIC BLOOD PRESSURE: 66 MMHG

## 2019-06-27 DIAGNOSIS — T45.1X5A ANEMIA DUE TO CHEMOTHERAPY: ICD-10-CM

## 2019-06-27 DIAGNOSIS — D64.81 ANEMIA DUE TO CHEMOTHERAPY: ICD-10-CM

## 2019-06-27 DIAGNOSIS — T45.1X5A ANEMIA DUE TO CHEMOTHERAPY: Primary | ICD-10-CM

## 2019-06-27 DIAGNOSIS — D64.81 ANEMIA DUE TO CHEMOTHERAPY: Primary | ICD-10-CM

## 2019-06-27 LAB
ALBUMIN SERPL-MCNC: 3.9 G/DL (ref 3.4–5)
ALBUMIN/GLOB SERPL: 1.3 {RATIO} (ref 0.8–1.7)
ALP SERPL-CCNC: 66 U/L (ref 45–117)
ALT SERPL-CCNC: 18 U/L (ref 13–56)
ANION GAP SERPL CALC-SCNC: 8 MMOL/L (ref 3–18)
AST SERPL-CCNC: 17 U/L (ref 15–37)
BASO+EOS+MONOS # BLD AUTO: 0.1 K/UL (ref 0–2.3)
BASO+EOS+MONOS NFR BLD AUTO: 8 % (ref 0.1–17)
BILIRUB SERPL-MCNC: 0.5 MG/DL (ref 0.2–1)
BUN SERPL-MCNC: 25 MG/DL (ref 7–18)
BUN/CREAT SERPL: 19 (ref 12–20)
CALCIUM SERPL-MCNC: 9 MG/DL (ref 8.5–10.1)
CHLORIDE SERPL-SCNC: 106 MMOL/L (ref 100–108)
CO2 SERPL-SCNC: 27 MMOL/L (ref 21–32)
CREAT SERPL-MCNC: 1.33 MG/DL (ref 0.6–1.3)
DIFFERENTIAL METHOD BLD: ABNORMAL
ERYTHROCYTE [DISTWIDTH] IN BLOOD BY AUTOMATED COUNT: 15.8 % (ref 11.5–14.5)
GLOBULIN SER CALC-MCNC: 2.9 G/DL (ref 2–4)
GLUCOSE SERPL-MCNC: 92 MG/DL (ref 74–99)
HCT VFR BLD AUTO: 30.6 % (ref 36–48)
HGB BLD-MCNC: 10.4 G/DL (ref 12–16)
LYMPHOCYTES # BLD: 0.2 K/UL (ref 1.1–5.9)
LYMPHOCYTES NFR BLD: 15 % (ref 14–44)
MAGNESIUM SERPL-MCNC: 1.8 MG/DL (ref 1.6–2.6)
MCH RBC QN AUTO: 36.1 PG (ref 25–35)
MCHC RBC AUTO-ENTMCNC: 34 G/DL (ref 31–37)
MCV RBC AUTO: 106.3 FL (ref 78–102)
NEUTS SEG # BLD: 1.4 K/UL (ref 1.8–9.5)
NEUTS SEG NFR BLD: 78 % (ref 40–70)
PHOSPHATE SERPL-MCNC: 2.6 MG/DL (ref 2.5–4.9)
PLATELET # BLD AUTO: 149 K/UL (ref 140–440)
POTASSIUM SERPL-SCNC: 4.4 MMOL/L (ref 3.5–5.5)
PROT SERPL-MCNC: 6.8 G/DL (ref 6.4–8.2)
RBC # BLD AUTO: 2.88 M/UL (ref 4.1–5.1)
SODIUM SERPL-SCNC: 141 MMOL/L (ref 136–145)
WBC # BLD AUTO: 1.7 K/UL (ref 4.5–13)

## 2019-06-27 PROCEDURE — 74011250636 HC RX REV CODE- 250/636: Performed by: NURSE PRACTITIONER

## 2019-06-27 PROCEDURE — 83735 ASSAY OF MAGNESIUM: CPT

## 2019-06-27 PROCEDURE — 84100 ASSAY OF PHOSPHORUS: CPT

## 2019-06-27 PROCEDURE — 36415 COLL VENOUS BLD VENIPUNCTURE: CPT

## 2019-06-27 PROCEDURE — 80053 COMPREHEN METABOLIC PANEL: CPT

## 2019-06-27 PROCEDURE — 96372 THER/PROPH/DIAG INJ SC/IM: CPT

## 2019-06-27 RX ADMIN — DENOSUMAB 120 MG: 120 INJECTION SUBCUTANEOUS at 11:28

## 2019-06-27 NOTE — PROGRESS NOTES
[facility] Progress Note    Date: 2019    Name: Vinay Henderson    MRN: [id number]         : 1945     Retacrit/Xgeva    Ms. Figueroa was assessed and education was provided. Ms. Corrie Murillo vitals were reviewed and patient was observed for 5 minutes prior to treatment. Visit Vitals  /66 (BP 1 Location: Left arm, BP Patient Position: Sitting)   Pulse 75   Temp 97 °F (36.1 °C)   Resp 20   SpO2 96%       Lab results were obtained from LAC x 1 attempt for CBC, CMP, MAG and PHOS. CBC ran in house and reviewed. Remaining labs sent to [facility] lab via . Recent Results (from the past 12 hour(s))   CBC WITH 3 PART DIFF    Collection Time: 19 11:11 AM   Result Value Ref Range    WBC 1.7 (L) 4.5 - 13.0 K/uL    RBC 2.88 (L) 4.10 - 5.10 M/uL    HGB 10.4 (L) 12.0 - 16.0 g/dL    HCT 30.6 (L) 36 - 48 %    .3 (H) 78 - 102 FL    MCH 36.1 (H) 25.0 - 35.0 PG    MCHC 34.0 31 - 37 g/dL    RDW 15.8 (H) 11.5 - 14.5 %    PLATELET 265 808 - 034 K/uL    NEUTROPHILS 78 (H) 40 - 70 %    MIXED CELLS 8 0.1 - 17 %    LYMPHOCYTES 15 14 - 44 %    ABS. NEUTROPHILS 1.4 (L) 1.8 - 9.5 K/UL    ABS. MIXED CELLS 0.1 0.0 - 2.3 K/uL    ABS. LYMPHOCYTES 0.2 (L) 1.1 - 5.9 K/UL    DF AUTOMATED       Retacrit 60,000 units held today per CBC results. CMP, MAG and PHOS from 19 reviewed. Xgeva 120 mg SQ given to the back of patients left arm. Band aid applied to site. Patient armband removed and shredded. Ms. Linda Borges was discharged from [place] [address] in stable condition at 1135. She is to return on 19 at 1100 for her next appointment for CBC/Retacrit.     Mauro Yeboah, MARA  2019

## 2019-07-02 ENCOUNTER — HOSPITAL ENCOUNTER (OUTPATIENT)
Dept: ONCOLOGY | Age: 74
Discharge: HOME | End: 2019-07-02

## 2019-07-02 ENCOUNTER — OFFICE VISIT (OUTPATIENT)
Dept: ONCOLOGY | Age: 74
End: 2019-07-02

## 2019-07-02 VITALS
DIASTOLIC BLOOD PRESSURE: 67 MMHG | SYSTOLIC BLOOD PRESSURE: 125 MMHG | HEIGHT: 64 IN | HEART RATE: 80 BPM | WEIGHT: 153.8 LBS | BODY MASS INDEX: 26.26 KG/M2 | TEMPERATURE: 98.2 F

## 2019-07-02 DIAGNOSIS — C50.919 METASTATIC BREAST CANCER (HCC): Primary | ICD-10-CM

## 2019-07-02 DIAGNOSIS — G89.3 CANCER ASSOCIATED PAIN: ICD-10-CM

## 2019-07-02 DIAGNOSIS — C50.919 METASTATIC BREAST CANCER (HCC): ICD-10-CM

## 2019-07-02 RX ORDER — OXYCODONE AND ACETAMINOPHEN 5; 325 MG/1; MG/1
1 TABLET ORAL
Qty: 120 TAB | Refills: 0 | Status: SHIPPED | OUTPATIENT
Start: 2019-07-02 | End: 2019-08-01

## 2019-07-02 NOTE — PROGRESS NOTES
Hematology/medical oncology progress note    7/2/2019  Marci Ackerman  YOB: 1945    PCP: Dr. Connie Peoples    Diagnosis: Metastatic breast cancer    Ms. Taty Khalil is a 63-year-old woman who suffers from metastatic breast cancer. She is currently receiving antiestrogen therapy. Today she is here complaining of some pain in her upper back and shoulder area with some associated swelling. On examining the right shoulder and upper back there is focal tenderness over the right scapula area. On looking at her most recent CT scan she did show evidence of rib lesions in the posterior lateral T12 area. I have offered the patient a prescription for pain medication which she states she may not need but did take the prescription for Percocet 5 mg every 6 hours as needed. Additionally she is been referred for an urgent bone scan to assess for disease progression in her bones at this time. I have explained to the patient that if the bone scan is highly positive over the site of focal tenderness then she will be referred either to the proton therapy center or to the external beam radiation therapy center for an assessment and treatment recommendations. Hopefully will have this done within the next few days and I will be able to see her back in 1 week to review the imaging studies and make the necessary referrals. The patient had her questions answered to her satisfaction. Total time 30 minutes, greater than 50% of the time was in counseling and coordination of care. Rolo Nick MD, Wm Diamond

## 2019-07-05 RX ORDER — LAMOTRIGINE 25 MG/1
500 TABLET ORAL ONCE
Status: CANCELLED | OUTPATIENT
Start: 2019-07-12 | End: 2019-07-12

## 2019-07-09 ENCOUNTER — HOSPITAL ENCOUNTER (OUTPATIENT)
Dept: CT IMAGING | Age: 74
Discharge: HOME | End: 2019-07-09
Attending: INTERNAL MEDICINE
Payer: MEDICARE

## 2019-07-09 ENCOUNTER — HOSPITAL ENCOUNTER (OUTPATIENT)
Dept: NUCLEAR MEDICINE | Age: 74
Discharge: HOME | End: 2019-07-09
Attending: INTERNAL MEDICINE
Payer: MEDICARE

## 2019-07-09 DIAGNOSIS — G89.3 CANCER ASSOCIATED PAIN: ICD-10-CM

## 2019-07-09 DIAGNOSIS — C50.919 METASTATIC BREAST CANCER (HCC): Primary | ICD-10-CM

## 2019-07-09 DIAGNOSIS — C50.919 METASTATIC BREAST CANCER (HCC): ICD-10-CM

## 2019-07-09 PROCEDURE — 72125 CT NECK SPINE W/O DYE: CPT

## 2019-07-09 PROCEDURE — 78306 BONE IMAGING WHOLE BODY: CPT

## 2019-07-10 NOTE — PROGRESS NOTES
I have reviewed bone scan results. This will be discussed with patient during follow up appointment with Dr Vikas José.

## 2019-07-11 ENCOUNTER — APPOINTMENT (OUTPATIENT)
Dept: INFUSION THERAPY | Age: 74
End: 2019-07-11
Payer: MEDICARE

## 2019-07-12 ENCOUNTER — CLINICAL SUPPORT (OUTPATIENT)
Dept: ONCOLOGY | Age: 74
End: 2019-07-12

## 2019-07-12 ENCOUNTER — HOSPITAL ENCOUNTER (OUTPATIENT)
Dept: ONCOLOGY | Age: 74
Discharge: HOME | End: 2019-07-12

## 2019-07-12 ENCOUNTER — OFFICE VISIT (OUTPATIENT)
Dept: ONCOLOGY | Age: 74
End: 2019-07-12

## 2019-07-12 ENCOUNTER — HOSPITAL ENCOUNTER (OUTPATIENT)
Dept: INFUSION THERAPY | Age: 74
Discharge: HOME | End: 2019-07-12
Payer: MEDICARE

## 2019-07-12 ENCOUNTER — HOSPITAL ENCOUNTER (OUTPATIENT)
Dept: GENERAL RADIOLOGY | Age: 74
Discharge: HOME | End: 2019-07-12
Payer: MEDICARE

## 2019-07-12 VITALS
TEMPERATURE: 97.4 F | RESPIRATION RATE: 16 BRPM | DIASTOLIC BLOOD PRESSURE: 67 MMHG | BODY MASS INDEX: 26.29 KG/M2 | WEIGHT: 154 LBS | HEIGHT: 64 IN | HEART RATE: 79 BPM | SYSTOLIC BLOOD PRESSURE: 126 MMHG

## 2019-07-12 DIAGNOSIS — C50.311 MALIGNANT NEOPLASM OF LOWER-INNER QUADRANT OF RIGHT BREAST OF FEMALE, ESTROGEN RECEPTOR POSITIVE (HCC): Primary | ICD-10-CM

## 2019-07-12 DIAGNOSIS — D64.81 ANEMIA DUE TO CHEMOTHERAPY: ICD-10-CM

## 2019-07-12 DIAGNOSIS — M25.519 SHOULDER PAIN: ICD-10-CM

## 2019-07-12 DIAGNOSIS — C50.919 METASTATIC BREAST CANCER (HCC): Primary | ICD-10-CM

## 2019-07-12 DIAGNOSIS — C50.919 METASTATIC BREAST CANCER (HCC): ICD-10-CM

## 2019-07-12 DIAGNOSIS — T45.1X5A ANEMIA DUE TO CHEMOTHERAPY: ICD-10-CM

## 2019-07-12 DIAGNOSIS — G89.3 CANCER ASSOCIATED PAIN: ICD-10-CM

## 2019-07-12 DIAGNOSIS — Z17.0 MALIGNANT NEOPLASM OF LOWER-INNER QUADRANT OF RIGHT BREAST OF FEMALE, ESTROGEN RECEPTOR POSITIVE (HCC): Primary | ICD-10-CM

## 2019-07-12 LAB
BASO+EOS+MONOS # BLD AUTO: 0.1 K/UL (ref 0–2.3)
BASO+EOS+MONOS NFR BLD AUTO: 7 % (ref 0.1–17)
DIFFERENTIAL METHOD BLD: ABNORMAL
ERYTHROCYTE [DISTWIDTH] IN BLOOD BY AUTOMATED COUNT: 16.3 % (ref 11.5–14.5)
HCT VFR BLD AUTO: 29.4 % (ref 36–48)
HGB BLD-MCNC: 9.9 G/DL (ref 12–16)
LYMPHOCYTES # BLD: 0.3 K/UL (ref 1.1–5.9)
LYMPHOCYTES NFR BLD: 18 % (ref 14–44)
MCH RBC QN AUTO: 36.4 PG (ref 25–35)
MCHC RBC AUTO-ENTMCNC: 33.7 G/DL (ref 31–37)
MCV RBC AUTO: 108.1 FL (ref 78–102)
NEUTS SEG # BLD: 1.5 K/UL (ref 1.8–9.5)
NEUTS SEG NFR BLD: 76 % (ref 40–70)
PLATELET # BLD AUTO: 305 K/UL (ref 140–440)
RBC # BLD AUTO: 2.72 M/UL (ref 4.1–5.1)
WBC # BLD AUTO: 1.9 K/UL (ref 4.5–13)

## 2019-07-12 PROCEDURE — 96402 CHEMO HORMON ANTINEOPL SQ/IM: CPT

## 2019-07-12 PROCEDURE — 96372 THER/PROPH/DIAG INJ SC/IM: CPT

## 2019-07-12 PROCEDURE — 73030 X-RAY EXAM OF SHOULDER: CPT

## 2019-07-12 PROCEDURE — 36591 DRAW BLOOD OFF VENOUS DEVICE: CPT

## 2019-07-12 PROCEDURE — 77030012965 HC NDL HUBR BBMI -A

## 2019-07-12 PROCEDURE — 74011250636 HC RX REV CODE- 250/636: Performed by: INTERNAL MEDICINE

## 2019-07-12 PROCEDURE — 74011250636 HC RX REV CODE- 250/636

## 2019-07-12 RX ORDER — HEPARIN 100 UNIT/ML
500 SYRINGE INTRAVENOUS AS NEEDED
Status: DISCONTINUED | OUTPATIENT
Start: 2019-07-12 | End: 2019-07-16 | Stop reason: HOSPADM

## 2019-07-12 RX ORDER — HEPARIN 100 UNIT/ML
500 SYRINGE INTRAVENOUS ONCE
Status: DISCONTINUED | OUTPATIENT
Start: 2019-07-12 | End: 2019-07-12

## 2019-07-12 RX ORDER — HEPARIN 100 UNIT/ML
SYRINGE INTRAVENOUS
Status: COMPLETED
Start: 2019-07-12 | End: 2019-07-12

## 2019-07-12 RX ORDER — SODIUM CHLORIDE 0.9 % (FLUSH) 0.9 %
10-40 SYRINGE (ML) INJECTION AS NEEDED
Status: DISCONTINUED | OUTPATIENT
Start: 2019-07-12 | End: 2019-07-16 | Stop reason: HOSPADM

## 2019-07-12 RX ORDER — LAMOTRIGINE 25 MG/1
500 TABLET ORAL ONCE
Status: COMPLETED | OUTPATIENT
Start: 2019-07-12 | End: 2019-07-12

## 2019-07-12 RX ADMIN — FULVESTRANT 500 MG: 50 INJECTION INTRAMUSCULAR at 11:38

## 2019-07-12 RX ADMIN — HEPARIN 500 UNITS: 100 SYRINGE at 11:22

## 2019-07-12 RX ADMIN — Medication 30 ML: at 11:22

## 2019-07-12 RX ADMIN — EPOETIN ALFA-EPBX 60000 UNITS: 40000 INJECTION, SOLUTION INTRAVENOUS; SUBCUTANEOUS at 11:53

## 2019-07-12 NOTE — PROGRESS NOTES
Hematology/medical oncology progress note    7/12/2018  Ez Goldberg  YOB: 1945    PCP: Dr. Trav Price    Diagnosis: Metastatic breast cancer    Ms. Massimo Marsh is a 66-year-old woman with a history of metastatic breast cancer. At her last clinic visit she complained of some neck discomfort and shoulder discomfort. A bone scan was ordered and this test revealed stable to slightly increased uptake at the cervical spine and left posterior iliac spine regions. There was stable and increased uptake involving the ribs. There was some concern about the abnormal uptake over the T12 corresponding to abnormalities noted on the CT findings from 4/9/2019. The bone scan did reveal additional degenerative changes at C5-C6 and C7 see 6 levels. The CT scans through the cervical spine showed no compression deformity. An abnormal sclerotic vertebral body at C5 was detected. This probably corresponds to the abnormal increase uptake seen on the bone scan. There was central canal stenosis at multiple levels with multilevel neuroforaminal narrowing. This was most pronounced at C5-C6 on the right side and C6-C7 on the right side followed by C3-C4 on the left side. The patient is still complaining of mild swelling and discomfort in the right shoulder. Most of the time she is not having pain until she moves her arm or shoulder. I have recommended that she begin using a heating pad and we will refer the patient to the radiology department for specific x-rays of the shoulder. Pending these additional tests she may be referred back to the proton therapy center to see if proton therapy to the areas of increased uptake may be of some palliative benefit. I will see her back in clinic on 7/16/2019. Total time 30 minutes, greater than 50% of the time was in counseling and coordination. Rolo Pacheco MD, Mayra Deluca

## 2019-07-12 NOTE — PROGRESS NOTES
[facility] Progress Note    Date: 2019    Name: Toya Becerra    MRN: [id number]         : 1945      Ms. Figueroa was assessed and education was provided. Ms. Michael Chery vitals were reviewed and patient was observed for 5 minutes prior to treatment. Visit Vitals  /67 (BP 1 Location: Left arm, BP Patient Position: Sitting)   Pulse 79   Temp 97.4 °F (36.3 °C)   Resp 16   Ht 5' 4\" (1.626 m)   Wt 69.9 kg (154 lb)   BMI 26.43 kg/m²       Lab results were obtained and reviewed. Recent Results (from the past 12 hour(s))   CBC WITH 3 PART DIFF    Collection Time: 19 11:25 AM   Result Value Ref Range    WBC 1.9 (L) 4.5 - 13.0 K/uL    RBC 2.72 (L) 4.10 - 5.10 M/uL    HGB 9.9 (L) 12.0 - 16.0 g/dL    HCT 29.4 (L) 36 - 48 %    .1 (H) 78 - 102 FL    MCH 36.4 (H) 25.0 - 35.0 PG    MCHC 33.7 31 - 37 g/dL    RDW 16.3 (H) 11.5 - 14.5 %    PLATELET 486 272 - 833 K/uL    NEUTROPHILS 76 (H) 40 - 70 %    MIXED CELLS 7 0.1 - 17 %    LYMPHOCYTES 18 14 - 44 %    ABS. NEUTROPHILS 1.5 (L) 1.8 - 9.5 K/UL    ABS. MIXED CELLS 0.1 0.0 - 2.3 K/uL    ABS. LYMPHOCYTES 0.3 (L) 1.1 - 5.9 K/UL    DF AUTOMATED       Retacrit 60,000 units given SQ upper left arm for H&H 9.9/29.4. Monthly port flush done with brisk blood return obtained. Faslodex 500 mg IM given in divided dose:  250 mg given in right gluteus klaudia, 250 mg given in left gluteus klaudia     Ms. Figueroa tolerated the injection, and had no complaints. Patient armband removed and shredded. Ms. Quita Villalobos was discharged from [place] [address] in stable condition at 1200. She is to return on 19 at 1100 for her next appointment for CBC/Retacrit, Lu Brooms and labs.     Sarai Vuong RN  2019  12:17 PM

## 2019-07-16 ENCOUNTER — OFFICE VISIT (OUTPATIENT)
Dept: ONCOLOGY | Age: 74
End: 2019-07-16

## 2019-07-16 ENCOUNTER — HOSPITAL ENCOUNTER (OUTPATIENT)
Dept: ONCOLOGY | Age: 74
Discharge: HOME | End: 2019-07-16

## 2019-07-16 VITALS
DIASTOLIC BLOOD PRESSURE: 60 MMHG | TEMPERATURE: 98.4 F | WEIGHT: 154.2 LBS | BODY MASS INDEX: 26.32 KG/M2 | OXYGEN SATURATION: 96 % | HEART RATE: 81 BPM | HEIGHT: 64 IN | SYSTOLIC BLOOD PRESSURE: 104 MMHG

## 2019-07-16 DIAGNOSIS — M19.011 PRIMARY OSTEOARTHRITIS OF RIGHT SHOULDER: ICD-10-CM

## 2019-07-16 DIAGNOSIS — C50.919 METASTATIC BREAST CANCER (HCC): ICD-10-CM

## 2019-07-16 DIAGNOSIS — C50.919 METASTATIC BREAST CANCER (HCC): Primary | ICD-10-CM

## 2019-07-16 NOTE — PROGRESS NOTES
Hematology/medical oncology progress note    7/16/2019  Benita Bodily  YOB: 1945    PCP: Dr. 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 Nilda Ford is a 60-year-old woman who has metastatic breast cancer. Recently we ordered a bone scan and CT scans which did not show any evidence of progressive metastatic disease definitively. A plain x-ray of the shoulder, AP and lateral of the right and left shoulders was completed the AP and lateral x-ray of the right shoulder confirmed arthritis in the right AC joint. It also documented her known metastatic foci in the fifth and 6 and seventh rib. The x-ray through the left shoulder was negative. I recommend that the patient began using Motrin 800 mg 3 times daily with food to help control the pain and swelling in her right AC joint. She will continue with outpatient management for metastatic breast cancer as previously ordered. The patient had her questions answered to her satisfaction. Total time 25 minutes, greater than 50% of the time was in counseling and coordination of care. Rolo Araya MD, [address]

## 2019-07-25 ENCOUNTER — CLINICAL SUPPORT (OUTPATIENT)
Dept: ONCOLOGY | Age: 74
End: 2019-07-25

## 2019-07-25 ENCOUNTER — HOSPITAL ENCOUNTER (OUTPATIENT)
Dept: ONCOLOGY | Age: 74
Discharge: HOME | End: 2019-07-25

## 2019-07-25 ENCOUNTER — HOSPITAL ENCOUNTER (OUTPATIENT)
Dept: INFUSION THERAPY | Age: 74
Discharge: HOME | End: 2019-07-25
Payer: MEDICARE

## 2019-07-25 VITALS
TEMPERATURE: 97 F | RESPIRATION RATE: 16 BRPM | OXYGEN SATURATION: 97 % | HEART RATE: 73 BPM | SYSTOLIC BLOOD PRESSURE: 125 MMHG | DIASTOLIC BLOOD PRESSURE: 65 MMHG

## 2019-07-25 DIAGNOSIS — T45.1X5A ANEMIA DUE TO CHEMOTHERAPY: ICD-10-CM

## 2019-07-25 DIAGNOSIS — D64.81 ANEMIA DUE TO CHEMOTHERAPY: ICD-10-CM

## 2019-07-25 DIAGNOSIS — D64.81 ANEMIA DUE TO CHEMOTHERAPY: Primary | ICD-10-CM

## 2019-07-25 DIAGNOSIS — T45.1X5A ANEMIA DUE TO CHEMOTHERAPY: Primary | ICD-10-CM

## 2019-07-25 LAB
ALBUMIN SERPL-MCNC: 3.8 G/DL (ref 3.4–5)
ALBUMIN/GLOB SERPL: 1.5 {RATIO} (ref 0.8–1.7)
ALP SERPL-CCNC: 55 U/L (ref 45–117)
ALT SERPL-CCNC: 15 U/L (ref 13–56)
ANION GAP SERPL CALC-SCNC: 5 MMOL/L (ref 3–18)
AST SERPL-CCNC: 15 U/L (ref 10–38)
BASOPHILS # BLD: 0 K/UL (ref 0–0.06)
BASOPHILS NFR BLD: 2 % (ref 0–3)
BILIRUB SERPL-MCNC: 0.5 MG/DL (ref 0.2–1)
BUN SERPL-MCNC: 20 MG/DL (ref 7–18)
BUN/CREAT SERPL: 20 (ref 12–20)
CALCIUM SERPL-MCNC: 8.7 MG/DL (ref 8.5–10.1)
CHLORIDE SERPL-SCNC: 109 MMOL/L (ref 100–111)
CO2 SERPL-SCNC: 28 MMOL/L (ref 21–32)
CREAT SERPL-MCNC: 1.02 MG/DL (ref 0.6–1.3)
DIFFERENTIAL METHOD BLD: ABNORMAL
EOSINOPHIL # BLD: 0 K/UL (ref 0–0.4)
EOSINOPHIL NFR BLD: 0 % (ref 0–5)
ERYTHROCYTE [DISTWIDTH] IN BLOOD BY AUTOMATED COUNT: 16.9 % (ref 11.6–14.5)
GLOBULIN SER CALC-MCNC: 2.6 G/DL (ref 2–4)
GLUCOSE SERPL-MCNC: 98 MG/DL (ref 74–99)
HCT VFR BLD AUTO: 29.7 % (ref 35–45)
HGB BLD-MCNC: 10 G/DL (ref 12–16)
LYMPHOCYTES # BLD: 0.2 K/UL (ref 0.8–3.5)
LYMPHOCYTES NFR BLD: 11 % (ref 20–51)
MAGNESIUM SERPL-MCNC: 1.8 MG/DL (ref 1.6–2.6)
MCH RBC QN AUTO: 35.6 PG (ref 24–34)
MCHC RBC AUTO-ENTMCNC: 33.7 G/DL (ref 31–37)
MCV RBC AUTO: 105.7 FL (ref 74–97)
MONOCYTES # BLD: 0.1 K/UL (ref 0–1)
MONOCYTES NFR BLD: 4 % (ref 2–9)
NEUTS BAND NFR BLD MANUAL: 4 % (ref 0–5)
NEUTS SEG # BLD: 1.2 K/UL (ref 1.8–8)
NEUTS SEG NFR BLD: 79 % (ref 42–75)
PHOSPHATE SERPL-MCNC: 2.1 MG/DL (ref 2.5–4.9)
PLATELET # BLD AUTO: 140 K/UL (ref 135–420)
PMV BLD AUTO: 10.4 FL (ref 9.2–11.8)
POTASSIUM SERPL-SCNC: 4 MMOL/L (ref 3.5–5.5)
PROT SERPL-MCNC: 6.4 G/DL (ref 6.4–8.2)
RBC # BLD AUTO: 2.81 M/UL (ref 4.2–5.3)
RBC MORPH BLD: ABNORMAL
SODIUM SERPL-SCNC: 142 MMOL/L (ref 136–145)
WBC # BLD AUTO: 1.5 K/UL (ref 4.6–13.2)

## 2019-07-25 PROCEDURE — 36415 COLL VENOUS BLD VENIPUNCTURE: CPT

## 2019-07-25 PROCEDURE — 74011250636 HC RX REV CODE- 250/636: Performed by: NURSE PRACTITIONER

## 2019-07-25 PROCEDURE — 83735 ASSAY OF MAGNESIUM: CPT

## 2019-07-25 PROCEDURE — 96372 THER/PROPH/DIAG INJ SC/IM: CPT

## 2019-07-25 PROCEDURE — 84100 ASSAY OF PHOSPHORUS: CPT

## 2019-07-25 PROCEDURE — 80053 COMPREHEN METABOLIC PANEL: CPT

## 2019-07-25 RX ADMIN — DENOSUMAB 120 MG: 120 INJECTION SUBCUTANEOUS at 10:46

## 2019-07-25 NOTE — PROGRESS NOTES
[facility] Progress Note    Date: 2019    Name: Fallon Rdz    MRN: [id number]         : 1945     Retacrit held Pepper Tomas    Ms. Figueroa was assessed and education was provided. Ms. Gerardo Perez vitals were reviewed and patient was observed for 5 minutes prior to treatment. Visit Vitals  /65 (BP 1 Location: Left arm, BP Patient Position: At rest)   Pulse 73   Temp 97 °F (36.1 °C)   Resp 16   SpO2 97%   Breastfeeding? No       Lab results were obtained from LAC x 1 attempt for CBC, CMP, MAG and PHOS. CBC ran in house and reviewed. Remaining labs sent to [facility] lab via . Retacrit 60,000 units held today per CBC results. CMP, MAG and PHOS from 19 reviewed. Xgeva 120 mg SQ given to the back of patients left arm. Band aid applied to site. Patient armband removed and shredded. Ms. Neo Edmond was discharged from [place] [address] in stable condition at 1135.  She is to return on 19 at 1400 for her next appointment for CBC/Retacrit at  [address] [facility], RN  2019

## 2019-08-05 RX ORDER — LAMOTRIGINE 25 MG/1
500 TABLET ORAL ONCE
Status: CANCELLED | OUTPATIENT
Start: 2019-08-08 | End: 2019-08-08

## 2019-08-08 ENCOUNTER — HOSPITAL ENCOUNTER (OUTPATIENT)
Dept: INFUSION THERAPY | Age: 74
Discharge: HOME | End: 2019-08-08
Payer: MEDICARE

## 2019-08-08 VITALS
SYSTOLIC BLOOD PRESSURE: 129 MMHG | TEMPERATURE: 97.8 F | DIASTOLIC BLOOD PRESSURE: 71 MMHG | OXYGEN SATURATION: 96 % | HEART RATE: 85 BPM | RESPIRATION RATE: 18 BRPM

## 2019-08-08 DIAGNOSIS — C50.919 METASTATIC BREAST CANCER (HCC): ICD-10-CM

## 2019-08-08 DIAGNOSIS — C50.311 MALIGNANT NEOPLASM OF LOWER-INNER QUADRANT OF RIGHT BREAST OF FEMALE, ESTROGEN RECEPTOR POSITIVE (HCC): Primary | ICD-10-CM

## 2019-08-08 DIAGNOSIS — Z17.0 MALIGNANT NEOPLASM OF LOWER-INNER QUADRANT OF RIGHT BREAST OF FEMALE, ESTROGEN RECEPTOR POSITIVE (HCC): Primary | ICD-10-CM

## 2019-08-08 LAB
BASO+EOS+MONOS # BLD AUTO: 0.2 K/UL (ref 0–2.3)
BASO+EOS+MONOS NFR BLD AUTO: 9 % (ref 0.1–17)
DIFFERENTIAL METHOD BLD: ABNORMAL
ERYTHROCYTE [DISTWIDTH] IN BLOOD BY AUTOMATED COUNT: 15.8 % (ref 11.5–14.5)
HCT VFR BLD AUTO: 30.4 % (ref 36–48)
HGB BLD-MCNC: 10.5 G/DL (ref 12–16)
LYMPHOCYTES # BLD: 0.5 K/UL (ref 1.1–5.9)
LYMPHOCYTES NFR BLD: 18 % (ref 14–44)
MCH RBC QN AUTO: 36.3 PG (ref 25–35)
MCHC RBC AUTO-ENTMCNC: 34.5 G/DL (ref 31–37)
MCV RBC AUTO: 105.2 FL (ref 78–102)
NEUTS SEG # BLD: 1.9 K/UL (ref 1.8–9.5)
NEUTS SEG NFR BLD: 74 % (ref 40–70)
PLATELET # BLD AUTO: 323 K/UL (ref 140–440)
RBC # BLD AUTO: 2.89 M/UL (ref 4.1–5.1)
WBC # BLD AUTO: 2.6 K/UL (ref 4.5–13)

## 2019-08-08 PROCEDURE — 85025 COMPLETE CBC W/AUTO DIFF WBC: CPT

## 2019-08-08 PROCEDURE — 36591 DRAW BLOOD OFF VENOUS DEVICE: CPT

## 2019-08-08 PROCEDURE — 96402 CHEMO HORMON ANTINEOPL SQ/IM: CPT

## 2019-08-08 PROCEDURE — 77030012965 HC NDL HUBR BBMI -A

## 2019-08-08 RX ORDER — LAMOTRIGINE 25 MG/1
500 TABLET ORAL ONCE
Status: DISCONTINUED | OUTPATIENT
Start: 2019-08-08 | End: 2019-08-09 | Stop reason: HOSPADM

## 2019-08-08 RX ORDER — SODIUM CHLORIDE 0.9 % (FLUSH) 0.9 %
10-40 SYRINGE (ML) INJECTION AS NEEDED
Status: DISCONTINUED | OUTPATIENT
Start: 2019-08-08 | End: 2019-08-12 | Stop reason: HOSPADM

## 2019-08-08 RX ORDER — HEPARIN 100 UNIT/ML
500 SYRINGE INTRAVENOUS ONCE
Status: DISCONTINUED | OUTPATIENT
Start: 2019-08-08 | End: 2019-08-09 | Stop reason: HOSPADM

## 2019-08-08 NOTE — PROGRESS NOTES
[facility] Progress Note    Date: 2019    Name: Joanna Prabhakar    MRN: [id number]         : 1945     [place]  flush/Faslodex/Retacrit      Ms. Figueroa was assessed and education was provided. Ms. Moraima Graff vitals were reviewed and patient was observed for 5 minutes prior to treatment. Visit Vitals  /71 (BP 1 Location: Left arm, BP Patient Position: Sitting)   Pulse 85   Temp 97.8 °F (36.6 °C)   Resp 18   SpO2 96%   Breastfeeding? No       Left chest single lumen mediport accessed using sterile technique. Positive for blood return and flushes without difficulty. CBC drawn off after 10 mL waste. Mediport flushed with 20 mL NS and 500 units of heparin then deaccessed. Band-aid applied. Lab results were obtained and reviewed. Recent Results (from the past 12 hour(s))   CBC WITH 3 PART DIFF    Collection Time: 19  2:15 PM   Result Value Ref Range    WBC 2.6 (L) 4.5 - 13.0 K/uL    RBC 2.89 (L) 4.10 - 5.10 M/uL    HGB 10.5 (L) 12.0 - 16 g/dL    HCT 30.4 (L) 36 - 48 %    .2 (H) 78 - 102 FL    MCH 36.3 (H) 25.0 - 35.0 PG    MCHC 34.5 31 - 37 g/dL    RDW 15.8 (H) 11.5 - 14.5 %    PLATELET 254 954 - 974 K/uL    NEUTROPHILS 74 (H) 40 - 70 %    MIXED CELLS 9 0.1 - 17 %    LYMPHOCYTES 18 14 - 44 %    ABS. NEUTROPHILS 1.9 1.8 - 9.5 K/UL    ABS. MIXED CELLS 0.2 0.0 - 2.3 K/uL    ABS. LYMPHOCYTES 0.5 (L) 1.1 - 5.9 K/UL    DF AUTOMATED       Retacrit HELD today per order parameters. Faslodex 500 mg IM given in divided dose:  250 mg given in right gluteus klaudia, 250 mg given in left gluteus klaudia     Ms. Figueroa tolerated the injection, and had no complaints. Patient armband removed and shredded. Ms. Gera Craig was discharged from [address] in stable condition at 56. She is to return on 19 at 1100 for her next appointment.     Chon Ford RN  2019

## 2019-08-20 ENCOUNTER — OFFICE VISIT (OUTPATIENT)
Dept: ONCOLOGY | Age: 74
End: 2019-08-20

## 2019-08-20 ENCOUNTER — HOSPITAL ENCOUNTER (OUTPATIENT)
Dept: LAB | Age: 74
Discharge: HOME | End: 2019-08-20
Payer: MEDICARE

## 2019-08-20 VITALS
DIASTOLIC BLOOD PRESSURE: 61 MMHG | OXYGEN SATURATION: 96 % | WEIGHT: 152.4 LBS | BODY MASS INDEX: 26.02 KG/M2 | HEIGHT: 64 IN | SYSTOLIC BLOOD PRESSURE: 117 MMHG | TEMPERATURE: 98.2 F | HEART RATE: 82 BPM

## 2019-08-20 DIAGNOSIS — D64.81 ANEMIA DUE TO CHEMOTHERAPY: Primary | ICD-10-CM

## 2019-08-20 DIAGNOSIS — T45.1X5A ANEMIA DUE TO CHEMOTHERAPY: ICD-10-CM

## 2019-08-20 DIAGNOSIS — C50.919 METASTATIC BREAST CANCER (HCC): ICD-10-CM

## 2019-08-20 DIAGNOSIS — G63 NEUROPATHY ASSOCIATED WITH CANCER (HCC): ICD-10-CM

## 2019-08-20 DIAGNOSIS — D69.59 CHEMOTHERAPY-INDUCED THROMBOCYTOPENIA: ICD-10-CM

## 2019-08-20 DIAGNOSIS — T45.1X5A ANEMIA DUE TO CHEMOTHERAPY: Primary | ICD-10-CM

## 2019-08-20 DIAGNOSIS — T45.1X5A CHEMOTHERAPY-INDUCED THROMBOCYTOPENIA: ICD-10-CM

## 2019-08-20 DIAGNOSIS — C80.1 NEUROPATHY ASSOCIATED WITH CANCER (HCC): ICD-10-CM

## 2019-08-20 DIAGNOSIS — D64.81 ANEMIA DUE TO CHEMOTHERAPY: ICD-10-CM

## 2019-08-20 LAB
ALBUMIN SERPL-MCNC: 4 G/DL (ref 3.4–5)
ALBUMIN/GLOB SERPL: 1.4 {RATIO} (ref 0.8–1.7)
ALP SERPL-CCNC: 64 U/L (ref 45–117)
ALT SERPL-CCNC: 16 U/L (ref 13–56)
ANION GAP SERPL CALC-SCNC: 6 MMOL/L (ref 3–18)
AST SERPL-CCNC: 16 U/L (ref 10–38)
BASOPHILS # BLD: 0 K/UL (ref 0–0.1)
BASOPHILS NFR BLD: 1 % (ref 0–2)
BILIRUB SERPL-MCNC: 0.6 MG/DL (ref 0.2–1)
BUN SERPL-MCNC: 23 MG/DL (ref 7–18)
BUN/CREAT SERPL: 20 (ref 12–20)
CALCIUM SERPL-MCNC: 9.4 MG/DL (ref 8.5–10.1)
CHLORIDE SERPL-SCNC: 106 MMOL/L (ref 100–111)
CO2 SERPL-SCNC: 28 MMOL/L (ref 21–32)
CREAT SERPL-MCNC: 1.16 MG/DL (ref 0.6–1.3)
DIFFERENTIAL METHOD BLD: ABNORMAL
EOSINOPHIL # BLD: 0 K/UL (ref 0–0.4)
EOSINOPHIL NFR BLD: 2 % (ref 0–5)
ERYTHROCYTE [DISTWIDTH] IN BLOOD BY AUTOMATED COUNT: 16.4 % (ref 11.6–14.5)
FERRITIN SERPL-MCNC: 152 NG/ML (ref 8–388)
GLOBULIN SER CALC-MCNC: 2.9 G/DL (ref 2–4)
GLUCOSE SERPL-MCNC: 94 MG/DL (ref 74–99)
HCT VFR BLD AUTO: 32 % (ref 35–45)
HGB BLD-MCNC: 10.5 G/DL (ref 12–16)
IRON SATN MFR SERPL: 33 %
IRON SERPL-MCNC: 106 UG/DL (ref 50–175)
LYMPHOCYTES # BLD: 0.3 K/UL (ref 0.9–3.6)
LYMPHOCYTES NFR BLD: 19 % (ref 21–52)
MCH RBC QN AUTO: 34.7 PG (ref 24–34)
MCHC RBC AUTO-ENTMCNC: 32.8 G/DL (ref 31–37)
MCV RBC AUTO: 105.6 FL (ref 74–97)
MONOCYTES # BLD: 0.1 K/UL (ref 0.05–1.2)
MONOCYTES NFR BLD: 6 % (ref 3–10)
NEUTS SEG # BLD: 1.2 K/UL (ref 1.8–8)
NEUTS SEG NFR BLD: 72 % (ref 40–73)
PLATELET # BLD AUTO: 171 K/UL (ref 135–420)
PMV BLD AUTO: 11.2 FL (ref 9.2–11.8)
POTASSIUM SERPL-SCNC: 4.2 MMOL/L (ref 3.5–5.5)
PROT SERPL-MCNC: 6.9 G/DL (ref 6.4–8.2)
RBC # BLD AUTO: 3.03 M/UL (ref 4.2–5.3)
SODIUM SERPL-SCNC: 140 MMOL/L (ref 136–145)
TIBC SERPL-MCNC: 322 UG/DL (ref 250–450)
WBC # BLD AUTO: 1.7 K/UL (ref 4.6–13.2)

## 2019-08-20 PROCEDURE — 80053 COMPREHEN METABOLIC PANEL: CPT

## 2019-08-20 PROCEDURE — 82728 ASSAY OF FERRITIN: CPT

## 2019-08-20 PROCEDURE — 83540 ASSAY OF IRON: CPT

## 2019-08-20 PROCEDURE — 86300 IMMUNOASSAY TUMOR CA 15-3: CPT

## 2019-08-20 PROCEDURE — 36415 COLL VENOUS BLD VENIPUNCTURE: CPT

## 2019-08-20 PROCEDURE — 85025 COMPLETE CBC W/AUTO DIFF WBC: CPT

## 2019-08-20 NOTE — PATIENT INSTRUCTIONS

## 2019-08-20 NOTE — PROGRESS NOTES
Hematology/Oncology  Progress Note    Name: Wandy Metzger  Date: 2019  : 1945    PCP: Jia Myers MD     Ms. Marce Romero is a 68 y.o.  female who was seen for management of her metastatic breast cancer with associated complications of chemotherapy. Current therapy: Fulvestrant 500 mg subcutaneous monthly and Ibrance 125 mg by mouth daily. Subjective:     Mrs. Marce Romero is a 70-year-old woman who has slowly progressive metastatic breast cancer. She is here today for chemo follow up. She is tolerating monthly Fulvestrant fairly well. The patient has previously completed external beam radiation therapy to lesions in her ribs and more recently she completed proton therapy to areas of bone involvement with a significant benefit with regards to pain control. The posttherapy imaging studies showed a significant decrease in the intensity of uptake on the bone scan. .  Additionally she is currently receiving systemic therapy with fulvestrant and Ibrance. Currently she is tolerating the systemic therapy reasonably well and has not experienced any nausea or emesis. She is no longer complaining of mouth discomfort but she does report some fatigue and occasional weakness. She continues to have SOB intermittently. She also receives Procrit at 2 week intervals whenever her hemoglobin is below 10 g/dL and hematocrit is below 30%. Past medical history, family history, and social history: these were reviewed and remains unchanged.     Past Medical History:   Diagnosis Date    Biliary colic     Breast CA ([facility] [address] 2012    Cancer [facility])     Right Breast    Chemotherapy convalescence or palliative care     Neuropathy     Radiation therapy complication     Thyroid disease      Past Surgical History:   Procedure Laterality Date    BREAST SURGERY PROCEDURE UNLISTED  10/2002    Right-Lesion    BREAST SURGERY PROCEDURE UNLISTED  2004    Right-Lesion    HX LAP CHOLECYSTECTOMY  13    HX MASTECTOMY  1991    Right     Social History     Socioeconomic History    Marital status:      Spouse name: Not on file    Number of children: Not on file    Years of education: Not on file    Highest education level: Not on file   Occupational History    Not on file   Social Needs    Financial resource strain: Not on file    Food insecurity:     Worry: Not on file     Inability: Not on file    Transportation needs:     Medical: Not on file     Non-medical: Not on file   Tobacco Use    Smoking status: Former Smoker     Last attempt to quit: 1991     Years since quittin.2    Smokeless tobacco: Never Used   Substance and Sexual Activity    Alcohol use: Yes     Alcohol/week: 0.0 standard drinks     Types: 1 - 2 Glasses of wine per week     Comment: socially, occassionally    Drug use: No    Sexual activity: Not on file   Lifestyle    Physical activity:     Days per week: Not on file     Minutes per session: Not on file    Stress: Not on file   Relationships    Social connections:     Talks on phone: Not on file     Gets together: Not on file     Attends [employer] service: Not on file     Active member of club or organization: Not on file     Attends meetings of clubs or organizations: Not on file     Relationship status: Not on file    Intimate partner violence:     Fear of current or ex partner: Not on file     Emotionally abused: Not on file     Physically abused: Not on file     Forced sexual activity: Not on file   Other Topics Concern    Not on file   Social History Narrative    Not on file     Family History   Problem Relation Age of Onset    Cancer Maternal Aunt         Hodgkin's disease    Breast Cancer Paternal Aunt     Cancer Father         Prostate, lung, brain     Current Outpatient Medications   Medication Sig Dispense Refill    palbociclib (IBRANCE) 100 mg cap Take 1 Cap by mouth daily. For 21 days on and 7 days off every 28 days.  63 Cap 5    gabapentin (NEURONTIN) 100 mg capsule TAKE 2 CAPSULES THREE TIMES A  Cap 2    lidocaine HCl-hydrocortison ac topical cream Apply  to affected area two (2) times a day. 85 g 3    gabapentin (NEURONTIN) 300 mg capsule TAKE 1 CAPSULE THREE TIMES A  Cap 2    TETRACYCLINE HCL (TETRACYCLINE PO) Take 250 mg by mouth two (2) times a day.  fexofenadine-pseudoephedrine (ALLEGRA-D 24 HOUR) 180-240 mg per tablet Take 1 Tab by mouth daily.  sulfacetamide (BLEPH-10) 10 % ophthalmic ointment Administer  to right eye three (3) times daily.  palbociclib (IBRANCE) 125 mg cap Take 125 mg by mouth daily. Take 1 tab po every day for 21 days on and 7 days off q 28 days  Indications: HORMONE RECEPTOR(+), HER2(-) ADVANCED BREAST CANCER 42 Cap 6    ibuprofen (MOTRIN) 800 mg tablet Take 800 mg by mouth two (2) times a day. Indications: OSTEOARTHRITIS      Cholecalciferol, Vitamin D3, 5,000 unit Tab Take  by mouth daily.  thyroid, Pork, (ARMOUR THYROID) 60 mg tablet Take 90 mg by mouth daily.  L-Mfolate-B6 Phos-Methyl-B12 (NEURPATH-B) 3-35-2 mg Tab Take  by mouth two (2) times a day.  warfarin (COUMADIN) 1 mg tablet Take 1 mg by mouth daily. Review of Systems  Constitutional: The patient has no acute distress or discomfort. HEENT: The patient denies recent head trauma, eye pain, blurred vision,  hearing deficit, oropharyngeal mucosal pain or lesions, and the patient denies throat pain or discomfort. Lymphatics: The patient denies palpable peripheral lymphadenopathy. Hematologic: The patient denies having bruising, bleeding, or progressive fatigue. Respiratory: Patient denies having shortness of breath, cough, sputum production, fever, or dyspnea on exertion. Cardiovascular: The patient denies having leg pain, leg swelling, heart palpitations, chest permit, chest pain, or lightheadedness. The patient denies having dyspnea on exertion.   Gastrointestinal: The patient denies having nausea, emesis, or diarrhea. The patient denies having any hematemesis or blood in the stool. Genitourinary: Patient denies having urinary urgency, frequency, or dysuria. The patient denies having blood in the urine. Psychological: The patient denies having symptoms of nervousness, anxiety, depression, or thoughts of harming self. Skin: Patient denies having skin rashes, skin, ulcerations, or unexplained itching or pruritus. Musculoskeletal: The patient denies having pain in the joints or bones. Objective:     Visit Vitals  Blood Pressure 117/61   Pulse 82   Temperature 98.2 °F (36.8 °C)   Height 5' 4\" (1.626 m)   Weight 69.1 kg (152 lb 6.4 oz)   Oxygen Saturation 96%   Body Mass Index 26.16 kg/m²     ECOG PS=0; Pain score= 4/10    Physical Exam:   Gen. Appearance: The patient is in no acute distress. Skin: There is no bruise or rash. There is no evidence of cutaneous shingles over the lower back and flank. HEENT: The exam is unremarkable. Neck: Supple without lymphadenopathy or thyromegaly. Lungs: Clear to auscultation and percussion; there are no wheezes or rhonchi. Heart: Regular rate and rhythm; there are no murmurs, gallops, or rubs. Abdomen: Bowel sounds are present and normal.  There is no guarding, tenderness, or hepatosplenomegaly. Extremities: There is no clubbing, cyanosis, or edema. Neurologic: There are no focal neurologic deficits. Lymphatics: There is no palpable peripheral lymphadenopathy. Musculoskeletal: The patient has full range of motion at all joints. There is no evidence of joint deformity or effusions. There is no focal joint tenderness. Psychological/psychiatric: There is no clinical evidence of anxiety, depression, or melancholy.     Lab data:      Results for orders placed or performed during the hospital encounter of 08/08/19   CBC WITH 3 PART DIFF     Status: Abnormal   Result Value Ref Range Status    WBC 2.6 (L) 4.5 - 13.0 K/uL Final    RBC 2.89 (L) 4.10 - 5.10 M/uL Final    HGB 10.5 (L) 12.0 - 16 g/dL Final    HCT 30.4 (L) 36 - 48 % Final    .2 (H) 78 - 102 FL Final    MCH 36.3 (H) 25.0 - 35.0 PG Final    MCHC 34.5 31 - 37 g/dL Final    RDW 15.8 (H) 11.5 - 14.5 % Final    PLATELET 976 931 - 273 K/uL Final    NEUTROPHILS 74 (H) 40 - 70 % Final    MIXED CELLS 9 0.1 - 17 % Final    LYMPHOCYTES 18 14 - 44 % Final    ABS. NEUTROPHILS 1.9 1.8 - 9.5 K/UL Final    ABS. MIXED CELLS 0.2 0.0 - 2.3 K/uL Final    ABS. LYMPHOCYTES 0.5 (L) 1.1 - 5.9 K/UL Final     Comment: Test performed at [address] [place] [address] or Outpatient Infusion Center Location. Reviewed by Medical Director. DF AUTOMATED   Final           Assessment:     1. Anemia due to chemotherapy    2. Metastatic breast cancer ([facility] [address]    3. [facility]    4. Chemotherapy-induced thrombocytopenia      Plan:   Metastatic breast cancer: The combination of Fulvestrant 500 mg subcutaneous monthly  will be continued. Ibrance 140 mg by mouth daily will be continued as well. Clinically she is doing reasonably well. The most recent CT scans of the chest, abdomen, and pelvis showed stable findings with no new evidence of disease progression, from November 2018. Neuropathy associated with cancer: I will continue her Neurontin at 400 mg 3 times daily. Chemotherapy-induced neutropenia/chronic leukopenia (persisting problem): The current CBC shows that her WBC count is 1.9 the hemoglobin and hematocrit are 10.7 g/dL and 31.4% respectively. If the absolute neutrophil count declines to 0.5 she will be started on Neupogen or Neulasta. She will get her labs drawn once a month. Chemotherapy-induced anemia (persisting problem): The chemotherapy regimen has significantly decreased her hemoglobin. Her current hemoglobin is 10.5 g/dL with hematocrit of 30.4 %. At this time I will check iron profile and ferritin levels.   I have recommended that the patient continue taking ferrous sulfate 1 tablet daily. Procrit at a dose of 60,000 units subcutaneous will be provided now that her hemoglobin has declined below 10 g/dL hematocrit is below 30%. Chemotherapy-induced thrombocytopenia : The patient is continuing the current dose of Ibrance 140 mg daily. We have explained to her that the medication was responsible for her thrombocytopenia. The current platelet count is currently 146,000. We will continue to monitor platelet counts at 6 week intervals. Lee Roque NP  8/20/2019     I have assessed the patient independently and  agree with the full assessment as outlined.   Placido Santos MD, [place]

## 2019-08-22 ENCOUNTER — HOSPITAL ENCOUNTER (OUTPATIENT)
Dept: INFUSION THERAPY | Age: 74
Discharge: HOME | End: 2019-08-22
Payer: MEDICARE

## 2019-08-22 VITALS
DIASTOLIC BLOOD PRESSURE: 63 MMHG | TEMPERATURE: 98.5 F | HEART RATE: 77 BPM | SYSTOLIC BLOOD PRESSURE: 119 MMHG | RESPIRATION RATE: 16 BRPM | OXYGEN SATURATION: 96 %

## 2019-08-22 LAB — CANCER AG27-29 SERPL-ACNC: 52.6 U/ML (ref 0–38.6)

## 2019-08-22 PROCEDURE — 74011250636 HC RX REV CODE- 250/636: Performed by: INTERNAL MEDICINE

## 2019-08-22 PROCEDURE — 96372 THER/PROPH/DIAG INJ SC/IM: CPT

## 2019-08-22 RX ADMIN — DENOSUMAB 120 MG: 120 INJECTION SUBCUTANEOUS at 11:30

## 2019-08-22 NOTE — PROGRESS NOTES
[facility] Progress Note    Date: 2019    Name: Alen Arevalo    MRN: [id number]         : 1945      Ms. Figueroa arrived in the [facility] today at 1100, in stable condition, here for Q 2 Week CBC/Retacrit Injection & Q 4 Week Xgeva Injection & Labs. She was assessed and education was provided. Ms. Derick Rojas vitals were reviewed. Visit Vitals  /63 (BP 1 Location: Left arm, BP Patient Position: Sitting)   Pulse 77   Temp 98.5 °F (36.9 °C)   Resp 16   SpO2 96%           No labs were drawn in the [facility] today, because she just had a CBC & CMP drawn on Tuesday, 19, during her office visit with Dr. Greyson Coker. The CBC & CMP results were as follows:        Results for Karely Bowen (MRN [id number])    Ref. Range 2019 13:21   WBC Latest Ref Range: 4.6 - 13.2 K/uL 1.7 (L)   RBC Latest Ref Range: 4.20 - 5.30 M/uL 3.03 (L)   HGB Latest Ref Range: 12.0 - 16.0 g/dL 10.5 (L)   HCT Latest Ref Range: 35.0 - 45.0 % 32.0 (L)   MCV Latest Ref Range: 74.0 - 97.0 .6 (H)   MCH Latest Ref Range: 24.0 - 34.0 PG 34.7 (H)   MCHC Latest Ref Range: 31.0 - 37.0 g/dL 32.8   RDW Latest Ref Range: 11.6 - 14.5 % 16.4 (H)   PLATELET Latest Ref Range: 135 - 420 K/uL 171   MPV Latest Ref Range: 9.2 - 11.8 FL 11.2   NEUTROPHILS Latest Ref Range: 40 - 73 % 72   LYMPHOCYTES Latest Ref Range: 21 - 52 % 19 (L)   MONOCYTES Latest Ref Range: 3 - 10 % 6   EOSINOPHILS Latest Ref Range: 0 - 5 % 2   BASOPHILS Latest Ref Range: 0 - 2 % 1   DF Latest Units:   AUTOMATED   ABS. NEUTROPHILS Latest Ref Range: 1.8 - 8.0 K/UL 1.2 (L)   ABS. LYMPHOCYTES Latest Ref Range: 0.9 - 3.6 K/UL 0.3 (L)   ABS. MONOCYTES Latest Ref Range: 0.05 - 1.2 K/UL 0.1   ABS. EOSINOPHILS Latest Ref Range: 0.0 - 0.4 K/UL 0.0   ABS.  BASOPHILS Latest Ref Range: 0.0 - 0.1 K/UL 0.0   Sodium Latest Ref Range: 136 - 145 mmol/L 140   Potassium Latest Ref Range: 3.5 - 5.5 mmol/L 4.2   Chloride Latest Ref Range: 100 - 111 mmol/L 106   CO2 Latest Ref Range: 21 - 32 mmol/L 28   Anion gap Latest Ref Range: 3.0 - 18 mmol/L 6   Glucose Latest Ref Range: 74 - 99 mg/dL 94   BUN Latest Ref Range: 7.0 - 18 MG/DL 23 (H)   Creatinine Latest Ref Range: 0.6 - 1.3 MG/DL 1.16   BUN/Creatinine ratio Latest Ref Range: 12 - 20   20   Calcium Latest Ref Range: 8.5 - 10.1 MG/DL 9.4   GFR est non-AA Latest Ref Range: >60 ml/min/1.73m2 46 (L)   GFR est AA Latest Ref Range: >60 ml/min/1.73m2 56 (L)   Bilirubin, total Latest Ref Range: 0.2 - 1.0 MG/DL 0.6   Protein, total Latest Ref Range: 6.4 - 8.2 g/dL 6.9   Albumin Latest Ref Range: 3.4 - 5.0 g/dL 4.0   Globulin Latest Ref Range: 2.0 - 4.0 g/dL 2.9   A-G Ratio Latest Ref Range: 0.8 - 1.7   1.4   ALT (SGPT) Latest Ref Range: 13 - 56 U/L 16   AST Latest Ref Range: 10 - 38 U/L 16   Alk. phosphatase Latest Ref Range: 45 - 117 U/L 64   Iron Latest Ref Range: 50 - 175 ug/dL 106   TIBC Latest Ref Range: 250 - 450 ug/dL 322   Iron % saturation Latest Units: % 33   Ferritin Latest Ref Range: 8 - 388 NG/         The above CBC & CMP results were reviewed. Retacrit Injection was HELD, per order, for HGB 10.5 & HCT 32.0. Xgeva 120 mg, was administered SQ, in the back of her left arm at 1130, per order, and without incident. Ms. Deepti Freedman tolerated well, and had no complaints. Ms. Deepti Freedman was discharged from [place] [address] in stable condition at 1135. Jose Traore She is to return in 2 weeks, on Thursday, 9-5-19 at 1000,  for her next appointment, for Q 2 Week CBC/Retacrit Injection, Q 4 Week Faslodex Injections, & Monthly Port Flush. Then, her next Xgeva injection is scheduled in 4 weeks, on Thursday, 9-19-19 at 1100.  (These appointments & all future [facility] appointments will be scheduled at the [facility], per her request.)    Sylvester Martinez RN  August 22, 2019  11:24 AM

## 2019-09-05 ENCOUNTER — HOSPITAL ENCOUNTER (OUTPATIENT)
Dept: INFUSION THERAPY | Age: 74
Discharge: HOME | End: 2019-09-05
Payer: MEDICARE

## 2019-09-05 VITALS
HEART RATE: 80 BPM | SYSTOLIC BLOOD PRESSURE: 127 MMHG | TEMPERATURE: 98.2 F | RESPIRATION RATE: 18 BRPM | DIASTOLIC BLOOD PRESSURE: 70 MMHG | OXYGEN SATURATION: 96 %

## 2019-09-05 DIAGNOSIS — Z17.0 MALIGNANT NEOPLASM OF LOWER-INNER QUADRANT OF RIGHT BREAST OF FEMALE, ESTROGEN RECEPTOR POSITIVE (HCC): Primary | ICD-10-CM

## 2019-09-05 DIAGNOSIS — C50.311 MALIGNANT NEOPLASM OF LOWER-INNER QUADRANT OF RIGHT BREAST OF FEMALE, ESTROGEN RECEPTOR POSITIVE (HCC): Primary | ICD-10-CM

## 2019-09-05 DIAGNOSIS — C50.919 METASTATIC BREAST CANCER (HCC): ICD-10-CM

## 2019-09-05 LAB
BASO+EOS+MONOS # BLD AUTO: 0.2 K/UL (ref 0–2.3)
BASO+EOS+MONOS NFR BLD AUTO: 7 % (ref 0.1–17)
DIFFERENTIAL METHOD BLD: ABNORMAL
ERYTHROCYTE [DISTWIDTH] IN BLOOD BY AUTOMATED COUNT: 15.5 % (ref 11.5–14.5)
HCT VFR BLD AUTO: 27.8 % (ref 36–48)
HGB BLD-MCNC: 9.5 G/DL (ref 12–16)
LYMPHOCYTES # BLD: 0.3 K/UL (ref 1.1–5.9)
LYMPHOCYTES NFR BLD: 14 % (ref 14–44)
MCH RBC QN AUTO: 35.6 PG (ref 25–35)
MCHC RBC AUTO-ENTMCNC: 34.2 G/DL (ref 31–37)
MCV RBC AUTO: 104.1 FL (ref 78–102)
NEUTS SEG # BLD: 1.9 K/UL (ref 1.8–9.5)
NEUTS SEG NFR BLD: 79 % (ref 40–70)
PLATELET # BLD AUTO: 316 K/UL (ref 140–440)
RBC # BLD AUTO: 2.67 M/UL (ref 4.1–5.1)
WBC # BLD AUTO: 2.4 K/UL (ref 4.5–13)

## 2019-09-05 PROCEDURE — 85025 COMPLETE CBC W/AUTO DIFF WBC: CPT

## 2019-09-05 PROCEDURE — 36591 DRAW BLOOD OFF VENOUS DEVICE: CPT

## 2019-09-05 PROCEDURE — 74011250636 HC RX REV CODE- 250/636: Performed by: INTERNAL MEDICINE

## 2019-09-05 PROCEDURE — 77030012965 HC NDL HUBR BBMI -A

## 2019-09-05 PROCEDURE — 74011250636 HC RX REV CODE- 250/636: Performed by: NURSE PRACTITIONER

## 2019-09-05 PROCEDURE — 96372 THER/PROPH/DIAG INJ SC/IM: CPT

## 2019-09-05 PROCEDURE — 96402 CHEMO HORMON ANTINEOPL SQ/IM: CPT

## 2019-09-05 RX ORDER — LAMOTRIGINE 25 MG/1
500 TABLET ORAL ONCE
Status: COMPLETED | OUTPATIENT
Start: 2019-09-05 | End: 2019-09-05

## 2019-09-05 RX ORDER — HEPARIN 100 UNIT/ML
SYRINGE INTRAVENOUS
Status: DISPENSED
Start: 2019-09-05 | End: 2019-09-05

## 2019-09-05 RX ORDER — SODIUM CHLORIDE 0.9 % (FLUSH) 0.9 %
10-40 SYRINGE (ML) INJECTION AS NEEDED
Status: DISCONTINUED | OUTPATIENT
Start: 2019-09-05 | End: 2019-09-09 | Stop reason: HOSPADM

## 2019-09-05 RX ORDER — HEPARIN 100 UNIT/ML
500 SYRINGE INTRAVENOUS ONCE
Status: COMPLETED | OUTPATIENT
Start: 2019-09-05 | End: 2019-09-05

## 2019-09-05 RX ADMIN — HEPARIN 500 UNITS: 100 SYRINGE at 10:39

## 2019-09-05 RX ADMIN — FULVESTRANT 500 MG: 50 INJECTION INTRAMUSCULAR at 10:31

## 2019-09-05 RX ADMIN — Medication 10 ML: at 10:18

## 2019-09-05 RX ADMIN — EPOETIN ALFA-EPBX 60000 UNITS: 40000 INJECTION, SOLUTION INTRAVENOUS; SUBCUTANEOUS at 10:26

## 2019-09-05 NOTE — PROGRESS NOTES
[facility] Progress Note    Date: 2019    Name: Nikos Aceves    MRN: [id number]         : 1945     [facility] flush/Faslodex/Retacrit      Ms. Figueroa was assessed and education was provided. Ms. Earline More vitals were reviewed and patient was observed for 5 minutes prior to treatment. Visit Vitals  /70 (BP 1 Location: Left arm, BP Patient Position: Sitting)   Pulse 80   Temp 98.2 °F (36.8 °C)   Resp 18   SpO2 96%   Breastfeeding? No       Left chest single lumen mediport accessed using sterile technique. Positive for blood return and flushes without difficulty. CBC drawn off after 10 mL waste. Mediport flushed with 20 mL NS and 500 units of heparin then deaccessed. Band-aid applied. Lab results were obtained and reviewed. Recent Results (from the past 12 hour(s))   CBC WITH 3 PART DIFF    Collection Time: 19 10:16 AM   Result Value Ref Range    WBC 2.4 (L) 4.5 - 13.0 K/uL    RBC 2.67 (L) 4.10 - 5.10 M/uL    HGB 9.5 (L) 12.0 - 16 g/dL    HCT 27.8 (L) 36 - 48 %    .1 (H) 78 - 102 FL    MCH 35.6 (H) 25.0 - 35.0 PG    MCHC 34.2 31 - 37 g/dL    RDW 15.5 (H) 11.5 - 14.5 %    PLATELET 939 589 - 717 K/uL    NEUTROPHILS 79 (H) 40 - 70 %    MIXED CELLS 7 0.1 - 17 %    LYMPHOCYTES 14 14 - 44 %    ABS. NEUTROPHILS 1.9 1.8 - 9.5 K/UL    ABS. MIXED CELLS 0.2 0.0 - 2.3 K/uL    ABS. LYMPHOCYTES 0.3 (L) 1.1 - 5.9 K/UL    DF AUTOMATED       Retacrit 60,000 units administered SQ in the back of the left arm today per order parameters. Faslodex 500 mg IM given in divided dose:  250 mg given in right gluteus klaudia, 250 mg given in left gluteus klaudia     Ms. Figueroa tolerated the injection, and had no complaints. Patient armband removed and shredded. Ms. Jarvis Ahmadi was discharged from [place] [address] in stable condition at [phone]. She is to return on 19 at 1100 for her next appointment.     Guillaume Vidal RN  2019

## 2019-09-19 ENCOUNTER — HOSPITAL ENCOUNTER (OUTPATIENT)
Dept: INFUSION THERAPY | Age: 74
Discharge: HOME | End: 2019-09-19
Payer: MEDICARE

## 2019-09-19 VITALS
TEMPERATURE: 98.1 F | SYSTOLIC BLOOD PRESSURE: 122 MMHG | RESPIRATION RATE: 18 BRPM | HEART RATE: 78 BPM | DIASTOLIC BLOOD PRESSURE: 70 MMHG | OXYGEN SATURATION: 98 %

## 2019-09-19 LAB
ANION GAP BLD CALC-SCNC: 13 MMOL/L (ref 10–20)
BASO+EOS+MONOS # BLD AUTO: 0.2 K/UL (ref 0–2.3)
BASO+EOS+MONOS NFR BLD AUTO: 12 % (ref 0.1–17)
BUN BLD-MCNC: 26 MG/DL (ref 7–18)
CA-I BLD-MCNC: 1.21 MMOL/L (ref 1.12–1.32)
CHLORIDE BLD-SCNC: 103 MMOL/L (ref 100–108)
CO2 BLD-SCNC: 27 MMOL/L (ref 19–24)
CREAT UR-MCNC: 1.4 MG/DL (ref 0.6–1.3)
DIFFERENTIAL METHOD BLD: ABNORMAL
ERYTHROCYTE [DISTWIDTH] IN BLOOD BY AUTOMATED COUNT: 17 % (ref 11.5–14.5)
GLUCOSE BLD STRIP.AUTO-MCNC: 96 MG/DL (ref 74–106)
HCT VFR BLD AUTO: 29.5 % (ref 36–48)
HCT VFR BLD CALC: 28 % (ref 36–49)
HGB BLD-MCNC: 10.3 G/DL (ref 12–16)
HGB BLD-MCNC: 9.5 G/DL (ref 12–16)
LYMPHOCYTES # BLD: 0.3 K/UL (ref 1.1–5.9)
LYMPHOCYTES NFR BLD: 16 % (ref 14–44)
MCH RBC QN AUTO: 36.7 PG (ref 25–35)
MCHC RBC AUTO-ENTMCNC: 34.9 G/DL (ref 31–37)
MCV RBC AUTO: 105 FL (ref 78–102)
NEUTS SEG # BLD: 1.5 K/UL (ref 1.8–9.5)
NEUTS SEG NFR BLD: 72 % (ref 40–70)
PLATELET # BLD AUTO: 155 K/UL (ref 140–440)
POTASSIUM BLD-SCNC: 4.3 MMOL/L (ref 3.5–5.5)
RBC # BLD AUTO: 2.81 M/UL (ref 4.1–5.1)
SODIUM BLD-SCNC: 138 MMOL/L (ref 136–145)
WBC # BLD AUTO: 2 K/UL (ref 4.5–13)

## 2019-09-19 PROCEDURE — 74011250636 HC RX REV CODE- 250/636: Performed by: INTERNAL MEDICINE

## 2019-09-19 PROCEDURE — 80047 BASIC METABLC PNL IONIZED CA: CPT

## 2019-09-19 PROCEDURE — 96372 THER/PROPH/DIAG INJ SC/IM: CPT

## 2019-09-19 PROCEDURE — 85025 COMPLETE CBC W/AUTO DIFF WBC: CPT

## 2019-09-19 RX ADMIN — DENOSUMAB 120 MG: 120 INJECTION SUBCUTANEOUS at 11:50

## 2019-09-19 NOTE — PROGRESS NOTES
[facility] Progress Note    Date: 2019    Name: Alen Arevalo    MRN: [id number]         : 1945      Ms. Figueroa arrived in the [facility] today at 1120, in stable condition, here for Q 2 Week CBC/Retacrit Injection & Q 4 Week Xgeva Injection & Labs. She was assessed and education was provided. Ms. Derick Rojas vitals were reviewed. Visit Vitals  /70 (BP 1 Location: Left arm, BP Patient Position: At rest)   Pulse 78   Temp 98.1 °F (36.7 °C)   Resp 18   SpO2 98%           Labs drawn to include CBC & CMP. Mag and phos sent to lab. Results for Karely Bowen (MRN [id number])    Ref. Range 2019 13:21   WBC Latest Ref Range: 4.6 - 13.2 K/uL 1.7 (L)   RBC Latest Ref Range: 4.20 - 5.30 M/uL 3.03 (L)   HGB Latest Ref Range: 12.0 - 16.0 g/dL 10.5 (L)   HCT Latest Ref Range: 35.0 - 45.0 % 32.0 (L)   MCV Latest Ref Range: 74.0 - 97.0 .6 (H)   MCH Latest Ref Range: 24.0 - 34.0 PG 34.7 (H)   MCHC Latest Ref Range: 31.0 - 37.0 g/dL 32.8   RDW Latest Ref Range: 11.6 - 14.5 % 16.4 (H)   PLATELET Latest Ref Range: 135 - 420 K/uL 171   MPV Latest Ref Range: 9.2 - 11.8 FL 11.2   NEUTROPHILS Latest Ref Range: 40 - 73 % 72   LYMPHOCYTES Latest Ref Range: 21 - 52 % 19 (L)   MONOCYTES Latest Ref Range: 3 - 10 % 6   EOSINOPHILS Latest Ref Range: 0 - 5 % 2   BASOPHILS Latest Ref Range: 0 - 2 % 1   DF Latest Units:   AUTOMATED   ABS. NEUTROPHILS Latest Ref Range: 1.8 - 8.0 K/UL 1.2 (L)   ABS. LYMPHOCYTES Latest Ref Range: 0.9 - 3.6 K/UL 0.3 (L)   ABS. MONOCYTES Latest Ref Range: 0.05 - 1.2 K/UL 0.1   ABS. EOSINOPHILS Latest Ref Range: 0.0 - 0.4 K/UL 0.0   ABS.  BASOPHILS Latest Ref Range: 0.0 - 0.1 K/UL 0.0   Sodium Latest Ref Range: 136 - 145 mmol/L 140   Potassium Latest Ref Range: 3.5 - 5.5 mmol/L 4.2   Chloride Latest Ref Range: 100 - 111 mmol/L 106   CO2 Latest Ref Range: 21 - 32 mmol/L 28   Anion gap Latest Ref Range: 3.0 - 18 mmol/L 6   Glucose Latest Ref Range: 74 - 99 mg/dL 94   BUN Latest Ref Range: 7.0 - 18 MG/DL 23 (H)   Creatinine Latest Ref Range: 0.6 - 1.3 MG/DL 1.16   BUN/Creatinine ratio Latest Ref Range: 12 - 20   20   Calcium Latest Ref Range: 8.5 - 10.1 MG/DL 9.4   GFR est non-AA Latest Ref Range: >60 ml/min/1.73m2 46 (L)   GFR est AA Latest Ref Range: >60 ml/min/1.73m2 56 (L)   Bilirubin, total Latest Ref Range: 0.2 - 1.0 MG/DL 0.6   Protein, total Latest Ref Range: 6.4 - 8.2 g/dL 6.9   Albumin Latest Ref Range: 3.4 - 5.0 g/dL 4.0   Globulin Latest Ref Range: 2.0 - 4.0 g/dL 2.9   A-G Ratio Latest Ref Range: 0.8 - 1.7   1.4   ALT (SGPT) Latest Ref Range: 13 - 56 U/L 16   AST Latest Ref Range: 10 - 38 U/L 16   Alk. phosphatase Latest Ref Range: 45 - 117 U/L 64   Iron Latest Ref Range: 50 - 175 ug/dL 106   TIBC Latest Ref Range: 250 - 450 ug/dL 322   Iron % saturation Latest Units: % 33   Ferritin Latest Ref Range: 8 - 388 NG/         The above CBC & CMP results were reviewed. Retacrit Injection was HELD, per order        Xgeva 120 mg, was administered SQ, in the back of her left arm  per order, and without incident. Ms. Florence Sung tolerated well, and had no complaints. Ms. Florence Sung was discharged from [place] [address] in stable condition at 1135. Taylor Dumas She is to return on 10/3/19 @x 1300 for her next appointment.     Gail Stevens, RN  September 19, 2019

## 2019-10-02 ENCOUNTER — OFFICE VISIT (OUTPATIENT)
Dept: ONCOLOGY | Age: 74
End: 2019-10-02

## 2019-10-02 VITALS
HEIGHT: 64 IN | DIASTOLIC BLOOD PRESSURE: 72 MMHG | HEART RATE: 80 BPM | BODY MASS INDEX: 26.12 KG/M2 | WEIGHT: 153 LBS | SYSTOLIC BLOOD PRESSURE: 134 MMHG | TEMPERATURE: 97.9 F | RESPIRATION RATE: 12 BRPM

## 2019-10-02 DIAGNOSIS — D72.819 CHRONIC LEUKOPENIA: ICD-10-CM

## 2019-10-02 DIAGNOSIS — T45.1X5A CHEMOTHERAPY INDUCED NEUTROPENIA (HCC): ICD-10-CM

## 2019-10-02 DIAGNOSIS — T45.1X5A ANEMIA DUE TO CHEMOTHERAPY: Primary | ICD-10-CM

## 2019-10-02 DIAGNOSIS — C50.919 METASTATIC BREAST CANCER (HCC): ICD-10-CM

## 2019-10-02 DIAGNOSIS — D70.1 CHEMOTHERAPY INDUCED NEUTROPENIA (HCC): ICD-10-CM

## 2019-10-02 DIAGNOSIS — E55.9 VITAMIN D DEFICIENCY: ICD-10-CM

## 2019-10-02 DIAGNOSIS — D64.81 ANEMIA DUE TO CHEMOTHERAPY: Primary | ICD-10-CM

## 2019-10-02 NOTE — PATIENT INSTRUCTIONS
Breast Cancer: Care Instructions  Your Care Instructions    Breast cancer occurs when abnormal cells grow out of control in the breast. These cancer cells can spread within the breast, to nearby lymph nodes and other tissues, and to other parts of the body. Being treated for cancer can weaken your body, and you may feel very tired. Get the rest your body needs so you can feel better. Finding out that you have cancer is scary. You may feel many emotions and may need some help coping. Seek out family, friends, and counselors for support. You also can do things at home to make yourself feel better while you go through treatment. Call the [employer] (2-960.612.6004) or visit its website at [employer] for more information. Follow-up care is a key part of your treatment and safety. Be sure to make and go to all appointments, and call your doctor if you are having problems. It's also a good idea to know your test results and keep a list of the medicines you take. How can you care for yourself at home? · Take your medicines exactly as prescribed. Call your doctor if you think you are having a problem with your medicine. You may get medicine for nausea and vomiting if you have these side effects. · Follow your doctor's instructions to relieve pain. Pain from cancer and surgery can almost always be controlled. Use pain medicine when you first notice pain, before it becomes severe. · Eat healthy food. If you do not feel like eating, try to eat food that has protein and extra calories to keep up your strength and prevent weight loss. Drink liquid meal replacements for extra calories and protein. Try to eat your main meal early. · Get some physical activity every day, but do not get too tired. Keep doing the hobbies you enjoy as your energy allows. · Do not smoke. Smoking can make your cancer worse. If you need help quitting, talk to your doctor about stop-smoking programs and medicines.  These can increase your chances of quitting for good. · Take steps to control your stress and workload. Learn relaxation techniques. ? Share your feelings. Stress and tension affect our emotions. By expressing your feelings to others, you may be able to understand and cope with them. ? Consider joining a support group. Talking about a problem with your spouse, a good friend, or other people with similar problems is a good way to reduce tension and stress. ? Express yourself through art. Try writing, crafts, dance, or art to relieve stress. Some dance, writing, or art groups may be available just for people who have cancer. ? Be kind to your body and mind. Getting enough sleep, eating a healthy diet, and taking time to do things you enjoy can contribute to an overall feeling of balance in your life and can help reduce stress. ? Get help if you need it. Discuss your concerns with your doctor or counselor. · If you are vomiting or have diarrhea:  ? Drink plenty of fluids (enough so that your urine is light yellow or clear like water) to prevent dehydration. Choose water and other caffeine-free clear liquids. If you have kidney, heart, or liver disease and have to limit fluids, talk with your doctor before you increase the amount of fluids you drink. ? When you are able to eat, try clear soups, mild foods, and liquids until all symptoms are gone for 12 to 48 hours. Other good choices include dry toast, crackers, cooked cereal, and gelatin dessert, such as Jell-O.  · If you have not already done so, prepare a list of advance directives. Advance directives are instructions to your doctor and family members about what kind of care you want if you become unable to speak or express yourself. When should you call for help? Call 911 anytime you think you may need emergency care.  For example, call if:    · You passed out (lost consciousness).    Call your doctor now or seek immediate medical care if:    · You have a fever.     · You have abnormal bleeding.     · You think you have an infection.     · You have new or worse pain.     · You have new symptoms, such as a cough, belly pain, vomiting, diarrhea, or a rash.    Watch closely for changes in your health, and be sure to contact your doctor if:    · You are much more tired than usual.     · You have swollen glands in your armpits, groin, or neck.     · You do not get better as expected. Where can you learn more? Go to http://mandie-elizabeth.info/. Enter V321 in the search box to learn more about \"Breast Cancer: Care Instructions. \"  Current as of: December 19, 2018  Content Version: 12.2  © 1571-4782 [employer]. Care instructions adapted under license by [employer] (which disclaims liability or warranty for this information). If you have questions about a medical condition or this instruction, always ask your healthcare professional. [address] any warranty or liability for your use of this information. Neutropenia: Care Instructions  Your Care Instructions  Neutropenia (say \"trr-uwdz-VQL-nee-uh\") means that your blood has too few neutrophils. These are white blood cells that help protect the body from infection. They do this by killing bacteria. Neutropenia can be caused by some types of infection. It also can be caused by immune system conditions such as HIV or lupus, a lack of vitamin O75 or folic acid, or an enlarged spleen. Some medicines can cause it too. It is most often caused by treatments for certain health problems, such as chemotherapy and radiation treatment for cancer. Mild neutropenia usually causes no symptoms. But when it's severe, it increases the risk of infection of your skin and organs. That's because your body can't fight off germs as well as it should. Follow-up care is a key part of your treatment and safety.  Be sure to make and go to all appointments, and call your doctor if you are having problems. It's also a good idea to know your test results and keep a list of the medicines you take. How can you care for yourself at home? · Take your medicines exactly as prescribed. Call your doctor if you have any problems with your medicine. · Eat a healthy, balanced diet. Eat foods with a lot of fiber. This helps to prevent constipation. Prevent infections  · Take your temperature several times a day, as your doctor suggests. Keep a written record of your temperature readings. Fever is a common symptom of infection. And it may be the only symptom. · Use a soft toothbrush. Do not floss your teeth. Talk with your doctor about other steps to prevent infections in your mouth. · Wash your hands often with soap and water, especially before you eat and after you use the bathroom. · If you are a woman, use sanitary napkins (pads) instead of tampons. Do not douche. · Do not use rectal thermometers or suppositories. · Avoid tasks that might expose you to germs, such as disposing of pet feces or urine. · Avoid crowds of people and anyone who might have an infection or an illness such as a cold or the flu. You may need to avoid people who have recently had certain kinds of vaccinations. · Even small injuries can get infected. Take steps to prevent cuts, burns, and sunburns. · If you have severe neutropenia, your doctor may advise you to avoid fresh fruits, vegetables, and flowers. When should you call for help? Call 911 anytime you think you may need emergency care. For example, call if:    · You have severe shortness of breath.     · You passed out (lost consciousness).    Call your doctor now or seek immediate medical care if:    · You have signs of infection, such as:  ? Increased pain, swelling, warmth, or redness of your skin. ? Red streaks leading from a wound. ? Pus draining from a wound.   ? A fever.    Watch closely for changes in your health, and be sure to contact your doctor if:    · You do not get better as expected. Where can you learn more? Go to http://mandie-elizabeth.info/. Enter S308 in the search box to learn more about \"Neutropenia: Care Instructions. \"  Current as of: March 28, 2019  Content Version: 12.2  © 3167-3518 [employer]. Care instructions adapted under license by [employer] (which disclaims liability or warranty for this information). If you have questions about a medical condition or this instruction, always ask your healthcare professional. [address] any warranty or liability for your use of this information.

## 2019-10-02 NOTE — PROGRESS NOTES
ROOM # 3    Alen Arevalo presents today for   Chief Complaint   Patient presents with    Anemia     Visit Vitals  /72   Pulse 80   Temp 97.9 °F (36.6 °C) (Oral)   Resp 12   Ht 5' 4\" (1.626 m)   Wt 153 lb (69.4 kg)   BMI 26.26 kg/m²         Alen Arevalo preferred language for health care discussion is english/other. Is someone accompanying this pt? no    Is the patient using any DME equipment during OV? no    Depression Screening:  3 most recent [facility] Screens 8/20/2019 7/16/2019 7/2/2019 3/29/2019 12/18/2018 7/17/2018 4/13/2018   Little interest or pleasure in doing things Not at all Not at all Not at all Not at all - Not at all Not at all   Feeling down, depressed, irritable, or hopeless Not at all Several days Several days Not at all Several days Not at all Not at all   Total Score PHQ 2 0 1 1 0 - 0 0       Learning Assessment:  Learning Assessment 6/8/2018 6/2/2017 11/12/2015   PRIMARY LEARNER Patient Patient Patient   BARRIERS PRIMARY LEARNER NONE NONE -   [facility]   LEARNER PREFERENCE PRIMARY DEMONSTRATION DEMONSTRATION LISTENING     - READING -   ANSWERED BY self self patient   RELATIONSHIP SELF SELF SELF       Abuse Screening:  Abuse Screening Questionnaire 8/20/2019 7/2/2019 3/29/2019 4/13/2018   Do you ever feel afraid of your partner? N N N N   Are you in a relationship with someone who physically or mentally threatens you? N N N N   Is it safe for you to go home? Raji Wilson       Fall Risk  Fall Risk Assessment, last 12 mths 8/20/2019 7/16/2019 7/2/2019 3/29/2019 12/18/2018 7/17/2018 4/13/2018   Able to walk? Yes Yes Yes Yes Yes Yes Yes   Fall in past 12 months? No No No No - No No       Health Maintenance reviewed and discussed per provider.  Yes    Alen Arevalo is due for   Health Maintenance Due   Topic Date Due    Hepatitis C Screening  1945    DTaP/Tdap/Td series (1 - Tdap) 10/18/1966    Shingrix Vaccine Age 50> (1 of 2) 10/18/1995    FOBT Q 1 YEAR AGE 50-75  10/18/1995    GLAUCOMA SCREENING Q2Y  10/18/2010    Pneumococcal 65+ years (1 of 2 - PCV13) 10/18/2010    MEDICARE YEARLY EXAM  03/14/2018    Influenza Age 9 to Adult  08/01/2019         Please order/place referral if appropriate. Advance Directive:  1. Do you have an advance directive in place? Patient Reply: no    2. If not, would you like material regarding how to put one in place? Patient Reply: no    Coordination of Care:  1. Have you been to the ER, urgent care clinic since your last visit? Hospitalized since your last visit? no    2. Have you seen or consulted any other health care providers outside of the [address] [place] [address] since your last visit? Include any pap smears or colon screening.  no

## 2019-10-03 ENCOUNTER — HOSPITAL ENCOUNTER (OUTPATIENT)
Dept: INFUSION THERAPY | Age: 74
Discharge: HOME | End: 2019-10-03
Payer: MEDICARE

## 2019-10-03 VITALS
RESPIRATION RATE: 18 BRPM | TEMPERATURE: 97.9 F | OXYGEN SATURATION: 97 % | SYSTOLIC BLOOD PRESSURE: 133 MMHG | HEART RATE: 86 BPM | DIASTOLIC BLOOD PRESSURE: 66 MMHG

## 2019-10-03 DIAGNOSIS — Z17.0 MALIGNANT NEOPLASM OF LOWER-INNER QUADRANT OF RIGHT BREAST OF FEMALE, ESTROGEN RECEPTOR POSITIVE (HCC): Primary | ICD-10-CM

## 2019-10-03 DIAGNOSIS — C50.311 MALIGNANT NEOPLASM OF LOWER-INNER QUADRANT OF RIGHT BREAST OF FEMALE, ESTROGEN RECEPTOR POSITIVE (HCC): Primary | ICD-10-CM

## 2019-10-03 DIAGNOSIS — C50.919 METASTATIC BREAST CANCER (HCC): ICD-10-CM

## 2019-10-03 LAB
ALBUMIN SERPL-MCNC: 3.5 G/DL (ref 3.4–5)
ALBUMIN/GLOB SERPL: 1.1 {RATIO} (ref 0.8–1.7)
ALP SERPL-CCNC: 58 U/L (ref 45–117)
ALT SERPL-CCNC: 18 U/L (ref 13–56)
ANION GAP SERPL CALC-SCNC: 6 MMOL/L (ref 3–18)
AST SERPL-CCNC: 18 U/L (ref 10–38)
BASO+EOS+MONOS # BLD AUTO: 0.2 K/UL (ref 0–2.3)
BASO+EOS+MONOS NFR BLD AUTO: 7 % (ref 0.1–17)
BILIRUB SERPL-MCNC: 0.5 MG/DL (ref 0.2–1)
BUN SERPL-MCNC: 17 MG/DL (ref 7–18)
BUN/CREAT SERPL: 15 (ref 12–20)
CALCIUM SERPL-MCNC: 8.9 MG/DL (ref 8.5–10.1)
CHLORIDE SERPL-SCNC: 108 MMOL/L (ref 100–111)
CO2 SERPL-SCNC: 26 MMOL/L (ref 21–32)
CREAT SERPL-MCNC: 1.13 MG/DL (ref 0.6–1.3)
DIFFERENTIAL METHOD BLD: ABNORMAL
ERYTHROCYTE [DISTWIDTH] IN BLOOD BY AUTOMATED COUNT: 16 % (ref 11.5–14.5)
FERRITIN SERPL-MCNC: 88 NG/ML (ref 8–388)
GLOBULIN SER CALC-MCNC: 3.3 G/DL (ref 2–4)
GLUCOSE SERPL-MCNC: 120 MG/DL (ref 74–99)
HCT VFR BLD AUTO: 29.5 % (ref 36–48)
HGB BLD-MCNC: 10.1 G/DL (ref 12–16)
IRON SATN MFR SERPL: 25 %
IRON SERPL-MCNC: 75 UG/DL (ref 50–175)
LYMPHOCYTES # BLD: 0.3 K/UL (ref 1.1–5.9)
LYMPHOCYTES NFR BLD: 13 % (ref 14–44)
MAGNESIUM SERPL-MCNC: 1.6 MG/DL (ref 1.6–2.6)
MCH RBC QN AUTO: 35.7 PG (ref 25–35)
MCHC RBC AUTO-ENTMCNC: 34.2 G/DL (ref 31–37)
MCV RBC AUTO: 104.2 FL (ref 78–102)
NEUTS SEG # BLD: 1.9 K/UL (ref 1.8–9.5)
NEUTS SEG NFR BLD: 80 % (ref 40–70)
PLATELET # BLD AUTO: 301 K/UL (ref 140–440)
POTASSIUM SERPL-SCNC: 3.8 MMOL/L (ref 3.5–5.5)
PROT SERPL-MCNC: 6.8 G/DL (ref 6.4–8.2)
RBC # BLD AUTO: 2.83 M/UL (ref 4.1–5.1)
SODIUM SERPL-SCNC: 140 MMOL/L (ref 136–145)
TIBC SERPL-MCNC: 304 UG/DL (ref 250–450)
WBC # BLD AUTO: 2.4 K/UL (ref 4.5–13)

## 2019-10-03 PROCEDURE — 82728 ASSAY OF FERRITIN: CPT

## 2019-10-03 PROCEDURE — 86304 IMMUNOASSAY TUMOR CA 125: CPT

## 2019-10-03 PROCEDURE — 36591 DRAW BLOOD OFF VENOUS DEVICE: CPT

## 2019-10-03 PROCEDURE — 96402 CHEMO HORMON ANTINEOPL SQ/IM: CPT

## 2019-10-03 PROCEDURE — 77030012965 HC NDL HUBR BBMI -A

## 2019-10-03 PROCEDURE — 74011250636 HC RX REV CODE- 250/636: Performed by: INTERNAL MEDICINE

## 2019-10-03 PROCEDURE — 83540 ASSAY OF IRON: CPT

## 2019-10-03 PROCEDURE — 96523 IRRIG DRUG DELIVERY DEVICE: CPT

## 2019-10-03 PROCEDURE — 74011250636 HC RX REV CODE- 250/636

## 2019-10-03 PROCEDURE — 85025 COMPLETE CBC W/AUTO DIFF WBC: CPT

## 2019-10-03 PROCEDURE — 80053 COMPREHEN METABOLIC PANEL: CPT

## 2019-10-03 PROCEDURE — 83735 ASSAY OF MAGNESIUM: CPT

## 2019-10-03 RX ORDER — HEPARIN 100 UNIT/ML
SYRINGE INTRAVENOUS
Status: COMPLETED
Start: 2019-10-03 | End: 2019-10-03

## 2019-10-03 RX ORDER — HEPARIN SODIUM (PORCINE) LOCK FLUSH IV SOLN 100 UNIT/ML 100 UNIT/ML
500 SOLUTION INTRAVENOUS AS NEEDED
Status: DISCONTINUED | OUTPATIENT
Start: 2019-10-03 | End: 2019-10-04 | Stop reason: HOSPADM

## 2019-10-03 RX ORDER — SODIUM CHLORIDE 0.9 % (FLUSH) 0.9 %
10-40 SYRINGE (ML) INJECTION AS NEEDED
Status: DISCONTINUED | OUTPATIENT
Start: 2019-10-03 | End: 2019-10-07 | Stop reason: HOSPADM

## 2019-10-03 RX ORDER — LAMOTRIGINE 25 MG/1
500 TABLET ORAL ONCE
Status: COMPLETED | OUTPATIENT
Start: 2019-10-03 | End: 2019-10-03

## 2019-10-03 RX ADMIN — Medication 10 ML: at 13:45

## 2019-10-03 RX ADMIN — SODIUM CHLORIDE, PRESERVATIVE FREE 500 UNITS: 5 INJECTION INTRAVENOUS at 13:50

## 2019-10-03 RX ADMIN — FULVESTRANT 500 MG: 50 INJECTION INTRAMUSCULAR at 13:36

## 2019-10-03 NOTE — PROGRESS NOTES
[facility] Progress Note    Date: October 3, 2019    Name: Nannette Voss    MRN: [id number]         : 1945      Ms. Figueroa arrived in the [facility] today, at 1305, in stable condition, here for Q 2 Week, CBC/Retacrit injection, Q 4 Week Faslodex Injections, and Monthly Port Flush. She was assessed and education was provided. Ms. Maxwell Zhu vitals were reviewed. Visit Vitals  /66 (BP 1 Location: Left arm, BP Patient Position: At rest)   Pulse 86   Temp 97.9 °F (36.6 °C)   Resp 18   SpO2 97%               Her left chest single lumen port was accessed without incident at 1205, and blood was drawn from the port for a CBC, iron, mag, ca-125, ferritin, cmp  per order. And then, the port was flushed well per protocol and without incident with NS and Heparin, and the carbone needle was removed and gauze/bandaid was applied. The CBC results from today, were as follows:    Recent Results (from the past 12 hour(s))   CBC WITH 3 PART DIFF    Collection Time: 10/03/19  1:40 PM   Result Value Ref Range    WBC 2.4 (L) 4.5 - 13.0 K/uL    RBC 2.83 (L) 4.10 - 5.10 M/uL    HGB 10.1 (L) 12.0 - 16 g/dL    HCT 29.5 (L) 36 - 48 %    .2 (H) 78 - 102 FL    MCH 35.7 (H) 25.0 - 35.0 PG    MCHC 34.2 31 - 37 g/dL    RDW 16.0 (H) 11.5 - 14.5 %    PLATELET 889 854 - 551 K/uL    NEUTROPHILS 80 (H) 40 - 70 %    MIXED CELLS 7 0.1 - 17 %    LYMPHOCYTES 13 (L) 14 - 44 %    ABS. NEUTROPHILS 1.9 1.8 - 9.5 K/UL    ABS. MIXED CELLS 0.2 0.0 - 2.3 K/uL    ABS. LYMPHOCYTES 0.3 (L) 1.1 - 5.9 K/UL    DF AUTOMATED               Retacrit injection was HELD today per order. Faslodex 500 mg Total Dose, was administered IM, in 2 equally divided doses of 250 mg each, at 1230, per order, and without incident. (250 mg was administered IM, in her right gluteal muscle, and 250 mg was administered IM, in her left gluteal muscle)           Ms. Giulia Harris tolerated well, and had no complaints.     [facility]. Giulia [facility] at 1315. .. She  is to return in 2 weeks, 10/17/19 at 1300, for her next [facility] appointment,  for CBC/Retacrit injection, and Q 4 Week, Xgeva Injection. And then, she is scheduled to return in 4 weeks,, for her port flush, and her next Faslodex Injections.      Leah Rojas RN  October 3, 2019

## 2019-10-04 LAB — CANCER AG125 SERPL-ACNC: 6 U/ML (ref 1.5–35)

## 2019-10-17 ENCOUNTER — HOSPITAL ENCOUNTER (OUTPATIENT)
Dept: INFUSION THERAPY | Age: 74
Discharge: HOME | End: 2019-10-17
Payer: MEDICARE

## 2019-10-17 ENCOUNTER — APPOINTMENT (OUTPATIENT)
Dept: INFUSION THERAPY | Age: 74
End: 2019-10-17
Payer: MEDICARE

## 2019-10-17 VITALS
RESPIRATION RATE: 20 BRPM | TEMPERATURE: 97.9 F | DIASTOLIC BLOOD PRESSURE: 62 MMHG | HEART RATE: 76 BPM | SYSTOLIC BLOOD PRESSURE: 145 MMHG | OXYGEN SATURATION: 99 %

## 2019-10-17 DIAGNOSIS — C50.919 METASTATIC BREAST CANCER (HCC): Primary | ICD-10-CM

## 2019-10-17 LAB
ANION GAP BLD CALC-SCNC: 16 MMOL/L (ref 10–20)
BASO+EOS+MONOS # BLD AUTO: 0.2 K/UL (ref 0–2.3)
BASO+EOS+MONOS NFR BLD AUTO: 7 % (ref 0.1–17)
BUN BLD-MCNC: 22 MG/DL (ref 7–18)
CA-I BLD-MCNC: 1.25 MMOL/L (ref 1.12–1.32)
CHLORIDE BLD-SCNC: 105 MMOL/L (ref 100–108)
CO2 BLD-SCNC: 24 MMOL/L (ref 19–24)
CREAT UR-MCNC: 1.2 MG/DL (ref 0.6–1.3)
DIFFERENTIAL METHOD BLD: ABNORMAL
ERYTHROCYTE [DISTWIDTH] IN BLOOD BY AUTOMATED COUNT: 16.5 % (ref 11.5–14.5)
GLUCOSE BLD STRIP.AUTO-MCNC: 106 MG/DL (ref 74–106)
HCT VFR BLD AUTO: 31.1 % (ref 36–48)
HCT VFR BLD CALC: 31 % (ref 36–49)
HGB BLD-MCNC: 10.5 G/DL (ref 12–16)
HGB BLD-MCNC: 10.8 G/DL (ref 12–16)
LYMPHOCYTES # BLD: 0.4 K/UL (ref 1.1–5.9)
LYMPHOCYTES NFR BLD: 17 % (ref 14–44)
MCH RBC QN AUTO: 36.1 PG (ref 25–35)
MCHC RBC AUTO-ENTMCNC: 34.7 G/DL (ref 31–37)
MCV RBC AUTO: 104 FL (ref 78–102)
NEUTS SEG # BLD: 1.9 K/UL (ref 1.8–9.5)
NEUTS SEG NFR BLD: 77 % (ref 40–70)
PLATELET # BLD AUTO: 203 K/UL (ref 140–440)
POTASSIUM BLD-SCNC: 4.1 MMOL/L (ref 3.5–5.5)
RBC # BLD AUTO: 2.99 M/UL (ref 4.1–5.1)
SODIUM BLD-SCNC: 140 MMOL/L (ref 136–145)
WBC # BLD AUTO: 2.5 K/UL (ref 4.5–13)

## 2019-10-17 PROCEDURE — 74011250636 HC RX REV CODE- 250/636: Performed by: INTERNAL MEDICINE

## 2019-10-17 PROCEDURE — 36415 COLL VENOUS BLD VENIPUNCTURE: CPT

## 2019-10-17 PROCEDURE — 96372 THER/PROPH/DIAG INJ SC/IM: CPT

## 2019-10-17 PROCEDURE — 80047 BASIC METABLC PNL IONIZED CA: CPT

## 2019-10-17 PROCEDURE — 85025 COMPLETE CBC W/AUTO DIFF WBC: CPT

## 2019-10-17 RX ADMIN — DENOSUMAB 120 MG: 120 INJECTION SUBCUTANEOUS at 13:38

## 2019-10-17 NOTE — PROGRESS NOTES
[facility] Progress Note    Date: 2019    Name: Stephanie Helm    MRN: [id number]         : 1945      Ms. Figueroa arrived in the [facility] today at 1320, in stable condition, here for Q 2 Week CBC/Retacrit Injection & Q 4 Week Xgeva Injection & Labs. She was assessed and education was provided. Ms. Mel Chavez vitals were reviewed. Visit Vitals  /62 (BP 1 Location: Left arm, BP Patient Position: At rest)   Pulse 76   Temp 97.9 °F (36.6 °C)   Resp 20   SpO2 99%     CBC and I-stat drawn from left ac x1 attempt. 2x2 and bandaid applied. Recent Results (from the past 8 hour(s))   CBC WITH 3 PART DIFF    Collection Time: 10/17/19  1:30 PM   Result Value Ref Range    WBC 2.5 (L) 4.5 - 13.0 K/uL    RBC 2.99 (L) 4.10 - 5.10 M/uL    HGB 10.8 (L) 12.0 - 16 g/dL    HCT 31.1 (L) 36 - 48 %    .0 (H) 78 - 102 FL    MCH 36.1 (H) 25.0 - 35.0 PG    MCHC 34.7 31 - 37 g/dL    RDW 16.5 (H) 11.5 - 14.5 %    PLATELET 017 838 - 123 K/uL    NEUTROPHILS 77 (H) 40 - 70 %    MIXED CELLS 7 0.1 - 17 %    LYMPHOCYTES 17 14 - 44 %    ABS. NEUTROPHILS 1.9 1.8 - 9.5 K/UL    ABS. MIXED CELLS 0.2 0.0 - 2.3 K/uL    ABS. LYMPHOCYTES 0.4 (L) 1.1 - 5.9 K/UL    DF AUTOMATED     POC CHEM8    Collection Time: 10/17/19  1:34 PM   Result Value Ref Range    CO2, POC 24 19 - 24 MMOL/L    Glucose,  74 - 106 MG/DL    BUN, POC 22 (H) 7 - 18 MG/DL    Creatinine, POC 1.2 0.6 - 1.3 MG/DL    GFRAA, POC 53 (L) >60 ml/min/1.73m2    GFRNA, POC 44 (L) >60 ml/min/1.73m2    Sodium,  136 - 145 MMOL/L    Potassium, POC 4.1 3.5 - 5.5 MMOL/L    Calcium, ionized (POC) 1.25 1.12 - 1.32 mmol/L    Chloride,  100 - 108 MMOL/L    Anion gap, POC 16 10 - 20      Hematocrit, POC 31 (L) 36 - 49 %    Hemoglobin, POC 10.5 (L) 12 - 16 G/DL     Retacrit Injection was HELD, per order, for HGB 10.58& HCT 31.1. Xgeva 120 mg, was administered SQ, in the back of her left arm per order and without incident.      Ms. Vargas Oas tolerated well, and had no complaints. Ms. Gilbert Graff was discharged from [place] [address] in stable condition at 1340. She is to return in 2 weeks, on Thursday, 10-31-19 at 1000,  for her next appointment, for Q 2 Week CBC/Retacrit Injection, Q 4 Week Faslodex Injections, & Monthly Port Flush.      Zion Fields RN  October 17, 2019

## 2019-10-31 ENCOUNTER — HOSPITAL ENCOUNTER (OUTPATIENT)
Dept: INFUSION THERAPY | Age: 74
Discharge: HOME | End: 2019-10-31
Payer: MEDICARE

## 2019-10-31 VITALS
DIASTOLIC BLOOD PRESSURE: 73 MMHG | OXYGEN SATURATION: 96 % | RESPIRATION RATE: 18 BRPM | SYSTOLIC BLOOD PRESSURE: 117 MMHG | HEART RATE: 76 BPM | TEMPERATURE: 98.1 F

## 2019-10-31 LAB
BASO+EOS+MONOS # BLD AUTO: 0.3 K/UL (ref 0–2.3)
BASO+EOS+MONOS NFR BLD AUTO: 15 % (ref 0.1–17)
DIFFERENTIAL METHOD BLD: ABNORMAL
ERYTHROCYTE [DISTWIDTH] IN BLOOD BY AUTOMATED COUNT: 16.4 % (ref 11.5–14.5)
HCT VFR BLD AUTO: 29.3 % (ref 36–48)
HGB BLD-MCNC: 10 G/DL (ref 12–16)
LYMPHOCYTES # BLD: 0.3 K/UL (ref 1.1–5.9)
LYMPHOCYTES NFR BLD: 16 % (ref 14–44)
MCH RBC QN AUTO: 35.5 PG (ref 25–35)
MCHC RBC AUTO-ENTMCNC: 34.1 G/DL (ref 31–37)
MCV RBC AUTO: 103.9 FL (ref 78–102)
NEUTS SEG # BLD: 1.4 K/UL (ref 1.8–9.5)
NEUTS SEG NFR BLD: 69 % (ref 40–70)
PLATELET # BLD AUTO: 219 K/UL (ref 140–440)
RBC # BLD AUTO: 2.82 M/UL (ref 4.1–5.1)
WBC # BLD AUTO: 2 K/UL (ref 4.5–13)

## 2019-10-31 PROCEDURE — 36415 COLL VENOUS BLD VENIPUNCTURE: CPT

## 2019-10-31 PROCEDURE — 85025 COMPLETE CBC W/AUTO DIFF WBC: CPT

## 2019-10-31 NOTE — PROGRESS NOTES
[facility] Progress Note    Date: 2019    Name: Rony Hernandez    MRN: [id number]         : 1945      Ms. Figueroa arrived to [facility] at 0910. Ms. Taylor Benedict was assessed and education was provided. Pt is here today CBC/ Retracrit injection (if needed). Ms. Ayana Dang vitals were reviewed. Visit Vitals  /73 (BP 1 Location: Left arm, BP Patient Position: Sitting)   Pulse 76   Temp 98.1 °F (36.7 °C)   Resp 18   SpO2 96%       Pt was observed for 5 minutes after obtaining vital signs prior to initiating treatment. Blood drawn for labs via left AC venipuncture x1 attempt. Lab results obtained & reviewed. Recent Results (from the past 12 hour(s))   CBC WITH 3 PART DIFF    Collection Time: 10/31/19  9:17 AM   Result Value Ref Range    WBC 2.0 (L) 4.5 - 13.0 K/uL    RBC 2.82 (L) 4.10 - 5.10 M/uL    HGB 10.0 (L) 12.0 - 16 g/dL    HCT 29.3 (L) 36 - 48 %    .9 (H) 78 - 102 FL    MCH 35.5 (H) 25.0 - 35.0 PG    MCHC 34.1 31 - 37 g/dL    RDW 16.4 (H) 11.5 - 14.5 %    PLATELET 635 332 - 082 K/uL    NEUTROPHILS 69 40 - 70 %    MIXED CELLS 15 0.1 - 17 %    LYMPHOCYTES 16 14 - 44 %    ABS. NEUTROPHILS 1.4 (L) 1.8 - 9.5 K/UL    ABS. MIXED CELLS 0.3 0.0 - 2.3 K/uL    ABS. LYMPHOCYTES 0.3 (L) 1.1 - 5.9 K/UL    DF AUTOMATED         Retacrit held at this time per order. Pt armband removed & shredded. Ms. Taylor Benedict was discharged from [place] [address] in stable condition at 0930. She is to return on 19 at 1300 for her next appointment.     Victor Manuel Weiner RN  2019

## 2019-11-05 ENCOUNTER — TELEPHONE (OUTPATIENT)
Dept: ONCOLOGY | Age: 74
End: 2019-11-05

## 2019-11-06 DIAGNOSIS — C80.1 NEUROPATHY ASSOCIATED WITH CANCER (HCC): Primary | ICD-10-CM

## 2019-11-06 DIAGNOSIS — G63 NEUROPATHY ASSOCIATED WITH CANCER (HCC): Primary | ICD-10-CM

## 2019-11-06 DIAGNOSIS — G63 NEUROPATHY ASSOCIATED WITH CANCER (HCC): ICD-10-CM

## 2019-11-06 DIAGNOSIS — C80.1 NEUROPATHY ASSOCIATED WITH CANCER (HCC): ICD-10-CM

## 2019-11-06 RX ORDER — GABAPENTIN 100 MG/1
200 CAPSULE ORAL 3 TIMES DAILY
Qty: 180 CAP | Refills: 3 | Status: SHIPPED | OUTPATIENT
Start: 2019-11-06 | End: 2020-04-27 | Stop reason: SDUPTHER

## 2019-11-06 NOTE — TELEPHONE ENCOUNTER
Pt calling again, she said what she called about yesterday is this:    Her gabapentin NEURONTIN 100mg was sent to her from [address] and has wrong direction & quantity and now she is out. She didn't notice till recently. She said the med should say 2 capsules 3x per day. She said it should be quantity sufficient for a 90 day supply. Lucian Marino She wants you to send a new script to [address] [facility] for this, and she said she feels the error was probably made by [facility] not us.     Please call pt to let her know if this is going to be done [phone]    Thank you

## 2019-11-07 RX ORDER — DIPHENHYDRAMINE HYDROCHLORIDE 50 MG/ML
50 INJECTION, SOLUTION INTRAMUSCULAR; INTRAVENOUS AS NEEDED
Status: CANCELLED
Start: 2019-11-14

## 2019-11-07 RX ORDER — HYDROCORTISONE SODIUM SUCCINATE 100 MG/2ML
100 INJECTION, POWDER, FOR SOLUTION INTRAMUSCULAR; INTRAVENOUS AS NEEDED
Status: CANCELLED | OUTPATIENT
Start: 2019-11-14

## 2019-11-07 RX ORDER — ALBUTEROL SULFATE 0.83 MG/ML
2.5 SOLUTION RESPIRATORY (INHALATION) AS NEEDED
Status: CANCELLED
Start: 2019-11-14

## 2019-11-07 RX ORDER — ONDANSETRON 2 MG/ML
8 INJECTION INTRAMUSCULAR; INTRAVENOUS AS NEEDED
Status: CANCELLED | OUTPATIENT
Start: 2019-11-14

## 2019-11-07 RX ORDER — EPINEPHRINE 1 MG/ML
0.3 INJECTION, SOLUTION, CONCENTRATE INTRAVENOUS AS NEEDED
Status: CANCELLED | OUTPATIENT
Start: 2019-11-14

## 2019-11-07 RX ORDER — HEPARIN 100 UNIT/ML
300-500 SYRINGE INTRAVENOUS AS NEEDED
Status: CANCELLED
Start: 2019-11-14

## 2019-11-07 RX ORDER — ACETAMINOPHEN 325 MG/1
650 TABLET ORAL AS NEEDED
Status: CANCELLED
Start: 2019-11-14

## 2019-11-14 ENCOUNTER — HOSPITAL ENCOUNTER (OUTPATIENT)
Dept: INFUSION THERAPY | Age: 74
Discharge: HOME | End: 2019-11-14
Payer: MEDICARE

## 2019-11-14 VITALS
TEMPERATURE: 97.6 F | SYSTOLIC BLOOD PRESSURE: 142 MMHG | RESPIRATION RATE: 18 BRPM | DIASTOLIC BLOOD PRESSURE: 80 MMHG | HEART RATE: 76 BPM | OXYGEN SATURATION: 97 %

## 2019-11-14 DIAGNOSIS — C50.311 MALIGNANT NEOPLASM OF LOWER-INNER QUADRANT OF RIGHT BREAST OF FEMALE, ESTROGEN RECEPTOR POSITIVE (HCC): Primary | ICD-10-CM

## 2019-11-14 DIAGNOSIS — T45.1X5A ANEMIA DUE TO ANTINEOPLASTIC CHEMOTHERAPY: ICD-10-CM

## 2019-11-14 DIAGNOSIS — D64.81 ANEMIA DUE TO ANTINEOPLASTIC CHEMOTHERAPY: ICD-10-CM

## 2019-11-14 DIAGNOSIS — Z17.0 MALIGNANT NEOPLASM OF LOWER-INNER QUADRANT OF RIGHT BREAST OF FEMALE, ESTROGEN RECEPTOR POSITIVE (HCC): Primary | ICD-10-CM

## 2019-11-14 DIAGNOSIS — C50.919 METASTATIC BREAST CANCER (HCC): ICD-10-CM

## 2019-11-14 LAB
ANION GAP BLD CALC-SCNC: 16 MMOL/L (ref 10–20)
BASO+EOS+MONOS # BLD AUTO: 0.1 K/UL (ref 0–2.3)
BASO+EOS+MONOS NFR BLD AUTO: 6 % (ref 0.1–17)
BUN BLD-MCNC: 24 MG/DL (ref 7–18)
CA-I BLD-MCNC: 1.3 MMOL/L (ref 1.12–1.32)
CHLORIDE BLD-SCNC: 105 MMOL/L (ref 100–108)
CO2 BLD-SCNC: 24 MMOL/L (ref 19–24)
CREAT UR-MCNC: 1 MG/DL (ref 0.6–1.3)
DIFFERENTIAL METHOD BLD: ABNORMAL
ERYTHROCYTE [DISTWIDTH] IN BLOOD BY AUTOMATED COUNT: 16.3 % (ref 11.5–14.5)
GLUCOSE BLD STRIP.AUTO-MCNC: 99 MG/DL (ref 74–106)
HCT VFR BLD AUTO: 28.9 % (ref 36–48)
HCT VFR BLD CALC: 26 % (ref 36–49)
HGB BLD-MCNC: 8.8 G/DL (ref 12–16)
HGB BLD-MCNC: 9.9 G/DL (ref 12–16)
LYMPHOCYTES # BLD: 0.4 K/UL (ref 1.1–5.9)
LYMPHOCYTES NFR BLD: 16 % (ref 14–44)
MAGNESIUM SERPL-MCNC: 1.6 MG/DL (ref 1.6–2.6)
MCH RBC QN AUTO: 35.4 PG (ref 25–35)
MCHC RBC AUTO-ENTMCNC: 34.3 G/DL (ref 31–37)
MCV RBC AUTO: 103.2 FL (ref 78–102)
NEUTS SEG # BLD: 1.7 K/UL (ref 1.8–9.5)
NEUTS SEG NFR BLD: 78 % (ref 40–70)
PHOSPHATE SERPL-MCNC: 2.7 MG/DL (ref 2.5–4.9)
PLATELET # BLD AUTO: 225 K/UL (ref 140–440)
POTASSIUM BLD-SCNC: 4 MMOL/L (ref 3.5–5.5)
RBC # BLD AUTO: 2.8 M/UL (ref 4.1–5.1)
SODIUM BLD-SCNC: 141 MMOL/L (ref 136–145)
WBC # BLD AUTO: 2.2 K/UL (ref 4.5–13)

## 2019-11-14 PROCEDURE — 96372 THER/PROPH/DIAG INJ SC/IM: CPT

## 2019-11-14 PROCEDURE — 74011250636 HC RX REV CODE- 250/636

## 2019-11-14 PROCEDURE — 85025 COMPLETE CBC W/AUTO DIFF WBC: CPT

## 2019-11-14 PROCEDURE — 80047 BASIC METABLC PNL IONIZED CA: CPT

## 2019-11-14 PROCEDURE — 74011250636 HC RX REV CODE- 250/636: Performed by: INTERNAL MEDICINE

## 2019-11-14 PROCEDURE — 84100 ASSAY OF PHOSPHORUS: CPT

## 2019-11-14 PROCEDURE — 36591 DRAW BLOOD OFF VENOUS DEVICE: CPT

## 2019-11-14 PROCEDURE — 96402 CHEMO HORMON ANTINEOPL SQ/IM: CPT

## 2019-11-14 PROCEDURE — 77030012965 HC NDL HUBR BBMI -A

## 2019-11-14 PROCEDURE — 74011250636 HC RX REV CODE- 250/636: Performed by: NURSE PRACTITIONER

## 2019-11-14 PROCEDURE — 83735 ASSAY OF MAGNESIUM: CPT

## 2019-11-14 RX ORDER — ONDANSETRON 2 MG/ML
8 INJECTION INTRAMUSCULAR; INTRAVENOUS AS NEEDED
Status: CANCELLED | OUTPATIENT
Start: 2019-12-12

## 2019-11-14 RX ORDER — EPINEPHRINE 1 MG/ML
0.3 INJECTION, SOLUTION, CONCENTRATE INTRAVENOUS AS NEEDED
Status: CANCELLED | OUTPATIENT
Start: 2019-12-12

## 2019-11-14 RX ORDER — DIPHENHYDRAMINE HYDROCHLORIDE 50 MG/ML
50 INJECTION, SOLUTION INTRAMUSCULAR; INTRAVENOUS AS NEEDED
Status: CANCELLED
Start: 2019-12-12

## 2019-11-14 RX ORDER — HEPARIN 100 UNIT/ML
SYRINGE INTRAVENOUS
Status: COMPLETED
Start: 2019-11-14 | End: 2019-11-14

## 2019-11-14 RX ORDER — ALBUTEROL SULFATE 0.83 MG/ML
2.5 SOLUTION RESPIRATORY (INHALATION) AS NEEDED
Status: CANCELLED
Start: 2019-12-12

## 2019-11-14 RX ORDER — SODIUM CHLORIDE 0.9 % (FLUSH) 0.9 %
10 SYRINGE (ML) INJECTION AS NEEDED
Status: CANCELLED
Start: 2019-11-28

## 2019-11-14 RX ORDER — HYDROCORTISONE SODIUM SUCCINATE 100 MG/2ML
100 INJECTION, POWDER, FOR SOLUTION INTRAMUSCULAR; INTRAVENOUS AS NEEDED
Status: CANCELLED | OUTPATIENT
Start: 2019-12-12

## 2019-11-14 RX ORDER — ACETAMINOPHEN 325 MG/1
650 TABLET ORAL AS NEEDED
Status: CANCELLED
Start: 2019-12-12

## 2019-11-14 RX ORDER — SODIUM CHLORIDE 0.9 % (FLUSH) 0.9 %
10 SYRINGE (ML) INJECTION AS NEEDED
Status: DISCONTINUED | OUTPATIENT
Start: 2019-11-14 | End: 2019-11-15 | Stop reason: HOSPADM

## 2019-11-14 RX ORDER — SODIUM CHLORIDE 9 MG/ML
10 INJECTION INTRAMUSCULAR; INTRAVENOUS; SUBCUTANEOUS AS NEEDED
Status: DISCONTINUED | OUTPATIENT
Start: 2019-11-14 | End: 2019-11-15 | Stop reason: HOSPADM

## 2019-11-14 RX ORDER — LAMOTRIGINE 25 MG/1
500 TABLET ORAL ONCE
Status: COMPLETED | OUTPATIENT
Start: 2019-11-14 | End: 2019-11-14

## 2019-11-14 RX ORDER — HEPARIN 100 UNIT/ML
300-500 SYRINGE INTRAVENOUS AS NEEDED
Status: CANCELLED
Start: 2019-11-28

## 2019-11-14 RX ORDER — SODIUM CHLORIDE 9 MG/ML
10 INJECTION INTRAMUSCULAR; INTRAVENOUS; SUBCUTANEOUS AS NEEDED
Status: CANCELLED | OUTPATIENT
Start: 2019-11-28

## 2019-11-14 RX ADMIN — EPOETIN ALFA-EPBX 60000 UNITS: 40000 INJECTION, SOLUTION INTRAVENOUS; SUBCUTANEOUS at 14:03

## 2019-11-14 RX ADMIN — DENOSUMAB 120 MG: 120 INJECTION SUBCUTANEOUS at 14:03

## 2019-11-14 RX ADMIN — HEPARIN 500 UNITS: 100 SYRINGE at 14:14

## 2019-11-14 RX ADMIN — SODIUM CHLORIDE 30 ML: 9 INJECTION INTRAMUSCULAR; INTRAVENOUS; SUBCUTANEOUS at 11:36

## 2019-11-14 RX ADMIN — FULVESTRANT 500 MG: 50 INJECTION INTRAMUSCULAR at 14:06

## 2019-11-14 NOTE — PROGRESS NOTES
[facility] Progress Note    Date: 2019    Name: Vinay Henderson    MRN: [id number]         : 1945      Ms. Figueroa arrived to [facility] at 1315. Ms. Linda Borges was assessed and education was provided. Pt is here today q2 week CBC/ Retracrit, q4 week Xgeva, q4 week Faslodex, and monthly port flush. Ms. Corrie Murillo vitals were reviewed. Visit Vitals  /80 (BP 1 Location: Left arm, BP Patient Position: Sitting)   Pulse 76   Temp 97.6 °F (36.4 °C)   Resp 18   SpO2 97%   Breastfeeding? No       Pt was observed for 5 minutes after obtaining vital signs prior to initiating treatment. Pt's left upper chest port accessed & blood drawn for labs per order. Lab results obtained & reviewed. Recent Results (from the past 12 hour(s))   CBC WITH 3 PART DIFF    Collection Time: 19  1:36 PM   Result Value Ref Range    WBC 2.2 (L) 4.5 - 13.0 K/uL    RBC 2.80 (L) 4.10 - 5.10 M/uL    HGB 9.9 (L) 12.0 - 16 g/dL    HCT 28.9 (L) 36 - 48 %    .2 (H) 78 - 102 FL    MCH 35.4 (H) 25.0 - 35.0 PG    MCHC 34.3 31 - 37 g/dL    RDW 16.3 (H) 11.5 - 14.5 %    PLATELET 411 628 - 906 K/uL    NEUTROPHILS 78 (H) 40 - 70 %    MIXED CELLS 6 0.1 - 17 %    LYMPHOCYTES 16 14 - 44 %    ABS. NEUTROPHILS 1.7 (L) 1.8 - 9.5 K/UL    ABS. MIXED CELLS 0.1 0.0 - 2.3 K/uL    ABS.  LYMPHOCYTES 0.4 (L) 1.1 - 5.9 K/UL    DF AUTOMATED     POC CHEM8    Collection Time: 19  1:46 PM   Result Value Ref Range    CO2, POC 24 19 - 24 MMOL/L    Glucose, POC 99 74 - 106 MG/DL    BUN, POC 24 (H) 7 - 18 MG/DL    Creatinine, POC 1.0 0.6 - 1.3 MG/DL    GFRAA, POC >60 >60 ml/min/1.73m2    GFRNA, POC 54 (L) >60 ml/min/1.73m2    Sodium,  136 - 145 MMOL/L    Potassium, POC 4.0 3.5 - 5.5 MMOL/L    Calcium, ionized (POC) 1.30 1.12 - 1.32 mmol/L    Chloride,  100 - 108 MMOL/L    Anion gap, POC 16 10 - 20      Hematocrit, POC 26 (L) 36 - 49 %    Hemoglobin, POC 8.8 (L) 12 - 16 G/DL       Retacrit 26094 units administered as ordered SQ in pt's L upper arm. Xgeva 120mg administered as ordered SQ in pt's L upper arm. Faslodex 500mg administered as ordered IM in pt's L & R dorsogluteal muscles (250mg per injection). Pt's port flushed with heparin per order & de-accessed. Band-aid to site. Pt armband removed & shredded. Ms. Kaylene Wynn was discharged from [place] [address] in stable condition at [phone]. She is to return on 11/29/19 at 1000 for her next appointment.     Stacy Vela RN  November 14, 2019

## 2019-11-29 ENCOUNTER — HOSPITAL ENCOUNTER (OUTPATIENT)
Dept: INFUSION THERAPY | Age: 74
Discharge: HOME | End: 2019-11-29
Payer: MEDICARE

## 2019-11-29 VITALS
OXYGEN SATURATION: 96 % | DIASTOLIC BLOOD PRESSURE: 75 MMHG | SYSTOLIC BLOOD PRESSURE: 124 MMHG | TEMPERATURE: 98.2 F | RESPIRATION RATE: 16 BRPM | HEART RATE: 78 BPM

## 2019-11-29 LAB
BASO+EOS+MONOS # BLD AUTO: 0.4 K/UL (ref 0–2.3)
BASO+EOS+MONOS NFR BLD AUTO: 14 % (ref 0.1–17)
DIFFERENTIAL METHOD BLD: ABNORMAL
ERYTHROCYTE [DISTWIDTH] IN BLOOD BY AUTOMATED COUNT: 17 % (ref 11.5–14.5)
HCT VFR BLD AUTO: 30.5 % (ref 36–48)
HGB BLD-MCNC: 10.5 G/DL (ref 12–16)
LYMPHOCYTES # BLD: 0.4 K/UL (ref 1.1–5.9)
LYMPHOCYTES NFR BLD: 15 % (ref 14–44)
MCH RBC QN AUTO: 35.8 PG (ref 25–35)
MCHC RBC AUTO-ENTMCNC: 34.4 G/DL (ref 31–37)
MCV RBC AUTO: 104.1 FL (ref 78–102)
NEUTS SEG # BLD: 1.7 K/UL (ref 1.8–9.5)
NEUTS SEG NFR BLD: 71 % (ref 40–70)
PLATELET # BLD AUTO: 231 K/UL (ref 140–440)
RBC # BLD AUTO: 2.93 M/UL (ref 4.1–5.1)
WBC # BLD AUTO: 2.5 K/UL (ref 4.5–13)

## 2019-11-29 PROCEDURE — 85025 COMPLETE CBC W/AUTO DIFF WBC: CPT

## 2019-11-29 PROCEDURE — 86300 IMMUNOASSAY TUMOR CA 15-3: CPT

## 2019-11-29 PROCEDURE — 36415 COLL VENOUS BLD VENIPUNCTURE: CPT

## 2019-11-29 NOTE — PROGRESS NOTES
[facility] Progress Note    Date: 2019    Name: Alen Arevalo    MRN: [id number]         : 1945      Ms. Figueroa arrived in the [facility] today at 1015, in stable condition, here for Q 2 Week CBC/Retacrit Injection & additional Labs. She was assessed and education was provided. Ms. Derick Rojas vitals were reviewed. Visit Vitals  /75 (BP 1 Location: Left arm, BP Patient Position: Sitting)   Pulse 78   Temp 98.2 °F (36.8 °C)   Resp 16   SpO2 96%           Blood for a CBC ([facility]) & CA 27-29 (outstanding Dr. Darwin Gomez lab) was drawn from her left [facility] at 1033, per order, and without incident. (The CBC was processed in house, and the CA 27-29, was sent out to be processed.)        Lab results were obtained and reviewed. Recent Results (from the past 12 hour(s))   CBC WITH 3 PART DIFF    Collection Time: 19 10:33 AM   Result Value Ref Range    WBC 2.5 (L) 4.5 - 13.0 K/uL    RBC 2.93 (L) 4.10 - 5.10 M/uL    HGB 10.5 (L) 12.0 - 16 g/dL    HCT 30.5 (L) 36 - 48 %    .1 (H) 78 - 102 FL    MCH 35.8 (H) 25.0 - 35.0 PG    MCHC 34.4 31 - 37 g/dL    RDW 17.0 (H) 11.5 - 14.5 %    PLATELET 902 726 - 515 K/uL    NEUTROPHILS 71 (H) 40 - 70 %    MIXED CELLS 14 0.1 - 17 %    LYMPHOCYTES 15 14 - 44 %    ABS. NEUTROPHILS 1.7 (L) 1.8 - 9.5 K/UL    ABS. MIXED CELLS 0.4 0.0 - 2.3 K/uL    ABS. LYMPHOCYTES 0.4 (L) 1.1 - 5.9 K/UL    DF AUTOMATED           Retacrit Injection was HELD today, per order. Ms. Lorene Aburto tolerated well, and had no complaints. Ms. Lorene Aburto was discharged from [place] [address] in stable condition at 1050. Jana York She is to return in 2 weeks, on Thursday, 19, at 1300,  for her next appointment, for Q 2 Week CBC/Retacrit Injection, & Q 28 Day, Xgeva & Faslodex Injections, & Monthly Port Flush.      Sintia Dunne, RN  2019  10:45 AM

## 2019-12-01 LAB — CANCER AG27-29 SERPL-ACNC: 53.8 U/ML (ref 0–38.6)

## 2019-12-04 RX ORDER — LAMOTRIGINE 25 MG/1
500 TABLET ORAL ONCE
Status: CANCELLED | OUTPATIENT
Start: 2020-01-09 | End: 2020-01-09

## 2019-12-09 ENCOUNTER — OFFICE VISIT (OUTPATIENT)
Dept: ONCOLOGY | Age: 74
End: 2019-12-09

## 2019-12-09 VITALS
HEART RATE: 69 BPM | BODY MASS INDEX: 26.09 KG/M2 | HEIGHT: 64 IN | SYSTOLIC BLOOD PRESSURE: 119 MMHG | WEIGHT: 152.8 LBS | RESPIRATION RATE: 16 BRPM | DIASTOLIC BLOOD PRESSURE: 59 MMHG | TEMPERATURE: 98.2 F | OXYGEN SATURATION: 96 %

## 2019-12-09 DIAGNOSIS — T45.1X5A CHEMOTHERAPY INDUCED NEUTROPENIA (HCC): ICD-10-CM

## 2019-12-09 DIAGNOSIS — G89.3 CANCER ASSOCIATED PAIN: ICD-10-CM

## 2019-12-09 DIAGNOSIS — Z85.3 PERSONAL HISTORY OF MALIGNANT NEOPLASM OF BREAST: ICD-10-CM

## 2019-12-09 DIAGNOSIS — T45.1X5A ANEMIA DUE TO CHEMOTHERAPY: Primary | ICD-10-CM

## 2019-12-09 DIAGNOSIS — E55.9 VITAMIN D DEFICIENCY: ICD-10-CM

## 2019-12-09 DIAGNOSIS — D64.81 ANEMIA DUE TO CHEMOTHERAPY: Primary | ICD-10-CM

## 2019-12-09 DIAGNOSIS — C80.1 NEUROPATHY ASSOCIATED WITH CANCER (HCC): ICD-10-CM

## 2019-12-09 DIAGNOSIS — G63 NEUROPATHY ASSOCIATED WITH CANCER (HCC): ICD-10-CM

## 2019-12-09 DIAGNOSIS — D70.1 CHEMOTHERAPY INDUCED NEUTROPENIA (HCC): ICD-10-CM

## 2019-12-09 NOTE — PROGRESS NOTES
Hematology/Oncology  Progress Note    Name: Edward Doe  Date: 2019  : 1945    PCP: Jong Bello MD     Ms. Gilbert Graff is a 76 y.o.  female who was seen for management of her metastatic breast cancer with associated complications of chemotherapy. Current therapy: Fulvestrant 500 mg subcutaneous monthly and Ibrance 125 mg by mouth daily. Subjective:     Mrs. Gilbert Graff is a 60-year-old woman who has slowly progressive metastatic breast cancer. She is here today for chemo follow up. She is tolerating monthly Fulvestrant fairly well. The patient has previously completed external beam radiation therapy to lesions in her ribs and more recently she completed proton therapy to areas of bone involvement with a significant benefit with regards to pain control. The posttherapy imaging studies showed a significant decrease in the intensity of uptake on the bone scan. .  Additionally she is currently receiving systemic therapy with fulvestrant and Ibrance. Patient is tolerating the systemic therapy reasonably well and has not experienced any nausea or emesis. She does report some fatigue and occasional weakness and leg cramps. She continues to have SOB intermittently. She also receives Procrit at 2 week intervals whenever her hemoglobin is below 10 g/dL and hematocrit is below 30%. Past medical history, family history, and social history: these were reviewed and remains unchanged.     Past Medical History:   Diagnosis Date    Biliary colic     Breast CA ([address] 2012    Cancer [facility])     Right Breast    Chemotherapy convalescence or palliative care     Neuropathy     Radiation therapy complication     Thyroid disease      Past Surgical History:   Procedure Laterality Date    BREAST SURGERY PROCEDURE UNLISTED  10/2002    Right-Lesion    BREAST SURGERY PROCEDURE UNLISTED  2004    Right-Lesion    HX LAP CHOLECYSTECTOMY  13    HX MASTECTOMY      Right     Social History Socioeconomic History    Marital status:      Spouse name: Not on file    Number of children: Not on file    Years of education: Not on file    Highest education level: Not on file   Occupational History    Not on file   Social Needs    Financial resource strain: Not on file    Food insecurity:     Worry: Not on file     Inability: Not on file    Transportation needs:     Medical: Not on file     Non-medical: Not on file   Tobacco Use    Smoking status: Former Smoker     Last attempt to quit: 1991     Years since quittin.5    Smokeless tobacco: Never Used   Substance and Sexual Activity    Alcohol use: Yes     Alcohol/week: 0.0 standard drinks     Types: 1 - 2 Glasses of wine per week     Comment: socially, occassionally    Drug use: No    Sexual activity: Not on file   Lifestyle    Physical activity:     Days per week: Not on file     Minutes per session: Not on file    Stress: Not on file   Relationships    Social connections:     Talks on phone: Not on file     Gets together: Not on file     Attends Rastafarian service: Not on file     Active member of club or organization: Not on file     Attends meetings of clubs or organizations: Not on file     Relationship status: Not on file    Intimate partner violence:     Fear of current or ex partner: Not on file     Emotionally abused: Not on file     Physically abused: Not on file     Forced sexual activity: Not on file   Other Topics Concern    Not on file   Social History Narrative    Not on file     Family History   Problem Relation Age of Onset    Cancer Maternal Aunt         Hodgkin's disease    Breast Cancer Paternal Aunt     Cancer Father         Prostate, lung, brain     Current Outpatient Medications   Medication Sig Dispense Refill    gabapentin (NEURONTIN) 100 mg capsule Take 2 Caps by mouth three (3) times daily.  Max Daily Amount: 600 mg. 180 Cap 3    Cetirizine (ZYRTEC) 10 mg cap Take 1 Cap by mouth daily as needed.  palbociclib (IBRANCE) 100 mg cap Take 1 Cap by mouth daily. For 21 days on and 7 days off every 28 days. 63 Cap 5    lidocaine HCl-hydrocortison ac topical cream Apply  to affected area two (2) times a day. 85 g 3    TETRACYCLINE HCL (TETRACYCLINE PO) Take 250 mg by mouth two (2) times a day.  fexofenadine-pseudoephedrine (ALLEGRA-D 24 HOUR) 180-240 mg per tablet Take 1 Tab by mouth daily as needed.  sulfacetamide (BLEPH-10) 10 % ophthalmic ointment Administer  to right eye three (3) times daily.  palbociclib (IBRANCE) 125 mg cap Take 125 mg by mouth daily. Take 1 tab po every day for 21 days on and 7 days off q 28 days  Indications: HORMONE RECEPTOR(+), HER2(-) ADVANCED BREAST CANCER 42 Cap 6    Cholecalciferol, Vitamin D3, 5,000 unit Tab Take  by mouth daily.  thyroid, Pork, (ARMOUR THYROID) 60 mg tablet Take 90 mg by mouth daily.  L-Mfolate-B6 Phos-Methyl-B12 (NEURPATH-B) 3-35-2 mg Tab Take  by mouth two (2) times a day.  warfarin (COUMADIN) 1 mg tablet Take 1 mg by mouth daily. Review of Systems  Constitutional: The patient has no acute distress or discomfort. HEENT: The patient denies recent head trauma, eye pain, blurred vision,  hearing deficit, oropharyngeal mucosal pain or lesions, and the patient denies throat pain or discomfort. Lymphatics: The patient denies palpable peripheral lymphadenopathy. Hematologic: The patient denies having bruising, bleeding, or progressive fatigue. Respiratory: Patient denies having shortness of breath, cough, sputum production, fever, or dyspnea on exertion. Cardiovascular: The patient denies having leg pain, leg swelling, heart palpitations, chest permit, chest pain, or lightheadedness. The patient denies having dyspnea on exertion. Gastrointestinal: The patient denies having nausea, emesis, or diarrhea. The patient denies having any hematemesis or blood in the stool.   Genitourinary: Patient denies having urinary urgency, frequency, or dysuria. The patient denies having blood in the urine. Psychological: The patient denies having symptoms of nervousness, anxiety, depression, or thoughts of harming self. Skin: Patient denies having skin rashes, skin, ulcerations, or unexplained itching or pruritus. Musculoskeletal: The patient denies having pain in the joints or bones. Objective:     Visit Vitals  /59   Pulse 69   Temp 98.2 °F (36.8 °C) (Oral)   Resp 16   Ht 5' 4\" (1.626 m)   Wt 69.3 kg (152 lb 12.8 oz)   SpO2 96%   BMI 26.23 kg/m²     ECOG PS=0; Pain score= 3/10    Physical Exam:   Gen. Appearance: The patient is in no acute distress. Skin: There is no bruise or rash. There is no evidence of cutaneous shingles over the lower back and flank. HEENT: The exam is unremarkable. Neck: Supple without lymphadenopathy or thyromegaly. Lungs: Clear to auscultation and percussion; there are no wheezes or rhonchi. Heart: Regular rate and rhythm; there are no murmurs, gallops, or rubs. Abdomen: Bowel sounds are present and normal.  There is no guarding, tenderness, or hepatosplenomegaly. Extremities: There is no clubbing, cyanosis, or edema. Neurologic: There are no focal neurologic deficits. Lymphatics: There is no palpable peripheral lymphadenopathy. Musculoskeletal: The patient has full range of motion at all joints. There is no evidence of joint deformity or effusions. There is no focal joint tenderness. Psychological/psychiatric: There is no clinical evidence of anxiety, depression, or melancholy.     Lab data:      Results for orders placed or performed during the hospital encounter of 11/29/19   CBC WITH 3 PART DIFF     Status: Abnormal   Result Value Ref Range Status    WBC 2.5 (L) 4.5 - 13.0 K/uL Final    RBC 2.93 (L) 4.10 - 5.10 M/uL Final    HGB 10.5 (L) 12.0 - 16 g/dL Final    HCT 30.5 (L) 36 - 48 % Final    .1 (H) 78 - 102 FL Final    MCH 35.8 (H) 25.0 - 35.0 PG Final    MCHC 34.4 31 - 37 g/dL Final    RDW 17.0 (H) 11.5 - 14.5 % Final    PLATELET 421 148 - 340 K/uL Final    NEUTROPHILS 71 (H) 40 - 70 % Final    MIXED CELLS 14 0.1 - 17 % Final    LYMPHOCYTES 15 14 - 44 % Final    ABS. NEUTROPHILS 1.7 (L) 1.8 - 9.5 K/UL Final    ABS. MIXED CELLS 0.4 0.0 - 2.3 K/uL Final    ABS. LYMPHOCYTES 0.4 (L) 1.1 - 5.9 K/UL Final     Comment: Test performed at [address] [place] [address] or [facility]patient [facility] Location. Reviewed by Medical Director. DF AUTOMATED   Final           Assessment:     1. Anemia due to chemotherapy    2. Chemotherapy induced neutropenia (HCC)    3. Vitamin D deficiency    4. Neuropathy associated with cancer ([facility] [address]    5. Cancer associated pain    6. Personal history of malignant neoplasm of breast      Plan:   Metastatic breast cancer: The combination of Fulvestrant 500 mg subcutaneous monthly  will be continued. Ibrance 140 mg by mouth daily will be continued as well. Clinically she is doing reasonably well. Her Previous bone scan and CT scans which did not show any evidence of progressive metastatic disease definitively. Current treatment will continue. Neuropathy associated with cancer: I will continue her Neurontin at 400 mg 3 times daily. She does not need a refill at this time. Chemotherapy-induced neutropenia/chronic leukopenia (persisting problem): If the absolute neutrophil count declines to 0.5 she will be started on Neupogen or Neulasta. She will got her labs drawn on 11/29/2019 which shows a WBC of 2.5, ANC of 1.7, total neutrophils of 71. Chemotherapy-induced anemia (persisting problem): At this time I will check iron profile and ferritin levels. I have recommended that the patient continue taking ferrous sulfate 1 tablet daily. Procrit at a dose of 60,000 units subcutaneous will be provided now that her hemoglobin has declined below 10 g/dL hematocrit is below 30%.     Chemotherapy-induced thrombocytopenia : The patient is continuing the current dose of Ibrance 140 mg daily. We have explained to her that the medication was responsible for her thrombocytopenia. The current platelet count is currently 231,000 done on 11/29/2019. We will continue to monitor platelet counts at 6 week intervals. Semaj Quinonez NP  12/9/2019     I have assessed the patient independently and  agree with the full assessment as outlined.   Kelli Hemphill MD, [place]

## 2019-12-09 NOTE — PATIENT INSTRUCTIONS
Breast Cancer: Care Instructions Your Care Instructions Breast cancer occurs when abnormal cells grow out of control in the breast. These cancer cells can spread within the breast, to nearby lymph nodes and other tissues, and to other parts of the body. Being treated for cancer can weaken your body, and you may feel very tired. Get the rest your body needs so you can feel better. Finding out that you have cancer is scary. You may feel many emotions and may need some help coping. Seek out family, friends, and counselors for support. You also can do things at home to make yourself feel better while you go through treatment. Call the [employer] (6-204.186.8487) or visit its website at [employer] [address] [employer] for more information. Follow-up care is a key part of your treatment and safety. Be sure to make and go to all appointments, and call your doctor if you are having problems. It's also a good idea to know your test results and keep a list of the medicines you take. How can you care for yourself at home? · Take your medicines exactly as prescribed. Call your doctor if you think you are having a problem with your medicine. You may get medicine for nausea and vomiting if you have these side effects. · Follow your doctor's instructions to relieve pain. Pain from cancer and surgery can almost always be controlled. Use pain medicine when you first notice pain, before it becomes severe. · Eat healthy food. If you do not feel like eating, try to eat food that has protein and extra calories to keep up your strength and prevent weight loss. Drink liquid meal replacements for extra calories and protein. Try to eat your main meal early. · Get some physical activity every day, but do not get too tired. Keep doing the hobbies you enjoy as your energy allows. · Do not smoke. Smoking can make your cancer worse. If you need help quitting, talk to your doctor about stop-smoking programs and medicines. These can increase your chances of quitting for good. · Take steps to control your stress and workload. Learn relaxation techniques. ? Share your feelings. Stress and tension affect our emotions. By expressing your feelings to others, you may be able to understand and cope with them. ? Consider joining a support group. Talking about a problem with your spouse, a good friend, or other people with similar problems is a good way to reduce tension and stress. ? Express yourself through art. Try writing, crafts, dance, or art to relieve stress. Some dance, writing, or art groups may be available just for people who have cancer. ? Be kind to your body and mind. Getting enough sleep, eating a healthy diet, and taking time to do things you enjoy can contribute to an overall feeling of balance in your life and can help reduce stress. ? Get help if you need it. Discuss your concerns with your doctor or counselor. · If you are vomiting or have diarrhea: ? Drink plenty of fluids (enough so that your urine is light yellow or clear like water) to prevent dehydration. Choose water and other caffeine-free clear liquids. If you have kidney, heart, or liver disease and have to limit fluids, talk with your doctor before you increase the amount of fluids you drink. ? When you are able to eat, try clear soups, mild foods, and liquids until all symptoms are gone for 12 to 48 hours. Other good choices include dry toast, crackers, cooked cereal, and gelatin dessert, such as Jell-O. · If you have not already done so, prepare a list of advance directives. Advance directives are instructions to your doctor and family members about what kind of care you want if you become unable to speak or express yourself. When should you call for help? Call 911 anytime you think you may need emergency care. For example, call if: 
  · You passed out (lost consciousness).  
 Call your doctor now or seek immediate medical care if:   · You have a fever.  
  · You have abnormal bleeding.  
  · You think you have an infection.  
  · You have new or worse pain.  
  · You have new symptoms, such as a cough, belly pain, vomiting, diarrhea, or a rash.  
 Watch closely for changes in your health, and be sure to contact your doctor if: 
  · You are much more tired than usual.  
  · You have swollen glands in your armpits, groin, or neck.  
  · You do not get better as expected. Where can you learn more? Go to http://mandie-elizabeth.info/. Enter V321 in the search box to learn more about \"Breast Cancer: Care Instructions. \" Current as of: December 19, 2018 Content Version: 12.2 © 7730-0467 Rad. Care instructions adapted under license by [employer] (which disclaims liability or warranty for this information). If you have questions about a medical condition or this instruction, always ask your healthcare professional. [place] [address] any warranty or liability for your use of this information. Learning About Breast Cancer Treatments Your Care Instructions Breast cancer means abnormal cells grow out of control in one or both breasts. These cancer cells can spread from the breast to nearby lymph nodes and other tissues. They can also spread to other parts of the body. The type and stage of cancer you have is based on: · Where in the breast the cancer started. · The genetics of those cancer cells. · How far cancer has spread within the breast, to nearby tissues, or to other organs. · What the cancer cells look like under a microscope. · Whether there is cancer in the nearby lymph nodes. Tests that help find the stage of your cancer can help choose the right treatment for you. These tests can include a breast biopsy, lymph node biopsy, blood tests, and X-rays. You may need other tests as well, such as a bone, CT, or MRI scan.  Whether you have more tests depends on your symptoms and the stage of the cancer. When you find out that you have cancer, you may feel many emotions and may need some help coping. Seek out family, friends, and counselors for support. You also can do things at home to make yourself feel better while you go through treatment. Call the [employer] (0-216.178.3723) or visit its website at [address] [place] for more information. How is breast cancer treated? Your doctor may combine treatments. This is a common way to treat breast cancer. Treatment depends on what type and stage of cancer you have. You may have: · Surgery to remove the cancer. · Radiation. This uses high-dose X-rays to kill cancer cells and shrink tumors. · Chemotherapy. This uses medicine to kill cancer cells. · Hormone therapy. This uses medicines such as tamoxifen. It limits the effect of the hormone estrogen. This hormone can help some types of breast cancer cells to grow. · Biological therapy. This uses medicines such as trastuzumab (Herceptin) to help your immune system fight the cancer. What surgeries are done for breast cancer? Surgery is a key part of treatment for breast cancer. The main types of surgeries are: · Breast-conserving surgery, such as lumpectomy. It removes the cancer in the breast and just enough tissue to make sure that all of the cancer was removed. · Surgery to remove the breast. This includes: ? Total mastectomy. This removes the whole breast. 
? Nipple-sparing mastectomy. This removes the whole breast but leaves the nipple. ? Modified radical mastectomy. This removes the whole breast and the lymph nodes under the arm (axillary lymph nodes). ? Radical mastectomy. This removes the whole breast, the chest muscles, and all the lymph nodes under the arm. If lymph nodes under the arm are removed, they are looked at under the microscope to check for cancer. There are two types of lymph node removal: · Meade lymph node biopsy removes the lymph node that is the first to receive lymph fluid from the tumor. If cancer has spread from the breast to the lymph nodes, cancer cells most often show up in the sentinel node first. 
· Axillary lymph node dissection removes most of the lymph nodes in the armpit. The type of surgery you have depends on the size, location, and type of the cancer. It also depends on your overall health and personal preferences. Even if your doctor removes all the cancer that can be seen at the time of your surgery, you may still need more treatment. You may get radiation, chemotherapy, or hormone therapy after surgery to try to kill any cancer cells that may be left. No matter what kind of surgery you have, you will get information about why you are having it, what its risks are, how to prepare, and what to do after surgery. Follow-up care is a key part of your treatment and safety. Be sure to make and go to all appointments, and call your doctor if you are having problems. It's also a good idea to know your test results and keep a list of the medicines you take. Where can you learn more? Go to http://mandie-elizabeth.info/. Enter X810 in the search box to learn more about \"Learning About Breast Cancer Treatments. \" Current as of: December 19, 2018 Content Version: 12.2 © 2340-6243 [employer]. Care instructions adapted under license by [employer] (which disclaims liability or warranty for this information). If you have questions about a medical condition or this instruction, always ask your healthcare professional. [place] [address] any warranty or liability for your use of this information. Learning About Cancer Pain What is cancer pain? Cancer pain may be caused by the cancer or by the treatments and tests used.  The pain may make it hard for you to do your normal activities, such as sleeping or eating. Over time, cancer pain can cause appetite and sleep problems, isolation, and depression. But most cancer pain can be managed with medicines and other methods. This may not mean that you have no pain but that it stays at a level that you can bear. Treating your pain will make you feel better. You will be more active, eat and sleep better, and enjoy your family and friends. What are some key points about cancer pain? · You are the only person who can say how much pain you have. If you tell your doctor when you have pain or when pain changes, your doctor can help you. · Cancer pain can almost always be relieved if you work with your doctor to create a treatment plan that is right for you. · Pain is often easier to control right after it starts. This may mean it is better to take regular doses instead of waiting until the pain becomes bad. · Take your medicines exactly as prescribed. Call your doctor if you think you are having a problem with your medicine. · You may find that taking your medicine works most of the time, but your pain flares up during extra activity or for no clear reason. This is called breakthrough pain. Ask your doctor what you can do if this happens. Your doctor can give you a prescription for fast-acting medicines that you can take for breakthrough pain. · People who take opioid pain medicines for cancer pain rarely develop opioid use disorder. Moderate to severe opioid use disorder is sometimes called addiction. Your body may come to expect daily doses of medicine to control the pain. But your doctor can gradually lower the amount you are taking when and if the cause of your pain is gone. What treatments can help you manage cancer pain? Medical treatments to manage cancer pain include: · Prescription and over-the-counter medicines. Many types of medicines are used. Your doctor may suggest different combinations of medicines. · Surgery, chemotherapy, radiation, and hormone therapy. These may be used to remove or destroy a tumor that is causing pain. · Nerve blocks. These are used to help with nerve pain. A medicine is injected into the nerve that affects the painful area. Nonmedical treatments include: · Physical therapy, gentle massage, acupuncture, and heat or cold to ease pain. · Stretching, yoga, and exercises to help you keep your strength, flexibility, and mobility. · Relaxation, biofeedback, or meditation to relieve stress and anxiety. · Short-term crisis therapy with a counselor. This may help you manage your cancer pain or the discomfort from cancer treatments. · Education and emotional support. Learning as much as you can about your pain may help. So can sharing your feelings with others. A support group can be a safe and comfortable place to talk about your illness. · Complementary therapies, such as aromatherapy, prayer, and humor therapy, may be helpful. How can you manage cancer pain? Your doctor needs all the information you can give about what your pain feels like. It often helps to write things down in a pain diary. · Write down when your pain starts, what it feels like, and how long it lasts. Use words like dull, aching, sharp, shooting, throbbing, or burning. · Note changes in your pain. Is it constant, or does it come and go? Do you have more than one kind of pain? How long does it last? 
· Rate your pain on a scale of 0 to 10, with 0 being no pain and 10 being the worst pain you can imagine. · Write exactly where you feel pain. You can use a drawing. Say whether the pain is just in that one place or several places at once. Or tell your doctor if it travels from one place to another. · Write down what makes your pain better or worse. Note when you used a treatment, how well it worked, and any side effects.  
If you and your doctor are not able to control your pain, ask about seeing a pain specialist. A pain specialist is a health professional who focuses on treating resistant pain. Talk to your doctor if you are having problems with depression. Treating depression can make it easier to manage your cancer pain. Where can you learn more? Go to http://mandie-elizabeth.info/. Enter K531 in the search box to learn more about \"Learning About Cancer Pain. \" Current as of: December 19, 2018 Content Version: 12.2 © 4668-0403 [employer]. Care instructions adapted under license by [employer] (which disclaims liability or warranty for this information). If you have questions about a medical condition or this instruction, always ask your healthcare professional. [address] any warranty or liability for your use of this information.

## 2019-12-12 ENCOUNTER — HOSPITAL ENCOUNTER (OUTPATIENT)
Dept: INFUSION THERAPY | Age: 74
Discharge: HOME | End: 2019-12-12
Payer: MEDICARE

## 2019-12-12 ENCOUNTER — HOSPITAL ENCOUNTER (OUTPATIENT)
Dept: LAB | Age: 74
Discharge: HOME | End: 2019-12-12
Payer: MEDICARE

## 2019-12-12 VITALS
RESPIRATION RATE: 16 BRPM | HEART RATE: 78 BPM | OXYGEN SATURATION: 99 % | DIASTOLIC BLOOD PRESSURE: 75 MMHG | TEMPERATURE: 98.1 F | SYSTOLIC BLOOD PRESSURE: 130 MMHG

## 2019-12-12 DIAGNOSIS — D64.81 ANEMIA DUE TO CHEMOTHERAPY: ICD-10-CM

## 2019-12-12 DIAGNOSIS — T45.1X5A CHEMOTHERAPY INDUCED NEUTROPENIA (HCC): ICD-10-CM

## 2019-12-12 DIAGNOSIS — D72.819 CHRONIC LEUKOPENIA: ICD-10-CM

## 2019-12-12 DIAGNOSIS — D70.1 CHEMOTHERAPY INDUCED NEUTROPENIA (HCC): ICD-10-CM

## 2019-12-12 DIAGNOSIS — C50.919 METASTATIC BREAST CANCER (HCC): ICD-10-CM

## 2019-12-12 DIAGNOSIS — Z17.0 MALIGNANT NEOPLASM OF LOWER-INNER QUADRANT OF RIGHT BREAST OF FEMALE, ESTROGEN RECEPTOR POSITIVE (HCC): ICD-10-CM

## 2019-12-12 DIAGNOSIS — C50.919 METASTATIC BREAST CANCER (HCC): Primary | ICD-10-CM

## 2019-12-12 DIAGNOSIS — D64.81 ANEMIA DUE TO ANTINEOPLASTIC CHEMOTHERAPY: ICD-10-CM

## 2019-12-12 DIAGNOSIS — T45.1X5A ANEMIA DUE TO ANTINEOPLASTIC CHEMOTHERAPY: ICD-10-CM

## 2019-12-12 DIAGNOSIS — C50.311 MALIGNANT NEOPLASM OF LOWER-INNER QUADRANT OF RIGHT BREAST OF FEMALE, ESTROGEN RECEPTOR POSITIVE (HCC): ICD-10-CM

## 2019-12-12 DIAGNOSIS — T45.1X5A ANEMIA DUE TO CHEMOTHERAPY: ICD-10-CM

## 2019-12-12 DIAGNOSIS — E55.9 VITAMIN D DEFICIENCY: ICD-10-CM

## 2019-12-12 LAB
ALBUMIN SERPL-MCNC: 3.8 G/DL (ref 3.4–5)
ALBUMIN/GLOB SERPL: 1.4 {RATIO} (ref 0.8–1.7)
ALP SERPL-CCNC: 54 U/L (ref 45–117)
ALT SERPL-CCNC: 17 U/L (ref 13–56)
ANION GAP BLD CALC-SCNC: 15 MMOL/L (ref 10–20)
ANION GAP SERPL CALC-SCNC: 7 MMOL/L (ref 3–18)
AST SERPL-CCNC: 23 U/L (ref 10–38)
BASO+EOS+MONOS # BLD AUTO: 0.1 K/UL (ref 0–2.3)
BASO+EOS+MONOS NFR BLD AUTO: 6 % (ref 0.1–17)
BILIRUB SERPL-MCNC: 0.8 MG/DL (ref 0.2–1)
BUN BLD-MCNC: 17 MG/DL (ref 7–18)
BUN SERPL-MCNC: 19 MG/DL (ref 7–18)
BUN/CREAT SERPL: 18 (ref 12–20)
CA-I BLD-MCNC: 1.3 MMOL/L (ref 1.12–1.32)
CALCIUM SERPL-MCNC: 9.1 MG/DL (ref 8.5–10.1)
CHLORIDE BLD-SCNC: 105 MMOL/L (ref 100–108)
CHLORIDE SERPL-SCNC: 110 MMOL/L (ref 100–111)
CO2 BLD-SCNC: 24 MMOL/L (ref 19–24)
CO2 SERPL-SCNC: 26 MMOL/L (ref 21–32)
CREAT SERPL-MCNC: 1.03 MG/DL (ref 0.6–1.3)
CREAT UR-MCNC: 1 MG/DL (ref 0.6–1.3)
DIFFERENTIAL METHOD BLD: ABNORMAL
ERYTHROCYTE [DISTWIDTH] IN BLOOD BY AUTOMATED COUNT: 16.2 % (ref 11.5–14.5)
GLOBULIN SER CALC-MCNC: 2.8 G/DL (ref 2–4)
GLUCOSE BLD STRIP.AUTO-MCNC: 97 MG/DL (ref 74–106)
GLUCOSE SERPL-MCNC: 82 MG/DL (ref 74–99)
HCT VFR BLD AUTO: 28.9 % (ref 36–48)
HCT VFR BLD CALC: 27 % (ref 36–49)
HGB BLD-MCNC: 9.2 G/DL (ref 12–16)
HGB BLD-MCNC: 9.9 G/DL (ref 12–16)
IRON SATN MFR SERPL: 21 %
IRON SERPL-MCNC: 66 UG/DL (ref 50–175)
LYMPHOCYTES # BLD: 0.4 K/UL (ref 1.1–5.9)
LYMPHOCYTES NFR BLD: 20 % (ref 14–44)
MAGNESIUM SERPL-MCNC: 1.7 MG/DL (ref 1.6–2.6)
MAGNESIUM SERPL-MCNC: 1.8 MG/DL (ref 1.6–2.6)
MCH RBC QN AUTO: 35.4 PG (ref 25–35)
MCHC RBC AUTO-ENTMCNC: 34.3 G/DL (ref 31–37)
MCV RBC AUTO: 103.2 FL (ref 78–102)
NEUTS SEG # BLD: 1.4 K/UL (ref 1.8–9.5)
NEUTS SEG NFR BLD: 75 % (ref 40–70)
PHOSPHATE SERPL-MCNC: 2.4 MG/DL (ref 2.5–4.9)
PLATELET # BLD AUTO: 275 K/UL (ref 140–440)
POTASSIUM BLD-SCNC: 4.2 MMOL/L (ref 3.5–5.5)
POTASSIUM SERPL-SCNC: 4.4 MMOL/L (ref 3.5–5.5)
PROT SERPL-MCNC: 6.6 G/DL (ref 6.4–8.2)
RBC # BLD AUTO: 2.8 M/UL (ref 4.1–5.1)
SODIUM BLD-SCNC: 140 MMOL/L (ref 136–145)
SODIUM SERPL-SCNC: 143 MMOL/L (ref 136–145)
TIBC SERPL-MCNC: 321 UG/DL (ref 250–450)
WBC # BLD AUTO: 1.9 K/UL (ref 4.5–13)

## 2019-12-12 PROCEDURE — 36591 DRAW BLOOD OFF VENOUS DEVICE: CPT

## 2019-12-12 PROCEDURE — 74011250636 HC RX REV CODE- 250/636: Performed by: INTERNAL MEDICINE

## 2019-12-12 PROCEDURE — 80053 COMPREHEN METABOLIC PANEL: CPT

## 2019-12-12 PROCEDURE — 84100 ASSAY OF PHOSPHORUS: CPT

## 2019-12-12 PROCEDURE — 96402 CHEMO HORMON ANTINEOPL SQ/IM: CPT

## 2019-12-12 PROCEDURE — 83735 ASSAY OF MAGNESIUM: CPT

## 2019-12-12 PROCEDURE — 77030012965 HC NDL HUBR BBMI -A

## 2019-12-12 PROCEDURE — 86300 IMMUNOASSAY TUMOR CA 15-3: CPT

## 2019-12-12 PROCEDURE — 80047 BASIC METABLC PNL IONIZED CA: CPT

## 2019-12-12 PROCEDURE — 74011250636 HC RX REV CODE- 250/636: Performed by: NURSE PRACTITIONER

## 2019-12-12 PROCEDURE — 85025 COMPLETE CBC W/AUTO DIFF WBC: CPT

## 2019-12-12 PROCEDURE — 96372 THER/PROPH/DIAG INJ SC/IM: CPT

## 2019-12-12 PROCEDURE — 74011250636 HC RX REV CODE- 250/636

## 2019-12-12 PROCEDURE — 83540 ASSAY OF IRON: CPT

## 2019-12-12 RX ORDER — SODIUM CHLORIDE 9 MG/ML
10 INJECTION INTRAMUSCULAR; INTRAVENOUS; SUBCUTANEOUS AS NEEDED
Status: CANCELLED | OUTPATIENT
Start: 2019-12-26

## 2019-12-12 RX ORDER — HEPARIN 100 UNIT/ML
300-500 SYRINGE INTRAVENOUS AS NEEDED
Status: DISCONTINUED | OUTPATIENT
Start: 2019-12-12 | End: 2019-12-13 | Stop reason: HOSPADM

## 2019-12-12 RX ORDER — HEPARIN 100 UNIT/ML
SYRINGE INTRAVENOUS
Status: COMPLETED
Start: 2019-12-12 | End: 2019-12-12

## 2019-12-12 RX ORDER — DIPHENHYDRAMINE HYDROCHLORIDE 50 MG/ML
50 INJECTION, SOLUTION INTRAMUSCULAR; INTRAVENOUS AS NEEDED
Status: CANCELLED
Start: 2020-01-09

## 2019-12-12 RX ORDER — SODIUM CHLORIDE 9 MG/ML
10 INJECTION INTRAMUSCULAR; INTRAVENOUS; SUBCUTANEOUS AS NEEDED
Status: DISCONTINUED | OUTPATIENT
Start: 2019-12-12 | End: 2019-12-13 | Stop reason: HOSPADM

## 2019-12-12 RX ORDER — ALBUTEROL SULFATE 0.83 MG/ML
2.5 SOLUTION RESPIRATORY (INHALATION) AS NEEDED
Status: CANCELLED
Start: 2020-01-09

## 2019-12-12 RX ORDER — LAMOTRIGINE 25 MG/1
500 TABLET ORAL ONCE
Status: COMPLETED | OUTPATIENT
Start: 2019-12-12 | End: 2019-12-12

## 2019-12-12 RX ORDER — ONDANSETRON 2 MG/ML
8 INJECTION INTRAMUSCULAR; INTRAVENOUS AS NEEDED
Status: CANCELLED | OUTPATIENT
Start: 2020-01-09

## 2019-12-12 RX ORDER — EPINEPHRINE 1 MG/ML
0.3 INJECTION, SOLUTION, CONCENTRATE INTRAVENOUS AS NEEDED
Status: CANCELLED | OUTPATIENT
Start: 2020-01-09

## 2019-12-12 RX ORDER — SODIUM CHLORIDE 0.9 % (FLUSH) 0.9 %
10 SYRINGE (ML) INJECTION AS NEEDED
Status: CANCELLED
Start: 2019-12-26

## 2019-12-12 RX ORDER — HEPARIN 100 UNIT/ML
300-500 SYRINGE INTRAVENOUS AS NEEDED
Status: CANCELLED
Start: 2019-12-26

## 2019-12-12 RX ORDER — ACETAMINOPHEN 325 MG/1
650 TABLET ORAL AS NEEDED
Status: CANCELLED
Start: 2020-01-09

## 2019-12-12 RX ORDER — HYDROCORTISONE SODIUM SUCCINATE 100 MG/2ML
100 INJECTION, POWDER, FOR SOLUTION INTRAMUSCULAR; INTRAVENOUS AS NEEDED
Status: CANCELLED | OUTPATIENT
Start: 2020-01-09

## 2019-12-12 RX ADMIN — SODIUM CHLORIDE 30 ML: 9 INJECTION INTRAMUSCULAR; INTRAVENOUS; SUBCUTANEOUS at 13:40

## 2019-12-12 RX ADMIN — HEPARIN 500 UNITS: 100 SYRINGE at 13:40

## 2019-12-12 RX ADMIN — DENOSUMAB 120 MG: 120 INJECTION SUBCUTANEOUS at 14:13

## 2019-12-12 RX ADMIN — FULVESTRANT 500 MG: 50 INJECTION INTRAMUSCULAR at 14:15

## 2019-12-12 RX ADMIN — EPOETIN ALFA-EPBX 60000 UNITS: 40000 INJECTION, SOLUTION INTRAVENOUS; SUBCUTANEOUS at 14:14

## 2019-12-12 NOTE — PROGRESS NOTES
[facility] Progress Note    Date: 2019    Name: Gilmer Ricketts    MRN: [id number]         : 1945      Ms. Figueroa arrived to [facility] at 1325. Ms. Ethan Leal was assessed and education was provided. Pt is here today q2 week CBC/ Retracrit, q4 week Xgeva, q4 week Faslodex, and monthly port flush. Ms. Daphne Moreno vitals were reviewed. Visit Vitals  /75 (BP 1 Location: Left arm, BP Patient Position: Sitting)   Pulse 78   Temp 98.1 °F (36.7 °C)   Resp 16   SpO2 99%       Pt was observed for 5 minutes after obtaining vital signs prior to initiating treatment. Pt's left upper chest port accessed & blood drawn for labs per order. Pt's port flushed with NS and heparin per order & de-accessed. Band-aid to site. Lab results obtained & reviewed. Recent Results (from the past 12 hour(s))   CBC WITH 3 PART DIFF    Collection Time: 19  1:40 PM   Result Value Ref Range    WBC 1.9 (L) 4.5 - 13.0 K/uL    RBC 2.80 (L) 4.10 - 5.10 M/uL    HGB 9.9 (L) 12.0 - 16 g/dL    HCT 28.9 (L) 36 - 48 %    .2 (H) 78 - 102 FL    MCH 35.4 (H) 25.0 - 35.0 PG    MCHC 34.3 31 - 37 g/dL    RDW 16.2 (H) 11.5 - 14.5 %    PLATELET 183 325 - 618 K/uL    NEUTROPHILS 75 (H) 40 - 70 %    MIXED CELLS 6 0.1 - 17 %    LYMPHOCYTES 20 14 - 44 %    ABS. NEUTROPHILS 1.4 (L) 1.8 - 9.5 K/UL    ABS. MIXED CELLS 0.1 0.0 - 2.3 K/uL    ABS.  LYMPHOCYTES 0.4 (L) 1.1 - 5.9 K/UL    DF AUTOMATED     POC CHEM8    Collection Time: 19  1:50 PM   Result Value Ref Range    CO2, POC 24 19 - 24 MMOL/L    Glucose, POC 97 74 - 106 MG/DL    BUN, POC 17 7 - 18 MG/DL    Creatinine, POC 1.0 0.6 - 1.3 MG/DL    GFRAA, POC >60 >60 ml/min/1.73m2    GFRNA, POC 54 (L) >60 ml/min/1.73m2    Sodium,  136 - 145 MMOL/L    Potassium, POC 4.2 3.5 - 5.5 MMOL/L    Calcium, ionized (POC) 1.30 1.12 - 1.32 mmol/L    Chloride,  100 - 108 MMOL/L    Anion gap, POC 15 10 - 20      Hematocrit, POC 27 (L) 36 - 49 %    Hemoglobin, POC 9.2 (L) 12 - 16 G/DL       Retacrit 57333 units administered as ordered SQ in pt's L upper arm (in 2 divided injections). Band-aids to sites. Xgeva 120mg administered as ordered SQ in pt's L upper arm. Band-aid to site. Faslodex 500mg administered as ordered IM in pt's L & R dorsogluteal muscles (250mg per injection). Band-aids to sites. Pt tolerated well without complaints. Pt armband removed & shredded. Ms. Linda Borges was discharged from [place] [address] in stable condition at 1430. She is to return on 12/26/19 at 1300 for her next appointment.     Rito Melo RN  December 12, 2019

## 2019-12-14 LAB — CANCER AG27-29 SERPL-ACNC: 46.9 U/ML (ref 0–38.6)

## 2019-12-26 ENCOUNTER — HOSPITAL ENCOUNTER (OUTPATIENT)
Dept: INFUSION THERAPY | Age: 74
Discharge: HOME | End: 2019-12-26
Payer: MEDICARE

## 2019-12-26 VITALS
RESPIRATION RATE: 16 BRPM | SYSTOLIC BLOOD PRESSURE: 136 MMHG | OXYGEN SATURATION: 96 % | HEART RATE: 81 BPM | TEMPERATURE: 98.4 F | DIASTOLIC BLOOD PRESSURE: 72 MMHG

## 2019-12-26 LAB
BASO+EOS+MONOS # BLD AUTO: 0.3 K/UL (ref 0–2.3)
BASO+EOS+MONOS NFR BLD AUTO: 15 % (ref 0.1–17)
DIFFERENTIAL METHOD BLD: ABNORMAL
ERYTHROCYTE [DISTWIDTH] IN BLOOD BY AUTOMATED COUNT: 17.6 % (ref 11.5–14.5)
HCT VFR BLD AUTO: 30.6 % (ref 36–48)
HGB BLD-MCNC: 10.6 G/DL (ref 12–16)
LYMPHOCYTES # BLD: 0.3 K/UL (ref 1.1–5.9)
LYMPHOCYTES NFR BLD: 15 % (ref 14–44)
MCH RBC QN AUTO: 35.6 PG (ref 25–35)
MCHC RBC AUTO-ENTMCNC: 34.6 G/DL (ref 31–37)
MCV RBC AUTO: 102.7 FL (ref 78–102)
NEUTS SEG # BLD: 1.6 K/UL (ref 1.8–9.5)
NEUTS SEG NFR BLD: 69 % (ref 40–70)
PLATELET # BLD AUTO: 215 K/UL (ref 140–440)
RBC # BLD AUTO: 2.98 M/UL (ref 4.1–5.1)
WBC # BLD AUTO: 2.2 K/UL (ref 4.5–13)

## 2019-12-26 PROCEDURE — 36415 COLL VENOUS BLD VENIPUNCTURE: CPT

## 2019-12-26 PROCEDURE — 85025 COMPLETE CBC W/AUTO DIFF WBC: CPT

## 2019-12-26 NOTE — PROGRESS NOTES
[facility] Progress Note    Date: 2019    Name: Hakeem Velasquez    MRN: [id number]         : 1945      Ms. Figueroa arrived in the [facility] today at , in stable condition, here for Q 2 Week CBC/Retacrit Injection. She was assessed and education was provided. Ms. Padma Chaudhary vitals were reviewed. Visit Vitals  /72 (BP 1 Location: Left arm, BP Patient Position: Sitting)   Pulse 81   Temp 98.4 °F (36.9 °C)   Resp 16   SpO2 96%           Blood for a CBC was drawn from her left AC at 1314, without incident. Lab results were obtained and reviewed. Recent Results (from the past 12 hour(s))   CBC WITH 3 PART DIFF    Collection Time: 19  1:15 PM   Result Value Ref Range    WBC 2.2 (L) 4.5 - 13.0 K/uL    RBC 2.98 (L) 4.10 - 5.10 M/uL    HGB 10.6 (L) 12.0 - 16 g/dL    HCT 30.6 (L) 36 - 48 %    .7 (H) 78 - 102 FL    MCH 35.6 (H) 25.0 - 35.0 PG    MCHC 34.6 31 - 37 g/dL    RDW 17.6 (H) 11.5 - 14.5 %    PLATELET 067 478 - 999 K/uL    NEUTROPHILS 69 40 - 70 %    MIXED CELLS 15 0.1 - 17 %    LYMPHOCYTES 15 14 - 44 %    ABS. NEUTROPHILS 1.6 (L) 1.8 - 9.5 K/UL    ABS. MIXED CELLS 0.3 0.0 - 2.3 K/uL    ABS. LYMPHOCYTES 0.3 (L) 1.1 - 5.9 K/UL    DF AUTOMATED           Retacrit Injection was HELD today, per order. Ms. Lamon Denver tolerated well, and had no complaints. Ms. Lamon Denver was discharged from [place] [address] in stable condition at 1330. .. She is to return in 2 weeks, on Thursday, -, at 1100,  for her next appointment, for Q 2 Week CBC/Retacrit Injection, & Q 28 Day, Xgeva & Faslodex Injections, & Monthly Port Flush.      Kimberley Nick RN  2019  10:45 AM

## 2019-12-30 DIAGNOSIS — Z85.3 PERSONAL HISTORY OF MALIGNANT NEOPLASM OF BREAST: Primary | ICD-10-CM

## 2019-12-30 DIAGNOSIS — C50.919 METASTATIC BREAST CANCER (HCC): ICD-10-CM

## 2020-01-09 ENCOUNTER — HOSPITAL ENCOUNTER (OUTPATIENT)
Dept: INFUSION THERAPY | Age: 75
Discharge: HOME | End: 2020-01-09
Payer: MEDICARE

## 2020-01-09 VITALS
TEMPERATURE: 98.3 F | RESPIRATION RATE: 18 BRPM | DIASTOLIC BLOOD PRESSURE: 72 MMHG | HEART RATE: 75 BPM | OXYGEN SATURATION: 98 % | SYSTOLIC BLOOD PRESSURE: 136 MMHG

## 2020-01-09 DIAGNOSIS — T45.1X5A ANEMIA DUE TO ANTINEOPLASTIC CHEMOTHERAPY: Primary | ICD-10-CM

## 2020-01-09 DIAGNOSIS — D64.81 ANEMIA DUE TO ANTINEOPLASTIC CHEMOTHERAPY: Primary | ICD-10-CM

## 2020-01-09 DIAGNOSIS — C50.311 MALIGNANT NEOPLASM OF LOWER-INNER QUADRANT OF RIGHT BREAST OF FEMALE, ESTROGEN RECEPTOR POSITIVE (HCC): ICD-10-CM

## 2020-01-09 DIAGNOSIS — C50.919 METASTATIC BREAST CANCER (HCC): ICD-10-CM

## 2020-01-09 DIAGNOSIS — Z17.0 MALIGNANT NEOPLASM OF LOWER-INNER QUADRANT OF RIGHT BREAST OF FEMALE, ESTROGEN RECEPTOR POSITIVE (HCC): ICD-10-CM

## 2020-01-09 LAB
ANION GAP BLD CALC-SCNC: 14 MMOL/L (ref 10–20)
BASO+EOS+MONOS # BLD AUTO: 0.1 K/UL (ref 0–2.3)
BASO+EOS+MONOS NFR BLD AUTO: 6 % (ref 0.1–17)
BUN BLD-MCNC: 16 MG/DL (ref 7–18)
CA-I BLD-MCNC: 1.21 MMOL/L (ref 1.12–1.32)
CHLORIDE BLD-SCNC: 105 MMOL/L (ref 100–108)
CO2 BLD-SCNC: 26 MMOL/L (ref 19–24)
CREAT UR-MCNC: 1.1 MG/DL (ref 0.6–1.3)
DIFFERENTIAL METHOD BLD: ABNORMAL
ERYTHROCYTE [DISTWIDTH] IN BLOOD BY AUTOMATED COUNT: 16.4 % (ref 11.5–14.5)
GLUCOSE BLD STRIP.AUTO-MCNC: 90 MG/DL (ref 74–106)
HCT VFR BLD AUTO: 30.6 % (ref 36–48)
HCT VFR BLD CALC: 28 % (ref 36–49)
HGB BLD-MCNC: 10.4 G/DL (ref 12–16)
HGB BLD-MCNC: 9.5 G/DL (ref 12–16)
LYMPHOCYTES # BLD: 0.3 K/UL (ref 1.1–5.9)
LYMPHOCYTES NFR BLD: 14 % (ref 14–44)
MAGNESIUM SERPL-MCNC: 1.7 MG/DL (ref 1.6–2.6)
MCH RBC QN AUTO: 35.1 PG (ref 25–35)
MCHC RBC AUTO-ENTMCNC: 34 G/DL (ref 31–37)
MCV RBC AUTO: 103.4 FL (ref 78–102)
NEUTS SEG # BLD: 1.8 K/UL (ref 1.8–9.5)
NEUTS SEG NFR BLD: 80 % (ref 40–70)
PHOSPHATE SERPL-MCNC: 2.4 MG/DL (ref 2.5–4.9)
PLATELET # BLD AUTO: 280 K/UL (ref 140–440)
POTASSIUM BLD-SCNC: 4.1 MMOL/L (ref 3.5–5.5)
RBC # BLD AUTO: 2.96 M/UL (ref 4.1–5.1)
SODIUM BLD-SCNC: 140 MMOL/L (ref 136–145)
WBC # BLD AUTO: 2.2 K/UL (ref 4.5–13)

## 2020-01-09 PROCEDURE — 83735 ASSAY OF MAGNESIUM: CPT

## 2020-01-09 PROCEDURE — 96402 CHEMO HORMON ANTINEOPL SQ/IM: CPT

## 2020-01-09 PROCEDURE — 85025 COMPLETE CBC W/AUTO DIFF WBC: CPT

## 2020-01-09 PROCEDURE — 96372 THER/PROPH/DIAG INJ SC/IM: CPT

## 2020-01-09 PROCEDURE — 84100 ASSAY OF PHOSPHORUS: CPT

## 2020-01-09 PROCEDURE — 74011250636 HC RX REV CODE- 250/636: Performed by: NURSE PRACTITIONER

## 2020-01-09 PROCEDURE — 36591 DRAW BLOOD OFF VENOUS DEVICE: CPT

## 2020-01-09 PROCEDURE — 74011250636 HC RX REV CODE- 250/636: Performed by: INTERNAL MEDICINE

## 2020-01-09 PROCEDURE — 80047 BASIC METABLC PNL IONIZED CA: CPT

## 2020-01-09 PROCEDURE — 77030012965 HC NDL HUBR BBMI -A

## 2020-01-09 RX ORDER — HEPARIN 100 UNIT/ML
300-500 SYRINGE INTRAVENOUS AS NEEDED
Status: CANCELLED
Start: 2020-01-23

## 2020-01-09 RX ORDER — ALBUTEROL SULFATE 0.83 MG/ML
2.5 SOLUTION RESPIRATORY (INHALATION) AS NEEDED
Status: CANCELLED
Start: 2020-02-06

## 2020-01-09 RX ORDER — LAMOTRIGINE 25 MG/1
500 TABLET ORAL ONCE
Status: COMPLETED | OUTPATIENT
Start: 2020-01-09 | End: 2020-01-09

## 2020-01-09 RX ORDER — SODIUM CHLORIDE 0.9 % (FLUSH) 0.9 %
10 SYRINGE (ML) INJECTION AS NEEDED
Status: CANCELLED
Start: 2020-01-23

## 2020-01-09 RX ORDER — ACETAMINOPHEN 325 MG/1
650 TABLET ORAL AS NEEDED
Status: CANCELLED
Start: 2020-02-06

## 2020-01-09 RX ORDER — HEPARIN 100 UNIT/ML
300-500 SYRINGE INTRAVENOUS AS NEEDED
Status: DISPENSED | OUTPATIENT
Start: 2020-01-09 | End: 2020-01-09

## 2020-01-09 RX ORDER — ONDANSETRON 2 MG/ML
8 INJECTION INTRAMUSCULAR; INTRAVENOUS AS NEEDED
Status: CANCELLED | OUTPATIENT
Start: 2020-02-06

## 2020-01-09 RX ORDER — SODIUM CHLORIDE 0.9 % (FLUSH) 0.9 %
10 SYRINGE (ML) INJECTION AS NEEDED
Status: DISPENSED | OUTPATIENT
Start: 2020-01-09 | End: 2020-01-09

## 2020-01-09 RX ORDER — HYDROCORTISONE SODIUM SUCCINATE 100 MG/2ML
100 INJECTION, POWDER, FOR SOLUTION INTRAMUSCULAR; INTRAVENOUS AS NEEDED
Status: CANCELLED | OUTPATIENT
Start: 2020-02-06

## 2020-01-09 RX ORDER — SODIUM CHLORIDE 9 MG/ML
10 INJECTION INTRAMUSCULAR; INTRAVENOUS; SUBCUTANEOUS AS NEEDED
Status: CANCELLED | OUTPATIENT
Start: 2020-01-23

## 2020-01-09 RX ORDER — DIPHENHYDRAMINE HYDROCHLORIDE 50 MG/ML
50 INJECTION, SOLUTION INTRAMUSCULAR; INTRAVENOUS AS NEEDED
Status: CANCELLED
Start: 2020-02-06

## 2020-01-09 RX ORDER — EPINEPHRINE 1 MG/ML
0.3 INJECTION, SOLUTION, CONCENTRATE INTRAVENOUS AS NEEDED
Status: CANCELLED | OUTPATIENT
Start: 2020-02-06

## 2020-01-09 RX ADMIN — Medication 10 ML: at 11:50

## 2020-01-09 RX ADMIN — Medication 500 UNITS: at 11:50

## 2020-01-09 RX ADMIN — FULVESTRANT 500 MG: 50 INJECTION INTRAMUSCULAR at 12:25

## 2020-01-09 RX ADMIN — DENOSUMAB 120 MG: 120 INJECTION SUBCUTANEOUS at 12:18

## 2020-01-09 NOTE — PROGRESS NOTES
[address] [facility] Progress Note    Date: 2020    Name: Codi Martinez    MRN: [id number]         : 1945    Xgeva/Faslodex/Retacrit    Ms. Figueroa arrived to [facility] at 1140. Ms. Marsh Snellen was assessed and education was provided. Pt is here today q2 week CBC/ Retracrit, q4 week Xgeva, q4 week Faslodex, and monthly port flush. Ms. Felix Tyler vitals were reviewed. Visit Vitals  /72 (BP 1 Location: Left arm, BP Patient Position: Sitting)   Pulse 75   Temp 98.3 °F (36.8 °C)   Resp 18   SpO2 98%       Pt was observed for 5 minutes after obtaining vital signs prior to initiating treatment. Lab results obtained & reviewed. Recent Results (from the past 12 hour(s))   CBC WITH 3 PART DIFF    Collection Time: 20 12:00 PM   Result Value Ref Range    WBC 2.2 (L) 4.5 - 13.0 K/uL    RBC 2.96 (L) 4.10 - 5.10 M/uL    HGB 10.4 (L) 12.0 - 16 g/dL    HCT 30.6 (L) 36 - 48 %    .4 (H) 78 - 102 FL    MCH 35.1 (H) 25.0 - 35.0 PG    MCHC 34.0 31 - 37 g/dL    RDW 16.4 (H) 11.5 - 14.5 %    PLATELET 032 725 - 759 K/uL    NEUTROPHILS 80 (H) 40 - 70 %    MIXED CELLS 6 0.1 - 17 %    LYMPHOCYTES 14 14 - 44 %    ABS. NEUTROPHILS 1.8 1.8 - 9.5 K/UL    ABS. MIXED CELLS 0.1 0.0 - 2.3 K/uL    ABS. LYMPHOCYTES 0.3 (L) 1.1 - 5.9 K/UL    DF AUTOMATED     POC CHEM8    Collection Time: 20 12:02 PM   Result Value Ref Range    CO2, POC 26 (H) 19 - 24 MMOL/L    Glucose, POC 90 74 - 106 MG/DL    BUN, POC 16 7 - 18 MG/DL    Creatinine, POC 1.1 0.6 - 1.3 MG/DL    GFRAA, POC 59 (L) >60 ml/min/1.73m2    GFRNA, POC 49 (L) >60 ml/min/1.73m2    Sodium,  136 - 145 MMOL/L    Potassium, POC 4.1 3.5 - 5.5 MMOL/L    Calcium, ionized (POC) 1.21 1.12 - 1.32 mmol/L    Chloride,  100 - 108 MMOL/L    Anion gap, POC 14 10 - 20      Hematocrit, POC 28 (L) 36 - 49 %    Hemoglobin, POC 9.5 (L) 12 - 16 G/DL     Meiport to Left chest accessed under sterile technique using 22g 1\" carbone needle, good blood return.  Line flushed with 10 ml normal saline, specimen collected for cbcw/diff, BMP, and mag. Line flushed with 20 ml normal saline followed by Heparin 500 units/5ml and De-accessed. Retacrit held for h/h greater than 10/30    Xgeva 120mg administered as ordered SQ in pt's L upper arm. Band-aid to site. Faslodex 500mg administered as ordered IM in pt's L & R Glute  muscles (250mg per injection). Band-aids to sites. Pt tolerated well without complaints. Pt armband removed & shredded. Ms. Quita Villalobos was discharged from [place] [address] in stable condition at 1230. She is to return Return in 2 weeks for her next appointment for Retacrit. Brant Chong RN  January 9, 2020

## 2020-01-13 RX ORDER — LAMOTRIGINE 25 MG/1
500 TABLET ORAL ONCE
Status: CANCELLED | OUTPATIENT
Start: 2020-02-06

## 2020-04-13 ENCOUNTER — HOSPITAL ENCOUNTER (OUTPATIENT)
Dept: ONCOLOGY | Age: 75
Discharge: HOME | End: 2020-04-13

## 2020-04-13 ENCOUNTER — OFFICE VISIT (OUTPATIENT)
Dept: ONCOLOGY | Age: 75
End: 2020-04-13

## 2020-04-13 VITALS
DIASTOLIC BLOOD PRESSURE: 68 MMHG | BODY MASS INDEX: 24.07 KG/M2 | RESPIRATION RATE: 18 BRPM | TEMPERATURE: 98.2 F | HEART RATE: 92 BPM | OXYGEN SATURATION: 98 % | HEIGHT: 64 IN | WEIGHT: 141 LBS | SYSTOLIC BLOOD PRESSURE: 124 MMHG

## 2020-04-13 DIAGNOSIS — T45.1X5A CHEMOTHERAPY-INDUCED THROMBOCYTOPENIA: ICD-10-CM

## 2020-04-13 DIAGNOSIS — T45.1X5A CHEMOTHERAPY INDUCED NEUTROPENIA (HCC): ICD-10-CM

## 2020-04-13 DIAGNOSIS — T45.1X5A ANTINEOPLASTIC CHEMOTHERAPY INDUCED ANEMIA: ICD-10-CM

## 2020-04-13 DIAGNOSIS — E55.9 VITAMIN D DEFICIENCY: ICD-10-CM

## 2020-04-13 DIAGNOSIS — C80.1 NEUROPATHY ASSOCIATED WITH CANCER (HCC): ICD-10-CM

## 2020-04-13 DIAGNOSIS — D69.59 CHEMOTHERAPY-INDUCED THROMBOCYTOPENIA: ICD-10-CM

## 2020-04-13 DIAGNOSIS — D64.81 ANTINEOPLASTIC CHEMOTHERAPY INDUCED ANEMIA: ICD-10-CM

## 2020-04-13 DIAGNOSIS — Z85.3 PERSONAL HISTORY OF MALIGNANT NEOPLASM OF BREAST: ICD-10-CM

## 2020-04-13 DIAGNOSIS — G89.3 CANCER ASSOCIATED PAIN: ICD-10-CM

## 2020-04-13 DIAGNOSIS — D72.819 CHRONIC LEUKOPENIA: ICD-10-CM

## 2020-04-13 DIAGNOSIS — D70.1 CHEMOTHERAPY INDUCED NEUTROPENIA (HCC): ICD-10-CM

## 2020-04-13 DIAGNOSIS — C50.919 METASTATIC BREAST CANCER (HCC): Primary | ICD-10-CM

## 2020-04-13 DIAGNOSIS — G63 NEUROPATHY ASSOCIATED WITH CANCER (HCC): ICD-10-CM

## 2020-04-13 DIAGNOSIS — C50.919 METASTATIC BREAST CANCER (HCC): ICD-10-CM

## 2020-04-13 NOTE — PROGRESS NOTES
Hematology/Oncology  Progress Note    Name: Richie Delavan  Date: 2020  : 1945    PCP: Jarocho Moe MD     Ms. Jagruti Mueller is a 76 y.o.  female who was seen for management of her metastatic breast cancer with associated complications of chemotherapy. Current therapy: Fulvestrant 500 mg subcutaneous monthly and Ibrance 125 mg by mouth daily. Subjective:     Mrs. Jagruti Mueller is a 66-year-old woman who has slowly progressive metastatic breast cancer. She is here today for for a reassessment after spending the winter months in [place]. . She is tolerating monthly Fulvestrant fairly well. The patient has previously completed external beam radiation therapy to lesions in her ribs and more recently she completed proton therapy to areas of bone involvement with a significant benefit with regards to pain control. The posttherapy imaging studies showed a significant decrease in the intensity of uptake on the bone scan. .  Additionally she is currently receiving systemic therapy with fulvestrant and Ibrance. However the patient states that prior to leaving [place] she was hospitalized for several days with pneumonia and at that time was told that [place] be discontinued until after she returns here to [place] and is seen by me for complete reassessment. She also informed me that although she does have anemia associated with her treatment she has not received any Procrit during her past several months stay in [place]. She is continuing to experience some fatigue and mild degree of weakness. Otherwise there are no additional issues. Past medical history, family history, and social history: these were reviewed and remains unchanged.     Past Medical History:   Diagnosis Date    Biliary colic     Breast CA ([facility] [address] 2012    Cancer [facility])     Right Breast    Chemotherapy convalescence or palliative care     Neuropathy     Radiation therapy complication     Thyroid disease      Past Surgical History:   Procedure Laterality Date    BREAST SURGERY PROCEDURE UNLISTED  10/2002    Right-Lesion    BREAST SURGERY PROCEDURE UNLISTED  2004    Right-Lesion    HX LAP CHOLECYSTECTOMY  13    HX MASTECTOMY      Right     Social History     Socioeconomic History    Marital status:      Spouse name: Not on file    Number of children: Not on file    Years of education: Not on file    Highest education level: Not on file   Occupational History    Not on file   Social Needs    Financial resource strain: Not on file    Food insecurity     Worry: Not on file     Inability: Not on file    Transportation needs     Medical: Not on file     Non-medical: Not on file   Tobacco Use    Smoking status: Former Smoker     Last attempt to quit: 1991     Years since quittin.8    Smokeless tobacco: Never Used   Substance and Sexual Activity    Alcohol use:  Yes     Alcohol/week: 0.0 standard drinks     Types: 1 - 2 Glasses of wine per week     Comment: socially, occassionally    Drug use: No    Sexual activity: Not on file   Lifestyle    Physical activity     Days per week: Not on file     Minutes per session: Not on file    Stress: Not on file   Relationships    Social connections     Talks on phone: Not on file     Gets together: Not on file     Attends [employer] service: Not on file     Active member of club or organization: Not on file     Attends meetings of clubs or organizations: Not on file     Relationship status: Not on file    Intimate partner violence     Fear of current or ex partner: Not on file     Emotionally abused: Not on file     Physically abused: Not on file     Forced sexual activity: Not on file   Other Topics Concern    Not on file   Social History Narrative    Not on file     Family History   Problem Relation Age of Onset    Cancer Maternal Aunt         Hodgkin's disease    Breast Cancer Paternal Aunt     Cancer Father         Prostate, lung, brain Current Outpatient Medications   Medication Sig Dispense Refill    gabapentin (NEURONTIN) 100 mg capsule Take 2 Caps by mouth three (3) times daily. Max Daily Amount: 600 mg. 180 Cap 3    Cetirizine (ZYRTEC) 10 mg cap Take 1 Cap by mouth daily as needed.  palbociclib (IBRANCE) 100 mg cap Take 1 Cap by mouth daily. For 21 days on and 7 days off every 28 days. 63 Cap 5    lidocaine HCl-hydrocortison ac topical cream Apply  to affected area two (2) times a day. 85 g 3    TETRACYCLINE HCL (TETRACYCLINE PO) Take 250 mg by mouth two (2) times a day.  fexofenadine-pseudoephedrine (ALLEGRA-D 24 HOUR) 180-240 mg per tablet Take 1 Tab by mouth daily as needed.  sulfacetamide (BLEPH-10) 10 % ophthalmic ointment Administer  to right eye three (3) times daily.  palbociclib (IBRANCE) 125 mg cap Take 125 mg by mouth daily. Take 1 tab po every day for 21 days on and 7 days off q 28 days  Indications: HORMONE RECEPTOR(+), HER2(-) ADVANCED BREAST CANCER 42 Cap 6    Cholecalciferol, Vitamin D3, 5,000 unit Tab Take  by mouth daily.  thyroid, Pork, (ARMOUR THYROID) 60 mg tablet Take 90 mg by mouth daily.  L-Mfolate-B6 Phos-Methyl-B12 (NEURPATH-B) 3-35-2 mg Tab Take  by mouth two (2) times a day.  warfarin (COUMADIN) 1 mg tablet Take 1 mg by mouth daily. Review of Systems  Constitutional: The patient has no acute distress or discomfort. HEENT: The patient denies recent head trauma, eye pain, blurred vision,  hearing deficit, oropharyngeal mucosal pain or lesions, and the patient denies throat pain or discomfort. Lymphatics: The patient denies palpable peripheral lymphadenopathy. Hematologic: The patient denies having bruising, bleeding, or progressive fatigue. Respiratory: Patient denies having shortness of breath, cough, sputum production, fever, or dyspnea on exertion. Cardiovascular:  The patient denies having leg pain, leg swelling, heart palpitations, chest permit, chest pain, or lightheadedness. The patient denies having dyspnea on exertion. Gastrointestinal: The patient denies having nausea, emesis, or diarrhea. The patient denies having any hematemesis or blood in the stool. Genitourinary: Patient denies having urinary urgency, frequency, or dysuria. The patient denies having blood in the urine. Psychological: The patient denies having symptoms of nervousness, anxiety, depression, or thoughts of harming self. Skin: Patient denies having skin rashes, skin, ulcerations, or unexplained itching or pruritus. Musculoskeletal: The patient denies having pain in the joints or bones. Objective:     Visit Vitals  /68   Pulse 92   Temp 98.2 °F (36.8 °C) (Oral)   Resp 18   Ht 5' 4\" (1.626 m)   Wt 64 kg (141 lb)   SpO2 98%   BMI 24.20 kg/m²     ECOG PS=0  Physical Exam:   Gen. Appearance: The patient is in no acute distress. Skin: There is no bruise or rash. HEENT: The exam is unremarkable. Neck: Supple without lymphadenopathy or thyromegaly. Lungs: Clear to auscultation and percussion; there are no wheezes or rhonchi. Heart: Regular rate and rhythm; there are no murmurs, gallops, or rubs. Anterior chest wall and breast: There is no evidence of local recurrence of disease. The axilla reveals no palpable axillary lymphadenopathy. Abdomen: Bowel sounds are present and normal.  There is no guarding, tenderness, or hepatosplenomegaly. Extremities: There is no clubbing, cyanosis, or edema. Neurologic: There are no focal neurologic deficits. Lymphatics: There is no palpable peripheral lymphadenopathy. Musculoskeletal: The patient has full range of motion at all joints. There is no evidence of joint deformity or effusions. There is no focal joint tenderness. Psychological/psychiatric: There is no clinical evidence of anxiety, depression, or melancholy.     Lab data:      Results for orders placed or performed during the hospital encounter of 01/09/20   CBC WITH 3 PART DIFF     Status: Abnormal   Result Value Ref Range Status    WBC 2.2 (L) 4.5 - 13.0 K/uL Final    RBC 2.96 (L) 4.10 - 5.10 M/uL Final    HGB 10.4 (L) 12.0 - 16 g/dL Final    HCT 30.6 (L) 36 - 48 % Final    .4 (H) 78 - 102 FL Final    MCH 35.1 (H) 25.0 - 35.0 PG Final    MCHC 34.0 31 - 37 g/dL Final    RDW 16.4 (H) 11.5 - 14.5 % Final    PLATELET 623 614 - 279 K/uL Final    NEUTROPHILS 80 (H) 40 - 70 % Final    MIXED CELLS 6 0.1 - 17 % Final    LYMPHOCYTES 14 14 - 44 % Final    ABS. NEUTROPHILS 1.8 1.8 - 9.5 K/UL Final    ABS. MIXED CELLS 0.1 0.0 - 2.3 K/uL Final    ABS. LYMPHOCYTES 0.3 (L) 1.1 - 5.9 K/UL Final     Comment: Test performed at [address] [place] [address] or Outpatient Infusion Center Location. Reviewed by Medical Director. DF AUTOMATED   Final           Assessment:     1. Metastatic breast cancer ([facility] [address]    2. Neuropathy associated with cancer (HCC)    3. Cancer associated pain    4. Antineoplastic chemotherapy induced anemia    5. Chronic leukopenia    6. Vitamin D deficiency    7. Chemotherapy induced neutropenia (HCC)    8. Chemotherapy-induced thrombocytopenia      Plan:     Metastatic breast cancer: The combination of Fulvestrant 500 mg subcutaneous monthly  will be continued. Ibrance 140 mg by mouth daily will remain on hold until after we complete her assessment with a whole-body bone scan and CT scans of the chest, abdomen, and pelvis. We will also check her CA 27-29 level will plan to see her back in clinic on 4/27/2019. Neuropathy associated with cancer: I will continue her Neurontin at 400 mg 3 times daily. She does not need a refill at this time. Chemotherapy-induced neutropenia/chronic leukopenia (persisting problem): If the absolute neutrophil count declines to 0.5 she will be started on Neupogen or Neulasta. She will got her labs drawn on 11/29/2019 which shows a WBC of 2.5, ANC of 1.7, total neutrophils of 71.       Chemotherapy-induced anemia (persisting problem): At this time I will check iron profile and ferritin levels. I have recommended that the patient continue taking ferrous sulfate 1 tablet daily. Procrit at a dose of 60,000 units subcutaneous will be provided now that her hemoglobin has declined below 10 g/dL hematocrit is below 30%. The most recent CBC from 4/6/2020 showed a WBC count of 3.8, hemoglobin of 10.7 g/dL, hematocrit of 33.1%, and a platelet count was 552,139. Chemotherapy-induced thrombocytopenia : The patient is continuing to hold the Ibrance 140 mg daily. The most recent platelet count from 0/3/9946 was 346,000. We will decide whether or not to resume Ibrance after the reassessment evaluation with the bone scan and CT scans have been completed. Follow-up on 4/27/2020. Orders Placed This Encounter    COMPLETE CBC & AUTO DIFF WBC    COMPLETE CBC & AUTO DIFF WBC    InHouse CBC ([facility])     Standing Status:   Future     Number of Occurrences:   1     Standing Expiration Date:   4/20/2020    InHouse CBC ([employer])     Standing Status:   Future     Standing Expiration Date:   3/82/2315    METABOLIC PANEL, COMPREHENSIVE     Standing Status:   Future     Standing Expiration Date:   4/14/2021    FERRITIN     Standing Status:   Future     Standing Expiration Date:   4/13/2021    IRON PROFILE     Standing Status:   Future     Standing Expiration Date:   4/14/2021    VITAMIN D, 25 HYDROXY     Standing Status:   Future     Standing Expiration Date:   4/13/2021    CA 27.29     Standing Status:   Future     Standing Expiration Date:   4/13/2021       Wing Soto MD  4/13/2020      Please note: This document has been produced using voice recognition software. Unrecognized errors in transcription may be present.

## 2020-04-13 NOTE — PATIENT INSTRUCTIONS

## 2020-04-13 NOTE — PROGRESS NOTES
Hematology/Oncology  Progress Note Name: Brenda Betancourt Date: 2020 : 1945 PCP: Zheng Anders MD  
 
 
 
 
Subjective:  
 
  
  
Past medical history, family history, and social history: these were reviewed and remains unchanged. Past Medical History:  
Diagnosis Date  Biliary colic  Breast CA ([facility] [address] 2012  Cancer ([facility] [address] 1991 Right Breast  
 Chemotherapy convalescence or palliative care  Neuropathy  Radiation therapy complication  Thyroid disease Past Surgical History:  
Procedure Laterality Date  BREAST SURGERY PROCEDURE UNLISTED  10/2002 [facility]  BREAST SURGERY PROCEDURE UNLISTED  2004 [facility]  HX LAP CHOLECYSTECTOMY  13  
 HX MASTECTOMY   Right Social History Socioeconomic History  Marital status:  Spouse name: Not on file  Number of children: Not on file  Years of education: Not on file  Highest education level: Not on file Occupational History  Not on file Social Needs  Financial resource strain: Not on file  Food insecurity Worry: Not on file Inability: Not on file  Transportation needs Medical: Not on file Non-medical: Not on file Tobacco Use  Smoking status: Former Smoker Last attempt to quit: 1991 Years since quittin.8  Smokeless tobacco: Never Used Substance and Sexual Activity  Alcohol use: Yes Alcohol/week: 0.0 standard drinks Types: 1 - 2 Glasses of wine per week Comment: socially, occassionally  Drug use: No  
 Sexual activity: Not on file Lifestyle  Physical activity Days per week: Not on file Minutes per session: Not on file  Stress: Not on file Relationships  Social connections Talks on phone: Not on file Gets together: Not on file Attends Nondenominational service: Not on file Active member of club or organization: Not on file Attends meetings of clubs or organizations: Not on file Relationship status: Not on file  Intimate partner violence Fear of current or ex partner: Not on file Emotionally abused: Not on file Physically abused: Not on file Forced sexual activity: Not on file Other Topics Concern  Not on file Social History Narrative  Not on file Family History Problem Relation Age of Onset  Cancer Maternal Aunt Hodgkin's disease  Breast Cancer Paternal Aunt  Cancer Father Prostate, lung, brain Current Outpatient Medications Medication Sig Dispense Refill  gabapentin (NEURONTIN) 100 mg capsule Take 2 Caps by mouth three (3) times daily. Max Daily Amount: 600 mg. 180 Cap 3  
 Cetirizine (ZYRTEC) 10 mg cap Take 1 Cap by mouth daily as needed.  palbociclib (IBRANCE) 100 mg cap Take 1 Cap by mouth daily. For 21 days on and 7 days off every 28 days. 63 Cap 5  lidocaine HCl-hydrocortison ac topical cream Apply  to affected area two (2) times a day. 85 g 3  
 TETRACYCLINE HCL (TETRACYCLINE PO) Take 250 mg by mouth two (2) times a day.  fexofenadine-pseudoephedrine (ALLEGRA-D 24 HOUR) 180-240 mg per tablet Take 1 Tab by mouth daily as needed.  sulfacetamide (BLEPH-10) 10 % ophthalmic ointment Administer  to right eye three (3) times daily.  palbociclib (IBRANCE) 125 mg cap Take 125 mg by mouth daily. Take 1 tab po every day for 21 days on and 7 days off q 28 days  Indications: HORMONE RECEPTOR(+), HER2(-) ADVANCED BREAST CANCER 42 Cap 6  Cholecalciferol, Vitamin D3, 5,000 unit Tab Take  by mouth daily.  thyroid, Pork, (ARMOUR THYROID) 60 mg tablet Take 90 mg by mouth daily.  L-Mfolate-B6 Phos-Methyl-B12 (NEURPATH-B) 3-35-2 mg Tab Take  by mouth two (2) times a day.  warfarin (COUMADIN) 1 mg tablet Take 1 mg by mouth daily. Review of Systems Constitutional: The patient has no acute distress or discomfort. HEENT: The patient denies recent head trauma, eye pain, blurred vision,  hearing deficit, oropharyngeal mucosal pain or lesions, and the patient denies throat pain or discomfort. Lymphatics: The patient denies palpable peripheral lymphadenopathy. Hematologic: The patient denies having bruising, bleeding, or progressive fatigue. Respiratory: Patient denies having shortness of breath, cough, sputum production, fever, or dyspnea on exertion. Cardiovascular: The patient denies having leg pain, leg swelling, heart palpitations, chest permit, chest pain, or lightheadedness. The patient denies having dyspnea on exertion. Gastrointestinal: The patient denies having nausea, emesis, or diarrhea. The patient denies having any hematemesis or blood in the stool. Genitourinary: Patient denies having urinary urgency, frequency, or dysuria. The patient denies having blood in the urine. Psychological: The patient denies having symptoms of nervousness, anxiety, depression, or thoughts of harming self. Skin: Patient denies having skin rashes, skin, ulcerations, or unexplained itching or pruritus. Musculoskeletal: The patient denies having pain in the joints or bones. Objective:  
 
Visit Vitals /68 Pulse 92 Temp 98.2 °F (36.8 °C) (Oral) Resp 18 Ht 5' 4\" (1.626 m) Wt 64 kg (141 lb) SpO2 98% BMI 24.20 kg/m² ECOG PS=0; Pain score= 3/10 Physical Exam:  
Gen. Appearance: The patient is in no acute distress. Skin: There is no bruise or rash. There is no evidence of cutaneous shingles over the lower back and flank. HEENT: The exam is unremarkable. Neck: Supple without lymphadenopathy or thyromegaly. Lungs: Clear to auscultation and percussion; there are no wheezes or rhonchi. Heart: Regular rate and rhythm; there are no murmurs, gallops, or rubs. Abdomen:  Bowel sounds are present and normal.  There is no guarding, tenderness, or hepatosplenomegaly. Extremities: There is no clubbing, cyanosis, or edema. Neurologic: There are no focal neurologic deficits. Lymphatics: There is no palpable peripheral lymphadenopathy. Musculoskeletal: The patient has full range of motion at all joints. There is no evidence of joint deformity or effusions. There is no focal joint tenderness. Psychological/psychiatric: There is no clinical evidence of anxiety, depression, or melancholy. Lab data: 
   
Results for orders placed or performed during the hospital encounter of 01/09/20 CBC WITH 3 PART DIFF     Status: Abnormal  
Result Value Ref Range Status WBC 2.2 (L) 4.5 - 13.0 K/uL Final  
 RBC 2.96 (L) 4.10 - 5.10 M/uL Final  
 HGB 10.4 (L) 12.0 - 16 g/dL Final  
 HCT 30.6 (L) 36 - 48 % Final  
 .4 (H) 78 - 102 FL Final  
 MCH 35.1 (H) 25.0 - 35.0 PG Final  
 MCHC 34.0 31 - 37 g/dL Final  
 RDW 16.4 (H) 11.5 - 14.5 % Final  
 PLATELET 625 008 - 838 K/uL Final  
 NEUTROPHILS 80 (H) 40 - 70 % Final  
 MIXED CELLS 6 0.1 - 17 % Final  
 LYMPHOCYTES 14 14 - 44 % Final  
 ABS. NEUTROPHILS 1.8 1.8 - 9.5 K/UL Final  
 ABS. MIXED CELLS 0.1 0.0 - 2.3 K/uL Final  
 ABS. LYMPHOCYTES 0.3 (L) 1.1 - 5.9 K/UL Final  
  Comment: Test performed at [address] [place] [address] or Outpatient Infusion Center Location. Reviewed by Medical Director. DF AUTOMATED   Final  
 
 
   
Assessment: 1. Metastatic breast cancer ([address] 2. Neuropathy associated with cancer ([address] 3. Cancer associated pain 4. Antineoplastic chemotherapy induced anemia 5. Chronic leukopenia 6. Vitamin D deficiency Plan:  
 
 
Christine Garcia MD 
4/13/2020 I have assessed the patient independently and  agree with the full assessment as outlined.  
Woo Braun MD, Jose Guadalupe Calderón

## 2020-04-15 ENCOUNTER — HOSPITAL ENCOUNTER (OUTPATIENT)
Dept: NUCLEAR MEDICINE | Age: 75
Discharge: HOME | End: 2020-04-15
Attending: INTERNAL MEDICINE
Payer: MEDICARE

## 2020-04-15 DIAGNOSIS — C50.311 MALIGNANT NEOPLASM OF LOWER-INNER QUADRANT OF RIGHT BREAST OF FEMALE, ESTROGEN RECEPTOR POSITIVE (HCC): ICD-10-CM

## 2020-04-15 DIAGNOSIS — Z17.0 MALIGNANT NEOPLASM OF LOWER-INNER QUADRANT OF RIGHT BREAST OF FEMALE, ESTROGEN RECEPTOR POSITIVE (HCC): ICD-10-CM

## 2020-04-15 DIAGNOSIS — C79.51 SECONDARY MALIGNANT NEOPLASM OF BONE AND BONE MARROW (HCC): ICD-10-CM

## 2020-04-15 DIAGNOSIS — Z85.3 PERSONAL HISTORY OF MALIGNANT NEOPLASM OF BREAST: Primary | ICD-10-CM

## 2020-04-15 DIAGNOSIS — C50.919 METASTATIC BREAST CANCER (HCC): ICD-10-CM

## 2020-04-15 DIAGNOSIS — C79.52 SECONDARY MALIGNANT NEOPLASM OF BONE AND BONE MARROW (HCC): ICD-10-CM

## 2020-04-15 PROCEDURE — 78306 BONE IMAGING WHOLE BODY: CPT

## 2020-04-17 ENCOUNTER — HOSPITAL ENCOUNTER (OUTPATIENT)
Dept: CT IMAGING | Age: 75
Discharge: HOME | End: 2020-04-17
Attending: INTERNAL MEDICINE
Payer: MEDICARE

## 2020-04-17 ENCOUNTER — APPOINTMENT (OUTPATIENT)
Dept: CT IMAGING | Age: 75
End: 2020-04-17
Attending: INTERNAL MEDICINE
Payer: MEDICARE

## 2020-04-17 DIAGNOSIS — C79.52 SECONDARY MALIGNANT NEOPLASM OF BONE AND BONE MARROW (HCC): ICD-10-CM

## 2020-04-17 DIAGNOSIS — Z85.3 PERSONAL HISTORY OF MALIGNANT NEOPLASM OF BREAST: ICD-10-CM

## 2020-04-17 DIAGNOSIS — C79.51 SECONDARY MALIGNANT NEOPLASM OF BONE AND BONE MARROW (HCC): ICD-10-CM

## 2020-04-17 DIAGNOSIS — C50.919 METASTATIC BREAST CANCER (HCC): ICD-10-CM

## 2020-04-17 DIAGNOSIS — Z17.0 MALIGNANT NEOPLASM OF LOWER-INNER QUADRANT OF RIGHT BREAST OF FEMALE, ESTROGEN RECEPTOR POSITIVE (HCC): ICD-10-CM

## 2020-04-17 DIAGNOSIS — C50.311 MALIGNANT NEOPLASM OF LOWER-INNER QUADRANT OF RIGHT BREAST OF FEMALE, ESTROGEN RECEPTOR POSITIVE (HCC): ICD-10-CM

## 2020-04-17 PROCEDURE — 74177 CT ABD & PELVIS W/CONTRAST: CPT

## 2020-04-17 PROCEDURE — 74011250636 HC RX REV CODE- 250/636

## 2020-04-17 PROCEDURE — 74011636320 HC RX REV CODE- 636/320: Performed by: INTERNAL MEDICINE

## 2020-04-17 RX ORDER — HEPARIN SODIUM (PORCINE) LOCK FLUSH IV SOLN 100 UNIT/ML 100 UNIT/ML
SOLUTION INTRAVENOUS
Status: COMPLETED
Start: 2020-04-17 | End: 2020-04-17

## 2020-04-17 RX ORDER — HEPARIN SODIUM (PORCINE) LOCK FLUSH IV SOLN 100 UNIT/ML 100 UNIT/ML
500 SOLUTION INTRAVENOUS AS NEEDED
Status: CANCELLED | OUTPATIENT
Start: 2020-04-17

## 2020-04-17 RX ORDER — HEPARIN 100 UNIT/ML
300-500 SYRINGE INTRAVENOUS AS NEEDED
Status: CANCELLED
Start: 2020-04-23

## 2020-04-17 RX ORDER — SODIUM CHLORIDE 0.9 % (FLUSH) 0.9 %
10 SYRINGE (ML) INJECTION AS NEEDED
Status: CANCELLED
Start: 2020-04-23

## 2020-04-17 RX ORDER — SODIUM CHLORIDE 9 MG/ML
10 INJECTION INTRAMUSCULAR; INTRAVENOUS; SUBCUTANEOUS AS NEEDED
Status: CANCELLED | OUTPATIENT
Start: 2020-04-23

## 2020-04-17 RX ADMIN — DIATRIZOATE MEGLUMINE AND DIATRIZOATE SODIUM 30 ML: 660; 100 LIQUID ORAL; RECTAL at 11:30

## 2020-04-17 RX ADMIN — HEPARIN SODIUM (PORCINE) LOCK FLUSH IV SOLN 100 UNIT/ML 500 UNITS: 100 SOLUTION at 13:30

## 2020-04-17 RX ADMIN — IOPAMIDOL 75 ML: 612 INJECTION, SOLUTION INTRAVENOUS at 13:20

## 2020-04-23 ENCOUNTER — HOSPITAL ENCOUNTER (OUTPATIENT)
Dept: INFUSION THERAPY | Age: 75
Discharge: HOME | End: 2020-04-23
Payer: MEDICARE

## 2020-04-23 VITALS
RESPIRATION RATE: 18 BRPM | DIASTOLIC BLOOD PRESSURE: 69 MMHG | HEART RATE: 82 BPM | SYSTOLIC BLOOD PRESSURE: 127 MMHG | OXYGEN SATURATION: 97 % | TEMPERATURE: 98.5 F

## 2020-04-23 LAB
25(OH)D3 SERPL-MCNC: 62.7 NG/ML (ref 30–100)
ALBUMIN SERPL-MCNC: 3.5 G/DL (ref 3.4–5)
ALBUMIN/GLOB SERPL: 1.1 {RATIO} (ref 0.8–1.7)
ALP SERPL-CCNC: 65 U/L (ref 45–117)
ALT SERPL-CCNC: 20 U/L (ref 13–56)
ANION GAP SERPL CALC-SCNC: 7 MMOL/L (ref 3–18)
AST SERPL-CCNC: 22 U/L (ref 10–38)
BASO+EOS+MONOS # BLD AUTO: 0.5 K/UL (ref 0–2.3)
BASO+EOS+MONOS NFR BLD AUTO: 10 % (ref 0.1–17)
BILIRUB SERPL-MCNC: 0.5 MG/DL (ref 0.2–1)
BUN SERPL-MCNC: 17 MG/DL (ref 7–18)
BUN/CREAT SERPL: 23 (ref 12–20)
CALCIUM SERPL-MCNC: 9.2 MG/DL (ref 8.5–10.1)
CHLORIDE SERPL-SCNC: 106 MMOL/L (ref 100–111)
CO2 SERPL-SCNC: 28 MMOL/L (ref 21–32)
CREAT SERPL-MCNC: 0.73 MG/DL (ref 0.6–1.3)
DIFFERENTIAL METHOD BLD: ABNORMAL
ERYTHROCYTE [DISTWIDTH] IN BLOOD BY AUTOMATED COUNT: 15 % (ref 11.5–14.5)
FERRITIN SERPL-MCNC: 462 NG/ML (ref 8–388)
GLOBULIN SER CALC-MCNC: 3.2 G/DL (ref 2–4)
GLUCOSE SERPL-MCNC: 87 MG/DL (ref 74–99)
HCT VFR BLD AUTO: 33 % (ref 36–48)
HGB BLD-MCNC: 11 G/DL (ref 12–16)
IRON SATN MFR SERPL: 24 % (ref 20–50)
IRON SERPL-MCNC: 66 UG/DL (ref 50–175)
LYMPHOCYTES # BLD: 0.8 K/UL (ref 1.1–5.9)
LYMPHOCYTES NFR BLD: 16 % (ref 14–44)
MCH RBC QN AUTO: 32.6 PG (ref 25–35)
MCHC RBC AUTO-ENTMCNC: 33.3 G/DL (ref 31–37)
MCV RBC AUTO: 97.9 FL (ref 78–102)
NEUTS SEG # BLD: 3.5 K/UL (ref 1.8–9.5)
NEUTS SEG NFR BLD: 74 % (ref 40–70)
PLATELET # BLD AUTO: 249 K/UL (ref 140–440)
POTASSIUM SERPL-SCNC: 4.3 MMOL/L (ref 3.5–5.5)
PROT SERPL-MCNC: 6.7 G/DL (ref 6.4–8.2)
RBC # BLD AUTO: 3.37 M/UL (ref 4.1–5.1)
SODIUM SERPL-SCNC: 141 MMOL/L (ref 136–145)
TIBC SERPL-MCNC: 278 UG/DL (ref 250–450)
WBC # BLD AUTO: 4.8 K/UL (ref 4.5–13)

## 2020-04-23 PROCEDURE — 82306 VITAMIN D 25 HYDROXY: CPT

## 2020-04-23 PROCEDURE — 83540 ASSAY OF IRON: CPT

## 2020-04-23 PROCEDURE — 80053 COMPREHEN METABOLIC PANEL: CPT

## 2020-04-23 PROCEDURE — 85025 COMPLETE CBC W/AUTO DIFF WBC: CPT

## 2020-04-23 PROCEDURE — 86300 IMMUNOASSAY TUMOR CA 15-3: CPT

## 2020-04-23 PROCEDURE — 96523 IRRIG DRUG DELIVERY DEVICE: CPT

## 2020-04-23 PROCEDURE — 77030012965 HC NDL HUBR BBMI -A

## 2020-04-23 PROCEDURE — 74011250636 HC RX REV CODE- 250/636: Performed by: INTERNAL MEDICINE

## 2020-04-23 PROCEDURE — 82728 ASSAY OF FERRITIN: CPT

## 2020-04-23 RX ORDER — HEPARIN 100 UNIT/ML
500 SYRINGE INTRAVENOUS ONCE
Status: COMPLETED | OUTPATIENT
Start: 2020-04-23 | End: 2020-04-23

## 2020-04-23 RX ORDER — SODIUM CHLORIDE 0.9 % (FLUSH) 0.9 %
10-40 SYRINGE (ML) INJECTION EVERY 8 HOURS
Status: DISCONTINUED | OUTPATIENT
Start: 2020-04-23 | End: 2020-04-27 | Stop reason: HOSPADM

## 2020-04-23 RX ADMIN — HEPARIN 500 UNITS: 100 SYRINGE at 13:30

## 2020-04-23 RX ADMIN — Medication 10 ML: at 13:30

## 2020-04-23 NOTE — PROGRESS NOTES
[facility] Progress Note    Date: 2020    Name: Samantha Walls    MRN: [id number]         : 1945    Xgeva/Faslodex/Retacrit    Ms. Figueroa arrived to [facility] at 1315. Ms. Ailyn Melgar was assessed and education was provided. Pt is here today q2 week CBC/ Retracrit and monthly port flush. Additional lab work requested to be drawn by Dr. Elizabeth Holder office consisting of metabolic panel, iron profile, ferritin, Vit D, Ca27.29    Ms. Figueroa's vitals were reviewed. Visit Vitals  /69 (BP 1 Location: Left arm, BP Patient Position: Sitting)   Pulse 82   Temp 98.5 °F (36.9 °C)   Resp 18   SpO2 97%       Pt was observed for 5 minutes after obtaining vital signs prior to initiating treatment. Lab results obtained & reviewed. Recent Results (from the past 12 hour(s))   CBC WITH 3 PART DIFF    Collection Time: 20  1:38 PM   Result Value Ref Range    WBC 4.8 4.5 - 13.0 K/uL    RBC 3.37 (L) 4.10 - 5.10 M/uL    HGB 11.0 (L) 12.0 - 16 g/dL    HCT 33.0 (L) 36 - 48 %    MCV 97.9 78 - 102 FL    MCH 32.6 25.0 - 35.0 PG    MCHC 33.3 31 - 37 g/dL    RDW 15.0 (H) 11.5 - 14.5 %    PLATELET 269 695 - 561 K/uL    NEUTROPHILS 74 (H) 40 - 70 %    MIXED CELLS 10 0.1 - 17 %    LYMPHOCYTES 16 14 - 44 %    ABS. NEUTROPHILS 3.5 1.8 - 9.5 K/UL    ABS. MIXED CELLS 0.5 0.0 - 2.3 K/uL    ABS. LYMPHOCYTES 0.8 (L) 1.1 - 5.9 K/UL    DF AUTOMATED       Mediport to Left chest accessed under sterile technique using 20g 1\" carbone needle, good blood return. Line flushed with 10 ml normal saline, blood specimens collected. Line flushed with 20 ml normal saline followed by Heparin 500 units/5ml and De-accessed. Retacrit held for h/h greater than 10/30    Pt tolerated well without complaints. Pt armband removed & shredded. Ms. Ailyn Melgar was discharged from [place] [address] in stable condition at 1350.  She is to return Return in 2 weeks for her next appointment 2020 at 1pm for CBC/ Retracrit, q4 week [employer], q4 week Faslodex, and monthly port flush.       Lisa Hemphill  April 23, 2020

## 2020-04-24 LAB — CANCER AG27-29 SERPL-ACNC: 54.6 U/ML (ref 0–38.6)

## 2020-04-27 ENCOUNTER — OFFICE VISIT (OUTPATIENT)
Dept: ONCOLOGY | Age: 75
End: 2020-04-27

## 2020-04-27 VITALS
DIASTOLIC BLOOD PRESSURE: 62 MMHG | OXYGEN SATURATION: 97 % | TEMPERATURE: 97.9 F | HEART RATE: 82 BPM | HEIGHT: 64 IN | RESPIRATION RATE: 18 BRPM | WEIGHT: 141 LBS | BODY MASS INDEX: 24.07 KG/M2 | SYSTOLIC BLOOD PRESSURE: 116 MMHG

## 2020-04-27 DIAGNOSIS — C80.1 NEUROPATHY ASSOCIATED WITH CANCER (HCC): ICD-10-CM

## 2020-04-27 DIAGNOSIS — T45.1X5A ANTINEOPLASTIC CHEMOTHERAPY INDUCED ANEMIA: ICD-10-CM

## 2020-04-27 DIAGNOSIS — G89.3 CANCER ASSOCIATED PAIN: ICD-10-CM

## 2020-04-27 DIAGNOSIS — G63 NEUROPATHY ASSOCIATED WITH CANCER (HCC): ICD-10-CM

## 2020-04-27 DIAGNOSIS — T45.1X5A ANEMIA DUE TO ANTINEOPLASTIC CHEMOTHERAPY: ICD-10-CM

## 2020-04-27 DIAGNOSIS — D72.819 CHRONIC LEUKOPENIA: ICD-10-CM

## 2020-04-27 DIAGNOSIS — D64.81 ANTINEOPLASTIC CHEMOTHERAPY INDUCED ANEMIA: ICD-10-CM

## 2020-04-27 DIAGNOSIS — D64.81 ANEMIA DUE TO ANTINEOPLASTIC CHEMOTHERAPY: ICD-10-CM

## 2020-04-27 DIAGNOSIS — E55.9 VITAMIN D DEFICIENCY: ICD-10-CM

## 2020-04-27 DIAGNOSIS — T45.1X5A CHEMOTHERAPY-INDUCED THROMBOCYTOPENIA: ICD-10-CM

## 2020-04-27 DIAGNOSIS — D69.59 CHEMOTHERAPY-INDUCED THROMBOCYTOPENIA: ICD-10-CM

## 2020-04-27 DIAGNOSIS — C50.919 METASTATIC BREAST CANCER (HCC): Primary | ICD-10-CM

## 2020-04-27 RX ORDER — GABAPENTIN 100 MG/1
200 CAPSULE ORAL 3 TIMES DAILY
Qty: 540 CAP | Refills: 3 | Status: SHIPPED | OUTPATIENT
Start: 2020-04-27 | End: 2020-10-30 | Stop reason: SDUPTHER

## 2020-04-27 NOTE — PROGRESS NOTES
Hematology/medical oncology progress note    4/27/2020  Jessica Campuzano  YOB: 1945    PCP: Dr. Joshua Meyer    Diagnosis: Metastatic breast cancer, neuropathy, and chronic pain    Ms. Figueroa 59-year-old woman with metastatic breast cancer. I have informed the patient that a follow-up CA 2729 level done on 4/24/2020 was 54.6 units/mL. The prior test completed on 11/29/2019 was 53.8 units/mL. Her CBC dated 4/23/2020 showed that her WBC count was 4.8, hemoglobin was 11 g/dL, hematocrit 33%, and the platelet count was 022,608. The comprehensive metabolic panel showed normal values. The iron studies revealed an iron level of 66mcg/dL with an iron saturation 24%, and a ferritin level of 462,000. Vitamin D level was 62.7 ng/mL. The whole-body bone scan showed no evidence of new metastatic disease. She had similar sclerotic bone metastasis since 7/9/2019 with no change. She does have some degenerative arthritic changes. CT scans of the chest, abdomen, and pelvis shows that she has a loculated right pleural effusion with some pleural thickening and adjacent areas of probable rounded atelectasis all of which are stable. She has relatively stable bone lesions of the rib, pelvis, and T12 posterior element. There was no findings on CT scan to support new metastatic disease in the chest, abdomen, or pelvis. Based on these data I recommended that the patient continue with the fulvestrant and palbociclib as previously recommended. She will follow-up in 1 month with Dr. Allison Richard. The patient and her  had their questions answered to their satisfaction. Total time 25 minutes, greater than 50% of the time was in counseling and coordination of care. Rolo Hamilton MD, [address]

## 2020-04-27 NOTE — PATIENT INSTRUCTIONS

## 2020-05-02 NOTE — PATIENT INSTRUCTIONS

## 2020-05-02 NOTE — PROGRESS NOTES
Hematology/Oncology  Progress Note    Name: Murray Rodriguez  Date: 2020  : 1945    PCP: Gregorio Alberts MD     Ms. Piedra Son is a 76 y.o.  female who was seen for management of her metastatic breast cancer with associated complications of chemotherapy. Current therapy: Fulvestrant 500 mg subcutaneous monthly and Ibrance 100 mg by mouth daily. Subjective:     Mrs. Tadeo Hester is a 79-year-old woman who has slowly progressive metastatic breast cancer. The patient has previously completed external beam radiation therapy to lesions in her ribs and more recently she completed proton therapy to areas of bone involvement with a significant benefit with regards to pain control. The posttherapy imaging studies showed a significant decrease in the intensity of uptake on the bone scan. .  Additionally she is currently receiving systemic therapy with fulvestrant and Ibrance. She is tolerating monthly Fulvestrant fairly well. She is continuing to experience some fatigue and mild degree of weakness. She denied any acute distress or new bone pain or severe pain. Her recent CT on 4/15 showed no new findings in chest, abdomen, or pelvis. She is going to have Mammogram  and follow up with Dr. Neal Hedge . Otherwise there are no additional issues. Past medical history, family history, and social history: these were reviewed and remains unchanged.     Past Medical History:   Diagnosis Date    Biliary colic     Breast CA ([address] 2012    Cancer [facility]) 1991    Right Breast    Chemotherapy convalescence or palliative care     Neuropathy     Radiation therapy complication     Thyroid disease      Past Surgical History:   Procedure Laterality Date    BREAST SURGERY PROCEDURE UNLISTED  10/2002    Right-Lesion    BREAST SURGERY PROCEDURE UNLISTED  2004    Right-Lesion    HX LAP CHOLECYSTECTOMY      HX MASTECTOMY  1991    Right     Social History     Socioeconomic History    Marital status:      Spouse name: Not on file    Number of children: Not on file    Years of education: Not on file    Highest education level: Not on file   Occupational History    Not on file   Social Needs    Financial resource strain: Not on file    Food insecurity     Worry: Not on file     Inability: Not on file    Transportation needs     Medical: Not on file     Non-medical: Not on file   Tobacco Use    Smoking status: Former Smoker     Last attempt to quit: 1991     Years since quittin.9    Smokeless tobacco: Never Used   Substance and Sexual Activity    Alcohol use: Yes     Alcohol/week: 0.0 standard drinks     Types: 1 - 2 Glasses of wine per week     Comment: socially, occassionally    Drug use: No    Sexual activity: Not on file   Lifestyle    Physical activity     Days per week: Not on file     Minutes per session: Not on file    Stress: Not on file   Relationships    Social connections     Talks on phone: Not on file     Gets together: Not on file     Attends Denominational service: Not on file     Active member of club or organization: Not on file     Attends meetings of clubs or organizations: Not on file     Relationship status: Not on file    Intimate partner violence     Fear of current or ex partner: Not on file     Emotionally abused: Not on file     Physically abused: Not on file     Forced sexual activity: Not on file   Other Topics Concern    Not on file   Social History Narrative    Not on file     Family History   Problem Relation Age of Onset    Cancer Maternal Aunt         Hodgkin's disease    Breast Cancer Paternal Aunt     Cancer Father         Prostate, lung, brain     Current Outpatient Medications   Medication Sig Dispense Refill    gabapentin (NEURONTIN) 100 mg capsule Take 2 Caps by mouth three (3) times daily. Max Daily Amount: 600 mg. 540 Cap 3    Cetirizine (ZYRTEC) 10 mg cap Take 1 Cap by mouth daily as needed.       palbociclib (IBRANCE) 100 mg cap Take 1 Cap by mouth daily. For 21 days on and 7 days off every 28 days. 63 Cap 5    lidocaine HCl-hydrocortison ac topical cream Apply  to affected area two (2) times a day. 85 g 3    TETRACYCLINE HCL (TETRACYCLINE PO) Take 250 mg by mouth two (2) times a day.  fexofenadine-pseudoephedrine (ALLEGRA-D 24 HOUR) 180-240 mg per tablet Take 1 Tab by mouth daily as needed.  sulfacetamide (BLEPH-10) 10 % ophthalmic ointment Administer  to right eye three (3) times daily.  palbociclib (IBRANCE) 125 mg cap Take 125 mg by mouth daily. Take 1 tab po every day for 21 days on and 7 days off q 28 days  Indications: HORMONE RECEPTOR(+), HER2(-) ADVANCED BREAST CANCER 42 Cap 6    Cholecalciferol, Vitamin D3, 5,000 unit Tab Take  by mouth daily.  thyroid, Pork, (ARMOUR THYROID) 60 mg tablet Take 90 mg by mouth daily.  L-Mfolate-B6 Phos-Methyl-B12 (NEURPATH-B) 3-35-2 mg Tab Take  by mouth two (2) times a day.  warfarin (COUMADIN) 1 mg tablet Take 1 mg by mouth daily. Review of Systems   Constitutional: Positive for malaise/fatigue. Negative for chills, diaphoresis, fever and weight loss. Respiratory: Negative for cough, hemoptysis, shortness of breath and wheezing. Cardiovascular: Negative for chest pain, palpitations and leg swelling. Gastrointestinal: Negative for abdominal pain, diarrhea, heartburn, nausea and vomiting. Genitourinary: Negative for dysuria, frequency, hematuria and urgency. Musculoskeletal: Negative for joint pain and myalgias. Skin: Negative for itching and rash. Neurological: Negative for dizziness, seizures, weakness and headaches. Psychiatric/Behavioral: Negative for depression. The patient does not have insomnia.              Objective:     Visit Vitals  /69   Pulse 97   Temp 96.8 °F (36 °C) (Oral)   Resp 18   Ht 5' 4\" (1.626 m)   Wt 64.9 kg (143 lb)   SpO2 98%   BMI 24.55 kg/m²       ECOG Performance Status (grade): 0  0 - able to carry on all pre-disease activity w/out restriction  1 - restricted but able to carry out light work  2 - ambulatory and can self- care but unable to carry out work  3 - bed or chair >50% of waking hours  4 - completely disable, total care, confined to bed or chair    Physical Exam  Constitutional:       Appearance: Normal appearance. HENT:      Head: Normocephalic and atraumatic. Eyes:      Pupils: Pupils are equal, round, and reactive to light. Neck:      Musculoskeletal: Neck supple. Cardiovascular:      Rate and Rhythm: Normal rate and regular rhythm. Heart sounds: Normal heart sounds. Pulmonary:      Effort: Pulmonary effort is normal.      Breath sounds: Normal breath sounds. Abdominal:      General: Bowel sounds are normal.      Palpations: Abdomen is soft. Tenderness: There is no abdominal tenderness. There is no guarding. Musculoskeletal: Normal range of motion. Left lower leg: No edema. Skin:     General: Skin is warm. Neurological:      General: No focal deficit present. Mental Status: She is alert and oriented to person, place, and time. Mental status is at baseline. Diagnostics:      No results found for this or any previous visit (from the past 96 hour(s)). Imaging:  Results for orders placed during the hospital encounter of 05/29/12   IR MEDIPORT    Narrative Ordering MD: Issa Plummer MD  Signed By: Angelic Sharma MD  ** FINAL **  ---------------------------------------------------------------------  Procedure Date:  05/29/2012   Accession Number:  [id number]           Order No:   [id number]         Procedure:   SPV - MEDIPORT        CPT Code:   46241      Admit Diag:   MEDIPORT PLACEMENT              Reason:   CHEMOTHERAPY  INTERPRETATION:  Chest port placement     Contrast: None   Complications: None     Medications: Lidocaine, 1% subcutaneous. Versed, 2 mg IV. Fentanyl,   100 mcg IV. Moderate sedation was monitored by radiology nursing   staff. Ancef 1 g iv. Indication: Breast cancer. Procedure: Using ultrasound guidance the left internal jugular vein   was accessed and a peel-away sheath was placed. A subcutaneous   pocket was then created using blunt dissection. A 6.6 [address] single-lumen port was flushed with normal   saline. The port was placed within the pocket and the catheter was   then advanced under fluoroscopic guidance through the peel-away   sheath to the caval-atrial junction. The catheter was attached   firmly to the port. The port was accessed and aspirated freely. Saline injection was then performed and there was no leakage. A   subcutaneous closure was then performed using 4-0 absorbable suture. A subcuticular closure of the skin incision was then performed using   5-0 absorbable suture. The port was then accessed using a half-inch   Castro needle. It aspirated with ease and was then flushed with   heparin. A sterile dressing was applied. The port may be used   immediately. This procedure was performed under the personal   supervision of Dr. Osmany Luis, who was present for the entire   procedure. Findings: Ultrasound shows an anechoic and compressible jugular   vein. Chest port placed with tip at cavoatrial junction. The port   was placed in a more lateral position at patient request.    IMPRESSION: Successful chest port placement as discussed above. I   performed this procedure. Results for orders placed during the hospital encounter of 07/12/19   XR SHOULDER LT AP/LAT MIN 2 V    Narrative EXAM: SHOULDER  2 OR 3 VIEWS  LEFT     CLINICAL HISTORY/INDICATION:  Bilateral Posterior shoulder pain and swelling  near the right scapula x6 months, history of breast cancer    COMPARISON: Chest x-ray August 11, 2016. TECHNIQUE: Three views    FINDINGS:     There is no evidence of fracture or dislocation. The joint spaces are  maintained. Mineralization is normal.  There is no radiopaque foreign body.   Left jugular approach MediPort in stable position. Impression IMPRESSION:    Negative. Results for orders placed during the hospital encounter of 04/17/20   CT CHEST ABD PELV W CONT    Narrative CT Chest, Abdomen And Pelvis With Contrast    INDICATION: Metastatic breast cancer follow-up. TECHNIQUE: Axial images obtained from the thoracic inlet to the level of the  pubic symphysis following the uneventful administration of oral and nonionic  intravenous contrast.  The patient received 75 cc of Isovue-300. Coronal and  sagittal reformations were also reviewed. All CT scans are performed using dose optimization techniques as appropriate to  the performed exam including the following: Automated exposure control,  adjustment of mA and/or kV according to patient size, and use of iterative  reconstructive technique. COMPARISON: 4/9/2019. CHEST FINDINGS:    The thyroid gland is small. Left sided Mediport. No enlarged axillary lymph  nodes. Right mastectomy. Stable subcentimeter mediastinal nodes. No hilar adenopathy. The aorta is  normal in caliber. The main pulmonary arteries are patent. No pericardial  fluid. Loculated right pleural effusion similar in size. Pleural thickening remains  fairly uniform without nodularity. The effusion measures 2 cm and AP dimension  stable. No left effusion. No pneumothorax. Pleural-based masslike consolidation in the posterior right lower lobe adjacent  to the effusion stable measuring 3.9 x 1.8 cm. Anterior peripheral pleural-based opacity anterior right middle lobe image 31  stable measuring 2.7 x 1.1 cm. No new lung nodules or masses. ABDOMEN AND PELVIS FINDINGS:     There is homogeneous enhancement of the liver, spleen, and pancreas. Cholecystectomy. . No adrenal nodules or masses. The kidneys enhance symmetrically and homogeneously. No hydronephrosis. No dilated loops of small or large bowel. Diverticulosis.   A normal or abnormal  appendix is not visualized. The bladder is without gross abnormality. Uterus  unremarkable. No adnexal mass    No abdominal or pelvic lympadenopathy. Mild to moderate calcifications in the aorta. No aneursymal dilatation of the  abdominal aorta. No free fluid, fluid collections, or free air in the abdomen or pelvis. Similar  stranding in the gluteal subcutaneous soft tissues    OSSEOUS STRUCTURES:  Similar heterogeneous sclerosis anterior right rib  5. Relatively stable sclerosis anterior right rib 6 and 7. Heterogeneous  mineralization of the right ilium stable. Neurogenic cysts in the sacrum. No  definite finding for new metastatic bone lesions      Impression IMPRESSION:    1. Loculated right pleural effusion, pleural thickening and adjacent areas of  probable rounded atelectasis in the right middle lobe and right lower lobe are  stable. 2. Relatively stable bone lesions of the ribs and pelvis and T12 posterior  elements. 3. No definite finding for new metastatic disease in the chest, abdomen, pelvis  or bones. Assessment:     No diagnosis found. Plan:     # Metastatic breast cancer:     -- Presently on Fulvestrant 500 mg subcutaneous monthly and Ibrance 100 mg by mouth daily. She is tolerating treatment fairly well without high grade toxicities. -- 4/15/2020  CT scans of the chest, abdomen, and pelvis shows that she has a loculated right pleural effusion with some pleural thickening and adjacent areas of probable rounded atelectasis all of which are stable. She has relatively stable bone lesions of the rib, pelvis, and T12 posterior element. There was no findings on CT scan to support new metastatic disease in the chest, abdomen, or pelvis. -- Patient will continue current regimen with fulvestrant and palbociclib as previously recommended. -- Refilled Palbociclib 100 mg daily 21 days on/7 days off today. -- She will need to check lab before each cycle. -- She will need yearly DEXA scan. Will also need CT Scan and Bone scan yearly. -- She is going to have Mammogram 6/5 and follow up with Dr. Kd Salazar 6/9.   -- We will check her CA 27-29 at next clinic visit. -- I will see the patient back in clinic in about 3 months. Always sooner if required. The patient can have lab done prior our next clinic visit. # Neuropathy associated with cancer:   -- Continue her Neurontin at 400 mg 3 times daily. She does not need a refill at this time. # Chemotherapy-induced neutropenia/chronic leukopenia (persisting problem):   -- If the absolute neutrophil count declines to 0.5 she will be started on Neupogen or Neulasta. # Chemotherapy-induced anemia (persisting problem):    -- We have recommended that the patient continue taking ferrous sulfate 1 tablet daily. Procrit at a dose of 60,000 units subcutaneous will be provided if her hemoglobin has declined below 10 g/dL hematocrit is below 30%. # Chemotherapy-induced thrombocytopenia :   -- The patient was holding the Ibrance 140 mg at previous times D/T thrombocytopenia. -- She presently taking Ibrance 100mg. Her recent CBC showed PLTs 249 K. Orders Placed This Encounter    palbociclib (Ibrance) 100 mg cap     Sig: Take 1 Cap by mouth daily. For 21 days on and 7 days off every 28 days. Dispense:  63 Cap     Refill:  5       Ms. Mira Garg has a reminder for a \"due or due soon\" health maintenance. I have asked that she contact her primary care provider for follow-up on this health maintenance. All of patient's questions answered to their apparent satisfaction. They verbally show understanding and agreement with aforementioned plan. Say Meléndez MD  5/2/2020          About 25 minutes were spent for this encounter with more than 50% of the time spent in face-to-face counseling, discussing on diagnosis and management plan going forward, and co-ordination of care. Parts of this document has been produced using Dragon dictation system. Unrecognized errors in transcription may be present. Please do not hesitate to reach out for any questions or clarifications.     CC: Ciera Monreal MD

## 2020-05-04 ENCOUNTER — OFFICE VISIT (OUTPATIENT)
Dept: ONCOLOGY | Age: 75
End: 2020-05-04

## 2020-05-04 VITALS
TEMPERATURE: 96.8 F | HEIGHT: 64 IN | HEART RATE: 97 BPM | DIASTOLIC BLOOD PRESSURE: 69 MMHG | SYSTOLIC BLOOD PRESSURE: 139 MMHG | OXYGEN SATURATION: 98 % | RESPIRATION RATE: 18 BRPM | BODY MASS INDEX: 24.41 KG/M2 | WEIGHT: 143 LBS

## 2020-05-04 DIAGNOSIS — C79.51 SECONDARY MALIGNANT NEOPLASM OF BONE AND BONE MARROW (HCC): Primary | ICD-10-CM

## 2020-05-04 DIAGNOSIS — D70.1 CHEMOTHERAPY INDUCED NEUTROPENIA (HCC): ICD-10-CM

## 2020-05-04 DIAGNOSIS — Z17.0 MALIGNANT NEOPLASM OF LOWER-INNER QUADRANT OF RIGHT BREAST OF FEMALE, ESTROGEN RECEPTOR POSITIVE (HCC): ICD-10-CM

## 2020-05-04 DIAGNOSIS — D69.59 CHEMOTHERAPY-INDUCED THROMBOCYTOPENIA: ICD-10-CM

## 2020-05-04 DIAGNOSIS — D64.81 ANEMIA DUE TO CHEMOTHERAPY: ICD-10-CM

## 2020-05-04 DIAGNOSIS — C50.311 MALIGNANT NEOPLASM OF LOWER-INNER QUADRANT OF RIGHT BREAST OF FEMALE, ESTROGEN RECEPTOR POSITIVE (HCC): ICD-10-CM

## 2020-05-04 DIAGNOSIS — T45.1X5A CHEMOTHERAPY-INDUCED THROMBOCYTOPENIA: ICD-10-CM

## 2020-05-04 DIAGNOSIS — T45.1X5A CHEMOTHERAPY INDUCED NEUTROPENIA (HCC): ICD-10-CM

## 2020-05-04 DIAGNOSIS — T45.1X5A ANEMIA DUE TO CHEMOTHERAPY: ICD-10-CM

## 2020-05-04 DIAGNOSIS — C50.919 METASTATIC BREAST CANCER (HCC): ICD-10-CM

## 2020-05-04 DIAGNOSIS — C79.52 SECONDARY MALIGNANT NEOPLASM OF BONE AND BONE MARROW (HCC): Primary | ICD-10-CM

## 2020-05-04 DIAGNOSIS — D72.819 CHRONIC LEUKOPENIA: ICD-10-CM

## 2020-05-04 RX ORDER — PALBOCICLIB 100 MG/1
100 CAPSULE ORAL DAILY
Qty: 63 CAP | Refills: 5 | Status: SHIPPED | OUTPATIENT
Start: 2020-05-04 | End: 2021-01-01

## 2020-05-04 NOTE — LETTER
5/5/20 Patient: Dmitry Howard YOB: 1945 Date of Visit: 5/4/2020 Radha Martin, [address] [place] [address] VIA Facsimile: [phone] Dear Radha Martin MD, Thank you for referring Ms. Salma Shaikh to [address] [place] [address] for evaluation. My notes for this consultation are attached. If you have questions, please do not hesitate to call me. I look forward to following your patient along with you. Sincerely, Antonio Oneil MD

## 2020-05-05 DIAGNOSIS — C50.919 METASTATIC BREAST CANCER (HCC): Primary | ICD-10-CM

## 2020-05-05 RX ORDER — LAMOTRIGINE 25 MG/1
500 TABLET ORAL ONCE
Status: CANCELLED | OUTPATIENT
Start: 2020-05-07 | End: 2020-05-07

## 2020-05-07 ENCOUNTER — HOSPITAL ENCOUNTER (OUTPATIENT)
Dept: INFUSION THERAPY | Age: 75
Discharge: HOME | End: 2020-05-07
Payer: MEDICARE

## 2020-05-07 VITALS
TEMPERATURE: 98.2 F | RESPIRATION RATE: 20 BRPM | OXYGEN SATURATION: 99 % | SYSTOLIC BLOOD PRESSURE: 142 MMHG | HEART RATE: 81 BPM | DIASTOLIC BLOOD PRESSURE: 77 MMHG

## 2020-05-07 DIAGNOSIS — Z17.0 MALIGNANT NEOPLASM OF LOWER-INNER QUADRANT OF RIGHT BREAST OF FEMALE, ESTROGEN RECEPTOR POSITIVE (HCC): ICD-10-CM

## 2020-05-07 DIAGNOSIS — C50.311 MALIGNANT NEOPLASM OF LOWER-INNER QUADRANT OF RIGHT BREAST OF FEMALE, ESTROGEN RECEPTOR POSITIVE (HCC): ICD-10-CM

## 2020-05-07 DIAGNOSIS — C50.919 METASTATIC BREAST CANCER (HCC): Primary | ICD-10-CM

## 2020-05-07 LAB
ALBUMIN SERPL-MCNC: 3.4 G/DL (ref 3.4–5)
ALBUMIN/GLOB SERPL: 0.9 {RATIO} (ref 0.8–1.7)
ALP SERPL-CCNC: 59 U/L (ref 45–117)
ALT SERPL-CCNC: 15 U/L (ref 13–56)
ANION GAP SERPL CALC-SCNC: 6 MMOL/L (ref 3–18)
AST SERPL-CCNC: 19 U/L (ref 10–38)
BASO+EOS+MONOS # BLD AUTO: 0.4 K/UL (ref 0–2.3)
BASO+EOS+MONOS NFR BLD AUTO: 10 % (ref 0.1–17)
BILIRUB SERPL-MCNC: 0.6 MG/DL (ref 0.2–1)
BUN SERPL-MCNC: 16 MG/DL (ref 7–18)
BUN/CREAT SERPL: 18 (ref 12–20)
CALCIUM SERPL-MCNC: 9.1 MG/DL (ref 8.5–10.1)
CHLORIDE SERPL-SCNC: 107 MMOL/L (ref 100–111)
CO2 SERPL-SCNC: 28 MMOL/L (ref 21–32)
CREAT SERPL-MCNC: 0.87 MG/DL (ref 0.6–1.3)
DIFFERENTIAL METHOD BLD: ABNORMAL
ERYTHROCYTE [DISTWIDTH] IN BLOOD BY AUTOMATED COUNT: 15 % (ref 11.5–14.5)
GLOBULIN SER CALC-MCNC: 3.9 G/DL (ref 2–4)
GLUCOSE SERPL-MCNC: 74 MG/DL (ref 74–99)
HCT VFR BLD AUTO: 33.5 % (ref 36–48)
HGB BLD-MCNC: 11.1 G/DL (ref 12–16)
LYMPHOCYTES # BLD: 0.8 K/UL (ref 1.1–5.9)
LYMPHOCYTES NFR BLD: 18 % (ref 14–44)
MAGNESIUM SERPL-MCNC: 1.7 MG/DL (ref 1.6–2.6)
MCH RBC QN AUTO: 31.9 PG (ref 25–35)
MCHC RBC AUTO-ENTMCNC: 33.1 G/DL (ref 31–37)
MCV RBC AUTO: 96.3 FL (ref 78–102)
NEUTS SEG # BLD: 3.1 K/UL (ref 1.8–9.5)
NEUTS SEG NFR BLD: 73 % (ref 40–70)
PHOSPHATE SERPL-MCNC: 2.9 MG/DL (ref 2.5–4.9)
PLATELET # BLD AUTO: 219 K/UL (ref 140–440)
POTASSIUM SERPL-SCNC: 3.8 MMOL/L (ref 3.5–5.5)
PROT SERPL-MCNC: 7.3 G/DL (ref 6.4–8.2)
RBC # BLD AUTO: 3.48 M/UL (ref 4.1–5.1)
SODIUM SERPL-SCNC: 141 MMOL/L (ref 136–145)
WBC # BLD AUTO: 4.3 K/UL (ref 4.5–13)

## 2020-05-07 PROCEDURE — 96402 CHEMO HORMON ANTINEOPL SQ/IM: CPT

## 2020-05-07 PROCEDURE — 96372 THER/PROPH/DIAG INJ SC/IM: CPT

## 2020-05-07 PROCEDURE — 36591 DRAW BLOOD OFF VENOUS DEVICE: CPT

## 2020-05-07 PROCEDURE — 84100 ASSAY OF PHOSPHORUS: CPT

## 2020-05-07 PROCEDURE — 83735 ASSAY OF MAGNESIUM: CPT

## 2020-05-07 PROCEDURE — 74011250636 HC RX REV CODE- 250/636: Performed by: INTERNAL MEDICINE

## 2020-05-07 PROCEDURE — 80053 COMPREHEN METABOLIC PANEL: CPT

## 2020-05-07 PROCEDURE — 74011250636 HC RX REV CODE- 250/636

## 2020-05-07 PROCEDURE — 85025 COMPLETE CBC W/AUTO DIFF WBC: CPT

## 2020-05-07 PROCEDURE — 77030012965 HC NDL HUBR BBMI -A

## 2020-05-07 PROCEDURE — 74011250636 HC RX REV CODE- 250/636: Performed by: NURSE PRACTITIONER

## 2020-05-07 RX ORDER — HEPARIN 100 UNIT/ML
SYRINGE INTRAVENOUS
Status: COMPLETED
Start: 2020-05-07 | End: 2020-05-07

## 2020-05-07 RX ORDER — ONDANSETRON 2 MG/ML
8 INJECTION INTRAMUSCULAR; INTRAVENOUS AS NEEDED
Status: CANCELLED | OUTPATIENT
Start: 2020-05-28

## 2020-05-07 RX ORDER — ACETAMINOPHEN 325 MG/1
650 TABLET ORAL AS NEEDED
Status: CANCELLED
Start: 2020-05-28

## 2020-05-07 RX ORDER — ALBUTEROL SULFATE 0.83 MG/ML
2.5 SOLUTION RESPIRATORY (INHALATION) AS NEEDED
Status: CANCELLED
Start: 2020-05-28

## 2020-05-07 RX ORDER — DIPHENHYDRAMINE HYDROCHLORIDE 50 MG/ML
50 INJECTION, SOLUTION INTRAMUSCULAR; INTRAVENOUS AS NEEDED
Status: CANCELLED
Start: 2020-05-28

## 2020-05-07 RX ORDER — EPINEPHRINE 1 MG/ML
0.3 INJECTION, SOLUTION, CONCENTRATE INTRAVENOUS AS NEEDED
Status: CANCELLED | OUTPATIENT
Start: 2020-05-28

## 2020-05-07 RX ORDER — HYDROCORTISONE SODIUM SUCCINATE 100 MG/2ML
100 INJECTION, POWDER, FOR SOLUTION INTRAMUSCULAR; INTRAVENOUS AS NEEDED
Status: CANCELLED | OUTPATIENT
Start: 2020-05-28

## 2020-05-07 RX ORDER — HEPARIN 100 UNIT/ML
500 SYRINGE INTRAVENOUS AS NEEDED
Status: DISCONTINUED | OUTPATIENT
Start: 2020-05-07 | End: 2020-05-11 | Stop reason: HOSPADM

## 2020-05-07 RX ORDER — LAMOTRIGINE 25 MG/1
500 TABLET ORAL ONCE
Status: COMPLETED | OUTPATIENT
Start: 2020-05-07 | End: 2020-05-07

## 2020-05-07 RX ORDER — SODIUM CHLORIDE 0.9 % (FLUSH) 0.9 %
10-40 SYRINGE (ML) INJECTION AS NEEDED
Status: DISCONTINUED | OUTPATIENT
Start: 2020-05-07 | End: 2020-05-11 | Stop reason: HOSPADM

## 2020-05-07 RX ADMIN — DENOSUMAB 120 MG: 120 INJECTION SUBCUTANEOUS at 14:02

## 2020-05-07 RX ADMIN — FULVESTRANT 500 MG: 50 INJECTION INTRAMUSCULAR at 14:12

## 2020-05-07 RX ADMIN — HEPARIN 500 UNITS: 100 SYRINGE at 13:45

## 2020-05-07 RX ADMIN — Medication 40 ML: at 13:45

## 2020-05-07 NOTE — PROGRESS NOTES
[facility] Progress Note    Date: May 7, 2020    Name: Edward Doe    MRN: [id number]         : 1945      Ms. Figueroa arrived in the [facility] today at 1310, in stable condition, here for Q 2 Week CBC/Retacrit Injection & Q 4 Week Xgeva & Faslodex Injections. She was assessed and education was provided. Ms. Diana Huitron vitals were reviewed. Visit Vitals  /77 (BP 1 Location: Left arm, BP Patient Position: Sitting)   Pulse 81   Temp 98.2 °F (36.8 °C)   Resp 20   SpO2 99%   Breastfeeding No           Attempted X 1 venipuncture to her left arm, to draw ordered labs, without success. Her left chest single lumen port was accessed without incident at 1345, and blood was drawn from the port for a CBC, CMP, Magnesium, and Phosphorus, per order. And then, the port was flushed well per protocol and without incident, with NS & Heparin, and the carbone needle was removed and gauze/bandaid was applied. (The CBC was processed in house via [employer], and the remaining labs were sent out to be processed. )       Lab results were obtained and reviewed. Recent Results (from the past 12 hour(s))   CBC WITH 3 PART DIFF    Collection Time: 20  1:45 PM   Result Value Ref Range    WBC 4.3 (L) 4.5 - 13.0 K/uL    RBC 3.48 (L) 4.10 - 5.10 M/uL    HGB 11.1 (L) 12.0 - 16 g/dL    HCT 33.5 (L) 36 - 48 %    MCV 96.3 78 - 102 FL    MCH 31.9 25.0 - 35.0 PG    MCHC 33.1 31 - 37 g/dL    RDW 15.0 (H) 11.5 - 14.5 %    PLATELET 747 697 - 806 K/uL    NEUTROPHILS 73 (H) 40 - 70 %    MIXED CELLS 10 0.1 - 17 %    LYMPHOCYTES 18 14 - 44 %    ABS. NEUTROPHILS 3.1 1.8 - 9.5 K/UL    ABS. MIXED CELLS 0.4 0.0 - 2.3 K/uL    ABS. LYMPHOCYTES 0.8 (L) 1.1 - 5.9 K/UL    DF AUTOMATED             Retacrit Injection was HELD today, per order. Per Ever Traore Pharmacist, OK to use the following CMP results from 20, to treat patient today with Xgeva, but draw new CMP, Magnesium, & Phosphorus, for next treatment.      Results for KRISTIN, PRANEETH FRY (MRN [id number])    Ref. Range 4/23/2020 13:38   Sodium Latest Ref Range: 136 - 145 mmol/L 141   Potassium Latest Ref Range: 3.5 - 5.5 mmol/L 4.3   Chloride Latest Ref Range: 100 - 111 mmol/L 106   CO2 Latest Ref Range: 21 - 32 mmol/L 28   Anion gap Latest Ref Range: 3.0 - 18 mmol/L 7   Glucose Latest Ref Range: 74 - 99 mg/dL 87   BUN Latest Ref Range: 7.0 - 18 MG/DL 17   Creatinine Latest Ref Range: 0.6 - 1.3 MG/DL 0.73   BUN/Creatinine ratio Latest Ref Range: 12 - 20   23 (H)   Calcium Latest Ref Range: 8.5 - 10.1 MG/DL 9.2   GFR est non-AA Latest Ref Range: >60 ml/min/1.73m2 >60   GFR est AA Latest Ref Range: >60 ml/min/1.73m2 >60   Bilirubin, total Latest Ref Range: 0.2 - 1.0 MG/DL 0.5   Protein, total Latest Ref Range: 6.4 - 8.2 g/dL 6.7   Albumin Latest Ref Range: 3.4 - 5.0 g/dL 3.5   Globulin Latest Ref Range: 2.0 - 4.0 g/dL 3.2   A-G Ratio Latest Ref Range: 0.8 - 1.7   1.1   ALT (SGPT) Latest Ref Range: 13 - 56 U/L 20   AST Latest Ref Range: 10 - 38 U/L 22   Alk. phosphatase Latest Ref Range: 45 - 117 U/L 65   Iron Latest Ref Range: 50 - 175 ug/dL 66   TIBC Latest Ref Range: 250 - 450 ug/dL 278   Iron % saturation Latest Ref Range: 20 - 50 % 24   Ferritin Latest Ref Range: 8 - 388 NG/ (H)           Xgeva 120 mg, was administered SQ, in her left arm at 1402, per order, and without incident.         Faslodex 500 mg Total Dose, was administered IM, in 2 equally divided doses of 250 mg, at 1412, per order, and without incident. (250 mg was administered IM, in her right gluteal muscle, and 250 mg was administered IM, in her left gluteal muscle)             Ms. Deepti Freedman tolerated well, and had no complaints. Ms. Deepti Freedman was discharged from [place] [address] in stable condition at [phone]. Jose Angry She is to return in 2 weeks, on Thursday, 5-21-20, at 1300, for her next appointment, for CBC/Retacrit Injection.  And then, she is scheduled to return in 4 weeks, on Thursday, 6-4-20, at 1300, for CBC/Retacrit Injection & Xgeva & Faslodex Injections & Associated Labs.      Dash Navarro RN  May 7, 2020  1:31 PM

## 2020-05-21 ENCOUNTER — HOSPITAL ENCOUNTER (OUTPATIENT)
Dept: INFUSION THERAPY | Age: 75
Discharge: HOME | End: 2020-05-21
Payer: MEDICARE

## 2020-05-21 VITALS
TEMPERATURE: 97.7 F | RESPIRATION RATE: 18 BRPM | HEART RATE: 82 BPM | DIASTOLIC BLOOD PRESSURE: 78 MMHG | SYSTOLIC BLOOD PRESSURE: 143 MMHG | OXYGEN SATURATION: 99 %

## 2020-05-21 LAB
BASO+EOS+MONOS # BLD AUTO: 0.1 K/UL (ref 0–2.3)
BASO+EOS+MONOS NFR BLD AUTO: 4 % (ref 0.1–17)
DIFFERENTIAL METHOD BLD: ABNORMAL
ERYTHROCYTE [DISTWIDTH] IN BLOOD BY AUTOMATED COUNT: 15.3 % (ref 11.5–14.5)
HCT VFR BLD AUTO: 30.9 % (ref 36–48)
HGB BLD-MCNC: 10.4 G/DL (ref 12–16)
LYMPHOCYTES # BLD: 0.5 K/UL (ref 1.1–5.9)
LYMPHOCYTES NFR BLD: 26 % (ref 14–44)
MCH RBC QN AUTO: 32 PG (ref 25–35)
MCHC RBC AUTO-ENTMCNC: 33.7 G/DL (ref 31–37)
MCV RBC AUTO: 95.1 FL (ref 78–102)
NEUTS SEG # BLD: 1.3 K/UL (ref 1.8–9.5)
NEUTS SEG NFR BLD: 70 % (ref 40–70)
PLATELET # BLD AUTO: 154 K/UL (ref 140–440)
RBC # BLD AUTO: 3.25 M/UL (ref 4.1–5.1)
WBC # BLD AUTO: 1.9 K/UL (ref 4.5–13)

## 2020-05-21 PROCEDURE — 77030012965 HC NDL HUBR BBMI -A

## 2020-05-21 PROCEDURE — 36591 DRAW BLOOD OFF VENOUS DEVICE: CPT

## 2020-05-21 PROCEDURE — 74011250636 HC RX REV CODE- 250/636

## 2020-05-21 PROCEDURE — 85025 COMPLETE CBC W/AUTO DIFF WBC: CPT

## 2020-05-21 RX ORDER — HEPARIN 100 UNIT/ML
SYRINGE INTRAVENOUS
Status: COMPLETED
Start: 2020-05-21 | End: 2020-05-21

## 2020-05-21 RX ORDER — SODIUM CHLORIDE 0.9 % (FLUSH) 0.9 %
10-40 SYRINGE (ML) INJECTION EVERY 8 HOURS
Status: DISCONTINUED | OUTPATIENT
Start: 2020-05-21 | End: 2020-05-25 | Stop reason: HOSPADM

## 2020-05-21 RX ORDER — HEPARIN 100 UNIT/ML
500 SYRINGE INTRAVENOUS ONCE
Status: COMPLETED | OUTPATIENT
Start: 2020-05-21 | End: 2020-05-21

## 2020-05-21 RX ADMIN — HEPARIN 500 UNITS: 100 SYRINGE at 13:34

## 2020-05-21 RX ADMIN — Medication 500 UNITS: at 13:34

## 2020-05-21 RX ADMIN — Medication 30 ML: at 13:33

## 2020-05-21 NOTE — PROGRESS NOTES
[facility] Progress Note    Date: May 21, 2020    Name: Perry Prado    MRN: [id number]         : 1945    Retacrit    Ms. Figueroa arrived to [facility] at 36    Ms. Figueroa was assessed and education was provided. Pt is here today q2 week CBC/ Retracrit. Ms. Corrinne Sailors vitals were reviewed. Visit Vitals  /78 (BP 1 Location: Left arm, BP Patient Position: Sitting)   Pulse 82   Temp 97.7 °F (36.5 °C)   Resp 18   SpO2 99%       Pt was observed for 5 minutes after obtaining vital signs prior to initiating treatment. Mediport to Left chest accessed under sterile technique using 20g 1\" carbone needle, good blood return. Line flushed with 10 ml normal saline, CBC collected. Line flushed with 20 ml normal saline followed by Heparin 500 units/5ml and de-accessed. Lab results obtained & reviewed. Recent Results (from the past 12 hour(s))   CBC WITH 3 PART DIFF    Collection Time: 20  1:36 PM   Result Value Ref Range    WBC 1.9 (L) 4.5 - 13.0 K/uL    RBC 3.25 (L) 4.10 - 5.10 M/uL    HGB 10.4 (L) 12.0 - 16 g/dL    HCT 30.9 (L) 36 - 48 %    MCV 95.1 78 - 102 FL    MCH 32.0 25.0 - 35.0 PG    MCHC 33.7 31 - 37 g/dL    RDW 15.3 (H) 11.5 - 14.5 %    PLATELET 255 134 - 077 K/uL    NEUTROPHILS 70 40 - 70 %    MIXED CELLS 4 0.1 - 17 %    LYMPHOCYTES 26 14 - 44 %    ABS. NEUTROPHILS 1.3 (L) 1.8 - 9.5 K/UL    ABS. MIXED CELLS 0.1 0.0 - 2.3 K/uL    ABS. LYMPHOCYTES 0.5 (L) 1.1 - 5.9 K/UL    DF AUTOMATED       Retacrit held for h/h greater than 10/30    Pt tolerated well without complaints. Pt armband removed & shredded. Ms. Laura Segal was discharged from [place] [address] in stable condition at [phone]. She is to return Return in 2 weeks for her next appointment 2020 at 1pm for CBC/ Retracrit, q4 week Xgeva, q4 week Faslodex, and monthly port flush.       Sharee Preciado RN  May 21, 2020

## 2020-06-01 RX ORDER — ACETAMINOPHEN 325 MG/1
650 TABLET ORAL AS NEEDED
Status: CANCELLED
Start: 2020-06-04

## 2020-06-01 RX ORDER — DIPHENHYDRAMINE HYDROCHLORIDE 50 MG/ML
25 INJECTION, SOLUTION INTRAMUSCULAR; INTRAVENOUS AS NEEDED
Status: CANCELLED
Start: 2020-06-04

## 2020-06-01 RX ORDER — HYDROCORTISONE SODIUM SUCCINATE 100 MG/2ML
100 INJECTION, POWDER, FOR SOLUTION INTRAMUSCULAR; INTRAVENOUS AS NEEDED
Status: CANCELLED | OUTPATIENT
Start: 2020-06-04

## 2020-06-01 RX ORDER — ONDANSETRON 2 MG/ML
8 INJECTION INTRAMUSCULAR; INTRAVENOUS AS NEEDED
Status: CANCELLED | OUTPATIENT
Start: 2020-06-04

## 2020-06-01 RX ORDER — ALBUTEROL SULFATE 0.83 MG/ML
2.5 SOLUTION RESPIRATORY (INHALATION) AS NEEDED
Status: CANCELLED
Start: 2020-06-04

## 2020-06-01 RX ORDER — EPINEPHRINE 1 MG/ML
0.3 INJECTION, SOLUTION, CONCENTRATE INTRAVENOUS AS NEEDED
Status: CANCELLED | OUTPATIENT
Start: 2020-06-04

## 2020-06-01 RX ORDER — DIPHENHYDRAMINE HYDROCHLORIDE 50 MG/ML
50 INJECTION, SOLUTION INTRAMUSCULAR; INTRAVENOUS AS NEEDED
Status: CANCELLED
Start: 2020-06-04

## 2020-06-03 DIAGNOSIS — C50.919 METASTATIC BREAST CANCER (HCC): Primary | ICD-10-CM

## 2020-06-04 ENCOUNTER — HOSPITAL ENCOUNTER (OUTPATIENT)
Dept: INFUSION THERAPY | Age: 75
Discharge: HOME | End: 2020-06-04
Payer: MEDICARE

## 2020-06-04 VITALS
SYSTOLIC BLOOD PRESSURE: 118 MMHG | DIASTOLIC BLOOD PRESSURE: 71 MMHG | RESPIRATION RATE: 18 BRPM | TEMPERATURE: 98.1 F | HEART RATE: 88 BPM | OXYGEN SATURATION: 96 %

## 2020-06-04 DIAGNOSIS — Z17.0 MALIGNANT NEOPLASM OF LOWER-INNER QUADRANT OF RIGHT BREAST OF FEMALE, ESTROGEN RECEPTOR POSITIVE (HCC): ICD-10-CM

## 2020-06-04 DIAGNOSIS — C50.311 MALIGNANT NEOPLASM OF LOWER-INNER QUADRANT OF RIGHT BREAST OF FEMALE, ESTROGEN RECEPTOR POSITIVE (HCC): ICD-10-CM

## 2020-06-04 DIAGNOSIS — C50.919 METASTATIC BREAST CANCER (HCC): ICD-10-CM

## 2020-06-04 DIAGNOSIS — C50.919 METASTATIC BREAST CANCER (HCC): Primary | ICD-10-CM

## 2020-06-04 LAB
ALBUMIN SERPL-MCNC: 3.7 G/DL (ref 3.4–5)
ALBUMIN/GLOB SERPL: 1.2 {RATIO} (ref 0.8–1.7)
ALP SERPL-CCNC: 57 U/L (ref 45–117)
ALT SERPL-CCNC: 19 U/L (ref 13–56)
ANION GAP SERPL CALC-SCNC: 8 MMOL/L (ref 3–18)
AST SERPL-CCNC: 19 U/L (ref 10–38)
BASO+EOS+MONOS # BLD AUTO: 0.3 K/UL (ref 0–2.3)
BASO+EOS+MONOS NFR BLD AUTO: 17 % (ref 0.1–17)
BILIRUB SERPL-MCNC: 0.7 MG/DL (ref 0.2–1)
BUN SERPL-MCNC: 18 MG/DL (ref 7–18)
BUN/CREAT SERPL: 15 (ref 12–20)
CALCIUM SERPL-MCNC: 9.6 MG/DL (ref 8.5–10.1)
CHLORIDE SERPL-SCNC: 109 MMOL/L (ref 100–111)
CO2 SERPL-SCNC: 28 MMOL/L (ref 21–32)
CREAT SERPL-MCNC: 1.22 MG/DL (ref 0.6–1.3)
DIFFERENTIAL METHOD BLD: ABNORMAL
ERYTHROCYTE [DISTWIDTH] IN BLOOD BY AUTOMATED COUNT: 18 % (ref 11.5–14.5)
GLOBULIN SER CALC-MCNC: 3 G/DL (ref 2–4)
GLUCOSE SERPL-MCNC: 120 MG/DL (ref 74–99)
HCT VFR BLD AUTO: 30.6 % (ref 36–48)
HGB BLD-MCNC: 10.3 G/DL (ref 12–16)
LYMPHOCYTES # BLD: 0.5 K/UL (ref 1.1–5.9)
LYMPHOCYTES NFR BLD: 25 % (ref 14–44)
MCH RBC QN AUTO: 32.1 PG (ref 25–35)
MCHC RBC AUTO-ENTMCNC: 33.7 G/DL (ref 31–37)
MCV RBC AUTO: 95.3 FL (ref 78–102)
NEUTS SEG # BLD: 1.1 K/UL (ref 1.8–9.5)
NEUTS SEG NFR BLD: 58 % (ref 40–70)
PLATELET # BLD AUTO: 196 K/UL (ref 140–440)
POTASSIUM SERPL-SCNC: 3.9 MMOL/L (ref 3.5–5.5)
PROT SERPL-MCNC: 6.7 G/DL (ref 6.4–8.2)
RBC # BLD AUTO: 3.21 M/UL (ref 4.1–5.1)
SODIUM SERPL-SCNC: 145 MMOL/L (ref 136–145)
WBC # BLD AUTO: 1.9 K/UL (ref 4.5–13)

## 2020-06-04 PROCEDURE — 80053 COMPREHEN METABOLIC PANEL: CPT

## 2020-06-04 PROCEDURE — 74011250636 HC RX REV CODE- 250/636: Performed by: NURSE PRACTITIONER

## 2020-06-04 PROCEDURE — 74011250636 HC RX REV CODE- 250/636: Performed by: INTERNAL MEDICINE

## 2020-06-04 PROCEDURE — 96372 THER/PROPH/DIAG INJ SC/IM: CPT

## 2020-06-04 PROCEDURE — 85025 COMPLETE CBC W/AUTO DIFF WBC: CPT

## 2020-06-04 PROCEDURE — 96402 CHEMO HORMON ANTINEOPL SQ/IM: CPT

## 2020-06-04 PROCEDURE — 36591 DRAW BLOOD OFF VENOUS DEVICE: CPT

## 2020-06-04 PROCEDURE — 77030012965 HC NDL HUBR BBMI -A

## 2020-06-04 RX ORDER — ACETAMINOPHEN 325 MG/1
650 TABLET ORAL AS NEEDED
Status: CANCELLED
Start: 2020-06-30

## 2020-06-04 RX ORDER — HEPARIN 100 UNIT/ML
500 SYRINGE INTRAVENOUS ONCE
Status: COMPLETED | OUTPATIENT
Start: 2020-06-04 | End: 2020-06-04

## 2020-06-04 RX ORDER — ALBUTEROL SULFATE 0.83 MG/ML
2.5 SOLUTION RESPIRATORY (INHALATION) AS NEEDED
Status: CANCELLED
Start: 2020-06-30

## 2020-06-04 RX ORDER — LAMOTRIGINE 25 MG/1
500 TABLET ORAL ONCE
Status: COMPLETED | OUTPATIENT
Start: 2020-06-04 | End: 2020-06-04

## 2020-06-04 RX ORDER — SODIUM CHLORIDE 0.9 % (FLUSH) 0.9 %
10-40 SYRINGE (ML) INJECTION EVERY 8 HOURS
Status: DISCONTINUED | OUTPATIENT
Start: 2020-06-04 | End: 2020-06-08 | Stop reason: HOSPADM

## 2020-06-04 RX ORDER — DIPHENHYDRAMINE HYDROCHLORIDE 50 MG/ML
25 INJECTION, SOLUTION INTRAMUSCULAR; INTRAVENOUS AS NEEDED
Status: CANCELLED
Start: 2020-06-30

## 2020-06-04 RX ORDER — DIPHENHYDRAMINE HYDROCHLORIDE 50 MG/ML
50 INJECTION, SOLUTION INTRAMUSCULAR; INTRAVENOUS AS NEEDED
Status: CANCELLED
Start: 2020-06-30

## 2020-06-04 RX ORDER — EPINEPHRINE 1 MG/ML
0.3 INJECTION, SOLUTION, CONCENTRATE INTRAVENOUS AS NEEDED
Status: CANCELLED | OUTPATIENT
Start: 2020-06-30

## 2020-06-04 RX ORDER — ONDANSETRON 2 MG/ML
8 INJECTION INTRAMUSCULAR; INTRAVENOUS AS NEEDED
Status: CANCELLED | OUTPATIENT
Start: 2020-06-30

## 2020-06-04 RX ORDER — HYDROCORTISONE SODIUM SUCCINATE 100 MG/2ML
100 INJECTION, POWDER, FOR SOLUTION INTRAMUSCULAR; INTRAVENOUS AS NEEDED
Status: CANCELLED | OUTPATIENT
Start: 2020-06-30

## 2020-06-04 RX ADMIN — FULVESTRANT 500 MG: 50 INJECTION, SOLUTION INTRAMUSCULAR at 14:03

## 2020-06-04 RX ADMIN — Medication 10 ML: at 13:39

## 2020-06-04 RX ADMIN — HEPARIN 500 UNITS: 100 SYRINGE at 13:39

## 2020-06-04 RX ADMIN — DENOSUMAB 120 MG: 120 INJECTION SUBCUTANEOUS at 13:59

## 2020-06-04 NOTE — PROGRESS NOTES
[facility] Progress Note    Date: 2020    Name: Jolene Mckeon    MRN: [id number]         : 1945    Retacrit    Ms. Figueroa arrived to [facility] at 1310    Ms. Figueroa was assessed and education was provided. Pt is here today q2 week CBC/ Retracrit. Ms. Tracey Lozano vitals were reviewed. Visit Vitals  /71 (BP 1 Location: Left arm, BP Patient Position: Sitting)   Pulse 88   Temp 98.1 °F (36.7 °C)   Resp 18   SpO2 96%   Breastfeeding No       Pt was observed for 5 minutes after obtaining vital signs prior to initiating treatment. Mediport to Left chest accessed under sterile technique using 20g 1\" carbone needle, good blood return. Line flushed with 10 ml normal saline, CBC and CMP collected. Line flushed with 20 ml normal saline followed by Heparin 500 units/5ml and de-accessed. Band-aid to site. Lab results obtained & reviewed. Recent Results (from the past 12 hour(s))   CBC WITH 3 PART DIFF    Collection Time: 20  1:48 PM   Result Value Ref Range    WBC 1.9 (L) 4.5 - 13.0 K/uL    RBC 3.21 (L) 4.10 - 5.10 M/uL    HGB 10.3 (L) 12.0 - 16 g/dL    HCT 30.6 (L) 36 - 48 %    MCV 95.3 78 - 102 FL    MCH 32.1 25.0 - 35.0 PG    MCHC 33.7 31 - 37 g/dL    RDW 18.0 (H) 11.5 - 14.5 %    PLATELET 195 899 - 767 K/uL    NEUTROPHILS 58 40 - 70 %    MIXED CELLS 17 0.1 - 17 %    LYMPHOCYTES 25 14 - 44 %    ABS. NEUTROPHILS 1.1 (L) 1.8 - 9.5 K/UL    ABS. MIXED CELLS 0.3 0.0 - 2.3 K/uL    ABS. LYMPHOCYTES 0.5 (L) 1.1 - 5.9 K/UL    DF AUTOMATED       Xgeva 120 mg given in left arm SC without incident. Band-aid applied to site. Faslodex 500mg given in right and left hip deep IM by 2 nurses  Band-aid applied to sites, no bleeding. Retacrit HELD per parameters. Pt tolerated well without complaints. Pt armband removed & shredded. Ms. Roas Ruvalcaba was discharged from [place] [address] in stable condition at [phone].  She is to return Return in 2 weeks for her next appointment 2020 at 1pm for CBC/ Retracrit, q4 week Xgeva, q4 week Faslodex, and monthly port flush.       Jazmine Dunne  June 4, 2020

## 2020-06-05 ENCOUNTER — HOSPITAL ENCOUNTER (OUTPATIENT)
Dept: MAMMOGRAPHY | Age: 75
Discharge: HOME | End: 2020-06-05
Payer: MEDICARE

## 2020-06-05 DIAGNOSIS — C50.919 METASTATIC BREAST CANCER (HCC): ICD-10-CM

## 2020-06-05 DIAGNOSIS — Z12.31 ENCOUNTER FOR SCREENING MAMMOGRAM FOR BREAST CANCER: ICD-10-CM

## 2020-06-05 PROCEDURE — 77063 BREAST TOMOSYNTHESIS BI: CPT

## 2020-06-09 ENCOUNTER — OFFICE VISIT (OUTPATIENT)
Dept: SURGERY | Age: 75
End: 2020-06-09

## 2020-06-09 VITALS
TEMPERATURE: 98.2 F | OXYGEN SATURATION: 97 % | WEIGHT: 145 LBS | SYSTOLIC BLOOD PRESSURE: 117 MMHG | HEIGHT: 64 IN | HEART RATE: 76 BPM | DIASTOLIC BLOOD PRESSURE: 65 MMHG | RESPIRATION RATE: 18 BRPM | BODY MASS INDEX: 24.75 KG/M2

## 2020-06-09 DIAGNOSIS — C50.919 METASTATIC BREAST CANCER (HCC): Primary | ICD-10-CM

## 2020-06-09 DIAGNOSIS — Z85.3 PERSONAL HISTORY OF MALIGNANT NEOPLASM OF BREAST: ICD-10-CM

## 2020-06-09 RX ORDER — IBUPROFEN 200 MG
CAPSULE ORAL DAILY
COMMUNITY
Start: 2022-01-01 | End: 2022-01-01

## 2020-06-09 NOTE — PROGRESS NOTES
Rosa Velazco is a 76 y.o. female  Chief Complaint   Patient presents with   [address] [facility] right mastectomy . Is someone accompanying this pt? no    Is the patient using any DME equipment during OV? no        Vitals:    20 1028   BP: 117/65   Pulse: 76   Resp: 18   Temp: 98.2 °F (36.8 °C)   SpO2: 97%   Weight: 145 lb (65.8 kg)   Height: 5' 4\" (1.626 m)        Depression Screening:  3 most recent PHQ Screens 2020   Little interest or pleasure in doing things Not at all   Feeling down, depressed, irritable, or hopeless Not at all   Total Score PHQ 2 0       Learning Assessment:  Learning Assessment 2018   PRIMARY LEARNER Patient   BARRIERS PRIMARY LEARNER NONE   PRIMARY LANGUAGE ENGLISH   LEARNER PREFERENCE PRIMARY DEMONSTRATION     -   ANSWERED BY self   RELATIONSHIP SELF         Fall Risk  Fall Risk Assessment, last 12 mths 2020   Able to walk? Yes   Fall in past 12 months?  No       Past Medical History:   Diagnosis Date    Biliary colic     Breast CA ([facility] [address] 2012    Cancer [facility])     Right Breast    Chemotherapy convalescence or palliative care     Neuropathy     Radiation therapy complication     Thyroid disease        Past Surgical History:   Procedure Laterality Date    BREAST SURGERY PROCEDURE UNLISTED  10/2002    Right-Lesion    BREAST SURGERY PROCEDURE UNLISTED  2004    Right-Lesion    HX LAP CHOLECYSTECTOMY  13    HX MASTECTOMY      Right       Family History   Problem Relation Age of Onset    Cancer Maternal Aunt         Hodgkin's disease    Breast Cancer Paternal Aunt     Cancer Father         Prostate, lung, brain       Social History     Socioeconomic History    Marital status:      Spouse name: Not on file    Number of children: Not on file    Years of education: Not on file    Highest education level: Not on file   Tobacco Use    Smoking status: Former Smoker     Last attempt to quit: 1991     Years since quittin.0  Smokeless tobacco: Never Used   Substance and Sexual Activity    Alcohol use: Yes     Alcohol/week: 0.0 standard drinks     Types: 1 - 2 Glasses of wine per week     Comment: socially, occassionally    Drug use: No           Outpatient Medications Marked as Taking for the 6/9/20 encounter (Office Visit) with Cintia Aguirre MD   Medication Sig Dispense Refill    calcium citrate 200 mg (950 mg) tablet Take  by mouth daily.  palbociclib (Ibrance) 100 mg cap Take 1 Cap by mouth daily. For 21 days on and 7 days off every 28 days. 63 Cap 5    gabapentin (NEURONTIN) 100 mg capsule Take 2 Caps by mouth three (3) times daily. Max Daily Amount: 600 mg. 540 Cap 3    lidocaine HCl-hydrocortison ac topical cream Apply  to affected area two (2) times a day. 85 g 3    Cholecalciferol, Vitamin D3, 5,000 unit Tab Take  by mouth daily.  thyroid, Pork, (ARMOUR THYROID) 60 mg tablet Take 90 mg by mouth daily.  warfarin (COUMADIN) 1 mg tablet Take 1 mg by mouth daily.            Allergies   Allergen Reactions    Codeine Palpitations    Darvocet A500 [Propoxyphene N-Acetaminophen] Nausea and Vomiting    Darvon [Propoxyphene] Nausea and Vomiting       Vitals:    06/09/20 1028   BP: 117/65   Pulse: 76   Resp: 18   Temp: 98.2 °F (36.8 °C)   SpO2: 97%   Weight: 145 lb (65.8 kg)   Height: 5' 4\" (1.626 m)   PainSc:   0 - No pain

## 2020-06-09 NOTE — PROGRESS NOTES
[facility]  General Surgery    Subjective:  Patient with history of right breast cancer status post right modified radical mastectomy in 1991. She experienced a recurrence in the chest wall and 2001 that was managed by Dr. Tana Arango. She then went on to experience 3 or 4 additional chest wall recurrences. She now has metastases to the spine which have been treated with radiation therapy. She complains of pain and swelling in the left arm following inpatient management of pneumonia while in [place] in late winter early spring. She denies any unintentional weight loss or breast pain or nipple drainage or discharge of the left breast.  Left breast mammogram performed June 5, 2020 without evidence of abnormality or malignancy. Objective:  Vitals:    06/09/20 1028   BP: 117/65   Pulse: 76   Resp: 18   Temp: 98.2 °F (36.8 °C)   SpO2: 97%   Weight: 65.8 kg (145 lb)   Height: 5' 4\" (1.626 m)       Physical Exam:    General: Awake and alert, oriented x4, no apparent distress   Breasts:    Left: No dimpling, discoloration, nipple inversion or retractions. No axillary or supraclavicular lymphadenopathy. No mass   Right: No dimpling, discoloration    No axillary or supraclavicular lymphadenopathy. No mass    Current Medications:  Current Outpatient Medications   Medication Sig Dispense Refill    calcium citrate 200 mg (950 mg) tablet Take  by mouth daily.  palbociclib (Ibrance) 100 mg cap Take 1 Cap by mouth daily. For 21 days on and 7 days off every 28 days. 63 Cap 5    gabapentin (NEURONTIN) 100 mg capsule Take 2 Caps by mouth three (3) times daily. Max Daily Amount: 600 mg. 540 Cap 3    lidocaine HCl-hydrocortison ac topical cream Apply  to affected area two (2) times a day. 85 g 3    Cholecalciferol, Vitamin D3, 5,000 unit Tab Take  by mouth daily.  thyroid, Pork, (ARMOUR THYROID) 60 mg tablet Take 90 mg by mouth daily.       warfarin (COUMADIN) 1 mg tablet Take 1 mg by mouth daily.        Cetirizine (ZYRTEC) 10 mg cap Take 1 Cap by mouth daily as needed.  TETRACYCLINE HCL (TETRACYCLINE PO) Take 250 mg by mouth two (2) times a day.  fexofenadine-pseudoephedrine (ALLEGRA-D 24 HOUR) 180-240 mg per tablet Take 1 Tab by mouth daily as needed.  sulfacetamide (BLEPH-10) 10 % ophthalmic ointment Administer  to right eye three (3) times daily.  palbociclib (IBRANCE) 125 mg cap Take 125 mg by mouth daily. Take 1 tab po every day for 21 days on and 7 days off q 28 days  Indications: HORMONE RECEPTOR(+), HER2(-) ADVANCED BREAST CANCER 42 Cap 6    L-Mfolate-B6 Phos-Methyl-B12 (NEURPATH-B) 3-35-2 mg Tab Take  by mouth two (2) times a day. Chart and notes reviewed. Data reviewed. I have evaluated and examined the patient. Impression and plan:  Patient with history of right breast cancer and multiple recurrences now metastatic to the spine stage IV being maintained with chemotherapy. Continue monthly self breast exam  Follow-up in 1 year for clinical breast exam  Left breast 3D screening mammography in 1 year  Please call a visit in the interim with any new questions or concerns.      Mane Day MD

## 2020-06-09 NOTE — PATIENT INSTRUCTIONS

## 2020-06-18 ENCOUNTER — HOSPITAL ENCOUNTER (OUTPATIENT)
Dept: INFUSION THERAPY | Age: 75
Discharge: HOME | End: 2020-06-18
Payer: MEDICARE

## 2020-06-18 VITALS
SYSTOLIC BLOOD PRESSURE: 120 MMHG | OXYGEN SATURATION: 97 % | DIASTOLIC BLOOD PRESSURE: 72 MMHG | HEART RATE: 78 BPM | TEMPERATURE: 97.6 F | RESPIRATION RATE: 18 BRPM

## 2020-06-18 LAB
BASO+EOS+MONOS # BLD AUTO: 0.2 K/UL (ref 0–2.3)
BASO+EOS+MONOS NFR BLD AUTO: 8 % (ref 0.1–17)
DIFFERENTIAL METHOD BLD: ABNORMAL
ERYTHROCYTE [DISTWIDTH] IN BLOOD BY AUTOMATED COUNT: 17.9 % (ref 11.5–14.5)
HCT VFR BLD AUTO: 33.2 % (ref 36–48)
HGB BLD-MCNC: 11.4 G/DL (ref 12–16)
LYMPHOCYTES # BLD: 0.5 K/UL (ref 1.1–5.9)
LYMPHOCYTES NFR BLD: 25 % (ref 14–44)
MCH RBC QN AUTO: 32.6 PG (ref 25–35)
MCHC RBC AUTO-ENTMCNC: 34.3 G/DL (ref 31–37)
MCV RBC AUTO: 94.9 FL (ref 78–102)
NEUTS SEG # BLD: 1.3 K/UL (ref 1.8–9.5)
NEUTS SEG NFR BLD: 67 % (ref 40–70)
PLATELET # BLD AUTO: 343 K/UL (ref 140–440)
RBC # BLD AUTO: 3.5 M/UL (ref 4.1–5.1)
WBC # BLD AUTO: 2 K/UL (ref 4.5–13)

## 2020-06-18 PROCEDURE — 36415 COLL VENOUS BLD VENIPUNCTURE: CPT

## 2020-06-18 PROCEDURE — 85025 COMPLETE CBC W/AUTO DIFF WBC: CPT

## 2020-06-18 NOTE — PROGRESS NOTES
[facility] Progress Note    Date: 2020    Name: Shakir Acosta    MRN: [id number]         : 1945    Retacrit    Ms. Figueroa arrived to [address] [place] [address] at . Ms. Kori Avila was assessed and education was provided. Pt is here today q2 week CBC/ Retracrit. Ms. Mar Mainland vitals were reviewed. Visit Vitals  /72 (BP 1 Location: Left arm, BP Patient Position: At rest;Sitting)   Pulse 78   Temp 97.6 °F (36.4 °C)   Resp 18   SpO2 97%       Pt was observed for 5 minutes after obtaining vital signs prior to initiating treatment. CBC drawn from Left AC by Phlebotomy x1 attempt. Lab results obtained & reviewed. Recent Results (from the past 12 hour(s))   CBC WITH 3 PART DIFF    Collection Time: 20  1:30 PM   Result Value Ref Range    WBC 2.0 (L) 4.5 - 13.0 K/uL    RBC 3.50 (L) 4.10 - 5.10 M/uL    HGB 11.4 (L) 12.0 - 16 g/dL    HCT 33.2 (L) 36 - 48 %    MCV 94.9 78 - 102 FL    MCH 32.6 25.0 - 35.0 PG    MCHC 34.3 31 - 37 g/dL    RDW 17.9 (H) 11.5 - 14.5 %    PLATELET 563 196 - 736 K/uL    NEUTROPHILS 67 40 - 70 %    MIXED CELLS 8 0.1 - 17 %    LYMPHOCYTES 25 14 - 44 %    ABS. NEUTROPHILS 1.3 (L) 1.8 - 9.5 K/UL    ABS. MIXED CELLS 0.2 0.0 - 2.3 K/uL    ABS. LYMPHOCYTES 0.5 (L) 1.1 - 5.9 K/UL    DF AUTOMATED       Retacrit HELD per parameters. Pt tolerated well without complaints. Pt armband removed & shredded. Ms. Kori Avila was discharged from [place] [address] in stable condition at 5. She is to return Return in 2 weeks for her next appointment 20 at 1pm for CBC/ Retracrit, q4 week Xgeva, q4 week Faslodex, and monthly port flush.       Jazmine Dunne  2020

## 2020-06-30 DIAGNOSIS — C50.919 METASTATIC BREAST CANCER (HCC): ICD-10-CM

## 2020-07-02 ENCOUNTER — HOSPITAL ENCOUNTER (OUTPATIENT)
Dept: INFUSION THERAPY | Age: 75
Discharge: HOME | End: 2020-07-02
Payer: MEDICARE

## 2020-07-02 VITALS
TEMPERATURE: 98.3 F | HEART RATE: 79 BPM | OXYGEN SATURATION: 97 % | RESPIRATION RATE: 18 BRPM | DIASTOLIC BLOOD PRESSURE: 79 MMHG | SYSTOLIC BLOOD PRESSURE: 130 MMHG

## 2020-07-02 DIAGNOSIS — C50.919 METASTATIC BREAST CANCER (HCC): Primary | ICD-10-CM

## 2020-07-02 DIAGNOSIS — D64.81 ANEMIA DUE TO ANTINEOPLASTIC CHEMOTHERAPY: ICD-10-CM

## 2020-07-02 DIAGNOSIS — Z17.0 MALIGNANT NEOPLASM OF LOWER-INNER QUADRANT OF RIGHT BREAST OF FEMALE, ESTROGEN RECEPTOR POSITIVE (HCC): ICD-10-CM

## 2020-07-02 DIAGNOSIS — T45.1X5A ANEMIA DUE TO ANTINEOPLASTIC CHEMOTHERAPY: ICD-10-CM

## 2020-07-02 DIAGNOSIS — C50.311 MALIGNANT NEOPLASM OF LOWER-INNER QUADRANT OF RIGHT BREAST OF FEMALE, ESTROGEN RECEPTOR POSITIVE (HCC): ICD-10-CM

## 2020-07-02 LAB
ALBUMIN SERPL-MCNC: 3.9 G/DL (ref 3.4–5)
ALBUMIN/GLOB SERPL: 1.2 {RATIO} (ref 0.8–1.7)
ALP SERPL-CCNC: 52 U/L (ref 45–117)
ALT SERPL-CCNC: 15 U/L (ref 13–56)
ANION GAP SERPL CALC-SCNC: 7 MMOL/L (ref 3–18)
AST SERPL-CCNC: 13 U/L (ref 10–38)
BASO+EOS+MONOS # BLD AUTO: 0.3 K/UL (ref 0–2.3)
BASO+EOS+MONOS NFR BLD AUTO: 17 % (ref 0.1–17)
BILIRUB SERPL-MCNC: 0.4 MG/DL (ref 0.2–1)
BUN SERPL-MCNC: 20 MG/DL (ref 7–18)
BUN/CREAT SERPL: 22 (ref 12–20)
CALCIUM SERPL-MCNC: 9.4 MG/DL (ref 8.5–10.1)
CHLORIDE SERPL-SCNC: 110 MMOL/L (ref 100–111)
CO2 SERPL-SCNC: 27 MMOL/L (ref 21–32)
CREAT SERPL-MCNC: 0.91 MG/DL (ref 0.6–1.3)
DIFFERENTIAL METHOD BLD: ABNORMAL
ERYTHROCYTE [DISTWIDTH] IN BLOOD BY AUTOMATED COUNT: 18.5 % (ref 11.5–14.5)
GLOBULIN SER CALC-MCNC: 3.2 G/DL (ref 2–4)
GLUCOSE SERPL-MCNC: 85 MG/DL (ref 74–99)
HCT VFR BLD AUTO: 30.9 % (ref 36–48)
HGB BLD-MCNC: 10.8 G/DL (ref 12–16)
LYMPHOCYTES # BLD: 0.4 K/UL (ref 1.1–5.9)
LYMPHOCYTES NFR BLD: 24 % (ref 14–44)
MAGNESIUM SERPL-MCNC: 1.7 MG/DL (ref 1.6–2.6)
MCH RBC QN AUTO: 33.4 PG (ref 25–35)
MCHC RBC AUTO-ENTMCNC: 35 G/DL (ref 31–37)
MCV RBC AUTO: 95.7 FL (ref 78–102)
NEUTS SEG # BLD: 1.2 K/UL (ref 1.8–9.5)
NEUTS SEG NFR BLD: 60 % (ref 40–70)
PHOSPHATE SERPL-MCNC: 2.7 MG/DL (ref 2.5–4.9)
PLATELET # BLD AUTO: 143 K/UL (ref 140–440)
POTASSIUM SERPL-SCNC: 3.9 MMOL/L (ref 3.5–5.5)
PROT SERPL-MCNC: 7.1 G/DL (ref 6.4–8.2)
RBC # BLD AUTO: 3.23 M/UL (ref 4.1–5.1)
SODIUM SERPL-SCNC: 144 MMOL/L (ref 136–145)
WBC # BLD AUTO: 1.9 K/UL (ref 4.5–13)

## 2020-07-02 PROCEDURE — 96372 THER/PROPH/DIAG INJ SC/IM: CPT

## 2020-07-02 PROCEDURE — 85025 COMPLETE CBC W/AUTO DIFF WBC: CPT

## 2020-07-02 PROCEDURE — 36415 COLL VENOUS BLD VENIPUNCTURE: CPT

## 2020-07-02 PROCEDURE — 74011250636 HC RX REV CODE- 250/636: Performed by: INTERNAL MEDICINE

## 2020-07-02 PROCEDURE — 36591 DRAW BLOOD OFF VENOUS DEVICE: CPT

## 2020-07-02 PROCEDURE — 77030012965 HC NDL HUBR BBMI -A

## 2020-07-02 PROCEDURE — 74011250636 HC RX REV CODE- 250/636: Performed by: NURSE PRACTITIONER

## 2020-07-02 PROCEDURE — 80053 COMPREHEN METABOLIC PANEL: CPT

## 2020-07-02 PROCEDURE — 84100 ASSAY OF PHOSPHORUS: CPT

## 2020-07-02 PROCEDURE — 96402 CHEMO HORMON ANTINEOPL SQ/IM: CPT

## 2020-07-02 PROCEDURE — 83735 ASSAY OF MAGNESIUM: CPT

## 2020-07-02 RX ORDER — DIPHENHYDRAMINE HYDROCHLORIDE 50 MG/ML
50 INJECTION, SOLUTION INTRAMUSCULAR; INTRAVENOUS AS NEEDED
Status: CANCELLED
Start: 2020-07-30

## 2020-07-02 RX ORDER — SODIUM CHLORIDE 9 MG/ML
10 INJECTION INTRAMUSCULAR; INTRAVENOUS; SUBCUTANEOUS AS NEEDED
Status: CANCELLED | OUTPATIENT
Start: 2020-07-02

## 2020-07-02 RX ORDER — HYDROCORTISONE SODIUM SUCCINATE 100 MG/2ML
100 INJECTION, POWDER, FOR SOLUTION INTRAMUSCULAR; INTRAVENOUS AS NEEDED
Status: CANCELLED | OUTPATIENT
Start: 2020-07-30

## 2020-07-02 RX ORDER — SODIUM CHLORIDE 0.9 % (FLUSH) 0.9 %
10 SYRINGE (ML) INJECTION AS NEEDED
Status: CANCELLED
Start: 2020-07-30

## 2020-07-02 RX ORDER — HEPARIN 100 UNIT/ML
300-500 SYRINGE INTRAVENOUS AS NEEDED
Status: CANCELLED
Start: 2020-07-30

## 2020-07-02 RX ORDER — HEPARIN 100 UNIT/ML
300-500 SYRINGE INTRAVENOUS AS NEEDED
Status: CANCELLED
Start: 2020-07-02

## 2020-07-02 RX ORDER — ALBUTEROL SULFATE 0.83 MG/ML
2.5 SOLUTION RESPIRATORY (INHALATION) AS NEEDED
Status: CANCELLED
Start: 2020-07-30

## 2020-07-02 RX ORDER — SODIUM CHLORIDE 9 MG/ML
10 INJECTION INTRAMUSCULAR; INTRAVENOUS; SUBCUTANEOUS AS NEEDED
Status: CANCELLED | OUTPATIENT
Start: 2020-07-30

## 2020-07-02 RX ORDER — SODIUM CHLORIDE 0.9 % (FLUSH) 0.9 %
10 SYRINGE (ML) INJECTION AS NEEDED
Status: DISCONTINUED | OUTPATIENT
Start: 2020-07-02 | End: 2020-07-03 | Stop reason: HOSPADM

## 2020-07-02 RX ORDER — SODIUM CHLORIDE 0.9 % (FLUSH) 0.9 %
10 SYRINGE (ML) INJECTION AS NEEDED
Status: CANCELLED
Start: 2020-07-02

## 2020-07-02 RX ORDER — ONDANSETRON 2 MG/ML
8 INJECTION INTRAMUSCULAR; INTRAVENOUS AS NEEDED
Status: CANCELLED | OUTPATIENT
Start: 2020-07-30

## 2020-07-02 RX ORDER — EPINEPHRINE 1 MG/ML
0.3 INJECTION, SOLUTION, CONCENTRATE INTRAVENOUS AS NEEDED
Status: CANCELLED | OUTPATIENT
Start: 2020-07-30

## 2020-07-02 RX ORDER — LAMOTRIGINE 25 MG/1
500 TABLET ORAL ONCE
Status: COMPLETED | OUTPATIENT
Start: 2020-07-02 | End: 2020-07-02

## 2020-07-02 RX ORDER — DIPHENHYDRAMINE HYDROCHLORIDE 50 MG/ML
25 INJECTION, SOLUTION INTRAMUSCULAR; INTRAVENOUS AS NEEDED
Status: CANCELLED
Start: 2020-07-30

## 2020-07-02 RX ORDER — HEPARIN 100 UNIT/ML
300-500 SYRINGE INTRAVENOUS AS NEEDED
Status: DISCONTINUED | OUTPATIENT
Start: 2020-07-02 | End: 2020-07-03 | Stop reason: HOSPADM

## 2020-07-02 RX ORDER — ACETAMINOPHEN 325 MG/1
650 TABLET ORAL AS NEEDED
Status: CANCELLED
Start: 2020-07-30

## 2020-07-02 RX ADMIN — FULVESTRANT 500 MG: 50 INJECTION INTRAMUSCULAR at 13:58

## 2020-07-02 RX ADMIN — HEPARIN 500 UNITS: 100 SYRINGE at 13:30

## 2020-07-02 RX ADMIN — DENOSUMAB 120 MG: 120 INJECTION SUBCUTANEOUS at 14:04

## 2020-07-02 RX ADMIN — Medication 30 ML: at 13:30

## 2020-07-02 NOTE — PROGRESS NOTES
[facility] Progress Note    Date: 2020    Name: Jessica Goncalves    MRN: [id number]         : 1945       FASLODEX Q4 WEEKS  XGEVA Q4 WEEKS  RETACRIT Q4 WEEKS      Ms. Zhen Voss arrived to [facility] at [phone]. Ms. Zhen Voss was assessed and education was provided. Ms. Frank Zhu vitals were reviewed. Visit Vitals  /79 (BP 1 Location: Left arm, BP Patient Position: Sitting)   Pulse 79   Temp 98.3 °F (36.8 °C)   Resp 18   SpO2 97%   Breastfeeding No       Pt was observed for 5 minutes after obtaining vital signs prior to initiating treatment. Pt's left upper chest port accessed & blood drawn for labs per order. Pt's port flushed with NS and heparin per order & de-accessed. Band-aid to site. Lab results obtained & reviewed. Recent Results (from the past 12 hour(s))   CBC WITH 3 PART DIFF    Collection Time: 20  1:30 PM   Result Value Ref Range    WBC 1.9 (L) 4.5 - 13.0 K/uL    RBC 3.23 (L) 4.10 - 5.10 M/uL    HGB 10.8 (L) 12.0 - 16 g/dL    HCT 30.9 (L) 36 - 48 %    MCV 95.7 78 - 102 FL    MCH 33.4 25.0 - 35.0 PG    MCHC 35.0 31 - 37 g/dL    RDW 18.5 (H) 11.5 - 14.5 %    PLATELET 083 122 - 696 K/uL    NEUTROPHILS 60 40 - 70 %    MIXED CELLS 17 0.1 - 17 %    LYMPHOCYTES 24 14 - 44 %    ABS. NEUTROPHILS 1.2 (L) 1.8 - 9.5 K/UL    ABS. MIXED CELLS 0.3 0.0 - 2.3 K/uL    ABS. LYMPHOCYTES 0.4 (L) 1.1 - 5.9 K/UL    DF AUTOMATED         Retacrit held at this time per order. Faslodex 500mg administered as ordered IM in pt's L & R dorsogluteal muscles (250mg per injection). Band-aids to sites. CMP results reviewed from 20. Within tx plan parameters for Xgeva injection. Xgeva 120mg administered as ordered SQ in pt's L upper arm. Band-aid to site. Pt tolerated well without complaints. Pt armband removed & shredded. Ms. Zhen Voss was discharged from [place] [address] in stable condition at 1410. She is to return on 20 at 1300 for her next appointment.     Soniya Mckay, RN  July 2, 2020

## 2020-07-16 ENCOUNTER — APPOINTMENT (OUTPATIENT)
Dept: INFUSION THERAPY | Age: 75
End: 2020-07-16
Payer: MEDICARE

## 2020-07-28 DIAGNOSIS — C50.919 METASTATIC BREAST CANCER (HCC): ICD-10-CM

## 2020-07-30 ENCOUNTER — LAB ONLY (OUTPATIENT)
Dept: ONCOLOGY | Age: 75
End: 2020-07-30

## 2020-07-30 ENCOUNTER — HOSPITAL ENCOUNTER (OUTPATIENT)
Dept: INFUSION THERAPY | Age: 75
Discharge: HOME | End: 2020-07-30
Payer: MEDICARE

## 2020-07-30 ENCOUNTER — HOSPITAL ENCOUNTER (OUTPATIENT)
Dept: LAB | Age: 75
Discharge: HOME | End: 2020-07-30
Payer: MEDICARE

## 2020-07-30 VITALS
SYSTOLIC BLOOD PRESSURE: 123 MMHG | RESPIRATION RATE: 18 BRPM | TEMPERATURE: 98.3 F | OXYGEN SATURATION: 97 % | DIASTOLIC BLOOD PRESSURE: 72 MMHG | HEART RATE: 92 BPM

## 2020-07-30 DIAGNOSIS — C50.919 METASTATIC BREAST CANCER (HCC): ICD-10-CM

## 2020-07-30 DIAGNOSIS — C50.311 MALIGNANT NEOPLASM OF LOWER-INNER QUADRANT OF RIGHT BREAST OF FEMALE, ESTROGEN RECEPTOR POSITIVE (HCC): ICD-10-CM

## 2020-07-30 DIAGNOSIS — C50.919 METASTATIC BREAST CANCER (HCC): Primary | ICD-10-CM

## 2020-07-30 DIAGNOSIS — Z17.0 MALIGNANT NEOPLASM OF LOWER-INNER QUADRANT OF RIGHT BREAST OF FEMALE, ESTROGEN RECEPTOR POSITIVE (HCC): ICD-10-CM

## 2020-07-30 LAB
ALBUMIN SERPL-MCNC: 3.7 G/DL (ref 3.4–5)
ALBUMIN/GLOB SERPL: 1 {RATIO} (ref 0.8–1.7)
ALP SERPL-CCNC: 58 U/L (ref 45–117)
ALT SERPL-CCNC: 15 U/L (ref 13–56)
ANION GAP SERPL CALC-SCNC: 6 MMOL/L (ref 3–18)
AST SERPL-CCNC: 16 U/L (ref 10–38)
BASO+EOS+MONOS # BLD AUTO: 0.3 K/UL (ref 0–2.3)
BASO+EOS+MONOS NFR BLD AUTO: 16 % (ref 0.1–17)
BILIRUB SERPL-MCNC: 0.6 MG/DL (ref 0.2–1)
BUN SERPL-MCNC: 21 MG/DL (ref 7–18)
BUN/CREAT SERPL: 23 (ref 12–20)
CALCIUM SERPL-MCNC: 9.2 MG/DL (ref 8.5–10.1)
CHLORIDE SERPL-SCNC: 108 MMOL/L (ref 100–111)
CO2 SERPL-SCNC: 27 MMOL/L (ref 21–32)
CREAT SERPL-MCNC: 0.91 MG/DL (ref 0.6–1.3)
DIFFERENTIAL METHOD BLD: ABNORMAL
ERYTHROCYTE [DISTWIDTH] IN BLOOD BY AUTOMATED COUNT: 18.8 % (ref 11.5–14.5)
FERRITIN SERPL-MCNC: 329 NG/ML (ref 8–388)
GLOBULIN SER CALC-MCNC: 3.8 G/DL (ref 2–4)
GLUCOSE SERPL-MCNC: 92 MG/DL (ref 74–99)
HCT VFR BLD AUTO: 30.6 % (ref 36–48)
HGB BLD-MCNC: 10.7 G/DL (ref 12–16)
IRON SATN MFR SERPL: 25 % (ref 20–50)
IRON SERPL-MCNC: 71 UG/DL (ref 50–175)
LYMPHOCYTES # BLD: 0.5 K/UL (ref 1.1–5.9)
LYMPHOCYTES NFR BLD: 23 % (ref 14–44)
MAGNESIUM SERPL-MCNC: 1.5 MG/DL (ref 1.6–2.6)
MCH RBC QN AUTO: 34.6 PG (ref 25–35)
MCHC RBC AUTO-ENTMCNC: 35 G/DL (ref 31–37)
MCV RBC AUTO: 99 FL (ref 78–102)
NEUTS SEG # BLD: 1.2 K/UL (ref 1.8–9.5)
NEUTS SEG NFR BLD: 61 % (ref 40–70)
PHOSPHATE SERPL-MCNC: 2.5 MG/DL (ref 2.5–4.9)
PLATELET # BLD AUTO: 147 K/UL (ref 140–440)
POTASSIUM SERPL-SCNC: 3.8 MMOL/L (ref 3.5–5.5)
PROT SERPL-MCNC: 7.5 G/DL (ref 6.4–8.2)
RBC # BLD AUTO: 3.09 M/UL (ref 4.1–5.1)
SODIUM SERPL-SCNC: 141 MMOL/L (ref 136–145)
TIBC SERPL-MCNC: 286 UG/DL (ref 250–450)
WBC # BLD AUTO: 2 K/UL (ref 4.5–13)

## 2020-07-30 PROCEDURE — 86300 IMMUNOASSAY TUMOR CA 15-3: CPT

## 2020-07-30 PROCEDURE — 83540 ASSAY OF IRON: CPT

## 2020-07-30 PROCEDURE — 74011250636 HC RX REV CODE- 250/636: Performed by: INTERNAL MEDICINE

## 2020-07-30 PROCEDURE — 96372 THER/PROPH/DIAG INJ SC/IM: CPT

## 2020-07-30 PROCEDURE — 82728 ASSAY OF FERRITIN: CPT

## 2020-07-30 PROCEDURE — 80053 COMPREHEN METABOLIC PANEL: CPT

## 2020-07-30 PROCEDURE — 96402 CHEMO HORMON ANTINEOPL SQ/IM: CPT

## 2020-07-30 PROCEDURE — 77030012965 HC NDL HUBR BBMI -A

## 2020-07-30 PROCEDURE — 96523 IRRIG DRUG DELIVERY DEVICE: CPT

## 2020-07-30 PROCEDURE — 84100 ASSAY OF PHOSPHORUS: CPT

## 2020-07-30 PROCEDURE — 36591 DRAW BLOOD OFF VENOUS DEVICE: CPT

## 2020-07-30 PROCEDURE — 83735 ASSAY OF MAGNESIUM: CPT

## 2020-07-30 PROCEDURE — 85025 COMPLETE CBC W/AUTO DIFF WBC: CPT

## 2020-07-30 PROCEDURE — 74011250636 HC RX REV CODE- 250/636: Performed by: NURSE PRACTITIONER

## 2020-07-30 PROCEDURE — 74011250636 HC RX REV CODE- 250/636

## 2020-07-30 RX ORDER — HEPARIN 100 UNIT/ML
SYRINGE INTRAVENOUS
Status: COMPLETED
Start: 2020-07-30 | End: 2020-07-30

## 2020-07-30 RX ORDER — DIPHENHYDRAMINE HYDROCHLORIDE 50 MG/ML
25 INJECTION, SOLUTION INTRAMUSCULAR; INTRAVENOUS AS NEEDED
Status: CANCELLED
Start: 2020-08-27

## 2020-07-30 RX ORDER — ACETAMINOPHEN 325 MG/1
650 TABLET ORAL AS NEEDED
Status: CANCELLED
Start: 2020-08-27

## 2020-07-30 RX ORDER — DIPHENHYDRAMINE HYDROCHLORIDE 50 MG/ML
50 INJECTION, SOLUTION INTRAMUSCULAR; INTRAVENOUS AS NEEDED
Status: CANCELLED
Start: 2020-08-27

## 2020-07-30 RX ORDER — ALBUTEROL SULFATE 0.83 MG/ML
2.5 SOLUTION RESPIRATORY (INHALATION) AS NEEDED
Status: CANCELLED
Start: 2020-08-27

## 2020-07-30 RX ORDER — EPINEPHRINE 1 MG/ML
0.3 INJECTION, SOLUTION, CONCENTRATE INTRAVENOUS AS NEEDED
Status: CANCELLED | OUTPATIENT
Start: 2020-08-27

## 2020-07-30 RX ORDER — HYDROCORTISONE SODIUM SUCCINATE 100 MG/2ML
100 INJECTION, POWDER, FOR SOLUTION INTRAMUSCULAR; INTRAVENOUS AS NEEDED
Status: CANCELLED | OUTPATIENT
Start: 2020-08-27

## 2020-07-30 RX ORDER — ONDANSETRON 2 MG/ML
8 INJECTION INTRAMUSCULAR; INTRAVENOUS AS NEEDED
Status: CANCELLED | OUTPATIENT
Start: 2020-08-27

## 2020-07-30 RX ORDER — LAMOTRIGINE 25 MG/1
500 TABLET ORAL ONCE
Status: COMPLETED | OUTPATIENT
Start: 2020-07-30 | End: 2020-07-30

## 2020-07-30 RX ADMIN — Medication 500 UNITS: at 13:27

## 2020-07-30 RX ADMIN — FULVESTRANT 500 MG: 50 INJECTION INTRAMUSCULAR at 13:38

## 2020-07-30 RX ADMIN — DENOSUMAB 120 MG: 120 INJECTION SUBCUTANEOUS at 13:35

## 2020-07-30 NOTE — PROGRESS NOTES
[facility] Progress Note    Date: 2020    Name: Alen Arevalo    MRN: [id number]         : 1945       FASLODEX Q4 WEEKS  XGEVA Q4 WEEKS  RETACRIT Q4 WEEKS  MONTHLY PORT FLUSH      Ms. Lorene Aburto arrived to [facility] at . Ms. Lorene Aburto was assessed and education was provided. Ms. Derick Rojas vitals were reviewed. Visit Vitals  /72 (BP 1 Location: Left arm, BP Patient Position: Sitting)   Pulse 92   Temp 98.3 °F (36.8 °C)   Resp 18   SpO2 97%       Pt was observed for 5 minutes after obtaining vital signs prior to initiating treatment. Pt's left upper chest port accessed & blood drawn for labs per order. Pt's port flushed with NS and heparin per order & de-accessed. Band-aid to site. Lab results obtained & reviewed. Recent Results (from the past 12 hour(s))   CBC WITH 3 PART DIFF    Collection Time: 20  1:27 PM   Result Value Ref Range    WBC 2.0 (L) 4.5 - 13.0 K/uL    RBC 3.09 (L) 4.10 - 5.10 M/uL    HGB 10.7 (L) 12.0 - 16 g/dL    HCT 30.6 (L) 36 - 48 %    MCV 99.0 78 - 102 FL    MCH 34.6 25.0 - 35.0 PG    MCHC 35.0 31 - 37 g/dL    RDW 18.8 (H) 11.5 - 14.5 %    PLATELET 181 003 - 208 K/uL    NEUTROPHILS 61 40 - 70 %    MIXED CELLS 16 0.1 - 17 %    LYMPHOCYTES 23 14 - 44 %    ABS. NEUTROPHILS 1.2 (L) 1.8 - 9.5 K/UL    ABS. MIXED CELLS 0.3 0.0 - 2.3 K/uL    ABS. LYMPHOCYTES 0.5 (L) 1.1 - 5.9 K/UL    DF AUTOMATED         Retacrit held at this time per order. Faslodex 500mg administered as ordered IM in pt's L & R dorsogluteal muscles (250mg per injection). Band-aids to sites. CMP results reviewed from 20. Within tx plan parameters for Xgeva injection. Xgeva 120mg administered as ordered SQ in pt's L upper arm. Band-aid to site. Pt tolerated well without complaints. Pt armband removed & shredded. Ms. Lorene Aburto was discharged from [place] [address] in stable condition at 5. She is to return on 20 at 1300 for her next appointment.     [facility]  July 30, 2020

## 2020-08-01 LAB — CANCER AG27-29 SERPL-ACNC: 44.6 U/ML (ref 0–38.6)

## 2020-08-05 ENCOUNTER — OFFICE VISIT (OUTPATIENT)
Dept: ONCOLOGY | Age: 75
End: 2020-08-05

## 2020-08-05 VITALS
DIASTOLIC BLOOD PRESSURE: 69 MMHG | HEART RATE: 79 BPM | BODY MASS INDEX: 24.89 KG/M2 | OXYGEN SATURATION: 98 % | WEIGHT: 145 LBS | RESPIRATION RATE: 16 BRPM | SYSTOLIC BLOOD PRESSURE: 113 MMHG

## 2020-08-05 DIAGNOSIS — C50.919 METASTATIC BREAST CANCER (HCC): Primary | ICD-10-CM

## 2020-08-05 DIAGNOSIS — C79.52 SECONDARY MALIGNANT NEOPLASM OF BONE AND BONE MARROW (HCC): ICD-10-CM

## 2020-08-05 DIAGNOSIS — D70.1 CHEMOTHERAPY INDUCED NEUTROPENIA (HCC): ICD-10-CM

## 2020-08-05 DIAGNOSIS — D64.81 ANEMIA DUE TO CHEMOTHERAPY: ICD-10-CM

## 2020-08-05 DIAGNOSIS — T45.1X5A CHEMOTHERAPY-INDUCED THROMBOCYTOPENIA: ICD-10-CM

## 2020-08-05 DIAGNOSIS — G63 NEUROPATHY ASSOCIATED WITH CANCER (HCC): ICD-10-CM

## 2020-08-05 DIAGNOSIS — E55.9 VITAMIN D DEFICIENCY: ICD-10-CM

## 2020-08-05 DIAGNOSIS — C79.51 SECONDARY MALIGNANT NEOPLASM OF BONE AND BONE MARROW (HCC): ICD-10-CM

## 2020-08-05 DIAGNOSIS — T45.1X5A CHEMOTHERAPY INDUCED NEUTROPENIA (HCC): ICD-10-CM

## 2020-08-05 DIAGNOSIS — T45.1X5A ANEMIA DUE TO CHEMOTHERAPY: ICD-10-CM

## 2020-08-05 DIAGNOSIS — C80.1 NEUROPATHY ASSOCIATED WITH CANCER (HCC): ICD-10-CM

## 2020-08-05 DIAGNOSIS — D69.59 CHEMOTHERAPY-INDUCED THROMBOCYTOPENIA: ICD-10-CM

## 2020-08-05 NOTE — PATIENT INSTRUCTIONS
Breast Cancer: Care Instructions  Your Care Instructions     Breast cancer occurs when abnormal cells grow out of control in the breast. These cancer cells can spread within the breast, to nearby lymph nodes and other tissues, and to other parts of the body. Being treated for cancer can weaken your body, and you may feel very tired. Get the rest your body needs so you can feel better. Finding out that you have cancer is scary. You may feel many emotions and may need some help coping. Seek out family, friends, and counselors for support. You also can do things at home to make yourself feel better while you go through treatment. Call the [employer] (0-874.450.6316) or visit its website at [employer] for more information. Follow-up care is a key part of your treatment and safety. Be sure to make and go to all appointments, and call your doctor if you are having problems. It's also a good idea to know your test results and keep a list of the medicines you take. How can you care for yourself at home? · Take your medicines exactly as prescribed. Call your doctor if you think you are having a problem with your medicine. You may get medicine for nausea and vomiting if you have these side effects. · Follow your doctor's instructions to relieve pain. Pain from cancer and surgery can almost always be controlled. Use pain medicine when you first notice pain, before it becomes severe. · Eat healthy food. If you do not feel like eating, try to eat food that has protein and extra calories to keep up your strength and prevent weight loss. Drink liquid meal replacements for extra calories and protein. Try to eat your main meal early. · Get some physical activity every day, but do not get too tired. Keep doing the hobbies you enjoy as your energy allows. · Do not smoke. Smoking can make your cancer worse. If you need help quitting, talk to your doctor about stop-smoking programs and medicines.  These can increase your chances of quitting for good. · Take steps to control your stress and workload. Learn relaxation techniques. ? Share your feelings. Stress and tension affect our emotions. By expressing your feelings to others, you may be able to understand and cope with them. ? Consider joining a support group. Talking about a problem with your spouse, a good friend, or other people with similar problems is a good way to reduce tension and stress. ? Express yourself through art. Try writing, crafts, dance, or art to relieve stress. Some dance, writing, or art groups may be available just for people who have cancer. ? Be kind to your body and mind. Getting enough sleep, eating a healthy diet, and taking time to do things you enjoy can contribute to an overall feeling of balance in your life and can help reduce stress. ? Get help if you need it. Discuss your concerns with your doctor or counselor. · If you are vomiting or have diarrhea:  ? Drink plenty of fluids (enough so that your urine is light yellow or clear like water) to prevent dehydration. Choose water and other caffeine-free clear liquids. If you have kidney, heart, or liver disease and have to limit fluids, talk with your doctor before you increase the amount of fluids you drink. ? When you are able to eat, try clear soups, mild foods, and liquids until all symptoms are gone for 12 to 48 hours. Other good choices include dry toast, crackers, cooked cereal, and gelatin dessert, such as Jell-O.  · If you have not already done so, prepare a list of advance directives. Advance directives are instructions to your doctor and family members about what kind of care you want if you become unable to speak or express yourself. When should you call for help? [id number] anytime you think you may need emergency care. For example, call if:  · You passed out (lost consciousness). Call your doctor now or seek immediate medical care if:  · You have a fever.   · You have abnormal bleeding. · You think you have an infection. · You have new or worse pain. · You have new symptoms, such as a cough, belly pain, vomiting, diarrhea, or a rash. Watch closely for changes in your health, and be sure to contact your doctor if:  · You are much more tired than usual.  · You have swollen glands in your armpits, groin, or neck. · You do not get better as expected. Where can you learn more? Go to http://www.webb.com/  Enter V321 in the search box to learn more about \"Breast Cancer: Care Instructions. \"  Current as of: August 22, 2019               Content Version: 12.5  © 7692-8019 [employer]. Care instructions adapted under license by [employer] (which disclaims liability or warranty for this information). If you have questions about a medical condition or this instruction, always ask your healthcare professional. [address] any warranty or liability for your use of this information. Learning About Breast Cancer Treatments  Your Care Instructions     Breast cancer means abnormal cells grow out of control in one or both breasts. These cancer cells can spread from the breast to nearby lymph nodes and other tissues. They can also spread to other parts of the body. The type and stage of cancer you have is based on:  · Where in the breast the cancer started. · The genetics of those cancer cells. · How far cancer has spread within the breast, to nearby tissues, or to other organs. · What the cancer cells look like under a microscope. · Whether there is cancer in the nearby lymph nodes. Tests that help find the stage of your cancer can help choose the right treatment for you. These tests can include a breast biopsy, lymph node biopsy, blood tests, and X-rays. You may need other tests as well, such as a bone, CT, or MRI scan.  Whether you have more tests depends on your symptoms and the stage of the cancer. When you find out that you have cancer, you may feel many emotions and may need some help coping. Seek out family, friends, and counselors for support. You also can do things at home to make yourself feel better while you go through treatment. Call the [employer] (5-280.365.2155) or visit its website at [address] for more information. How is breast cancer treated? Your doctor may combine treatments. This is a common way to treat breast cancer. Treatment depends on what type and stage of cancer you have. You may have:  · Surgery to remove the cancer. · Radiation. This uses high-dose X-rays to kill cancer cells and shrink tumors. · Chemotherapy. This uses medicine to kill cancer cells. · Hormone therapy. This uses medicines such as tamoxifen. It limits the effect of the hormone estrogen. This hormone can help some types of breast cancer cells to grow. · Targeted therapy. This uses medicines such as trastuzumab (Herceptin) to help your immune system fight the cancer. What surgeries are done for breast cancer? Surgery is a key part of treatment for breast cancer. The main types of surgeries are:  · Breast-conserving surgery, such as lumpectomy. It removes the cancer in the breast and just enough tissue to make sure that all of the cancer was removed. · Surgery to remove the breast. This includes:  ? Total mastectomy. This removes the whole breast.  ? Nipple-sparing mastectomy. This removes the whole breast but leaves the nipple. ? Modified radical mastectomy. This removes the whole breast and the lymph nodes under the arm (axillary lymph nodes). ? Radical mastectomy. This removes the whole breast, the chest muscles, and all the lymph nodes under the arm. If lymph nodes under the arm are removed, they are looked at under the microscope to check for cancer.  There are two types of lymph node removal:  · [place] lymph node biopsy removes the lymph node that is the first to receive lymph fluid from the tumor. If cancer has spread from the breast to the lymph nodes, cancer cells most often show up in the sentinel node first.  · Axillary lymph node dissection removes most of the lymph nodes in the armpit. The type of surgery you have depends on the size, location, and type of the cancer. It also depends on your overall health and personal preferences. Even if your doctor removes all the cancer that can be seen at the time of your surgery, you may still need more treatment. You may get radiation, chemotherapy, or hormone therapy after surgery to try to kill any cancer cells that may be left. No matter what kind of surgery you have, you will get information about why you are having it, what its risks are, how to prepare, and what to do after surgery. Follow-up care is a key part of your treatment and safety. Be sure to make and go to all appointments, and call your doctor if you are having problems. It's also a good idea to know your test results and keep a list of the medicines you take. Where can you learn more? Go to http://mandie-elizabeth.info/  Enter X810 in the search box to learn more about \"Learning About Breast Cancer Treatments. \"  Current as of: August 22, 2019               Content Version: 12.5  © 8751-8202 [employer]. Care instructions adapted under license by [employer] (which disclaims liability or warranty for this information). If you have questions about a medical condition or this instruction, always ask your healthcare professional. [address] any warranty or liability for your use of this information. Palbociclib (Ibrance) - (By mouth)   Why this medicine is used:   Treats breast cancer. This medicine is given together with letrozole or fulvestrant.   Contact a nurse or doctor right away if you have:  · Fast or pounding heartbeat, chest pain, trouble breathing, coughing up blood  · Unusual bleeding, bruising, or weakness  · Dizziness or lightheadedness  · Fever, chills, cough, runny or stuffy nose, sore throat, body aches   © 2017 [address] Information is for End User's use only and may not be sold, redistributed or otherwise used for commercial purposes. Palbociclib (By mouth)   Palbociclib (pal-isha-SYE-klib)  Treats breast cancer. This medicine is given together with letrozole or fulvestrant. Brand Name(s): Ibrance   There may be other brand names for this medicine. When This Medicine Should Not Be Used: This medicine is not right for everyone. Do not use it if you had an allergic reaction to palbociclib, or if you are pregnant. How to Use This Medicine:   Capsule  · Take your medicine as directed. Your dose may need to be changed several times to find what works best for you. · It is best to take this medicine with food or milk. · Swallow the capsule whole. Do not open, crush, break, or chew it. · Read and follow the patient instructions that come with this medicine. Talk to your doctor or pharmacist if you have any questions. · Missed dose: If you miss a dose or vomit after you take this medicine, do not take another dose on the same day. Wait until your next dose and take it when you normally would. · Store the medicine in a closed container at room temperature, away from heat, moisture, and direct light. Drugs and Foods to Avoid:   Ask your doctor or pharmacist before using any other medicine, including over-the-counter medicines, vitamins, and herbal products. · Some medicines can affect how palbociclib works.  Tell your doctor if you are using alfentanil, carbamazepine, clarithromycin, cyclosporine, dihydroergotamine, enzalutamide, ergotamine, everolimus, fentanyl, indinavir, itraconazole, ketoconazole, lopinavir/ritonavir, midazolam, modafinil, nefazodone, nelfinavir, phenytoin, pimozide, posaconazole, quinidine, rifampin, ritonavir, saquinavir, sirolimus, Conchita's wort, tacrolimus, telaprevir, telithromycin, or voriconazole. · Do not eat grapefruit or drink grapefruit juice while you are using this medicine. Warnings While Using This Medicine:   · It is not safe to take this medicine during pregnancy. It could harm an unborn baby. Women should use an effective form of birth control during treatment and for at least 3 weeks after the last dose. Men who are taking this medicine should use birth control during treatment and for at least 3 months after the last dose. Tell your doctor right away if you or your partner becomes pregnant. · Do not breastfeed while you are taking this medicine and for 3 weeks after your last dose. · Tell your doctor if you have kidney disease, liver disease, or any type of infection. · This medicine may cause the following problems:  ¨ Changes in the number of blood cells, which can increase your risk for infection or bleeding  ¨ Pulmonary embolism (blood clots in the lungs)  · Medicines used to treat cancer are very strong and can have many side effects. Before receiving this medicine, make sure you understand all the risks and benefits. It is important for you to work closely with your doctor during your treatment. · Your doctor will do lab tests at regular visits to check on the effects of this medicine. Keep all appointments. · Keep all medicine out of the reach of children. Never share your medicine with anyone.   Possible Side Effects While Using This Medicine:   Call your doctor right away if you notice any of these side effects:  · Allergic reaction: Itching or hives, swelling in your face or hands, swelling or tingling in your mouth or throat, chest tightness, trouble breathing  · Chest pain, trouble breathing, coughing up blood  · Fast or pounding heartbeat, fast breathing, dizziness or lightheadedness  · Fever, chills, cough, runny or stuffy nose, sore throat, body aches  · Unusual bleeding, bruising, or weakness  If you notice other side effects that you think are caused by this medicine, tell your doctor. Call your doctor for medical advice about side effects. You may report side effects to FDA at 5-122-LTZ-4196  © 2017 [facility] Information is for End User's use only and may not be sold, redistributed or otherwise used for commercial purposes. The above information is an  only. It is not intended as medical advice for individual conditions or treatments. Talk to your doctor, nurse or pharmacist before following any medical regimen to see if it is safe and effective for you.

## 2020-08-05 NOTE — PROGRESS NOTES
Hematology/Oncology  Progress Note    Name: Stephanie Helm  Date: 2020  : 1945    PCP: Guanakito Machado MD     Ms. Sam Gonzalez is a 76 y.o.  female who was seen for management of her metastatic breast cancer with associated complications of chemotherapy. Current therapy: Fulvestrant 500 mg subcutaneous monthly and Ibrance 125 mg by mouth daily. Subjective:     Mrs. Sam Gonzalez is a 28-year-old woman who has slowly progressive metastatic breast cancer. She is here today for chemo follow up. She is tolerating monthly Fulvestrant fairly well. The patient has previously completed external beam radiation therapy to lesions in her ribs and more recently she completed proton therapy to areas of bone involvement with a significant benefit with regards to pain control. The posttherapy imaging studies showed a significant decrease in the intensity of uptake on the bone scan. Additionally she is currently receiving systemic therapy with fulvestrant and Ibrance. Patient is tolerating the systemic therapy reasonably well and has not experienced any nausea or emesis. She does report some fatigue and occasional weakness and leg cramps. She continues to have SOB intermittently. She also receives Procrit at 4 week intervals whenever her hemoglobin is below 10 g/dL and hematocrit is below 30%. Past medical history, family history, and social history: these were reviewed and remains unchanged.     Past Medical History:   Diagnosis Date    Biliary colic     Breast CA ([address] 2012    Cancer [facility])     Right Breast    Chemotherapy convalescence or palliative care     Neuropathy     Radiation therapy complication     Thyroid disease      Past Surgical History:   Procedure Laterality Date    BREAST SURGERY PROCEDURE UNLISTED  10/2002    Right-Lesion    BREAST SURGERY PROCEDURE UNLISTED  2004    Right-Lesion    HX LAP CHOLECYSTECTOMY  13    HX MASTECTOMY      Right     Social History Socioeconomic History    Marital status:      Spouse name: Not on file    Number of children: Not on file    Years of education: Not on file    Highest education level: Not on file   Occupational History    Not on file   Social Needs    Financial resource strain: Not on file    Food insecurity     Worry: Not on file     Inability: Not on file    Transportation needs     Medical: Not on file     Non-medical: Not on file   Tobacco Use    Smoking status: Former Smoker     Last attempt to quit: 1991     Years since quittin.1    Smokeless tobacco: Never Used   Substance and Sexual Activity    Alcohol use: Yes     Alcohol/week: 0.0 standard drinks     Types: 1 - 2 Glasses of wine per week     Comment: socially, occassionally    Drug use: No    Sexual activity: Not on file   Lifestyle    Physical activity     Days per week: Not on file     Minutes per session: Not on file    Stress: Not on file   Relationships    Social connections     Talks on phone: Not on file     Gets together: Not on file     Attends [employer] service: Not on file     Active member of club or organization: Not on file     Attends meetings of clubs or organizations: Not on file     Relationship status: Not on file    Intimate partner violence     Fear of current or ex partner: Not on file     Emotionally abused: Not on file     Physically abused: Not on file     Forced sexual activity: Not on file   Other Topics Concern    Not on file   Social History Narrative    Not on file     Family History   Problem Relation Age of Onset    Cancer Maternal Aunt         Hodgkin's disease    Breast Cancer Paternal Aunt     Cancer Father         Prostate, lung, brain     Current Outpatient Medications   Medication Sig Dispense Refill    calcium citrate 200 mg (950 mg) tablet Take  by mouth daily.  palbociclib (Ibrance) 100 mg cap Take 1 Cap by mouth daily. For 21 days on and 7 days off every 28 days.  63 Cap 5    gabapentin (NEURONTIN) 100 mg capsule Take 2 Caps by mouth three (3) times daily. Max Daily Amount: 600 mg. 540 Cap 3    Cetirizine (ZYRTEC) 10 mg cap Take 1 Cap by mouth daily as needed.  lidocaine HCl-hydrocortison ac topical cream Apply  to affected area two (2) times a day. 85 g 3    TETRACYCLINE HCL (TETRACYCLINE PO) Take 250 mg by mouth two (2) times a day.  fexofenadine-pseudoephedrine (ALLEGRA-D 24 HOUR) 180-240 mg per tablet Take 1 Tab by mouth daily as needed.  sulfacetamide (BLEPH-10) 10 % ophthalmic ointment Administer  to right eye three (3) times daily.  palbociclib (IBRANCE) 125 mg cap Take 125 mg by mouth daily. Take 1 tab po every day for 21 days on and 7 days off q 28 days  Indications: HORMONE RECEPTOR(+), HER2(-) ADVANCED BREAST CANCER 42 Cap 6    Cholecalciferol, Vitamin D3, 5,000 unit Tab Take  by mouth daily.  thyroid, Pork, (ARMOUR THYROID) 60 mg tablet Take 90 mg by mouth daily.  L-Mfolate-B6 Phos-Methyl-B12 (NEURPATH-B) 3-35-2 mg Tab Take  by mouth two (2) times a day.  warfarin (COUMADIN) 1 mg tablet Take 1 mg by mouth daily. Review of Systems  Constitutional: The patient has no acute distress or discomfort. HEENT: The patient denies recent head trauma, eye pain, blurred vision,  hearing deficit, oropharyngeal mucosal pain or lesions, and the patient denies throat pain or discomfort. Lymphatics: The patient denies palpable peripheral lymphadenopathy. Hematologic: The patient denies having bruising, bleeding, or progressive fatigue. Respiratory: Patient denies having shortness of breath, cough, sputum production, fever, or dyspnea on exertion. Cardiovascular: The patient denies having leg pain, leg swelling, heart palpitations, chest permit, chest pain, or lightheadedness. The patient denies having dyspnea on exertion. Gastrointestinal: The patient denies having nausea, emesis, or diarrhea.  The patient denies having any hematemesis or blood in the stool. Genitourinary: Patient denies having urinary urgency, frequency, or dysuria. The patient denies having blood in the urine. Psychological: The patient denies having symptoms of nervousness, anxiety, depression, or thoughts of harming self. Skin: Patient denies having skin rashes, skin, ulcerations, or unexplained itching or pruritus. Musculoskeletal: The patient denies having pain in the joints or bones. Objective:     Visit Vitals  /69 (BP Patient Position: Sitting)   Pulse 79   Resp 16   Wt 65.8 kg (145 lb)   SpO2 98%   BMI 24.89 kg/m²     ECOG PS=0; Pain score= 3/10    Physical Exam:   Gen. Appearance: The patient is in no acute distress. Skin: There is no bruise or rash. There is no evidence of cutaneous shingles over the lower back and flank. HEENT: The exam is unremarkable. Neck: Supple without lymphadenopathy or thyromegaly. Lungs: Clear to auscultation and percussion; there are no wheezes or rhonchi. Heart: Regular rate and rhythm; there are no murmurs, gallops, or rubs. Abdomen: Bowel sounds are present and normal.  There is no guarding, tenderness, or hepatosplenomegaly. Extremities: There is no clubbing, cyanosis, or edema. Neurologic: There are no focal neurologic deficits. Lymphatics: There is no palpable peripheral lymphadenopathy. Musculoskeletal: The patient has full range of motion at all joints. There is no evidence of joint deformity or effusions. There is no focal joint tenderness. Psychological/psychiatric: There is no clinical evidence of anxiety, depression, or melancholy. XR Results (maximum last 3): Results from [place] encounter on 07/12/19   XR SHOULDER LT AP/LAT MIN 2 V    Impression IMPRESSION:    Negative. XR SHOULDER RT AP/LAT MIN 2 V    Impression IMPRESSION:    Arthritis at the right [facility] [place] joint. Right apical parenchymal opacification unchanged from April 2019. Possibly post  radiation change.   Increased streaky opacity at the right base incompletely included on this exam.  Possible increased sclerosis involving the anterior fifth and sixth and seventh  ribs incompletely included on this exam. Possibility of blastic metastases not  excluded. Results from [place] encounter on 08/11/16   XR CHEST SNGL V EXPIR    Impression IMPRESSION:    No evidence of pneumothorax. Right basilar density, likely to be chronic changes, as described. CT Results (maximum last 3): Results from [place] encounter on 04/17/20   CT CHEST ABD PELV W CONT    Impression IMPRESSION:    1. Loculated right pleural effusion, pleural thickening and adjacent areas of  probable rounded atelectasis in the right middle lobe and right lower lobe are  stable. 2. Relatively stable bone lesions of the ribs and pelvis and T12 posterior  elements. 3. No definite finding for new metastatic disease in the chest, abdomen, pelvis  or bones. Results from [place] encounter on 07/09/19   CT SPINE CERV WO CONT    Impression IMPRESSION:    1. No compression deformity. Abnormal sclerotic vertebral body at C5. This  probably corresponds to the abnormal increased uptake seen on the bone scan. 2.  Central canal stenosis at multiple levels, with multilevel neural foraminal  narrowing. Most pronounced at C5-6 on the right side and C6-7 on the right side  followed by C3-4 on the left side. Results from [place] encounter on 04/09/19   CT CHEST ABD PELV W CONT    Impression IMPRESSION:    CT CHEST:    Right lung small pleural effusion with pleural thickening and subpleural  opacities as above, without significant change since 11/5/2018. Component of  tumor and malignant effusion not excluded, however benign/inflammatory process  such as radiation related changes with associated round atelectasis may appear  similar.     Multiple rib lesions and T12 posterior element lesion are also similar to  11/5/2018, consistent with stable metastases. CT ABDOMEN/PELVIS:    Right iliac crest and S1 bone lesions are similar to prior, consistent with  stable metastases. No new or worsening metastatic disease identified in the abdomen or pelvis. Status post cholecystectomy. Sigmoid diverticulosis. See additional details above. MRI Results (maximum last 3): Results from [place] encounter on 06/30/17   MRI LUMB SPINE W WO CONT    Impression Impression:    Enhancing mildly expansile bone lesion in the T12 spinous process, bilateral  lamina, right pedicle into posterior right vertebral body, similar in  distribution to the prior MRI although with more heterogeneous bubbly cystic  appearance which may be from interval treatment changes. Mild soft tissue edema. No extraosseous or epidural tumor. No other bone metastases noted at the lumbar spine. Mild degenerative spinal disease with no significant spinal canal stenosis. Mild foraminal stenosis at T12-L1 and L4-5. No nerve root impingement. Partially visualized complex septated right pleural effusion. Results from [place] encounter on 06/23/17   MRI [facility] SPINE W WO CONT    Impression IMPRESSION:    1. Evaluation is somewhat limited due to motion artifacts and field  inhomogeneity. Stable lesion in the posterior elements of T12 with no definite  evidence of epidural invasion or mass. 2. Abnormal signal in C5 vertebra,  new since prior study. Another focus of  metastatic disease suspected. Additional findings as above. Results from [place] encounter on 06/24/16   MRI [facility] SPINE W WO CONT    Impression Impression:    As per recent PET/CT. Metastatic disease to the posterior elements of the T12  vertebral body as described above in detail. No clear evidence of posterior  epidural invasion as of yet. Moderate right-sided fluid dependent pleural effusion.          Nuclear Medicine Results (maximum last 3):  Results from Hospital Encounter encounter on 04/15/20   NM BONE SCAN [address] BODY    Impression IMPRESSION:    Similar sclerotic bone metastasis since 7/9/2019. No new site of metastatic  disease. Degenerative changes. Results from [place] encounter on 07/09/19   NM BONE SCAN [address] BODY    Impression IMPRESSION:          Multiple foci of abnormal increased uptake as described. Consistent with  metastatic lesions. Stable to slightly increased uptake at the cervical spine  and left posterior iliac spine region. Stable increased uptake involving the  ribs. Results from [place] encounter on 10/30/18   NM BONE SCAN [address] BODY    Impression IMPRESSION:    1. Grossly stable appearance of osseous metastatic disease. 2. No evidence for new osseous metastatic lesion. US Results (maximum last 3): Results from [place] encounter on 08/11/16   US THORACENTESIS RT NDL W IMAGE    Impression Impression:  Successful right thoracentesis. Specimen sent to lab for analysis     Results from Hospital Encounter encounter on 06/28/16   US THORACENTESIS RT NDL W IMAGE    Impression Impression:  Successful right thoracentesis. Results from [place] encounter on 05/24/16   US BREAST LT LIMITED=<3 QUAD    Impression IMPRESSION:    No evidence for malignancy. Suggest routine followup. Thank you for this referral.          DEXA Results (maximum last 3): No results found for this or any previous visit. RODOLFO Results (maximum last 3): Results from [place] encounter on 06/05/20   RODOLFO 3D WES W MAMMO LT SCREENING INCL CAD    Impression IMPRESSION:      No evidence of malignancy. Suggest routine follow-up. Results from Hospital Encounter encounter on 06/03/19   RODOLFO 3D WES W MAMMO LT DX INCL CAD    Impression IMPRESSION:  1. Benign findings. Resume annual screening mammography.      BI-RADS Category 2 - Benign     These findings were discussed with the patient in person. Indications to return  sooner were discussed with the patient such as new palpable nodule, nipple  inversion, spontaneous nipple discharge especially if bloody, and architectural  changes. Patient demonstrated understanding. The lack of mammographic evidence of malignancy should not deter further work-up  of a clinically significant palpable finding. Radiodense breast tissue may  obscure an early malignancy or a palpable finding. 10-15% of breast cancers are  not detected by mammography. Results from [place] encounter on 05/31/18   RODOLFO 3D WES W MAMMO LT SCREENING INCL CAD    Impression IMPRESSION:  1. No suspicious findings    Recommendations:  Resume annual screening mammogram. These results are being  provided to the patient by letter. BI-RADS category 2 - Benign . The lack of mammographic evidence of malignancy should not deter further work-up  of a clinically significant palpable finding. Radiodense breast tissue may  obscure an early malignancy or a palpable finding. 10-15% of breast cancers are  not detected by mammography. IR Results (maximum last 3): Results from [place] encounter on 05/29/12   IR MEDIPORT       VAS/US Results (maximum last 3): Results from [place] encounter on 09/23/15   DUPLEX LOWER EXT VENOUS RIGHT   Results from [place] encounter on 07/17/14   DUPLEX LOWER EXT VENOUS BILAT   Results from [place] encounter on 07/08/14   DUPLEX UPPER EXT VENOUS BILAT       PET Results (maximum last 3): Results from [place] encounter on 06/09/16   PET/CT TUMOR IMAGE SKULL THIGH W (INI)    Impression IMPRESSION:  1. Bone destructive hypermetabolic metastasis H11 vertebra with probable  epidural tumor, not well defined  -Expedited clinical correlation and, if possible, MR scan thoracic spine is  recommended  2. Multifocal sclerotic bone metastases of indeterminate activity.  May have  cervical spine metastatic disease  3. Small volume multifocal mediastinal/hilar malignant adenopathy  4. 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 Brittani Rees regarding spine  lesion which needs attention. Confirmed. Lab data:      Results for orders placed or performed during the hospital encounter of 07/30/20   CBC WITH 3 PART DIFF     Status: Abnormal   Result Value Ref Range Status    WBC 2.0 (L) 4.5 - 13.0 K/uL Final    RBC 3.09 (L) 4.10 - 5.10 M/uL Final    HGB 10.7 (L) 12.0 - 16 g/dL Final    HCT 30.6 (L) 36 - 48 % Final    MCV 99.0 78 - 102 FL Final    MCH 34.6 25.0 - 35.0 PG Final    MCHC 35.0 31 - 37 g/dL Final    RDW 18.8 (H) 11.5 - 14.5 % Final    PLATELET 728 227 - 152 K/uL Final    NEUTROPHILS 61 40 - 70 % Final    MIXED CELLS 16 0.1 - 17 % Final    LYMPHOCYTES 23 14 - 44 % Final    ABS. NEUTROPHILS 1.2 (L) 1.8 - 9.5 K/UL Final    ABS. MIXED CELLS 0.3 0.0 - 2.3 K/uL Final    ABS. LYMPHOCYTES 0.5 (L) 1.1 - 5.9 K/UL Final     Comment: Test performed at [address] [place] [address] or Outpatient Infusion Center Location. Reviewed by Medical Director. DF AUTOMATED   Final           Assessment:     1. Metastatic breast cancer ([facility] [address]    2. Chemotherapy-induced thrombocytopenia    3. Anemia due to chemotherapy    4. Chemotherapy induced neutropenia (HCC)    5. Vitamin D deficiency    6. Neuropathy associated with cancer ([address]    7. 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 She is tolerating treatment fairly well without high grade toxicities.  -- 4/15/2020  CT scans of the chest, abdomen, and pelvis shows that she has a loculated right pleural effusion with some pleural thickening and adjacent areas of probable rounded atelectasis all of which are stable. She has relatively stable bone lesions of the rib, pelvis, and T12 posterior element. There was no findings on CT scan to support new metastatic disease in the chest, abdomen, or pelvis. -- Patient will continue current regimen with fulvestrant and palbociclib as previously recommended. -- Refilled Palbociclib 100 mg daily 21 days on/7 days off today. -- She will need to check lab before each cycle. -- She will need yearly DEXA scan. Will also need CT Scan and Bone scan yearly. --  Mammogram 6/5 done shows No evidence of malignancy. Suggest routine follow-up. -- We will check her CA 27-29 at next clinic visit. -- I will see the patient back in clinic in about 3 months. Always sooner if required. The patient can have lab done prior our next clinic visit, she will need CBC monthly      Neuropathy associated with cancer:   -- Continue her Neurontin at 400 mg 3 times daily. She does not need a refill at this time.      Chemotherapy-induced neutropenia/chronic leukopenia (persisting problem):   -- If the absolute neutrophil count declines to 0.5 she will be started on Neupogen or Neulasta.       Chemotherapy-induced anemia (persisting problem):    -- We have recommended that the patient continue taking ferrous sulfate 1 tablet daily. Procrit at a dose of 60,000 units subcutaneous will continue to  be provided when her hemoglobin  declined below 10 g/dL and hematocrit is below 30%every 4 weeks .        Chemotherapy-induced thrombocytopenia :   -- The patient was holding the Ibrance 140 mg at previous times D/T thrombocytopenia. -- She presently taking Ibrance 100mg. Her recent CBC showed PLTs 147 K.           Joe Shay, NP  8/5/2020

## 2020-08-25 DIAGNOSIS — C50.919 METASTATIC BREAST CANCER (HCC): ICD-10-CM

## 2020-08-27 ENCOUNTER — HOSPITAL ENCOUNTER (OUTPATIENT)
Dept: INFUSION THERAPY | Age: 75
Discharge: HOME | End: 2020-08-27
Payer: MEDICARE

## 2020-08-27 VITALS
OXYGEN SATURATION: 98 % | TEMPERATURE: 97.7 F | HEIGHT: 64 IN | DIASTOLIC BLOOD PRESSURE: 74 MMHG | SYSTOLIC BLOOD PRESSURE: 118 MMHG | WEIGHT: 138.9 LBS | BODY MASS INDEX: 23.71 KG/M2 | HEART RATE: 79 BPM | RESPIRATION RATE: 18 BRPM

## 2020-08-27 DIAGNOSIS — C50.919 METASTATIC BREAST CANCER (HCC): ICD-10-CM

## 2020-08-27 DIAGNOSIS — Z17.0 MALIGNANT NEOPLASM OF LOWER-INNER QUADRANT OF RIGHT BREAST OF FEMALE, ESTROGEN RECEPTOR POSITIVE (HCC): Primary | ICD-10-CM

## 2020-08-27 DIAGNOSIS — C50.311 MALIGNANT NEOPLASM OF LOWER-INNER QUADRANT OF RIGHT BREAST OF FEMALE, ESTROGEN RECEPTOR POSITIVE (HCC): Primary | ICD-10-CM

## 2020-08-27 LAB
ALBUMIN SERPL-MCNC: 3.5 G/DL (ref 3.4–5)
ALBUMIN/GLOB SERPL: 0.9 {RATIO} (ref 0.8–1.7)
ALP SERPL-CCNC: 60 U/L (ref 45–117)
ALT SERPL-CCNC: 15 U/L (ref 13–56)
ANION GAP SERPL CALC-SCNC: 6 MMOL/L (ref 3–18)
AST SERPL-CCNC: 17 U/L (ref 10–38)
BASOPHILS # BLD: 0 K/UL (ref 0–0.1)
BASOPHILS NFR BLD: 1 % (ref 0–2)
BILIRUB SERPL-MCNC: 0.6 MG/DL (ref 0.2–1)
BUN SERPL-MCNC: 23 MG/DL (ref 7–18)
BUN/CREAT SERPL: 20 (ref 12–20)
CALCIUM SERPL-MCNC: 8.8 MG/DL (ref 8.5–10.1)
CHLORIDE SERPL-SCNC: 109 MMOL/L (ref 100–111)
CO2 SERPL-SCNC: 26 MMOL/L (ref 21–32)
CREAT SERPL-MCNC: 1.14 MG/DL (ref 0.6–1.3)
DIFFERENTIAL METHOD BLD: ABNORMAL
EOSINOPHIL # BLD: 0 K/UL (ref 0–0.4)
EOSINOPHIL NFR BLD: 1 % (ref 0–5)
ERYTHROCYTE [DISTWIDTH] IN BLOOD BY AUTOMATED COUNT: 17 % (ref 11.6–14.5)
GLOBULIN SER CALC-MCNC: 3.7 G/DL (ref 2–4)
GLUCOSE SERPL-MCNC: 150 MG/DL (ref 74–99)
HCT VFR BLD AUTO: 30.2 % (ref 35–45)
HGB BLD-MCNC: 10 G/DL (ref 12–16)
LYMPHOCYTES # BLD: 0.4 K/UL (ref 0.9–3.6)
LYMPHOCYTES NFR BLD: 24 % (ref 21–52)
MAGNESIUM SERPL-MCNC: 1.7 MG/DL (ref 1.6–2.6)
MCH RBC QN AUTO: 34.1 PG (ref 24–34)
MCHC RBC AUTO-ENTMCNC: 33.1 G/DL (ref 31–37)
MCV RBC AUTO: 103.1 FL (ref 74–97)
MONOCYTES # BLD: 0.1 K/UL (ref 0.05–1.2)
MONOCYTES NFR BLD: 8 % (ref 3–10)
NEUTS SEG # BLD: 1.1 K/UL (ref 1.8–8)
NEUTS SEG NFR BLD: 66 % (ref 40–73)
PHOSPHATE SERPL-MCNC: 2.2 MG/DL (ref 2.5–4.9)
PLATELET # BLD AUTO: 149 K/UL (ref 135–420)
PMV BLD AUTO: 10.6 FL (ref 9.2–11.8)
POTASSIUM SERPL-SCNC: 3.6 MMOL/L (ref 3.5–5.5)
PROT SERPL-MCNC: 7.2 G/DL (ref 6.4–8.2)
RBC # BLD AUTO: 2.93 M/UL (ref 4.2–5.3)
SODIUM SERPL-SCNC: 141 MMOL/L (ref 136–145)
WBC # BLD AUTO: 1.6 K/UL (ref 4.6–13.2)

## 2020-08-27 PROCEDURE — 74011250636 HC RX REV CODE- 250/636

## 2020-08-27 PROCEDURE — 74011250636 HC RX REV CODE- 250/636: Performed by: INTERNAL MEDICINE

## 2020-08-27 PROCEDURE — 74011250636 HC RX REV CODE- 250/636: Performed by: NURSE PRACTITIONER

## 2020-08-27 PROCEDURE — 84100 ASSAY OF PHOSPHORUS: CPT

## 2020-08-27 PROCEDURE — 83735 ASSAY OF MAGNESIUM: CPT

## 2020-08-27 PROCEDURE — 85025 COMPLETE CBC W/AUTO DIFF WBC: CPT

## 2020-08-27 PROCEDURE — 96402 CHEMO HORMON ANTINEOPL SQ/IM: CPT

## 2020-08-27 PROCEDURE — 80053 COMPREHEN METABOLIC PANEL: CPT

## 2020-08-27 PROCEDURE — 77030012965 HC NDL HUBR BBMI -A

## 2020-08-27 PROCEDURE — 36591 DRAW BLOOD OFF VENOUS DEVICE: CPT

## 2020-08-27 PROCEDURE — 96372 THER/PROPH/DIAG INJ SC/IM: CPT

## 2020-08-27 RX ORDER — ACETAMINOPHEN 325 MG/1
650 TABLET ORAL AS NEEDED
Status: CANCELLED
Start: 2020-09-24

## 2020-08-27 RX ORDER — SODIUM CHLORIDE 0.9 % (FLUSH) 0.9 %
10-40 SYRINGE (ML) INJECTION AS NEEDED
Status: DISCONTINUED | OUTPATIENT
Start: 2020-08-27 | End: 2020-08-31 | Stop reason: HOSPADM

## 2020-08-27 RX ORDER — LAMOTRIGINE 25 MG/1
500 TABLET ORAL ONCE
Status: COMPLETED | OUTPATIENT
Start: 2020-08-27 | End: 2020-08-27

## 2020-08-27 RX ORDER — DIPHENHYDRAMINE HYDROCHLORIDE 50 MG/ML
50 INJECTION, SOLUTION INTRAMUSCULAR; INTRAVENOUS AS NEEDED
Status: CANCELLED
Start: 2020-09-24

## 2020-08-27 RX ORDER — HYDROCORTISONE SODIUM SUCCINATE 100 MG/2ML
100 INJECTION, POWDER, FOR SOLUTION INTRAMUSCULAR; INTRAVENOUS AS NEEDED
Status: CANCELLED | OUTPATIENT
Start: 2020-09-24

## 2020-08-27 RX ORDER — EPINEPHRINE 1 MG/ML
0.3 INJECTION, SOLUTION, CONCENTRATE INTRAVENOUS AS NEEDED
Status: CANCELLED | OUTPATIENT
Start: 2020-09-24

## 2020-08-27 RX ORDER — DIPHENHYDRAMINE HYDROCHLORIDE 50 MG/ML
25 INJECTION, SOLUTION INTRAMUSCULAR; INTRAVENOUS AS NEEDED
Status: CANCELLED
Start: 2020-09-24

## 2020-08-27 RX ORDER — ALBUTEROL SULFATE 0.83 MG/ML
2.5 SOLUTION RESPIRATORY (INHALATION) AS NEEDED
Status: CANCELLED
Start: 2020-09-24

## 2020-08-27 RX ORDER — ONDANSETRON 2 MG/ML
8 INJECTION INTRAMUSCULAR; INTRAVENOUS AS NEEDED
Status: CANCELLED | OUTPATIENT
Start: 2020-09-24

## 2020-08-27 RX ORDER — HEPARIN 100 UNIT/ML
SYRINGE INTRAVENOUS
Status: COMPLETED
Start: 2020-08-27 | End: 2020-08-27

## 2020-08-27 RX ADMIN — HEPARIN 500 UNITS: 100 SYRINGE at 13:50

## 2020-08-27 RX ADMIN — DENOSUMAB 120 MG: 120 INJECTION SUBCUTANEOUS at 14:06

## 2020-08-27 RX ADMIN — FULVESTRANT 500 MG: 50 INJECTION INTRAMUSCULAR at 14:07

## 2020-08-27 NOTE — PROGRESS NOTES
[facility] Progress Note    Date: 2020    Name: Yamilet Augustin    MRN: [id number]         : 1945       FASLODEX Q4 WEEKS  XGEVA Q4 WEEKS  RETACRIT Q4 WEEKS  MONTHLY PORT FLUSH      Ms. Deepti Freedman arrived to [facility] at 1310, ambulatory and in no acute distress. Patient denied any recent changes in her health condition or medication regimen. Patient denied complaint of pain or discomfort. Ms. Deepti Freedman was assessed and education was provided. Ms. Felecia Garay vitals were reviewed. Visit Vitals  /74 (BP 1 Location: Left arm, BP Patient Position: Sitting; At rest)   Pulse 79   Temp 97.7 °F (36.5 °C)   Resp 18   Ht 5' 4\" (1.626 m)   Wt 63 kg (138 lb 14.4 oz)   SpO2 98%   Breastfeeding No   BMI 23.84 kg/m²       Pt was observed for 5 minutes after obtaining vital signs prior to initiating treatment. Patient's left upper chest port accessed with 20 g 1 inch non-coring access needle and blood samples obtained for labs per order. Patient's port flushed with NS, followed by instillation of heparin 500 units/5mL, per protocol, and was de-accessed with needle removed intact. No evidence of complication noted at site and bandaid. Lab results obtained & reviewed. Lab machine onsite not functioning at this time and labs sent to  [facility] main lab for processing. Patient to be called back with results of CBC and for Retacrit injection if needed. Faslodex 500mg administered as ordered IM in patient's L & R dorsogluteal muscles (250mg per injection). Band-aids to sites. CMP results reviewed from 2020. Serum calcium = 9.2 and within ordered parameters for administration of Xgeva injection. Xgeva 120mg administered as ordered SQ in pt's left upper arm. Patient tolerated injection well and site without evidence of complication. Pt tolerated injections well and without complaints. Pt armband removed & shredded.     Ms. Deepti Freedman was discharged from [place] [address] in stable condition at 1410. She is to return on 09/24/2020 at 1300 for her next appointment.     Kris Carranza RN  August 27, 2020

## 2020-09-22 DIAGNOSIS — C50.919 METASTATIC BREAST CANCER (HCC): ICD-10-CM

## 2020-09-24 ENCOUNTER — HOSPITAL ENCOUNTER (OUTPATIENT)
Dept: INFUSION THERAPY | Age: 75
Discharge: HOME | End: 2020-09-24
Payer: MEDICARE

## 2020-09-24 VITALS
RESPIRATION RATE: 18 BRPM | DIASTOLIC BLOOD PRESSURE: 65 MMHG | TEMPERATURE: 99.2 F | HEART RATE: 83 BPM | SYSTOLIC BLOOD PRESSURE: 126 MMHG | OXYGEN SATURATION: 95 %

## 2020-09-24 DIAGNOSIS — Z17.0 MALIGNANT NEOPLASM OF LOWER-INNER QUADRANT OF RIGHT BREAST OF FEMALE, ESTROGEN RECEPTOR POSITIVE (HCC): Primary | ICD-10-CM

## 2020-09-24 DIAGNOSIS — C50.311 MALIGNANT NEOPLASM OF LOWER-INNER QUADRANT OF RIGHT BREAST OF FEMALE, ESTROGEN RECEPTOR POSITIVE (HCC): Primary | ICD-10-CM

## 2020-09-24 DIAGNOSIS — C50.919 METASTATIC BREAST CANCER (HCC): ICD-10-CM

## 2020-09-24 LAB
ALBUMIN SERPL-MCNC: 3.6 G/DL (ref 3.4–5)
ALBUMIN/GLOB SERPL: 1.2 {RATIO} (ref 0.8–1.7)
ALP SERPL-CCNC: 59 U/L (ref 45–117)
ALT SERPL-CCNC: 16 U/L (ref 13–56)
ANION GAP SERPL CALC-SCNC: 5 MMOL/L (ref 3–18)
AST SERPL-CCNC: 21 U/L (ref 10–38)
BASOPHILS # BLD: 0 K/UL (ref 0–0.1)
BASOPHILS NFR BLD: 1 % (ref 0–2)
BILIRUB SERPL-MCNC: 0.6 MG/DL (ref 0.2–1)
BUN SERPL-MCNC: 17 MG/DL (ref 7–18)
BUN/CREAT SERPL: 21 (ref 12–20)
CALCIUM SERPL-MCNC: 8.8 MG/DL (ref 8.5–10.1)
CHLORIDE SERPL-SCNC: 107 MMOL/L (ref 100–111)
CO2 SERPL-SCNC: 29 MMOL/L (ref 21–32)
CREAT SERPL-MCNC: 0.81 MG/DL (ref 0.6–1.3)
DIFFERENTIAL METHOD BLD: ABNORMAL
EOSINOPHIL # BLD: 0 K/UL (ref 0–0.4)
EOSINOPHIL NFR BLD: 1 % (ref 0–5)
ERYTHROCYTE [DISTWIDTH] IN BLOOD BY AUTOMATED COUNT: 16.6 % (ref 11.6–14.5)
FERRITIN SERPL-MCNC: 311 NG/ML (ref 8–388)
GLOBULIN SER CALC-MCNC: 3.1 G/DL (ref 2–4)
GLUCOSE SERPL-MCNC: 89 MG/DL (ref 74–99)
HCT VFR BLD AUTO: 30.7 % (ref 35–45)
HGB BLD-MCNC: 10.3 G/DL (ref 12–16)
IRON SATN MFR SERPL: 22 % (ref 20–50)
IRON SERPL-MCNC: 63 UG/DL (ref 50–175)
LYMPHOCYTES # BLD: 0.5 K/UL (ref 0.9–3.6)
LYMPHOCYTES NFR BLD: 22 % (ref 21–52)
MAGNESIUM SERPL-MCNC: 1.7 MG/DL (ref 1.6–2.6)
MCH RBC QN AUTO: 34.9 PG (ref 24–34)
MCHC RBC AUTO-ENTMCNC: 33.6 G/DL (ref 31–37)
MCV RBC AUTO: 104.1 FL (ref 74–97)
MONOCYTES # BLD: 0.3 K/UL (ref 0.05–1.2)
MONOCYTES NFR BLD: 15 % (ref 3–10)
NEUTS SEG # BLD: 1.3 K/UL (ref 1.8–8)
NEUTS SEG NFR BLD: 61 % (ref 40–73)
PHOSPHATE SERPL-MCNC: 2.6 MG/DL (ref 2.5–4.9)
PLATELET # BLD AUTO: 202 K/UL (ref 135–420)
PMV BLD AUTO: 10 FL (ref 9.2–11.8)
POTASSIUM SERPL-SCNC: 4 MMOL/L (ref 3.5–5.5)
PROT SERPL-MCNC: 6.7 G/DL (ref 6.4–8.2)
RBC # BLD AUTO: 2.95 M/UL (ref 4.2–5.3)
SODIUM SERPL-SCNC: 141 MMOL/L (ref 136–145)
TIBC SERPL-MCNC: 286 UG/DL (ref 250–450)
WBC # BLD AUTO: 2.2 K/UL (ref 4.6–13.2)

## 2020-09-24 PROCEDURE — 96402 CHEMO HORMON ANTINEOPL SQ/IM: CPT

## 2020-09-24 PROCEDURE — 77030012965 HC NDL HUBR BBMI -A

## 2020-09-24 PROCEDURE — 36591 DRAW BLOOD OFF VENOUS DEVICE: CPT

## 2020-09-24 PROCEDURE — 80053 COMPREHEN METABOLIC PANEL: CPT

## 2020-09-24 PROCEDURE — 74011250636 HC RX REV CODE- 250/636: Performed by: NURSE PRACTITIONER

## 2020-09-24 PROCEDURE — 83735 ASSAY OF MAGNESIUM: CPT

## 2020-09-24 PROCEDURE — 83540 ASSAY OF IRON: CPT

## 2020-09-24 PROCEDURE — 82728 ASSAY OF FERRITIN: CPT

## 2020-09-24 PROCEDURE — 74011250636 HC RX REV CODE- 250/636: Performed by: INTERNAL MEDICINE

## 2020-09-24 PROCEDURE — 96372 THER/PROPH/DIAG INJ SC/IM: CPT

## 2020-09-24 PROCEDURE — 84100 ASSAY OF PHOSPHORUS: CPT

## 2020-09-24 PROCEDURE — 85025 COMPLETE CBC W/AUTO DIFF WBC: CPT

## 2020-09-24 RX ORDER — LAMOTRIGINE 25 MG/1
500 TABLET ORAL ONCE
Status: COMPLETED | OUTPATIENT
Start: 2020-09-24 | End: 2020-09-24

## 2020-09-24 RX ORDER — SODIUM CHLORIDE 0.9 % (FLUSH) 0.9 %
10-40 SYRINGE (ML) INJECTION EVERY 8 HOURS
Status: DISCONTINUED | OUTPATIENT
Start: 2020-09-24 | End: 2020-09-28 | Stop reason: HOSPADM

## 2020-09-24 RX ORDER — ACETAMINOPHEN 325 MG/1
650 TABLET ORAL AS NEEDED
Status: CANCELLED
Start: 2020-10-22

## 2020-09-24 RX ORDER — DIPHENHYDRAMINE HYDROCHLORIDE 50 MG/ML
50 INJECTION, SOLUTION INTRAMUSCULAR; INTRAVENOUS AS NEEDED
Status: CANCELLED
Start: 2020-10-22

## 2020-09-24 RX ORDER — EPINEPHRINE 1 MG/ML
0.3 INJECTION, SOLUTION, CONCENTRATE INTRAVENOUS AS NEEDED
Status: CANCELLED | OUTPATIENT
Start: 2020-10-22

## 2020-09-24 RX ORDER — ALBUTEROL SULFATE 0.83 MG/ML
2.5 SOLUTION RESPIRATORY (INHALATION) AS NEEDED
Status: CANCELLED
Start: 2020-10-22

## 2020-09-24 RX ORDER — HYDROCORTISONE SODIUM SUCCINATE 100 MG/2ML
100 INJECTION, POWDER, FOR SOLUTION INTRAMUSCULAR; INTRAVENOUS AS NEEDED
Status: CANCELLED | OUTPATIENT
Start: 2020-10-22

## 2020-09-24 RX ORDER — DIPHENHYDRAMINE HYDROCHLORIDE 50 MG/ML
25 INJECTION, SOLUTION INTRAMUSCULAR; INTRAVENOUS AS NEEDED
Status: CANCELLED
Start: 2020-10-22

## 2020-09-24 RX ORDER — HEPARIN 100 UNIT/ML
500 SYRINGE INTRAVENOUS ONCE
Status: COMPLETED | OUTPATIENT
Start: 2020-09-24 | End: 2020-09-24

## 2020-09-24 RX ORDER — ONDANSETRON 2 MG/ML
8 INJECTION INTRAMUSCULAR; INTRAVENOUS AS NEEDED
Status: CANCELLED | OUTPATIENT
Start: 2020-10-22

## 2020-09-24 RX ADMIN — HEPARIN 500 UNITS: 100 SYRINGE at 13:40

## 2020-09-24 RX ADMIN — Medication 20 ML: at 13:40

## 2020-09-24 RX ADMIN — FULVESTRANT 500 MG: 50 INJECTION INTRAMUSCULAR at 13:29

## 2020-09-24 RX ADMIN — DENOSUMAB 120 MG: 120 INJECTION SUBCUTANEOUS at 13:26

## 2020-09-24 NOTE — PROGRESS NOTES
[facility] Progress Note    Date: 2020    Name: Ruthie Goodson    MRN: [id number]         : 1945       FASLODEX Q4 WEEKS  XGEVA Q4 WEEKS  RETACRIT Q4 WEEKS  MONTHLY PORT FLUSH      Ms. Shameka German arrived to [facility] at [phone], ambulatory and in no acute distress. Patient denied any recent changes in her health condition or medication regimen. Patient denied complaint of pain or discomfort. Ms. Shameka German was assessed and education was provided. Ms. Kala Mcpherson vitals were reviewed. Visit Vitals  /65 (BP 1 Location: Left arm, BP Patient Position: Sitting)   Pulse 83   Temp 99.2 °F (37.3 °C)   Resp 18   SpO2 95%   Breastfeeding No       Pt was observed for 5 minutes after obtaining vital signs prior to initiating treatment. Patient's left upper chest port accessed with 20 g 1 inch non-coring access needle and blood samples obtained for labs (CBC, CMP, iron profile and ferritin) per order. Patient's port flushed with NS, followed by instillation of heparin 500 units/5mL, per protocol, and was de-accessed with needle removed intact. No evidence of complication noted at site and bandaid. Faslodex 500mg administered as ordered IM in patient's L & R dorsogluteal muscles (250mg per injection). Band-aids to sites. CMP results reviewed from 2020. Serum calcium = 8.8 and within ordered parameters for administration of Xgeva injection. Xgeva 120mg administered as ordered SQ in pt's left upper arm. Patient tolerated injection well and site without evidence of complication. Pt tolerated injections well and without complaints. Pt armband removed & shredded. Ms. Shameka German was discharged from [place] [address] in stable condition at 1350. She is to return on 10/22/2020 at 1300 for her next appointment.     Chapis Poe RN  2020

## 2020-10-06 ENCOUNTER — OFFICE VISIT (OUTPATIENT)
Dept: ONCOLOGY | Age: 75
End: 2020-10-06
Payer: MEDICARE

## 2020-10-06 VITALS
OXYGEN SATURATION: 96 % | BODY MASS INDEX: 24.75 KG/M2 | HEART RATE: 78 BPM | WEIGHT: 145 LBS | HEIGHT: 64 IN | RESPIRATION RATE: 18 BRPM | SYSTOLIC BLOOD PRESSURE: 121 MMHG | DIASTOLIC BLOOD PRESSURE: 70 MMHG

## 2020-10-06 DIAGNOSIS — D69.59 CHEMOTHERAPY-INDUCED THROMBOCYTOPENIA: ICD-10-CM

## 2020-10-06 DIAGNOSIS — D70.1 CHEMOTHERAPY INDUCED NEUTROPENIA (HCC): ICD-10-CM

## 2020-10-06 DIAGNOSIS — C79.52 SECONDARY MALIGNANT NEOPLASM OF BONE AND BONE MARROW (HCC): ICD-10-CM

## 2020-10-06 DIAGNOSIS — T45.1X5A CHEMOTHERAPY INDUCED NEUTROPENIA (HCC): ICD-10-CM

## 2020-10-06 DIAGNOSIS — D72.819 CHRONIC LEUKOPENIA: ICD-10-CM

## 2020-10-06 DIAGNOSIS — T45.1X5A CHEMOTHERAPY-INDUCED THROMBOCYTOPENIA: ICD-10-CM

## 2020-10-06 DIAGNOSIS — Z17.0 MALIGNANT NEOPLASM OF LOWER-INNER QUADRANT OF RIGHT BREAST OF FEMALE, ESTROGEN RECEPTOR POSITIVE (HCC): ICD-10-CM

## 2020-10-06 DIAGNOSIS — C50.919 METASTATIC BREAST CANCER (HCC): ICD-10-CM

## 2020-10-06 DIAGNOSIS — T45.1X5A ANEMIA DUE TO ANTINEOPLASTIC CHEMOTHERAPY: Primary | ICD-10-CM

## 2020-10-06 DIAGNOSIS — D64.81 ANEMIA DUE TO ANTINEOPLASTIC CHEMOTHERAPY: Primary | ICD-10-CM

## 2020-10-06 DIAGNOSIS — C50.311 MALIGNANT NEOPLASM OF LOWER-INNER QUADRANT OF RIGHT BREAST OF FEMALE, ESTROGEN RECEPTOR POSITIVE (HCC): ICD-10-CM

## 2020-10-06 DIAGNOSIS — C79.51 SECONDARY MALIGNANT NEOPLASM OF BONE AND BONE MARROW (HCC): ICD-10-CM

## 2020-10-06 PROCEDURE — G0463 HOSPITAL OUTPT CLINIC VISIT: HCPCS | Performed by: NURSE PRACTITIONER

## 2020-10-06 PROCEDURE — 99214 OFFICE O/P EST MOD 30 MIN: CPT | Performed by: NURSE PRACTITIONER

## 2020-10-06 NOTE — PROGRESS NOTES
Hematology/Oncology  Progress Note    Name: Jaspreet Me  Date: 10/6/2020  : 1945    PCP: Yadiel Funez MD     Ms. Viviane Thompson is a 76 y.o.  female who was seen for management of her metastatic breast cancer with associated complications of chemotherapy. Current therapy: Fulvestrant 500 mg subcutaneous monthly and Ibrance 125 mg by mouth daily. Subjective:     Mrs. Viviane Thompson is a 59-year-old woman who has slowly progressive metastatic breast cancer. She is here today for chemo follow up. She is tolerating monthly Fulvestrant fairly well. The patient has previously completed external beam radiation therapy to lesions in her ribs and more recently she completed proton therapy to areas of bone involvement with a significant benefit with regards to pain control. The posttherapy imaging studies showed a significant decrease in the intensity of uptake on the bone scan. Additionally she is currently receiving systemic therapy with fulvestrant and Ibrance. Patient is tolerating the systemic therapy reasonably well and has not experienced any nausea or emesis. She does report some fatigue and occasional weakness and leg cramps. She continues to have SOB intermittently. She also receives Procrit at 4 week intervals whenever her hemoglobin is below 10 g/dL and hematocrit is below 30%. Past medical history, family history, and social history: these were reviewed and remains unchanged.     Past Medical History:   Diagnosis Date    Biliary colic     Breast CA ([address] 2012    Cancer [facility])     Right Breast    Chemotherapy convalescence or palliative care     Neuropathy     Radiation therapy complication     Thyroid disease      Past Surgical History:   Procedure Laterality Date    BREAST SURGERY PROCEDURE UNLISTED  10/2002    Right-Lesion    BREAST SURGERY PROCEDURE UNLISTED  2004    Right-Lesion    HX LAP CHOLECYSTECTOMY  13    HX MASTECTOMY  1991    Right     Social History Socioeconomic History    Marital status:      Spouse name: Not on file    Number of children: Not on file    Years of education: Not on file    Highest education level: Not on file   Occupational History    Not on file   Social Needs    Financial resource strain: Not on file    Food insecurity     Worry: Not on file     Inability: Not on file    Transportation needs     Medical: Not on file     Non-medical: Not on file   Tobacco Use    Smoking status: Former Smoker     Last attempt to quit: 1991     Years since quittin.3    Smokeless tobacco: Never Used   Substance and Sexual Activity    Alcohol use: Yes     Alcohol/week: 0.0 standard drinks     Types: 1 - 2 Glasses of wine per week     Comment: socially, occassionally    Drug use: No    Sexual activity: Not on file   Lifestyle    Physical activity     Days per week: Not on file     Minutes per session: Not on file    Stress: Not on file   Relationships    Social connections     Talks on phone: Not on file     Gets together: Not on file     Attends Sikh service: Not on file     Active member of club or organization: Not on file     Attends meetings of clubs or organizations: Not on file     Relationship status: Not on file    Intimate partner violence     Fear of current or ex partner: Not on file     Emotionally abused: Not on file     Physically abused: Not on file     Forced sexual activity: Not on file   Other Topics Concern    Not on file   Social History Narrative    Not on file     Family History   Problem Relation Age of Onset    Cancer Maternal Aunt         Hodgkin's disease    Breast Cancer Paternal Aunt     Cancer Father         Prostate, lung, brain     Current Outpatient Medications   Medication Sig Dispense Refill    calcium citrate 200 mg (950 mg) tablet Take  by mouth daily.  palbociclib (Ibrance) 100 mg cap Take 1 Cap by mouth daily. For 21 days on and 7 days off every 28 days.  63 Cap 5    gabapentin (NEURONTIN) 100 mg capsule Take 2 Caps by mouth three (3) times daily. Max Daily Amount: 600 mg. 540 Cap 3    Cetirizine (ZYRTEC) 10 mg cap Take 1 Cap by mouth daily as needed.  lidocaine HCl-hydrocortison ac topical cream Apply  to affected area two (2) times a day. 85 g 3    TETRACYCLINE HCL (TETRACYCLINE PO) Take 250 mg by mouth two (2) times a day.  fexofenadine-pseudoephedrine (ALLEGRA-D 24 HOUR) 180-240 mg per tablet Take 1 Tab by mouth daily as needed.  sulfacetamide (BLEPH-10) 10 % ophthalmic ointment Administer  to right eye three (3) times daily.  palbociclib (IBRANCE) 125 mg cap Take 125 mg by mouth daily. Take 1 tab po every day for 21 days on and 7 days off q 28 days  Indications: HORMONE RECEPTOR(+), HER2(-) ADVANCED BREAST CANCER 42 Cap 6    Cholecalciferol, Vitamin D3, 5,000 unit Tab Take  by mouth daily.  thyroid, Pork, (ARMOUR THYROID) 60 mg tablet Take 90 mg by mouth daily.  L-Mfolate-B6 Phos-Methyl-B12 (NEURPATH-B) 3-35-2 mg Tab Take  by mouth two (2) times a day.  warfarin (COUMADIN) 1 mg tablet Take 1 mg by mouth daily. Review of Systems  Constitutional: The patient has no acute distress or discomfort. HEENT: The patient denies recent head trauma, eye pain, blurred vision,  hearing deficit, oropharyngeal mucosal pain or lesions, and the patient denies throat pain or discomfort. Lymphatics: The patient denies palpable peripheral lymphadenopathy. Hematologic: The patient denies having bruising, bleeding, or progressive fatigue. Respiratory: Patient denies having shortness of breath, cough, sputum production, fever, or dyspnea on exertion. Cardiovascular: The patient denies having leg pain, leg swelling, heart palpitations, chest permit, chest pain, or lightheadedness. The patient denies having dyspnea on exertion. Gastrointestinal: The patient denies having nausea, emesis, or diarrhea.  The patient denies having any hematemesis or blood in the stool. Genitourinary: Patient denies having urinary urgency, frequency, or dysuria. The patient denies having blood in the urine. Psychological: The patient denies having symptoms of nervousness, anxiety, depression, or thoughts of harming self. Skin: Patient denies having skin rashes, skin, ulcerations, or unexplained itching or pruritus. Musculoskeletal: The patient denies having pain in the joints or bones. Objective:     Visit Vitals  /70   Pulse 78   Resp 18   Ht 5' 4\" (1.626 m)   Wt 65.8 kg (145 lb)   SpO2 96%   BMI 24.89 kg/m²     ECOG PS=0; Pain score= 3/10    Physical Exam:   Gen. Appearance: The patient is in no acute distress. Skin: There is no bruise or rash. There is no evidence of cutaneous shingles over the lower back and flank. HEENT: The exam is unremarkable. Neck: Supple without lymphadenopathy or thyromegaly. Lungs: Clear to auscultation and percussion; there are no wheezes or rhonchi. Heart: Regular rate and rhythm; there are no murmurs, gallops, or rubs. Abdomen: Bowel sounds are present and normal.  There is no guarding, tenderness, or hepatosplenomegaly. Extremities: There is no clubbing, cyanosis, or edema. Neurologic: There are no focal neurologic deficits. Lymphatics: There is no palpable peripheral lymphadenopathy. Musculoskeletal: The patient has full range of motion at all joints. There is no evidence of joint deformity or effusions. There is no focal joint tenderness. Psychological/psychiatric: There is no clinical evidence of anxiety, depression, or melancholy. XR Results (maximum last 3): Results from [place] encounter on 07/12/19   XR SHOULDER LT AP/LAT MIN 2 V    Impression IMPRESSION:    Negative. XR SHOULDER RT AP/LAT MIN 2 V    Impression IMPRESSION:    Arthritis at the right [facility], [place], operated by [facility] joint. Right apical parenchymal opacification unchanged from April 2019. Possibly post  radiation change.   Increased streaky opacity at the right base incompletely included on this exam.  Possible increased sclerosis involving the anterior fifth and sixth and seventh  ribs incompletely included on this exam. Possibility of blastic metastases not  excluded. Results from [place] encounter on 08/11/16   XR CHEST SNGL V EXPIR    Impression IMPRESSION:    No evidence of pneumothorax. Right basilar density, likely to be chronic changes, as described. CT Results (maximum last 3): Results from [place] encounter on 04/17/20   CT CHEST ABD PELV W CONT    Impression IMPRESSION:    1. Loculated right pleural effusion, pleural thickening and adjacent areas of  probable rounded atelectasis in the right middle lobe and right lower lobe are  stable. 2. Relatively stable bone lesions of the ribs and pelvis and T12 posterior  elements. 3. No definite finding for new metastatic disease in the chest, abdomen, pelvis  or bones. Results from [place] encounter on 07/09/19   CT SPINE CERV WO CONT    Impression IMPRESSION:    1. No compression deformity. Abnormal sclerotic vertebral body at C5. This  probably corresponds to the abnormal increased uptake seen on the bone scan. 2.  Central canal stenosis at multiple levels, with multilevel neural foraminal  narrowing. Most pronounced at C5-6 on the right side and C6-7 on the right side  followed by C3-4 on the left side. Results from [place] encounter on 04/09/19   CT CHEST ABD PELV W CONT    Impression IMPRESSION:    CT CHEST:    Right lung small pleural effusion with pleural thickening and subpleural  opacities as above, without significant change since 11/5/2018. Component of  tumor and malignant effusion not excluded, however benign/inflammatory process  such as radiation related changes with associated round atelectasis may appear  similar.     Multiple rib lesions and T12 posterior element lesion are also similar to  11/5/2018, consistent with stable metastases. CT ABDOMEN/PELVIS:    Right iliac crest and S1 bone lesions are similar to prior, consistent with  stable metastases. No new or worsening metastatic disease identified in the abdomen or pelvis. Status post cholecystectomy. Sigmoid diverticulosis. See additional details above. MRI Results (maximum last 3): Results from [place] encounter on 06/30/17   MRI LUMB SPINE W WO CONT    Impression Impression:    Enhancing mildly expansile bone lesion in the T12 spinous process, bilateral  lamina, right pedicle into posterior right vertebral body, similar in  distribution to the prior MRI although with more heterogeneous bubbly cystic  appearance which may be from interval treatment changes. Mild soft tissue edema. No extraosseous or epidural tumor. No other bone metastases noted at the lumbar spine. Mild degenerative spinal disease with no significant spinal canal stenosis. Mild foraminal stenosis at T12-L1 and L4-5. No nerve root impingement. Partially visualized complex septated right pleural effusion. Results from [place] encounter on 06/23/17   MRI [facility] SPINE W WO CONT    Impression IMPRESSION:    1. Evaluation is somewhat limited due to motion artifacts and field  inhomogeneity. Stable lesion in the posterior elements of T12 with no definite  evidence of epidural invasion or mass. 2. Abnormal signal in C5 vertebra,  new since prior study. Another focus of  metastatic disease suspected. Additional findings as above. Results from [place] encounter on 06/24/16   MRI [facility] SPINE W WO CONT    Impression Impression:    As per recent PET/CT. Metastatic disease to the posterior elements of the T12  vertebral body as described above in detail. No clear evidence of posterior  epidural invasion as of yet. Moderate right-sided fluid dependent pleural effusion. Nuclear Medicine Results (maximum last 3):   Results from [facility] on 04/15/20   NM BONE SCAN [address] BODY    Impression IMPRESSION:    Similar sclerotic bone metastasis since 7/9/2019. No new site of metastatic  disease. Degenerative changes. Results from [place] encounter on 07/09/19   NM BONE SCAN [address] BODY    Impression IMPRESSION:          Multiple foci of abnormal increased uptake as described. Consistent with  metastatic lesions. Stable to slightly increased uptake at the cervical spine  and left posterior iliac spine region. Stable increased uptake involving the  ribs. Results from [place] encounter on 10/30/18   NM BONE SCAN [address] BODY    Impression IMPRESSION:    1. Grossly stable appearance of osseous metastatic disease. 2. No evidence for new osseous metastatic lesion. US Results (maximum last 3): Results from [place] encounter on 08/11/16   US THORACENTESIS RT NDL W IMAGE    Impression Impression:  Successful right thoracentesis. Specimen sent to lab for analysis     Results from Hospital Encounter encounter on 06/28/16   US THORACENTESIS RT NDL W IMAGE    Impression Impression:  Successful right thoracentesis. Results from [place] encounter on 05/24/16   US BREAST LT LIMITED=<3 QUAD    Impression IMPRESSION:    No evidence for malignancy. Suggest routine followup. Thank you for this referral.          DEXA Results (maximum last 3): No results found for this or any previous visit. RODOLFO Results (maximum last 3): Results from [place] encounter on 06/05/20   RODOLFO 3D WES W MAMMO LT SCREENING INCL CAD    Impression IMPRESSION:      No evidence of malignancy. Suggest routine follow-up. Results from Hospital Encounter encounter on 06/03/19   RODOLFO 3D WES W MAMMO LT DX INCL CAD    Impression IMPRESSION:  1. Benign findings. Resume annual screening mammography. BI-RADS Category 2 - Benign     These findings were discussed with the patient in person.  Indications to return  sooner were discussed with the patient such as new palpable nodule, nipple  inversion, spontaneous nipple discharge especially if bloody, and architectural  changes. Patient demonstrated understanding. The lack of mammographic evidence of malignancy should not deter further work-up  of a clinically significant palpable finding. Radiodense breast tissue may  obscure an early malignancy or a palpable finding. 10-15% of breast cancers are  not detected by mammography. Results from [place] encounter on 05/31/18   RODOLFO 3D WES W MAMMO LT SCREENING INCL CAD    Impression IMPRESSION:  1. No suspicious findings    Recommendations:  Resume annual screening mammogram. These results are being  provided to the patient by letter. BI-RADS category 2 - Benign . The lack of mammographic evidence of malignancy should not deter further work-up  of a clinically significant palpable finding. Radiodense breast tissue may  obscure an early malignancy or a palpable finding. 10-15% of breast cancers are  not detected by mammography. IR Results (maximum last 3): No results found for this or any previous visit. VAS/US Results (maximum last 3): Results from [place] encounter on 09/23/15   DUPLEX LOWER EXT VENOUS RIGHT   Results from [place] encounter on 07/17/14   DUPLEX LOWER EXT VENOUS BILAT   Results from [place] encounter on 07/08/14   DUPLEX UPPER EXT VENOUS BILAT       PET Results (maximum last 3): Results from [place] encounter on 06/09/16   PET/CT TUMOR IMAGE SKULL THIGH W (INI)    Impression IMPRESSION:  1. Bone destructive hypermetabolic metastasis I65 vertebra with probable  epidural tumor, not well defined  -Expedited clinical correlation and, if possible, MR scan thoracic spine is  recommended  2. Multifocal sclerotic bone metastases of indeterminate activity. May have  cervical spine metastatic disease  3.  Small volume multifocal mediastinal/hilar malignant adenopathy  4. 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 Michaela Charles regarding spine  lesion which needs attention. Confirmed. Lab data:      Results for orders placed or performed during the hospital encounter of 07/30/20   CBC WITH 3 PART DIFF     Status: Abnormal   Result Value Ref Range Status    WBC 2.0 (L) 4.5 - 13.0 K/uL Final    RBC 3.09 (L) 4.10 - 5.10 M/uL Final    HGB 10.7 (L) 12.0 - 16 g/dL Final    HCT 30.6 (L) 36 - 48 % Final    MCV 99.0 78 - 102 FL Final    MCH 34.6 25.0 - 35.0 PG Final    MCHC 35.0 31 - 37 g/dL Final    RDW 18.8 (H) 11.5 - 14.5 % Final    PLATELET 655 477 - 083 K/uL Final    NEUTROPHILS 61 40 - 70 % Final    MIXED CELLS 16 0.1 - 17 % Final    LYMPHOCYTES 23 14 - 44 % Final    ABS. NEUTROPHILS 1.2 (L) 1.8 - 9.5 K/UL Final    ABS. MIXED CELLS 0.3 0.0 - 2.3 K/uL Final    ABS. LYMPHOCYTES 0.5 (L) 1.1 - 5.9 K/UL Final     Comment: Test performed at [address] [place] [address] or Outpatient Infusion Center Location. Reviewed by Medical Director. DF AUTOMATED   Final           Assessment:     1. Anemia due to antineoplastic chemotherapy    2. Metastatic breast cancer ([address]    3. Chemotherapy-induced thrombocytopenia    4. Chronic leukopenia    5. Chemotherapy induced neutropenia (HCC)    6. Malignant neoplasm of lower-inner quadrant of right breast of female, estrogen receptor positive ([address]    7. 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 She is tolerating treatment fairly well without high grade toxicities.  -- 4/15/2020  CT scans of the chest, abdomen, and pelvis shows that she has a loculated right pleural effusion with some pleural thickening and adjacent areas of probable rounded atelectasis all of which are stable. She has relatively stable bone lesions of the rib, pelvis, and T12 posterior element. There was no findings on CT scan to support new metastatic disease in the chest, abdomen, or pelvis. -- Patient will continue current regimen with fulvestrant and palbociclib as previously recommended. -- Refilled Palbociclib 100 mg daily 21 days on/7 days off today. -- She will need to check lab before each cycle. -- She will need yearly DEXA scan. Will also need CT Scan and Bone scan yearly. --  Mammogram 6/5 done shows No evidence of malignancy. Suggest routine follow-up. -- We will check her CA 27-29 with her labs at the infusion center   -- I will see the patient back in clinic in about 3 months. Always sooner if required. The patient can have lab done prior our next clinic visit, she will need CBC monthly      Neuropathy associated with cancer:   -- Continue her Neurontin at 400 mg 3 times daily. She does not need a refill at this time.      Chemotherapy-induced neutropenia/chronic leukopenia (persisting problem):   -- If the absolute neutrophil count declines to 0.5 she will be started on Neupogen or Neulasta. Recent ANC 1.3, WBC of 2.2 on 9/24/2020      Chemotherapy-induced anemia (persisting problem):    -- We have recommended that the patient continue taking ferrous sulfate 1 tablet daily. Procrit at a dose of 60,000 units subcutaneous will continue to  be provided when her hemoglobin  declined below 10 g/dL and hematocrit is below 30%every 4 weeks .        Chemotherapy-induced thrombocytopenia :   -- The patient was holding the Ibrance 140 mg at previous times D/T thrombocytopenia. -- She presently taking Ibrance 100mg. Her recent CBC showed PLTs 202 K on 9/24/2020 .           Juan Antonio Kimble NP  10/6/2020

## 2020-10-06 NOTE — PATIENT INSTRUCTIONS
Breast Cancer: Care Instructions  Your Care Instructions     Breast cancer occurs when abnormal cells grow out of control in the breast. These cancer cells can spread within the breast, to nearby lymph nodes and other tissues, and to other parts of the body. Being treated for cancer can weaken your body, and you may feel very tired. Get the rest your body needs so you can feel better. Finding out that you have cancer is scary. You may feel many emotions and may need some help coping. Seek out family, friends, and counselors for support. You also can do things at home to make yourself feel better while you go through treatment. Call the [employer] (6-959.884.7671) or visit its website at [employer] for more information. Follow-up care is a key part of your treatment and safety. Be sure to make and go to all appointments, and call your doctor if you are having problems. It's also a good idea to know your test results and keep a list of the medicines you take. How can you care for yourself at home? · Take your medicines exactly as prescribed. Call your doctor if you think you are having a problem with your medicine. You may get medicine for nausea and vomiting if you have these side effects. · Follow your doctor's instructions to relieve pain. Pain from cancer and surgery can almost always be controlled. Use pain medicine when you first notice pain, before it becomes severe. · Eat healthy food. If you do not feel like eating, try to eat food that has protein and extra calories to keep up your strength and prevent weight loss. Drink liquid meal replacements for extra calories and protein. Try to eat your main meal early. · Get some physical activity every day, but do not get too tired. Keep doing the hobbies you enjoy as your energy allows. · Do not smoke. Smoking can make your cancer worse. If you need help quitting, talk to your doctor about stop-smoking programs and medicines.  These can increase your chances of quitting for good. · Take steps to control your stress and workload. Learn relaxation techniques. ? Share your feelings. Stress and tension affect our emotions. By expressing your feelings to others, you may be able to understand and cope with them. ? Consider joining a support group. Talking about a problem with your spouse, a good friend, or other people with similar problems is a good way to reduce tension and stress. ? Express yourself through art. Try writing, crafts, dance, or art to relieve stress. Some dance, writing, or art groups may be available just for people who have cancer. ? Be kind to your body and mind. Getting enough sleep, eating a healthy diet, and taking time to do things you enjoy can contribute to an overall feeling of balance in your life and can help reduce stress. ? Get help if you need it. Discuss your concerns with your doctor or counselor. · If you are vomiting or have diarrhea:  ? Drink plenty of fluids (enough so that your urine is light yellow or clear like water) to prevent dehydration. Choose water and other caffeine-free clear liquids. If you have kidney, heart, or liver disease and have to limit fluids, talk with your doctor before you increase the amount of fluids you drink. ? When you are able to eat, try clear soups, mild foods, and liquids until all symptoms are gone for 12 to 48 hours. Other good choices include dry toast, crackers, cooked cereal, and gelatin dessert, such as Jell-O.  · If you have not already done so, prepare a list of advance directives. Advance directives are instructions to your doctor and family members about what kind of care you want if you become unable to speak or express yourself. When should you call for help? Call 911 anytime you think you may need emergency care. For example, call if:    · You passed out (lost consciousness).    Call your doctor now or seek immediate medical care if:    · You have a fever.     · You have abnormal bleeding.     · You think you have an infection.     · You have new or worse pain.     · You have new symptoms, such as a cough, belly pain, vomiting, diarrhea, or a rash. Watch closely for changes in your health, and be sure to contact your doctor if:    · You are much more tired than usual.     · You have swollen glands in your armpits, groin, or neck.     · You do not get better as expected. Where can you learn more? Go to http://www.webb.com/  Enter V321 in the search box to learn more about \"Breast Cancer: Care Instructions. \"  Current as of: April 29, 2020               Content Version: 12.6  © 8109-2958 [employer]. Care instructions adapted under license by [employer] (which disclaims liability or warranty for this information). If you have questions about a medical condition or this instruction, always ask your healthcare professional. [place] [address] any warranty or liability for your use of this information. Palbociclib (Ibrance) - (By mouth)   Why this medicine is used:   Treats breast cancer. This medicine is given together with letrozole or fulvestrant. Contact a nurse or doctor right away if you have:  · Fast or pounding heartbeat, chest pain, trouble breathing, coughing up blood  · Unusual bleeding, bruising, or weakness  · Dizziness or lightheadedness  · Fever, chills, cough, runny or stuffy nose, sore throat, body aches   © 2017 [facility] Information is for End User's use only and may not be sold, redistributed or otherwise used for commercial purposes. Fulvestrant (By injection)   Fulvestrant (ful-VES-trant)  Treats breast cancer. Brand Name(s): Faslodex   There may be other brand names for this medicine. When This Medicine Should Not Be Used: This medicine is not right for everyone.  You should not receive it if you had an allergic reaction to fulvestrant, or if you are pregnant. How to Use This Medicine:   Injectable  · Your doctor will prescribe your exact dose and tell you how often it should be given. This medicine is given as a shot into one of your muscles. · You will receive this medicine while you are in a hospital or cancer treatment center. A nurse or other trained health professional will give you this medicine. · Read and follow the patient instructions that come with this medicine. Talk to your doctor or pharmacist if you have any questions. · Missed dose: This medicine needs to be given on a fixed schedule. If you miss a dose, call your doctor, home health caregiver, or treatment clinic for instructions. Drugs and Foods to Avoid:   Ask your doctor or pharmacist before using any other medicine, including over-the-counter medicines, vitamins, and herbal products. · Some foods and medicines can affect how fulvestrant works. Tell your doctor if you are using a blood thinner (including warfarin). Warnings While Using This Medicine:   · It is not safe to take this medicine during pregnancy. It could harm an unborn baby. Tell your doctor right away if you become pregnant. If you are a woman who can bear children, your doctor may give you a pregnancy test 7 days before you start using this medicine to make sure you are not pregnant. Use an effective form of birth control during treatment and for 1 year after your last dose of this medicine. · Do not breastfeed during treatment and for 1 year after your last dose of this medicine. · Medicines used to treat cancer are very strong and can have many side effects. Before receiving this medicine, make sure you understand all the risks and benefits. It is important for you to work closely with your doctor during your treatment. · Tell your doctor if you have liver disease or a bleeding or blood disorder. · This medicine may increase your risk of bleeding, including vaginal bleeding.   · This medicine could cause infertility. Talk with your doctor before using this medicine if you plan to have children. · Cancer medicine can cause nausea or vomiting, sometimes even after you receive medicine to prevent these effects. Ask your doctor or nurse about other ways to control any nausea or vomiting that might happen. Possible Side Effects While Using This Medicine:   Call your doctor right away if you notice any of these side effects:  · Allergic reaction: Itching or hives, swelling in your face or hands, swelling or tingling in your mouth or throat, chest tightness, trouble breathing  · Fever, chills, cough, sore throat, and body aches  · Unusual bleeding, bruising, or weakness  If you notice these less serious side effects, talk with your doctor:   · Headache, nausea  · Pain, itching, burning, numbness, tingling, swelling, or a lump under your skin where the shot was given  · Sores or white patches on your lips, mouth, or throat  If you notice other side effects that you think are caused by this medicine, tell your doctor. Call your doctor for medical advice about side effects. You may report side effects to FDA at 8-019-FDA-1494  © 2017 [address] Information is for End User's use only and may not be sold, redistributed or otherwise used for commercial purposes. The above information is an  only. It is not intended as medical advice for individual conditions or treatments. Talk to your doctor, nurse or pharmacist before following any medical regimen to see if it is safe and effective for you. Fulvestrant (Faslodex) - (By injection)   Why this medicine is used:   Treats breast cancer.   Contact a nurse or doctor right away if you have:  · Fever, chills, cough, sore throat, body aches, headache  · Unusual bleeding, bruising, or weakness     Common side effects:  · Sores or white patches on your lips, mouth, or throat, nausea  · Pain, itching, burning, numbness, tingling, swelling, or a lump under your skin where the shot was given  © 2017 [address] is for End User's use only and may not be sold, redistributed or otherwise used for commercial purposes.

## 2020-10-20 DIAGNOSIS — C50.919 METASTATIC BREAST CANCER (HCC): ICD-10-CM

## 2020-10-22 ENCOUNTER — HOSPITAL ENCOUNTER (OUTPATIENT)
Dept: INFUSION THERAPY | Age: 75
Discharge: HOME | End: 2020-10-22
Payer: MEDICARE

## 2020-10-22 VITALS
RESPIRATION RATE: 18 BRPM | BODY MASS INDEX: 24.92 KG/M2 | SYSTOLIC BLOOD PRESSURE: 121 MMHG | WEIGHT: 146 LBS | HEART RATE: 80 BPM | DIASTOLIC BLOOD PRESSURE: 60 MMHG | OXYGEN SATURATION: 98 % | TEMPERATURE: 98 F | HEIGHT: 64 IN

## 2020-10-22 DIAGNOSIS — C50.311 MALIGNANT NEOPLASM OF LOWER-INNER QUADRANT OF RIGHT BREAST OF FEMALE, ESTROGEN RECEPTOR POSITIVE (HCC): Primary | ICD-10-CM

## 2020-10-22 DIAGNOSIS — Z17.0 MALIGNANT NEOPLASM OF LOWER-INNER QUADRANT OF RIGHT BREAST OF FEMALE, ESTROGEN RECEPTOR POSITIVE (HCC): Primary | ICD-10-CM

## 2020-10-22 DIAGNOSIS — C50.919 METASTATIC BREAST CANCER (HCC): ICD-10-CM

## 2020-10-22 LAB
ALBUMIN SERPL-MCNC: 3.7 G/DL (ref 3.4–5)
ALBUMIN/GLOB SERPL: 1.1 {RATIO} (ref 0.8–1.7)
ALP SERPL-CCNC: 56 U/L (ref 45–117)
ALT SERPL-CCNC: 12 U/L (ref 13–56)
ANION GAP SERPL CALC-SCNC: 6 MMOL/L (ref 3–18)
AST SERPL-CCNC: 15 U/L (ref 10–38)
BILIRUB SERPL-MCNC: 0.5 MG/DL (ref 0.2–1)
BUN SERPL-MCNC: 18 MG/DL (ref 7–18)
BUN/CREAT SERPL: 18 (ref 12–20)
CALCIUM SERPL-MCNC: 9.5 MG/DL (ref 8.5–10.1)
CHLORIDE SERPL-SCNC: 109 MMOL/L (ref 100–111)
CO2 SERPL-SCNC: 28 MMOL/L (ref 21–32)
CREAT SERPL-MCNC: 1 MG/DL (ref 0.6–1.3)
FERRITIN SERPL-MCNC: 282 NG/ML (ref 8–388)
GLOBULIN SER CALC-MCNC: 3.4 G/DL (ref 2–4)
GLUCOSE SERPL-MCNC: 79 MG/DL (ref 74–99)
IRON SATN MFR SERPL: 28 % (ref 20–50)
IRON SERPL-MCNC: 82 UG/DL (ref 50–175)
MAGNESIUM SERPL-MCNC: 1.5 MG/DL (ref 1.6–2.6)
PHOSPHATE SERPL-MCNC: 3 MG/DL (ref 2.5–4.9)
POTASSIUM SERPL-SCNC: 3.7 MMOL/L (ref 3.5–5.5)
PROT SERPL-MCNC: 7.1 G/DL (ref 6.4–8.2)
SODIUM SERPL-SCNC: 143 MMOL/L (ref 136–145)
TIBC SERPL-MCNC: 290 UG/DL (ref 250–450)

## 2020-10-22 PROCEDURE — 96372 THER/PROPH/DIAG INJ SC/IM: CPT

## 2020-10-22 PROCEDURE — 36591 DRAW BLOOD OFF VENOUS DEVICE: CPT

## 2020-10-22 PROCEDURE — 74011250636 HC RX REV CODE- 250/636

## 2020-10-22 PROCEDURE — 82728 ASSAY OF FERRITIN: CPT

## 2020-10-22 PROCEDURE — 74011250636 HC RX REV CODE- 250/636: Performed by: NURSE PRACTITIONER

## 2020-10-22 PROCEDURE — 74011250636 HC RX REV CODE- 250/636: Performed by: INTERNAL MEDICINE

## 2020-10-22 PROCEDURE — 86300 IMMUNOASSAY TUMOR CA 15-3: CPT

## 2020-10-22 PROCEDURE — 84100 ASSAY OF PHOSPHORUS: CPT

## 2020-10-22 PROCEDURE — 83735 ASSAY OF MAGNESIUM: CPT

## 2020-10-22 PROCEDURE — 96402 CHEMO HORMON ANTINEOPL SQ/IM: CPT

## 2020-10-22 PROCEDURE — 80053 COMPREHEN METABOLIC PANEL: CPT

## 2020-10-22 PROCEDURE — 83540 ASSAY OF IRON: CPT

## 2020-10-22 PROCEDURE — 85025 COMPLETE CBC W/AUTO DIFF WBC: CPT

## 2020-10-22 PROCEDURE — 77030012965 HC NDL HUBR BBMI -A

## 2020-10-22 RX ORDER — ALBUTEROL SULFATE 0.83 MG/ML
2.5 SOLUTION RESPIRATORY (INHALATION) AS NEEDED
Status: CANCELLED
Start: 2020-11-19

## 2020-10-22 RX ORDER — ACETAMINOPHEN 325 MG/1
650 TABLET ORAL AS NEEDED
Status: CANCELLED
Start: 2020-11-19

## 2020-10-22 RX ORDER — ONDANSETRON 2 MG/ML
8 INJECTION INTRAMUSCULAR; INTRAVENOUS AS NEEDED
Status: CANCELLED | OUTPATIENT
Start: 2020-11-19

## 2020-10-22 RX ORDER — LAMOTRIGINE 25 MG/1
500 TABLET ORAL ONCE
Status: COMPLETED | OUTPATIENT
Start: 2020-10-22 | End: 2020-10-22

## 2020-10-22 RX ORDER — HEPARIN 100 UNIT/ML
500 SYRINGE INTRAVENOUS AS NEEDED
Status: DISCONTINUED | OUTPATIENT
Start: 2020-10-22 | End: 2020-10-26 | Stop reason: HOSPADM

## 2020-10-22 RX ORDER — HEPARIN 100 UNIT/ML
SYRINGE INTRAVENOUS
Status: COMPLETED
Start: 2020-10-22 | End: 2020-10-22

## 2020-10-22 RX ORDER — EPINEPHRINE 1 MG/ML
0.3 INJECTION, SOLUTION, CONCENTRATE INTRAVENOUS AS NEEDED
Status: CANCELLED | OUTPATIENT
Start: 2020-11-19

## 2020-10-22 RX ORDER — HYDROCORTISONE SODIUM SUCCINATE 100 MG/2ML
100 INJECTION, POWDER, FOR SOLUTION INTRAMUSCULAR; INTRAVENOUS AS NEEDED
Status: CANCELLED | OUTPATIENT
Start: 2020-11-19

## 2020-10-22 RX ORDER — DIPHENHYDRAMINE HYDROCHLORIDE 50 MG/ML
50 INJECTION, SOLUTION INTRAMUSCULAR; INTRAVENOUS AS NEEDED
Status: CANCELLED
Start: 2020-11-19

## 2020-10-22 RX ORDER — SODIUM CHLORIDE 0.9 % (FLUSH) 0.9 %
10-40 SYRINGE (ML) INJECTION AS NEEDED
Status: DISCONTINUED | OUTPATIENT
Start: 2020-10-22 | End: 2020-10-26 | Stop reason: HOSPADM

## 2020-10-22 RX ORDER — DIPHENHYDRAMINE HYDROCHLORIDE 50 MG/ML
25 INJECTION, SOLUTION INTRAMUSCULAR; INTRAVENOUS AS NEEDED
Status: CANCELLED
Start: 2020-11-19

## 2020-10-22 RX ADMIN — Medication 500 UNITS: at 13:30

## 2020-10-22 RX ADMIN — Medication 30 ML: at 13:25

## 2020-10-22 RX ADMIN — DENOSUMAB 120 MG: 120 INJECTION SUBCUTANEOUS at 13:36

## 2020-10-22 RX ADMIN — FULVESTRANT 500 MG: 50 INJECTION, SOLUTION INTRAMUSCULAR at 13:40

## 2020-10-22 NOTE — PROGRESS NOTES
[facility] Progress Note    Date: 2020    Name: Gris Rodríguez    MRN: [id number]         : 1945       FASLODEX Q4 WEEKS  XGEVA Q4 WEEKS  RETACRIT Q4 WEEKS  MONTHLY PORT FLUSH WITH LABS      Ms. Angel Wagoner arrived to [address] [place] [address] at 1310, ambulatory and in no acute distress. Patient denied any recent changes in her health condition or medication regimen. Patient denied complaint of pain or discomfort. Ms. Angel Wagoner was assessed and education was provided. Ms. Elenita Arguello vitals were reviewed. Visit Vitals  /60 (BP 1 Location: Left arm, BP Patient Position: Sitting)   Pulse 80   Temp 98 °F (36.7 °C)   Resp 18   Ht 5' 4\" (1.626 m)   Wt 66.2 kg (146 lb)   SpO2 98%   Breastfeeding No   BMI 25.06 kg/m²       Pt was observed for 5 minutes after obtaining vital signs prior to initiating treatment. Patient's left upper chest port accessed with 20 g 1 inch non-coring access needle and blood samples obtained for labs (CBC, CMP, iron profile, magnesium, phosphorus, ca.27.29 and ferritin) per order. Patient's port flushed with NS, followed by instillation of heparin 500 units/5mL, per protocol, and was de-accessed with needle removed intact. No evidence of complication noted at site and bandaid/gauze applied. Faslodex 500mg administered as ordered IM in patient's L & R dorsogluteal muscles (250mg per injection). Band-aids to sites. No irritation or bleeding to sites    CMP results reviewed from 2020. Serum calcium = 8.8 and within ordered parameters for administration of Xgeva injection. Xgeva 120mg administered as ordered SQ in pt's left upper arm. Patient tolerated injection well and site without evidence of complication. Bandaid applied. Pt tolerated injections well and without complaints. Pt armband removed & shredded. Ms. Angel Wagoner was discharged from [place] [address] in stable condition at 1350. She is to return on 2020 at 1300 for her next appointment.     Mikhail Woo Brad Ramirez  October 22, 2020

## 2020-10-27 LAB — CANCER AG27-29 SERPL-ACNC: 49.9 U/ML (ref 0–38.6)

## 2020-10-30 DIAGNOSIS — G63 NEUROPATHY ASSOCIATED WITH CANCER (HCC): ICD-10-CM

## 2020-10-30 DIAGNOSIS — C80.1 NEUROPATHY ASSOCIATED WITH CANCER (HCC): ICD-10-CM

## 2020-11-02 RX ORDER — GABAPENTIN 100 MG/1
200 CAPSULE ORAL 3 TIMES DAILY
Qty: 540 CAP | Refills: 3 | Status: SHIPPED | OUTPATIENT
Start: 2020-11-02 | End: 2021-01-01

## 2020-11-17 DIAGNOSIS — C50.919 METASTATIC BREAST CANCER (HCC): ICD-10-CM

## 2020-11-19 ENCOUNTER — HOSPITAL ENCOUNTER (OUTPATIENT)
Dept: INFUSION THERAPY | Age: 75
Discharge: HOME | End: 2020-11-19
Payer: MEDICARE

## 2020-11-19 VITALS
OXYGEN SATURATION: 100 % | BODY MASS INDEX: 25.1 KG/M2 | HEART RATE: 75 BPM | TEMPERATURE: 97 F | HEIGHT: 64 IN | RESPIRATION RATE: 18 BRPM | DIASTOLIC BLOOD PRESSURE: 66 MMHG | SYSTOLIC BLOOD PRESSURE: 126 MMHG | WEIGHT: 147 LBS

## 2020-11-19 DIAGNOSIS — C50.919 METASTATIC BREAST CANCER (HCC): ICD-10-CM

## 2020-11-19 DIAGNOSIS — Z17.0 MALIGNANT NEOPLASM OF LOWER-INNER QUADRANT OF RIGHT BREAST OF FEMALE, ESTROGEN RECEPTOR POSITIVE (HCC): Primary | ICD-10-CM

## 2020-11-19 DIAGNOSIS — C50.311 MALIGNANT NEOPLASM OF LOWER-INNER QUADRANT OF RIGHT BREAST OF FEMALE, ESTROGEN RECEPTOR POSITIVE (HCC): Primary | ICD-10-CM

## 2020-11-19 LAB
ALBUMIN SERPL-MCNC: 3.8 G/DL (ref 3.4–5)
ALBUMIN/GLOB SERPL: 1 {RATIO} (ref 0.8–1.7)
ALP SERPL-CCNC: 61 U/L (ref 45–117)
ALT SERPL-CCNC: 15 U/L (ref 13–56)
ANION GAP SERPL CALC-SCNC: 7 MMOL/L (ref 3–18)
AST SERPL-CCNC: 15 U/L (ref 10–38)
BASOPHILS # BLD: 0 K/UL (ref 0–0.1)
BASOPHILS NFR BLD: 1 % (ref 0–2)
BILIRUB SERPL-MCNC: 0.6 MG/DL (ref 0.2–1)
BUN SERPL-MCNC: 24 MG/DL (ref 7–18)
BUN/CREAT SERPL: 20 (ref 12–20)
CALCIUM SERPL-MCNC: 9 MG/DL (ref 8.5–10.1)
CHLORIDE SERPL-SCNC: 109 MMOL/L (ref 100–111)
CO2 SERPL-SCNC: 26 MMOL/L (ref 21–32)
CREAT SERPL-MCNC: 1.23 MG/DL (ref 0.6–1.3)
DIFFERENTIAL METHOD BLD: ABNORMAL
EOSINOPHIL # BLD: 0 K/UL (ref 0–0.4)
EOSINOPHIL NFR BLD: 1 % (ref 0–5)
ERYTHROCYTE [DISTWIDTH] IN BLOOD BY AUTOMATED COUNT: 15.9 % (ref 11.6–14.5)
GLOBULIN SER CALC-MCNC: 3.7 G/DL (ref 2–4)
GLUCOSE SERPL-MCNC: 94 MG/DL (ref 74–99)
HCT VFR BLD AUTO: 33.5 % (ref 35–45)
HGB BLD-MCNC: 11.2 G/DL (ref 12–16)
LYMPHOCYTES # BLD: 0.5 K/UL (ref 0.9–3.6)
LYMPHOCYTES NFR BLD: 21 % (ref 21–52)
MAGNESIUM SERPL-MCNC: 1.6 MG/DL (ref 1.6–2.6)
MCH RBC QN AUTO: 34.1 PG (ref 24–34)
MCHC RBC AUTO-ENTMCNC: 33.4 G/DL (ref 31–37)
MCV RBC AUTO: 102.1 FL (ref 74–97)
MONOCYTES # BLD: 0.1 K/UL (ref 0.05–1.2)
MONOCYTES NFR BLD: 3 % (ref 3–10)
NEUTS SEG # BLD: 1.6 K/UL (ref 1.8–8)
NEUTS SEG NFR BLD: 74 % (ref 40–73)
PHOSPHATE SERPL-MCNC: 3.1 MG/DL (ref 2.5–4.9)
PLATELET # BLD AUTO: 198 K/UL (ref 135–420)
PMV BLD AUTO: 9.6 FL (ref 9.2–11.8)
POTASSIUM SERPL-SCNC: 3.9 MMOL/L (ref 3.5–5.5)
PROT SERPL-MCNC: 7.5 G/DL (ref 6.4–8.2)
RBC # BLD AUTO: 3.28 M/UL (ref 4.2–5.3)
SODIUM SERPL-SCNC: 142 MMOL/L (ref 136–145)
WBC # BLD AUTO: 2.2 K/UL (ref 4.6–13.2)

## 2020-11-19 PROCEDURE — 74011250636 HC RX REV CODE- 250/636

## 2020-11-19 PROCEDURE — 74011250636 HC RX REV CODE- 250/636: Performed by: INTERNAL MEDICINE

## 2020-11-19 PROCEDURE — 77030012965 HC NDL HUBR BBMI -A

## 2020-11-19 PROCEDURE — 74011250636 HC RX REV CODE- 250/636: Performed by: NURSE PRACTITIONER

## 2020-11-19 PROCEDURE — 80053 COMPREHEN METABOLIC PANEL: CPT

## 2020-11-19 PROCEDURE — 85025 COMPLETE CBC W/AUTO DIFF WBC: CPT

## 2020-11-19 PROCEDURE — 84100 ASSAY OF PHOSPHORUS: CPT

## 2020-11-19 PROCEDURE — 36591 DRAW BLOOD OFF VENOUS DEVICE: CPT

## 2020-11-19 PROCEDURE — 83735 ASSAY OF MAGNESIUM: CPT

## 2020-11-19 PROCEDURE — 96402 CHEMO HORMON ANTINEOPL SQ/IM: CPT

## 2020-11-19 PROCEDURE — 96372 THER/PROPH/DIAG INJ SC/IM: CPT

## 2020-11-19 RX ORDER — EPINEPHRINE 1 MG/ML
0.3 INJECTION, SOLUTION, CONCENTRATE INTRAVENOUS AS NEEDED
Status: CANCELLED | OUTPATIENT
Start: 2020-12-17

## 2020-11-19 RX ORDER — HYDROCORTISONE SODIUM SUCCINATE 100 MG/2ML
100 INJECTION, POWDER, FOR SOLUTION INTRAMUSCULAR; INTRAVENOUS AS NEEDED
Status: CANCELLED | OUTPATIENT
Start: 2020-12-17

## 2020-11-19 RX ORDER — ONDANSETRON 2 MG/ML
8 INJECTION INTRAMUSCULAR; INTRAVENOUS AS NEEDED
Status: CANCELLED | OUTPATIENT
Start: 2020-12-17

## 2020-11-19 RX ORDER — SODIUM CHLORIDE 0.9 % (FLUSH) 0.9 %
10-40 SYRINGE (ML) INJECTION AS NEEDED
Status: DISCONTINUED | OUTPATIENT
Start: 2020-11-19 | End: 2020-11-23 | Stop reason: HOSPADM

## 2020-11-19 RX ORDER — DIPHENHYDRAMINE HYDROCHLORIDE 50 MG/ML
25 INJECTION, SOLUTION INTRAMUSCULAR; INTRAVENOUS AS NEEDED
Status: CANCELLED
Start: 2020-12-17

## 2020-11-19 RX ORDER — ACETAMINOPHEN 325 MG/1
650 TABLET ORAL AS NEEDED
Status: CANCELLED
Start: 2020-12-17

## 2020-11-19 RX ORDER — DIPHENHYDRAMINE HYDROCHLORIDE 50 MG/ML
50 INJECTION, SOLUTION INTRAMUSCULAR; INTRAVENOUS AS NEEDED
Status: CANCELLED
Start: 2020-12-17

## 2020-11-19 RX ORDER — LAMOTRIGINE 25 MG/1
500 TABLET ORAL ONCE
Status: COMPLETED | OUTPATIENT
Start: 2020-11-19 | End: 2020-11-19

## 2020-11-19 RX ORDER — HEPARIN 100 UNIT/ML
SYRINGE INTRAVENOUS
Status: COMPLETED
Start: 2020-11-19 | End: 2020-11-19

## 2020-11-19 RX ORDER — HEPARIN 100 UNIT/ML
500 SYRINGE INTRAVENOUS AS NEEDED
Status: DISCONTINUED | OUTPATIENT
Start: 2020-11-19 | End: 2020-11-23 | Stop reason: HOSPADM

## 2020-11-19 RX ORDER — ALBUTEROL SULFATE 0.83 MG/ML
2.5 SOLUTION RESPIRATORY (INHALATION) AS NEEDED
Status: CANCELLED
Start: 2020-12-17

## 2020-11-19 RX ADMIN — Medication 30 ML: at 13:24

## 2020-11-19 RX ADMIN — FULVESTRANT 500 MG: 50 INJECTION INTRAMUSCULAR at 13:30

## 2020-11-19 RX ADMIN — DENOSUMAB 120 MG: 120 INJECTION SUBCUTANEOUS at 13:38

## 2020-11-19 RX ADMIN — HEPARIN 500 UNITS: 100 SYRINGE at 13:27

## 2020-11-19 NOTE — PROGRESS NOTES
[facility] Progress Note    Date: 2020    Name: Terrell Marroquin    MRN: [id number]         : 1945       FASLODEX Q4 WEEKS  XGEVA Q4 WEEKS    MONTHLY PORT FLUSH WITH LABS      Ms. Purnell Severance arrived to [place] at , ambulatory and in no acute distress. Patient denied any recent changes in her health condition or medication regimen. Patient denied complaint of pain or discomfort. Ms. Purnell Severance was assessed and education was provided. Ms. Nata Dorado vitals were reviewed. Visit Vitals  /66 (BP 1 Location: Left arm, BP Patient Position: Sitting)   Pulse 75   Temp 97 °F (36.1 °C)   Resp 18   Ht 5' 4\" (1.626 m)   Wt 66.7 kg (147 lb)   SpO2 100%   Breastfeeding No   BMI 25.23 kg/m²       Pt was observed for 5 minutes after obtaining vital signs prior to initiating treatment. Patient's left upper chest port accessed with 20 g 1 inch non-coring access needle and blood samples obtained for labs (cbc, cmp,  magnesium, and phosphorus) per order. Patient's port flushed with NS, followed by instillation of heparin 500 units/5mL, per protocol, and was de-accessed with needle removed intact. No evidence of complication noted at site and bandaid/gauze applied. Faslodex 500mg administered as ordered IM in patient's L & R dorsogluteal muscles (250mg per injection). Band-aids to sites. No irritation or bleeding to sites    CMP results reviewed from 10/22/2020. Serum calcium = 9.5 and within ordered parameters for administration of Xgeva injection. Xgeva 120mg administered as ordered SQ in pt's left upper arm. Patient tolerated injection well and site without evidence of complication. Bandaid applied. Pt tolerated injections well and without complaints. Pt armband removed & shredded. Ms. Purnell Severance was discharged from [place] [address] in stable condition at [phone]. She is to return on 2020 at 1300 for her next appointment.     Shannen Armenta RN  2020

## 2020-12-10 RX ORDER — LAMOTRIGINE 25 MG/1
500 TABLET ORAL ONCE
Status: CANCELLED | OUTPATIENT
Start: 2021-01-14 | End: 2021-01-14

## 2020-12-10 RX ORDER — LAMOTRIGINE 25 MG/1
500 TABLET ORAL ONCE
Status: CANCELLED | OUTPATIENT
Start: 2021-02-11 | End: 2021-02-11

## 2020-12-15 DIAGNOSIS — C50.919 METASTATIC BREAST CANCER (HCC): ICD-10-CM

## 2020-12-17 ENCOUNTER — HOSPITAL ENCOUNTER (OUTPATIENT)
Dept: GENERAL RADIOLOGY | Age: 75
Discharge: HOME | End: 2020-12-17
Payer: MEDICARE

## 2020-12-17 ENCOUNTER — HOSPITAL ENCOUNTER (OUTPATIENT)
Dept: INFUSION THERAPY | Age: 75
Discharge: HOME | End: 2020-12-17
Payer: MEDICARE

## 2020-12-17 VITALS
RESPIRATION RATE: 20 BRPM | SYSTOLIC BLOOD PRESSURE: 145 MMHG | DIASTOLIC BLOOD PRESSURE: 76 MMHG | HEART RATE: 84 BPM | TEMPERATURE: 97 F | OXYGEN SATURATION: 99 %

## 2020-12-17 DIAGNOSIS — C50.311 MALIGNANT NEOPLASM OF LOWER-INNER QUADRANT OF RIGHT BREAST OF FEMALE, ESTROGEN RECEPTOR POSITIVE (HCC): ICD-10-CM

## 2020-12-17 DIAGNOSIS — C50.919 METASTATIC BREAST CANCER (HCC): Primary | ICD-10-CM

## 2020-12-17 DIAGNOSIS — R07.9 CHEST PAIN: ICD-10-CM

## 2020-12-17 DIAGNOSIS — Z17.0 MALIGNANT NEOPLASM OF LOWER-INNER QUADRANT OF RIGHT BREAST OF FEMALE, ESTROGEN RECEPTOR POSITIVE (HCC): ICD-10-CM

## 2020-12-17 DIAGNOSIS — M54.9 BACK PAIN: ICD-10-CM

## 2020-12-17 LAB
ALBUMIN SERPL-MCNC: 3.5 G/DL (ref 3.4–5)
ALBUMIN/GLOB SERPL: 0.9 {RATIO} (ref 0.8–1.7)
ALP SERPL-CCNC: 62 U/L (ref 45–117)
ALT SERPL-CCNC: 14 U/L (ref 13–56)
ANION GAP SERPL CALC-SCNC: 3 MMOL/L (ref 3–18)
AST SERPL-CCNC: 15 U/L (ref 10–38)
BASOPHILS # BLD: 0 K/UL (ref 0–0.06)
BASOPHILS NFR BLD: 0 % (ref 0–3)
BILIRUB SERPL-MCNC: 0.3 MG/DL (ref 0.2–1)
BUN SERPL-MCNC: 20 MG/DL (ref 7–18)
BUN/CREAT SERPL: 18 (ref 12–20)
CALCIUM SERPL-MCNC: 9.5 MG/DL (ref 8.5–10.1)
CHLORIDE SERPL-SCNC: 109 MMOL/L (ref 100–111)
CO2 SERPL-SCNC: 29 MMOL/L (ref 21–32)
CREAT SERPL-MCNC: 1.13 MG/DL (ref 0.6–1.3)
DIFFERENTIAL METHOD BLD: ABNORMAL
EOSINOPHIL # BLD: 0.1 K/UL (ref 0–0.4)
EOSINOPHIL NFR BLD: 4 % (ref 0–5)
ERYTHROCYTE [DISTWIDTH] IN BLOOD BY AUTOMATED COUNT: 16 % (ref 11.6–14.5)
GLOBULIN SER CALC-MCNC: 3.8 G/DL (ref 2–4)
GLUCOSE SERPL-MCNC: 123 MG/DL (ref 74–99)
HCT VFR BLD AUTO: 32.8 % (ref 35–45)
HGB BLD-MCNC: 10.8 G/DL (ref 12–16)
LYMPHOCYTES # BLD: 0.3 K/UL (ref 0.8–3.5)
LYMPHOCYTES NFR BLD: 18 % (ref 20–51)
MAGNESIUM SERPL-MCNC: 1.5 MG/DL (ref 1.6–2.6)
MCH RBC QN AUTO: 33.3 PG (ref 24–34)
MCHC RBC AUTO-ENTMCNC: 32.9 G/DL (ref 31–37)
MCV RBC AUTO: 101.2 FL (ref 74–97)
MONOCYTES # BLD: 0.2 K/UL (ref 0–1)
MONOCYTES NFR BLD: 8 % (ref 2–9)
NEUTS SEG # BLD: 1.3 K/UL (ref 1.8–8)
NEUTS SEG NFR BLD: 70 % (ref 42–75)
PHOSPHATE SERPL-MCNC: 3.2 MG/DL (ref 2.5–4.9)
PLATELET # BLD AUTO: 265 K/UL (ref 135–420)
PLATELET COMMENTS,PCOM: ABNORMAL
PMV BLD AUTO: 9.9 FL (ref 9.2–11.8)
POTASSIUM SERPL-SCNC: 3.9 MMOL/L (ref 3.5–5.5)
PROT SERPL-MCNC: 7.3 G/DL (ref 6.4–8.2)
RBC # BLD AUTO: 3.24 M/UL (ref 4.2–5.3)
RBC MORPH BLD: ABNORMAL
SODIUM SERPL-SCNC: 141 MMOL/L (ref 136–145)
TOTAL CELLS COUNTED SPEC: 50
WBC # BLD AUTO: 1.9 K/UL (ref 4.6–13.2)

## 2020-12-17 PROCEDURE — 83735 ASSAY OF MAGNESIUM: CPT

## 2020-12-17 PROCEDURE — 77030012965 HC NDL HUBR BBMI -A

## 2020-12-17 PROCEDURE — 96372 THER/PROPH/DIAG INJ SC/IM: CPT

## 2020-12-17 PROCEDURE — 80053 COMPREHEN METABOLIC PANEL: CPT

## 2020-12-17 PROCEDURE — 96402 CHEMO HORMON ANTINEOPL SQ/IM: CPT

## 2020-12-17 PROCEDURE — 71046 X-RAY EXAM CHEST 2 VIEWS: CPT

## 2020-12-17 PROCEDURE — 85025 COMPLETE CBC W/AUTO DIFF WBC: CPT

## 2020-12-17 PROCEDURE — 72070 X-RAY EXAM THORAC SPINE 2VWS: CPT

## 2020-12-17 PROCEDURE — 74011250636 HC RX REV CODE- 250/636: Performed by: INTERNAL MEDICINE

## 2020-12-17 PROCEDURE — 36591 DRAW BLOOD OFF VENOUS DEVICE: CPT

## 2020-12-17 PROCEDURE — 74011250636 HC RX REV CODE- 250/636

## 2020-12-17 PROCEDURE — 84100 ASSAY OF PHOSPHORUS: CPT

## 2020-12-17 RX ORDER — DIPHENHYDRAMINE HYDROCHLORIDE 50 MG/ML
25 INJECTION, SOLUTION INTRAMUSCULAR; INTRAVENOUS AS NEEDED
Status: CANCELLED
Start: 2021-01-14

## 2020-12-17 RX ORDER — HEPARIN 100 UNIT/ML
SYRINGE INTRAVENOUS
Status: COMPLETED
Start: 2020-12-17 | End: 2020-12-17

## 2020-12-17 RX ORDER — ONDANSETRON 2 MG/ML
8 INJECTION INTRAMUSCULAR; INTRAVENOUS AS NEEDED
Status: CANCELLED | OUTPATIENT
Start: 2021-01-14

## 2020-12-17 RX ORDER — ACETAMINOPHEN 325 MG/1
650 TABLET ORAL AS NEEDED
Status: CANCELLED
Start: 2021-01-14

## 2020-12-17 RX ORDER — HEPARIN 100 UNIT/ML
500 SYRINGE INTRAVENOUS AS NEEDED
Status: DISCONTINUED | OUTPATIENT
Start: 2020-12-17 | End: 2020-12-21 | Stop reason: HOSPADM

## 2020-12-17 RX ORDER — ALBUTEROL SULFATE 0.83 MG/ML
2.5 SOLUTION RESPIRATORY (INHALATION) AS NEEDED
Status: CANCELLED
Start: 2021-01-14

## 2020-12-17 RX ORDER — SODIUM CHLORIDE 0.9 % (FLUSH) 0.9 %
10-40 SYRINGE (ML) INJECTION AS NEEDED
Status: DISCONTINUED | OUTPATIENT
Start: 2020-12-17 | End: 2020-12-21 | Stop reason: HOSPADM

## 2020-12-17 RX ORDER — EPINEPHRINE 1 MG/ML
0.3 INJECTION, SOLUTION, CONCENTRATE INTRAVENOUS AS NEEDED
Status: CANCELLED | OUTPATIENT
Start: 2021-01-14

## 2020-12-17 RX ORDER — LAMOTRIGINE 25 MG/1
500 TABLET ORAL ONCE
Status: COMPLETED | OUTPATIENT
Start: 2020-12-17 | End: 2020-12-17

## 2020-12-17 RX ORDER — HYDROCORTISONE SODIUM SUCCINATE 100 MG/2ML
100 INJECTION, POWDER, FOR SOLUTION INTRAMUSCULAR; INTRAVENOUS AS NEEDED
Status: CANCELLED | OUTPATIENT
Start: 2021-01-14

## 2020-12-17 RX ORDER — DIPHENHYDRAMINE HYDROCHLORIDE 50 MG/ML
50 INJECTION, SOLUTION INTRAMUSCULAR; INTRAVENOUS AS NEEDED
Status: CANCELLED
Start: 2021-01-14

## 2020-12-17 RX ADMIN — DENOSUMAB 120 MG: 120 INJECTION SUBCUTANEOUS at 13:46

## 2020-12-17 RX ADMIN — Medication 40 ML: at 13:40

## 2020-12-17 RX ADMIN — HEPARIN 500 UNITS: 100 SYRINGE at 13:40

## 2020-12-17 RX ADMIN — FULVESTRANT 500 MG: 50 INJECTION INTRAMUSCULAR at 13:55

## 2020-12-17 NOTE — PROGRESS NOTES
[facility] Progress Note    Date: 2020    Name: Nelida Cardenas    MRN: [id number]         : 1945      Ms. Figueroa arrived in the [facility] today at 1310, in stable condition, here for [facility] [place], and Q 4 Week Xgeva & Faslodex Injections. She was assessed and education was provided. Ms. Torsten Garcia vitals were reviewed. Visit Vitals  BP (!) 145/76 (BP 1 Location: Left arm, BP Patient Position: Sitting)   Pulse 84   Temp 97 °F (36.1 °C)   Resp 20   SpO2 99%   Breastfeeding No                 Her left chest single lumen port was accessed without incident at 1340, and blood was drawn from the port for a CBC, CMP, Magnesium, and Phosphorus, per order. And then, the port was flushed well per protocol and without incident, with NS & Heparin, and the carbone needle was removed and gauze/bandaid was applied. (All labs were sent out to the [facility] hospital lab by , for processing.)       Lab results were reviewed. Her most recent CMP, MG, & PHOS, levels from 20 were reviewed, and were all noted to be satisfactory for treatment today, and were as follows:      Results for Thi Singh (MRN [id number])    Ref. Range 2020 13:24   WBC Latest Ref Range: 4.6 - 13.2 K/uL 2.2 (L)   RBC Latest Ref Range: 4.20 - 5.30 M/uL 3.28 (L)   HGB Latest Ref Range: 12.0 - 16.0 g/dL 11.2 (L)   HCT Latest Ref Range: 35.0 - 45.0 % 33.5 (L)   MCV Latest Ref Range: 74.0 - 97.0 .1 (H)   MCH Latest Ref Range: 24.0 - 34.0 PG 34.1 (H)   MCHC Latest Ref Range: 31.0 - 37.0 g/dL 33.4   RDW Latest Ref Range: 11.6 - 14.5 % 15.9 (H)   PLATELET Latest Ref Range: 135 - 420 K/uL 198   MPV Latest Ref Range: 9.2 - 11.8 FL 9.6   NEUTROPHILS Latest Ref Range: 40 - 73 % 74 (H)   LYMPHOCYTES Latest Ref Range: 21 - 52 % 21   MONOCYTES Latest Ref Range: 3 - 10 % 3   EOSINOPHILS Latest Ref Range: 0 - 5 % 1   BASOPHILS Latest Ref Range: 0 - 2 % 1   DF Latest Units:   AUTOMATED   ABS.  NEUTROPHILS Latest Ref Range: 1.8 - 8.0 K/UL 1.6 (L)   ABS. LYMPHOCYTES Latest Ref Range: 0.9 - 3.6 K/UL 0.5 (L)   ABS. MONOCYTES Latest Ref Range: 0.05 - 1.2 K/UL 0.1   ABS. EOSINOPHILS Latest Ref Range: 0.0 - 0.4 K/UL 0.0   ABS. BASOPHILS Latest Ref Range: 0.0 - 0.1 K/UL 0.0   Sodium Latest Ref Range: 136 - 145 mmol/L 142   Potassium Latest Ref Range: 3.5 - 5.5 mmol/L 3.9   Chloride Latest Ref Range: 100 - 111 mmol/L 109   CO2 Latest Ref Range: 21 - 32 mmol/L 26   Anion gap Latest Ref Range: 3.0 - 18 mmol/L 7   Glucose Latest Ref Range: 74 - 99 mg/dL 94   BUN Latest Ref Range: 7.0 - 18 MG/DL 24 (H)   Creatinine Latest Ref Range: 0.6 - 1.3 MG/DL 1.23   BUN/Creatinine ratio Latest Ref Range: 12 - 20   20   Calcium Latest Ref Range: 8.5 - 10.1 MG/DL 9.0   Phosphorus Latest Ref Range: 2.5 - 4.9 MG/DL 3.1   Magnesium Latest Ref Range: 1.6 - 2.6 mg/dL 1.6   GFR est non-AA Latest Ref Range: >60 ml/min/1.73m2 43 (L)   GFR est AA Latest Ref Range: >60 ml/min/1.73m2 52 (L)   Bilirubin, total Latest Ref Range: 0.2 - 1.0 MG/DL 0.6   Protein, total Latest Ref Range: 6.4 - 8.2 g/dL 7.5   Albumin Latest Ref Range: 3.4 - 5.0 g/dL 3.8   Globulin Latest Ref Range: 2.0 - 4.0 g/dL 3.7   A-G Ratio Latest Ref Range: 0.8 - 1.7   1.0   ALT Latest Ref Range: 13 - 56 U/L 15   AST Latest Ref Range: 10 - 38 U/L 15   Alk. phosphatase Latest Ref Range: 45 - 117 U/L 61         Xgeva 120 mg, was administered SQ, in her left arm at 1346, per order, and without incident.         Faslodex 500 mg Total Dose, was administered IM, in 2 equally divided doses of 250 mg, at 1355, per order, and without incident. (250 mg was administered IM, in her right gluteal muscle, and 250 mg was administered IM, in her left gluteal muscle)             Ms. Ceferino Mancilla tolerated well, and had no complaints. Ms. Ceferino Mancilla was discharged from [place] [address] in stable condition at 1400. Jerod Huber  She is to return in 4 weeks, on Thursday, 1-14-21, at 1300, for [employer], & [place] & Faslodex Injections & Associated Labs.      Quentin Dickerson RN  December 17, 2020  1:31 PM

## 2020-12-29 NOTE — PROGRESS NOTES
Hematology/Oncology  Progress Note    Name: Sumanth Rocha  Date: 2021  : 1945    PCP: Otis Jerez MD     Ms. Wojciech Mario is a 76 y.o.  female who was seen for management of her metastatic breast cancer with associated complications of chemotherapy. Current therapy: Fulvestrant 500 mg subcutaneous monthly and Ibrance 125 mg by mouth daily. Subjective:     Mrs. Wojciech Mario is a 42-year-old woman who has slowly progressive metastatic breast cancer. Patient was being followed by Dr. Chhaya Palma who retired. She is here today for chemo follow up. The patient has previously completed external beam radiation therapy to lesions in her ribs and more recently she completed proton therapy to areas of bone involvement with a significant benefit with regards to pain control. She is tolerating monthly Fulvestrant fairly well. The posttherapy imaging studies showed a significant decrease in the intensity of uptake on the bone scan. Additionally she is currently receiving systemic therapy with fulvestrant and Ibrance. Patient is tolerating the systemic therapy reasonably well and has not experienced any nausea or emesis. She reported right thoracic and chest wall pain for weeks. She does report some fatigue and occasional weakness and leg cramps. She continues to have SOB intermittently. She also receives Procrit at 4 week intervals whenever her hemoglobin is below 10 g/dL and hematocrit is below 30%. Past medical history, family history, and social history: these were reviewed and remains unchanged.     Past Medical History:   Diagnosis Date    Biliary colic     Breast CA ([address] 2012    Cancer [facility]) 1991    Right Breast    Chemotherapy convalescence or palliative care     Neuropathy     Radiation therapy complication     Thyroid disease      Past Surgical History:   Procedure Laterality Date    HX LAP CHOLECYSTECTOMY  13    HX MASTECTOMY  1991    Right    RI BREAST SURGERY PROCEDURE UNLISTED  10/2002    Right-Lesion    ND BREAST SURGERY PROCEDURE UNLISTED  2004    Right-Lesion     Social History     Socioeconomic History    Marital status:      Spouse name: Not on file    Number of children: Not on file    Years of education: Not on file    Highest education level: Not on file   Occupational History    Not on file   Social Needs    Financial resource strain: Not on file    Food insecurity     Worry: Not on file     Inability: Not on file    Transportation needs     Medical: Not on file     Non-medical: Not on file   Tobacco Use    Smoking status: Former Smoker     Quit date: 1991     Years since quittin.6    Smokeless tobacco: Never Used   Substance and Sexual Activity    Alcohol use: Yes     Alcohol/week: 0.0 standard drinks     Types: 1 - 2 Glasses of wine per week     Comment: socially, occassionally    Drug use: No    Sexual activity: Not on file   Lifestyle    Physical activity     Days per week: Not on file     Minutes per session: Not on file    Stress: Not on file   Relationships    Social connections     Talks on phone: Not on file     Gets together: Not on file     Attends Restorationist service: Not on file     Active member of club or organization: Not on file     Attends meetings of clubs or organizations: Not on file     Relationship status: Not on file    Intimate partner violence     Fear of current or ex partner: Not on file     Emotionally abused: Not on file     Physically abused: Not on file     Forced sexual activity: Not on file   Other Topics Concern    Not on file   Social History Narrative    Not on file     Family History   Problem Relation Age of Onset    Cancer Maternal Aunt         Hodgkin's disease    Breast Cancer Paternal Aunt     Cancer Father         Prostate, lung, brain     Current Outpatient Medications   Medication Sig Dispense Refill    gabapentin (NEURONTIN) 100 mg capsule Take 2 Caps by mouth three (3) times daily. Max Daily Amount: 600 mg. 540 Cap 3    calcium citrate 200 mg (950 mg) tablet Take  by mouth daily.  palbociclib (Ibrance) 100 mg cap Take 1 Cap by mouth daily. For 21 days on and 7 days off every 28 days. 63 Cap 5    TETRACYCLINE HCL (TETRACYCLINE PO) Take 250 mg by mouth two (2) times a day.  fexofenadine-pseudoephedrine (ALLEGRA-D 24 HOUR) 180-240 mg per tablet Take 1 Tab by mouth daily as needed.  sulfacetamide (BLEPH-10) 10 % ophthalmic ointment Administer  to right eye three (3) times daily.  Cholecalciferol, Vitamin D3, 5,000 unit Tab Take  by mouth daily.  thyroid, Pork, (ARMOUR THYROID) 60 mg tablet Take 90 mg by mouth daily.  L-Mfolate-B6 Phos-Methyl-B12 (NEURPATH-B) 3-35-2 mg Tab Take  by mouth two (2) times a day.  warfarin (COUMADIN) 1 mg tablet Take 1 mg by mouth daily.  Cetirizine (ZYRTEC) 10 mg cap Take 1 Cap by mouth daily as needed.  lidocaine HCl-hydrocortison ac topical cream Apply  to affected area two (2) times a day. 85 g 3    palbociclib (IBRANCE) 125 mg cap Take 125 mg by mouth daily. Take 1 tab po every day for 21 days on and 7 days off q 28 days  Indications: HORMONE RECEPTOR(+), HER2(-) ADVANCED BREAST CANCER 42 Cap 6       Review of Systems  Constitutional: The patient has no acute distress or discomfort. HEENT: The patient denies recent head trauma, eye pain, blurred vision,  hearing deficit, oropharyngeal mucosal pain or lesions, and the patient denies throat pain or discomfort. Lymphatics: The patient denies palpable peripheral lymphadenopathy. Hematologic: The patient denies having bruising, bleeding, or progressive fatigue. Respiratory: Patient denies having shortness of breath, cough, sputum production, fever, or dyspnea on exertion. Cardiovascular: The patient denies having leg pain, leg swelling, heart palpitations, chest permit, chest pain, or lightheadedness.   The patient denies having dyspnea on exertion. Gastrointestinal: The patient denies having nausea, emesis, or diarrhea. The patient denies having any hematemesis or blood in the stool. Genitourinary: Patient denies having urinary urgency, frequency, or dysuria. The patient denies having blood in the urine. Psychological: The patient denies having symptoms of nervousness, anxiety, depression, or thoughts of harming self. Skin: Patient denies having skin rashes, skin, ulcerations, or unexplained itching or pruritus. Musculoskeletal: The patient denies having pain in the joints or bones. Objective:     Visit Vitals  /68   Pulse 89   Resp 20   Ht 5' 4\" (1.626 m)   Wt 67.2 kg (148 lb 3.2 oz)   SpO2 98%   BMI 25.44 kg/m²     ECOG PS=0; Pain score= 3/10    Physical Exam:   Gen. Appearance: The patient is in no acute distress. Skin: There is no bruise or rash. There is no evidence of cutaneous shingles over the lower back and flank. HEENT: The exam is unremarkable. Neck: Supple without lymphadenopathy or thyromegaly. Lungs: Clear to auscultation and percussion; there are no wheezes or rhonchi. Heart: Regular rate and rhythm; there are no murmurs, gallops, or rubs. Abdomen: Bowel sounds are present and normal.  There is no guarding, tenderness, or hepatosplenomegaly. Extremities: There is no clubbing, cyanosis, or edema. Neurologic: There are no focal neurologic deficits. Lymphatics: There is no palpable peripheral lymphadenopathy. Musculoskeletal: The patient has full range of motion at all joints. There is no evidence of joint deformity or effusions. There is no focal joint tenderness. Psychological/psychiatric: There is no clinical evidence of anxiety, depression, or melancholy. XR Results (maximum last 3): Results from [place] encounter on 12/17/20   XR SPINE THORAC 2 V    Impression IMPRESSION:  No acute osseous findings. If continued clinical concern, recommend MRI.    XR CHEST PA LAT    Impression IMPRESSION:    No scapular fracture or destructive lesion. Left anterior chest wall port with catheter looped within the subcutaneous  tissues of the chest wall. Chronic right pleural effusion with associated scarring/atelectasis. Known sclerotic lesions of the right ribs are better appreciated on prior CT  exam.   Results from [place] encounter on 07/12/19   XR SHOULDER LT AP/LAT MIN 2 V    Impression IMPRESSION:    Negative. CT Results (maximum last 3): Results from [place] encounter on 04/17/20   CT CHEST ABD PELV W CONT    Impression IMPRESSION:    1. Loculated right pleural effusion, pleural thickening and adjacent areas of  probable rounded atelectasis in the right middle lobe and right lower lobe are  stable. 2. Relatively stable bone lesions of the ribs and pelvis and T12 posterior  elements. 3. No definite finding for new metastatic disease in the chest, abdomen, pelvis  or bones. Results from [place] encounter on 07/09/19   CT SPINE CERV WO CONT    Impression IMPRESSION:    1. No compression deformity. Abnormal sclerotic vertebral body at C5. This  probably corresponds to the abnormal increased uptake seen on the bone scan. 2.  Central canal stenosis at multiple levels, with multilevel neural foraminal  narrowing. Most pronounced at C5-6 on the right side and C6-7 on the right side  followed by C3-4 on the left side. Results from [place] encounter on 04/09/19   CT CHEST ABD PELV W CONT    Impression IMPRESSION:    CT CHEST:    Right lung small pleural effusion with pleural thickening and subpleural  opacities as above, without significant change since 11/5/2018. Component of  tumor and malignant effusion not excluded, however benign/inflammatory process  such as radiation related changes with associated round atelectasis may appear  similar.     Multiple rib lesions and T12 posterior element lesion are also similar to  11/5/2018, consistent with stable metastases. CT ABDOMEN/PELVIS:    Right iliac crest and S1 bone lesions are similar to prior, consistent with  stable metastases. No new or worsening metastatic disease identified in the abdomen or pelvis. Status post cholecystectomy. Sigmoid diverticulosis. See additional details above. MRI Results (maximum last 3): Results from [place] encounter on 06/30/17   MRI LUMB SPINE W WO CONT    Impression Impression:    Enhancing mildly expansile bone lesion in the T12 spinous process, bilateral  lamina, right pedicle into posterior right vertebral body, similar in  distribution to the prior MRI although with more heterogeneous bubbly cystic  appearance which may be from interval treatment changes. Mild soft tissue edema. No extraosseous or epidural tumor. No other bone metastases noted at the lumbar spine. Mild degenerative spinal disease with no significant spinal canal stenosis. Mild foraminal stenosis at T12-L1 and L4-5. No nerve root impingement. Partially visualized complex septated right pleural effusion. Results from [place] encounter on 06/23/17   MRI [facility] SPINE W WO CONT    Impression IMPRESSION:    1. Evaluation is somewhat limited due to motion artifacts and field  inhomogeneity. Stable lesion in the posterior elements of T12 with no definite  evidence of epidural invasion or mass. 2. Abnormal signal in C5 vertebra,  new since prior study. Another focus of  metastatic disease suspected. Additional findings as above. Results from [place] encounter on 06/24/16   MRI [facility] SPINE W WO CONT    Impression Impression:    As per recent PET/CT. Metastatic disease to the posterior elements of the T12  vertebral body as described above in detail. No clear evidence of posterior  epidural invasion as of yet. Moderate right-sided fluid dependent pleural effusion.          Nuclear Medicine Results (maximum last 3):  Results from Hospital Encounter encounter on 04/15/20   NM BONE SCAN [address] BODY    Impression IMPRESSION:    Similar sclerotic bone metastasis since 7/9/2019. No new site of metastatic  disease. Degenerative changes. Results from [place] encounter on 07/09/19   NM BONE SCAN [address] BODY    Impression IMPRESSION:          Multiple foci of abnormal increased uptake as described. Consistent with  metastatic lesions. Stable to slightly increased uptake at the cervical spine  and left posterior iliac spine region. Stable increased uptake involving the  ribs. Results from [place] encounter on 10/30/18   NM BONE SCAN [address] BODY    Impression IMPRESSION:    1. Grossly stable appearance of osseous metastatic disease. 2. No evidence for new osseous metastatic lesion. US Results (maximum last 3): Results from [place] encounter on 08/11/16   US THORACENTESIS RT NDL W IMAGE    Impression Impression:  Successful right thoracentesis. Specimen sent to lab for analysis     Results from Hospital Encounter encounter on 06/28/16   US THORACENTESIS RT NDL W IMAGE    Impression Impression:  Successful right thoracentesis. Results from [place] encounter on 05/24/16   US BREAST LT LIMITED=<3 QUAD    Impression IMPRESSION:    No evidence for malignancy. Suggest routine followup. Thank you for this referral.          DEXA Results (maximum last 3): No results found for this or any previous visit. RODOLFO Results (maximum last 3): Results from [place] encounter on 06/05/20   RODOLFO 3D WES W MAMMO LT SCREENING INCL CAD    Impression IMPRESSION:      No evidence of malignancy. Suggest routine follow-up. Results from Hospital Encounter encounter on 06/03/19   RODOLFO 3D WES W MAMMO LT DX INCL CAD    Impression IMPRESSION:  1. Benign findings. Resume annual screening mammography.      BI-RADS Category 2 - Benign     These findings were discussed with the patient in person. Indications to return  sooner were discussed with the patient such as new palpable nodule, nipple  inversion, spontaneous nipple discharge especially if bloody, and architectural  changes. Patient demonstrated understanding. The lack of mammographic evidence of malignancy should not deter further work-up  of a clinically significant palpable finding. Radiodense breast tissue may  obscure an early malignancy or a palpable finding. 10-15% of breast cancers are  not detected by mammography. Results from [place] encounter on 05/31/18   RODOLFO 3D WES W MAMMO LT SCREENING INCL CAD    Impression IMPRESSION:  1. No suspicious findings    Recommendations:  Resume annual screening mammogram. These results are being  provided to the patient by letter. BI-RADS category 2 - Benign . The lack of mammographic evidence of malignancy should not deter further work-up  of a clinically significant palpable finding. Radiodense breast tissue may  obscure an early malignancy or a palpable finding. 10-15% of breast cancers are  not detected by mammography. IR Results (maximum last 3): No results found for this or any previous visit. VAS/US Results (maximum last 3): Results from [place] encounter on 09/23/15   DUPLEX LOWER EXT VENOUS RIGHT   Results from [place] encounter on 07/17/14   DUPLEX LOWER EXT VENOUS BILAT   Results from [place] encounter on 07/08/14   DUPLEX UPPER EXT VENOUS BILAT       PET Results (maximum last 3): Results from [place] encounter on 06/09/16   PET/CT TUMOR IMAGE SKULL THIGH W (INI)    Impression IMPRESSION:  1. Bone destructive hypermetabolic metastasis B57 vertebra with probable  epidural tumor, not well defined  -Expedited clinical correlation and, if possible, MR scan thoracic spine is  recommended  2. Multifocal sclerotic bone metastases of indeterminate activity. May have  cervical spine metastatic disease  3. Small volume multifocal mediastinal/hilar malignant adenopathy  4. 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 Giuseppe Tran regarding spine  lesion which needs attention. Confirmed. Lab data:      Results for orders placed or performed during the hospital encounter of 07/30/20   CBC WITH 3 PART DIFF     Status: Abnormal   Result Value Ref Range Status    WBC 2.0 (L) 4.5 - 13.0 K/uL Final    RBC 3.09 (L) 4.10 - 5.10 M/uL Final    HGB 10.7 (L) 12.0 - 16 g/dL Final    HCT 30.6 (L) 36 - 48 % Final    MCV 99.0 78 - 102 FL Final    MCH 34.6 25.0 - 35.0 PG Final    MCHC 35.0 31 - 37 g/dL Final    RDW 18.8 (H) 11.5 - 14.5 % Final    PLATELET 747 888 - 504 K/uL Final    NEUTROPHILS 61 40 - 70 % Final    MIXED CELLS 16 0.1 - 17 % Final    LYMPHOCYTES 23 14 - 44 % Final    ABS. NEUTROPHILS 1.2 (L) 1.8 - 9.5 K/UL Final    ABS. MIXED CELLS 0.3 0.0 - 2.3 K/uL Final    ABS. LYMPHOCYTES 0.5 (L) 1.1 - 5.9 K/UL Final     Comment: Test performed at [address] [place] [address] or Outpatient Infusion Center Location. Reviewed by Medical Director. DF AUTOMATED   Final           Assessment:     1. Metastatic breast cancer ([facility] [address]    2. Malignant neoplasm of lower-inner quadrant of right breast of female, estrogen receptor positive ([facility] [address]    3. 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 Personal history of malignant neoplasm of breast    5. Right-sided chest wall pain    6. Chronic leukopenia    7. Anemia due to chemotherapy    8. Neuropathy associated with cancer [facility])      Plan:   Metastatic breast cancer:     Chest wall pain  -- She has slowly progressive metastatic breast cancer. Patient was being followed by Dr. Giuseppe Tran who retired.  The patient has previously completed external beam radiation therapy to lesions in her ribs and more recently she completed proton therapy to areas of bone involvement with a significant benefit with regards to pain control.    The posttherapy imaging studies showed a significant decrease in the intensity of uptake on the bone scan.    -- Additionally she is currently receiving systemic therapy with fulvestrant and Ibrance. She has been on Fulvestrant 500 mg subcutaneous monthly and Ibrance 100 mg by mouth daily. She is tolerating treatment fairly well without high grade toxicities.   -- 4/15/2020  CT scans of the chest, abdomen, and pelvis shows that she has a loculated right pleural effusion with some pleural thickening and adjacent areas of probable rounded atelectasis all of which are stable.  She has relatively stable bone lesions of the rib, pelvis, and T12 posterior element. There was no findings on CT scan to support new metastatic disease in the chest, abdomen, or pelvis.  --  Mammogram 6/5 done shows No evidence of malignancy.  Suggest routine follow-up.    -- 12/17/2020 CBC reported hemoglobin was 10.8, hematocrit was 32.8%, total WBC was 1.9 and ANC was 1.3, platelet was 265,000.  -- She reported right thoracic and chest wall pain for weeks.    Plan:   -- Patient will continue current regimen with fulvestrant and palbociclib as previously recommended.   -- Refilled Palbociclib 100 mg daily 21 days on/7 days off today.   -- She will need to check lab before each cycle.   -- She will need yearly DEXA scan.   -- Given increasing right chest wall and thoracic back pain, we will repeat CT Scan and Bone scan for interval assessment .  -- We will check her CA 27-29 with her labs at the infusion center   -- I will see the patient back in clinic in about 4 weeks. Always sooner if required.         Neuropathy associated with cancer:   -- Continue her Neurontin at 400 mg 3 times daily. She does not need a refill at this time.      Chemotherapy-induced  neutropenia/chronic leukopenia (persisting problem):   -- 12/17/2020 CBC reported hemoglobin was 10.8, hematocrit was 32.8%, total WBC was 1.9 and ANC was 1.3, platelet was 027,491.  -- If the absolute neutrophil count declines to 0.5 she will be started on Neupogen or Neulasta.       Chemotherapy-induced anemia (persisting problem):    -- 12/17/2020 CBC reported hemoglobin of 10.8, hematocrit 32.8%, total WBC 1.9 ANC was 1.3, and platelet was normal to 265,000.  -- We have recommended that the patient continue taking ferrous sulfate 1 tablet daily. Procrit at a dose of 60,000 units subcutaneous will continue to  be provided when her hemoglobin  declined below 10 g/dL and hematocrit is below 30%every 4 weeks .        Chemotherapy-induced thrombocytopenia :   -- The patient was holding the Ibrance 140 mg at previous times D/T thrombocytopenia. -- She presently taking Ibrance 100mg.   -- 12/17/2020 CBC reported hemoglobin of 10.8, hematocrit 32.8%, total WBC 1.9 ANC was 1.3, and platelet was normal to 265,000.        -- We will see the patient back in clinic in about 4 weeks. Always sooner if required. The patient can have lab done prior our next clinic visit. Orders Placed This Encounter    NM BONE SCAN [facility] BODY     Standing Status:   Future     Number of Occurrences:   1     Standing Expiration Date:   2/5/2022    CT CHEST ABD PELV W CONT     Standing Status:   Future     Number of Occurrences:   1     Standing Expiration Date:   2/5/2022     Order Specific Question:   STAT Creatinine as indicated     Answer:   Yes     Order Specific Question: This order utilizes IV contrast.  What additional contrast is needed?      Answer:   Per Radiologist Protocol    CANCER ANTIGEN (CA) 15-3     Standing Status:   Future     Standing Expiration Date:   1/6/2022    CA 27.29     Standing Status:   Future     Standing Expiration Date:   1/5/2022    CBC WITH AUTOMATED DIFF     Standing Status:   Future     Standing Expiration Date:   5/4/3854    METABOLIC PANEL, COMPREHENSIVE     Standing Status:   Future     Standing Expiration Date:   1/6/2022           Ms. Collette Landau has a reminder for a \"due or due soon\" health maintenance. I have asked that she contact her primary care provider for follow-up on this health maintenance. All of patient's questions answered to their apparent satisfaction. They verbally show understanding and agreement with aforementioned plan. Floyd Baker MD  1/5/2021          About 25 minutes were spent for this encounter with more than 50% of the time spent in face-to-face counseling, discussing on diagnosis and management plan going forward, and co-ordination of care. Parts of this document has been produced using Dragon dictation system. Unrecognized errors in transcription may be present. Please do not hesitate to reach out for any questions or clarifications.       CC: Zaynab Jj MD

## 2021-01-01 ENCOUNTER — OFFICE VISIT (OUTPATIENT)
Dept: SURGERY | Age: 76
End: 2021-01-01
Payer: MEDICARE

## 2021-01-01 ENCOUNTER — TELEPHONE (OUTPATIENT)
Dept: ONCOLOGY | Age: 76
End: 2021-01-01

## 2021-01-01 ENCOUNTER — HOSPITAL ENCOUNTER (OUTPATIENT)
Dept: NUCLEAR MEDICINE | Age: 76
Discharge: HOME | End: 2021-09-14
Attending: INTERNAL MEDICINE
Payer: MEDICARE

## 2021-01-01 ENCOUNTER — VIRTUAL VISIT (OUTPATIENT)
Dept: ONCOLOGY | Age: 76
End: 2021-01-01
Payer: MEDICARE

## 2021-01-01 ENCOUNTER — HOSPITAL ENCOUNTER (OUTPATIENT)
Dept: INFUSION THERAPY | Age: 76
Discharge: HOME | End: 2021-10-05
Payer: MEDICARE

## 2021-01-01 ENCOUNTER — HOSPITAL ENCOUNTER (OUTPATIENT)
Dept: LAB | Age: 76
Discharge: HOME | End: 2021-11-15
Payer: MEDICARE

## 2021-01-01 ENCOUNTER — TRANSCRIBE ORDER (OUTPATIENT)
Dept: SCHEDULING | Age: 76
End: 2021-01-01

## 2021-01-01 ENCOUNTER — HOSPITAL ENCOUNTER (OUTPATIENT)
Age: 76
Discharge: HOME | End: 2021-05-30
Attending: INTERNAL MEDICINE
Payer: MEDICARE

## 2021-01-01 ENCOUNTER — LAB ONLY (OUTPATIENT)
Dept: ONCOLOGY | Age: 76
End: 2021-01-01

## 2021-01-01 ENCOUNTER — HOSPITAL ENCOUNTER (OUTPATIENT)
Dept: LAB | Age: 76
Discharge: HOME | End: 2021-07-20
Payer: MEDICARE

## 2021-01-01 ENCOUNTER — HOSPITAL ENCOUNTER (OUTPATIENT)
Dept: LAB | Age: 76
Discharge: HOME | End: 2021-10-26
Payer: MEDICARE

## 2021-01-01 ENCOUNTER — HOSPITAL ENCOUNTER (OUTPATIENT)
Dept: INFUSION THERAPY | Age: 76
Discharge: HOME | End: 2021-09-07
Payer: MEDICARE

## 2021-01-01 ENCOUNTER — OFFICE VISIT (OUTPATIENT)
Dept: ONCOLOGY | Age: 76
End: 2021-01-01
Payer: MEDICARE

## 2021-01-01 ENCOUNTER — NURSE NAVIGATOR (OUTPATIENT)
Dept: OTHER | Age: 76
End: 2021-01-01

## 2021-01-01 ENCOUNTER — HOSPITAL ENCOUNTER (OUTPATIENT)
Dept: PET IMAGING | Age: 76
Discharge: HOME | End: 2021-10-14
Attending: INTERNAL MEDICINE
Payer: MEDICARE

## 2021-01-01 ENCOUNTER — HOSPITAL ENCOUNTER (OUTPATIENT)
Dept: INFUSION THERAPY | Age: 76
Discharge: HOME | End: 2021-06-03
Payer: MEDICARE

## 2021-01-01 ENCOUNTER — HOSPITAL ENCOUNTER (OUTPATIENT)
Dept: INFUSION THERAPY | Age: 76
Discharge: HOME | End: 2021-12-28
Payer: MEDICARE

## 2021-01-01 ENCOUNTER — HOSPITAL ENCOUNTER (OUTPATIENT)
Dept: INFUSION THERAPY | Age: 76
Discharge: HOME | End: 2021-11-02
Payer: MEDICARE

## 2021-01-01 ENCOUNTER — HOSPITAL ENCOUNTER (OUTPATIENT)
Dept: INFUSION THERAPY | Age: 76
Discharge: HOME | End: 2021-07-01
Payer: MEDICARE

## 2021-01-01 ENCOUNTER — HOSPITAL ENCOUNTER (OUTPATIENT)
Dept: INFUSION THERAPY | Age: 76
Discharge: HOME | End: 2021-08-03
Payer: MEDICARE

## 2021-01-01 ENCOUNTER — HOSPITAL ENCOUNTER (OUTPATIENT)
Dept: MAMMOGRAPHY | Age: 76
Discharge: HOME | End: 2021-06-07
Attending: SURGERY
Payer: MEDICARE

## 2021-01-01 ENCOUNTER — HOSPITAL ENCOUNTER (OUTPATIENT)
Dept: CT IMAGING | Age: 76
Discharge: HOME | End: 2021-09-14
Attending: INTERNAL MEDICINE
Payer: MEDICARE

## 2021-01-01 ENCOUNTER — HOSPITAL ENCOUNTER (OUTPATIENT)
Dept: INFUSION THERAPY | Age: 76
Discharge: HOME | End: 2021-11-30
Payer: MEDICARE

## 2021-01-01 ENCOUNTER — APPOINTMENT (OUTPATIENT)
Dept: INFUSION THERAPY | Age: 76
End: 2021-01-01
Payer: MEDICARE

## 2021-01-01 VITALS
SYSTOLIC BLOOD PRESSURE: 138 MMHG | HEART RATE: 80 BPM | DIASTOLIC BLOOD PRESSURE: 74 MMHG | WEIGHT: 130 LBS | BODY MASS INDEX: 22.2 KG/M2 | RESPIRATION RATE: 18 BRPM | TEMPERATURE: 98.4 F | HEIGHT: 64 IN | OXYGEN SATURATION: 97 %

## 2021-01-01 VITALS
SYSTOLIC BLOOD PRESSURE: 115 MMHG | TEMPERATURE: 98 F | WEIGHT: 144.2 LBS | HEIGHT: 64 IN | OXYGEN SATURATION: 95 % | RESPIRATION RATE: 16 BRPM | DIASTOLIC BLOOD PRESSURE: 72 MMHG | HEART RATE: 73 BPM | BODY MASS INDEX: 24.62 KG/M2

## 2021-01-01 VITALS
BODY MASS INDEX: 23.86 KG/M2 | OXYGEN SATURATION: 98 % | HEART RATE: 76 BPM | SYSTOLIC BLOOD PRESSURE: 138 MMHG | TEMPERATURE: 97.2 F | DIASTOLIC BLOOD PRESSURE: 67 MMHG | WEIGHT: 139 LBS

## 2021-01-01 VITALS
DIASTOLIC BLOOD PRESSURE: 79 MMHG | SYSTOLIC BLOOD PRESSURE: 148 MMHG | OXYGEN SATURATION: 94 % | TEMPERATURE: 98 F | HEART RATE: 88 BPM | RESPIRATION RATE: 18 BRPM

## 2021-01-01 VITALS
SYSTOLIC BLOOD PRESSURE: 130 MMHG | BODY MASS INDEX: 24.41 KG/M2 | WEIGHT: 143 LBS | OXYGEN SATURATION: 96 % | HEART RATE: 74 BPM | HEIGHT: 64 IN | DIASTOLIC BLOOD PRESSURE: 72 MMHG

## 2021-01-01 VITALS
TEMPERATURE: 97.1 F | WEIGHT: 134 LBS | HEIGHT: 64 IN | DIASTOLIC BLOOD PRESSURE: 70 MMHG | HEART RATE: 79 BPM | BODY MASS INDEX: 24.72 KG/M2 | BODY MASS INDEX: 22.88 KG/M2 | WEIGHT: 144 LBS | SYSTOLIC BLOOD PRESSURE: 132 MMHG | OXYGEN SATURATION: 98 % | RESPIRATION RATE: 18 BRPM

## 2021-01-01 VITALS
RESPIRATION RATE: 16 BRPM | HEART RATE: 76 BPM | TEMPERATURE: 98 F | SYSTOLIC BLOOD PRESSURE: 114 MMHG | WEIGHT: 144.8 LBS | BODY MASS INDEX: 24.72 KG/M2 | DIASTOLIC BLOOD PRESSURE: 67 MMHG | HEIGHT: 64 IN | OXYGEN SATURATION: 97 %

## 2021-01-01 VITALS
RESPIRATION RATE: 18 BRPM | OXYGEN SATURATION: 99 % | WEIGHT: 128 LBS | TEMPERATURE: 97.7 F | DIASTOLIC BLOOD PRESSURE: 78 MMHG | HEART RATE: 89 BPM | SYSTOLIC BLOOD PRESSURE: 146 MMHG | BODY MASS INDEX: 21.97 KG/M2

## 2021-01-01 VITALS
DIASTOLIC BLOOD PRESSURE: 78 MMHG | HEART RATE: 90 BPM | TEMPERATURE: 97.2 F | RESPIRATION RATE: 16 BRPM | SYSTOLIC BLOOD PRESSURE: 139 MMHG | OXYGEN SATURATION: 96 %

## 2021-01-01 VITALS
BODY MASS INDEX: 23.73 KG/M2 | SYSTOLIC BLOOD PRESSURE: 136 MMHG | TEMPERATURE: 98.2 F | HEIGHT: 64 IN | RESPIRATION RATE: 18 BRPM | DIASTOLIC BLOOD PRESSURE: 69 MMHG | WEIGHT: 139 LBS | HEART RATE: 73 BPM | OXYGEN SATURATION: 97 %

## 2021-01-01 VITALS
RESPIRATION RATE: 18 BRPM | DIASTOLIC BLOOD PRESSURE: 75 MMHG | HEART RATE: 76 BPM | TEMPERATURE: 97.6 F | OXYGEN SATURATION: 97 % | SYSTOLIC BLOOD PRESSURE: 483 MMHG

## 2021-01-01 VITALS
HEIGHT: 64 IN | RESPIRATION RATE: 16 BRPM | WEIGHT: 143.4 LBS | BODY MASS INDEX: 24.48 KG/M2 | SYSTOLIC BLOOD PRESSURE: 125 MMHG | TEMPERATURE: 97.8 F | DIASTOLIC BLOOD PRESSURE: 81 MMHG | HEART RATE: 77 BPM | OXYGEN SATURATION: 98 %

## 2021-01-01 VITALS
TEMPERATURE: 97.5 F | BODY MASS INDEX: 24.72 KG/M2 | RESPIRATION RATE: 16 BRPM | OXYGEN SATURATION: 96 % | SYSTOLIC BLOOD PRESSURE: 133 MMHG | DIASTOLIC BLOOD PRESSURE: 75 MMHG | WEIGHT: 144 LBS | HEART RATE: 77 BPM

## 2021-01-01 DIAGNOSIS — D64.81 ANEMIA DUE TO CHEMOTHERAPY: ICD-10-CM

## 2021-01-01 DIAGNOSIS — R63.4 WEIGHT LOSS: ICD-10-CM

## 2021-01-01 DIAGNOSIS — D72.819 CHRONIC LEUKOPENIA: ICD-10-CM

## 2021-01-01 DIAGNOSIS — C50.311 MALIGNANT NEOPLASM OF LOWER-INNER QUADRANT OF RIGHT BREAST OF FEMALE, ESTROGEN RECEPTOR POSITIVE (HCC): ICD-10-CM

## 2021-01-01 DIAGNOSIS — C50.919 METASTATIC BREAST CANCER (HCC): ICD-10-CM

## 2021-01-01 DIAGNOSIS — C50.919 METASTATIC BREAST CANCER (HCC): Primary | ICD-10-CM

## 2021-01-01 DIAGNOSIS — Z17.0 MALIGNANT NEOPLASM OF LOWER-INNER QUADRANT OF RIGHT BREAST OF FEMALE, ESTROGEN RECEPTOR POSITIVE (HCC): ICD-10-CM

## 2021-01-01 DIAGNOSIS — E53.8 B12 DEFICIENCY: ICD-10-CM

## 2021-01-01 DIAGNOSIS — C80.1 NEUROPATHY ASSOCIATED WITH CANCER (HCC): ICD-10-CM

## 2021-01-01 DIAGNOSIS — D64.81 ANEMIA DUE TO ANTINEOPLASTIC CHEMOTHERAPY: ICD-10-CM

## 2021-01-01 DIAGNOSIS — T45.1X5A ANEMIA DUE TO CHEMOTHERAPY: ICD-10-CM

## 2021-01-01 DIAGNOSIS — C79.52 SECONDARY MALIGNANT NEOPLASM OF BONE AND BONE MARROW (HCC): Primary | ICD-10-CM

## 2021-01-01 DIAGNOSIS — C79.51 SECONDARY MALIGNANT NEOPLASM OF BONE AND BONE MARROW (HCC): ICD-10-CM

## 2021-01-01 DIAGNOSIS — D69.59 CHEMOTHERAPY-INDUCED THROMBOCYTOPENIA: ICD-10-CM

## 2021-01-01 DIAGNOSIS — C79.52 SECONDARY MALIGNANT NEOPLASM OF BONE AND BONE MARROW (HCC): ICD-10-CM

## 2021-01-01 DIAGNOSIS — E55.9 VITAMIN D DEFICIENCY: ICD-10-CM

## 2021-01-01 DIAGNOSIS — C50.311 MALIGNANT NEOPLASM OF LOWER-INNER QUADRANT OF RIGHT BREAST OF FEMALE, ESTROGEN RECEPTOR POSITIVE (HCC): Primary | ICD-10-CM

## 2021-01-01 DIAGNOSIS — T45.1X5A CHEMOTHERAPY INDUCED NEUTROPENIA (HCC): ICD-10-CM

## 2021-01-01 DIAGNOSIS — Z17.0 MALIGNANT NEOPLASM OF LOWER-INNER QUADRANT OF RIGHT BREAST OF FEMALE, ESTROGEN RECEPTOR POSITIVE (HCC): Primary | ICD-10-CM

## 2021-01-01 DIAGNOSIS — G63 NEUROPATHY ASSOCIATED WITH CANCER (HCC): ICD-10-CM

## 2021-01-01 DIAGNOSIS — R51.9 INTRACTABLE HEADACHE, UNSPECIFIED CHRONICITY PATTERN, UNSPECIFIED HEADACHE TYPE: ICD-10-CM

## 2021-01-01 DIAGNOSIS — G89.29 CHRONIC INTRACTABLE HEADACHE, UNSPECIFIED HEADACHE TYPE: ICD-10-CM

## 2021-01-01 DIAGNOSIS — D64.81 ANTINEOPLASTIC CHEMOTHERAPY INDUCED ANEMIA: ICD-10-CM

## 2021-01-01 DIAGNOSIS — R07.89 RIGHT-SIDED CHEST WALL PAIN: ICD-10-CM

## 2021-01-01 DIAGNOSIS — T45.1X5A CHEMOTHERAPY-INDUCED THROMBOCYTOPENIA: ICD-10-CM

## 2021-01-01 DIAGNOSIS — T45.1X5A ANEMIA DUE TO ANTINEOPLASTIC CHEMOTHERAPY: ICD-10-CM

## 2021-01-01 DIAGNOSIS — Z12.31 VISIT FOR SCREENING MAMMOGRAM: ICD-10-CM

## 2021-01-01 DIAGNOSIS — D70.1 CHEMOTHERAPY INDUCED NEUTROPENIA (HCC): ICD-10-CM

## 2021-01-01 DIAGNOSIS — C79.51 SECONDARY MALIGNANT NEOPLASM OF BONE AND BONE MARROW (HCC): Primary | ICD-10-CM

## 2021-01-01 DIAGNOSIS — Z12.31 VISIT FOR SCREENING MAMMOGRAM: Primary | ICD-10-CM

## 2021-01-01 DIAGNOSIS — R10.9 RIGHT LATERAL ABDOMINAL PAIN: ICD-10-CM

## 2021-01-01 DIAGNOSIS — T45.1X5A ANTINEOPLASTIC CHEMOTHERAPY INDUCED ANEMIA: ICD-10-CM

## 2021-01-01 DIAGNOSIS — R51.9 CHRONIC INTRACTABLE HEADACHE, UNSPECIFIED HEADACHE TYPE: ICD-10-CM

## 2021-01-01 LAB
25(OH)D3 SERPL-MCNC: 54.5 NG/ML (ref 30–100)
25(OH)D3 SERPL-MCNC: 59.5 NG/ML (ref 30–100)
ALBUMIN SERPL-MCNC: 3.3 G/DL (ref 3.4–5)
ALBUMIN SERPL-MCNC: 3.3 G/DL (ref 3.4–5)
ALBUMIN SERPL-MCNC: 3.4 G/DL (ref 3.4–5)
ALBUMIN SERPL-MCNC: 3.4 G/DL (ref 3.4–5)
ALBUMIN SERPL-MCNC: 3.5 G/DL (ref 3.4–5)
ALBUMIN SERPL-MCNC: 3.6 G/DL (ref 3.4–5)
ALBUMIN SERPL-MCNC: 3.7 G/DL (ref 3.4–5)
ALBUMIN SERPL-MCNC: 3.9 G/DL (ref 3.4–5)
ALBUMIN/GLOB SERPL: 1 {RATIO} (ref 0.8–1.7)
ALBUMIN/GLOB SERPL: 1.1 {RATIO} (ref 0.8–1.7)
ALBUMIN/GLOB SERPL: 1.2 {RATIO} (ref 0.8–1.7)
ALBUMIN/GLOB SERPL: 1.2 {RATIO} (ref 0.8–1.7)
ALBUMIN/GLOB SERPL: 1.3 {RATIO} (ref 0.8–1.7)
ALP SERPL-CCNC: 56 U/L (ref 45–117)
ALP SERPL-CCNC: 56 U/L (ref 45–117)
ALP SERPL-CCNC: 58 U/L (ref 45–117)
ALP SERPL-CCNC: 60 U/L (ref 45–117)
ALP SERPL-CCNC: 62 U/L (ref 45–117)
ALP SERPL-CCNC: 62 U/L (ref 45–117)
ALP SERPL-CCNC: 64 U/L (ref 45–117)
ALP SERPL-CCNC: 65 U/L (ref 45–117)
ALP SERPL-CCNC: 69 U/L (ref 45–117)
ALP SERPL-CCNC: 73 U/L (ref 45–117)
ALT SERPL-CCNC: 12 U/L (ref 13–56)
ALT SERPL-CCNC: 12 U/L (ref 13–56)
ALT SERPL-CCNC: 13 U/L (ref 13–56)
ALT SERPL-CCNC: 14 U/L (ref 13–56)
ALT SERPL-CCNC: 15 U/L (ref 13–56)
ALT SERPL-CCNC: 15 U/L (ref 13–56)
ALT SERPL-CCNC: 16 U/L (ref 13–56)
ALT SERPL-CCNC: 17 U/L (ref 13–56)
ANION GAP SERPL CALC-SCNC: 3 MMOL/L (ref 3–18)
ANION GAP SERPL CALC-SCNC: 4 MMOL/L (ref 3–18)
ANION GAP SERPL CALC-SCNC: 5 MMOL/L (ref 3–18)
ANION GAP SERPL CALC-SCNC: 8 MMOL/L (ref 3–18)
AST SERPL-CCNC: 15 U/L (ref 10–38)
AST SERPL-CCNC: 15 U/L (ref 10–38)
AST SERPL-CCNC: 17 U/L (ref 10–38)
AST SERPL-CCNC: 18 U/L (ref 10–38)
AST SERPL-CCNC: 19 U/L (ref 10–38)
AST SERPL-CCNC: 20 U/L (ref 10–38)
AST SERPL-CCNC: 20 U/L (ref 10–38)
AST SERPL-CCNC: 23 U/L (ref 10–38)
BASO+EOS+MONOS # BLD AUTO: 0.1 K/UL (ref 0–2.3)
BASO+EOS+MONOS # BLD AUTO: 0.1 K/UL (ref 0–2.3)
BASO+EOS+MONOS # BLD AUTO: 0.2 K/UL (ref 0–2.3)
BASO+EOS+MONOS # BLD AUTO: 0.3 K/UL (ref 0–2.3)
BASO+EOS+MONOS # BLD AUTO: 0.4 K/UL (ref 0–2.3)
BASO+EOS+MONOS NFR BLD AUTO: 10 % (ref 0.1–17)
BASO+EOS+MONOS NFR BLD AUTO: 12 % (ref 0.1–17)
BASO+EOS+MONOS NFR BLD AUTO: 13 % (ref 0.1–17)
BASO+EOS+MONOS NFR BLD AUTO: 7 % (ref 0.1–17)
BASO+EOS+MONOS NFR BLD AUTO: 7 % (ref 0.1–17)
BASOPHILS # BLD: 0 K/UL (ref 0–0.1)
BASOPHILS NFR BLD: 0 % (ref 0–2)
BASOPHILS NFR BLD: 1 % (ref 0–2)
BILIRUB SERPL-MCNC: 0.4 MG/DL (ref 0.2–1)
BILIRUB SERPL-MCNC: 0.6 MG/DL (ref 0.2–1)
BILIRUB SERPL-MCNC: 0.7 MG/DL (ref 0.2–1)
BILIRUB SERPL-MCNC: 1 MG/DL (ref 0.2–1)
BUN SERPL-MCNC: 13 MG/DL (ref 7–18)
BUN SERPL-MCNC: 16 MG/DL (ref 7–18)
BUN SERPL-MCNC: 16 MG/DL (ref 7–18)
BUN SERPL-MCNC: 17 MG/DL (ref 7–18)
BUN SERPL-MCNC: 18 MG/DL (ref 7–18)
BUN SERPL-MCNC: 20 MG/DL (ref 7–18)
BUN SERPL-MCNC: 21 MG/DL (ref 7–18)
BUN SERPL-MCNC: 23 MG/DL (ref 7–18)
BUN SERPL-MCNC: 24 MG/DL (ref 7–18)
BUN SERPL-MCNC: 25 MG/DL (ref 7–18)
BUN/CREAT SERPL: 18 (ref 12–20)
BUN/CREAT SERPL: 19 (ref 12–20)
BUN/CREAT SERPL: 19 (ref 12–20)
BUN/CREAT SERPL: 20 (ref 12–20)
BUN/CREAT SERPL: 21 (ref 12–20)
BUN/CREAT SERPL: 23 (ref 12–20)
BUN/CREAT SERPL: 25 (ref 12–20)
BUN/CREAT SERPL: 26 (ref 12–20)
CALCIUM SERPL-MCNC: 8.5 MG/DL (ref 8.5–10.1)
CALCIUM SERPL-MCNC: 8.5 MG/DL (ref 8.5–10.1)
CALCIUM SERPL-MCNC: 8.7 MG/DL (ref 8.5–10.1)
CALCIUM SERPL-MCNC: 9 MG/DL (ref 8.5–10.1)
CALCIUM SERPL-MCNC: 9 MG/DL (ref 8.5–10.1)
CALCIUM SERPL-MCNC: 9.3 MG/DL (ref 8.5–10.1)
CALCIUM SERPL-MCNC: 9.3 MG/DL (ref 8.5–10.1)
CALCIUM SERPL-MCNC: 9.4 MG/DL (ref 8.5–10.1)
CALCIUM SERPL-MCNC: 9.4 MG/DL (ref 8.5–10.1)
CALCIUM SERPL-MCNC: 9.7 MG/DL (ref 8.5–10.1)
CANCER AG15-3 SERPL-ACNC: 46.5 U/ML (ref 0–25)
CANCER AG15-3 SERPL-ACNC: 51.4 U/ML (ref 0–25)
CANCER AG27-29 SERPL-ACNC: 68.5 U/ML (ref 0–38.6)
CANCER AG27-29 SERPL-ACNC: 69 U/ML (ref 0–38.6)
CHLORIDE SERPL-SCNC: 102 MMOL/L (ref 100–111)
CHLORIDE SERPL-SCNC: 104 MMOL/L (ref 100–111)
CHLORIDE SERPL-SCNC: 105 MMOL/L (ref 100–111)
CHLORIDE SERPL-SCNC: 106 MMOL/L (ref 100–111)
CHLORIDE SERPL-SCNC: 108 MMOL/L (ref 100–111)
CHLORIDE SERPL-SCNC: 109 MMOL/L (ref 100–111)
CHLORIDE SERPL-SCNC: 109 MMOL/L (ref 100–111)
CHLORIDE SERPL-SCNC: 110 MMOL/L (ref 100–111)
CO2 SERPL-SCNC: 28 MMOL/L (ref 21–32)
CO2 SERPL-SCNC: 29 MMOL/L (ref 21–32)
CO2 SERPL-SCNC: 30 MMOL/L (ref 21–32)
CO2 SERPL-SCNC: 31 MMOL/L (ref 21–32)
CREAT SERPL-MCNC: 0.66 MG/DL (ref 0.6–1.3)
CREAT SERPL-MCNC: 0.76 MG/DL (ref 0.6–1.3)
CREAT SERPL-MCNC: 0.84 MG/DL (ref 0.6–1.3)
CREAT SERPL-MCNC: 0.86 MG/DL (ref 0.6–1.3)
CREAT SERPL-MCNC: 0.88 MG/DL (ref 0.6–1.3)
CREAT SERPL-MCNC: 0.92 MG/DL (ref 0.6–1.3)
CREAT SERPL-MCNC: 0.93 MG/DL (ref 0.6–1.3)
CREAT SERPL-MCNC: 0.99 MG/DL (ref 0.6–1.3)
CREAT SERPL-MCNC: 1.15 MG/DL (ref 0.6–1.3)
CREAT SERPL-MCNC: 1.15 MG/DL (ref 0.6–1.3)
CREAT UR-MCNC: 1 MG/DL (ref 0.6–1.3)
DIFFERENTIAL METHOD BLD: ABNORMAL
EOSINOPHIL # BLD: 0 K/UL (ref 0–0.4)
EOSINOPHIL NFR BLD: 0 % (ref 0–5)
EOSINOPHIL NFR BLD: 0 % (ref 0–5)
EOSINOPHIL NFR BLD: 1 % (ref 0–5)
EOSINOPHIL NFR BLD: 1 % (ref 0–5)
ERYTHROCYTE [DISTWIDTH] IN BLOOD BY AUTOMATED COUNT: 15.3 % (ref 11.5–14.5)
ERYTHROCYTE [DISTWIDTH] IN BLOOD BY AUTOMATED COUNT: 16.3 % (ref 11.6–14.5)
ERYTHROCYTE [DISTWIDTH] IN BLOOD BY AUTOMATED COUNT: 16.6 % (ref 11.5–14.5)
ERYTHROCYTE [DISTWIDTH] IN BLOOD BY AUTOMATED COUNT: 16.6 % (ref 11.6–14.5)
ERYTHROCYTE [DISTWIDTH] IN BLOOD BY AUTOMATED COUNT: 16.9 % (ref 11.5–14.5)
ERYTHROCYTE [DISTWIDTH] IN BLOOD BY AUTOMATED COUNT: 17 % (ref 11.5–14.5)
ERYTHROCYTE [DISTWIDTH] IN BLOOD BY AUTOMATED COUNT: 17 % (ref 11.6–14.5)
ERYTHROCYTE [DISTWIDTH] IN BLOOD BY AUTOMATED COUNT: 17.1 % (ref 11.5–14.5)
ERYTHROCYTE [DISTWIDTH] IN BLOOD BY AUTOMATED COUNT: 17.1 % (ref 11.5–14.5)
ERYTHROCYTE [DISTWIDTH] IN BLOOD BY AUTOMATED COUNT: 17.4 % (ref 11.6–14.5)
ERYTHROCYTE [DISTWIDTH] IN BLOOD BY AUTOMATED COUNT: 18 % (ref 11.5–14.5)
ERYTHROCYTE [SEDIMENTATION RATE] IN BLOOD: 75 MM/HR (ref 0–30)
FERRITIN SERPL-MCNC: 269 NG/ML (ref 8–388)
FERRITIN SERPL-MCNC: 369 NG/ML (ref 8–388)
FOLATE SERPL-MCNC: 16.7 NG/ML (ref 3.1–17.5)
FOLATE SERPL-MCNC: 17.4 NG/ML (ref 3.1–17.5)
GLOBULIN SER CALC-MCNC: 2.7 G/DL (ref 2–4)
GLOBULIN SER CALC-MCNC: 2.8 G/DL (ref 2–4)
GLOBULIN SER CALC-MCNC: 2.8 G/DL (ref 2–4)
GLOBULIN SER CALC-MCNC: 2.9 G/DL (ref 2–4)
GLOBULIN SER CALC-MCNC: 2.9 G/DL (ref 2–4)
GLOBULIN SER CALC-MCNC: 3 G/DL (ref 2–4)
GLOBULIN SER CALC-MCNC: 3.1 G/DL (ref 2–4)
GLOBULIN SER CALC-MCNC: 3.3 G/DL (ref 2–4)
GLOBULIN SER CALC-MCNC: 3.3 G/DL (ref 2–4)
GLOBULIN SER CALC-MCNC: 3.4 G/DL (ref 2–4)
GLUCOSE SERPL-MCNC: 103 MG/DL (ref 74–99)
GLUCOSE SERPL-MCNC: 105 MG/DL (ref 74–99)
GLUCOSE SERPL-MCNC: 110 MG/DL (ref 74–99)
GLUCOSE SERPL-MCNC: 118 MG/DL (ref 74–99)
GLUCOSE SERPL-MCNC: 121 MG/DL (ref 74–99)
GLUCOSE SERPL-MCNC: 85 MG/DL (ref 74–99)
GLUCOSE SERPL-MCNC: 90 MG/DL (ref 74–99)
GLUCOSE SERPL-MCNC: 90 MG/DL (ref 74–99)
GLUCOSE SERPL-MCNC: 92 MG/DL (ref 74–99)
GLUCOSE SERPL-MCNC: 99 MG/DL (ref 74–99)
HCT VFR BLD AUTO: 28.3 % (ref 36–48)
HCT VFR BLD AUTO: 28.8 % (ref 36–48)
HCT VFR BLD AUTO: 29.4 % (ref 36–48)
HCT VFR BLD AUTO: 29.8 % (ref 35–45)
HCT VFR BLD AUTO: 31.2 % (ref 36–48)
HCT VFR BLD AUTO: 31.4 % (ref 36–48)
HCT VFR BLD AUTO: 31.4 % (ref 36–48)
HCT VFR BLD AUTO: 32.2 % (ref 36–48)
HCT VFR BLD AUTO: 33.7 % (ref 35–45)
HCT VFR BLD AUTO: 35.6 % (ref 35–45)
HCT VFR BLD AUTO: 36.1 % (ref 35–45)
HGB BLD-MCNC: 10.3 G/DL (ref 12–16)
HGB BLD-MCNC: 10.4 G/DL (ref 12–16)
HGB BLD-MCNC: 10.5 G/DL (ref 12–16)
HGB BLD-MCNC: 10.7 G/DL (ref 12–16)
HGB BLD-MCNC: 11.5 G/DL (ref 12–16)
HGB BLD-MCNC: 11.7 G/DL (ref 12–16)
HGB BLD-MCNC: 9.3 G/DL (ref 12–16)
HGB BLD-MCNC: 9.6 G/DL (ref 12–16)
HGB BLD-MCNC: 9.7 G/DL (ref 12–16)
IMM GRANULOCYTES # BLD AUTO: 0 K/UL (ref 0–0.04)
IMM GRANULOCYTES NFR BLD AUTO: 1 % (ref 0–0.5)
IRON SATN MFR SERPL: 10 % (ref 20–50)
IRON SATN MFR SERPL: 21 % (ref 20–50)
IRON SERPL-MCNC: 24 UG/DL (ref 50–175)
IRON SERPL-MCNC: 60 UG/DL (ref 50–175)
LYMPHOCYTES # BLD: 0.2 K/UL (ref 0.9–3.6)
LYMPHOCYTES # BLD: 0.3 K/UL (ref 1.1–5.9)
LYMPHOCYTES # BLD: 0.4 K/UL (ref 0.9–3.6)
LYMPHOCYTES # BLD: 0.4 K/UL (ref 1.1–5.9)
LYMPHOCYTES # BLD: 0.5 K/UL (ref 0.9–3.6)
LYMPHOCYTES # BLD: 0.5 K/UL (ref 1.1–5.9)
LYMPHOCYTES # BLD: 0.6 K/UL (ref 1.1–5.9)
LYMPHOCYTES # BLD: 0.8 K/UL (ref 0.9–3.6)
LYMPHOCYTES NFR BLD: 12 % (ref 21–52)
LYMPHOCYTES NFR BLD: 13 % (ref 14–44)
LYMPHOCYTES NFR BLD: 16 % (ref 14–44)
LYMPHOCYTES NFR BLD: 17 % (ref 21–52)
LYMPHOCYTES NFR BLD: 19 % (ref 14–44)
LYMPHOCYTES NFR BLD: 20 % (ref 14–44)
LYMPHOCYTES NFR BLD: 20 % (ref 21–52)
LYMPHOCYTES NFR BLD: 22 % (ref 14–44)
LYMPHOCYTES NFR BLD: 23 % (ref 14–44)
LYMPHOCYTES NFR BLD: 23 % (ref 14–44)
LYMPHOCYTES NFR BLD: 42 % (ref 21–52)
MAGNESIUM SERPL-MCNC: 1.4 MG/DL (ref 1.6–2.6)
MAGNESIUM SERPL-MCNC: 1.5 MG/DL (ref 1.6–2.6)
MAGNESIUM SERPL-MCNC: 1.6 MG/DL (ref 1.6–2.6)
MCH RBC QN AUTO: 30.3 PG (ref 25–35)
MCH RBC QN AUTO: 31.6 PG (ref 24–34)
MCH RBC QN AUTO: 31.6 PG (ref 25–35)
MCH RBC QN AUTO: 31.9 PG (ref 24–34)
MCH RBC QN AUTO: 32.1 PG (ref 25–35)
MCH RBC QN AUTO: 32.3 PG (ref 25–35)
MCH RBC QN AUTO: 32.4 PG (ref 25–35)
MCH RBC QN AUTO: 32.5 PG (ref 25–35)
MCH RBC QN AUTO: 32.9 PG (ref 25–35)
MCH RBC QN AUTO: 33.1 PG (ref 24–34)
MCH RBC QN AUTO: 34.2 PG (ref 24–34)
MCHC RBC AUTO-ENTMCNC: 31.8 G/DL (ref 31–37)
MCHC RBC AUTO-ENTMCNC: 32 G/DL (ref 31–37)
MCHC RBC AUTO-ENTMCNC: 32.3 G/DL (ref 31–37)
MCHC RBC AUTO-ENTMCNC: 32.4 G/DL (ref 31–37)
MCHC RBC AUTO-ENTMCNC: 32.9 G/DL (ref 31–37)
MCHC RBC AUTO-ENTMCNC: 33 G/DL (ref 31–37)
MCHC RBC AUTO-ENTMCNC: 33 G/DL (ref 31–37)
MCHC RBC AUTO-ENTMCNC: 33.1 G/DL (ref 31–37)
MCHC RBC AUTO-ENTMCNC: 33.3 G/DL (ref 31–37)
MCHC RBC AUTO-ENTMCNC: 33.4 G/DL (ref 31–37)
MCHC RBC AUTO-ENTMCNC: 34.6 G/DL (ref 31–37)
MCV RBC AUTO: 102.3 FL (ref 74–97)
MCV RBC AUTO: 94.7 FL (ref 78–102)
MCV RBC AUTO: 94.7 FL (ref 78–102)
MCV RBC AUTO: 96.9 FL (ref 78–102)
MCV RBC AUTO: 97.8 FL (ref 78–102)
MCV RBC AUTO: 98.3 FL (ref 78–102)
MCV RBC AUTO: 98.4 FL (ref 78–102)
MCV RBC AUTO: 98.9 FL (ref 78–100)
MCV RBC AUTO: 99 FL (ref 74–97)
MCV RBC AUTO: 99 FL (ref 78–102)
MCV RBC AUTO: 99.4 FL (ref 78–100)
MONOCYTES # BLD: 0 K/UL (ref 0.05–1.2)
MONOCYTES # BLD: 0.1 K/UL (ref 0.05–1.2)
MONOCYTES # BLD: 0.3 K/UL (ref 0.05–1.2)
MONOCYTES # BLD: 0.4 K/UL (ref 0.05–1.2)
MONOCYTES NFR BLD: 13 % (ref 3–10)
MONOCYTES NFR BLD: 16 % (ref 3–10)
MONOCYTES NFR BLD: 2 % (ref 3–10)
MONOCYTES NFR BLD: 4 % (ref 3–10)
NEUTS BAND NFR BLD MANUAL: 2 % (ref 0–5)
NEUTS SEG # BLD: 0.9 K/UL (ref 1.8–8)
NEUTS SEG # BLD: 1.1 K/UL (ref 1.8–9.5)
NEUTS SEG # BLD: 1.2 K/UL (ref 1.8–9.5)
NEUTS SEG # BLD: 1.3 K/UL (ref 1.8–9.5)
NEUTS SEG # BLD: 1.3 K/UL (ref 1.8–9.5)
NEUTS SEG # BLD: 1.4 K/UL (ref 1.8–8)
NEUTS SEG # BLD: 1.5 K/UL (ref 1.8–9.5)
NEUTS SEG # BLD: 1.6 K/UL (ref 1.8–8)
NEUTS SEG # BLD: 1.6 K/UL (ref 1.8–9.5)
NEUTS SEG # BLD: 2.1 K/UL (ref 1.8–8)
NEUTS SEG # BLD: 3.3 K/UL (ref 1.8–9.5)
NEUTS SEG NFR BLD: 52 % (ref 40–73)
NEUTS SEG NFR BLD: 62 % (ref 40–73)
NEUTS SEG NFR BLD: 64 % (ref 40–70)
NEUTS SEG NFR BLD: 67 % (ref 40–70)
NEUTS SEG NFR BLD: 68 % (ref 40–73)
NEUTS SEG NFR BLD: 70 % (ref 40–70)
NEUTS SEG NFR BLD: 71 % (ref 40–70)
NEUTS SEG NFR BLD: 71 % (ref 40–70)
NEUTS SEG NFR BLD: 74 % (ref 40–70)
NEUTS SEG NFR BLD: 77 % (ref 40–70)
NEUTS SEG NFR BLD: 86 % (ref 40–73)
NRBC # BLD: 0 K/UL (ref 0–0.01)
NRBC BLD-RTO: 0 PER 100 WBC
PHOSPHATE SERPL-MCNC: 2.6 MG/DL (ref 2.5–4.9)
PHOSPHATE SERPL-MCNC: 2.6 MG/DL (ref 2.5–4.9)
PHOSPHATE SERPL-MCNC: 2.7 MG/DL (ref 2.5–4.9)
PHOSPHATE SERPL-MCNC: 2.8 MG/DL (ref 2.5–4.9)
PHOSPHATE SERPL-MCNC: 2.9 MG/DL (ref 2.5–4.9)
PHOSPHATE SERPL-MCNC: 3.1 MG/DL (ref 2.5–4.9)
PHOSPHATE SERPL-MCNC: 3.1 MG/DL (ref 2.5–4.9)
PLATELET # BLD AUTO: 133 K/UL (ref 140–440)
PLATELET # BLD AUTO: 153 K/UL (ref 140–440)
PLATELET # BLD AUTO: 173 K/UL (ref 140–440)
PLATELET # BLD AUTO: 179 K/UL (ref 140–440)
PLATELET # BLD AUTO: 188 K/UL (ref 140–440)
PLATELET # BLD AUTO: 218 K/UL (ref 135–420)
PLATELET # BLD AUTO: 220 K/UL (ref 140–440)
PLATELET # BLD AUTO: 310 K/UL (ref 135–420)
PLATELET # BLD AUTO: 314 K/UL (ref 135–420)
PLATELET # BLD AUTO: 342 K/UL (ref 135–420)
PLATELET # BLD AUTO: 441 K/UL (ref 140–440)
PLATELET COMMENTS,PCOM: ABNORMAL
PLATELET COMMENTS,PCOM: ABNORMAL
PMV BLD AUTO: 9.1 FL (ref 9.2–11.8)
PMV BLD AUTO: 9.5 FL (ref 9.2–11.8)
PMV BLD AUTO: 9.5 FL (ref 9.2–11.8)
PMV BLD AUTO: 9.9 FL (ref 9.2–11.8)
POTASSIUM SERPL-SCNC: 3.5 MMOL/L (ref 3.5–5.5)
POTASSIUM SERPL-SCNC: 3.6 MMOL/L (ref 3.5–5.5)
POTASSIUM SERPL-SCNC: 3.6 MMOL/L (ref 3.5–5.5)
POTASSIUM SERPL-SCNC: 3.7 MMOL/L (ref 3.5–5.5)
POTASSIUM SERPL-SCNC: 3.7 MMOL/L (ref 3.5–5.5)
POTASSIUM SERPL-SCNC: 3.8 MMOL/L (ref 3.5–5.5)
POTASSIUM SERPL-SCNC: 3.9 MMOL/L (ref 3.5–5.5)
POTASSIUM SERPL-SCNC: 4.1 MMOL/L (ref 3.5–5.5)
POTASSIUM SERPL-SCNC: 4.1 MMOL/L (ref 3.5–5.5)
POTASSIUM SERPL-SCNC: 4.3 MMOL/L (ref 3.5–5.5)
PROT SERPL-MCNC: 6.2 G/DL (ref 6.4–8.2)
PROT SERPL-MCNC: 6.3 G/DL (ref 6.4–8.2)
PROT SERPL-MCNC: 6.4 G/DL (ref 6.4–8.2)
PROT SERPL-MCNC: 6.5 G/DL (ref 6.4–8.2)
PROT SERPL-MCNC: 6.5 G/DL (ref 6.4–8.2)
PROT SERPL-MCNC: 6.8 G/DL (ref 6.4–8.2)
PROT SERPL-MCNC: 7 G/DL (ref 6.4–8.2)
PROT SERPL-MCNC: 7.2 G/DL (ref 6.4–8.2)
RBC # BLD AUTO: 2.86 M/UL (ref 4.1–5.1)
RBC # BLD AUTO: 2.99 M/UL (ref 4.1–5.1)
RBC # BLD AUTO: 3.01 M/UL (ref 4.2–5.3)
RBC # BLD AUTO: 3.04 M/UL (ref 4.1–5.1)
RBC # BLD AUTO: 3.19 M/UL (ref 4.1–5.1)
RBC # BLD AUTO: 3.19 M/UL (ref 4.1–5.1)
RBC # BLD AUTO: 3.24 M/UL (ref 4.1–5.1)
RBC # BLD AUTO: 3.39 M/UL (ref 4.2–5.3)
RBC # BLD AUTO: 3.4 M/UL (ref 4.1–5.1)
RBC # BLD AUTO: 3.53 M/UL (ref 4.2–5.3)
RBC # BLD AUTO: 3.6 M/UL (ref 4.2–5.3)
RBC MORPH BLD: ABNORMAL
RBC MORPH BLD: ABNORMAL
RETICS/RBC NFR AUTO: 1.1 % (ref 0.5–2.5)
SODIUM SERPL-SCNC: 137 MMOL/L (ref 136–145)
SODIUM SERPL-SCNC: 138 MMOL/L (ref 136–145)
SODIUM SERPL-SCNC: 139 MMOL/L (ref 136–145)
SODIUM SERPL-SCNC: 141 MMOL/L (ref 136–145)
SODIUM SERPL-SCNC: 141 MMOL/L (ref 136–145)
SODIUM SERPL-SCNC: 142 MMOL/L (ref 136–145)
SODIUM SERPL-SCNC: 143 MMOL/L (ref 136–145)
SODIUM SERPL-SCNC: 144 MMOL/L (ref 136–145)
T3FREE SERPL-MCNC: 4.3 PG/ML (ref 2.18–3.98)
T4 FREE SERPL-MCNC: 1 NG/DL (ref 0.7–1.5)
TIBC SERPL-MCNC: 237 UG/DL (ref 250–450)
TIBC SERPL-MCNC: 292 UG/DL (ref 250–450)
TOTAL CELLS COUNTED SPEC: 50
TOTAL CELLS COUNTED SPEC: 50
TSH SERPL DL<=0.05 MIU/L-ACNC: 1.35 UIU/ML (ref 0.36–3.74)
VIT B12 SERPL-MCNC: 1164 PG/ML (ref 211–911)
VIT B12 SERPL-MCNC: 275 PG/ML (ref 211–911)
WBC # BLD AUTO: 1.6 K/UL (ref 4.5–13)
WBC # BLD AUTO: 1.7 K/UL (ref 4.5–13)
WBC # BLD AUTO: 1.8 K/UL (ref 4.5–13)
WBC # BLD AUTO: 1.8 K/UL (ref 4.6–13.2)
WBC # BLD AUTO: 1.8 K/UL (ref 4.6–13.2)
WBC # BLD AUTO: 2.1 K/UL (ref 4.5–13)
WBC # BLD AUTO: 2.1 K/UL (ref 4.5–13)
WBC # BLD AUTO: 2.2 K/UL (ref 4.5–13)
WBC # BLD AUTO: 2.2 K/UL (ref 4.6–13.2)
WBC # BLD AUTO: 3 K/UL (ref 4.6–13.2)
WBC # BLD AUTO: 4.3 K/UL (ref 4.5–13)

## 2021-01-01 PROCEDURE — 74011250636 HC RX REV CODE- 250/636: Performed by: NURSE PRACTITIONER

## 2021-01-01 PROCEDURE — 86300 IMMUNOASSAY TUMOR CA 15-3: CPT

## 2021-01-01 PROCEDURE — 74177 CT ABD & PELVIS W/CONTRAST: CPT

## 2021-01-01 PROCEDURE — 77030012965 HC NDL HUBR BBMI -A

## 2021-01-01 PROCEDURE — 36591 DRAW BLOOD OFF VENOUS DEVICE: CPT

## 2021-01-01 PROCEDURE — 82306 VITAMIN D 25 HYDROXY: CPT

## 2021-01-01 PROCEDURE — 96372 THER/PROPH/DIAG INJ SC/IM: CPT

## 2021-01-01 PROCEDURE — G8399 PT W/DXA RESULTS DOCUMENT: HCPCS | Performed by: INTERNAL MEDICINE

## 2021-01-01 PROCEDURE — A9575 INJ GADOTERATE MEGLUMI 0.1ML: HCPCS | Performed by: INTERNAL MEDICINE

## 2021-01-01 PROCEDURE — 74011250636 HC RX REV CODE- 250/636: Performed by: INTERNAL MEDICINE

## 2021-01-01 PROCEDURE — A9503 TC99M MEDRONATE: HCPCS

## 2021-01-01 PROCEDURE — 99213 OFFICE O/P EST LOW 20 MIN: CPT | Performed by: SURGERY

## 2021-01-01 PROCEDURE — G8427 DOCREV CUR MEDS BY ELIG CLIN: HCPCS | Performed by: SURGERY

## 2021-01-01 PROCEDURE — G8420 CALC BMI NORM PARAMETERS: HCPCS | Performed by: SURGERY

## 2021-01-01 PROCEDURE — 80053 COMPREHEN METABOLIC PANEL: CPT

## 2021-01-01 PROCEDURE — G0463 HOSPITAL OUTPT CLINIC VISIT: HCPCS | Performed by: INTERNAL MEDICINE

## 2021-01-01 PROCEDURE — 82746 ASSAY OF FOLIC ACID SERUM: CPT

## 2021-01-01 PROCEDURE — 96402 CHEMO HORMON ANTINEOPL SQ/IM: CPT

## 2021-01-01 PROCEDURE — 82728 ASSAY OF FERRITIN: CPT

## 2021-01-01 PROCEDURE — 1101F PT FALLS ASSESS-DOCD LE1/YR: CPT | Performed by: SURGERY

## 2021-01-01 PROCEDURE — 1090F PRES/ABSN URINE INCON ASSESS: CPT | Performed by: SURGERY

## 2021-01-01 PROCEDURE — 84481 FREE ASSAY (FT-3): CPT

## 2021-01-01 PROCEDURE — 36415 COLL VENOUS BLD VENIPUNCTURE: CPT

## 2021-01-01 PROCEDURE — 83735 ASSAY OF MAGNESIUM: CPT

## 2021-01-01 PROCEDURE — 96401 CHEMO ANTI-NEOPL SQ/IM: CPT

## 2021-01-01 PROCEDURE — 77063 BREAST TOMOSYNTHESIS BI: CPT

## 2021-01-01 PROCEDURE — 85025 COMPLETE CBC W/AUTO DIFF WBC: CPT

## 2021-01-01 PROCEDURE — 74011636320 HC RX REV CODE- 636/320: Performed by: INTERNAL MEDICINE

## 2021-01-01 PROCEDURE — 99214 OFFICE O/P EST MOD 30 MIN: CPT | Performed by: INTERNAL MEDICINE

## 2021-01-01 PROCEDURE — G8399 PT W/DXA RESULTS DOCUMENT: HCPCS | Performed by: SURGERY

## 2021-01-01 PROCEDURE — 84100 ASSAY OF PHOSPHORUS: CPT

## 2021-01-01 PROCEDURE — 1090F PRES/ABSN URINE INCON ASSESS: CPT | Performed by: INTERNAL MEDICINE

## 2021-01-01 PROCEDURE — G8432 DEP SCR NOT DOC, RNG: HCPCS | Performed by: INTERNAL MEDICINE

## 2021-01-01 PROCEDURE — G8428 CUR MEDS NOT DOCUMENT: HCPCS | Performed by: INTERNAL MEDICINE

## 2021-01-01 PROCEDURE — G8420 CALC BMI NORM PARAMETERS: HCPCS | Performed by: INTERNAL MEDICINE

## 2021-01-01 PROCEDURE — 99442 PR PHYS/QHP TELEPHONE EVALUATION 11-20 MIN: CPT | Performed by: INTERNAL MEDICINE

## 2021-01-01 PROCEDURE — 96523 IRRIG DRUG DELIVERY DEVICE: CPT

## 2021-01-01 PROCEDURE — 84443 ASSAY THYROID STIM HORMONE: CPT

## 2021-01-01 PROCEDURE — 70553 MRI BRAIN STEM W/O & W/DYE: CPT

## 2021-01-01 PROCEDURE — 74011000636 HC RX REV CODE- 636: Performed by: INTERNAL MEDICINE

## 2021-01-01 PROCEDURE — 83540 ASSAY OF IRON: CPT

## 2021-01-01 PROCEDURE — 99443 PR PHYS/QHP TELEPHONE EVALUATION 21-30 MIN: CPT | Performed by: INTERNAL MEDICINE

## 2021-01-01 PROCEDURE — 85045 AUTOMATED RETICULOCYTE COUNT: CPT

## 2021-01-01 PROCEDURE — 82565 ASSAY OF CREATININE: CPT

## 2021-01-01 PROCEDURE — G8432 DEP SCR NOT DOC, RNG: HCPCS | Performed by: SURGERY

## 2021-01-01 PROCEDURE — 3017F COLORECTAL CA SCREEN DOC REV: CPT | Performed by: SURGERY

## 2021-01-01 PROCEDURE — 74011250636 HC RX REV CODE- 250/636

## 2021-01-01 PROCEDURE — 85652 RBC SED RATE AUTOMATED: CPT

## 2021-01-01 PROCEDURE — 74011000250 HC RX REV CODE- 250: Performed by: INTERNAL MEDICINE

## 2021-01-01 PROCEDURE — 1101F PT FALLS ASSESS-DOCD LE1/YR: CPT | Performed by: INTERNAL MEDICINE

## 2021-01-01 PROCEDURE — 82607 VITAMIN B-12: CPT

## 2021-01-01 PROCEDURE — A9552 F18 FDG: HCPCS

## 2021-01-01 PROCEDURE — G8536 NO DOC ELDER MAL SCRN: HCPCS | Performed by: INTERNAL MEDICINE

## 2021-01-01 PROCEDURE — 84439 ASSAY OF FREE THYROXINE: CPT

## 2021-01-01 PROCEDURE — G8536 NO DOC ELDER MAL SCRN: HCPCS | Performed by: SURGERY

## 2021-01-01 RX ORDER — ONDANSETRON 2 MG/ML
8 INJECTION INTRAMUSCULAR; INTRAVENOUS AS NEEDED
Status: CANCELLED | OUTPATIENT
Start: 2021-01-01

## 2021-01-01 RX ORDER — LAMOTRIGINE 25 MG/1
500 TABLET ORAL ONCE
Status: COMPLETED | OUTPATIENT
Start: 2021-01-01 | End: 2021-01-01

## 2021-01-01 RX ORDER — HEPARIN 100 UNIT/ML
300-500 SYRINGE INTRAVENOUS AS NEEDED
Status: CANCELLED
Start: 2021-01-01

## 2021-01-01 RX ORDER — DIPHENHYDRAMINE HYDROCHLORIDE 50 MG/ML
25 INJECTION, SOLUTION INTRAMUSCULAR; INTRAVENOUS AS NEEDED
Status: CANCELLED
Start: 2021-01-01

## 2021-01-01 RX ORDER — HYDROCORTISONE SODIUM SUCCINATE 100 MG/2ML
100 INJECTION, POWDER, FOR SOLUTION INTRAMUSCULAR; INTRAVENOUS AS NEEDED
Status: CANCELLED | OUTPATIENT
Start: 2021-01-01

## 2021-01-01 RX ORDER — EPINEPHRINE 1 MG/ML
0.3 INJECTION, SOLUTION, CONCENTRATE INTRAVENOUS AS NEEDED
Status: CANCELLED | OUTPATIENT
Start: 2021-01-01

## 2021-01-01 RX ORDER — ACETAMINOPHEN 325 MG/1
650 TABLET ORAL AS NEEDED
Status: CANCELLED
Start: 2021-01-01

## 2021-01-01 RX ORDER — DIPHENHYDRAMINE HYDROCHLORIDE 50 MG/ML
50 INJECTION, SOLUTION INTRAMUSCULAR; INTRAVENOUS AS NEEDED
Status: CANCELLED
Start: 2021-01-01

## 2021-01-01 RX ORDER — ALBUTEROL SULFATE 0.83 MG/ML
2.5 SOLUTION RESPIRATORY (INHALATION) AS NEEDED
Status: CANCELLED
Start: 2021-01-01

## 2021-01-01 RX ORDER — BARIUM SULFATE 20 MG/ML
450 SUSPENSION ORAL
Status: COMPLETED | OUTPATIENT
Start: 2021-01-01 | End: 2021-01-01

## 2021-01-01 RX ORDER — DIPHENHYDRAMINE HYDROCHLORIDE 50 MG/ML
50 INJECTION, SOLUTION INTRAMUSCULAR; INTRAVENOUS AS NEEDED
Status: CANCELLED
Start: 2022-01-01

## 2021-01-01 RX ORDER — SODIUM CHLORIDE 0.9 % (FLUSH) 0.9 %
10-40 SYRINGE (ML) INJECTION EVERY 8 HOURS
Status: DISCONTINUED | OUTPATIENT
Start: 2021-01-01 | End: 2021-01-01 | Stop reason: HOSPADM

## 2021-01-01 RX ORDER — SODIUM CHLORIDE 0.9 % (FLUSH) 0.9 %
10 SYRINGE (ML) INJECTION AS NEEDED
Status: CANCELLED | OUTPATIENT
Start: 2021-01-01

## 2021-01-01 RX ORDER — GABAPENTIN 100 MG/1
CAPSULE ORAL
Qty: 540 CAPSULE | Refills: 3 | Status: SHIPPED | OUTPATIENT
Start: 2021-01-01 | End: 2021-01-01

## 2021-01-01 RX ORDER — ACETAMINOPHEN 325 MG/1
650 TABLET ORAL AS NEEDED
Status: CANCELLED
Start: 2022-01-01

## 2021-01-01 RX ORDER — HEPARIN 100 UNIT/ML
SYRINGE INTRAVENOUS
Status: COMPLETED
Start: 2021-01-01 | End: 2021-01-01

## 2021-01-01 RX ORDER — GABAPENTIN 100 MG/1
CAPSULE ORAL
Qty: 540 CAPSULE | Refills: 3 | Status: SHIPPED | OUTPATIENT
Start: 2021-01-01

## 2021-01-01 RX ORDER — SODIUM CHLORIDE 0.9 % (FLUSH) 0.9 %
10-40 SYRINGE (ML) INJECTION AS NEEDED
Status: DISCONTINUED | OUTPATIENT
Start: 2021-01-01 | End: 2021-01-01 | Stop reason: HOSPADM

## 2021-01-01 RX ORDER — SODIUM CHLORIDE 9 MG/ML
10 INJECTION INTRAMUSCULAR; INTRAVENOUS; SUBCUTANEOUS AS NEEDED
Status: CANCELLED | OUTPATIENT
Start: 2021-01-01

## 2021-01-01 RX ORDER — EPINEPHRINE 1 MG/ML
0.3 INJECTION, SOLUTION, CONCENTRATE INTRAVENOUS AS NEEDED
Status: CANCELLED | OUTPATIENT
Start: 2022-01-01

## 2021-01-01 RX ORDER — SODIUM CHLORIDE 0.9 % (FLUSH) 0.9 %
10 SYRINGE (ML) INJECTION AS NEEDED
Status: CANCELLED | OUTPATIENT
Start: 2022-01-01

## 2021-01-01 RX ORDER — PALBOCICLIB 100 MG/1
CAPSULE ORAL
Qty: 63 CAPSULE | Refills: 3 | Status: SHIPPED | OUTPATIENT
Start: 2021-01-01 | End: 2021-01-01 | Stop reason: DRUGHIGH

## 2021-01-01 RX ORDER — FLUDEOXYGLUCOSE F-18 200 MCI/ML
8.68 INJECTION INTRAVENOUS ONCE
Status: COMPLETED | OUTPATIENT
Start: 2021-01-01 | End: 2021-01-01

## 2021-01-01 RX ORDER — FLUTICASONE PROPIONATE 50 MCG
2 SPRAY, SUSPENSION (ML) NASAL
Status: ON HOLD | COMMUNITY
Start: 2021-01-01 | End: 2022-01-01

## 2021-01-01 RX ORDER — ONDANSETRON 2 MG/ML
8 INJECTION INTRAMUSCULAR; INTRAVENOUS AS NEEDED
Status: CANCELLED | OUTPATIENT
Start: 2022-01-01

## 2021-01-01 RX ORDER — SODIUM CHLORIDE 9 MG/ML
10 INJECTION INTRAMUSCULAR; INTRAVENOUS; SUBCUTANEOUS AS NEEDED
Status: CANCELLED | OUTPATIENT
Start: 2022-01-01

## 2021-01-01 RX ORDER — ALBUTEROL SULFATE 0.83 MG/ML
2.5 SOLUTION RESPIRATORY (INHALATION) AS NEEDED
Status: CANCELLED
Start: 2022-01-01

## 2021-01-01 RX ORDER — HEPARIN 100 UNIT/ML
500 SYRINGE INTRAVENOUS ONCE
Status: COMPLETED | OUTPATIENT
Start: 2021-01-01 | End: 2021-01-01

## 2021-01-01 RX ORDER — HEPARIN 100 UNIT/ML
SYRINGE INTRAVENOUS
Status: DISCONTINUED
Start: 2021-01-01 | End: 2021-01-01 | Stop reason: HOSPADM

## 2021-01-01 RX ORDER — ALBUTEROL SULFATE 90 UG/1
2 AEROSOL, METERED RESPIRATORY (INHALATION)
COMMUNITY
Start: 2021-01-01

## 2021-01-01 RX ORDER — SODIUM CHLORIDE 0.9 % (FLUSH) 0.9 %
10-40 SYRINGE (ML) INJECTION AS NEEDED
Status: DISCONTINUED | OUTPATIENT
Start: 2021-01-01 | End: 2022-01-01 | Stop reason: HOSPADM

## 2021-01-01 RX ORDER — HYDROCORTISONE SODIUM SUCCINATE 100 MG/2ML
100 INJECTION, POWDER, FOR SOLUTION INTRAMUSCULAR; INTRAVENOUS AS NEEDED
Status: CANCELLED | OUTPATIENT
Start: 2022-01-01

## 2021-01-01 RX ORDER — DIPHENHYDRAMINE HYDROCHLORIDE 50 MG/ML
25 INJECTION, SOLUTION INTRAMUSCULAR; INTRAVENOUS AS NEEDED
Status: CANCELLED
Start: 2022-01-01

## 2021-01-01 RX ORDER — MEGESTROL ACETATE 40 MG/1
40 TABLET ORAL DAILY
Qty: 30 TABLET | Refills: 2 | Status: SHIPPED | OUTPATIENT
Start: 2021-01-01 | End: 2022-01-01

## 2021-01-01 RX ORDER — HEPARIN 100 UNIT/ML
500 SYRINGE INTRAVENOUS AS NEEDED
Status: DISCONTINUED | OUTPATIENT
Start: 2021-01-01 | End: 2021-01-01 | Stop reason: HOSPADM

## 2021-01-01 RX ORDER — DULOXETIN HYDROCHLORIDE 30 MG/1
30 CAPSULE, DELAYED RELEASE ORAL DAILY
Qty: 60 CAPSULE | Refills: 5 | Status: ON HOLD | OUTPATIENT
Start: 2021-01-01 | End: 2022-01-01

## 2021-01-01 RX ORDER — SODIUM CHLORIDE 0.9 % (FLUSH) 0.9 %
5-10 SYRINGE (ML) INJECTION EVERY 8 HOURS
Status: DISCONTINUED | OUTPATIENT
Start: 2021-01-01 | End: 2021-01-01

## 2021-01-01 RX ORDER — HEPARIN 100 UNIT/ML
300-500 SYRINGE INTRAVENOUS AS NEEDED
Status: CANCELLED
Start: 2022-01-01

## 2021-01-01 RX ADMIN — Medication 30 ML: at 09:31

## 2021-01-01 RX ADMIN — Medication 30 ML: at 12:54

## 2021-01-01 RX ADMIN — FULVESTRANT 500 MG: 50 INJECTION, SOLUTION INTRAMUSCULAR at 09:51

## 2021-01-01 RX ADMIN — GADOTERATE MEGLUMINE 14 ML: 376.9 INJECTION INTRAVENOUS at 12:11

## 2021-01-01 RX ADMIN — FULVESTRANT 500 MG: 50 INJECTION INTRAMUSCULAR at 14:20

## 2021-01-01 RX ADMIN — HEPARIN 500 UNITS: 100 SYRINGE at 13:35

## 2021-01-01 RX ADMIN — Medication 10 ML: at 13:28

## 2021-01-01 RX ADMIN — HEPARIN 500 UNITS: 100 SYRINGE at 14:39

## 2021-01-01 RX ADMIN — DENOSUMAB 120 MG: 120 INJECTION SUBCUTANEOUS at 13:38

## 2021-01-01 RX ADMIN — DENOSUMAB 120 MG: 120 INJECTION SUBCUTANEOUS at 13:59

## 2021-01-01 RX ADMIN — DIATRIZOATE MEGLUMINE AND DIATRIZOATE SODIUM 30 ML: 660; 100 LIQUID ORAL; RECTAL at 10:45

## 2021-01-01 RX ADMIN — HEPARIN 500 UNITS: 100 SYRINGE at 14:10

## 2021-01-01 RX ADMIN — FULVESTRANT 500 MG: 50 INJECTION, SOLUTION INTRAMUSCULAR at 14:06

## 2021-01-01 RX ADMIN — Medication 20 ML: at 13:29

## 2021-01-01 RX ADMIN — IOPAMIDOL 80 ML: 612 INJECTION, SOLUTION INTRAVENOUS at 10:44

## 2021-01-01 RX ADMIN — DENOSUMAB 120 MG: 120 INJECTION SUBCUTANEOUS at 14:11

## 2021-01-01 RX ADMIN — HEPARIN 500 UNITS: 100 SYRINGE at 09:22

## 2021-01-01 RX ADMIN — FULVESTRANT 500 MG: 50 INJECTION, SOLUTION INTRAMUSCULAR at 14:19

## 2021-01-01 RX ADMIN — DENOSUMAB 120 MG: 120 INJECTION SUBCUTANEOUS at 14:04

## 2021-01-01 RX ADMIN — Medication 10 ML: at 13:35

## 2021-01-01 RX ADMIN — Medication 10 ML: at 09:15

## 2021-01-01 RX ADMIN — DENOSUMAB 120 MG: 120 INJECTION SUBCUTANEOUS at 09:45

## 2021-01-01 RX ADMIN — FULVESTRANT 500 MG: 50 INJECTION, SOLUTION INTRAMUSCULAR at 10:00

## 2021-01-01 RX ADMIN — Medication 30 ML: at 09:22

## 2021-01-01 RX ADMIN — FULVESTRANT 500 MG: 50 INJECTION INTRAMUSCULAR at 13:19

## 2021-01-01 RX ADMIN — Medication 10 ML: at 13:38

## 2021-01-01 RX ADMIN — FULVESTRANT 500 MG: 50 INJECTION, SOLUTION INTRAMUSCULAR at 14:03

## 2021-01-01 RX ADMIN — EPOETIN ALFA-EPBX 60000 UNITS: 20000 INJECTION, SOLUTION INTRAVENOUS; SUBCUTANEOUS at 14:03

## 2021-01-01 RX ADMIN — EPOETIN ALFA-EPBX 60000 UNITS: 20000 INJECTION, SOLUTION INTRAVENOUS; SUBCUTANEOUS at 13:36

## 2021-01-01 RX ADMIN — DENOSUMAB 120 MG: 120 INJECTION SUBCUTANEOUS at 09:56

## 2021-01-01 RX ADMIN — HEPARIN 500 UNITS: 100 SYRINGE at 12:54

## 2021-01-01 RX ADMIN — HEPARIN 500 UNITS: 100 SYRINGE at 13:30

## 2021-01-01 RX ADMIN — FLUDEOXYGLUCOSE F-18 8.68 MILLICURIE: 200 INJECTION INTRAVENOUS at 10:50

## 2021-01-01 RX ADMIN — DENOSUMAB 120 MG: 120 INJECTION SUBCUTANEOUS at 13:50

## 2021-01-01 RX ADMIN — EPOETIN ALFA-EPBX 60000 UNITS: 40000 INJECTION, SOLUTION INTRAVENOUS; SUBCUTANEOUS at 14:17

## 2021-01-01 RX ADMIN — HEPARIN 500 UNITS: 100 SYRINGE at 09:31

## 2021-01-01 RX ADMIN — FULVESTRANT 500 MG: 250 INJECTION INTRAMUSCULAR at 13:40

## 2021-01-01 RX ADMIN — BARIUM SULFATE 450 ML: 21 SUSPENSION ORAL at 12:00

## 2021-01-05 ENCOUNTER — OFFICE VISIT (OUTPATIENT)
Dept: ONCOLOGY | Age: 76
End: 2021-01-05
Payer: MEDICARE

## 2021-01-05 VITALS
OXYGEN SATURATION: 98 % | DIASTOLIC BLOOD PRESSURE: 68 MMHG | HEIGHT: 64 IN | BODY MASS INDEX: 25.3 KG/M2 | WEIGHT: 148.2 LBS | HEART RATE: 89 BPM | RESPIRATION RATE: 20 BRPM | SYSTOLIC BLOOD PRESSURE: 126 MMHG

## 2021-01-05 DIAGNOSIS — Z85.3 PERSONAL HISTORY OF MALIGNANT NEOPLASM OF BREAST: ICD-10-CM

## 2021-01-05 DIAGNOSIS — C79.51 SECONDARY MALIGNANT NEOPLASM OF BONE AND BONE MARROW (HCC): ICD-10-CM

## 2021-01-05 DIAGNOSIS — C79.52 SECONDARY MALIGNANT NEOPLASM OF BONE AND BONE MARROW (HCC): ICD-10-CM

## 2021-01-05 DIAGNOSIS — D64.81 ANEMIA DUE TO CHEMOTHERAPY: ICD-10-CM

## 2021-01-05 DIAGNOSIS — C50.919 METASTATIC BREAST CANCER (HCC): Primary | ICD-10-CM

## 2021-01-05 DIAGNOSIS — T45.1X5A ANEMIA DUE TO CHEMOTHERAPY: ICD-10-CM

## 2021-01-05 DIAGNOSIS — Z17.0 MALIGNANT NEOPLASM OF LOWER-INNER QUADRANT OF RIGHT BREAST OF FEMALE, ESTROGEN RECEPTOR POSITIVE (HCC): ICD-10-CM

## 2021-01-05 DIAGNOSIS — R07.89 RIGHT-SIDED CHEST WALL PAIN: ICD-10-CM

## 2021-01-05 DIAGNOSIS — C50.311 MALIGNANT NEOPLASM OF LOWER-INNER QUADRANT OF RIGHT BREAST OF FEMALE, ESTROGEN RECEPTOR POSITIVE (HCC): ICD-10-CM

## 2021-01-05 DIAGNOSIS — D72.819 CHRONIC LEUKOPENIA: ICD-10-CM

## 2021-01-05 DIAGNOSIS — G63 NEUROPATHY ASSOCIATED WITH CANCER (HCC): ICD-10-CM

## 2021-01-05 DIAGNOSIS — C80.1 NEUROPATHY ASSOCIATED WITH CANCER (HCC): ICD-10-CM

## 2021-01-05 DIAGNOSIS — C50.919 METASTATIC BREAST CANCER (HCC): ICD-10-CM

## 2021-01-05 PROCEDURE — 99214 OFFICE O/P EST MOD 30 MIN: CPT | Performed by: INTERNAL MEDICINE

## 2021-01-05 PROCEDURE — G0463 HOSPITAL OUTPT CLINIC VISIT: HCPCS | Performed by: INTERNAL MEDICINE

## 2021-01-12 DIAGNOSIS — C50.919 METASTATIC BREAST CANCER (HCC): ICD-10-CM

## 2021-01-14 ENCOUNTER — HOSPITAL ENCOUNTER (OUTPATIENT)
Dept: INFUSION THERAPY | Age: 76
Discharge: HOME | End: 2021-01-14
Payer: MEDICARE

## 2021-01-14 VITALS
SYSTOLIC BLOOD PRESSURE: 128 MMHG | TEMPERATURE: 98.2 F | OXYGEN SATURATION: 96 % | RESPIRATION RATE: 18 BRPM | HEART RATE: 86 BPM | DIASTOLIC BLOOD PRESSURE: 73 MMHG

## 2021-01-14 DIAGNOSIS — C50.919 METASTATIC BREAST CANCER (HCC): Primary | ICD-10-CM

## 2021-01-14 DIAGNOSIS — C50.311 MALIGNANT NEOPLASM OF LOWER-INNER QUADRANT OF RIGHT BREAST OF FEMALE, ESTROGEN RECEPTOR POSITIVE (HCC): ICD-10-CM

## 2021-01-14 DIAGNOSIS — Z17.0 MALIGNANT NEOPLASM OF LOWER-INNER QUADRANT OF RIGHT BREAST OF FEMALE, ESTROGEN RECEPTOR POSITIVE (HCC): ICD-10-CM

## 2021-01-14 LAB
ALBUMIN SERPL-MCNC: 3.6 G/DL (ref 3.4–5)
ALBUMIN/GLOB SERPL: 0.9 {RATIO} (ref 0.8–1.7)
ALP SERPL-CCNC: 67 U/L (ref 45–117)
ALT SERPL-CCNC: 17 U/L (ref 13–56)
ANION GAP SERPL CALC-SCNC: 7 MMOL/L (ref 3–18)
AST SERPL-CCNC: 16 U/L (ref 10–38)
BASOPHILS # BLD: 0 K/UL (ref 0–0.06)
BASOPHILS NFR BLD: 0 % (ref 0–3)
BILIRUB SERPL-MCNC: 0.8 MG/DL (ref 0.2–1)
BUN SERPL-MCNC: 21 MG/DL (ref 7–18)
BUN/CREAT SERPL: 19 (ref 12–20)
CALCIUM SERPL-MCNC: 9.1 MG/DL (ref 8.5–10.1)
CHLORIDE SERPL-SCNC: 110 MMOL/L (ref 100–111)
CO2 SERPL-SCNC: 26 MMOL/L (ref 21–32)
CREAT SERPL-MCNC: 1.1 MG/DL (ref 0.6–1.3)
DIFFERENTIAL METHOD BLD: ABNORMAL
EOSINOPHIL # BLD: 0 K/UL (ref 0–0.4)
EOSINOPHIL NFR BLD: 0 % (ref 0–5)
ERYTHROCYTE [DISTWIDTH] IN BLOOD BY AUTOMATED COUNT: 16.9 % (ref 11.6–14.5)
GLOBULIN SER CALC-MCNC: 3.8 G/DL (ref 2–4)
GLUCOSE SERPL-MCNC: 82 MG/DL (ref 74–99)
HCT VFR BLD AUTO: 32.4 % (ref 35–45)
HGB BLD-MCNC: 10.8 G/DL (ref 12–16)
LYMPHOCYTES # BLD: 0.3 K/UL (ref 0.8–3.5)
LYMPHOCYTES NFR BLD: 20 % (ref 20–51)
MAGNESIUM SERPL-MCNC: 1.5 MG/DL (ref 1.6–2.6)
MCH RBC QN AUTO: 33.3 PG (ref 24–34)
MCHC RBC AUTO-ENTMCNC: 33.3 G/DL (ref 31–37)
MCV RBC AUTO: 100 FL (ref 74–97)
MONOCYTES # BLD: 0.1 K/UL (ref 0–1)
MONOCYTES NFR BLD: 6 % (ref 2–9)
NEUTS SEG # BLD: 1.3 K/UL (ref 1.8–8)
NEUTS SEG NFR BLD: 74 % (ref 42–75)
PHOSPHATE SERPL-MCNC: 3.2 MG/DL (ref 2.5–4.9)
PLATELET # BLD AUTO: 221 K/UL (ref 135–420)
PLATELET COMMENTS,PCOM: ABNORMAL
PMV BLD AUTO: 9.1 FL (ref 9.2–11.8)
POTASSIUM SERPL-SCNC: 3.8 MMOL/L (ref 3.5–5.5)
PROT SERPL-MCNC: 7.4 G/DL (ref 6.4–8.2)
RBC # BLD AUTO: 3.24 M/UL (ref 4.2–5.3)
RBC MORPH BLD: ABNORMAL
SODIUM SERPL-SCNC: 143 MMOL/L (ref 136–145)
TOTAL CELLS COUNTED SPEC: 50
WBC # BLD AUTO: 1.7 K/UL (ref 4.6–13.2)

## 2021-01-14 PROCEDURE — 36591 DRAW BLOOD OFF VENOUS DEVICE: CPT

## 2021-01-14 PROCEDURE — 86300 IMMUNOASSAY TUMOR CA 15-3: CPT

## 2021-01-14 PROCEDURE — 85025 COMPLETE CBC W/AUTO DIFF WBC: CPT

## 2021-01-14 PROCEDURE — 77030012965 HC NDL HUBR BBMI -A

## 2021-01-14 PROCEDURE — 84100 ASSAY OF PHOSPHORUS: CPT

## 2021-01-14 PROCEDURE — 96402 CHEMO HORMON ANTINEOPL SQ/IM: CPT

## 2021-01-14 PROCEDURE — 80053 COMPREHEN METABOLIC PANEL: CPT

## 2021-01-14 PROCEDURE — 83735 ASSAY OF MAGNESIUM: CPT

## 2021-01-14 PROCEDURE — 96372 THER/PROPH/DIAG INJ SC/IM: CPT

## 2021-01-14 PROCEDURE — 74011250636 HC RX REV CODE- 250/636: Performed by: INTERNAL MEDICINE

## 2021-01-14 RX ORDER — ONDANSETRON 2 MG/ML
8 INJECTION INTRAMUSCULAR; INTRAVENOUS AS NEEDED
Status: CANCELLED | OUTPATIENT
Start: 2021-02-11

## 2021-01-14 RX ORDER — LAMOTRIGINE 25 MG/1
500 TABLET ORAL ONCE
Status: COMPLETED | OUTPATIENT
Start: 2021-01-14 | End: 2021-01-14

## 2021-01-14 RX ORDER — HYDROCORTISONE SODIUM SUCCINATE 100 MG/2ML
100 INJECTION, POWDER, FOR SOLUTION INTRAMUSCULAR; INTRAVENOUS AS NEEDED
Status: CANCELLED | OUTPATIENT
Start: 2021-02-11

## 2021-01-14 RX ORDER — HEPARIN 100 UNIT/ML
500 SYRINGE INTRAVENOUS ONCE
Status: COMPLETED | OUTPATIENT
Start: 2021-01-14 | End: 2021-01-14

## 2021-01-14 RX ORDER — ACETAMINOPHEN 325 MG/1
650 TABLET ORAL AS NEEDED
Status: CANCELLED
Start: 2021-02-11

## 2021-01-14 RX ORDER — SODIUM CHLORIDE 0.9 % (FLUSH) 0.9 %
10-40 SYRINGE (ML) INJECTION AS NEEDED
Status: DISCONTINUED | OUTPATIENT
Start: 2021-01-14 | End: 2021-01-18 | Stop reason: HOSPADM

## 2021-01-14 RX ORDER — DIPHENHYDRAMINE HYDROCHLORIDE 50 MG/ML
50 INJECTION, SOLUTION INTRAMUSCULAR; INTRAVENOUS AS NEEDED
Status: CANCELLED
Start: 2021-02-11

## 2021-01-14 RX ORDER — EPINEPHRINE 1 MG/ML
0.3 INJECTION, SOLUTION, CONCENTRATE INTRAVENOUS AS NEEDED
Status: CANCELLED | OUTPATIENT
Start: 2021-02-11

## 2021-01-14 RX ORDER — ALBUTEROL SULFATE 0.83 MG/ML
2.5 SOLUTION RESPIRATORY (INHALATION) AS NEEDED
Status: CANCELLED
Start: 2021-02-11

## 2021-01-14 RX ORDER — DIPHENHYDRAMINE HYDROCHLORIDE 50 MG/ML
25 INJECTION, SOLUTION INTRAMUSCULAR; INTRAVENOUS AS NEEDED
Status: CANCELLED
Start: 2021-02-11

## 2021-01-14 RX ADMIN — HEPARIN 500 UNITS: 100 SYRINGE at 14:17

## 2021-01-14 RX ADMIN — DENOSUMAB 120 MG: 120 INJECTION SUBCUTANEOUS at 14:24

## 2021-01-14 RX ADMIN — FULVESTRANT 500 MG: 50 INJECTION INTRAMUSCULAR at 14:26

## 2021-01-14 RX ADMIN — Medication 10 ML: at 14:17

## 2021-01-14 NOTE — PROGRESS NOTES
[facility] Progress Note    Date: 2021    Name: Jose J Nowak    MRN: [id number]         : 1945       FASLODEX Q4 81 [facility] LABS      Ms. Rosemary Orellana arrived to [facility] at [phone], ambulatory and in no acute distress. Patient denied any recent changes in her health condition or medication regimen. Patient denied complaint of pain or discomfort. Ms. Rosemary Orellana was assessed and education was provided. Ms. Klarissa Bolden vitals were reviewed. Visit Vitals  /73 (BP 1 Location: Left arm, BP Patient Position: Sitting)   Pulse 86   Temp 98.2 °F (36.8 °C)   Resp 18   SpO2 96%   Breastfeeding No       Pt was observed for 5 minutes after obtaining vital signs prior to initiating treatment. Patient's left upper chest port accessed with 20 g 1 inch non-coring access needle and blood samples obtained for labs (cbc, cmp,  magnesium, phos, CA 27.29, and Cancer antigen 15-3) per order. Patient's port flushed with NS, followed by instillation of heparin 500 units/5mL, per protocol, and was de-accessed with needle removed intact. No evidence of complication noted at site and bandaid/gauze applied. Faslodex 500mg administered as ordered IM in patient's L & R dorsogluteal muscles (250mg per injection). Band-aids to sites. No irritation or bleeding to sites    CMP results reviewed from 2020. Serum calcium = 9.5 and within ordered parameters for administration of Xgeva injection. Xgeva 120mg administered as ordered SQ in pt's left upper arm. Patient tolerated injection well and site without evidence of complication. Bandaid applied. Pt tolerated injections well and without complaints. Pt armband removed & shredded. Ms. Rosemary Orellana was discharged from [place] [address] in stable condition at . She is to return on 2021 at 1300 for her next appointment.     Robbie Mora  2021

## 2021-01-15 LAB
CANCER AG15-3 SERPL-ACNC: 44.3 U/ML (ref 0–25)
CANCER AG27-29 SERPL-ACNC: 60.6 U/ML (ref 0–38.6)

## 2021-01-18 ENCOUNTER — HOSPITAL ENCOUNTER (OUTPATIENT)
Dept: CT IMAGING | Age: 76
Discharge: HOME | End: 2021-01-18
Attending: INTERNAL MEDICINE
Payer: MEDICARE

## 2021-01-18 ENCOUNTER — HOSPITAL ENCOUNTER (OUTPATIENT)
Dept: NUCLEAR MEDICINE | Age: 76
Discharge: HOME | End: 2021-01-18
Attending: INTERNAL MEDICINE
Payer: MEDICARE

## 2021-01-18 LAB — CREAT UR-MCNC: 1.1 MG/DL (ref 0.6–1.3)

## 2021-01-18 PROCEDURE — 74177 CT ABD & PELVIS W/CONTRAST: CPT

## 2021-01-18 PROCEDURE — A9503 TC99M MEDRONATE: HCPCS

## 2021-01-18 PROCEDURE — 74011000636 HC RX REV CODE- 636: Performed by: INTERNAL MEDICINE

## 2021-01-18 PROCEDURE — 82565 ASSAY OF CREATININE: CPT

## 2021-01-18 RX ADMIN — IOPAMIDOL 70 ML: 612 INJECTION, SOLUTION INTRAVENOUS at 09:57

## 2021-01-30 NOTE — PROGRESS NOTES
Hematology/Oncology  Progress Note    Name: Johanne Howell  Date: 2021  : 1945    PCP: Niki Romero MD     Ms. Vidhya Diana is a 76 y.o.  female who was seen for management of her metastatic breast cancer with associated complications of chemotherapy. Current therapy: Fulvestrant 500 mg subcutaneous monthly and Ibrance 125 mg by mouth daily. Subjective:     Mrs. Vidhya Diana is a 40-year-old woman who has slowly progressive metastatic breast cancer. Patient was being followed by Dr. Miah Sanders who retired. She is here today for chemo follow up. The patient has previously completed external beam radiation therapy to lesions in her ribs and more recently she completed proton therapy to areas of bone involvement with a significant benefit with regards to pain control. She is tolerating monthly Fulvestrant fairly well. The posttherapy imaging studies showed a significant decrease in the intensity of uptake on the bone scan. Additionally she is currently receiving systemic therapy with fulvestrant and Ibrance. Patient is tolerating the systemic therapy reasonably well and has not experienced any nausea or emesis. She reported soft tissues in  right shoulder area which enlarge slowly. She continues to have SOB intermittently. She also receives Procrit at 4 week intervals whenever her hemoglobin is below 10 g/dL and hematocrit is below 30%. Past medical history, family history, and social history: these were reviewed and remains unchanged.     Past Medical History:   Diagnosis Date    Biliary colic     Breast CA ([facility] [address] 2012    Cancer [facility])     Right Breast    Chemotherapy convalescence or palliative care     Neuropathy     Radiation therapy complication     Thyroid disease      Past Surgical History:   Procedure Laterality Date    HX LAP CHOLECYSTECTOMY  13    HX MASTECTOMY      Right    NV BREAST SURGERY PROCEDURE UNLISTED  10/2002    Right-Lesion    NV BREAST SURGERY PROCEDURE UNLISTED  2004    Right-Lesion     Social History     Socioeconomic History    Marital status:      Spouse name: Not on file    Number of children: Not on file    Years of education: Not on file    Highest education level: Not on file   Occupational History    Not on file   Social Needs    Financial resource strain: Not on file    Food insecurity     Worry: Not on file     Inability: Not on file    Transportation needs     Medical: Not on file     Non-medical: Not on file   Tobacco Use    Smoking status: Former Smoker     Quit date: 1991     Years since quittin.6    Smokeless tobacco: Never Used   Substance and Sexual Activity    Alcohol use: Yes     Alcohol/week: 0.0 standard drinks     Types: 1 - 2 Glasses of wine per week     Comment: socially, occassionally    Drug use: No    Sexual activity: Not on file   Lifestyle    Physical activity     Days per week: Not on file     Minutes per session: Not on file    Stress: Not on file   Relationships    Social connections     Talks on phone: Not on file     Gets together: Not on file     Attends Confucianism service: Not on file     Active member of club or organization: Not on file     Attends meetings of clubs or organizations: Not on file     Relationship status: Not on file    Intimate partner violence     Fear of current or ex partner: Not on file     Emotionally abused: Not on file     Physically abused: Not on file     Forced sexual activity: Not on file   Other Topics Concern    Not on file   Social History Narrative    Not on file     Family History   Problem Relation Age of Onset    Cancer Maternal Aunt         Hodgkin's disease    Breast Cancer Paternal Aunt     Cancer Father         Prostate, lung, brain     Current Outpatient Medications   Medication Sig Dispense Refill    gabapentin (NEURONTIN) 100 mg capsule Take 2 Caps by mouth three (3) times daily.  Max Daily Amount: 600 mg. 540 Cap 3    calcium citrate 200 mg (950 mg) tablet Take  by mouth daily.  palbociclib (Ibrance) 100 mg cap Take 1 Cap by mouth daily. For 21 days on and 7 days off every 28 days. 63 Cap 5    Cetirizine (ZYRTEC) 10 mg cap Take 1 Cap by mouth daily as needed.  lidocaine HCl-hydrocortison ac topical cream Apply  to affected area two (2) times a day. 85 g 3    TETRACYCLINE HCL (TETRACYCLINE PO) Take 250 mg by mouth two (2) times a day.  fexofenadine-pseudoephedrine (ALLEGRA-D 24 HOUR) 180-240 mg per tablet Take 1 Tab by mouth daily as needed.  sulfacetamide (BLEPH-10) 10 % ophthalmic ointment Administer  to right eye three (3) times daily.  palbociclib (IBRANCE) 125 mg cap Take 125 mg by mouth daily. Take 1 tab po every day for 21 days on and 7 days off q 28 days  Indications: HORMONE RECEPTOR(+), HER2(-) ADVANCED BREAST CANCER 42 Cap 6    Cholecalciferol, Vitamin D3, 5,000 unit Tab Take  by mouth daily.  thyroid, Pork, (ARMOUR THYROID) 60 mg tablet Take 90 mg by mouth daily.  L-Mfolate-B6 Phos-Methyl-B12 (NEURPATH-B) 3-35-2 mg Tab Take  by mouth two (2) times a day.  warfarin (COUMADIN) 1 mg tablet Take 1 mg by mouth daily. Review of Systems   Constitutional: Negative for chills, diaphoresis, fever, malaise/fatigue and weight loss. Respiratory: Negative for cough, hemoptysis, shortness of breath and wheezing. Cardiovascular: Negative for chest pain, palpitations and leg swelling. Gastrointestinal: Negative for abdominal pain, diarrhea, heartburn, nausea and vomiting. Genitourinary: Negative for dysuria, frequency, hematuria and urgency. Musculoskeletal: Negative for joint pain and myalgias. Skin: Negative for itching and rash. Neurological: Negative for dizziness, seizures, weakness and headaches. Psychiatric/Behavioral: Negative for depression. The patient does not have insomnia.              Objective:     Visit Vitals  /62   Pulse 79   Temp 97.6 °F (36.4 °C)   Ht 5' 4\" (1.626 m)   Wt 67.6 kg (149 lb)   SpO2 99%   BMI 25.58 kg/m²       ECOG Performance Status (grade): 1  0 - able to carry on all pre-disease activity w/out restriction  1 - restricted but able to carry out light work  2 - ambulatory and can self- care but unable to carry out work  3 - bed or chair >50% of waking hours  4 - completely disable, total care, confined to bed or chair    Physical Exam  Constitutional:       Appearance: Normal appearance. HENT:      Head: Normocephalic and atraumatic. Eyes:      Pupils: Pupils are equal, round, and reactive to light. Neck:      Musculoskeletal: Neck supple. Cardiovascular:      Rate and Rhythm: Normal rate and regular rhythm. Heart sounds: Normal heart sounds. Pulmonary:      Effort: Pulmonary effort is normal.      Breath sounds: Normal breath sounds. Abdominal:      General: Bowel sounds are normal.      Palpations: Abdomen is soft. Tenderness: There is no abdominal tenderness. There is no guarding. Musculoskeletal: Normal range of motion. Right lower leg: No edema. Left lower leg: No edema. Comments: Enlarged soft tissues in right shoulder area, no tenderness. Skin:     General: Skin is warm. Neurological:      General: No focal deficit present. Mental Status: She is alert and oriented to person, place, and time. Mental status is at baseline. Diagnostics:      No results found for this or any previous visit (from the past 96 hour(s)). Imaging:  No results found for this or any previous visit. Results for orders placed during the hospital encounter of 12/17/20   XR SPINE THORAC 2 V    Narrative XR SPINE [facility] 2 V: 12/17/2020 2:45 PM    CLINICAL INFORMATION  back pain. History of breast cancer    COMPARISON  Thoracic spine MRI 23 June 2017, CT chest, abdomen and pelvis 17 April 2020    TECHNIQUE  2 views of the thoracic spine    FINDINGS    Levoconvex curvature of the upper lumbar spine.     Mild/moderate Detail Level: Simple multilevel discogenic endplate degenerative changes throughout the  thoracic spine. No fracture or destructive osseous lesions identified. Impression IMPRESSION:  No acute osseous findings. If continued clinical concern, recommend MRI. Results for orders placed in visit on 01/05/21   CT CHEST ABD PELV W CONT    Narrative CT CHEST ABDOMEN PELVIS ENHANCED    CPT code: 23068, 23278 & 13307    INDICATION: Metastatic breast cancer. Secondary malignant neoplasm of bone and  bone marrow. TECHNIQUE: 5 mm collimation axial images obtained from the thoracic inlet to the  level of the pubic symphysis following the uneventful administration of 70 cc's  nonionic intravenous contrast.    All CT scans at this facility are performed using dose optimization technique as  appropriate to this specific exam, to include automated exposure control,  adjustment of the mA and/or KP according to patient size or use of iterative  reconstruction techniques. COMPARISON: Prior CT 4/17/20    CHEST FINDINGS:  Heart size normal. No pericardial effusion. Nonenlarged aorta. Infusion catheter in place in the left chest.    No enlarged axillary or mediastinal lymph nodes. Similar volume loss of the right hemithorax with area of lenticular density  ventral along the pleural surface similar to the prior exam likely related to  rounded atelectasis possibly fibrosis if there has been previous radiation  therapy. Similar conglomerate presumed rounded atelectasis posterior right lower lobe  adjacent to a small chronic pleural effusion. Smooth pleural thickening at the  dependent and lateral right lung base costophrenic angle appears similar to  previous. No new lung lesions. ABDOMEN FINDINGS:   Liver diffusely low attenuation consistent with steatosis. No mass appreciated  Gallbladder absent. Pancreas unremarkable. Spleen unremarkable. Adrenal glands unremarkable. Kidneys demonstrate symmetric enhancement.  No Additional Notes: After discussion of the risks, benefits and limitations with respect to this Telehealth visit, including potential limitations in picture and video quality, the patient has given verbal consent to proceed with this Telehealth visit. Interactive audio and video telecommunications were used to permit real-time communication between myself and the patient.  This Telehealth visit was performed due to the national COVID-19 emergency and recommended social distancing. hydronephrosis, mass or stones  No ascites. Normal caliber abdominal aorta. Similar chronic segmental narrowing of the proximal celiac segment over the  initial 1.4 cm, as on the previous exam. Bifurcation appears normal caliber and  normally patent. This is present dating back to at least 2016. PELVIS FINDINGS:   No small bowel or colon dilatation. Small volume scattered diverticulosis most significant at the sigmoid level. Uterus not enlarged. Bladder nearly empty and not well assessed. No ascites. No new or enlarging lymphadenopathy. Stable 7 mm sclerotic density left femoral neck and tiny sclerotic density right  femoral head. Similar amorphous sclerotic densities in the right iliac bone. Similar mildly expansile heterogeneous attenuation of the posterior T12 lamina  and right pedicle. Similar increased sclerosis right anterior lateral T6  diffusely and focally T7. No new lesions appreciated. Impression IMPRESSION:  1. Similar chronic appearing findings in the right hemithorax with areas of  rounded atelectasis, smooth pleural thickening, presumed chronic interstitial  changes and apical fibrosis and small pleural effusion. 2. No new or enlarging lymphadenopathy. No solid organ lesions or ascites. .    3. Similar appearance of sclerotic and heterogeneous osseous foci involving the  femoral heads, right iliac bone, T12 and right ribs as above. Assessment:     1. Metastatic breast cancer ([address]    2. Malignant neoplasm of lower-inner quadrant of right breast of female, estrogen receptor positive ([address]    3. Right-sided chest wall pain    4. Mass of soft tissue of shoulder      Plan:   Metastatic breast cancer:     -- She has slowly progressive metastatic breast cancer. Patient was being followed by Dr. Althea Patel who retired.  The patient has previously completed external beam radiation therapy to lesions in her ribs and more recently she completed proton therapy to areas of bone involvement with a significant benefit with regards to pain control. The posttherapy imaging studies showed a significant decrease in the intensity of uptake on the bone scan. -- Additionally she is currently receiving systemic therapy with fulvestrant and Ibrance. She has been on Fulvestrant 500 mg subcutaneous monthly and Ibrance 100 mg by mouth daily. She is tolerating treatment fairly well without high grade toxicities. -- 4/15/2020  CT scans of the chest, abdomen, and pelvis shows that she has a loculated right pleural effusion with some pleural thickening and adjacent areas of probable rounded atelectasis all of which are stable. She has relatively stable bone lesions of the rib, pelvis, and T12 posterior element. There was no findings on CT scan to support new metastatic disease in the chest, abdomen, or pelvis. --  Mammogram 6/5 done shows No evidence of malignancy. Suggest routine follow-up. -- 12/17/2020 CBC reported hemoglobin was 10.8, hematocrit was 32.8%, total WBC was 1.9 and ANC was 1.3, platelet was 715,858.  -- 1/18/2021 CT CA/P: No new or enlarging lymphadenopathy. No solid organ lesions or ascites. -- 1/18/2021 Bone scan reported a focal uptake anterior left iliac crest without definite CT correlate. Plan:   -- Patient will continue current regimen with fulvestrant and palbociclib as previously recommended. Palbociclib 100 mg daily 21 days on/7 days off today. -- She will need to check lab before each cycle. -- She will need yearly DEXA scan. -- We will repeat Bone scan in 3 months as above findings. Enlarged soft tissues in right scapular soft tissues  -- She noted having episodes of sharp pain and was thought d/t Shingles with neuropathy  -- It could be Lipomatous lesions. Will obtain US soft tissues for further imaging. Neuropathy associated with cancer:   -- Was on Neurontin at 400 mg 3 times daily.  She does not need a refill at this time.        Chemotherapy-induced neutropenia/chronic leukopenia (persisting problem):   -- 12/17/2020 CBC reported hemoglobin was 10.8, hematocrit was 32.8%, total WBC was 1.9 and ANC was 1.3, platelet was 121,034.  -- If the absolute neutrophil count declines to 0.5 she will be started on Neupogen or Neulasta.       Chemotherapy-induced anemia (persisting problem):    -- 12/17/2020 CBC reported hemoglobin of 10.8, hematocrit 32.8%, total WBC 1.9 ANC was 1.3, and platelet was normal to 265,000.  -- We have recommended that the patient continue taking ferrous sulfate 1 tablet daily. Procrit at a dose of 60,000 units subcutaneous will continue to  be provided when her hemoglobin  declined below 10 g/dL and hematocrit is below 30% every 4 weeks .        Chemotherapy-induced thrombocytopenia :   -- The patient was holding the Ibrance 140 mg at previous times D/T thrombocytopenia. -- She presently taking Ibrance 100mg.   -- 12/17/2020 CBC reported hemoglobin of 10.8, hematocrit 32.8%, total WBC 1.9 ANC was 1.3, and platelet was normal to 265,000.      -- We will see the patient back in clinic in about 4 weeks. Always sooner if required. The patient can have lab done prior our next clinic visit. No orders of the defined types were placed in this encounter. Ms. Gerhardt Matin has a reminder for a \"due or due soon\" health maintenance. I have asked that she contact her primary care provider for follow-up on this health maintenance. All of patient's questions answered to their apparent satisfaction. They verbally show understanding and agreement with aforementioned plan. Luis Armando Oliveira MD  2/2/2021          About 25 minutes were spent for this encounter with more than 50% of the time spent in face-to-face counseling, discussing on diagnosis and management plan going forward, and co-ordination of care. Parts of this document has been produced using Dragon dictation system.  Unrecognized errors in transcription may be present. Please do not hesitate to reach out for any questions or clarifications.       CC: Modesta Robles MD

## 2021-02-02 ENCOUNTER — OFFICE VISIT (OUTPATIENT)
Dept: ONCOLOGY | Age: 76
End: 2021-02-02
Payer: MEDICARE

## 2021-02-02 VITALS
TEMPERATURE: 97.6 F | HEART RATE: 79 BPM | SYSTOLIC BLOOD PRESSURE: 130 MMHG | WEIGHT: 149 LBS | HEIGHT: 64 IN | BODY MASS INDEX: 25.44 KG/M2 | DIASTOLIC BLOOD PRESSURE: 62 MMHG | OXYGEN SATURATION: 99 %

## 2021-02-02 DIAGNOSIS — C50.311 MALIGNANT NEOPLASM OF LOWER-INNER QUADRANT OF RIGHT BREAST OF FEMALE, ESTROGEN RECEPTOR POSITIVE (HCC): ICD-10-CM

## 2021-02-02 DIAGNOSIS — Z17.0 MALIGNANT NEOPLASM OF LOWER-INNER QUADRANT OF RIGHT BREAST OF FEMALE, ESTROGEN RECEPTOR POSITIVE (HCC): ICD-10-CM

## 2021-02-02 DIAGNOSIS — C50.919 METASTATIC BREAST CANCER (HCC): Primary | ICD-10-CM

## 2021-02-02 DIAGNOSIS — M79.89 MASS OF SOFT TISSUE OF SHOULDER: ICD-10-CM

## 2021-02-02 DIAGNOSIS — R07.89 RIGHT-SIDED CHEST WALL PAIN: ICD-10-CM

## 2021-02-02 PROCEDURE — G0463 HOSPITAL OUTPT CLINIC VISIT: HCPCS | Performed by: INTERNAL MEDICINE

## 2021-02-02 PROCEDURE — 99214 OFFICE O/P EST MOD 30 MIN: CPT | Performed by: INTERNAL MEDICINE

## 2021-02-08 DIAGNOSIS — C50.311 MALIGNANT NEOPLASM OF LOWER-INNER QUADRANT OF RIGHT BREAST OF FEMALE, ESTROGEN RECEPTOR POSITIVE (HCC): ICD-10-CM

## 2021-02-08 DIAGNOSIS — Z17.0 MALIGNANT NEOPLASM OF LOWER-INNER QUADRANT OF RIGHT BREAST OF FEMALE, ESTROGEN RECEPTOR POSITIVE (HCC): ICD-10-CM

## 2021-02-08 DIAGNOSIS — C79.52 SECONDARY MALIGNANT NEOPLASM OF BONE AND BONE MARROW (HCC): ICD-10-CM

## 2021-02-08 DIAGNOSIS — C50.919 METASTATIC BREAST CANCER (HCC): Primary | ICD-10-CM

## 2021-02-08 DIAGNOSIS — C79.51 SECONDARY MALIGNANT NEOPLASM OF BONE AND BONE MARROW (HCC): ICD-10-CM

## 2021-02-08 DIAGNOSIS — C50.919 METASTATIC BREAST CANCER (HCC): ICD-10-CM

## 2021-02-09 DIAGNOSIS — C50.919 METASTATIC BREAST CANCER (HCC): ICD-10-CM

## 2021-02-11 ENCOUNTER — HOSPITAL ENCOUNTER (OUTPATIENT)
Dept: INFUSION THERAPY | Age: 76
Discharge: HOME | End: 2021-02-11
Payer: MEDICARE

## 2021-02-11 VITALS
DIASTOLIC BLOOD PRESSURE: 72 MMHG | RESPIRATION RATE: 18 BRPM | HEART RATE: 82 BPM | SYSTOLIC BLOOD PRESSURE: 134 MMHG | TEMPERATURE: 97.7 F | OXYGEN SATURATION: 100 %

## 2021-02-11 DIAGNOSIS — C50.311 MALIGNANT NEOPLASM OF LOWER-INNER QUADRANT OF RIGHT BREAST OF FEMALE, ESTROGEN RECEPTOR POSITIVE (HCC): ICD-10-CM

## 2021-02-11 DIAGNOSIS — Z17.0 MALIGNANT NEOPLASM OF LOWER-INNER QUADRANT OF RIGHT BREAST OF FEMALE, ESTROGEN RECEPTOR POSITIVE (HCC): ICD-10-CM

## 2021-02-11 DIAGNOSIS — C50.919 METASTATIC BREAST CANCER (HCC): Primary | ICD-10-CM

## 2021-02-11 LAB
ALBUMIN SERPL-MCNC: 3.7 G/DL (ref 3.4–5)
ALBUMIN/GLOB SERPL: 1.3 {RATIO} (ref 0.8–1.7)
ALP SERPL-CCNC: 60 U/L (ref 45–117)
ALT SERPL-CCNC: 15 U/L (ref 13–56)
ANION GAP SERPL CALC-SCNC: 3 MMOL/L (ref 3–18)
AST SERPL-CCNC: 14 U/L (ref 10–38)
BASOPHILS # BLD: 0 K/UL (ref 0–0.1)
BASOPHILS NFR BLD: 0 % (ref 0–2)
BILIRUB SERPL-MCNC: 0.6 MG/DL (ref 0.2–1)
BUN SERPL-MCNC: 19 MG/DL (ref 7–18)
BUN/CREAT SERPL: 19 (ref 12–20)
CALCIUM SERPL-MCNC: 8.9 MG/DL (ref 8.5–10.1)
CHLORIDE SERPL-SCNC: 111 MMOL/L (ref 100–111)
CO2 SERPL-SCNC: 28 MMOL/L (ref 21–32)
CREAT SERPL-MCNC: 1.02 MG/DL (ref 0.6–1.3)
DIFFERENTIAL METHOD BLD: ABNORMAL
EOSINOPHIL # BLD: 0 K/UL (ref 0–0.4)
EOSINOPHIL NFR BLD: 1 % (ref 0–5)
ERYTHROCYTE [DISTWIDTH] IN BLOOD BY AUTOMATED COUNT: 16.7 % (ref 11.6–14.5)
GLOBULIN SER CALC-MCNC: 2.9 G/DL (ref 2–4)
GLUCOSE SERPL-MCNC: 129 MG/DL (ref 74–99)
HCT VFR BLD AUTO: 32 % (ref 35–45)
HGB BLD-MCNC: 10.5 G/DL (ref 12–16)
LYMPHOCYTES # BLD: 0.4 K/UL (ref 0.9–3.6)
LYMPHOCYTES NFR BLD: 21 % (ref 21–52)
MAGNESIUM SERPL-MCNC: 1.6 MG/DL (ref 1.6–2.6)
MCH RBC QN AUTO: 33.9 PG (ref 24–34)
MCHC RBC AUTO-ENTMCNC: 32.8 G/DL (ref 31–37)
MCV RBC AUTO: 103.2 FL (ref 74–97)
MONOCYTES # BLD: 0.1 K/UL (ref 0.05–1.2)
MONOCYTES NFR BLD: 5 % (ref 3–10)
NEUTS SEG # BLD: 1.5 K/UL (ref 1.8–8)
NEUTS SEG NFR BLD: 73 % (ref 40–73)
PHOSPHATE SERPL-MCNC: 2.6 MG/DL (ref 2.5–4.9)
PLATELET # BLD AUTO: 268 K/UL (ref 135–420)
PMV BLD AUTO: 9.9 FL (ref 9.2–11.8)
POTASSIUM SERPL-SCNC: 4 MMOL/L (ref 3.5–5.5)
PROT SERPL-MCNC: 6.6 G/DL (ref 6.4–8.2)
RBC # BLD AUTO: 3.1 M/UL (ref 4.2–5.3)
SODIUM SERPL-SCNC: 142 MMOL/L (ref 136–145)
WBC # BLD AUTO: 2 K/UL (ref 4.6–13.2)

## 2021-02-11 PROCEDURE — 96523 IRRIG DRUG DELIVERY DEVICE: CPT

## 2021-02-11 PROCEDURE — 84100 ASSAY OF PHOSPHORUS: CPT

## 2021-02-11 PROCEDURE — 85025 COMPLETE CBC W/AUTO DIFF WBC: CPT

## 2021-02-11 PROCEDURE — 83735 ASSAY OF MAGNESIUM: CPT

## 2021-02-11 PROCEDURE — 96402 CHEMO HORMON ANTINEOPL SQ/IM: CPT

## 2021-02-11 PROCEDURE — 96372 THER/PROPH/DIAG INJ SC/IM: CPT

## 2021-02-11 PROCEDURE — 36415 COLL VENOUS BLD VENIPUNCTURE: CPT

## 2021-02-11 PROCEDURE — 36591 DRAW BLOOD OFF VENOUS DEVICE: CPT

## 2021-02-11 PROCEDURE — 80053 COMPREHEN METABOLIC PANEL: CPT

## 2021-02-11 PROCEDURE — 74011250636 HC RX REV CODE- 250/636: Performed by: INTERNAL MEDICINE

## 2021-02-11 PROCEDURE — 96401 CHEMO ANTI-NEOPL SQ/IM: CPT

## 2021-02-11 PROCEDURE — 77030012965 HC NDL HUBR BBMI -A

## 2021-02-11 RX ORDER — HEPARIN 100 UNIT/ML
500 SYRINGE INTRAVENOUS ONCE
Status: COMPLETED | OUTPATIENT
Start: 2021-02-11 | End: 2021-02-11

## 2021-02-11 RX ORDER — SODIUM CHLORIDE 0.9 % (FLUSH) 0.9 %
10-40 SYRINGE (ML) INJECTION AS NEEDED
Status: DISCONTINUED | OUTPATIENT
Start: 2021-02-11 | End: 2021-02-15 | Stop reason: HOSPADM

## 2021-02-11 RX ORDER — EPINEPHRINE 1 MG/ML
0.3 INJECTION, SOLUTION, CONCENTRATE INTRAVENOUS AS NEEDED
Status: CANCELLED | OUTPATIENT
Start: 2021-03-11

## 2021-02-11 RX ORDER — HYDROCORTISONE SODIUM SUCCINATE 100 MG/2ML
100 INJECTION, POWDER, FOR SOLUTION INTRAMUSCULAR; INTRAVENOUS AS NEEDED
Status: CANCELLED | OUTPATIENT
Start: 2021-03-11

## 2021-02-11 RX ORDER — ALBUTEROL SULFATE 0.83 MG/ML
2.5 SOLUTION RESPIRATORY (INHALATION) AS NEEDED
Status: CANCELLED
Start: 2021-03-11

## 2021-02-11 RX ORDER — ONDANSETRON 2 MG/ML
8 INJECTION INTRAMUSCULAR; INTRAVENOUS AS NEEDED
Status: CANCELLED | OUTPATIENT
Start: 2021-03-11

## 2021-02-11 RX ORDER — LAMOTRIGINE 25 MG/1
500 TABLET ORAL ONCE
Status: COMPLETED | OUTPATIENT
Start: 2021-02-11 | End: 2021-02-11

## 2021-02-11 RX ORDER — DIPHENHYDRAMINE HYDROCHLORIDE 50 MG/ML
50 INJECTION, SOLUTION INTRAMUSCULAR; INTRAVENOUS AS NEEDED
Status: CANCELLED
Start: 2021-03-11

## 2021-02-11 RX ORDER — ACETAMINOPHEN 325 MG/1
650 TABLET ORAL AS NEEDED
Status: CANCELLED
Start: 2021-03-11

## 2021-02-11 RX ORDER — DIPHENHYDRAMINE HYDROCHLORIDE 50 MG/ML
25 INJECTION, SOLUTION INTRAMUSCULAR; INTRAVENOUS AS NEEDED
Status: CANCELLED
Start: 2021-03-11

## 2021-02-11 RX ADMIN — DENOSUMAB 120 MG: 120 INJECTION SUBCUTANEOUS at 13:29

## 2021-02-11 RX ADMIN — HEPARIN 500 UNITS: 100 SYRINGE at 13:22

## 2021-02-11 RX ADMIN — FULVESTRANT 500 MG: 50 INJECTION INTRAMUSCULAR at 13:29

## 2021-02-11 RX ADMIN — Medication 30 ML: at 13:19

## 2021-02-11 NOTE — PROGRESS NOTES
[facility] Progress Note    Date: 2021    Name: Jose J Nowak    MRN: [id number]         : 1945       FASLODEX Q4 WEEKS  XGEVA Q4 WEEKS    MONTHLY PORT FLUSH WITH LABS      Ms. Rosemary Orellana arrived to [address] [place] [address] at 1300, ambulatory and in no acute distress. Patient denied any recent changes in her health condition or medication regimen. Patient denied complaint of pain or discomfort. Ms. Rosemary Orellana was assessed and education was provided. Ms. Klarissa Bolden vitals were reviewed. Visit Vitals  /72 (BP 1 Location: Left arm, BP Patient Position: Sitting)   Pulse 82   Temp 97.7 °F (36.5 °C)   Resp 18   SpO2 100%   Breastfeeding No       Pt was observed for 5 minutes after obtaining vital signs prior to initiating treatment. Patient's left upper chest port accessed with 20 g 1 inch non-coring access needle and blood samples obtained for labs (cbc, cmp,  Magnesium, and phos) per order. Patient's port flushed with NS, followed by instillation of heparin 500 units/5mL, per protocol, and was de-accessed with needle removed intact. No evidence of complication noted at site and bandaid/gauze applied. Faslodex 500mg administered as ordered IM in patient's L & R dorsogluteal muscles (250mg per injection). Band-aids to sites. No irritation or bleeding to sites    CMP results reviewed from 2021. Serum calcium = 9.1 and within ordered parameters for administration of Xgeva injection. Xgeva 120mg administered as ordered SQ in pt's left upper arm. Patient tolerated injection well and site without evidence of complication. Bandaid applied. Pt tolerated injections well and without complaints. Pt armband removed & shredded. Ms. Rosemary Orellana was discharged from [place] [address] in stable condition at 1440. She is to return on 3/11/2021 at 1300 for her next appointment.     Alivia Garcia RN  2021

## 2021-02-12 ENCOUNTER — HOSPITAL ENCOUNTER (OUTPATIENT)
Dept: ULTRASOUND IMAGING | Age: 76
Discharge: HOME | End: 2021-02-12
Attending: INTERNAL MEDICINE
Payer: MEDICARE

## 2021-02-12 DIAGNOSIS — Z17.0 MALIGNANT NEOPLASM OF LOWER-INNER QUADRANT OF RIGHT BREAST OF FEMALE, ESTROGEN RECEPTOR POSITIVE (HCC): ICD-10-CM

## 2021-02-12 DIAGNOSIS — C50.919 METASTATIC BREAST CANCER (HCC): ICD-10-CM

## 2021-02-12 DIAGNOSIS — C79.51 SECONDARY MALIGNANT NEOPLASM OF BONE AND BONE MARROW (HCC): ICD-10-CM

## 2021-02-12 DIAGNOSIS — C50.311 MALIGNANT NEOPLASM OF LOWER-INNER QUADRANT OF RIGHT BREAST OF FEMALE, ESTROGEN RECEPTOR POSITIVE (HCC): ICD-10-CM

## 2021-02-12 DIAGNOSIS — C79.52 SECONDARY MALIGNANT NEOPLASM OF BONE AND BONE MARROW (HCC): ICD-10-CM

## 2021-02-12 PROCEDURE — 76882 US LMTD JT/FCL EVL NVASC XTR: CPT

## 2021-02-26 NOTE — PROGRESS NOTES
Hematology/Oncology  Progress Note    Name: Aruna Durham  Date: 3/3/2021  : 1945    PCP: Ace Carrillo MD     Ms. Mariza Marsh is a 76 y.o.  female who was seen for management of her metastatic breast cancer with associated complications of chemotherapy. Current therapy: Fulvestrant 500 mg subcutaneous monthly and Ibrance 125 mg by mouth daily. Subjective:     Mrs. Mariza Marsh is a 42-year-old woman who has slowly progressive metastatic breast cancer. Patient was being followed by Dr. Adrianna Goodrich who retired. She is here today for chemo follow up. The patient has previously completed external beam radiation therapy to lesions in her ribs and more recently she completed proton therapy to areas of bone involvement with a significant benefit with regards to pain control. She is tolerating monthly Fulvestrant fairly well. The posttherapy imaging studies showed a significant decrease in the intensity of uptake on the bone scan. Additionally she is currently receiving systemic therapy with fulvestrant and Ibrance. Patient is tolerating the systemic therapy reasonably well and has not experienced any nausea or emesis. She reported persistent right shoulder area for months. Her  admitted her pain appeared after she carried heavy things on 2020. She described sharp pain, on-off, right shoulder area, and change with positions. She denied skin rashes but noted some pimples on the same site before. She continues to have SOB intermittently. She also receives Procrit at 4 week intervals whenever her hemoglobin is below 10 g/dL and hematocrit is below 30%. Past medical history, family history, and social history: these were reviewed and remains unchanged.     Past Medical History:   Diagnosis Date    Biliary colic     Breast CA ([address] 2012    Cancer [facility])     Right Breast    Chemotherapy convalescence or palliative care     Neuropathy     Radiation therapy complication     Thyroid disease      Past Surgical History:   Procedure Laterality Date    HX LAP CHOLECYSTECTOMY  13    HX MASTECTOMY      Right    FL BREAST SURGERY PROCEDURE UNLISTED  10/2002    Right-Lesion    FL BREAST SURGERY PROCEDURE UNLISTED  2004    Right-Lesion     Social History     Socioeconomic History    Marital status:      Spouse name: Not on file    Number of children: Not on file    Years of education: Not on file    Highest education level: Not on file   Occupational History    Not on file   Social Needs    Financial resource strain: Not on file    Food insecurity     Worry: Not on file     Inability: Not on file    Transportation needs     Medical: Not on file     Non-medical: Not on file   Tobacco Use    Smoking status: Former Smoker     Quit date: 1991     Years since quittin.7    Smokeless tobacco: Never Used   Substance and Sexual Activity    Alcohol use:  Yes     Alcohol/week: 0.0 standard drinks     Types: 1 - 2 Glasses of wine per week     Comment: socially, occassionally    Drug use: No    Sexual activity: Not on file   Lifestyle    Physical activity     Days per week: Not on file     Minutes per session: Not on file    Stress: Not on file   Relationships    Social connections     Talks on phone: Not on file     Gets together: Not on file     Attends Rastafari service: Not on file     Active member of club or organization: Not on file     Attends meetings of clubs or organizations: Not on file     Relationship status: Not on file    Intimate partner violence     Fear of current or ex partner: Not on file     Emotionally abused: Not on file     Physically abused: Not on file     Forced sexual activity: Not on file   Other Topics Concern    Not on file   Social History Narrative    Not on file     Family History   Problem Relation Age of Onset    Cancer Maternal Aunt         Hodgkin's disease    Breast Cancer Paternal Aunt     Cancer Father         Prostate, lung, brain     Current Outpatient Medications   Medication Sig Dispense Refill    cyclobenzaprine (FLEXERIL) 10 mg tablet Take 1 Tab by mouth three (3) times daily as needed for Muscle Spasm(s) for up to 30 days. 45 Tab 0    gabapentin (NEURONTIN) 100 mg capsule Take 2 Caps by mouth three (3) times daily. Max Daily Amount: 600 mg. 540 Cap 3    calcium citrate 200 mg (950 mg) tablet Take  by mouth daily.  palbociclib (Ibrance) 100 mg cap Take 1 Cap by mouth daily. For 21 days on and 7 days off every 28 days. 63 Cap 5    Cetirizine (ZYRTEC) 10 mg cap Take 1 Cap by mouth daily as needed.  lidocaine HCl-hydrocortison ac topical cream Apply  to affected area two (2) times a day. 85 g 3    TETRACYCLINE HCL (TETRACYCLINE PO) Take 250 mg by mouth two (2) times a day.  fexofenadine-pseudoephedrine (ALLEGRA-D 24 HOUR) 180-240 mg per tablet Take 1 Tab by mouth daily as needed.  sulfacetamide (BLEPH-10) 10 % ophthalmic ointment Administer  to right eye three (3) times daily.  palbociclib (IBRANCE) 125 mg cap Take 125 mg by mouth daily. Take 1 tab po every day for 21 days on and 7 days off q 28 days  Indications: HORMONE RECEPTOR(+), HER2(-) ADVANCED BREAST CANCER 42 Cap 6    Cholecalciferol, Vitamin D3, 5,000 unit Tab Take  by mouth daily.  thyroid, Pork, (ARMOUR THYROID) 60 mg tablet Take 90 mg by mouth daily.  L-Mfolate-B6 Phos-Methyl-B12 (NEURPATH-B) 3-35-2 mg Tab Take  by mouth two (2) times a day.  warfarin (COUMADIN) 1 mg tablet Take 1 mg by mouth daily. Review of Systems   Constitutional: Negative for chills, diaphoresis, fever, malaise/fatigue and weight loss. Respiratory: Negative for cough, hemoptysis, shortness of breath and wheezing. Cardiovascular: Negative for chest pain, palpitations and leg swelling. Gastrointestinal: Negative for abdominal pain, diarrhea, heartburn, nausea and vomiting.    Genitourinary: Negative for dysuria, frequency, hematuria and urgency. Musculoskeletal: Negative for joint pain and myalgias. Skin: Negative for itching and rash. Neurological: Negative for dizziness, seizures, weakness and headaches. Psychiatric/Behavioral: Negative for depression. The patient does not have insomnia. Objective:     Visit Vitals  /68   Pulse 78   Temp 97.8 °F (36.6 °C) (Temporal)   Resp 18   Ht 5' 4\" (1.626 m)   Wt 66.7 kg (147 lb)   SpO2 99%   BMI 25.23 kg/m²       ECOG Performance Status (grade): 1  0 - able to carry on all pre-disease activity w/out restriction  1 - restricted but able to carry out light work  2 - ambulatory and can self- care but unable to carry out work  3 - bed or chair >50% of waking hours  4 - completely disable, total care, confined to bed or chair    Physical Exam  Constitutional:       Appearance: Normal appearance. HENT:      Head: Normocephalic and atraumatic. Eyes:      Pupils: Pupils are equal, round, and reactive to light. Neck:      Musculoskeletal: Neck supple. Cardiovascular:      Rate and Rhythm: Normal rate and regular rhythm. Heart sounds: Normal heart sounds. Pulmonary:      Effort: Pulmonary effort is normal.      Breath sounds: Normal breath sounds. Abdominal:      General: Bowel sounds are normal.      Palpations: Abdomen is soft. Tenderness: There is no abdominal tenderness. There is no guarding. Musculoskeletal: Normal range of motion. Right lower leg: No edema. Left lower leg: No edema. Comments: Enlarged soft tissues in right shoulder area, no tenderness. Skin:     General: Skin is warm. Neurological:      General: No focal deficit present. Mental Status: She is alert and oriented to person, place, and time. Mental status is at baseline. Diagnostics:      No results found for this or any previous visit (from the past 96 hour(s)). Imaging:  No results found for this or any previous visit.     Results for orders placed during the hospital encounter of 12/17/20   XR SPINE THORAC 2 V    Narrative XR SPINE THORAC 2 V: 12/17/2020 2:45 PM    CLINICAL INFORMATION  back pain. History of breast cancer    COMPARISON  Thoracic spine MRI 23 June 2017, CT chest, abdomen and pelvis 17 April 2020    TECHNIQUE  2 views of the thoracic spine    FINDINGS    Levoconvex curvature of the upper lumbar spine. Mild/moderate multilevel discogenic endplate degenerative changes throughout the  thoracic spine. No fracture or destructive osseous lesions identified. Impression IMPRESSION:  No acute osseous findings. If continued clinical concern, recommend MRI. Results for orders placed in visit on 01/05/21   CT CHEST ABD PELV W CONT    Narrative CT CHEST ABDOMEN PELVIS ENHANCED    CPT code: 34096, 28961 & 41373    INDICATION: Metastatic breast cancer. Secondary malignant neoplasm of bone and  bone marrow. TECHNIQUE: 5 mm collimation axial images obtained from the thoracic inlet to the  level of the pubic symphysis following the uneventful administration of 70 cc's  nonionic intravenous contrast.    All CT scans at this facility are performed using dose optimization technique as  appropriate to this specific exam, to include automated exposure control,  adjustment of the mA and/or KP according to patient size or use of iterative  reconstruction techniques. COMPARISON: Prior CT 4/17/20    CHEST FINDINGS:  Heart size normal. No pericardial effusion. Nonenlarged aorta. Infusion catheter in place in the left chest.    No enlarged axillary or mediastinal lymph nodes. Similar volume loss of the right hemithorax with area of lenticular density  ventral along the pleural surface similar to the prior exam likely related to  rounded atelectasis possibly fibrosis if there has been previous radiation  therapy. Similar conglomerate presumed rounded atelectasis posterior right lower lobe  adjacent to a small chronic pleural effusion.  Smooth pleural thickening at the  dependent and lateral right lung base costophrenic angle appears similar to  previous. No new lung lesions. ABDOMEN FINDINGS:   Liver diffusely low attenuation consistent with steatosis. No mass appreciated  Gallbladder absent. Pancreas unremarkable. Spleen unremarkable. Adrenal glands unremarkable. Kidneys demonstrate symmetric enhancement. No hydronephrosis, mass or stones  No ascites. Normal caliber abdominal aorta. Similar chronic segmental narrowing of the proximal celiac segment over the  initial 1.4 cm, as on the previous exam. Bifurcation appears normal caliber and  normally patent. This is present dating back to at least 2016. PELVIS FINDINGS:   No small bowel or colon dilatation. Small volume scattered diverticulosis most significant at the sigmoid level. Uterus not enlarged. Bladder nearly empty and not well assessed. No ascites. No new or enlarging lymphadenopathy. Stable 7 mm sclerotic density left femoral neck and tiny sclerotic density right  femoral head. Similar amorphous sclerotic densities in the right iliac bone. Similar mildly expansile heterogeneous attenuation of the posterior T12 lamina  and right pedicle. Similar increased sclerosis right anterior lateral T6  diffusely and focally T7. No new lesions appreciated. Impression IMPRESSION:  1. Similar chronic appearing findings in the right hemithorax with areas of  rounded atelectasis, smooth pleural thickening, presumed chronic interstitial  changes and apical fibrosis and small pleural effusion. 2. No new or enlarging lymphadenopathy. No solid organ lesions or ascites. .    3. Similar appearance of sclerotic and heterogeneous osseous foci involving the  femoral heads, right iliac bone, T12 and right ribs as above. Assessment:     1. Metastatic breast cancer ([address]    2.  Malignant neoplasm of lower-inner quadrant of right breast of female, estrogen receptor positive ([address] 3. Right-sided chest wall pain    4. Neuropathy associated with cancer ([address]    5. Anemia due to chemotherapy    6. Chronic leukopenia    7. Chemotherapy-induced thrombocytopenia      Plan:   Metastatic breast cancer:     -- She has slowly progressive metastatic breast cancer. Patient was being followed by Dr. Karrie Leahy who retired. The patient has previously completed external beam radiation therapy to lesions in her ribs and more recently she completed proton therapy to areas of bone involvement with a significant benefit with regards to pain control. The posttherapy imaging studies showed a significant decrease in the intensity of uptake on the bone scan. -- Additionally she is currently receiving systemic therapy with fulvestrant and Ibrance. She has been on Fulvestrant 500 mg subcutaneous monthly and Ibrance 100 mg by mouth daily. She is tolerating treatment fairly well without high grade toxicities. -- 4/15/2020  CT scans of the chest, abdomen, and pelvis shows that she has a loculated right pleural effusion with some pleural thickening and adjacent areas of probable rounded atelectasis all of which are stable. She has relatively stable bone lesions of the rib, pelvis, and T12 posterior element. There was no findings on CT scan to support new metastatic disease in the chest, abdomen, or pelvis. --  Mammogram 6/5 done shows No evidence of malignancy. Suggest routine follow-up. -- 12/17/2020 CBC reported hemoglobin was 10.8, hematocrit was 32.8%, total WBC was 1.9 and ANC was 1.3, platelet was 214,258.  -- 1/18/2021 CT CA/P: No new or enlarging lymphadenopathy. No solid organ lesions or ascites. -- 1/18/2021 Bone scan reported a focal uptake anterior left iliac crest without definite CT correlate.   -- 2/12/2021 US reported No abnormality is seen in the patient's area of concern inferior to the right scapula. Today I have reviewed with the patient about above reports.    2/11/2021 CBC reported hemoglobin 10.5, hematocrit 32, wbc 2.0, ANC 1.5, and platelet 064,334. chemistry reviewed    Plan:   -- Patient will continue current regimen with fulvestrant and palbociclib as previously recommended. Palbociclib 100 mg daily 21 days on/7 days off today. -- She will need to check lab before each cycle. -- She will need yearly DEXA scan. -- We will repeat Bone scan in 3 months as above findings. Right posterior chest wall pain  -- Recent work up reported as above, still unclear etiology. Muscle strains vs. Shingles with neuropathy?  -- Advised her to continue Gabapentin for neuropathy  -- Will add muscle relaxants Flexeril  -- She has completed steroids per PCP, will f/u PCP soon. Neuropathy associated with cancer:   -- Was on Neurontin at 400 mg 3 times daily.         Chemotherapy-induced neutropenia/chronic leukopenia (persisting problem):   -- 2/11/2021 CBC reported hemoglobin 10.5, hematocrit 32, wbc 2.0, ANC 1.5, and platelet 900,667. chemistry reviewed. -- Clinical stable. -- If the absolute neutrophil count declines to 0.5 she will be started on Neupogen or Neulasta.       Chemotherapy-induced anemia. -- 2/11/2021 CBC reported hemoglobin 10.5, hematocrit 32, wbc 2.0, ANC 1.5, and platelet 059,829. chemistry reviewed  -- We have recommended that the patient continue taking ferrous sulfate 1 tablet daily. Procrit at a dose of 60,000 units subcutaneous will continue to  be provided when her hemoglobin  declined below 10 g/dL and hematocrit is below 30% every 4 weeks .        Chemotherapy-induced thrombocytopenia :   -- The patient was holding the Ibrance 140 mg at previous times D/T thrombocytopenia. -- She presently taking Ibrance 100mg.   -- 2/11/2021 CBC reported hemoglobin 10.5, hematocrit 32, wbc 2.0, ANC 1.5, and platelet [phone],477. chemistry reviewed      -- We will see the patient back in clinic in about 4 weeks. Always sooner if required.   The patient can have lab done prior our next clinic visit. Orders Placed This Encounter    cyclobenzaprine (FLEXERIL) 10 mg tablet     Sig: Take 1 Tab by mouth three (3) times daily as needed for Muscle Spasm(s) for up to 30 days. Dispense:  45 Tab     Refill:  0           Ms. Narayan Schofield has a reminder for a \"due or due soon\" health maintenance. I have asked that she contact her primary care provider for follow-up on this health maintenance. All of patient's questions answered to their apparent satisfaction. They verbally show understanding and agreement with aforementioned plan. Bernabe Cespedes MD  3/3/2021          About 25 minutes were spent for this encounter with more than 50% of the time spent in face-to-face counseling, discussing on diagnosis and management plan going forward, and co-ordination of care. Parts of this document has been produced using Dragon dictation system. Unrecognized errors in transcription may be present. Please do not hesitate to reach out for any questions or clarifications.       CC: Nicole Gillespie MD

## 2021-03-03 ENCOUNTER — OFFICE VISIT (OUTPATIENT)
Dept: ONCOLOGY | Age: 76
End: 2021-03-03
Payer: MEDICARE

## 2021-03-03 VITALS
SYSTOLIC BLOOD PRESSURE: 134 MMHG | DIASTOLIC BLOOD PRESSURE: 68 MMHG | HEART RATE: 78 BPM | HEIGHT: 64 IN | BODY MASS INDEX: 25.1 KG/M2 | WEIGHT: 147 LBS | RESPIRATION RATE: 18 BRPM | OXYGEN SATURATION: 99 % | TEMPERATURE: 97.8 F

## 2021-03-03 DIAGNOSIS — R07.89 RIGHT-SIDED CHEST WALL PAIN: ICD-10-CM

## 2021-03-03 DIAGNOSIS — D64.81 ANEMIA DUE TO CHEMOTHERAPY: ICD-10-CM

## 2021-03-03 DIAGNOSIS — T45.1X5A ANEMIA DUE TO CHEMOTHERAPY: ICD-10-CM

## 2021-03-03 DIAGNOSIS — G63 NEUROPATHY ASSOCIATED WITH CANCER (HCC): ICD-10-CM

## 2021-03-03 DIAGNOSIS — D69.59 CHEMOTHERAPY-INDUCED THROMBOCYTOPENIA: ICD-10-CM

## 2021-03-03 DIAGNOSIS — C50.919 METASTATIC BREAST CANCER (HCC): Primary | ICD-10-CM

## 2021-03-03 DIAGNOSIS — Z17.0 MALIGNANT NEOPLASM OF LOWER-INNER QUADRANT OF RIGHT BREAST OF FEMALE, ESTROGEN RECEPTOR POSITIVE (HCC): ICD-10-CM

## 2021-03-03 DIAGNOSIS — C80.1 NEUROPATHY ASSOCIATED WITH CANCER (HCC): ICD-10-CM

## 2021-03-03 DIAGNOSIS — D72.819 CHRONIC LEUKOPENIA: ICD-10-CM

## 2021-03-03 DIAGNOSIS — C50.311 MALIGNANT NEOPLASM OF LOWER-INNER QUADRANT OF RIGHT BREAST OF FEMALE, ESTROGEN RECEPTOR POSITIVE (HCC): ICD-10-CM

## 2021-03-03 DIAGNOSIS — T45.1X5A CHEMOTHERAPY-INDUCED THROMBOCYTOPENIA: ICD-10-CM

## 2021-03-03 PROCEDURE — 99214 OFFICE O/P EST MOD 30 MIN: CPT | Performed by: INTERNAL MEDICINE

## 2021-03-03 PROCEDURE — G0463 HOSPITAL OUTPT CLINIC VISIT: HCPCS | Performed by: INTERNAL MEDICINE

## 2021-03-03 RX ORDER — CYCLOBENZAPRINE HCL 10 MG
10 TABLET ORAL
Qty: 45 TAB | Refills: 0 | Status: SHIPPED | OUTPATIENT
Start: 2021-03-03 | End: 2021-04-02

## 2021-03-08 DIAGNOSIS — C50.919 METASTATIC BREAST CANCER (HCC): Primary | ICD-10-CM

## 2021-03-08 RX ORDER — LAMOTRIGINE 25 MG/1
500 TABLET ORAL ONCE
Status: CANCELLED | OUTPATIENT
Start: 2021-03-11 | End: 2021-03-11

## 2021-03-09 DIAGNOSIS — C50.919 METASTATIC BREAST CANCER (HCC): ICD-10-CM

## 2021-03-11 ENCOUNTER — HOSPITAL ENCOUNTER (OUTPATIENT)
Dept: INFUSION THERAPY | Age: 76
Discharge: HOME | End: 2021-03-11
Payer: MEDICARE

## 2021-03-11 VITALS
RESPIRATION RATE: 18 BRPM | DIASTOLIC BLOOD PRESSURE: 79 MMHG | TEMPERATURE: 98.1 F | SYSTOLIC BLOOD PRESSURE: 145 MMHG | HEART RATE: 90 BPM | OXYGEN SATURATION: 97 %

## 2021-03-11 DIAGNOSIS — C50.919 METASTATIC BREAST CANCER (HCC): Primary | ICD-10-CM

## 2021-03-11 DIAGNOSIS — C50.311 MALIGNANT NEOPLASM OF LOWER-INNER QUADRANT OF RIGHT BREAST OF FEMALE, ESTROGEN RECEPTOR POSITIVE (HCC): ICD-10-CM

## 2021-03-11 DIAGNOSIS — Z17.0 MALIGNANT NEOPLASM OF LOWER-INNER QUADRANT OF RIGHT BREAST OF FEMALE, ESTROGEN RECEPTOR POSITIVE (HCC): ICD-10-CM

## 2021-03-11 LAB
BASOPHILS # BLD: 0 K/UL (ref 0–0.1)
BASOPHILS NFR BLD: 0 % (ref 0–2)
DIFFERENTIAL METHOD BLD: ABNORMAL
EOSINOPHIL # BLD: 0 K/UL (ref 0–0.4)
EOSINOPHIL NFR BLD: 0 % (ref 0–5)
ERYTHROCYTE [DISTWIDTH] IN BLOOD BY AUTOMATED COUNT: 15.9 % (ref 11.6–14.5)
HCT VFR BLD AUTO: 31.6 % (ref 35–45)
HGB BLD-MCNC: 10.7 G/DL (ref 12–16)
LYMPHOCYTES # BLD: 0.4 K/UL (ref 0.9–3.6)
LYMPHOCYTES NFR BLD: 6 % (ref 21–52)
MAGNESIUM SERPL-MCNC: 1.8 MG/DL (ref 1.6–2.6)
MCH RBC QN AUTO: 34.3 PG (ref 24–34)
MCHC RBC AUTO-ENTMCNC: 33.9 G/DL (ref 31–37)
MCV RBC AUTO: 101.3 FL (ref 74–97)
MONOCYTES # BLD: 0.1 K/UL (ref 0.05–1.2)
MONOCYTES NFR BLD: 3 % (ref 3–10)
NEUTS SEG # BLD: 5.2 K/UL (ref 1.8–8)
NEUTS SEG NFR BLD: 91 % (ref 40–73)
PHOSPHATE SERPL-MCNC: 3.4 MG/DL (ref 2.5–4.9)
PLATELET # BLD AUTO: 362 K/UL (ref 135–420)
PMV BLD AUTO: 9.5 FL (ref 9.2–11.8)
RBC # BLD AUTO: 3.12 M/UL (ref 4.2–5.3)
WBC # BLD AUTO: 5.7 K/UL (ref 4.6–13.2)

## 2021-03-11 PROCEDURE — 74011250636 HC RX REV CODE- 250/636: Performed by: INTERNAL MEDICINE

## 2021-03-11 PROCEDURE — 96402 CHEMO HORMON ANTINEOPL SQ/IM: CPT

## 2021-03-11 PROCEDURE — 77030012965 HC NDL HUBR BBMI -A

## 2021-03-11 PROCEDURE — 84100 ASSAY OF PHOSPHORUS: CPT

## 2021-03-11 PROCEDURE — 74011250636 HC RX REV CODE- 250/636: Performed by: NURSE PRACTITIONER

## 2021-03-11 PROCEDURE — 96523 IRRIG DRUG DELIVERY DEVICE: CPT

## 2021-03-11 PROCEDURE — 85025 COMPLETE CBC W/AUTO DIFF WBC: CPT

## 2021-03-11 PROCEDURE — 83735 ASSAY OF MAGNESIUM: CPT

## 2021-03-11 PROCEDURE — 96372 THER/PROPH/DIAG INJ SC/IM: CPT

## 2021-03-11 PROCEDURE — 36591 DRAW BLOOD OFF VENOUS DEVICE: CPT

## 2021-03-11 RX ORDER — ALBUTEROL SULFATE 0.83 MG/ML
2.5 SOLUTION RESPIRATORY (INHALATION) AS NEEDED
Status: CANCELLED
Start: 2021-04-08

## 2021-03-11 RX ORDER — DIPHENHYDRAMINE HYDROCHLORIDE 50 MG/ML
25 INJECTION, SOLUTION INTRAMUSCULAR; INTRAVENOUS AS NEEDED
Status: CANCELLED
Start: 2021-04-08

## 2021-03-11 RX ORDER — HYDROCORTISONE SODIUM SUCCINATE 100 MG/2ML
100 INJECTION, POWDER, FOR SOLUTION INTRAMUSCULAR; INTRAVENOUS AS NEEDED
Status: CANCELLED | OUTPATIENT
Start: 2021-04-08

## 2021-03-11 RX ORDER — SODIUM CHLORIDE 0.9 % (FLUSH) 0.9 %
10-40 SYRINGE (ML) INJECTION AS NEEDED
Status: DISCONTINUED | OUTPATIENT
Start: 2021-03-11 | End: 2021-03-15 | Stop reason: HOSPADM

## 2021-03-11 RX ORDER — EPINEPHRINE 1 MG/ML
0.3 INJECTION, SOLUTION, CONCENTRATE INTRAVENOUS AS NEEDED
Status: CANCELLED | OUTPATIENT
Start: 2021-04-08

## 2021-03-11 RX ORDER — LAMOTRIGINE 25 MG/1
500 TABLET ORAL ONCE
Status: COMPLETED | OUTPATIENT
Start: 2021-03-11 | End: 2021-03-11

## 2021-03-11 RX ORDER — ONDANSETRON 2 MG/ML
8 INJECTION INTRAMUSCULAR; INTRAVENOUS AS NEEDED
Status: CANCELLED | OUTPATIENT
Start: 2021-04-08

## 2021-03-11 RX ORDER — ACETAMINOPHEN 325 MG/1
650 TABLET ORAL AS NEEDED
Status: CANCELLED
Start: 2021-04-08

## 2021-03-11 RX ORDER — METHYLPREDNISOLONE 4 MG/1
4 TABLET ORAL
COMMUNITY
End: 2021-05-06

## 2021-03-11 RX ORDER — HEPARIN 100 UNIT/ML
500 SYRINGE INTRAVENOUS ONCE
Status: COMPLETED | OUTPATIENT
Start: 2021-03-11 | End: 2021-03-11

## 2021-03-11 RX ORDER — DIPHENHYDRAMINE HYDROCHLORIDE 50 MG/ML
50 INJECTION, SOLUTION INTRAMUSCULAR; INTRAVENOUS AS NEEDED
Status: CANCELLED
Start: 2021-04-08

## 2021-03-11 RX ADMIN — DENOSUMAB 120 MG: 120 INJECTION SUBCUTANEOUS at 13:19

## 2021-03-11 RX ADMIN — HEPARIN 500 UNITS: 100 SYRINGE at 13:14

## 2021-03-11 RX ADMIN — Medication 20 ML: at 13:13

## 2021-03-11 RX ADMIN — FULVESTRANT 500 MG: 250 INJECTION INTRAMUSCULAR at 13:21

## 2021-03-11 NOTE — PROGRESS NOTES
[facility] Progress Note    Date: 2021    Name: Mary Jo Santana    MRN: [id number]         : 1945       FASLODEX Q4 WEEKS  XGEVA Q4 WEEKS    MONTHLY PORT FLUSH WITH LABS      Ms. Thania Lin arrived to [facility] at 1300, ambulatory and in no acute distress. Patient had about with shingles, did see Dr. Katarina Yang and received some new medciations. Patient denied complaint of pain or discomfort. Ms. Thania Lin was assessed and education was provided. Ms. Stacey Sandoval vitals were reviewed. Visit Vitals  BP (!) 145/79 (BP 1 Location: Left upper arm, BP Patient Position: Sitting)   Pulse 90   Temp 98.1 °F (36.7 °C)   Resp 18   SpO2 97%   Breastfeeding No       Pt was observed for 5 minutes after obtaining vital signs prior to initiating treatment. Patient's left upper chest port accessed with 20 g 1 inch non-coring access needle and blood samples obtained for labs (cbc, cmp,  Magnesium, and phos) per order. Patient's port flushed with NS, followed by instillation of heparin 500 units/5mL, per protocol, and was de-accessed with needle removed intact. No evidence of complication noted at site and bandaid/gauze applied. Faslodex 500mg administered as ordered IM in patient's L & R dorsogluteal muscles (250mg per injection). Band-aids to sites. No irritation or bleeding to sites    CMP results reviewed from 2021. Serum calcium = 8.9 and within ordered parameters for administration of Xgeva injection. Xgeva 120mg administered as ordered SQ in pt's left upper arm. Patient tolerated injection well and site without evidence of complication. Bandaid applied. Pt tolerated injections well and without complaints. Pt armband removed & shredded. Ms. Thania Lin was discharged from [place] [id number] in stable condition at 1330. She is to return on 2021 at 1300 for her next appointment.     Margie Escalera RN  2021

## 2021-03-26 NOTE — PROGRESS NOTES
Hematology/Oncology  Progress Note    Name: Wanda Route  Date: 3/31/2021  : 1945    PCP: Phoenix Gayle MD     Ms. Charo Ferrer is a 76 y.o.  female who was seen for management of her metastatic breast cancer with associated complications of chemotherapy. Current therapy: Fulvestrant 500 mg subcutaneous monthly and Ibrance 125 mg by mouth daily. Subjective:     Mrs. Charo Ferrer is a 28-year-old woman who has slowly progressive metastatic breast cancer. Patient was being followed by Dr. Fredy Walker who retired. She is here today for chemo follow up. The patient has previously completed external beam radiation therapy to lesions in her ribs and more recently she completed proton therapy to areas of bone involvement with a significant benefit with regards to pain control. She is tolerating monthly Fulvestrant fairly well. The posttherapy imaging studies showed a significant decrease in the intensity of uptake on the bone scan. Additionally she is currently receiving systemic therapy with fulvestrant and Ibrance. Patient is tolerating the systemic therapy reasonably well and has not experienced any nausea or emesis. She reported her right shoulder pain has improving with flexeril. She continues to have SOB intermittently. She also receives Procrit at 4 week intervals whenever her hemoglobin is below 10 g/dL and hematocrit is below 30%. She denied other complaints. Past medical history, family history, and social history: these were reviewed and remains unchanged.     Past Medical History:   Diagnosis Date    Biliary colic     Breast CA ([address] 2012    Cancer [facility])     Right Breast    Chemotherapy convalescence or palliative care     Neuropathy     Radiation therapy complication     Thyroid disease      Past Surgical History:   Procedure Laterality Date    HX LAP CHOLECYSTECTOMY  13    HX MASTECTOMY  1991    Right    NV BREAST SURGERY PROCEDURE UNLISTED  10/2002    Right-Lesion  HI BREAST SURGERY PROCEDURE UNLISTED  2004    Right-Lesion     Social History     Socioeconomic History    Marital status:      Spouse name: Not on file    Number of children: Not on file    Years of education: Not on file    Highest education level: Not on file   Occupational History    Not on file   Social Needs    Financial resource strain: Not on file    Food insecurity     Worry: Not on file     Inability: Not on file    Transportation needs     Medical: Not on file     Non-medical: Not on file   Tobacco Use    Smoking status: Former Smoker     Quit date: 1991     Years since quittin.8    Smokeless tobacco: Never Used   Substance and Sexual Activity    Alcohol use: Yes     Alcohol/week: 0.0 standard drinks     Types: 1 - 2 Glasses of wine per week     Comment: socially, occassionally    Drug use: No    Sexual activity: Not on file   Lifestyle    Physical activity     Days per week: Not on file     Minutes per session: Not on file    Stress: Not on file   Relationships    Social connections     Talks on phone: Not on file     Gets together: Not on file     Attends Confucianism service: Not on file     Active member of club or organization: Not on file     Attends meetings of clubs or organizations: Not on file     Relationship status: Not on file    Intimate partner violence     Fear of current or ex partner: Not on file     Emotionally abused: Not on file     Physically abused: Not on file     Forced sexual activity: Not on file   Other Topics Concern    Not on file   Social History Narrative    Not on file     Family History   Problem Relation Age of Onset    Cancer Maternal Aunt         Hodgkin's disease    Breast Cancer Paternal Aunt     Cancer Father         Prostate, lung, brain     Current Outpatient Medications   Medication Sig Dispense Refill    methylPREDNISolone (Medrol) 4 mg tab Take 4 mg by mouth daily (with breakfast).  Indications: shingles      cyclobenzaprine (FLEXERIL) 10 mg tablet Take 1 Tab by mouth three (3) times daily as needed for Muscle Spasm(s) for up to 30 days. 45 Tab 0    gabapentin (NEURONTIN) 100 mg capsule Take 2 Caps by mouth three (3) times daily. Max Daily Amount: 600 mg. 540 Cap 3    calcium citrate 200 mg (950 mg) tablet Take  by mouth daily.  palbociclib (Ibrance) 100 mg cap Take 1 Cap by mouth daily. For 21 days on and 7 days off every 28 days. 63 Cap 5    Cetirizine (ZYRTEC) 10 mg cap Take 1 Cap by mouth daily as needed.  lidocaine HCl-hydrocortison ac topical cream Apply  to affected area two (2) times a day. 85 g 3    TETRACYCLINE HCL (TETRACYCLINE PO) Take 250 mg by mouth two (2) times a day.  fexofenadine-pseudoephedrine (ALLEGRA-D 24 HOUR) 180-240 mg per tablet Take 1 Tab by mouth daily as needed.  sulfacetamide (BLEPH-10) 10 % ophthalmic ointment Administer  to right eye three (3) times daily.  palbociclib (IBRANCE) 125 mg cap Take 125 mg by mouth daily. Take 1 tab po every day for 21 days on and 7 days off q 28 days  Indications: HORMONE RECEPTOR(+), HER2(-) ADVANCED BREAST CANCER 42 Cap 6    Cholecalciferol, Vitamin D3, 5,000 unit Tab Take  by mouth daily.  thyroid, Pork, (ARMOUR THYROID) 60 mg tablet Take 90 mg by mouth daily.  L-Mfolate-B6 Phos-Methyl-B12 (NEURPATH-B) 3-35-2 mg Tab Take  by mouth two (2) times a day.  warfarin (COUMADIN) 1 mg tablet Take 1 mg by mouth daily. Review of Systems   Constitutional: Negative for chills, diaphoresis, fever, malaise/fatigue and weight loss. Respiratory: Negative for cough, hemoptysis, shortness of breath and wheezing. Cardiovascular: Negative for chest pain, palpitations and leg swelling. Gastrointestinal: Negative for abdominal pain, diarrhea, heartburn, nausea and vomiting. Genitourinary: Negative for dysuria, frequency, hematuria and urgency. Musculoskeletal: Negative for joint pain and myalgias.    Skin: Negative for itching and rash. Neurological: Negative for dizziness, seizures, weakness and headaches. Psychiatric/Behavioral: Negative for depression. The patient does not have insomnia. Objective:     Visit Vitals  /71 (BP 1 Location: Left upper arm, BP Patient Position: Sitting, BP Cuff Size: Adult)   Pulse 87   Temp 97.1 °F (36.2 °C) (Temporal)   Resp 18   Ht 5' 4\" (1.626 m)   Wt 64 kg (141 lb)   SpO2 98%   BMI 24.20 kg/m²       ECOG Performance Status (grade): 1  0 - able to carry on all pre-disease activity w/out restriction  1 - restricted but able to carry out light work  2 - ambulatory and can self- care but unable to carry out work  3 - bed or chair >50% of waking hours  4 - completely disable, total care, confined to bed or chair    Physical Exam  Constitutional:       Appearance: Normal appearance. HENT:      Head: Normocephalic and atraumatic. Eyes:      Pupils: Pupils are equal, round, and reactive to light. Neck:      Musculoskeletal: Neck supple. Cardiovascular:      Rate and Rhythm: Normal rate and regular rhythm. Heart sounds: Normal heart sounds. Pulmonary:      Effort: Pulmonary effort is normal.      Breath sounds: Normal breath sounds. Abdominal:      General: Bowel sounds are normal.      Palpations: Abdomen is soft. Tenderness: There is no abdominal tenderness. There is no guarding. Musculoskeletal: Normal range of motion. Right lower leg: No edema. Left lower leg: No edema. Comments: Enlarged soft tissues in right shoulder area, no tenderness. Skin:     General: Skin is warm. Neurological:      General: No focal deficit present. Mental Status: She is alert and oriented to person, place, and time. Mental status is at baseline. Diagnostics:      No results found for this or any previous visit (from the past 96 hour(s)). Imaging:  No results found for this or any previous visit.     Results for orders placed during the hospital encounter of 12/17/20   XR SPINE THORAC 2 V    Narrative XR SPINE THORAC 2 V: 12/17/2020 2:45 PM    CLINICAL INFORMATION  back pain. History of breast cancer    COMPARISON  Thoracic spine MRI 23 June 2017, CT chest, abdomen and pelvis 17 April 2020    TECHNIQUE  2 views of the thoracic spine    FINDINGS    Levoconvex curvature of the upper lumbar spine. Mild/moderate multilevel discogenic endplate degenerative changes throughout the  thoracic spine. No fracture or destructive osseous lesions identified. Impression IMPRESSION:  No acute osseous findings. If continued clinical concern, recommend MRI. Results for orders placed in visit on 01/05/21   CT CHEST ABD PELV W CONT    Narrative CT CHEST ABDOMEN PELVIS ENHANCED    CPT code: 27279, [id number] & [id number]    INDICATION: Metastatic breast cancer. Secondary malignant neoplasm of bone and  bone marrow. TECHNIQUE: 5 mm collimation axial images obtained from the thoracic inlet to the  level of the pubic symphysis following the uneventful administration of 70 cc's  nonionic intravenous contrast.    All CT scans at this facility are performed using dose optimization technique as  appropriate to this specific exam, to include automated exposure control,  adjustment of the mA and/or KP according to patient size or use of iterative  reconstruction techniques. COMPARISON: Prior CT 4/17/20    CHEST FINDINGS:  Heart size normal. No pericardial effusion. Nonenlarged aorta. Infusion catheter in place in the left chest.    No enlarged axillary or mediastinal lymph nodes. Similar volume loss of the right hemithorax with area of lenticular density  ventral along the pleural surface similar to the prior exam likely related to  rounded atelectasis possibly fibrosis if there has been previous radiation  therapy. Similar conglomerate presumed rounded atelectasis posterior right lower lobe  adjacent to a small chronic pleural effusion.  Smooth pleural thickening at the  dependent and lateral right lung base costophrenic angle appears similar to  previous. No new lung lesions. ABDOMEN FINDINGS:   Liver diffusely low attenuation consistent with steatosis. No mass appreciated  Gallbladder absent. Pancreas unremarkable. Spleen unremarkable. Adrenal glands unremarkable. Kidneys demonstrate symmetric enhancement. No hydronephrosis, mass or stones  No ascites. Normal caliber abdominal aorta. Similar chronic segmental narrowing of the proximal celiac segment over the  initial 1.4 cm, as on the previous exam. Bifurcation appears normal caliber and  normally patent. This is present dating back to at least 2016. PELVIS FINDINGS:   No small bowel or colon dilatation. Small volume scattered diverticulosis most significant at the sigmoid level. Uterus not enlarged. Bladder nearly empty and not well assessed. No ascites. No new or enlarging lymphadenopathy. Stable 7 mm sclerotic density left femoral neck and tiny sclerotic density right  femoral head. Similar amorphous sclerotic densities in the right iliac bone. Similar mildly expansile heterogeneous attenuation of the posterior T12 lamina  and right pedicle. Similar increased sclerosis right anterior lateral T6  diffusely and focally T7. No new lesions appreciated. Impression IMPRESSION:  1. Similar chronic appearing findings in the right hemithorax with areas of  rounded atelectasis, smooth pleural thickening, presumed chronic interstitial  changes and apical fibrosis and small pleural effusion. 2. No new or enlarging lymphadenopathy. No solid organ lesions or ascites. .    3. Similar appearance of sclerotic and heterogeneous osseous foci involving the  femoral heads, right iliac bone, T12 and right ribs as above. Assessment:     1. Malignant neoplasm of lower-inner quadrant of right breast of female, estrogen receptor positive ([address]    2.  Metastatic breast cancer ([address] 3. Right-sided chest wall pain    4. Neuropathy associated with cancer ([address]    5. Anemia due to chemotherapy    6. Chronic leukopenia      Plan:   Metastatic breast cancer:     -- She has slowly progressive metastatic breast cancer. Patient was being followed by Dr. Mansi Umaña who retired. The patient has previously completed external beam radiation therapy to lesions in her ribs and more recently she completed proton therapy to areas of bone involvement with a significant benefit with regards to pain control. The posttherapy imaging studies showed a significant decrease in the intensity of uptake on the bone scan. -- Additionally she is currently receiving systemic therapy with fulvestrant and Ibrance. She has been on Fulvestrant 500 mg subcutaneous monthly and Ibrance 100 mg by mouth daily. She is tolerating treatment fairly well without high grade toxicities. -- 4/15/2020  CT scans of the chest, abdomen, and pelvis shows that she has a loculated right pleural effusion with some pleural thickening and adjacent areas of probable rounded atelectasis all of which are stable. She has relatively stable bone lesions of the rib, pelvis, and T12 posterior element. There was no findings on CT scan to support new metastatic disease in the chest, abdomen, or pelvis. --  Mammogram 6/5 done shows No evidence of malignancy. Suggest routine follow-up. -- 12/17/2020 CBC reported hemoglobin was 10.8, hematocrit was 32.8%, total WBC was 1.9 and ANC was 1.3, platelet was [phone].  -- 1/18/2021 CT CA/P: No new or enlarging lymphadenopathy. No solid organ lesions or ascites. -- 1/18/2021 Bone scan reported a focal uptake anterior left iliac crest without definite CT correlate.   -- 2/12/2021 US reported No abnormality is seen in the patient's area of concern inferior to the right scapula. Today I have reviewed with the patient about above reports.    3/11/2021 CBC reported hemoglobin 10.7, hematocrit 31.6, wbc 5.7, ANC 5.2, and platelet [phone]. chemistry reviewed    Plan:   -- Patient will continue current regimen with fulvestrant and palbociclib as previously recommended. Palbociclib 100 mg daily 21 days on/7 days off today. -- She will need to check lab before each cycle. -- She will need yearly DEXA scan. -- We will repeat Bone scan as above findings. Right posterior chest wall pain  -- Recent work up reported as above. Muscle strains vs. Shingles with neuropathy?  -- Advised her to continue Gabapentin for neuropathy  -- Clinical improving with muscle relaxants Flexeril  -- She has completed steroids per PCP, will f/u PCP soon. Neuropathy associated with cancer:   -- Was on Neurontin at 400 mg 3 times daily.         Chemotherapy-induced neutropenia/chronic leukopenia (persisting problem):   -- 2/11/2021 CBC reported hemoglobin 10.5, hematocrit 32, wbc 2.0, ANC 1.5, and platelet [phone]. chemistry reviewed. -- Clinical stable. -- If the absolute neutrophil count declines to 0.5 she will be started on Neupogen or Neulasta.       Chemotherapy-induced anemia. -- 2/11/2021 CBC reported hemoglobin 10.5, hematocrit 32, wbc 2.0, ANC 1.5, and platelet [phone]. chemistry reviewed  -- We have recommended that the patient continue taking ferrous sulfate 1 tablet daily. Procrit at a dose of 60,000 units subcutaneous will continue to  be provided when her hemoglobin  declined below 10 g/dL and hematocrit is below 30% every 4 weeks .        Chemotherapy-induced thrombocytopenia :   -- The patient was holding the Ibrance 140 mg at previous times D/T thrombocytopenia. -- She presently taking Ibrance 100mg.   -- 2/11/2021 CBC reported hemoglobin 10.5, hematocrit 32, wbc 2.0, ANC 1.5, and platelet [phone]. chemistry reviewed      -- We will see the patient back in clinic in about 6 weeks. Always sooner if required. The patient can have lab done prior our next clinic visit.       Orders Placed This Encounter    NM BONE SCAN [facility] BODY     Standing Status:   Future     Standing Expiration Date:   4/30/2022           Ms. Dell Concepcion has a reminder for a \"due or due soon\" health maintenance. I have asked that she contact her primary care provider for follow-up on this health maintenance. All of patient's questions answered to their apparent satisfaction. They verbally show understanding and agreement with aforementioned plan. Yoandy Gutierrez MD  3/31/2021          About 25 minutes were spent for this encounter with more than 50% of the time spent in face-to-face counseling, discussing on diagnosis and management plan going forward, and co-ordination of care. Parts of this document has been produced using Dragon dictation system. Unrecognized errors in transcription may be present. Please do not hesitate to reach out for any questions or clarifications.       CC: Isabel Coffman MD

## 2021-03-31 ENCOUNTER — OFFICE VISIT (OUTPATIENT)
Dept: ONCOLOGY | Age: 76
End: 2021-03-31
Payer: MEDICARE

## 2021-03-31 VITALS
DIASTOLIC BLOOD PRESSURE: 71 MMHG | BODY MASS INDEX: 24.07 KG/M2 | TEMPERATURE: 97.1 F | WEIGHT: 141 LBS | SYSTOLIC BLOOD PRESSURE: 128 MMHG | OXYGEN SATURATION: 98 % | RESPIRATION RATE: 18 BRPM | HEIGHT: 64 IN | HEART RATE: 87 BPM

## 2021-03-31 DIAGNOSIS — T45.1X5A ANEMIA DUE TO CHEMOTHERAPY: ICD-10-CM

## 2021-03-31 DIAGNOSIS — C50.919 METASTATIC BREAST CANCER (HCC): ICD-10-CM

## 2021-03-31 DIAGNOSIS — D64.81 ANEMIA DUE TO CHEMOTHERAPY: ICD-10-CM

## 2021-03-31 DIAGNOSIS — C50.311 MALIGNANT NEOPLASM OF LOWER-INNER QUADRANT OF RIGHT BREAST OF FEMALE, ESTROGEN RECEPTOR POSITIVE (HCC): Primary | ICD-10-CM

## 2021-03-31 DIAGNOSIS — Z17.0 MALIGNANT NEOPLASM OF LOWER-INNER QUADRANT OF RIGHT BREAST OF FEMALE, ESTROGEN RECEPTOR POSITIVE (HCC): Primary | ICD-10-CM

## 2021-03-31 DIAGNOSIS — C80.1 NEUROPATHY ASSOCIATED WITH CANCER (HCC): ICD-10-CM

## 2021-03-31 DIAGNOSIS — R07.89 RIGHT-SIDED CHEST WALL PAIN: ICD-10-CM

## 2021-03-31 DIAGNOSIS — D72.819 CHRONIC LEUKOPENIA: ICD-10-CM

## 2021-03-31 DIAGNOSIS — G63 NEUROPATHY ASSOCIATED WITH CANCER (HCC): ICD-10-CM

## 2021-03-31 PROCEDURE — G0463 HOSPITAL OUTPT CLINIC VISIT: HCPCS | Performed by: INTERNAL MEDICINE

## 2021-03-31 PROCEDURE — 99214 OFFICE O/P EST MOD 30 MIN: CPT | Performed by: INTERNAL MEDICINE

## 2021-04-06 DIAGNOSIS — C50.919 METASTATIC BREAST CANCER (HCC): ICD-10-CM

## 2021-04-08 ENCOUNTER — HOSPITAL ENCOUNTER (OUTPATIENT)
Dept: INFUSION THERAPY | Age: 76
Discharge: HOME | End: 2021-04-08
Payer: MEDICARE

## 2021-04-08 VITALS
HEIGHT: 64 IN | DIASTOLIC BLOOD PRESSURE: 68 MMHG | OXYGEN SATURATION: 97 % | HEART RATE: 81 BPM | SYSTOLIC BLOOD PRESSURE: 143 MMHG | WEIGHT: 143.8 LBS | BODY MASS INDEX: 24.55 KG/M2 | RESPIRATION RATE: 18 BRPM | TEMPERATURE: 97.5 F

## 2021-04-08 DIAGNOSIS — C50.311 MALIGNANT NEOPLASM OF LOWER-INNER QUADRANT OF RIGHT BREAST OF FEMALE, ESTROGEN RECEPTOR POSITIVE (HCC): ICD-10-CM

## 2021-04-08 DIAGNOSIS — C50.919 METASTATIC BREAST CANCER (HCC): Primary | ICD-10-CM

## 2021-04-08 DIAGNOSIS — Z17.0 MALIGNANT NEOPLASM OF LOWER-INNER QUADRANT OF RIGHT BREAST OF FEMALE, ESTROGEN RECEPTOR POSITIVE (HCC): ICD-10-CM

## 2021-04-08 LAB
ALBUMIN SERPL-MCNC: 3.4 G/DL (ref 3.4–5)
ALBUMIN/GLOB SERPL: 1.2 {RATIO} (ref 0.8–1.7)
ALP SERPL-CCNC: 60 U/L (ref 45–117)
ALT SERPL-CCNC: 15 U/L (ref 13–56)
ANION GAP SERPL CALC-SCNC: 8 MMOL/L (ref 3–18)
AST SERPL-CCNC: 14 U/L (ref 10–38)
BASOPHILS # BLD: 0 K/UL (ref 0–0.1)
BASOPHILS NFR BLD: 0 % (ref 0–2)
BILIRUB SERPL-MCNC: 0.5 MG/DL (ref 0.2–1)
BUN SERPL-MCNC: 14 MG/DL (ref 7–18)
BUN/CREAT SERPL: 16 (ref 12–20)
CALCIUM SERPL-MCNC: 8.8 MG/DL (ref 8.5–10.1)
CHLORIDE SERPL-SCNC: 107 MMOL/L (ref 100–111)
CO2 SERPL-SCNC: 29 MMOL/L (ref 21–32)
CREAT SERPL-MCNC: 0.85 MG/DL (ref 0.6–1.3)
DIFFERENTIAL METHOD BLD: ABNORMAL
EOSINOPHIL # BLD: 0 K/UL (ref 0–0.4)
EOSINOPHIL NFR BLD: 0 % (ref 0–5)
ERYTHROCYTE [DISTWIDTH] IN BLOOD BY AUTOMATED COUNT: 15.5 % (ref 11.6–14.5)
GLOBULIN SER CALC-MCNC: 2.8 G/DL (ref 2–4)
GLUCOSE SERPL-MCNC: 85 MG/DL (ref 74–99)
HCT VFR BLD AUTO: 28.7 % (ref 35–45)
HGB BLD-MCNC: 9.6 G/DL (ref 12–16)
LYMPHOCYTES # BLD: 0.3 K/UL (ref 0.9–3.6)
LYMPHOCYTES NFR BLD: 13 % (ref 21–52)
MAGNESIUM SERPL-MCNC: 1.4 MG/DL (ref 1.6–2.6)
MCH RBC QN AUTO: 33.2 PG (ref 24–34)
MCHC RBC AUTO-ENTMCNC: 33.4 G/DL (ref 31–37)
MCV RBC AUTO: 99.3 FL (ref 74–97)
MONOCYTES # BLD: 0.1 K/UL (ref 0.05–1.2)
MONOCYTES NFR BLD: 5 % (ref 3–10)
NEUTS SEG # BLD: 1.8 K/UL (ref 1.8–8)
NEUTS SEG NFR BLD: 82 % (ref 40–73)
PHOSPHATE SERPL-MCNC: 2.4 MG/DL (ref 2.5–4.9)
PLATELET # BLD AUTO: 325 K/UL (ref 135–420)
PLATELET COMMENTS,PCOM: ABNORMAL
PMV BLD AUTO: 9.5 FL (ref 9.2–11.8)
POTASSIUM SERPL-SCNC: 3.8 MMOL/L (ref 3.5–5.5)
PROT SERPL-MCNC: 6.2 G/DL (ref 6.4–8.2)
RBC # BLD AUTO: 2.89 M/UL (ref 4.2–5.3)
RBC MORPH BLD: ABNORMAL
SODIUM SERPL-SCNC: 144 MMOL/L (ref 136–145)
WBC # BLD AUTO: 2.2 K/UL (ref 4.6–13.2)

## 2021-04-08 PROCEDURE — 96401 CHEMO ANTI-NEOPL SQ/IM: CPT

## 2021-04-08 PROCEDURE — 36591 DRAW BLOOD OFF VENOUS DEVICE: CPT

## 2021-04-08 PROCEDURE — 83735 ASSAY OF MAGNESIUM: CPT

## 2021-04-08 PROCEDURE — 77030012965 HC NDL HUBR BBMI -A

## 2021-04-08 PROCEDURE — 96372 THER/PROPH/DIAG INJ SC/IM: CPT

## 2021-04-08 PROCEDURE — 85025 COMPLETE CBC W/AUTO DIFF WBC: CPT

## 2021-04-08 PROCEDURE — 74011250636 HC RX REV CODE- 250/636: Performed by: INTERNAL MEDICINE

## 2021-04-08 PROCEDURE — 96402 CHEMO HORMON ANTINEOPL SQ/IM: CPT

## 2021-04-08 PROCEDURE — 84100 ASSAY OF PHOSPHORUS: CPT

## 2021-04-08 PROCEDURE — 80053 COMPREHEN METABOLIC PANEL: CPT

## 2021-04-08 RX ORDER — SODIUM CHLORIDE 0.9 % (FLUSH) 0.9 %
10-40 SYRINGE (ML) INJECTION AS NEEDED
Status: DISCONTINUED | OUTPATIENT
Start: 2021-04-08 | End: 2021-04-12 | Stop reason: HOSPADM

## 2021-04-08 RX ORDER — HEPARIN 100 UNIT/ML
500 SYRINGE INTRAVENOUS AS NEEDED
Status: DISCONTINUED | OUTPATIENT
Start: 2021-04-08 | End: 2021-04-12 | Stop reason: HOSPADM

## 2021-04-08 RX ORDER — ONDANSETRON 2 MG/ML
8 INJECTION INTRAMUSCULAR; INTRAVENOUS AS NEEDED
Status: CANCELLED | OUTPATIENT
Start: 2021-05-06

## 2021-04-08 RX ORDER — DIPHENHYDRAMINE HYDROCHLORIDE 50 MG/ML
25 INJECTION, SOLUTION INTRAMUSCULAR; INTRAVENOUS AS NEEDED
Status: CANCELLED
Start: 2021-05-06

## 2021-04-08 RX ORDER — ACETAMINOPHEN 325 MG/1
650 TABLET ORAL AS NEEDED
Status: CANCELLED
Start: 2021-05-06

## 2021-04-08 RX ORDER — DIPHENHYDRAMINE HYDROCHLORIDE 50 MG/ML
50 INJECTION, SOLUTION INTRAMUSCULAR; INTRAVENOUS AS NEEDED
Status: CANCELLED
Start: 2021-05-06

## 2021-04-08 RX ORDER — HYDROCORTISONE SODIUM SUCCINATE 100 MG/2ML
100 INJECTION, POWDER, FOR SOLUTION INTRAMUSCULAR; INTRAVENOUS AS NEEDED
Status: CANCELLED | OUTPATIENT
Start: 2021-05-06

## 2021-04-08 RX ORDER — ALBUTEROL SULFATE 0.83 MG/ML
2.5 SOLUTION RESPIRATORY (INHALATION) AS NEEDED
Status: CANCELLED
Start: 2021-05-06

## 2021-04-08 RX ORDER — LAMOTRIGINE 25 MG/1
500 TABLET ORAL ONCE
Status: COMPLETED | OUTPATIENT
Start: 2021-04-08 | End: 2021-04-08

## 2021-04-08 RX ORDER — EPINEPHRINE 1 MG/ML
0.3 INJECTION, SOLUTION, CONCENTRATE INTRAVENOUS AS NEEDED
Status: CANCELLED | OUTPATIENT
Start: 2021-05-06

## 2021-04-08 RX ADMIN — DENOSUMAB 120 MG: 120 INJECTION SUBCUTANEOUS at 13:41

## 2021-04-08 RX ADMIN — Medication 30 ML: at 13:35

## 2021-04-08 RX ADMIN — FULVESTRANT 500 MG: 50 INJECTION INTRAMUSCULAR at 13:43

## 2021-04-08 RX ADMIN — HEPARIN 500 UNITS: 100 SYRINGE at 13:35

## 2021-04-08 NOTE — PROGRESS NOTES
[facility] Progress Note    Date: 2021    Name: Clarice Mooney    MRN: [id number]         : 1945       PORT FLUSH Q4 WEEKS  FASLODEX Q4 Garnetta Leavens Q4 WEEKS      Ms. Yvrose Villavicencio arrived to [facility] at 18. Ms. Yvrose Villavicencio was assessed and education was provided. Ms. Vivian Galeano vitals were reviewed. Visit Vitals  BP (!) 143/68 (BP 1 Location: Left arm, BP Patient Position: Sitting)   Pulse 81   Temp 97.5 °F (36.4 °C)   Resp 18   Ht 5' 4\" (1.626 m)   Wt 65.2 kg (143 lb 12.8 oz)   SpO2 97%   Breastfeeding No   BMI 24.68 kg/m²       Pt's left upper chest port accessed & blood drawn for labs per order (CBC/ CMP/ MAG/ PHOS). Pt's port flushed with NS and heparin per order & de-accessed. Band-aid to site. Pt's CMP results reviewed from 3/24/21 (scanned under media tab, labeled \"Referral/Dr. Luzmaria Cisse referral notes\", pg. 6 of 7). Labs WNL to proceed w/ Xgeva injection per pt's tx plan. Xgeva 120mg administered as ordered SQ in pt's L upper arm. Band-aid to site. Faslodex 500mg administered as ordered IM in pt's L & R dorsogluteal muscles (250mg per injection). Band-aids to sites. Pt tolerated well without complaints. Discharge/ follow-up instructions discussed w/ pt. Pt verbalized understanding. Pt armband removed & shredded. Ms. Yvrose Villavicencio was discharged from [place] [id number] in stable condition at 1350. She is to return on 21 at 1300 for her next appointment.     Alan Canela RN  2021

## 2021-04-14 ENCOUNTER — OFFICE VISIT (OUTPATIENT)
Dept: CARDIOLOGY CLINIC | Age: 76
End: 2021-04-14
Payer: MEDICARE

## 2021-04-14 VITALS
BODY MASS INDEX: 24.24 KG/M2 | DIASTOLIC BLOOD PRESSURE: 64 MMHG | HEIGHT: 64 IN | WEIGHT: 142 LBS | OXYGEN SATURATION: 99 % | HEART RATE: 82 BPM | SYSTOLIC BLOOD PRESSURE: 122 MMHG

## 2021-04-14 DIAGNOSIS — R06.09 DOE (DYSPNEA ON EXERTION): ICD-10-CM

## 2021-04-14 DIAGNOSIS — R06.09 DOE (DYSPNEA ON EXERTION): Primary | ICD-10-CM

## 2021-04-14 PROCEDURE — G8420 CALC BMI NORM PARAMETERS: HCPCS | Performed by: INTERNAL MEDICINE

## 2021-04-14 PROCEDURE — 99204 OFFICE O/P NEW MOD 45 MIN: CPT | Performed by: INTERNAL MEDICINE

## 2021-04-14 PROCEDURE — G8536 NO DOC ELDER MAL SCRN: HCPCS | Performed by: INTERNAL MEDICINE

## 2021-04-14 PROCEDURE — 3017F COLORECTAL CA SCREEN DOC REV: CPT | Performed by: INTERNAL MEDICINE

## 2021-04-14 PROCEDURE — G8399 PT W/DXA RESULTS DOCUMENT: HCPCS | Performed by: INTERNAL MEDICINE

## 2021-04-14 PROCEDURE — G8427 DOCREV CUR MEDS BY ELIG CLIN: HCPCS | Performed by: INTERNAL MEDICINE

## 2021-04-14 PROCEDURE — G8510 SCR DEP NEG, NO PLAN REQD: HCPCS | Performed by: INTERNAL MEDICINE

## 2021-04-14 PROCEDURE — 1101F PT FALLS ASSESS-DOCD LE1/YR: CPT | Performed by: INTERNAL MEDICINE

## 2021-04-14 PROCEDURE — 93000 ELECTROCARDIOGRAM COMPLETE: CPT | Performed by: INTERNAL MEDICINE

## 2021-04-14 PROCEDURE — 1090F PRES/ABSN URINE INCON ASSESS: CPT | Performed by: INTERNAL MEDICINE

## 2021-04-14 NOTE — PROGRESS NOTES
Taisha Cancino    Chief Complaint   Patient presents with    New Patient     ref by PCP for RADFORD       HPI    Taisha Cancino is a 76 y.o. with no known CV disease, here for initial CV evaluation for CP and SOB. Pt has metastatic CA, s/p masectomy, radiation, chemotx, now follows with Dr. Sunil Blevins. Most recent notes, labs reviewed and aware currently anemic. She has been c/o CP for some time. Very sensitive to the touch so was treated for possible shingles with no improvement per pt. Was given muscle relaxer which she thinks helped and does feel OTC APAP also helps. Its most tender under her right breast/ flank and at one point felt there was point tenderness in her back with swelling. She does mention h/o fluid in her lungs requiring thora?- due to her malignancy. Hx reviewed below:    Past Medical History:   Diagnosis Date    Biliary colic     Breast CA ([facility] [address] 2012    Cancer [facility]) 1991    Right Breast    Chemotherapy convalescence or palliative care     Neuropathy     Radiation therapy complication     Thyroid disease        Past Surgical History:   Procedure Laterality Date    HX LAP CHOLECYSTECTOMY  9-19-13    HX MASTECTOMY  1991    Right    CO BREAST SURGERY PROCEDURE UNLISTED  10/2002    Right-Lesion    CO BREAST SURGERY PROCEDURE UNLISTED  11/2004    Right-Lesion       Current Outpatient Medications   Medication Sig Dispense Refill    methylPREDNISolone (Medrol) 4 mg tab Take 4 mg by mouth daily (with breakfast). Indications: shingles      gabapentin (NEURONTIN) 100 mg capsule Take 2 Caps by mouth three (3) times daily. Max Daily Amount: 600 mg. 540 Cap 3    calcium citrate 200 mg (950 mg) tablet Take  by mouth daily.  palbociclib (Ibrance) 100 mg cap Take 1 Cap by mouth daily. For 21 days on and 7 days off every 28 days. 63 Cap 5    Cetirizine (ZYRTEC) 10 mg cap Take 1 Cap by mouth daily as needed.       lidocaine HCl-hydrocortison ac topical cream Apply  to affected area two (2) times a day. 85 g 3    TETRACYCLINE HCL (TETRACYCLINE PO) Take 250 mg by mouth two (2) times a day.  fexofenadine-pseudoephedrine (ALLEGRA-D 24 HOUR) 180-240 mg per tablet Take 1 Tab by mouth daily as needed.  sulfacetamide (BLEPH-10) 10 % ophthalmic ointment Administer  to right eye three (3) times daily.  palbociclib (IBRANCE) 125 mg cap Take 125 mg by mouth daily. Take 1 tab po every day for 21 days on and 7 days off q 28 days  Indications: HORMONE RECEPTOR(+), HER2(-) ADVANCED BREAST CANCER 42 Cap 6    Cholecalciferol, Vitamin D3, 5,000 unit Tab Take  by mouth daily.  thyroid, Pork, (ARMOUR THYROID) 60 mg tablet Take 90 mg by mouth daily.  L-Mfolate-B6 Phos-Methyl-B12 (NEURPATH-B) 3-35-2 mg Tab Take  by mouth two (2) times a day.  warfarin (COUMADIN) 1 mg tablet Take 1 mg by mouth daily. Allergies   Allergen Reactions    Codeine Palpitations    Darvocet A500 [Propoxyphene N-Acetaminophen] Nausea and Vomiting    Darvon [Propoxyphene] Nausea and Vomiting       Social History     Socioeconomic History    Marital status:      Spouse name: Not on file    Number of children: Not on file    Years of education: Not on file    Highest education level: Not on file   Occupational History    Not on file   Social Needs    Financial resource strain: Not on file    Food insecurity     Worry: Not on file     Inability: Not on file    Transportation needs     Medical: Not on file     Non-medical: Not on file   Tobacco Use    Smoking status: Former Smoker     Quit date: 1991     Years since quittin.8    Smokeless tobacco: Never Used   Substance and Sexual Activity    Alcohol use:  Yes     Alcohol/week: 0.0 standard drinks     Types: 1 - 2 Glasses of wine per week     Comment: socially, occassionally    Drug use: No    Sexual activity: Not on file   Lifestyle    Physical activity     Days per week: Not on file     Minutes per session: Not on file    Stress: Not on file   Relationships    Social connections     Talks on phone: Not on file     Gets together: Not on file     Attends Orthodox service: Not on file     Active member of club or organization: Not on file     Attends meetings of clubs or organizations: Not on file     Relationship status: Not on file    Intimate partner violence     Fear of current or ex partner: Not on file     Emotionally abused: Not on file     Physically abused: Not on file     Forced sexual activity: Not on file   Other Topics Concern    Not on file   Social History Narrative    Not on file     is also my pt    FH: neg for CV     Review of Systems    14 pt Review of Systems is negative unless otherwise mentioned in the HPI. Wt Readings from Last 3 Encounters:   04/14/21 64.4 kg (142 lb)   04/08/21 65.2 kg (143 lb 12.8 oz)   03/31/21 64 kg (141 lb)     Temp Readings from Last 3 Encounters:   04/08/21 97.5 °F (36.4 °C)   03/31/21 97.1 °F (36.2 °C) (Temporal)   03/11/21 98.1 °F (36.7 °C)     BP Readings from Last 3 Encounters:   04/14/21 122/64   04/08/21 (!) 143/68   03/31/21 128/71     Pulse Readings from Last 3 Encounters:   04/14/21 82   04/08/21 81   03/31/21 87            Physical Exam:    Visit Vitals  /64 (BP 1 Location: Left upper arm, BP Patient Position: Sitting, BP Cuff Size: Adult)   Pulse 82   Ht 5' 4\" (1.626 m)   Wt 64.4 kg (142 lb)   SpO2 99%   BMI 24.37 kg/m²      Physical Exam  HENT:      Head: Normocephalic and atraumatic. Eyes:      Pupils: Pupils are equal, round, and reactive to light. Cardiovascular:      Rate and Rhythm: Normal rate and regular rhythm. Heart sounds: Normal heart sounds. No murmur. No friction rub. No gallop. Comments: ++TTP  Pulmonary:      Effort: Pulmonary effort is normal. No respiratory distress. Breath sounds: Normal breath sounds. No wheezing or rales. Chest:      Chest wall: No tenderness.    Abdominal:      General: Bowel sounds are normal. Palpations: Abdomen is soft. Musculoskeletal:         General: No tenderness. Skin:     General: Skin is warm and dry. Neurological:      Mental Status: She is alert and oriented to person, place, and time. EKG today shows: NSR, normal axis and intervals, no ST segment abnormalities    Lab Results   Component Value Date/Time    Cholesterol, total 203 (H) 06/01/2010 11:13 AM    HDL Cholesterol 59 03/18/2010 09:00 AM    LDL, calculated 96.2 03/18/2010 09:00 AM    VLDL, calculated 23.8 03/18/2010 09:00 AM    Triglyceride 119 03/18/2010 09:00 AM    CHOL/HDL Ratio 3.0 03/18/2010 09:00 AM     Lab Results   Component Value Date/Time    WBC 2.2 (L) 04/08/2021 01:35 PM    Hemoglobin, POC 9.5 (L) 01/09/2020 12:02 PM    HGB 9.6 (L) 04/08/2021 01:35 PM    Hematocrit, POC 28 (L) 01/09/2020 12:02 PM    HCT 28.7 (L) 04/08/2021 01:35 PM    PLATELET 798 39/24/5039 01:35 PM    MCV 99.3 (H) 04/08/2021 01:35 PM     Lab Results   Component Value Date/Time    Sodium 144 04/08/2021 01:35 PM    Potassium 3.8 04/08/2021 01:35 PM    Chloride 107 04/08/2021 01:35 PM    CO2 29 04/08/2021 01:35 PM    Anion gap 8 04/08/2021 01:35 PM    Glucose 85 04/08/2021 01:35 PM    BUN 14 04/08/2021 01:35 PM    Creatinine 0.85 04/08/2021 01:35 PM    BUN/Creatinine ratio 16 04/08/2021 01:35 PM    GFR est AA >60 04/08/2021 01:35 PM    GFR est non-AA >60 04/08/2021 01:35 PM    Calcium 8.8 04/08/2021 01:35 PM    Bilirubin, total 0.5 04/08/2021 01:35 PM    Alk.  phosphatase 60 04/08/2021 01:35 PM    Protein, total 6.2 (L) 04/08/2021 01:35 PM    Albumin 3.4 04/08/2021 01:35 PM    Globulin 2.8 04/08/2021 01:35 PM    A-G Ratio 1.2 04/08/2021 01:35 PM    ALT (SGPT) 15 04/08/2021 01:35 PM    AST (SGOT) 14 04/08/2021 01:35 PM       Impression and Plan:  Hermes Chery is a 76 y.o. with:    1.) Atypical CP, +TTP doubt ACS, more likely MSK etiology  2.) SOB r/o CV etiology, but no overt signs of fluid overload on clinical exam  3.) metastatic breast CA s/p chemo, radiation etc  4.) Anemia    Her SOB can definitely be from her current anemia  Her CP though doesn't sound anginal/ related and more likely from tissue fibrosis related to prior radiation? Due to her hx and risk factors though I took the liberty to order an echo to give her more reassurance, if WNL can see me in 6 months    Thank you for allowing me to participate in the care of your patient, please do not hesitate to call with questions or concerns.     [address],    Lauren Spann, DO

## 2021-04-14 NOTE — PROGRESS NOTES
Lazarus Pelton presents today for   Chief Complaint   Patient presents with    New Patient     ref by PCP for Marisol Davenport preferred language for health care discussion is english/other. Is someone accompanying this pt? no    Is the patient using any DME equipment during [address]? no    Depression Screening:  3 most recent PHQ Screens 4/14/2021   Little interest or pleasure in doing things Not at all   Feeling down, depressed, irritable, or hopeless Not at all   Total Score PHQ 2 0       Learning Assessment:  Learning Assessment 6/8/2018   PRIMARY LEARNER Patient   BARRIERS PRIMARY LEARNER NONE   PRIMARY LANGUAGE ENGLISH   LEARNER PREFERENCE PRIMARY DEMONSTRATION     -   ANSWERED BY self   RELATIONSHIP SELF       Abuse Screening:  Abuse Screening Questionnaire 4/14/2021   Do you ever feel afraid of your partner? N   Are you in a relationship with someone who physically or mentally threatens you? N   Is it safe for you to go home? Y       Fall Risk  Fall Risk Assessment, last 12 mths 4/14/2021   Able to walk? Yes   Fall in past 12 months? 0   Do you feel unsteady? 0   Are you worried about falling 0   Is TUG test greater than 12 seconds? -   Is the gait abnormal? -       Pt currently taking Anticoagulant therapy? Warfarin 2mg    Coordination of Care:  1. Have you been to the ER, urgent care clinic since your last visit? Hospitalized since your last visit? no    2. Have you seen or consulted any other health care providers outside of the [address] [place] [address] since your last visit? Include any pap smears or colon screening.  no

## 2021-04-16 LAB
ECHO AO ROOT DIAM: 2.13 CM
ECHO LA AREA 4C: 14.16 CM2
ECHO LA VOL 2C: 34.6 ML (ref 22–52)
ECHO LA VOL 4C: 31.9 ML (ref 22–52)
ECHO LA VOL BP: 36.8 ML (ref 22–52)
ECHO LA VOL/BSA BIPLANE: 21.76 ML/M2 (ref 16–28)
ECHO LA VOLUME INDEX A2C: 20.46 ML/M2 (ref 16–28)
ECHO LA VOLUME INDEX A4C: 18.86 ML/M2 (ref 16–28)
ECHO LV E' LATERAL VELOCITY: 7.75 CM/S
ECHO LV E' SEPTAL VELOCITY: 7.97 CM/S
ECHO LV INTERNAL DIMENSION DIASTOLIC: 4.01 CM (ref 3.9–5.3)
ECHO LV INTERNAL DIMENSION SYSTOLIC: 2.64 CM
ECHO LV IVSD: 1 CM (ref 0.6–0.9)
ECHO LV MASS 2D: 130.3 G (ref 67–162)
ECHO LV MASS INDEX 2D: 77 G/M2 (ref 43–95)
ECHO LV POSTERIOR WALL DIASTOLIC: 1.03 CM (ref 0.6–0.9)
ECHO LVOT DIAM: 1.92 CM
ECHO LVOT PEAK GRADIENT: 3.33 MMHG
ECHO LVOT PEAK VELOCITY: 91.17 CM/S
ECHO LVOT SV: 56.6 ML
ECHO LVOT VTI: 19.48 CM
ECHO MV A VELOCITY: 128.16 CM/S
ECHO MV E DECELERATION TIME (DT): 141.24 MS
ECHO MV E VELOCITY: 92.83 CM/S
ECHO MV E/A RATIO: 0.72
ECHO MV E/E' LATERAL: 11.98
ECHO MV E/E' RATIO (AVERAGED): 11.81
ECHO MV E/E' SEPTAL: 11.65
ECHO PV REGURGITANT MAX VELOCITY: 207.04 CM/S
ECHO PVEIN A DURATION: 93.24 MS
ECHO PVEIN A VELOCITY: 36.88 CM/S
ECHO RV TAPSE: 1.72 CM (ref 1.5–2)
ECHO TV REGURGITANT PEAK GRADIENT: 17.15 MMHG
LVOT MG: 1.86 MMHG

## 2021-04-19 ENCOUNTER — HOSPITAL ENCOUNTER (OUTPATIENT)
Dept: NUCLEAR MEDICINE | Age: 76
Discharge: HOME | End: 2021-04-19
Attending: INTERNAL MEDICINE
Payer: MEDICARE

## 2021-04-19 DIAGNOSIS — C50.311 MALIGNANT NEOPLASM OF LOWER-INNER QUADRANT OF RIGHT BREAST OF FEMALE, ESTROGEN RECEPTOR POSITIVE (HCC): ICD-10-CM

## 2021-04-19 DIAGNOSIS — C50.919 METASTATIC BREAST CANCER (HCC): ICD-10-CM

## 2021-04-19 DIAGNOSIS — G63 NEUROPATHY ASSOCIATED WITH CANCER (HCC): ICD-10-CM

## 2021-04-19 DIAGNOSIS — R07.89 RIGHT-SIDED CHEST WALL PAIN: ICD-10-CM

## 2021-04-19 DIAGNOSIS — Z17.0 MALIGNANT NEOPLASM OF LOWER-INNER QUADRANT OF RIGHT BREAST OF FEMALE, ESTROGEN RECEPTOR POSITIVE (HCC): ICD-10-CM

## 2021-04-19 DIAGNOSIS — C80.1 NEUROPATHY ASSOCIATED WITH CANCER (HCC): ICD-10-CM

## 2021-04-19 PROCEDURE — A9503 TC99M MEDRONATE: HCPCS

## 2021-04-19 NOTE — PROGRESS NOTES
PER YOUR LAST NOTE\" Edna Clarke is a 76 y.o. with:     1.) Atypical CP, +TTP doubt ACS, more likely MSK etiology  2.) SOB r/o CV etiology, but no overt signs of fluid overload on clinical exam  3.) metastatic breast CA s/p chemo, radiation etc  4.) Anemia     Her SOB can definitely be from her current anemia  Her CP though doesn't sound anginal/ related and more likely from tissue fibrosis related to prior radiation?  Due to her hx and risk factors though I took the liberty to order an echo to give her more reassurance, if WNL can see me in 6 months

## 2021-04-23 ENCOUNTER — TELEPHONE (OUTPATIENT)
Dept: CARDIOLOGY CLINIC | Age: 76
End: 2021-04-23

## 2021-04-23 NOTE — TELEPHONE ENCOUNTER
----- Message from Wendi Standard, DO sent at 4/20/2021  9:56 AM EDT -----  Please let her know echo is normal,which is reassuring  ----- Message -----  From: Breanna Frankel LPN  Sent: 0/53/0158  10:06 AM EDT  To: Wendi Standard, DO    PER YOUR LAST NOTE\" Kristina Arauz is a 76 y.o. with:     1.) Atypical CP, +TTP doubt ACS, more likely MSK etiology  2.) SOB r/o CV etiology, but no overt signs of fluid overload on clinical exam  3.) metastatic breast CA s/p chemo, radiation etc  4.) Anemia     Her SOB can definitely be from her current anemia  Her CP though doesn't sound anginal/ related and more likely from tissue fibrosis related to prior radiation?  Due to her hx and risk factors though I took the liberty to order an echo to give her more reassurance, if WNL can see me in 6 months

## 2021-04-23 NOTE — TELEPHONE ENCOUNTER
Patient made aware of ECHO results and Dr. Sharon Neville remarks. No questions or concerns at this time.

## 2021-05-04 DIAGNOSIS — C50.919 METASTATIC BREAST CANCER (HCC): ICD-10-CM

## 2021-05-06 ENCOUNTER — HOSPITAL ENCOUNTER (OUTPATIENT)
Dept: INFUSION THERAPY | Age: 76
Discharge: HOME | End: 2021-05-06
Payer: MEDICARE

## 2021-05-06 ENCOUNTER — APPOINTMENT (OUTPATIENT)
Dept: INFUSION THERAPY | Age: 76
End: 2021-05-06

## 2021-05-06 VITALS
HEART RATE: 77 BPM | RESPIRATION RATE: 16 BRPM | OXYGEN SATURATION: 100 % | TEMPERATURE: 97.4 F | SYSTOLIC BLOOD PRESSURE: 126 MMHG | DIASTOLIC BLOOD PRESSURE: 65 MMHG

## 2021-05-06 DIAGNOSIS — D64.81 ANEMIA DUE TO ANTINEOPLASTIC CHEMOTHERAPY: ICD-10-CM

## 2021-05-06 DIAGNOSIS — T45.1X5A ANEMIA DUE TO ANTINEOPLASTIC CHEMOTHERAPY: ICD-10-CM

## 2021-05-06 DIAGNOSIS — C50.919 METASTATIC BREAST CANCER (HCC): Primary | ICD-10-CM

## 2021-05-06 DIAGNOSIS — C50.311 MALIGNANT NEOPLASM OF LOWER-INNER QUADRANT OF RIGHT BREAST OF FEMALE, ESTROGEN RECEPTOR POSITIVE (HCC): ICD-10-CM

## 2021-05-06 DIAGNOSIS — Z17.0 MALIGNANT NEOPLASM OF LOWER-INNER QUADRANT OF RIGHT BREAST OF FEMALE, ESTROGEN RECEPTOR POSITIVE (HCC): ICD-10-CM

## 2021-05-06 LAB
ALBUMIN SERPL-MCNC: 3.4 G/DL (ref 3.4–5)
ALBUMIN/GLOB SERPL: 1.2 {RATIO} (ref 0.8–1.7)
ALP SERPL-CCNC: 68 U/L (ref 45–117)
ALT SERPL-CCNC: 19 U/L (ref 13–56)
ANION GAP SERPL CALC-SCNC: 5 MMOL/L (ref 3–18)
AST SERPL-CCNC: 20 U/L (ref 10–38)
BASO+EOS+MONOS # BLD AUTO: 0.1 K/UL (ref 0–2.3)
BASO+EOS+MONOS NFR BLD AUTO: 4 % (ref 0.1–17)
BILIRUB SERPL-MCNC: 0.4 MG/DL (ref 0.2–1)
BUN SERPL-MCNC: 19 MG/DL (ref 7–18)
BUN/CREAT SERPL: 23 (ref 12–20)
CALCIUM SERPL-MCNC: 8.4 MG/DL (ref 8.5–10.1)
CHLORIDE SERPL-SCNC: 108 MMOL/L (ref 100–111)
CO2 SERPL-SCNC: 28 MMOL/L (ref 21–32)
CREAT SERPL-MCNC: 0.82 MG/DL (ref 0.6–1.3)
DIFFERENTIAL METHOD BLD: ABNORMAL
ERYTHROCYTE [DISTWIDTH] IN BLOOD BY AUTOMATED COUNT: 16.6 % (ref 11.5–14.5)
GLOBULIN SER CALC-MCNC: 2.8 G/DL (ref 2–4)
GLUCOSE SERPL-MCNC: 88 MG/DL (ref 74–99)
HCT VFR BLD AUTO: 28.4 % (ref 36–48)
HGB BLD-MCNC: 9.5 G/DL (ref 12–16)
LYMPHOCYTES # BLD: 0.5 K/UL (ref 1.1–5.9)
LYMPHOCYTES NFR BLD: 18 % (ref 14–44)
MAGNESIUM SERPL-MCNC: 1.8 MG/DL (ref 1.6–2.6)
MCH RBC QN AUTO: 33.2 PG (ref 25–35)
MCHC RBC AUTO-ENTMCNC: 33.5 G/DL (ref 31–37)
MCV RBC AUTO: 99.3 FL (ref 78–102)
NEUTS SEG # BLD: 1.9 K/UL (ref 1.8–9.5)
NEUTS SEG NFR BLD: 78 % (ref 40–70)
PHOSPHATE SERPL-MCNC: 2.8 MG/DL (ref 2.5–4.9)
PLATELET # BLD AUTO: 291 K/UL (ref 140–440)
POTASSIUM SERPL-SCNC: 3.9 MMOL/L (ref 3.5–5.5)
PROT SERPL-MCNC: 6.2 G/DL (ref 6.4–8.2)
RBC # BLD AUTO: 2.86 M/UL (ref 4.1–5.1)
SODIUM SERPL-SCNC: 141 MMOL/L (ref 136–145)
WBC # BLD AUTO: 2.5 K/UL (ref 4.5–13)

## 2021-05-06 PROCEDURE — 74011250636 HC RX REV CODE- 250/636: Performed by: NURSE PRACTITIONER

## 2021-05-06 PROCEDURE — 83735 ASSAY OF MAGNESIUM: CPT

## 2021-05-06 PROCEDURE — 96402 CHEMO HORMON ANTINEOPL SQ/IM: CPT

## 2021-05-06 PROCEDURE — 74011250636 HC RX REV CODE- 250/636: Performed by: INTERNAL MEDICINE

## 2021-05-06 PROCEDURE — 36591 DRAW BLOOD OFF VENOUS DEVICE: CPT

## 2021-05-06 PROCEDURE — 84100 ASSAY OF PHOSPHORUS: CPT

## 2021-05-06 PROCEDURE — 77030012965 HC NDL HUBR BBMI -A

## 2021-05-06 PROCEDURE — 80053 COMPREHEN METABOLIC PANEL: CPT

## 2021-05-06 PROCEDURE — 96372 THER/PROPH/DIAG INJ SC/IM: CPT

## 2021-05-06 PROCEDURE — 85025 COMPLETE CBC W/AUTO DIFF WBC: CPT

## 2021-05-06 RX ORDER — DIPHENHYDRAMINE HYDROCHLORIDE 50 MG/ML
25 INJECTION, SOLUTION INTRAMUSCULAR; INTRAVENOUS AS NEEDED
Status: CANCELLED
Start: 2021-01-01

## 2021-05-06 RX ORDER — DIPHENHYDRAMINE HYDROCHLORIDE 50 MG/ML
50 INJECTION, SOLUTION INTRAMUSCULAR; INTRAVENOUS AS NEEDED
Status: CANCELLED
Start: 2021-01-01

## 2021-05-06 RX ORDER — LAMOTRIGINE 25 MG/1
500 TABLET ORAL ONCE
Status: COMPLETED | OUTPATIENT
Start: 2021-05-06 | End: 2021-05-06

## 2021-05-06 RX ORDER — ONDANSETRON 2 MG/ML
8 INJECTION INTRAMUSCULAR; INTRAVENOUS AS NEEDED
Status: CANCELLED | OUTPATIENT
Start: 2021-01-01

## 2021-05-06 RX ORDER — ALBUTEROL SULFATE 0.83 MG/ML
2.5 SOLUTION RESPIRATORY (INHALATION) AS NEEDED
Status: CANCELLED
Start: 2021-01-01

## 2021-05-06 RX ORDER — HYDROCORTISONE SODIUM SUCCINATE 100 MG/2ML
100 INJECTION, POWDER, FOR SOLUTION INTRAMUSCULAR; INTRAVENOUS AS NEEDED
Status: CANCELLED | OUTPATIENT
Start: 2021-01-01

## 2021-05-06 RX ORDER — HEPARIN 100 UNIT/ML
500 SYRINGE INTRAVENOUS AS NEEDED
Status: DISCONTINUED | OUTPATIENT
Start: 2021-05-06 | End: 2021-01-01 | Stop reason: HOSPADM

## 2021-05-06 RX ORDER — ACETAMINOPHEN 325 MG/1
650 TABLET ORAL AS NEEDED
Status: CANCELLED
Start: 2021-01-01

## 2021-05-06 RX ORDER — SODIUM CHLORIDE 0.9 % (FLUSH) 0.9 %
10-40 SYRINGE (ML) INJECTION AS NEEDED
Status: DISCONTINUED | OUTPATIENT
Start: 2021-05-06 | End: 2021-01-01 | Stop reason: HOSPADM

## 2021-05-06 RX ORDER — EPINEPHRINE 1 MG/ML
0.3 INJECTION, SOLUTION, CONCENTRATE INTRAVENOUS AS NEEDED
Status: CANCELLED | OUTPATIENT
Start: 2021-01-01

## 2021-05-06 RX ADMIN — DENOSUMAB 120 MG: 120 INJECTION SUBCUTANEOUS at 13:42

## 2021-05-06 RX ADMIN — FULVESTRANT 500 MG: 50 INJECTION INTRAMUSCULAR at 13:50

## 2021-05-06 RX ADMIN — HEPARIN 500 UNITS: 100 SYRINGE at 13:30

## 2021-05-06 RX ADMIN — Medication 40 ML: at 13:30

## 2021-05-06 NOTE — PROGRESS NOTES
[facility] Progress Note    Date: May 6, 2021    Name: Martine Iqbal    MRN: [id number]         : 1945      Ms. Figueroa arrived in the [facility] today at 1300, in stable condition, here for Monthly Keith Sparks, and Q 4 Week Xgeva & Faslodex Injections. She was assessed and education was provided. Ms. Tr Faust vitals were reviewed. Visit Vitals  /65 (BP 1 Location: Left upper arm, BP Patient Position: Sitting)   Pulse 77   Temp 97.4 °F (36.3 °C)   Resp 16   SpO2 100%   Breastfeeding No                 Her left chest single lumen port was accessed without incident at 1330, and blood was drawn from the port for a CBC, CMP, Magnesium, and Phosphorus, per order. And then, the port was flushed well per protocol and without incident, with NS & Heparin, and the carbone needle was removed and gauze/bandaid was applied. (All labs were sent out to the [facility] lab by , for processing.)       Lab results were reviewed. Her most recent CMP, MG, & PHOS, levels from 21 were reviewed, and were all noted to be satisfactory for treatment today, and were as follows: The slightly low MG & PHOS levels were discussed with [facility] pharmacist Samia Heath, and per Samia Heath, still OK to treat with Laure Wick today. Results for Jeane Brunner (MRN [id number])   Ref.  Range 2021 13:35   WBC Latest Ref Range: 4.6 - 13.2 K/uL 2.2 (L)   RBC Latest Ref Range: 4.20 - 5.30 M/uL 2.89 (L)   HGB Latest Ref Range: 12.0 - 16.0 g/dL 9.6 (L)   HCT Latest Ref Range: 35.0 - 45.0 % 28.7 (L)   MCV Latest Ref Range: 74.0 - 97.0 FL 99.3 (H)   MCH Latest Ref Range: 24.0 - 34.0 PG 33.2   MCHC Latest Ref Range: 31.0 - 37.0 g/dL 33.4   RDW Latest Ref Range: 11.6 - 14.5 % 15.5 (H)   PLATELET Latest Ref Range: 135 - 420 K/uL 325   MPV Latest Ref Range: 9.2 - 11.8 FL 9.5   NEUTROPHILS Latest Ref Range: 40 - 73 % 82 (H)   LYMPHOCYTES Latest Ref Range: 21 - 52 % 13 (L)   MONOCYTES Latest Ref Range: 3 - 10 % 5   EOSINOPHILS Latest Ref Range: 0 - 5 % 0   BASOPHILS Latest Ref Range: 0 - 2 % 0   DF Latest Units:   MANUAL   ABS. NEUTROPHILS Latest Ref Range: 1.8 - 8.0 K/UL 1.8   ABS. LYMPHOCYTES Latest Ref Range: 0.9 - 3.6 K/UL 0.3 (L)   ABS. MONOCYTES Latest Ref Range: 0.05 - 1.2 K/UL 0.1   ABS. EOSINOPHILS Latest Ref Range: 0.0 - 0.4 K/UL 0.0   ABS. BASOPHILS Latest Ref Range: 0.0 - 0.1 K/UL 0.0   RBC COMMENTS Latest Units:   ANISOCYTOSIS. .. PLATELET COMMENTS Latest Units:   ADEQUATE PLATELETS   Sodium Latest Ref Range: 136 - 145 mmol/L 144   Potassium Latest Ref Range: 3.5 - 5.5 mmol/L 3.8   Chloride Latest Ref Range: 100 - 111 mmol/L 107   CO2 Latest Ref Range: 21 - 32 mmol/L 29   Anion gap Latest Ref Range: 3.0 - 18 mmol/L 8   Glucose Latest Ref Range: 74 - 99 mg/dL 85   BUN Latest Ref Range: 7.0 - 18 MG/DL 14   Creatinine Latest Ref Range: 0.6 - 1.3 MG/DL 0.85   BUN/Creatinine ratio Latest Ref Range: 12 - 20   16   Calcium Latest Ref Range: 8.5 - 10.1 MG/DL 8.8   Phosphorus Latest Ref Range: 2.5 - 4.9 MG/DL 2.4 (L)   Magnesium Latest Ref Range: 1.6 - 2.6 mg/dL 1.4 (L)   GFR est non-AA Latest Ref Range: >60 ml/min/1.73m2 >60   GFR est AA Latest Ref Range: >60 ml/min/1.73m2 >60   Bilirubin, total Latest Ref Range: 0.2 - 1.0 MG/DL 0.5   Protein, total Latest Ref Range: 6.4 - 8.2 g/dL 6.2 (L)   Albumin Latest Ref Range: 3.4 - 5.0 g/dL 3.4   Globulin Latest Ref Range: 2.0 - 4.0 g/dL 2.8   A-G Ratio Latest Ref Range: 0.8 - 1.7   1.2   ALT Latest Ref Range: 13 - 56 U/L 15   AST Latest Ref Range: 10 - 38 U/L 14   Alk.  phosphatase Latest Ref Range: 45 - 117 U/L 60       Xgeva 120 mg, was administered SQ, in her left arm at 1342, per order, and without incident.         Faslodex 500 mg Total Dose, was administered IM, in 2 equally divided doses of 250 mg, at 1350, per order, and without incident. (250 mg was administered IM, in her right gluteal muscle, and 250 mg was administered IM, in her left gluteal muscle)             Ms. Landry Han tolerated well, and had no complaints. Ms. Unique Adler was discharged from [place] [address] in stable condition at [phone]. Luis Alfredo El She is to return in 4 weeks, on Thursday, 6-3-21, at 1300, for her next appointment, for [facility], & Wallace Carmona & Faslodex Injections & Associated Labs.      Judy Perez RN  May 6, 2021  1:30 PM

## 2021-05-10 NOTE — PROGRESS NOTES
Hematology/Oncology  Progress Note    Name: Jocelynn Cuenca  Date: 2021  : 1945    PCP: Lanette Sneed MD     Ms. Christina Mcfarland is a 76 y.o.  female who was seen for management of her metastatic breast cancer with associated complications of chemotherapy. Current therapy: Fulvestrant 500 mg subcutaneous monthly and Ibrance 125 mg by mouth daily. Subjective:     Mrs. Christina Mcfarland is a 42-year-old woman who has slowly progressive metastatic breast cancer. Patient was being followed by Dr. Sherita Cruz who retired. She is here today for chemo follow up. The patient has previously completed external beam radiation therapy to lesions in her ribs and more recently she completed proton therapy to areas of bone involvement with a significant benefit with regards to pain control. Additionally she is currently receiving systemic therapy with fulvestrant and Ibrance. Patient is tolerating the systemic therapy reasonably well and has not experienced any nausea or emesis. Today the patient reported fatigue and persistent headache. She continues to have SOB intermittently. She previously received Procrit at 4 week intervals whenever her hemoglobin is below 10 g/dL and hematocrit is below 30%. She concerned her Procrit was not given recently. She noted her fatigue usually improved after Procrit injection. Feels better with retacrit. She denied other complaints. Past medical history, family history, and social history: these were reviewed and remains unchanged.     Past Medical History:   Diagnosis Date    Biliary colic     Breast CA ([facility] [address] 2012    Cancer [facility])     Right Breast    Chemotherapy convalescence or palliative care     Neuropathy     Radiation therapy complication     Thyroid disease      Past Surgical History:   Procedure Laterality Date    HX LAP CHOLECYSTECTOMY  13    HX MASTECTOMY      Right    TX BREAST SURGERY PROCEDURE UNLISTED  10/2002    Right-Lesion    TX BREAST SURGERY PROCEDURE UNLISTED  2004    Right-Lesion     Social History     Socioeconomic History    Marital status:      Spouse name: Not on file    Number of children: Not on file    Years of education: Not on file    Highest education level: Not on file   Occupational History    Not on file   Social Needs    Financial resource strain: Not on file    Food insecurity     Worry: Not on file     Inability: Not on file    Transportation needs     Medical: Not on file     Non-medical: Not on file   Tobacco Use    Smoking status: Former Smoker     Quit date: 1991     Years since quittin.9    Smokeless tobacco: Never Used   Substance and Sexual Activity    Alcohol use: Yes     Alcohol/week: 0.0 standard drinks     Types: 1 - 2 Glasses of wine per week     Comment: socially, occassionally    Drug use: No    Sexual activity: Not on file   Lifestyle    Physical activity     Days per week: Not on file     Minutes per session: Not on file    Stress: Not on file   Relationships    Social connections     Talks on phone: Not on file     Gets together: Not on file     Attends Yazdanism service: Not on file     Active member of club or organization: Not on file     Attends meetings of clubs or organizations: Not on file     Relationship status: Not on file    Intimate partner violence     Fear of current or ex partner: Not on file     Emotionally abused: Not on file     Physically abused: Not on file     Forced sexual activity: Not on file   Other Topics Concern    Not on file   Social History Narrative    Not on file     Family History   Problem Relation Age of Onset    Cancer Maternal Aunt         Hodgkin's disease    Breast Cancer Paternal Aunt     Cancer Father         Prostate, lung, brain     Current Outpatient Medications   Medication Sig Dispense Refill    gabapentin (NEURONTIN) 100 mg capsule Take 2 Caps by mouth three (3) times daily.  Max Daily Amount: 600 mg. 540 Cap 3    calcium citrate 200 mg (950 mg) tablet Take  by mouth daily.  palbociclib (Ibrance) 100 mg cap Take 1 Cap by mouth daily. For 21 days on and 7 days off every 28 days. 63 Cap 5    Cetirizine (ZYRTEC) 10 mg cap Take 1 Cap by mouth daily as needed.  lidocaine HCl-hydrocortison ac topical cream Apply  to affected area two (2) times a day. 85 g 3    TETRACYCLINE HCL (TETRACYCLINE PO) Take 250 mg by mouth two (2) times a day.  fexofenadine-pseudoephedrine (ALLEGRA-D 24 HOUR) 180-240 mg per tablet Take 1 Tab by mouth daily as needed.  palbociclib (IBRANCE) 125 mg cap Take 125 mg by mouth daily. Take 1 tab po every day for 21 days on and 7 days off q 28 days  Indications: HORMONE RECEPTOR(+), HER2(-) ADVANCED BREAST CANCER 42 Cap 6    Cholecalciferol, Vitamin D3, 5,000 unit Tab Take  by mouth daily.  thyroid, Pork, (ARMOUR THYROID) 60 mg tablet Take 60 mg by mouth daily.  L-Mfolate-B6 Phos-Methyl-B12 (NEURPATH-B) 3-35-2 mg Tab Take  by mouth two (2) times a day.  warfarin (COUMADIN) 1 mg tablet Take 1 mg by mouth every other day. Review of Systems   Constitutional: Positive for malaise/fatigue. Negative for chills, diaphoresis, fever and weight loss. Respiratory: Negative for cough, hemoptysis, shortness of breath and wheezing. Cardiovascular: Negative for chest pain, palpitations and leg swelling. Gastrointestinal: Negative for abdominal pain, diarrhea, heartburn, nausea and vomiting. Genitourinary: Negative for dysuria, frequency, hematuria and urgency. Musculoskeletal: Negative for joint pain and myalgias. Skin: Negative for itching and rash. Neurological: Positive for headaches. Negative for dizziness, seizures and weakness. Psychiatric/Behavioral: Negative for depression. The patient does not have insomnia.              Objective:     Visit Vitals  /75 (BP Patient Position: Sitting)   Pulse 77   Temp 97.5 °F (36.4 °C) (Skin)   Resp 16   Wt 65.3 kg (144 lb) SpO2 96%   BMI 24.72 kg/m²       ECOG Performance Status (grade): 1  0 - able to carry on all pre-disease activity w/out restriction  1 - restricted but able to carry out light work  2 - ambulatory and can self- care but unable to carry out work  3 - bed or chair >50% of waking hours  4 - completely disable, total care, confined to bed or chair    Physical Exam  Constitutional:       Appearance: Normal appearance. HENT:      Head: Normocephalic and atraumatic. Eyes:      Pupils: Pupils are equal, round, and reactive to light. Neck:      Musculoskeletal: Neck supple. Cardiovascular:      Rate and Rhythm: Normal rate and regular rhythm. Heart sounds: Normal heart sounds. Pulmonary:      Effort: Pulmonary effort is normal.      Breath sounds: Normal breath sounds. Abdominal:      General: Bowel sounds are normal.      Palpations: Abdomen is soft. Tenderness: There is no abdominal tenderness. There is no guarding. Musculoskeletal: Normal range of motion. Right lower leg: No edema. Left lower leg: No edema. Skin:     General: Skin is warm. Neurological:      General: No focal deficit present. Mental Status: She is alert and oriented to person, place, and time. Mental status is at baseline. Diagnostics:      No results found for this or any previous visit (from the past 96 hour(s)). Imaging:  No results found for this or any previous visit. Results for orders placed during the hospital encounter of 12/17/20   XR SPINE THORAC 2 V    Narrative XR SPINE [facility] 2 V: 12/17/2020 2:45 PM    CLINICAL INFORMATION  back pain. History of breast cancer    COMPARISON  Thoracic spine MRI 23 June 2017, CT chest, abdomen and pelvis 17 April 2020    TECHNIQUE  2 views of the thoracic spine    FINDINGS    Levoconvex curvature of the upper lumbar spine. Mild/moderate multilevel discogenic endplate degenerative changes throughout the  thoracic spine.     No fracture or destructive osseous lesions identified. Impression IMPRESSION:  No acute osseous findings. If continued clinical concern, recommend MRI. Results for orders placed in visit on 01/05/21   CT CHEST ABD PELV W CONT    Narrative CT CHEST ABDOMEN PELVIS ENHANCED    CPT code: 24032, 41292 & 82667    INDICATION: Metastatic breast cancer. Secondary malignant neoplasm of bone and  bone marrow. TECHNIQUE: 5 mm collimation axial images obtained from the thoracic inlet to the  level of the pubic symphysis following the uneventful administration of 70 cc's  nonionic intravenous contrast.    All CT scans at this facility are performed using dose optimization technique as  appropriate to this specific exam, to include automated exposure control,  adjustment of the mA and/or KP according to patient size or use of iterative  reconstruction techniques. COMPARISON: Prior CT 4/17/20    CHEST FINDINGS:  Heart size normal. No pericardial effusion. Nonenlarged aorta. Infusion catheter in place in the left chest.    No enlarged axillary or mediastinal lymph nodes. Similar volume loss of the right hemithorax with area of lenticular density  ventral along the pleural surface similar to the prior exam likely related to  rounded atelectasis possibly fibrosis if there has been previous radiation  therapy. Similar conglomerate presumed rounded atelectasis posterior right lower lobe  adjacent to a small chronic pleural effusion. Smooth pleural thickening at the  dependent and lateral right lung base costophrenic angle appears similar to  previous. No new lung lesions. ABDOMEN FINDINGS:   Liver diffusely low attenuation consistent with steatosis. No mass appreciated  Gallbladder absent. Pancreas unremarkable. Spleen unremarkable. Adrenal glands unremarkable. Kidneys demonstrate symmetric enhancement. No hydronephrosis, mass or stones  No ascites. Normal caliber abdominal aorta.    Similar chronic segmental narrowing of the proximal celiac segment over the  initial 1.4 cm, as on the previous exam. Bifurcation appears normal caliber and  normally patent. This is present dating back to at least 2016. PELVIS FINDINGS:   No small bowel or colon dilatation. Small volume scattered diverticulosis most significant at the sigmoid level. Uterus not enlarged. Bladder nearly empty and not well assessed. No ascites. No new or enlarging lymphadenopathy. Stable 7 mm sclerotic density left femoral neck and tiny sclerotic density right  femoral head. Similar amorphous sclerotic densities in the right iliac bone. Similar mildly expansile heterogeneous attenuation of the posterior T12 lamina  and right pedicle. Similar increased sclerosis right anterior lateral T6  diffusely and focally T7. No new lesions appreciated. Impression IMPRESSION:  1. Similar chronic appearing findings in the right hemithorax with areas of  rounded atelectasis, smooth pleural thickening, presumed chronic interstitial  changes and apical fibrosis and small pleural effusion. 2. No new or enlarging lymphadenopathy. No solid organ lesions or ascites. .    3. Similar appearance of sclerotic and heterogeneous osseous foci involving the  femoral heads, right iliac bone, T12 and right ribs as above. Assessment:     1. Metastatic breast cancer ([address]    2. Malignant neoplasm of lower-inner quadrant of right breast of female, estrogen receptor positive ([address]    3. Anemia due to antineoplastic chemotherapy    4. B12 deficiency    5. Right-sided chest wall pain    6. Anemia due to chemotherapy    7. Chronic leukopenia    8. Chemotherapy-induced thrombocytopenia    9. Neuropathy associated with cancer ([address]    10. Intractable headache, unspecified chronicity pattern, unspecified headache type      Plan:   Metastatic breast cancer:     -- She has slowly progressive metastatic breast cancer. Patient was being followed by Dr. Marilee Miguel who retired.  The patient has previously completed external beam radiation therapy to lesions in her ribs and more recently she completed proton therapy to areas of bone involvement with a significant benefit with regards to pain control. The posttherapy imaging studies showed a significant decrease in the intensity of uptake on the bone scan. -- Additionally she is currently receiving systemic therapy with fulvestrant and Ibrance. She has been on Fulvestrant 500 mg subcutaneous monthly and Ibrance 100 mg by mouth daily. She is tolerating treatment fairly well without high grade toxicities. -- 4/15/2020  CT scans of the chest, abdomen, and pelvis shows that she has a loculated right pleural effusion with some pleural thickening and adjacent areas of probable rounded atelectasis all of which are stable. She has relatively stable bone lesions of the rib, pelvis, and T12 posterior element. There was no findings on CT scan to support new metastatic disease in the chest, abdomen, or pelvis. --  Mammogram 6/5 done shows No evidence of malignancy. Suggest routine follow-up. -- 12/17/2020 CBC reported hemoglobin was 10.8, hematocrit was 32.8%, total WBC was 1.9 and ANC was 1.3, platelet was [phone],781.  -- 1/18/2021 CT CA/P: No new or enlarging lymphadenopathy. No solid organ lesions or ascites. -- 1/18/2021 Bone scan reported a focal uptake anterior left iliac crest without definite CT correlate.   -- 2/12/2021 US reported No abnormality is seen in the patient's area of concern inferior to the right scapula. -- 4/19/2021 Bone scan reported no gross interval change in indeterminate uptake in the left superior iliac bone. Slight increased intensity within the uptake within the right anterior rib cage with similar distribution. Today I have reviewed with the patient about above bone scan reports. Recent labs reviewed with the patient.       Plan:   -- Patient will continue current regimen with fulvestrant and palbociclib as previously recommended. Palbociclib 100 mg daily 21 days on/7 days off today. -- She will need to check lab before each cycle. -- She will need yearly DEXA scan. Intractable headache  -- We will obtain Brain MRI to r/o intracranial lesions    Normocytic Anemia. Chemotherapy-related annemia  -- 2/11/2021 CBC reported hemoglobin 10.5, hematocrit 32, wbc 2.0, ANC 1.5, and platelet [phone]. chemistry reviewed  -- The patient previously received Procrit 60,000 units subcutaneous at 4 week intervals whenever her hemoglobin is below 10 g/dL and hematocrit is below 30%. She concerned her Procrit was not given recently. She noted her fatigue usually improved after Procrit injection. -- We will add to her next labs: Iron profiles, ferritin, B12/folate  -- We will resume Procrit SQ as previous       Right posterior chest wall pain  -- Advised her to continue Gabapentin for neuropathy  -- Clinical improving with muscle relaxants Flexeril  -- She has completed steroids per PCP, will f/u PCP soon. Neuropathy associated with cancer:   -- Was on Neurontin at 400 mg 3 times daily.         Chemotherapy-induced neutropenia/chronic leukopenia (persisting problem):   -- 2/11/2021 CBC reported hemoglobin 10.5, hematocrit 32, wbc 2.0, ANC 1.5, and platelet 012,488. chemistry reviewed. -- Clinical stable. -- If the absolute neutrophil count declines to 0.5 she will be started on Neupogen or Neulasta.            Chemotherapy-induced thrombocytopenia :   -- The patient was holding the Ibrance 140 mg at previous times D/T thrombocytopenia. -- She presently taking Ibrance 100mg.   -- Recent CBC showed improved PLTs count. -- We will see the patient back in clinic in about 4 weeks. Always sooner if required. The patient can have lab done prior our next clinic visit.       Orders Placed This Encounter    MRI BRAIN W CONT     Standing Status:   Future     Number of Occurrences:   1     Standing Expiration Date: 6/12/2022     Order Specific Question:   STAT Creatinine as indicated     Answer: Yes    IRON PROFILE     Standing Status:   Future     Standing Expiration Date:   5/13/2022    FERRITIN     Standing Status:   Future     Standing Expiration Date:   5/13/2022    VITAMIN B12 & FOLATE     Standing Status:   Future     Standing Expiration Date:   5/13/2022           Ms. Damien Navarro has a reminder for a \"due or due soon\" health maintenance. I have asked that she contact her primary care provider for follow-up on this health maintenance. All of patient's questions answered to their apparent satisfaction. They verbally show understanding and agreement with aforementioned plan. Guerda Harris MD  5/12/2021          About 30 minutes were spent for this encounter with more than 50% of the time spent in face-to-face counseling, discussing on diagnosis and management plan going forward, and co-ordination of care. Parts of this document has been produced using Dragon dictation system. Unrecognized errors in transcription may be present. Please do not hesitate to reach out for any questions or clarifications.       CC: Sam Robert MD

## 2021-06-03 NOTE — PROGRESS NOTES
[facility] Progress Note    Date: Genesis 3, 2021    Name: Blake Duran    MRN: [id number]         : 1945      Ms. Figueroa was assessed and education was provided. Ms. Meri Reveles vitals were reviewed and patient was observed for 5 minutes prior to treatment. Visit Vitals  /81 (BP 1 Location: Left upper arm, BP Patient Position: Sitting)   Pulse 77   Temp 97.8 °F (36.6 °C)   Resp 16   Ht 5' 4\" (1.626 m)   Wt 65 kg (143 lb 6.4 oz)   SpO2 98%   Breastfeeding No   BMI 24.61 kg/m²       Lab results were obtained and reviewed. Recent Results (from the past 12 hour(s))   CBC WITH AUTOMATED DIFF    Collection Time: 21  1:02 PM   Result Value Ref Range    WBC 2.2 (L) 4.6 - 13.2 K/uL    RBC 3.01 (L) 4.20 - 5.30 M/uL    HGB 10.3 (L) 12.0 - 16.0 g/dL    HCT 29.8 (L) 35.0 - 45.0 %    MCV 99.0 (H) 74.0 - 97.0 FL    MCH 34.2 (H) 24.0 - 34.0 PG    MCHC 34.6 31.0 - 37.0 g/dL    RDW 17.4 (H) 11.6 - 14.5 %    PLATELET 394 588 - 574 K/uL    MPV 9.5 9.2 - 11.8 FL    NEUTROPHILS 62 40 - 73 %    LYMPHOCYTES 20 (L) 21 - 52 %    MONOCYTES 16 (H) 3 - 10 %    EOSINOPHILS 1 0 - 5 %    BASOPHILS 1 0 - 2 %    ABS. NEUTROPHILS 1.4 (L) 1.8 - 8.0 K/UL    ABS. LYMPHOCYTES 0.4 (L) 0.9 - 3.6 K/UL    ABS. MONOCYTES 0.3 0.05 - 1.2 K/UL    ABS. EOSINOPHILS 0.0 0.0 - 0.4 K/UL    ABS. BASOPHILS 0.0 0.0 - 0.1 K/UL    DF AUTOMATED         Faslodex 500 mg IM given divided into 2 injections, one in each upper outer quadrand. I advised Ms Cookie Diana to draw Dr Cristina Fuentes attention to the hard areas of her buttocks where she has been getting faslodex injections. Ms Cookie Ortiz agreed to do this. Retacrit not given for h&h 10.1.8 AND 10.. 4. Xgeva held for calcium of 8.4 per Abigail Mckeon, NP. Calcium redrawn today. Labs drawn via port today include the following:  CBC. CMP, Mag, Phos, Ferritin, Iron profile, B12 & Folate. CBC was run in house and sent out to hospital due to flags.   Preliminary CBC results scanned into chart.    Discharge instructions discussed with Ms Lia Tavera and she verbalized understanding. Ms. Lia Tavera had no complaints. Patient armband removed and shredded. Ms. Lia Tavera was discharged from [place] [id number] in stable condition at 1335. She is to return on 7/1/2021 at 1300 for her next appointment.     Leydi Jean Baptiste RN  Genesis 3, 2021  3:32 PM

## 2021-06-06 NOTE — PROGRESS NOTES
Hematology/Oncology  Progress Note    Name: Cem Service  Date: 6/10/2021  : 1945    PCP: Arianne Chauhan MD     Ms. Brian Lawrence is a 76 y.o.  female who was seen for management of her metastatic breast cancer with associated complications of chemotherapy. Current therapy: Fulvestrant 500 mg subcutaneous monthly and Ibrance 125 mg by mouth daily. Subjective:     Mrs. Brian Lawrence is a 66-year-old woman who has slowly progressive metastatic breast cancer. Patient was being followed by Dr. Golden Skelton who retired. She is here today for chemo follow up. The patient has previously completed external beam radiation therapy to lesions in her ribs and more recently she completed proton therapy to areas of bone involvement with a significant benefit with regards to pain control. Additionally she is currently receiving systemic therapy with fulvestrant and Ibrance. Patient is tolerating the systemic therapy reasonably well and has not experienced any nausea or emesis. Today the patient reported poor appetite. She has resumed Procrit at 4 week intervals whenever her hemoglobin is below 10 g/dL and hematocrit is below 30%. She denied other complaints. Past medical history, family history, and social history: these were reviewed and remains unchanged.     Past Medical History:   Diagnosis Date    Biliary colic     Breast CA ([facility] [address] 2012    Cancer [facility])     Right Breast    Chemotherapy convalescence or palliative care     Neuropathy     Radiation therapy complication     Thyroid disease      Past Surgical History:   Procedure Laterality Date    HX LAP CHOLECYSTECTOMY  13    HX MASTECTOMY      Right    FL BREAST SURGERY PROCEDURE UNLISTED  10/2002    Right-Lesion    FL BREAST SURGERY PROCEDURE UNLISTED  2004    Right-Lesion     Social History     Socioeconomic History    Marital status:      Spouse name: Not on file    Number of children: Not on file    Years of education: Not on file    Highest education level: Not on file   Occupational History    Not on file   Tobacco Use    Smoking status: Former Smoker     Quit date: 1991     Years since quittin.0    Smokeless tobacco: Never Used   Substance and Sexual Activity    Alcohol use: Yes     Alcohol/week: 0.0 standard drinks     Types: 1 - 2 Glasses of wine per week     Comment: socially, occassionally    Drug use: No    Sexual activity: Not on file   Other Topics Concern    Not on file   Social History Narrative    Not on file     Social Determinants of Health     Financial Resource Strain:     Difficulty of Paying Living Expenses:    Food Insecurity:     Worried About Running Out of Food in the Last Year:     [address] in the Last Year:    Transportation Needs:     Lack of Transportation (Medical):  Lack of Transportation (Non-Medical):    Physical Activity:     Days of Exercise per Week:     Minutes of Exercise per Session:    Stress:     Feeling of Stress :    Social Connections:     Frequency of Communication with Friends and Family:     Frequency of Social Gatherings with Friends and Family:     Attends Denominational Services:     Active Member of Clubs or Organizations:     Attends Club or Organization Meetings:     Marital Status:    Intimate Partner Violence:     Fear of Current or Ex-Partner:     Emotionally Abused:     Physically Abused:     Sexually Abused:      Family History   Problem Relation Age of Onset    Cancer Maternal Aunt         Hodgkin's disease    Breast Cancer Paternal Aunt     Cancer Father         Prostate, lung, brain     Current Outpatient Medications   Medication Sig Dispense Refill    gabapentin (NEURONTIN) 100 mg capsule Take 2 Caps by mouth three (3) times daily. Max Daily Amount: 600 mg. 540 Cap 3    calcium citrate 200 mg (950 mg) tablet Take  by mouth daily.  palbociclib (Ibrance) 100 mg cap Take 1 Cap by mouth daily.  For 21 days on and 7 days off every 28 days. 63 Cap 5    Cetirizine (ZYRTEC) 10 mg cap Take 1 Cap by mouth daily as needed.  lidocaine HCl-hydrocortison ac topical cream Apply  to affected area two (2) times a day. 85 g 3    TETRACYCLINE HCL (TETRACYCLINE PO) Take 250 mg by mouth two (2) times a day.  fexofenadine-pseudoephedrine (ALLEGRA-D 24 HOUR) 180-240 mg per tablet Take 1 Tab by mouth daily as needed.  palbociclib (IBRANCE) 125 mg cap Take 125 mg by mouth daily. Take 1 tab po every day for 21 days on and 7 days off q 28 days  Indications: HORMONE RECEPTOR(+), HER2(-) ADVANCED BREAST CANCER 42 Cap 6    Cholecalciferol, Vitamin D3, 5,000 unit Tab Take  by mouth daily.  thyroid, Pork, (ARMOUR THYROID) 60 mg tablet Take 60 mg by mouth daily.  L-Mfolate-B6 Phos-Methyl-B12 (NEURPATH-B) 3-35-2 mg Tab Take  by mouth two (2) times a day.  warfarin (COUMADIN) 1 mg tablet Take 1 mg by mouth every other day. Review of Systems   Constitutional: Positive for malaise/fatigue. Negative for chills, diaphoresis, fever and weight loss. Respiratory: Negative for cough, hemoptysis, shortness of breath and wheezing. Cardiovascular: Negative for chest pain, palpitations and leg swelling. Gastrointestinal: Negative for abdominal pain, diarrhea, heartburn, nausea and vomiting. Genitourinary: Negative for dysuria, frequency, hematuria and urgency. Musculoskeletal: Negative for joint pain and myalgias. Skin: Negative for itching and rash. Neurological: Negative for dizziness, seizures, weakness and headaches. Psychiatric/Behavioral: Negative for depression. The patient does not have insomnia.              Objective:     Visit Vitals  /72   Pulse 74   Ht 5' 4\" (1.626 m)   Wt 64.9 kg (143 lb)   SpO2 96%   BMI 24.55 kg/m²       ECOG Performance Status (grade): 1  0 - able to carry on all pre-disease activity w/out restriction  1 - restricted but able to carry out light work  2 - ambulatory and can self- care but unable to carry out work  3 - bed or chair >50% of waking hours  4 - completely disable, total care, confined to bed or chair    Physical Exam  Constitutional:       Appearance: Normal appearance. HENT:      Head: Normocephalic and atraumatic. Eyes:      Pupils: Pupils are equal, round, and reactive to light. Cardiovascular:      Rate and Rhythm: Normal rate and regular rhythm. Heart sounds: Normal heart sounds. Pulmonary:      Effort: Pulmonary effort is normal.      Breath sounds: Normal breath sounds. Abdominal:      General: Bowel sounds are normal.      Palpations: Abdomen is soft. Tenderness: There is no abdominal tenderness. There is no guarding. Musculoskeletal:         General: Normal range of motion. Cervical back: Neck supple. Right lower leg: No edema. Left lower leg: No edema. Skin:     General: Skin is warm. Neurological:      General: No focal deficit present. Mental Status: She is alert and oriented to person, place, and time. Mental status is at baseline. Diagnostics:      No results found for this or any previous visit (from the past 96 hour(s)). Imaging:  No results found for this or any previous visit. Results for orders placed during the hospital encounter of 12/17/20    XR SPINE THORAC 2 V    Narrative  XR SPINE [address] 2 V: 12/17/2020 2:45 PM    CLINICAL INFORMATION  back pain. History of breast cancer    COMPARISON  Thoracic spine MRI 23 June 2017, CT chest, abdomen and pelvis 17 April 2020    TECHNIQUE  2 views of the thoracic spine    FINDINGS    Levoconvex curvature of the upper lumbar spine. Mild/moderate multilevel discogenic endplate degenerative changes throughout the  thoracic spine. No fracture or destructive osseous lesions identified. Impression  IMPRESSION:  No acute osseous findings. If continued clinical concern, recommend MRI.       Results for orders placed in visit on 01/05/21    CT CHEST ABD PELV W CONT    Narrative  CT CHEST ABDOMEN PELVIS ENHANCED    CPT code: [id number], [id number] & [id number]    INDICATION: Metastatic breast cancer. Secondary malignant neoplasm of bone and  bone marrow. TECHNIQUE: 5 mm collimation axial images obtained from the thoracic inlet to the  level of the pubic symphysis following the uneventful administration of 70 cc's  nonionic intravenous contrast.    All CT scans at this facility are performed using dose optimization technique as  appropriate to this specific exam, to include automated exposure control,  adjustment of the mA and/or KP according to patient size or use of iterative  reconstruction techniques. COMPARISON: Prior CT 4/17/20    CHEST FINDINGS:  Heart size normal. No pericardial effusion. Nonenlarged aorta. Infusion catheter in place in the left chest.    No enlarged axillary or mediastinal lymph nodes. Similar volume loss of the right hemithorax with area of lenticular density  ventral along the pleural surface similar to the prior exam likely related to  rounded atelectasis possibly fibrosis if there has been previous radiation  therapy. Similar conglomerate presumed rounded atelectasis posterior right lower lobe  adjacent to a small chronic pleural effusion. Smooth pleural thickening at the  dependent and lateral right lung base costophrenic angle appears similar to  previous. No new lung lesions. ABDOMEN FINDINGS:  Liver diffusely low attenuation consistent with steatosis. No mass appreciated  Gallbladder absent. Pancreas unremarkable. Spleen unremarkable. Adrenal glands unremarkable. Kidneys demonstrate symmetric enhancement. No hydronephrosis, mass or stones  No ascites. Normal caliber abdominal aorta. Similar chronic segmental narrowing of the proximal celiac segment over the  initial 1.4 cm, as on the previous exam. Bifurcation appears normal caliber and  normally patent. This is present dating back to at least 2016.     PELVIS FINDINGS:  No small bowel or colon dilatation. Small volume scattered diverticulosis most significant at the sigmoid level. Uterus not enlarged. Bladder nearly empty and not well assessed. No ascites. No new or enlarging lymphadenopathy. Stable 7 mm sclerotic density left femoral neck and tiny sclerotic density right  femoral head. Similar amorphous sclerotic densities in the right iliac bone. Similar mildly expansile heterogeneous attenuation of the posterior T12 lamina  and right pedicle. Similar increased sclerosis right anterior lateral T6  diffusely and focally T7. No new lesions appreciated. Impression  IMPRESSION:  1. Similar chronic appearing findings in the right hemithorax with areas of  rounded atelectasis, smooth pleural thickening, presumed chronic interstitial  changes and apical fibrosis and small pleural effusion. 2. No new or enlarging lymphadenopathy. No solid organ lesions or ascites. .    3. Similar appearance of sclerotic and heterogeneous osseous foci involving the  femoral heads, right iliac bone, T12 and right ribs as above. Assessment:     1. Metastatic breast cancer ([address]    2. Malignant neoplasm of lower-inner quadrant of right breast of female, estrogen receptor positive ([address]    3. Anemia due to antineoplastic chemotherapy    4. B12 deficiency    5. Chronic leukopenia    6. Chemotherapy-induced thrombocytopenia    7. Neuropathy associated with cancer ([address]    8. Secondary malignant neoplasm of bone and bone marrow (HCC)    9. Chemotherapy induced neutropenia (HCC)      Plan:   Metastatic breast cancer:     -- She has slowly progressive metastatic breast cancer. Patient was being followed by Dr. Charmaine Dumont who retired. The patient has previously completed external beam radiation therapy to lesions in her ribs and more recently she completed proton therapy to areas of bone involvement with a significant benefit with regards to pain control.     The posttherapy imaging studies showed a significant decrease in the intensity of uptake on the bone scan. -- Additionally she is currently receiving systemic therapy with fulvestrant and Ibrance. She has been on Fulvestrant 500 mg subcutaneous monthly and Ibrance 100 mg by mouth daily. She is tolerating treatment fairly well without high grade toxicities. -- 4/15/2020  CT scans of the chest, abdomen, and pelvis shows that she has a loculated right pleural effusion with some pleural thickening and adjacent areas of probable rounded atelectasis all of which are stable. She has relatively stable bone lesions of the rib, pelvis, and T12 posterior element. There was no findings on CT scan to support new metastatic disease in the chest, abdomen, or pelvis. --  Mammogram 6/5 done shows No evidence of malignancy. Suggest routine follow-up. -- 12/17/2020 CBC reported hemoglobin was 10.8, hematocrit was 32.8%, total WBC was 1.9 and ANC was 1.3, platelet was 924,181.  -- 1/18/2021 CT CA/P: No new or enlarging lymphadenopathy. No solid organ lesions or ascites. -- 1/18/2021 Bone scan reported a focal uptake anterior left iliac crest without definite CT correlate.   -- 2/12/2021 US reported No abnormality is seen in the patient's area of concern inferior to the right scapula. -- 4/19/2021 Bone scan reported no gross interval change in indeterminate uptake in the left superior iliac bone. Slight increased intensity within the uptake within the right anterior rib cage with similar distribution. -- 6/7/2021 Mammogram reported no evidence for malignancy. Suggest routine follow-up with annual mammographic screening.  -- The patient has tolerated therapy with no high graded toxicities. Recent labs reviewed with the patient today. Plan:   -- Patient will continue current regimen with fulvestrant and palbociclib as previously recommended. Palbociclib 100 mg daily 21 days on/7 days off today. -- She will need to check lab before each cycle. -- She will need yearly DEXA scan. -- Labs: CBC, CMP, CA 27-29, CA 15-3, Vitamin D      Intractable headache  -- 5/30/2021 Brain MRI reported no intracranial metastases identified. -- Clinical improving    Normocytic Anemia. Chemotherapy-related annemia  -- 2/11/2021 CBC reported hemoglobin 10.5, hematocrit 32, wbc 2.0, ANC 1.5, and platelet [phone]. chemistry reviewed  -- The patient previously received Procrit 60,000 units subcutaneous at 4 week intervals whenever her hemoglobin is below 10 g/dL and hematocrit is below 30%. Procrit SQ every 4 weeks has resumed recently. -- Recent labs reviewed. B12 275  -- Advised the patient to take B12 supplement      Poor appetite  -- Nutritionist consult      Neuropathy associated with cancer:   -- Was on Neurontin at 400 mg 3 times daily.         Chemotherapy-induced neutropenia/chronic leukopenia (persisting problem):   -- 2/11/2021 CBC reported hemoglobin 10.5, hematocrit 32, wbc 2.0, ANC 1.5, and platelet 660,540. chemistry reviewed. -- Clinical stable. -- If the absolute neutrophil count declines to 0.5 she will be started on Neupogen or Neulasta.            Chemotherapy-induced thrombocytopenia :   -- The patient was holding the Ibrance 140 mg at previous times D/T thrombocytopenia. -- She presently taking Ibrance 100mg.   -- Recent CBC showed improved PLTs count. -- We will see the patient back in clinic in about 8 weeks. Always sooner if required. The patient can have lab done prior our next clinic visit. No orders of the defined types were placed in this encounter. Ms. Madelin Gonzalez has a reminder for a \"due or due soon\" health maintenance. I have asked that she contact her primary care provider for follow-up on this health maintenance. All of patient's questions answered to their apparent satisfaction. They verbally show understanding and agreement with aforementioned plan.          Reji Medeiros MD  6/10/2021          About 30 minutes were spent for this encounter with more than 50% of the time spent in face-to-face counseling, discussing on diagnosis and management plan going forward, and co-ordination of care. Parts of this document has been produced using Dragon dictation system. Unrecognized errors in transcription may be present. Please do not hesitate to reach out for any questions or clarifications.       CC: Nicky Brower MD

## 2021-07-01 NOTE — PROGRESS NOTES
[facility] Progress Note    Date: 2021    Name: Janice Dennis    MRN: [id number]         : 1945      Ms. Figueroa was assessed and education was provided. Ms. Brian Cody vitals were reviewed and patient was observed for 5 minutes prior to treatment. Visit Vitals  /67 (BP 1 Location: Left upper arm, BP Patient Position: Sitting)   Pulse 76   Temp 98 °F (36.7 °C)   Resp 16   Ht 5' 4\" (1.626 m)   Wt 65.7 kg (144 lb 12.8 oz)   SpO2 97%   Breastfeeding No   BMI 24.85 kg/m²       Lab results were obtained and reviewed. Recent Results (from the past 12 hour(s))   CBC WITH 3 PART DIFF    Collection Time: 21  1:46 PM   Result Value Ref Range    WBC 1.7 (L) 4.5 - 13.0 K/uL    RBC 2.86 (L) 4.10 - 5.10 M/uL    HGB 9.3 (L) 12.0 - 16 g/dL    HCT 28.3 (L) 36 - 48 %    MCV 99.0 78 - 102 FL    MCH 32.5 25.0 - 35.0 PG    MCHC 32.9 31 - 37 g/dL    RDW 17.1 (H) 11.5 - 14.5 %    PLATELET 340 (L) 173 - 440 K/uL    NEUTROPHILS 71 (H) 40 - 70 %    MIXED CELLS 7 0.1 - 17 %    LYMPHOCYTES 22 14 - 44 %    ABS. NEUTROPHILS 1.2 (L) 1.8 - 9.5 K/UL    ABS. MIXED CELLS 0.1 0.0 - 2.3 K/uL    ABS. LYMPHOCYTES 0.4 (L) 1.1 - 5.9 K/UL    DF AUTOMATED         Faslodex 500 mg IM given divided into 2 injections, one in each upper outer quadrand. Ms Yamilet Ordonez stated she did talk with Dr Austyn Macdonald about the hard areas on her buttocks. She stated she will apply heat to her buttocks as he advised. Retacrit 60,000 units given SQ for H&H 9.3/28.3 upper left arm. Xgeva 120 mg SQ given upper left arm. Labs drawn via port today include the following:  CBC,CMP, Mag, Phos. Discharge instructions discussed with Ms Ermalene Fort and she verbalized understanding. Ms. Yamilet Ordnoez had no complaints. Patient armband removed and shredded. Ms. Yamilet Ordonez was discharged from [place] [id number] in stable condition at 1430. She is to return on 8/3/2021 at 0900 for her next appointment.     Anya Go RN  2021

## 2021-07-01 NOTE — PROGRESS NOTES
[facility] Progress Note    Date: 2021    Name: Goldy Adame    MRN: [id number]         : 1945      Ms. Figueroa was assessed and education was provided. Ms. Zulay Bowen vitals were reviewed and patient was observed for 5 minutes prior to treatment. Visit Vitals  /67 (BP 1 Location: Left upper arm, BP Patient Position: Sitting)   Pulse 76   Temp 98 °F (36.7 °C)   Resp 16   Ht 5' 4\" (1.626 m)   Wt 65.7 kg (144 lb 12.8 oz)   SpO2 97%   Breastfeeding No   BMI 24.85 kg/m²       Lab results were obtained and reviewed. Recent Results (from the past 12 hour(s))   CBC WITH 3 PART DIFF    Collection Time: 21  1:46 PM   Result Value Ref Range    WBC 1.7 (L) 4.5 - 13.0 K/uL    RBC 2.86 (L) 4.10 - 5.10 M/uL    HGB 9.3 (L) 12.0 - 16 g/dL    HCT 28.3 (L) 36 - 48 %    MCV 99.0 78 - 102 FL    MCH 32.5 25.0 - 35.0 PG    MCHC 32.9 31 - 37 g/dL    RDW 17.1 (H) 11.5 - 14.5 %    PLATELET 792 (L) 380 - 440 K/uL    NEUTROPHILS 71 (H) 40 - 70 %    MIXED CELLS 7 0.1 - 17 %    LYMPHOCYTES 22 14 - 44 %    ABS. NEUTROPHILS 1.2 (L) 1.8 - 9.5 K/UL    ABS. MIXED CELLS 0.1 0.0 - 2.3 K/uL    ABS. LYMPHOCYTES 0.4 (L) 1.1 - 5.9 K/UL    DF AUTOMATED         Faslodex 500 mg IM given divided into 2 injections, one in each upper outer quadrand. Ms Armin Swartz stated she did talk with Dr Heri Velazquez about the hard areas on her buttocks. She stated she will apply heat to her buttocks as he advised. Retacrit 60,000 units given SQ for H&H 9.3/28.3 upper left arm. Xgeva 120 mg SQ given upper left arm. Labs drawn via port today include the following:  CBC. CMP, Mag, Phos. Discharge instructions discussed with Ms Armin Swartz and she verbalized understanding. Ms. Armin Swartz had no complaints. Patient armband removed and shredded. Ms. Armin Swartz was discharged from [place] [address] in stable condition at 1430. She is to return on 8/3/2021 at 0900 for her next appointment.     Asa Villarreal RN  2021

## 2021-08-01 NOTE — PROGRESS NOTES
Hematology/Oncology  Progress Note    Name: Goldy Adame  Date: 8/3/2021  : 1945    PCP: Juan Cabrera MD     Ms. Armin Swartz is a 76 y.o.  female who was seen for management of her metastatic breast cancer with associated complications of chemotherapy. Current therapy: Fulvestrant 500 mg subcutaneous monthly and Ibrance 125 mg by mouth daily. Subjective:     Mrs. Armin Swartz is a 22-year-old woman who has slowly progressive metastatic breast cancer. Patient was being followed by Dr. Loan Márquez who retired. She is here today for chemo follow up. The patient has previously completed external beam radiation therapy to lesions in her ribs and more recently she completed proton therapy to areas of bone involvement with a significant benefit with regards to pain control. Additionally she is currently receiving systemic therapy with fulvestrant and Ibrance. Patient is tolerating the systemic therapy reasonably well and has not experienced any nausea or emesis. Today the patient reported doing fairly well. She has on-off muscle pain at the site of XRT. Mukul Moraes She has resumed Procrit at 4 week intervals whenever her hemoglobin is below 10 g/dL and hematocrit is below 30%. Her neuropathy reported with tinging and numbness which not really improved with Neurontin 200 mg TID. She denied other complaints. Past medical history, family history, and social history: these were reviewed and remains unchanged.     Past Medical History:   Diagnosis Date    Biliary colic     Breast CA ([facility] [address] 2012    Cancer [facility])     Right Breast    Chemotherapy convalescence or palliative care     Neuropathy     Radiation therapy complication     Thyroid disease      Past Surgical History:   Procedure Laterality Date    HX LAP CHOLECYSTECTOMY  13    HX MASTECTOMY      Right    CT BREAST SURGERY PROCEDURE UNLISTED  10/2002    Right-Lesion    CT BREAST SURGERY PROCEDURE UNLISTED  2004    Right-Lesion Social History     Socioeconomic History    Marital status:      Spouse name: Not on file    Number of children: Not on file    Years of education: Not on file    Highest education level: Not on file   Occupational History    Not on file   Tobacco Use    Smoking status: Former Smoker     Quit date: 1991     Years since quittin.1    Smokeless tobacco: Never Used   Substance and Sexual Activity    Alcohol use: Yes     Alcohol/week: 0.0 standard drinks     Types: 1 - 2 Glasses of wine per week     Comment: socially, occassionally    Drug use: No    Sexual activity: Not on file   Other Topics Concern    Not on file   Social History Narrative    Not on file     Social Determinants of Health     Financial Resource Strain:     Difficulty of Paying Living Expenses:    Food Insecurity:     Worried About Running Out of Food in the Last Year:     [address] in the Last Year:    Transportation Needs:     Lack of Transportation (Medical):  Lack of Transportation (Non-Medical):    Physical Activity:     Days of Exercise per Week:     Minutes of Exercise per Session:    Stress:     Feeling of Stress :    Social Connections:     Frequency of Communication with Friends and Family:     Frequency of Social Gatherings with Friends and Family:     Attends Yazidism Services:     Active Member of Clubs or Organizations:     Attends Club or Organization Meetings:     Marital Status:    Intimate Partner Violence:     Fear of Current or Ex-Partner:     Emotionally Abused:     Physically Abused:     Sexually Abused:      Family History   Problem Relation Age of Onset    Cancer Maternal Aunt         Hodgkin's disease    Breast Cancer Paternal Aunt     Cancer Father         Prostate, lung, brain     Current Outpatient Medications   Medication Sig Dispense Refill    DULoxetine (CYMBALTA) 30 mg capsule Take 1 Capsule by mouth daily.  For one week, then 2 capsules daily 60 Capsule 5    Ibrance 100 mg cap TAKE 1 CAPSULE DAILY FOR 21 DAYS ON AND 7 DAYS OFF, EVERY 28 DAYS 63 Capsule 3    gabapentin (NEURONTIN) 100 mg capsule TAKE 2 CAPSULES THREE TIMES A  Capsule 3    calcium citrate 200 mg (950 mg) tablet Take  by mouth daily.  Cetirizine (ZYRTEC) 10 mg cap Take 1 Cap by mouth daily as needed.  lidocaine HCl-hydrocortison ac topical cream Apply  to affected area two (2) times a day. 85 g 3    TETRACYCLINE HCL (TETRACYCLINE PO) Take 250 mg by mouth two (2) times a day.  fexofenadine-pseudoephedrine (ALLEGRA-D 24 HOUR) 180-240 mg per tablet Take 1 Tab by mouth daily as needed.  palbociclib (IBRANCE) 125 mg cap Take 125 mg by mouth daily. Take 1 tab po every day for 21 days on and 7 days off q 28 days  Indications: HORMONE RECEPTOR(+), HER2(-) ADVANCED BREAST CANCER 42 Cap 6    Cholecalciferol, Vitamin D3, 5,000 unit Tab Take  by mouth daily.  thyroid, Pork, (ARMOUR THYROID) 60 mg tablet Take 60 mg by mouth daily.  L-Mfolate-B6 Phos-Methyl-B12 (NEURPATH-B) 3-35-2 mg Tab Take  by mouth two (2) times a day.  warfarin (COUMADIN) 1 mg tablet Take 1 mg by mouth every other day. Facility-Administered Medications Ordered in Other Visits   Medication Dose Route Frequency Provider Last Rate Last Admin    sodium chloride (NS) flush 10-40 mL  10-40 mL IntraVENous PRN Salud Cooper MD   30 mL at 08/03/21 0922    heparin (porcine) pf 500 Units  500 Units InterCATHeter PRN Salud Cooper MD   500 Units at 08/03/21 0272     Review of Systems   Constitutional: Positive for malaise/fatigue. Negative for chills, diaphoresis, fever and weight loss. Respiratory: Negative for cough, hemoptysis, shortness of breath and wheezing. Cardiovascular: Negative for chest pain, palpitations and leg swelling. Gastrointestinal: Negative for abdominal pain, diarrhea, heartburn, nausea and vomiting. Genitourinary: Negative for dysuria, frequency, hematuria and urgency. Musculoskeletal: Negative for joint pain and myalgias. Skin: Negative for itching and rash. Neurological: Negative for dizziness, seizures, weakness and headaches. Psychiatric/Behavioral: Negative for depression. The patient does not have insomnia. Objective: There were no vitals taken for this visit. ECOG Performance Status (grade): 1  0 - able to carry on all pre-disease activity w/out restriction  1 - restricted but able to carry out light work  2 - ambulatory and can self- care but unable to carry out work  3 - bed or chair >50% of waking hours  4 - completely disable, total care, confined to bed or chair    Physical Exam  Constitutional:       Appearance: Normal appearance. HENT:      Head: Normocephalic and atraumatic. Eyes:      Pupils: Pupils are equal, round, and reactive to light. Cardiovascular:      Rate and Rhythm: Normal rate and regular rhythm. Heart sounds: Normal heart sounds. Pulmonary:      Effort: Pulmonary effort is normal.      Breath sounds: Normal breath sounds. Abdominal:      General: Bowel sounds are normal.      Palpations: Abdomen is soft. Tenderness: There is no abdominal tenderness. There is no guarding. Musculoskeletal:         General: Normal range of motion. Cervical back: Neck supple. Right lower leg: No edema. Left lower leg: No edema. Skin:     General: Skin is warm. Neurological:      General: No focal deficit present. Mental Status: She is alert and oriented to person, place, and time. Mental status is at baseline.           Diagnostics:      Recent Results (from the past 96 hour(s))   CBC WITH 3 PART DIFF    Collection Time: 08/03/21  9:30 AM   Result Value Ref Range    WBC 1.6 (L) 4.5 - 13.0 K/uL    RBC 3.19 (L) 4.10 - 5.10 M/uL    HGB 10.5 (L) 12.0 - 16 g/dL    HCT 31.4 (L) 36 - 48 %    MCV 98.4 78 - 102 FL    MCH 32.9 25.0 - 35.0 PG    MCHC 33.4 31 - 37 g/dL    RDW 17.1 (H) 11.5 - 14.5 %    PLATELET 055 140 - 440 K/uL    NEUTROPHILS 67 40 - 70 %    MIXED CELLS 13 0.1 - 17 %    LYMPHOCYTES 20 14 - 44 %    ABS. NEUTROPHILS 1.1 (L) 1.8 - 9.5 K/UL    ABS. MIXED CELLS 0.2 0.0 - 2.3 K/uL    ABS. LYMPHOCYTES 0.3 (L) 1.1 - 5.9 K/UL    DF AUTOMATED         Imaging:  No results found for this or any previous visit. Results for orders placed during the hospital encounter of 12/17/20    XR SPINE THORAC 2 V    Narrative  XR SPINE [address] 2 V: 12/17/2020 2:45 PM    CLINICAL INFORMATION  back pain. History of breast cancer    COMPARISON  Thoracic spine MRI 23 June 2017, CT chest, abdomen and pelvis 17 April 2020    TECHNIQUE  2 views of the thoracic spine    FINDINGS    Levoconvex curvature of the upper lumbar spine. Mild/moderate multilevel discogenic endplate degenerative changes throughout the  thoracic spine. No fracture or destructive osseous lesions identified. Impression  IMPRESSION:  No acute osseous findings. If continued clinical concern, recommend MRI. Results for orders placed in visit on 01/05/21    CT CHEST ABD PELV W CONT    Narrative  CT CHEST ABDOMEN PELVIS ENHANCED    CPT code: 28570, 11104 & 87798    INDICATION: Metastatic breast cancer. Secondary malignant neoplasm of bone and  bone marrow. TECHNIQUE: 5 mm collimation axial images obtained from the thoracic inlet to the  level of the pubic symphysis following the uneventful administration of 70 cc's  nonionic intravenous contrast.    All CT scans at this facility are performed using dose optimization technique as  appropriate to this specific exam, to include automated exposure control,  adjustment of the mA and/or KP according to patient size or use of iterative  reconstruction techniques. COMPARISON: Prior CT 4/17/20    CHEST FINDINGS:  Heart size normal. No pericardial effusion. Nonenlarged aorta. Infusion catheter in place in the left chest.    No enlarged axillary or mediastinal lymph nodes.   Similar volume loss of the right hemithorax with area of lenticular density  ventral along the pleural surface similar to the prior exam likely related to  rounded atelectasis possibly fibrosis if there has been previous radiation  therapy. Similar conglomerate presumed rounded atelectasis posterior right lower lobe  adjacent to a small chronic pleural effusion. Smooth pleural thickening at the  dependent and lateral right lung base costophrenic angle appears similar to  previous. No new lung lesions. ABDOMEN FINDINGS:  Liver diffusely low attenuation consistent with steatosis. No mass appreciated  Gallbladder absent. Pancreas unremarkable. Spleen unremarkable. Adrenal glands unremarkable. Kidneys demonstrate symmetric enhancement. No hydronephrosis, mass or stones  No ascites. Normal caliber abdominal aorta. Similar chronic segmental narrowing of the proximal celiac segment over the  initial 1.4 cm, as on the previous exam. Bifurcation appears normal caliber and  normally patent. This is present dating back to at least 2016. PELVIS FINDINGS:  No small bowel or colon dilatation. Small volume scattered diverticulosis most significant at the sigmoid level. Uterus not enlarged. Bladder nearly empty and not well assessed. No ascites. No new or enlarging lymphadenopathy. Stable 7 mm sclerotic density left femoral neck and tiny sclerotic density right  femoral head. Similar amorphous sclerotic densities in the right iliac bone. Similar mildly expansile heterogeneous attenuation of the posterior T12 lamina  and right pedicle. Similar increased sclerosis right anterior lateral T6  diffusely and focally T7. No new lesions appreciated. Impression  IMPRESSION:  1. Similar chronic appearing findings in the right hemithorax with areas of  rounded atelectasis, smooth pleural thickening, presumed chronic interstitial  changes and apical fibrosis and small pleural effusion. 2. No new or enlarging lymphadenopathy.  No solid organ lesions or ascites. .    3. Similar appearance of sclerotic and heterogeneous osseous foci involving the  femoral heads, right iliac bone, T12 and right ribs as above. Assessment:     1. Metastatic breast cancer ([facility] [address]    2. 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 Malignant neoplasm of lower-inner quadrant of right breast of female, estrogen receptor positive ([address]    4. Anemia due to chemotherapy    5. B12 deficiency    6. Chronic leukopenia    7. Chemotherapy induced neutropenia (HCC)      Plan:   Metastatic breast cancer:     -- She has slowly progressive metastatic breast cancer. Patient was being followed by Dr. Catalina Lazcano who retired. The patient has previously completed external beam radiation therapy to lesions in her ribs and more recently she completed proton therapy to areas of bone involvement with a significant benefit with regards to pain control. The posttherapy imaging studies showed a significant decrease in the intensity of uptake on the bone scan. -- Additionally she is currently receiving systemic therapy with fulvestrant and Ibrance. She has been on Fulvestrant 500 mg subcutaneous monthly and Ibrance 100 mg by mouth daily. She is tolerating treatment fairly well without high grade toxicities. -- 4/15/2020  CT scans of the chest, abdomen, and pelvis shows that she has a loculated right pleural effusion with some pleural thickening and adjacent areas of probable rounded atelectasis all of which are stable. She has relatively stable bone lesions of the rib, pelvis, and T12 posterior element. There was no findings on CT scan to support new metastatic disease in the chest, abdomen, or pelvis. --  Mammogram 6/5 done shows No evidence of malignancy. Suggest routine follow-up.     -- 12/17/2020 CBC reported hemoglobin was 10.8, hematocrit was 32.8%, total WBC was 1.9 and ANC was 1.3, platelet was [phone].  -- 1/18/2021 CT CA/P: No new or enlarging lymphadenopathy. No solid organ lesions or ascites. -- 1/18/2021 Bone scan reported a focal uptake anterior left iliac crest without definite CT correlate.   -- 2/12/2021 US reported No abnormality is seen in the patient's area of concern inferior to the right scapula. -- 4/19/2021 Bone scan reported no gross interval change in indeterminate uptake in the left superior iliac bone. -- 6/7/2021 Mammogram reported no evidence for malignancy. Suggest routine follow-up with annual mammographic screening.  -- 7/20/2021 CBC reported hemoglobin 11.7, hematocrit 37.1%, ANC 1.6, platelet 116,311.  -- The patient has tolerated therapy with no high graded toxicities. Recent labs reviewed with the patient today. Plan:   -- Patient will continue current regimen with fulvestrant and palbociclib as previously recommended. Palbociclib 100 mg daily 21 days on/7 days off today. -- She will need to check lab before each cycle. -- We will obtain CT CAP and  bone scan for interval assessment. -- She will need yearly DEXA scan. -- Labs: CBC, CMP, CA 27-29, CA 15-3, Vitamin D      Intractable headache  -- 5/30/2021 Brain MRI reported no intracranial metastases identified. -- Clinical improving    Normocytic Anemia. Chemotherapy-related annemia  -- 2/11/2021 CBC reported hemoglobin 10.5, hematocrit 32, wbc 2.0, ANC 1.5, and platelet 706,505. chemistry reviewed  -- The patient previously received Procrit 60,000 units subcutaneous at 4 week intervals whenever her hemoglobin is below 10 g/dL and hematocrit is below 30%. Procrit SQ every 4 weeks has resumed recently. -- Recent labs reviewed. B12 275. Advised the patient to take B12 supplement  -- Today I have reviewed with the patient about recent lab reports. 7/20/2021 CBC reported hemoglobin 11.7, hematocrit 37.1%, ANC 1.6, platelet [phone]. Poor appetite  -- Nutritionist consulted      Neuropathy associated with cancer:   -- On Neurontin at 200 mg 3 times daily. -- Will add Cymblata        Chemotherapy-induced neutropenia/chronic leukopenia (persisting problem):   -- 2/11/2021 CBC reported hemoglobin 10.5, hematocrit 32, wbc 2.0, ANC 1.5, and platelet [phone]. chemistry reviewed. -- Clinical stable. -- If the absolute neutrophil count declines to 0.5 she will be started on Neupogen or Neulasta.            Chemotherapy-induced thrombocytopenia :   -- The patient was holding the Ibrance 140 mg at previous times D/T thrombocytopenia. -- She presently taking Ibrance 100mg.   -- Recent CBC showed improved PLTs count. -- We will see the patient back in clinic in about 8 weeks. Always sooner if required. The patient can have lab done prior our next clinic visit. Orders Placed This Encounter    CT CHEST ABD PELV W CONT     Standing Status:   Future     Number of Occurrences:   1     Standing Expiration Date:   9/3/2022     Order Specific Question:   STAT Creatinine as indicated     Answer:   Yes     Order Specific Question: This order utilizes IV contrast.  What additional contrast is needed? Answer:   Per Radiologist Protocol    NM BONE SCAN [facility] BODY     Standing Status:   Future     Number of Occurrences:   1     Standing Expiration Date:   9/3/2022    DULoxetine (CYMBALTA) 30 mg capsule     Sig: Take 1 Capsule by mouth daily. For one week, then 2 capsules daily     Dispense:  60 Capsule     Refill:  5           Ms. Makayla Elizabeth has a reminder for a \"due or due soon\" health maintenance. I have asked that she contact her primary care provider for follow-up on this health maintenance. All of patient's questions answered to their apparent satisfaction. They verbally show understanding and agreement with aforementioned plan.          Robert Simons MD  8/3/2021          About 30 minutes were spent for this encounter with more than 50% of the time spent in face-to-face counseling, discussing on diagnosis and management plan going forward, and co-ordination of care.  Parts of this document has been produced using Dragon dictation system. Unrecognized errors in transcription may be present. Please do not hesitate to reach out for any questions or clarifications.       CC: Hue Roberts MD

## 2021-08-03 NOTE — PROGRESS NOTES
[facility] Progress Note    Date: August 3, 2021    Name: Unique Saleem    MRN: [id number]         : 1945      Ms. Figueroa was assessed and education was provided. Ms. Agatha Lynch vitals were reviewed and patient was observed for 5 minutes prior to treatment. Visit Vitals  /72 (BP 1 Location: Left upper arm, BP Patient Position: Sitting)   Pulse 73   Temp 98 °F (36.7 °C)   Resp 16   Ht 5' 4\" (1.626 m)   Wt 65.4 kg (144 lb 3.2 oz)   SpO2 95%   Breastfeeding No   BMI 24.75 kg/m²       Lab results were obtained and reviewed. Recent Results (from the past 12 hour(s))   CBC WITH 3 PART DIFF    Collection Time: 21  9:30 AM   Result Value Ref Range    WBC 1.6 (L) 4.5 - 13.0 K/uL    RBC 3.19 (L) 4.10 - 5.10 M/uL    HGB 10.5 (L) 12.0 - 16 g/dL    HCT 31.4 (L) 36 - 48 %    MCV 98.4 78 - 102 FL    MCH 32.9 25.0 - 35.0 PG    MCHC 33.4 31 - 37 g/dL    RDW 17.1 (H) 11.5 - 14.5 %    PLATELET 258 772 - 406 K/uL    NEUTROPHILS 67 40 - 70 %    MIXED CELLS 13 0.1 - 17 %    LYMPHOCYTES 20 14 - 44 %    ABS. NEUTROPHILS 1.1 (L) 1.8 - 9.5 K/UL    ABS. MIXED CELLS 0.2 0.0 - 2.3 K/uL    ABS. LYMPHOCYTES 0.3 (L) 1.1 - 5.9 K/UL    DF AUTOMATED     METABOLIC PANEL, COMPREHENSIVE    Collection Time: 21  9:30 AM   Result Value Ref Range    Sodium 142 136 - 145 mmol/L    Potassium 3.6 3.5 - 5.5 mmol/L    Chloride 109 100 - 111 mmol/L    CO2 28 21 - 32 mmol/L    Anion gap 5 3.0 - 18 mmol/L    Glucose 103 (H) 74 - 99 mg/dL    BUN 16 7.0 - 18 MG/DL    Creatinine 0.84 0.6 - 1.3 MG/DL    BUN/Creatinine ratio 19 12 - 20      GFR est AA >60 >60 ml/min/1.73m2    GFR est non-AA >60 >60 ml/min/1.73m2    Calcium 9.0 8.5 - 10.1 MG/DL    Bilirubin, total 0.6 0.2 - 1.0 MG/DL    ALT (SGPT) 12 (L) 13 - 56 U/L    AST (SGOT) 17 10 - 38 U/L    Alk.  phosphatase 60 45 - 117 U/L    Protein, total 6.4 6.4 - 8.2 g/dL    Albumin 3.6 3.4 - 5.0 g/dL    Globulin 2.8 2.0 - 4.0 g/dL    A-G Ratio 1.3 0.8 - 1.7     MAGNESIUM    Collection Time: 08/03/21  9:30 AM   Result Value Ref Range    Magnesium 1.6 1.6 - 2.6 mg/dL   PHOSPHORUS    Collection Time: 08/03/21  9:30 AM   Result Value Ref Range    Phosphorus 2.9 2.5 - 4.9 MG/DL       Faslodex 500 mg IM given divided into 2 injections, one in each upper outer quadrand. Ms Jonathon Rojo stated she did talk with Dr Tatiana Long about the hard areas on her buttocks. She stated she will apply heat to her buttocks as he advised. CBC drawn from port and processed in house. CMP, mag and phos drawn and sent to hospital for testing. All labs drawn from [facility]. Retacrit not given for H&H 10.5/31. 4. Xgeva 120 mg SQ given upper left arm. Discharge instructions discussed with Ms Jonathon Rojo and she verbalized understanding. Ms. Jonathon Rojo had no complaints. Patient armband removed and shredded. Ms. Jonathon Rojo was discharged from [place] [id number] in stable condition at 1010. She is to return on 9/7/2021 at 1300 for her next appointment.     Kenneth Pedroza RN  August 3, 2021

## 2021-08-24 NOTE — TELEPHONE ENCOUNTER
Patient is asking if she can get the Covid booster shot? Does Dr. Verma Offer recommend it?    Please call patient

## 2021-09-07 NOTE — PATIENT INSTRUCTIONS
Neuropathic Pain: Care Instructions  Your Care Instructions     Neuropathic pain is caused by pressure on or damage to your nerves. It's often simply called nerve pain. Some people feel this type of pain all the time. For others, it comes and goes. Diabetes, shingles, or an injury can cause nerve pain. Many people say the pain feels sharp, burning, or stabbing. But some people feel it as a dull ache. In some cases, it makes your skin very sensitive. So touch, pressure, and other sensations that did not hurt before may now cause pain. It's important to know that this kind of pain is real and can affect your quality of life. It's also important to know that treatment can help. Treatment includes pain medicines, exercise, and physical therapy. Medicines can help reduce the number of pain signals that travel over the nerves. This can make the painful areas less sensitive. It can also help you sleep better and improve your mood. But medicines are only one part of successful treatment. Most people do best with more than one kind of treatment. Your doctor may recommend that you try cognitive-behavioral therapy and stress management. Or, if needed, you may decide to try to quit smoking, lower your blood pressure, or better control blood sugar. These kinds of healthy changes can also make a difference. If you feel that your treatment is not working, talk to your doctor. And be sure to tell your doctor if you think you might be depressed or anxious. These are common problems that can also be treated. Follow-up care is a key part of your treatment and safety. Be sure to make and go to all appointments, and call your doctor if you are having problems. It's also a good idea to know your test results and keep a list of the medicines you take. How can you care for yourself at home? · Be safe with medicines. Read and follow all instructions on the label.   ? If the doctor gave you a prescription medicine for pain, take it as prescribed. ? If you are not taking a prescription pain medicine, ask your doctor if you can take an over-the-counter medicine. · Save hard tasks for days when you have less pain. Follow a hard task with an easy task. And remember to take breaks. · Relax, and reduce stress. You may want to try deep breathing or meditation. These can help. · Keep moving. Gentle, daily exercise can help reduce pain. Your doctor or physical therapist can tell you what type of exercise is best for you. This may include walking, swimming, and stationary biking. It may also include stretches and range-of-motion exercises. · Try heat, cold packs, and massage. · Get enough sleep. Constant pain can make you more tired. If the pain makes it hard to sleep, talk with your doctor. · Think positively. Your thoughts can affect your pain. Do fun things to distract yourself from the pain. See a movie, read a book, listen to music, or spend time with a friend. · Keep a pain diary. Try to write down how strong your pain is and what it feels like. Also try to notice and write down how your moods, thoughts, sleep, activities, and medicine affect your pain. These notes can help you and your doctor find the best ways to treat your pain. Reducing constipation caused by pain medicine  Pain medicines often cause constipation. To reduce constipation:  · Include fruits, vegetables, beans, and whole grains in your diet each day. These foods are high in fiber. · Drink plenty of fluids, enough so that your urine is light yellow or clear like water. If you have kidney, heart, or liver disease and have to limit fluids, talk with your doctor before you increase the amount of fluids you drink. · Get some exercise every day. Build up slowly to 30 to 60 minutes a day on 5 or more days of the week. · Take a fiber supplement, such as Citrucel or Metamucil, every day if needed. Read and follow all instructions on the label.   · Schedule time each day for a bowel movement. Having a daily routine may help. Take your time and do not strain when having a bowel movement. · Ask your doctor about a laxative. The goal is to have one easy bowel movement every 1 to 2 days. Do not let constipation go untreated for more than 3 days. When should you call for help? Call your doctor now or seek immediate medical care if:    · You feel sad, anxious, or hopeless for more than a few days. This could mean you are depressed. Depression is common in people who have a lot of pain. But it can be treated.     · You have trouble with bowel movements, such as:  ? No bowel movement in 3 days. ? Blood in the anal area, in your stool, or on the toilet paper. ? Diarrhea for more than 24 hours. Watch closely for changes in your health, and be sure to contact your doctor if:    · Your pain is getting worse.     · You can't sleep because of pain.     · You are very worried or anxious about your pain.     · You have trouble taking your pain medicine.     · You have any concerns about your pain medicine or its side effects.     · You have vomiting or cramps for more than 2 hours. Where can you learn more? Go to http://www.gray.com/  Enter O734 in the search box to learn more about \"Neuropathic Pain: Care Instructions. \"  Current as of: August 4, 2020               Content Version: 12.8  © 2006-2021 [employer]. Care instructions adapted under license by [employer] (which disclaims liability or warranty for this information). If you have questions about a medical condition or this instruction, always ask your healthcare professional. [place] [address] any warranty or liability for your use of this information. Breast Self-Exam: Care Instructions  Your Care Instructions     A breast self-exam is when you check your breasts for lumps or changes.  This regular exam helps you learn how your breasts normally look and feel. Most breast problems or changes are not because of cancer. Breast self-exam is not a substitute for a mammogram. Having regular breast exams by your doctor and regular mammograms improve your chances of finding any problems with your breasts. Some women set a time each month to do a step-by-step breast self-exam. Other women like a less formal system. They might look at their breasts as they brush their teeth, or feel their breasts once in a while in the shower. If you notice a change in your breast, tell your doctor. Follow-up care is a key part of your treatment and safety. Be sure to make and go to all appointments, and call your doctor if you are having problems. It's also a good idea to know your test results and keep a list of the medicines you take. How do you do a breast self-exam?  · The best time to examine your breasts is usually one week after your menstrual period begins. Your breasts should not be tender then. If you do not have periods, you might do your exam on a day of the month that is easy to remember. · To examine your breasts:  ? Remove all your clothes above the waist and lie down. When you are lying down, your breast tissue spreads evenly over your chest wall, which makes it easier to feel all your breast tissue. ? Use the padsnot the fingertipsof the 3 middle fingers of your left hand to check your right breast. Move your fingers slowly in small coin-sized circles that overlap. ? Use three levels of pressure to feel of all your breast tissue. Use light pressure to feel the tissue close to the skin surface. Use medium pressure to feel a little deeper. Use firm pressure to feel your tissue close to your breastbone and ribs. Use each pressure level to feel your breast tissue before moving on to the next spot. ? Check your entire breast, moving up and down as if following a strip from the collarbone to the bra line, and from the armpit to the ribs.  Repeat until you have covered the entire breast.  ? Repeat this procedure for your left breast, using the pads of the 3 middle fingers of your right hand. · To examine your breasts while in the shower:  ? Place one arm over your head and lightly soap your breast on that side. ? Using the pads of your fingers, gently move your hand over your breast (in the strip pattern described above), feeling carefully for any lumps or changes. ? Repeat for the other breast.  · Have your doctor inspect anything you notice to see if you need further testing. Where can you learn more? Go to http://www.gray.com/  Enter P148 in the search box to learn more about \"Breast Self-Exam: Care Instructions. \"  Current as of: December 17, 2020               Content Version: 12.8  © 2006-2021 [employer]. Care instructions adapted under license by [employer] (which disclaims liability or warranty for this information). If you have questions about a medical condition or this instruction, always ask your healthcare professional. [address] any warranty or liability for your use of this information.

## 2021-09-07 NOTE — PROGRESS NOTES
[facility]  General Surgery    Subjective:  Patient with history of right breast cancer with metastases to the ribs managed with radiation and proton therapy. She has been treated twice in the past few months for postherpetic neuralgia/shingles. Objective:  Vitals:    09/07/21 0929   BP: 138/67   Pulse: 76   Temp: 97.2 °F (36.2 °C)   SpO2: 98%   Weight: 63 kg (139 lb)       Physical Exam:    General: Awake and alert, oriented x4, no apparent distress   Breasts:    Left: No dimpling, discoloration, nipple inversion or retractions. No axillary or supraclavicular lymphadenopathy. No mass   Right: No dimpling, discoloration, nipple inversion or retractions. No axillary or supraclavicular lymphadenopathy. No mass    Current Medications:  Current Outpatient Medications   Medication Sig Dispense Refill    DULoxetine (CYMBALTA) 30 mg capsule Take 1 Capsule by mouth daily. For one week, then 2 capsules daily 60 Capsule 5    Ibrance 100 mg cap TAKE 1 CAPSULE DAILY FOR 21 DAYS ON AND 7 DAYS OFF, EVERY 28 DAYS 63 Capsule 3    gabapentin (NEURONTIN) 100 mg capsule TAKE 2 CAPSULES THREE TIMES A  Capsule 3    calcium citrate 200 mg (950 mg) tablet Take  by mouth daily.  Cetirizine (ZYRTEC) 10 mg cap Take 1 Cap by mouth daily as needed.  lidocaine HCl-hydrocortison ac topical cream Apply  to affected area two (2) times a day. 85 g 3    TETRACYCLINE HCL (TETRACYCLINE PO) Take 250 mg by mouth two (2) times a day.  fexofenadine-pseudoephedrine (ALLEGRA-D 24 HOUR) 180-240 mg per tablet Take 1 Tab by mouth daily as needed.  palbociclib (IBRANCE) 125 mg cap Take 125 mg by mouth daily. Take 1 tab po every day for 21 days on and 7 days off q 28 days  Indications: HORMONE RECEPTOR(+), HER2(-) ADVANCED BREAST CANCER 42 Cap 6    Cholecalciferol, Vitamin D3, 5,000 unit Tab Take  by mouth daily.  thyroid, Pork, (ARMOUR THYROID) 60 mg tablet Take 60 mg by mouth daily.  L-Mfolate-B6 Phos-Methyl-B12 (NEURPATH-B) 3-35-2 mg Tab Take  by mouth two (2) times a day.  warfarin (COUMADIN) 1 mg tablet Take 1 mg by mouth every other day. Chart and notes reviewed. Data reviewed. I have evaluated and examined the patient. Impression and plan:  Patient with history of right breast cancer with metastases to the chest wall and ribs status post mastectomy radiation therapy and proton therapy who now complains of neuropathic right-sided pain into the right upper quadrant. We discussed the fact that the pain is most likely secondary to the scarring and fibrosis around the nerves of the intercostal space from the radiation therapy. Continue monthly self breast exam  Left breast screening mammography BI-RADS 1 performed June 2021. Mammogram in 1 year  Please call or visit with any questions or concerns.        Ann Garcia MD

## 2021-09-07 NOTE — PROGRESS NOTES
[facility] Progress Note    Date: 2021    Name: Natasha Sauer    MRN: [id number]         : 1945      Ms. Figueroa arrived in the [facility] [place] today at , in stable condition, here for Monthly 100 Pin [place] Marcus, and Q 4 Week Xgeva, RETACRIT & Faslodex Injections. She was assessed and education was provided. Ms. Kings Monae vitals were reviewed. Visit Vitals  /69 (BP 1 Location: Left upper arm, BP Patient Position: Sitting)   Pulse 73   Temp 98.2 °F (36.8 °C)   Resp 18   Ht 5' 4\" (1.626 m)   Wt 63 kg (139 lb)   SpO2 97%   Breastfeeding No   BMI 23.86 kg/m²     Her left chest single lumen port was accessed without incident using sterile technique, and blood was drawn from the port for a CBC, CMP, Magnesium, and Phosphorus, per order. And then, the port was flushed well per protocol and without incident, with NS & Heparin, and the carbone needle was removed and gauze/bandaid was applied. (CBC processed in house and other labs were sent out to the [facility] hospital lab by , for processing.)    Recent Results (from the past 12 hour(s))   CBC WITH 3 PART DIFF    Collection Time: 21  1:24 PM   Result Value Ref Range    WBC 2.1 (L) 4.5 - 13.0 K/uL    RBC 2.99 (L) 4.10 - 5.10 M/uL    HGB 9.7 (L) 12.0 - 16 g/dL    HCT 29.4 (L) 36 - 48 %    MCV 98.3 78 - 102 FL    MCH 32.4 25.0 - 35.0 PG    MCHC 33.0 31 - 37 g/dL    RDW 17.0 (H) 11.5 - 14.5 %    PLATELET 995 939 - 870 K/uL    NEUTROPHILS 64 40 - 70 %    MIXED CELLS 12 0.1 - 17 %    LYMPHOCYTES 23 14 - 44 %    ABS. NEUTROPHILS 1.3 (L) 1.8 - 9.5 K/UL    ABS. MIXED CELLS 0.3 0.0 - 2.3 K/uL    ABS. LYMPHOCYTES 0.5 (L) 1.1 - 5.9 K/UL    DF AUTOMATED            Lab results were reviewed. Her most recent CMP, MG, & PHOS, levels from 8-3-21 were reviewed, and were all noted to be satisfactory for treatment today, and were as follows:    Results for Jennyfer Wells (MRN [id number]) as of 2021 14:44   Ref.  Range 8/3/2021 09:30   Sodium Latest Ref Range: 136 - 145 mmol/L 142   Potassium Latest Ref Range: 3.5 - 5.5 mmol/L 3.6   Chloride Latest Ref Range: 100 - 111 mmol/L 109   CO2 Latest Ref Range: 21 - 32 mmol/L 28   Anion gap Latest Ref Range: 3.0 - 18 mmol/L 5   Glucose Latest Ref Range: 74 - 99 mg/dL 103 (H)   BUN Latest Ref Range: 7.0 - 18 MG/DL 16   Creatinine Latest Ref Range: 0.6 - 1.3 MG/DL 0.84   BUN/Creatinine ratio Latest Ref Range: 12 - 20   19   Calcium Latest Ref Range: 8.5 - 10.1 MG/DL 9.0   Phosphorus Latest Ref Range: 2.5 - 4.9 MG/DL 2.9   Magnesium Latest Ref Range: 1.6 - 2.6 mg/dL 1.6   GFR est non-AA Latest Ref Range: >60 ml/min/1.73m2 >60   GFR est AA Latest Ref Range: >60 ml/min/1.73m2 >60   Bilirubin, total Latest Ref Range: 0.2 - 1.0 MG/DL 0.6   Protein, total Latest Ref Range: 6.4 - 8.2 g/dL 6.4   Albumin Latest Ref Range: 3.4 - 5.0 g/dL 3.6   Globulin Latest Ref Range: 2.0 - 4.0 g/dL 2.8   A-G Ratio Latest Ref Range: 0.8 - 1.7   1.3   ALT Latest Ref Range: 13 - 56 U/L 12 (L)   AST Latest Ref Range: 10 - 38 U/L 17   Alk. phosphatase Latest Ref Range: 45 - 117 U/L 60         Xgeva 120 mg, was administered SQ, in her left arm per order, and without incident.      Per today's CBC, Retacrit 60mg SQ was administered in her left arm as ordered and without incident.     Faslodex 500 mg Total Dose, was administered IM, in 2 equally divided doses of 250 mg,  per order, and without incident. (250 mg was administered IM, in her right gluteal muscle, and 250 mg was administered IM, in her left gluteal muscle). Ms. Ashby Sandifer tolerated well, and had no complaints. Ms. Ashby Sandifer was discharged from [facility] in stable condition at 1350. Mitali Colder She is to return in 4 weeks, on Tuesday, 10-5-21, at 1300, for her next appointment, for [facility], & [facility] & Faslodex Injections & [facility].      Marilee Rodríguez RN  September 7, 2021  1:30 PM

## 2021-09-07 NOTE — PROGRESS NOTES
Rhea Garcia is a 76 y.o. female (: 1945) presenting to address:    HX of right breast ca status post right modified radical mastectomy . 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 Last mammo 21    Chief Complaint   Patient presents with    Follow-up     RUQ abd pain radiating from right back       Medication list and allergies have been reviewed with Rhea Garcia and updated as of today's date. I have gone over all Medical, Surgical and Social History with Rhea Garcia and updated/added the information accordingly. 1. Have you been to the ER, Urgent Care or Hospitalized since your last visit? NO      2. Have you followed up with your PCP or any other Physicians since your procedure/ last office visit? YES. Dr Merline Dos Santos for Right back pain radiating to right side pain abd. Treated for shingles 2 rounds of steroids due to pain.

## 2021-09-28 NOTE — PROGRESS NOTES
Brock Blue is a 76 y.o. female, evaluated via audio-only technology on 9/28/2021 for Follow-up  breast cancer. Assessment & Plan:   Metastatic breast cancer:     -- She has slowly progressive metastatic breast cancer. Patient was being followed by Dr. Shiraz Haynes who retired. The patient has previously completed external beam radiation therapy to lesions in her ribs and more recently she completed proton therapy to areas of bone involvement with a significant benefit with regards to pain control. The posttherapy imaging studies showed a significant decrease in the intensity of uptake on the bone scan. -- Additionally she is currently receiving systemic therapy with fulvestrant and Ibrance. She has been on Fulvestrant 500 mg subcutaneous monthly and Ibrance 100 mg by mouth daily. She is tolerating treatment fairly well without high grade toxicities. -- 4/15/2020  CT scans of the chest, abdomen, and pelvis shows that she has a loculated right pleural effusion with some pleural thickening and adjacent areas of probable rounded atelectasis all of which are stable. She has relatively stable bone lesions of the rib, pelvis, and T12 posterior element. There was no findings on CT scan to support new metastatic disease in the chest, abdomen, or pelvis. --  Mammogram 6/5 done shows No evidence of malignancy. Suggest routine follow-up. -- 12/17/2020 CBC reported hemoglobin was 10.8, hematocrit was 32.8%, total WBC was 1.9 and ANC was 1.3, platelet was [phone].  -- 1/18/2021 CT CA/P: No new or enlarging lymphadenopathy. No solid organ lesions or ascites. -- 1/18/2021 Bone scan reported a focal uptake anterior left iliac crest without definite CT correlate.   -- 2/12/2021 US reported No abnormality is seen in the patient's area of concern inferior to the right scapula. -- 4/19/2021 Bone scan reported no gross interval change in indeterminate uptake in the left superior iliac bone.    -- 6/7/2021 Mammogram reported no evidence for malignancy. Suggest routine follow-up with annual mammographic screening.  -- 7/20/2021 CBC reported hemoglobin 11.7, hematocrit 37.1%, ANC 1.6, platelet 407,251.  -- The patient has tolerated fulvestrant and palbociclib therapy with no high graded toxicities. Recent labs reviewed with the patient today. -- 9/14/2021 CT CAP reported a new soft tissue density noted along the anterior inferior aspect of the sternum. Otherwise no significant interval change in the chest. No new or enlarging nodule. -- 9/14/2021 Bone scan reported known osseous metastatic disease. Small new focal activity proximal right forearm, concerning for new metastasis. Developing activity in the left femoral head concerning for metastasis. Plan:   -- PET scan  -- Patient was interested in XRT/Proton therapy. Will refer to [facility]. -- She will f/u with Dr. Janee Rankin as scheduled. -- Patient will continue current regimen with fulvestrant and palbociclib as previously recommended. Palbociclib 100 mg daily 21 days on/7 days off today. -- She will need to check lab before each cycle. -- She will need yearly DEXA scan. -- Labs: CBC, CMP, CA 27-29, CA 15-3, Vitamin D    Poor appetite  Weight loss  -- Nutritionist referral    Intractable headache  -- 5/30/2021 Brain MRI reported no intracranial metastases identified. -- Clinical improved    Normocytic Anemia. Chemotherapy-related annemia  -- 2/11/2021 CBC reported hemoglobin 10.5, hematocrit 32, wbc 2.0, ANC 1.5, and platelet [phone]. chemistry reviewed  -- The patient previously received Procrit 60,000 units subcutaneous at 4 week intervals whenever her hemoglobin is below 10 g/dL and hematocrit is below 30%. Procrit SQ every 4 weeks has resumed recently. -- Recent labs reviewed. B12 275. Advised the patient to take B12 supplement  -- 7/20/2021 CBC reported hemoglobin 11.7, hematocrit 37.1%, ANC 1.6, platelet [phone].       Neuropathy associated with cancer: -- On Neurontin at 200 mg 3 times daily. -- Continue on Cymblata          Chemotherapy-induced neutropenia/chronic leukopenia (persisting problem):   -- 2/11/2021 CBC reported hemoglobin 10.5, hematocrit 32, wbc 2.0, ANC 1.5, and platelet [phone]. chemistry reviewed. -- Clinical stable. -- If the absolute neutrophil count declines to 0.5 she will be started on Neupogen or Neulasta.            Chemotherapy-induced thrombocytopenia :   -- The patient was holding the Ibrance 140 mg at previous times D/T thrombocytopenia. -- She presently taking Ibrance 100mg.   -- Recent CBC showed improved PLTs count. -- We will see the patient back in clinic in about 4 weeks. Always sooner if required. The patient can have lab done prior our next clinic visit. 12  Subjective:   Mrs. Narayan Schofield is a 15-year-old woman who has slowly progressive metastatic breast cancer. Patient was being followed by Dr. Dick Vu who retired. She is here today for chemo follow up. The patient has previously completed external beam radiation therapy to lesions in her ribs and more recently she completed proton therapy to areas of bone involvement with a significant benefit with regards to pain control. Additionally she is currently receiving systemic therapy with fulvestrant and Ibrance. Patient is tolerating the systemic therapy reasonably well and has not experienced any nausea or emesis. Today the patient reported doing fairly well. She has poor appetite and weight loss. She has on-off muscle pain at the site of XRT. She has resumed Procrit at 4 week intervals whenever her hemoglobin is below 10 g/dL and hematocrit is below 30%. Her neuropathy reported with tinging and numbness which not really improved with Neurontin 200 mg TID. She denied other complaints. Prior to Admission medications    Medication Sig Start Date End Date Taking? Authorizing Provider   DULoxetine (CYMBALTA) 30 mg capsule Take 1 Capsule by mouth daily.  For one week, then 2 capsules daily 8/3/21   Beatriz Cooper MD   Ibrance 100 mg cap TAKE 1 CAPSULE DAILY FOR 21 DAYS ON AND 7 DAYS OFF, EVERY 28 DAYS 6/21/21   Beatriz Cooper MD   gabapentin (NEURONTIN) 100 mg capsule TAKE 2 CAPSULES THREE TIMES A DAY 6/21/21   , Beatriz Malik MD   calcium citrate 200 mg (950 mg) tablet Take  by mouth daily. Provider, Historical   Cetirizine (ZYRTEC) 10 mg cap Take 1 Cap by mouth daily as needed. Provider, Historical   lidocaine HCl-hydrocortison ac topical cream Apply  to affected area two (2) times a day. 7/10/18   [facility] President, DEZ   TETRACYCLINE HCL (TETRACYCLINE PO) Take 250 mg by mouth two (2) times a day. Provider, Historical   fexofenadine-pseudoephedrine (ALLEGRA-D 24 HOUR) 180-240 mg per tablet Take 1 Tab by mouth daily as needed. Provider, Historical   palbociclib (IBRANCE) 125 mg cap Take 125 mg by mouth daily. Take 1 tab po every day for 21 days on and 7 days off q 28 days  Indications: HORMONE RECEPTOR(+), HER2(-) ADVANCED BREAST CANCER 5/4/17   Albaro Vance MD   Cholecalciferol, Vitamin D3, 5,000 unit Tab Take  by mouth daily. Provider, Historical   thyroid, Pork, (ARMOUR THYROID) 60 mg tablet Take 60 mg by mouth daily. Provider, Historical   L-Mfolate-B6 Phos-Methyl-B12 (NEURPATH-B) 3-35-2 mg Tab Take  by mouth two (2) times a day. Provider, Historical   warfarin (COUMADIN) 1 mg tablet Take 1 mg by mouth every other day. Provider, Historical         Review of Systems   Constitutional: Negative for chills, diaphoresis, fever, malaise/fatigue and weight loss. Respiratory: Negative for cough, hemoptysis, shortness of breath and wheezing. Cardiovascular: Negative for chest pain, palpitations and leg swelling. Gastrointestinal: Negative for abdominal pain, diarrhea, heartburn, nausea and vomiting. Genitourinary: Negative for dysuria, frequency, hematuria and urgency. Musculoskeletal: Negative for joint pain and myalgias.    Skin: Negative for itching and rash. Neurological: Negative for dizziness, seizures, weakness and headaches. Psychiatric/Behavioral: Negative for depression. The patient does not have insomnia. No flowsheet data found. Mary Jo Santana, who was evaluated through a patient-initiated, synchronous (real-time) audio only encounter, and/or her healthcare decision maker, is aware that it is a billable service, with coverage as determined by her insurance carrier. She provided verbal consent to proceed: Yes. She has not had a related appointment within my department in the past 7 days or scheduled within the next 24 hours. On this date 09/28/2021 I have spent 25 minutes reviewing previous notes, test results and face to face (virtual) with the patient discussing the diagnosis and importance of compliance with the treatment plan as well as documenting on the day of the visit.     Chikis Olivo MD

## 2021-10-05 NOTE — PROGRESS NOTES
[facility] Progress Note    Date: 2021    Name: Helio Hamm    MRN: [id number]         : 1945      Ms. Figueroa arrived in the [facility] today at 0915, in stable condition, here for Monthly 100 Pin [place], and Q 4 Week Xgeva, RETACRIT & Faslodex Injections. She was assessed and education was provided. Ms. Shanon Mckeon vitals were reviewed. Visit Vitals  BP (!) 148/79 (BP 1 Location: Left upper arm, BP Patient Position: Sitting)   Pulse 88   Temp 98 °F (36.7 °C)   Resp 18   SpO2 94%   Breastfeeding No     Her left chest single lumen port was accessed without incident using sterile technique, and blood was drawn from the port for a CBC, CMP, Magnesium, and Phosphorus, per order. And then, the port was flushed well per protocol and without incident, with NS & Heparin, and the carbone needle was removed and gauze/bandaid was applied. (CBC processed in house and other labs were sent out to the [facility] lab by , for processing.)    Recent Results (from the past 12 hour(s))   CBC WITH 3 PART DIFF    Collection Time: 10/05/21  9:15 AM   Result Value Ref Range    WBC 2.2 (L) 4.5 - 13.0 K/uL    RBC 3.19 (L) 4.10 - 5.10 M/uL    HGB 10.3 (L) 12.0 - 16 g/dL    HCT 31.2 (L) 36 - 48 %    MCV 97.8 78 - 102 FL    MCH 32.3 25.0 - 35.0 PG    MCHC 33.0 31 - 37 g/dL    RDW 16.9 (H) 11.5 - 14.5 %    PLATELET 583 882 - 226 K/uL    NEUTROPHILS 74 (H) 40 - 70 %    MIXED CELLS 10 0.1 - 17 %    LYMPHOCYTES 16 14 - 44 %    ABS. NEUTROPHILS 1.6 (L) 1.8 - 9.5 K/UL    ABS. MIXED CELLS 0.2 0.0 - 2.3 K/uL    ABS. LYMPHOCYTES 0.4 (L) 1.1 - 5.9 K/UL    DF AUTOMATED            Lab results were reviewed. Her most recent CMP, MG, & PHOS, levels from 21 were reviewed, and were all noted to be satisfactory for treatment today, and were as follows:    Results for Clari Arenas (MRN [id number]) as of 2021    Ref.  Range 8/3/2021 09:30   Sodium Latest Ref Range: 136 - 145 mmol/L 142   Potassium Latest Ref Range: 3.5 - 5.5 mmol/L 3.6   Chloride Latest Ref Range: 100 - 111 mmol/L 109   CO2 Latest Ref Range: 21 - 32 mmol/L 28   Anion gap Latest Ref Range: 3.0 - 18 mmol/L 5   Glucose Latest Ref Range: 74 - 99 mg/dL 103 (H)   BUN Latest Ref Range: 7.0 - 18 MG/DL 16   Creatinine Latest Ref Range: 0.6 - 1.3 MG/DL 0.84   BUN/Creatinine ratio Latest Ref Range: 12 - 20   19   Calcium Latest Ref Range: 8.5 - 10.1 MG/DL 9.0   Phosphorus Latest Ref Range: 2.5 - 4.9 MG/DL 2.9   Magnesium Latest Ref Range: 1.6 - 2.6 mg/dL 1.6   GFR est non-AA Latest Ref Range: >60 ml/min/1.73m2 >60   GFR est AA Latest Ref Range: >60 ml/min/1.73m2 >60   Bilirubin, total Latest Ref Range: 0.2 - 1.0 MG/DL 0.6   Protein, total Latest Ref Range: 6.4 - 8.2 g/dL 6.4   Albumin Latest Ref Range: 3.4 - 5.0 g/dL 3.6   Globulin Latest Ref Range: 2.0 - 4.0 g/dL 2.8   A-G Ratio Latest Ref Range: 0.8 - 1.7   1.3   ALT Latest Ref Range: 13 - 56 U/L 12 (L)   AST Latest Ref Range: 10 - 38 U/L 17   Alk. phosphatase Latest Ref Range: 45 - 117 U/L 60         Xgeva 120 mg, was administered SQ, in her left arm per order, and without incident.      Per today's CBC, Retacrit was HELD per parameters.     Faslodex 500 mg Total Dose, was administered IM, in 2 equally divided doses of 250 mg,  per order, and without incident. (250 mg was administered IM, in her right gluteal muscle, and 250 mg was administered IM, in her left gluteal muscle). Banadid and gauze to sites. Ms. Ladd Crigler tolerated well, and had no complaints. Ms. Ladd Crigler was discharged from [place] [address] in stable condition at [id number]. She is to return in 4 weeks, on Tuesday, 11/2/21, at 1400, for her next appointment, for [facility], & Xgeva & Faslodex Injections & Associated Labs.        Radha Harvey  October 5, 2021

## 2021-10-12 NOTE — TELEPHONE ENCOUNTER
Medical Nutrition Therapy Note    Nutritional Assessment:  IBW: Ideal body weight: 120 lb 9.5 oz (54.7 kg)  Adjusted ideal body weight: 127 lb 15.3 oz (58 kg)   UBW: 155 lb (1 year ago per pt)  Wt hx:   Wt Readings from Last 20 Encounters:   09/07/21 139 lb (63 kg)   09/07/21 139 lb (63 kg)   08/03/21 144 lb 3.2 oz (65.4 kg)   07/01/21 144 lb 12.8 oz (65.7 kg)   06/10/21 143 lb (64.9 kg)   06/03/21 143 lb 6.4 oz (65 kg)   05/30/21 144 lb (65.3 kg)   05/12/21 144 lb (65.3 kg)   04/16/21 142 lb (64.4 kg)   04/14/21 142 lb (64.4 kg)   04/08/21 143 lb 12.8 oz (65.2 kg)   03/31/21 141 lb (64 kg)   03/03/21 147 lb (66.7 kg)   02/02/21 149 lb (67.6 kg)   01/05/21 148 lb 3.2 oz (67.2 kg)   11/19/20 147 lb (66.7 kg)   10/22/20 146 lb (66.2 kg)   10/06/20 145 lb (65.8 kg)   08/27/20 138 lb 14.4 oz (63 kg)   08/05/20 145 lb (65.8 kg)     Current Ht:   Ht Readings from Last 1 Encounters:   09/07/21 5' 4\" (1.626 m)       Current BMI: Estimated body mass index is 23.86 kg/m² as calculated from the following:    Height as of 9/7/21: 5' 4\" (1.626 m). Weight as of 9/7/21: 139 lb (63 kg). Wt change: 5.5% weight loss over the last 6 month(s)    Estimated nutritional needs:   Calorie Range: 2530-5416  Formula: 30-35 kcal/kg  Protein Range:   Formula: 1.5-1.7 g/kg  Fluid Needs: 1 mL/kcal    Nutrition-impact symptoms: Weight loss    10/12/21: Pt reports slowly losing wt over the last year. Pt denies any changes in meal intake over this time. Pt stated the only change has been that she drinks less diet pepsi and sprite. Pt mentioned that she's been less physically active related to concerns with COVID. Pt reports seeing her neighbors walk outside and wanting to walk as well. Nutritional Diagnosis: unintentional weight loss related to inability to consume sufficient energy intake as evidenced by pt report of 10.3% wt loss over the past 1 year    Nutritional Interventions:   1.  Pt agreed to trial increasing meal frequency (either 3 or 4x per day)  2. Pt will increase physical activity    Nutritional Monitoring/Goals:   Pt's wt > 140 by 11/12/21    Initial consult per provider    Cancer Dx: No matching staging information was found for the patient. Cancer Staging  No matching staging information was found for the patient. Treatment Plan:   Chemotherapy Flowsheet 9/7/2021   Cycle C55   Date 9/7/2021   Drug / Regimen FASLODEX   Dosage 500MG    +   Radiation Treatments     No radiation treatments to show.  (Treatments may have been administered in another system.)          Lab Results   Component Value Date/Time    Sodium 139 10/05/2021 09:32 AM    Sodium 142 09/07/2021 01:24 PM    Sodium 142 08/03/2021 09:30 AM    Sodium 142 07/20/2021 09:47 AM    Sodium 144 07/01/2021 01:46 PM    Potassium 3.7 10/05/2021 09:32 AM    Chloride 104 10/05/2021 09:32 AM    Chloride 109 09/07/2021 01:24 PM    Chloride 109 08/03/2021 09:30 AM    Chloride 106 07/20/2021 09:47 AM    Chloride 108 07/01/2021 01:46 PM    CO2 30 10/05/2021 09:32 AM    CO2 29 09/07/2021 01:24 PM    CO2 28 08/03/2021 09:30 AM    CO2 31 07/20/2021 09:47 AM    CO2 28 07/01/2021 01:46 PM    Anion gap 5 10/05/2021 09:32 AM    Anion gap 4 09/07/2021 01:24 PM    Anion gap 5 08/03/2021 09:30 AM    Anion gap 5 07/20/2021 09:47 AM    Anion gap 8 07/01/2021 01:46 PM    Glucose 105 (H) 10/05/2021 09:32 AM    Glucose 90 09/07/2021 01:24 PM    Glucose 103 (H) 08/03/2021 09:30 AM    Glucose 121 (H) 07/20/2021 09:47 AM    Glucose 110 (H) 07/01/2021 01:46 PM    BUN 17 10/05/2021 09:32 AM    BUN 16 09/07/2021 01:24 PM    BUN 16 08/03/2021 09:30 AM    BUN 24 (H) 07/20/2021 09:47 AM    BUN 18 07/01/2021 01:46 PM    Creatinine 0.92 10/05/2021 09:32 AM    Creatinine 0.86 09/07/2021 01:24 PM    Creatinine 0.84 08/03/2021 09:30 AM    Creatinine 1.15 07/20/2021 09:47 AM    Creatinine 0.88 07/01/2021 01:46 PM    BUN/Creatinine ratio 18 10/05/2021 09:32 AM    BUN/Creatinine ratio 19 09/07/2021 01:24 PM BUN/Creatinine ratio 19 08/03/2021 09:30 AM    BUN/Creatinine ratio 21 (H) 07/20/2021 09:47 AM    BUN/Creatinine ratio 20 07/01/2021 01:46 PM    GFR est AA >60 10/05/2021 09:32 AM    GFR est AA >60 09/07/2021 01:24 PM    GFR est AA >60 08/03/2021 09:30 AM    GFR est AA 56 (L) 07/20/2021 09:47 AM    GFR est AA >60 07/01/2021 01:46 PM    GFR est non-AA 60 (L) 10/05/2021 09:32 AM    GFR est non-AA >60 09/07/2021 01:24 PM    GFR est non-AA >60 08/03/2021 09:30 AM    GFR est non-AA 46 (L) 07/20/2021 09:47 AM    GFR est non-AA >60 07/01/2021 01:46 PM    Calcium 9.4 10/05/2021 09:32 AM    Calcium 8.5 09/07/2021 01:24 PM    Calcium 9.0 08/03/2021 09:30 AM    Calcium 9.7 07/20/2021 09:47 AM    Calcium 8.7 07/01/2021 01:46 PM         Current Outpatient Medications:     DULoxetine (CYMBALTA) 30 mg capsule, Take 1 Capsule by mouth daily. For one week, then 2 capsules daily, Disp: 60 Capsule, Rfl: 5    Ibrance 100 mg cap, TAKE 1 CAPSULE DAILY FOR 21 DAYS ON AND 7 DAYS OFF, EVERY 28 DAYS, Disp: 63 Capsule, Rfl: 3    gabapentin (NEURONTIN) 100 mg capsule, TAKE 2 CAPSULES THREE TIMES A DAY, Disp: 540 Capsule, Rfl: 3    calcium citrate 200 mg (950 mg) tablet, Take  by mouth daily. , Disp: , Rfl:     Cetirizine (ZYRTEC) 10 mg cap, Take 1 Cap by mouth daily as needed. , Disp: , Rfl:     lidocaine HCl-hydrocortison ac topical cream, Apply  to affected area two (2) times a day., Disp: 85 g, Rfl: 3    TETRACYCLINE HCL (TETRACYCLINE PO), Take 250 mg by mouth two (2) times a day., Disp: , Rfl:     fexofenadine-pseudoephedrine (ALLEGRA-D 24 HOUR) 180-240 mg per tablet, Take 1 Tab by mouth daily as needed. , Disp: , Rfl:     palbociclib (IBRANCE) 125 mg cap, Take 125 mg by mouth daily. Take 1 tab po every day for 21 days on and 7 days off q 28 days  Indications: HORMONE RECEPTOR(+), HER2(-) ADVANCED BREAST CANCER, Disp: 42 Cap, Rfl: 6    Cholecalciferol, Vitamin D3, 5,000 unit Tab, Take  by mouth daily.   , Disp: , Rfl:     thyroid, Pork, (ARMOUR THYROID) 60 mg tablet, Take 60 mg by mouth daily. , Disp: , Rfl:     L-Mfolate-B6 Phos-Methyl-B12 (NEURPATH-B) 3-35-2 mg Tab, Take  by mouth two (2) times a day.  , Disp: , Rfl:     warfarin (COUMADIN) 1 mg tablet, Take 1 mg by mouth every other day., Disp: , Rfl:     Diet/po intake: 2 large meals (typically morning and evening) + 1 snack (typically afternoon)

## 2021-10-29 NOTE — TELEPHONE ENCOUNTER
Medical Nutrition Therapy Note    Nutritional Assessment:  IBW: Ideal body weight: 120 lb 9.5 oz (54.7 kg)  Adjusted ideal body weight: 127 lb 15.3 oz (58 kg)   UBW: 155 lb (1 year ago per pt)  Wt hx:   Wt Readings from Last 20 Encounters:   09/07/21 139 lb (63 kg)   09/07/21 139 lb (63 kg)   08/03/21 144 lb 3.2 oz (65.4 kg)   07/01/21 144 lb 12.8 oz (65.7 kg)   06/10/21 143 lb (64.9 kg)   06/03/21 143 lb 6.4 oz (65 kg)   05/30/21 144 lb (65.3 kg)   05/12/21 144 lb (65.3 kg)   04/16/21 142 lb (64.4 kg)   04/14/21 142 lb (64.4 kg)   04/08/21 143 lb 12.8 oz (65.2 kg)   03/31/21 141 lb (64 kg)   03/03/21 147 lb (66.7 kg)   02/02/21 149 lb (67.6 kg)   01/05/21 148 lb 3.2 oz (67.2 kg)   11/19/20 147 lb (66.7 kg)   10/22/20 146 lb (66.2 kg)   10/06/20 145 lb (65.8 kg)   08/27/20 138 lb 14.4 oz (63 kg)   08/05/20 145 lb (65.8 kg)     Current Ht:   Ht Readings from Last 1 Encounters:   09/07/21 5' 4\" (1.626 m)       Current BMI: Estimated body mass index is 23.86 kg/m² as calculated from the following:    Height as of 9/7/21: 5' 4\" (1.626 m). Weight as of 9/7/21: 139 lb (63 kg). Wt change: 5.5% weight loss over the last 6 month(s)    Estimated nutritional needs:   Calorie Range: 5043-7319  Formula: 30-35 kcal/kg  Protein Range:   Formula: 1.5-1.7 g/kg  Fluid Needs: 1 mL/kcal    Nutrition-impact symptoms: Weight loss    10/12/21: Pt reports slowly losing wt over the last year. Pt denies any changes in meal intake over this time. Pt stated the only change has been that she drinks less diet pepsi and sprite. Pt mentioned that she's been less physically active related to concerns with COVID. Pt reports seeing her neighbors walk outside and wanting to walk as well. 10/29/21: Called pt for follow up, but no answer so message left for pt to call writer back.     Nutritional Diagnosis: unintentional weight loss related to inability to consume sufficient energy intake as evidenced by pt report of 10.3% wt loss over the past 1 year    Nutritional Interventions:   1. Pt agreed to trial increasing meal frequency (either 3 or 4x per day)  2. Pt will increase physical activity    Nutritional Monitoring/Goals:   Pt's wt > 140 by 11/12/21    Initial consult per provider    Cancer Dx: No matching staging information was found for the patient. Cancer Staging  No matching staging information was found for the patient. Treatment Plan:   Chemotherapy Flowsheet 9/7/2021   Cycle C55   Date 9/7/2021   Drug / Regimen FASLODEX   Dosage 500MG    +   Radiation Treatments     No radiation treatments to show.  (Treatments may have been administered in another system.)          Lab Results   Component Value Date/Time    Sodium 138 10/26/2021 12:02 PM    Sodium 139 10/05/2021 09:32 AM    Sodium 142 09/07/2021 01:24 PM    Sodium 142 08/03/2021 09:30 AM    Sodium 142 07/20/2021 09:47 AM    Potassium 4.3 10/26/2021 12:02 PM    Chloride 105 10/26/2021 12:02 PM    Chloride 104 10/05/2021 09:32 AM    Chloride 109 09/07/2021 01:24 PM    Chloride 109 08/03/2021 09:30 AM    Chloride 106 07/20/2021 09:47 AM    CO2 28 10/26/2021 12:02 PM    CO2 30 10/05/2021 09:32 AM    CO2 29 09/07/2021 01:24 PM    CO2 28 08/03/2021 09:30 AM    CO2 31 07/20/2021 09:47 AM    Anion gap 5 10/26/2021 12:02 PM    Anion gap 5 10/05/2021 09:32 AM    Anion gap 4 09/07/2021 01:24 PM    Anion gap 5 08/03/2021 09:30 AM    Anion gap 5 07/20/2021 09:47 AM    Glucose 92 10/26/2021 12:02 PM    Glucose 105 (H) 10/05/2021 09:32 AM    Glucose 90 09/07/2021 01:24 PM    Glucose 103 (H) 08/03/2021 09:30 AM    Glucose 121 (H) 07/20/2021 09:47 AM    BUN 23 (H) 10/26/2021 12:02 PM    BUN 17 10/05/2021 09:32 AM    BUN 16 09/07/2021 01:24 PM    BUN 16 08/03/2021 09:30 AM    BUN 24 (H) 07/20/2021 09:47 AM    Creatinine 1.15 10/26/2021 12:02 PM    Creatinine 0.92 10/05/2021 09:32 AM    Creatinine 0.86 09/07/2021 01:24 PM    Creatinine 0.84 08/03/2021 09:30 AM    Creatinine 1.15 07/20/2021 09:47 AM BUN/Creatinine ratio 20 10/26/2021 12:02 PM    BUN/Creatinine ratio 18 10/05/2021 09:32 AM    BUN/Creatinine ratio 19 09/07/2021 01:24 PM    BUN/Creatinine ratio 19 08/03/2021 09:30 AM    BUN/Creatinine ratio 21 (H) 07/20/2021 09:47 AM    GFR est AA 56 (L) 10/26/2021 12:02 PM    GFR est AA >60 10/05/2021 09:32 AM    GFR est AA >60 09/07/2021 01:24 PM    GFR est AA >60 08/03/2021 09:30 AM    GFR est AA 56 (L) 07/20/2021 09:47 AM    GFR est non-AA 46 (L) 10/26/2021 12:02 PM    GFR est non-AA 60 (L) 10/05/2021 09:32 AM    GFR est non-AA >60 09/07/2021 01:24 PM    GFR est non-AA >60 08/03/2021 09:30 AM    GFR est non-AA 46 (L) 07/20/2021 09:47 AM    Calcium 9.3 10/26/2021 12:02 PM    Calcium 9.4 10/05/2021 09:32 AM    Calcium 8.5 09/07/2021 01:24 PM    Calcium 9.0 08/03/2021 09:30 AM    Calcium 9.7 07/20/2021 09:47 AM         Current Outpatient Medications:     DULoxetine (CYMBALTA) 30 mg capsule, Take 1 Capsule by mouth daily. For one week, then 2 capsules daily, Disp: 60 Capsule, Rfl: 5    Ibrance 100 mg cap, TAKE 1 CAPSULE DAILY FOR 21 DAYS ON AND 7 DAYS OFF, EVERY 28 DAYS, Disp: 63 Capsule, Rfl: 3    gabapentin (NEURONTIN) 100 mg capsule, TAKE 2 CAPSULES THREE TIMES A DAY, Disp: 540 Capsule, Rfl: 3    calcium citrate 200 mg (950 mg) tablet, Take  by mouth daily. , Disp: , Rfl:     Cetirizine (ZYRTEC) 10 mg cap, Take 1 Cap by mouth daily as needed. , Disp: , Rfl:     lidocaine HCl-hydrocortison ac topical cream, Apply  to affected area two (2) times a day., Disp: 85 g, Rfl: 3    TETRACYCLINE HCL (TETRACYCLINE PO), Take 250 mg by mouth two (2) times a day., Disp: , Rfl:     fexofenadine-pseudoephedrine (ALLEGRA-D 24 HOUR) 180-240 mg per tablet, Take 1 Tab by mouth daily as needed. , Disp: , Rfl:     palbociclib (IBRANCE) 125 mg cap, Take 125 mg by mouth daily.  Take 1 tab po every day for 21 days on and 7 days off q 28 days  Indications: HORMONE RECEPTOR(+), HER2(-) ADVANCED BREAST CANCER, Disp: 42 Cap, Rfl: 6   Cholecalciferol, Vitamin D3, 5,000 unit Tab, Take  by mouth daily. , Disp: , Rfl:     thyroid, Pork, (ARMOUR THYROID) 60 mg tablet, Take 60 mg by mouth daily. , Disp: , Rfl:     L-Mfolate-B6 Phos-Methyl-B12 (NEURPATH-B) 3-35-2 mg Tab, Take  by mouth two (2) times a day.  , Disp: , Rfl:     warfarin (COUMADIN) 1 mg tablet, Take 1 mg by mouth every other day., Disp: , Rfl:     Diet/po intake: 2 large meals (typically morning and evening) + 1 snack (typically afternoon)

## 2021-10-29 NOTE — TELEPHONE ENCOUNTER
Medical Nutrition Therapy Note    Nutritional Assessment:  IBW: Ideal body weight: 120 lb 9.5 oz (54.7 kg)  Adjusted ideal body weight: 127 lb 15.3 oz (58 kg)   UBW: 155 lb (1 year ago per pt)  Wt hx:   Wt Readings from Last 20 Encounters:   09/07/21 139 lb (63 kg)   09/07/21 139 lb (63 kg)   08/03/21 144 lb 3.2 oz (65.4 kg)   07/01/21 144 lb 12.8 oz (65.7 kg)   06/10/21 143 lb (64.9 kg)   06/03/21 143 lb 6.4 oz (65 kg)   05/30/21 144 lb (65.3 kg)   05/12/21 144 lb (65.3 kg)   04/16/21 142 lb (64.4 kg)   04/14/21 142 lb (64.4 kg)   04/08/21 143 lb 12.8 oz (65.2 kg)   03/31/21 141 lb (64 kg)   03/03/21 147 lb (66.7 kg)   02/02/21 149 lb (67.6 kg)   01/05/21 148 lb 3.2 oz (67.2 kg)   11/19/20 147 lb (66.7 kg)   10/22/20 146 lb (66.2 kg)   10/06/20 145 lb (65.8 kg)   08/27/20 138 lb 14.4 oz (63 kg)   08/05/20 145 lb (65.8 kg)     Current Ht:   Ht Readings from Last 1 Encounters:   09/07/21 5' 4\" (1.626 m)       Current BMI: Estimated body mass index is 23.86 kg/m² as calculated from the following:    Height as of 9/7/21: 5' 4\" (1.626 m). Weight as of 9/7/21: 139 lb (63 kg). Wt change: 5.5% weight loss over the last 6 month(s)    Estimated nutritional needs:   Calorie Range: 4173-8154  Formula: 30-35 kcal/kg  Protein Range:   Formula: 1.5-1.7 g/kg  Fluid Needs: 1 mL/kcal    Nutrition-impact symptoms: Weight loss    10/12/21: Pt reports slowly losing wt over the last year. Pt denies any changes in meal intake over this time. Pt stated the only change has been that she drinks less diet pepsi and sprite. Pt mentioned that she's been less physically active related to concerns with COVID. Pt reports seeing her neighbors walk outside and wanting to walk as well. 10/29/21: Called pt for follow up, but no answer so message left for pt to call writer back. 10/29/21: Pt called writer back and endorsed having increased meal frequency and physical activity. Pt reports consistently eating > 3x per day.  Pt states that she been maintaining her wt since making this change. Pt mentioned that she hasn't started going on walks yet, but has started some weight lifting in her home and walking around her backyard. Nutritional Diagnosis: unintentional weight loss related to inability to consume sufficient energy intake as evidenced by pt report of 10.3% wt loss over the past 1 year    Nutritional Interventions:   1. Pt agreed to trial increasing meal frequency (either 3 or 4x per day)  2. Pt will increase physical activity    Nutritional Monitoring/Goals:   Pt's wt > 140 by 11/12/21    Initial consult per provider    Cancer Dx: No matching staging information was found for the patient. Cancer Staging  No matching staging information was found for the patient. Treatment Plan:   Chemotherapy Flowsheet 9/7/2021   Cycle C55   Date 9/7/2021   Drug / Regimen FASLODEX   Dosage 500MG    +   Radiation Treatments     No radiation treatments to show.  (Treatments may have been administered in another system.)          Lab Results   Component Value Date/Time    Sodium 138 10/26/2021 12:02 PM    Sodium 139 10/05/2021 09:32 AM    Sodium 142 09/07/2021 01:24 PM    Sodium 142 08/03/2021 09:30 AM    Sodium 142 07/20/2021 09:47 AM    Potassium 4.3 10/26/2021 12:02 PM    Chloride 105 10/26/2021 12:02 PM    Chloride 104 10/05/2021 09:32 AM    Chloride 109 09/07/2021 01:24 PM    Chloride 109 08/03/2021 09:30 AM    Chloride 106 07/20/2021 09:47 AM    CO2 28 10/26/2021 12:02 PM    CO2 30 10/05/2021 09:32 AM    CO2 29 09/07/2021 01:24 PM    CO2 28 08/03/2021 09:30 AM    CO2 31 07/20/2021 09:47 AM    Anion gap 5 10/26/2021 12:02 PM    Anion gap 5 10/05/2021 09:32 AM    Anion gap 4 09/07/2021 01:24 PM    Anion gap 5 08/03/2021 09:30 AM    Anion gap 5 07/20/2021 09:47 AM    Glucose 92 10/26/2021 12:02 PM    Glucose 105 (H) 10/05/2021 09:32 AM    Glucose 90 09/07/2021 01:24 PM    Glucose 103 (H) 08/03/2021 09:30 AM    Glucose 121 (H) 07/20/2021 09:47 AM    BUN 23 (H) 10/26/2021 12:02 PM    BUN 17 10/05/2021 09:32 AM    BUN 16 09/07/2021 01:24 PM    BUN 16 08/03/2021 09:30 AM    BUN 24 (H) 07/20/2021 09:47 AM    Creatinine 1.15 10/26/2021 12:02 PM    Creatinine 0.92 10/05/2021 09:32 AM    Creatinine 0.86 09/07/2021 01:24 PM    Creatinine 0.84 08/03/2021 09:30 AM    Creatinine 1.15 07/20/2021 09:47 AM    BUN/Creatinine ratio 20 10/26/2021 12:02 PM    BUN/Creatinine ratio 18 10/05/2021 09:32 AM    BUN/Creatinine ratio 19 09/07/2021 01:24 PM    BUN/Creatinine ratio 19 08/03/2021 09:30 AM    BUN/Creatinine ratio 21 (H) 07/20/2021 09:47 AM    GFR est AA 56 (L) 10/26/2021 12:02 PM    GFR est AA >60 10/05/2021 09:32 AM    GFR est AA >60 09/07/2021 01:24 PM    GFR est AA >60 08/03/2021 09:30 AM    GFR est AA 56 (L) 07/20/2021 09:47 AM    GFR est non-AA 46 (L) 10/26/2021 12:02 PM    GFR est non-AA 60 (L) 10/05/2021 09:32 AM    GFR est non-AA >60 09/07/2021 01:24 PM    GFR est non-AA >60 08/03/2021 09:30 AM    GFR est non-AA 46 (L) 07/20/2021 09:47 AM    Calcium 9.3 10/26/2021 12:02 PM    Calcium 9.4 10/05/2021 09:32 AM    Calcium 8.5 09/07/2021 01:24 PM    Calcium 9.0 08/03/2021 09:30 AM    Calcium 9.7 07/20/2021 09:47 AM         Current Outpatient Medications:     DULoxetine (CYMBALTA) 30 mg capsule, Take 1 Capsule by mouth daily. For one week, then 2 capsules daily, Disp: 60 Capsule, Rfl: 5    Ibrance 100 mg cap, TAKE 1 CAPSULE DAILY FOR 21 DAYS ON AND 7 DAYS OFF, EVERY 28 DAYS, Disp: 63 Capsule, Rfl: 3    gabapentin (NEURONTIN) 100 mg capsule, TAKE 2 CAPSULES THREE TIMES A DAY, Disp: 540 Capsule, Rfl: 3    calcium citrate 200 mg (950 mg) tablet, Take  by mouth daily. , Disp: , Rfl:     Cetirizine (ZYRTEC) 10 mg cap, Take 1 Cap by mouth daily as needed. , Disp: , Rfl:     lidocaine HCl-hydrocortison ac topical cream, Apply  to affected area two (2) times a day., Disp: 85 g, Rfl: 3    TETRACYCLINE HCL (TETRACYCLINE PO), Take 250 mg by mouth two (2) times a day., Disp: , Rfl:   fexofenadine-pseudoephedrine (ALLEGRA-D 24 HOUR) 180-240 mg per tablet, Take 1 Tab by mouth daily as needed. , Disp: , Rfl:     palbociclib (IBRANCE) 125 mg cap, Take 125 mg by mouth daily. Take 1 tab po every day for 21 days on and 7 days off q 28 days  Indications: HORMONE RECEPTOR(+), HER2(-) ADVANCED BREAST CANCER, Disp: 42 Cap, Rfl: 6    Cholecalciferol, Vitamin D3, 5,000 unit Tab, Take  by mouth daily. , Disp: , Rfl:     thyroid, Pork, (ARMOUR THYROID) 60 mg tablet, Take 60 mg by mouth daily. , Disp: , Rfl:     L-Mfolate-B6 Phos-Methyl-B12 (NEURPATH-B) 3-35-2 mg Tab, Take  by mouth two (2) times a day.  , Disp: , Rfl:     warfarin (COUMADIN) 1 mg tablet, Take 1 mg by mouth every other day., Disp: , Rfl:     Diet/po intake: 2 large meals (typically morning and evening) + 1 snack (typically afternoon)

## 2021-11-01 NOTE — PROGRESS NOTES
Hematology/Oncology  Progress Note    Name: Charley Rosen  Date: 2021  : 1945    PCP: Stephanie Naranjo MD     Ms. Codi Hooper is a 68 y.o.  female who was seen for management of her metastatic breast cancer with associated complications of chemotherapy. Current therapy: Fulvestrant 500 mg subcutaneous monthly and Ibrance 125 mg by mouth daily. Subjective:     Mrs. Codi Hooper is a 79-year-old woman who has slowly progressive metastatic breast cancer. Patient was being followed by Dr. Giuseppe Tran who retired. She is here today for chemo follow up. The patient has previously completed external beam radiation therapy to lesions in her ribs and more recently she completed proton therapy to areas of bone involvement with a significant benefit with regards to pain control. Additionally she is currently receiving systemic therapy with fulvestrant and Ibrance. Patient is tolerating the systemic therapy reasonably well and has not experienced any nausea or emesis. Today the patient reported doing fairly well. She has on-off muscle pain at the site of XRT. Francheska Monahan She has resumed Procrit at 4 week intervals whenever her hemoglobin is below 10 g/dL and hematocrit is below 30%. Her neuropathy reported with tinging and numbness which not really improved with Neurontin 200 mg TID. She denied other complaints. Past medical history, family history, and social history: these were reviewed and remains unchanged.     Past Medical History:   Diagnosis Date    Biliary colic     Breast CA ([facility] [address] 2012    Cancer [facility])     Right Breast    Chemotherapy convalescence or palliative care     Neuropathy     Radiation therapy complication     Thyroid disease      Past Surgical History:   Procedure Laterality Date    HX LAP CHOLECYSTECTOMY  13    HX MASTECTOMY      Right    [place] BRE[facility] PROCED[facility] [place]  10/2002    Right-Lesion    [place] BREAST SUR[place] PROCEDURE [place]  2004    Right-Lesion Social History     Socioeconomic History    Marital status:      Spouse name: Not on file    Number of children: Not on file    Years of education: Not on file    Highest education level: Not on file   Occupational History    Not on file   Tobacco Use    Smoking status: Former Smoker     Quit date: 1991     Years since quittin.4    Smokeless tobacco: Never Used   Substance and Sexual Activity    Alcohol use: Yes     Alcohol/week: 0.0 standard drinks     Types: 1 - 2 Glasses of wine per week     Comment: socially, occassionally    Drug use: No    Sexual activity: Not on file   Other Topics Concern    Not on file   Social History Narrative    Not on file     Social Determinants of Health     Financial Resource Strain:     Difficulty of Paying Living Expenses:    Food Insecurity:     Worried About Running Out of Food in the Last Year:     [address] in the Last Year:    Transportation Needs:     Lack of Transportation (Medical):  Lack of Transportation (Non-Medical):    Physical Activity:     Days of Exercise per Week:     Minutes of Exercise per Session:    Stress:     Feeling of Stress :    Social Connections:     Frequency of Communication with Friends and Family:     Frequency of Social Gatherings with Friends and Family:     Attends Mosque Services:     Active Member of Clubs or Organizations:     Attends Club or Organization Meetings:     Marital Status:    Intimate Partner Violence:     Fear of Current or Ex-Partner:     Emotionally Abused:     Physically Abused:     Sexually Abused:      Family History   Problem Relation Age of Onset    Cancer Maternal Aunt         Hodgkin's disease    Breast Cancer Paternal Aunt     Cancer Father         Prostate, lung, brain     Current Outpatient Medications   Medication Sig Dispense Refill    DULoxetine (CYMBALTA) 30 mg capsule Take 1 Capsule by mouth daily.  For one week, then 2 capsules daily 60 Capsule 5    Ibrance 100 mg cap TAKE 1 CAPSULE DAILY FOR 21 DAYS ON AND 7 DAYS OFF, EVERY 28 DAYS 63 Capsule 3    gabapentin (NEURONTIN) 100 mg capsule TAKE 2 CAPSULES THREE TIMES A  Capsule 3    calcium citrate 200 mg (950 mg) tablet Take  by mouth daily.  Cetirizine (ZYRTEC) 10 mg cap Take 1 Cap by mouth daily as needed.  lidocaine HCl-hydrocortison ac topical cream Apply  to affected area two (2) times a day. 85 g 3    TETRACYCLINE HCL (TETRACYCLINE PO) Take 250 mg by mouth two (2) times a day.  fexofenadine-pseudoephedrine (ALLEGRA-D 24 HOUR) 180-240 mg per tablet Take 1 Tab by mouth daily as needed.  palbociclib (IBRANCE) 125 mg cap Take 125 mg by mouth daily. Take 1 tab po every day for 21 days on and 7 days off q 28 days  Indications: HORMONE RECEPTOR(+), HER2(-) ADVANCED BREAST CANCER 42 Cap 6    Cholecalciferol, Vitamin D3, 5,000 unit Tab Take  by mouth daily.  thyroid, Pork, (ARMOUR THYROID) 60 mg tablet Take 60 mg by mouth daily.  L-Mfolate-B6 Phos-Methyl-B12 (NEURPATH-B) 3-35-2 mg Tab Take  by mouth two (2) times a day.  warfarin (COUMADIN) 1 mg tablet Take 1 mg by mouth every other day. Review of Systems   Constitutional: Positive for malaise/fatigue. Negative for chills, diaphoresis, fever and weight loss. Respiratory: Negative for cough, hemoptysis, shortness of breath and wheezing. Cardiovascular: Negative for chest pain, palpitations and leg swelling. Gastrointestinal: Negative for abdominal pain, diarrhea, heartburn, nausea and vomiting. Genitourinary: Negative for dysuria, frequency, hematuria and urgency. Musculoskeletal: Negative for joint pain and myalgias. Skin: Negative for itching and rash. Neurological: Negative for dizziness, seizures, weakness and headaches. Psychiatric/Behavioral: Negative for depression. The patient does not have insomnia.              Objective:     Visit Vitals  /70 (BP 1 Location: Left upper arm, BP Patient Position: Sitting, BP Cuff Size: Adult)   Pulse 79   Temp 97.1 °F (36.2 °C) (Temporal)   Resp 18   Ht 5' 4\" (1.626 m)   Wt 60.8 kg (134 lb)   SpO2 98%   BMI 23.00 kg/m²       ECOG Performance Status (grade): 1  0 - able to carry on all pre-disease activity w/out restriction  1 - restricted but able to carry out light work  2 - ambulatory and can self- care but unable to carry out work  3 - bed or chair >50% of waking hours  4 - completely disable, total care, confined to bed or chair    Physical Exam  Constitutional:       Appearance: Normal appearance. HENT:      Head: Normocephalic and atraumatic. Eyes:      Pupils: Pupils are equal, round, and reactive to light. Cardiovascular:      Rate and Rhythm: Normal rate and regular rhythm. Heart sounds: Normal heart sounds. Pulmonary:      Effort: Pulmonary effort is normal.      Breath sounds: Normal breath sounds. Abdominal:      General: Bowel sounds are normal.      Palpations: Abdomen is soft. Tenderness: There is no abdominal tenderness. There is no guarding. Musculoskeletal:         General: Normal range of motion. Cervical back: Neck supple. Right lower leg: No edema. Left lower leg: No edema. Skin:     General: Skin is warm. Neurological:      General: No focal deficit present. Mental Status: She is alert and oriented to person, place, and time. Mental status is at baseline. Diagnostics:      No results found for this or any previous visit (from the past 96 hour(s)). Imaging:  No results found for this or any previous visit. Results for orders placed during the hospital encounter of 12/17/20    XR SPINE THORAC 2 V    Narrative  XR SPINE [facility] 2 V: 12/17/2020 2:45 PM    CLINICAL INFORMATION  back pain.  History of breast cancer    COMPARISON  Thoracic spine MRI 23 June 2017, CT chest, abdomen and pelvis 17 April 2020    TECHNIQUE  2 views of the thoracic spine    FINDINGS    Levoconvex curvature of the upper lumbar spine. Mild/moderate multilevel discogenic endplate degenerative changes throughout the  thoracic spine. No fracture or destructive osseous lesions identified. Impression  IMPRESSION:  No acute osseous findings. If continued clinical concern, recommend MRI. Results for orders placed in visit on 08/03/21    CT CHEST ABD PELV W CONT    Narrative  CT CHEST, ABDOMEN AND PELVIS WITH ENHANCEMENT    INDICATION: 70-year-old female with history of metastatic breast cancer. 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 TECHNIQUE: Axial images obtained of the chest, abdomen and pelvis following the  uneventful administration 80 cc of Isovue 300 intravenous contrast.  Coronal and  sagittal reformatted images of the abdomen and pelvis were obtained. All CT scans at this facility are performed using dose optimization technique as  appropriate to a performed exam, to include automated exposure control,  adjustment of the mA and/or kV according to patient size (including appropriate  matching first site-specific examinations), or use of iterative reconstruction  technique. COMPARISON: 1/18/2021., 4/9/2019    CHEST FINDINGS:  Thyroid: Unremarkable    Lymph nodes: No lymphadenopathy. Cardiovascular: Left chest port present with tip along the SVC. Heart size  within normal limits. No pericardial effusion. Calcifications present in the  coronary arteries and thoracic aorta. Airways: Central airways are patent. Lungs/pleura. Left lung is clear. Similar-appearing volume loss along the right  hemithorax with lenticular densities along the anterior and posterior hemithorax  is related to atelectasis versus fibrosis. Chronic small right pleural effusion  with smooth pleural thickening is noted. No new or enlarging nodule. Chest wall: Status post right breast mastectomy with implant reconstruction.   Increased soft tissue density noted in the subcutaneous tissues along the  anterior inferior border of the sternum measuring approximately 2.6 x 1.6 x 2.6  cm (axial image 40). ABDOMEN FINDINGS:    Liver: Hepatic steatosis  Spleen: Unremarkable. Pancreas: Unremarkable. Biliary: Status post cholecystectomy. No biliary ductal dilatation. Bowel: No dilated loops of bowel. Colonic diverticulosis. No evidence of acute  diverticulitis. Peritoneum/ Retroperitoneum: Unremarkable. Lymph Nodes: Unremarkable. Adrenal Glands: Unremarkable. Kidneys: Unremarkable. Vessels: Vascular calcifications present in the abdominal aorta. PELVIS FINDINGS:    Bladder/ Pelvic Organs: Uterus and bilateral ovaries are present. Bladder is  decompressed    Bones: Increased sclerosis along the right sixth and seventh anterolateral ribs  is again noted. Heterogeneous expansile appearance of the T12 lamina and pedicle  again noted. Chronic appearing sclerotic density present in the left femoral  head. No new lesions. Degenerative changes in the spine. Impression  1. New nonspecific soft tissue density noted along the anterior inferior aspect  of the sternum. 2. Otherwise no significant interval change in the chest. Volume loss on the  right hemithorax with areas of fibrosis/atelectasis and chronic pleural  effusion. No new or enlarging nodule. 3. Hepatic steatosis    4. Similar-appearing of sclerotic and heterogeneous osseous foci as described  above. Assessment:     1. Metastatic breast cancer ([address]    2. Malignant neoplasm of lower-inner quadrant of right breast of female, estrogen receptor positive ([address]    3. Anemia due to chemotherapy    4. Chronic leukopenia    5. Chemotherapy induced neutropenia (HCC)    6. Neuropathy associated with cancer [facility])      Plan:   Metastatic breast cancer:     -- She has slowly progressive metastatic breast cancer. Patient was being followed by Dr. Kaitlin Kelly who retired.  The patient has previously completed external beam radiation therapy to lesions in her ribs and more recently she completed proton therapy to areas of bone involvement with a significant benefit with regards to pain control. The posttherapy imaging studies showed a significant decrease in the intensity of uptake on the bone scan. -- Additionally she is currently receiving systemic therapy with fulvestrant and Ibrance. She has been on Fulvestrant 500 mg subcutaneous monthly and Ibrance 100 mg by mouth daily. She is tolerating treatment fairly well without high grade toxicities. -- 4/15/2020  CT scans of the chest, abdomen, and pelvis shows that she has a loculated right pleural effusion with some pleural thickening and adjacent areas of probable rounded atelectasis all of which are stable. She has relatively stable bone lesions of the rib, pelvis, and T12 posterior element. There was no findings on CT scan to support new metastatic disease in the chest, abdomen, or pelvis. --  Mammogram 6/5 done shows No evidence of malignancy. Suggest routine follow-up. -- 12/17/2020 CBC reported hemoglobin was 10.8, hematocrit was 32.8%, total WBC was 1.9 and ANC was 1.3, platelet was 166,489.  -- 1/18/2021 CT CA/P: No new or enlarging lymphadenopathy. No solid organ lesions or ascites. -- 1/18/2021 Bone scan reported a focal uptake anterior left iliac crest without definite CT correlate.   -- 2/12/2021 US reported No abnormality is seen in the patient's area of concern inferior to the right scapula. -- 4/19/2021 Bone scan reported no gross interval change in indeterminate uptake in the left superior iliac bone. -- 6/7/2021 Mammogram reported no evidence for malignancy. Suggest routine follow-up with annual mammographic screening.  -- 7/20/2021 CBC reported hemoglobin 11.7, hematocrit 37.1%, ANC 1.6, platelet [phone].  -- The patient has tolerated fulvestrant and palbociclib therapy with no high graded toxicities. Recent labs reviewed with the patient today.   -- 9/14/2021 CT CAP reported a new soft tissue density noted along the anterior inferior aspect of the sternum. Otherwise no significant interval change in the chest. No new or enlarging nodule. -- 9/14/2021 Bone scan reported known osseous metastatic disease. Small new focal activity proximal right forearm, concerning for new metastasis. Developing activity in the left femoral head concerning for metastasis. -- 10/14/2021 PET scan reported no definite finding for FDG avid bone disease, no definite correlate with bone scan findings. + The soft tissue mass anterior to the inferior sternum demonstrates a moderate  degree of metabolic activity. This is nonspecific. Persistent metabolically active subcarinal lymph node and new medial right infrahilar lymph node. -- 10/26/2021 WBC 1.8, ANC 0.9  Clinical stable. Plan:   -- Patient will hold her current Palbociclib d/t leukopenia. Repeat CBC in 2 weeks. -- Patient has recent follow up with [facility] - Dr. Desiree Hyde. PET reviewed, there're no XRT offered at this time reportedly with the plan to repeat PET in 3 months. -- Patient will continue current regimen with fulvestrant. Palbociclib 100 mg daily 21 days on/7 days off today. -- She will need to check lab before each cycle. -- She will need yearly DEXA scan. -- Labs: CBC, CMP, CA 27-29, CA 15-3, Vitamin D    Poor appetite  Weight loss  -- Nutritionist referral    Intractable headache  -- 5/30/2021 Brain MRI reported no intracranial metastases identified. -- Clinical improved    Normocytic Anemia. Chemotherapy-related annemia  -- 2/11/2021 CBC reported hemoglobin 10.5, hematocrit 32, wbc 2.0, ANC 1.5, and platelet 579,508. chemistry reviewed  -- The patient previously received Procrit 60,000 units subcutaneous at 4 week intervals whenever her hemoglobin is below 10 g/dL and hematocrit is below 30%. Procrit SQ every 4 weeks has resumed recently. -- Recent labs reviewed. B12 275.  Advised the patient to take B12 supplement  -- 7/20/2021 CBC reported hemoglobin 11.7, hematocrit 37.1%, ANC 1.6, platelet 221,019. Neuropathy associated with cancer:   -- On Neurontin at 200 mg 3 times daily. -- Continue on Cymblata          Chemotherapy-induced neutropenia/chronic leukopenia (persisting problem):   -- 2/11/2021 CBC reported hemoglobin 10.5, hematocrit 32, wbc 2.0, ANC 1.5, and platelet 915,347. chemistry reviewed. -- Clinical stable. -- If the absolute neutrophil count declines to 0.5 she will be started on Neupogen or Neulasta.            Chemotherapy-induced thrombocytopenia :   -- The patient was holding the Ibrance 140 mg at previous times D/T thrombocytopenia. -- She presently taking Ibrance 100mg.   -- Recent CBC showed improved PLTs count. -- We will see the patient back in clinic in about 2 weeks. Always sooner if required. The patient can have lab done prior our next clinic visit. Orders Placed This Encounter    CBC WITH AUTOMATED DIFF     Standing Status:   Future     Standing Expiration Date:   11/1/2022           Ms. Ezio Tomlin has a reminder for a \"due or due soon\" health maintenance. I have asked that she contact her primary care provider for follow-up on this health maintenance. All of patient's questions answered to their apparent satisfaction. They verbally show understanding and agreement with aforementioned plan. Andie Fields MD  11/1/2021          About 30 minutes were spent for this encounter with more than 50% of the time spent in face-to-face counseling, discussing on diagnosis and management plan going forward, and co-ordination of care. Parts of this document has been produced using Dragon dictation system. Unrecognized errors in transcription may be present. Please do not hesitate to reach out for any questions or clarifications.       CC: Bret Hicks MD

## 2021-11-02 NOTE — PROGRESS NOTES
[facility] Progress Note    Date: 2021    Name: Jonn Bonilla    MRN: [id number]         : 1945      Ms. Figueroa arrived in the [address] [place] [address] Street today at 1345, in stable condition, here for Monthly 100 [address] [place], and Q 4 Week Xgeva, RETACRIT & Faslodex Injections. She was assessed and education was provided. Ms. Arben Guy vitals were reviewed. Visit Vitals  BP (!) 483/75 (BP 1 Location: Left arm, BP Patient Position: Sitting)   Pulse 76   Temp 97.6 °F (36.4 °C)   Resp 18   SpO2 97%     Her left chest single lumen port was accessed without incident using sterile technique, and blood was drawn from the port for a CBC, CMP, Magnesium, and Phosphorus, per order. And then, the port was flushed well per protocol and without incident, with NS & Heparin, and the carbone needle was removed and gauze/bandaid was applied. (CBC processed in house and other labs were sent out to the [facility] lab by , for processing.)    Recent Results (from the past 12 hour(s))   METABOLIC PANEL, COMPREHENSIVE    Collection Time: 21  2:10 PM   Result Value Ref Range    Sodium 141 136 - 145 mmol/L    Potassium 3.7 3.5 - 5.5 mmol/L    Chloride 110 100 - 111 mmol/L    CO2 28 21 - 32 mmol/L    Anion gap 3 3.0 - 18 mmol/L    Glucose 118 (H) 74 - 99 mg/dL    BUN 21 (H) 7.0 - 18 MG/DL    Creatinine 0.93 0.6 - 1.3 MG/DL    BUN/Creatinine ratio 23 (H) 12 - 20      GFR est AA >60 >60 ml/min/1.73m2    GFR est non-AA 59 (L) >60 ml/min/1.73m2    Calcium 9.3 8.5 - 10.1 MG/DL    Bilirubin, total 0.4 0.2 - 1.0 MG/DL    ALT (SGPT) 13 13 - 56 U/L    AST (SGOT) 18 10 - 38 U/L    Alk.  phosphatase 64 45 - 117 U/L    Protein, total 6.5 6.4 - 8.2 g/dL    Albumin 3.6 3.4 - 5.0 g/dL    Globulin 2.9 2.0 - 4.0 g/dL    A-G Ratio 1.2 0.8 - 1.7     MAGNESIUM    Collection Time: 21  2:10 PM   Result Value Ref Range    Magnesium 1.6 1.6 - 2.6 mg/dL   PHOSPHORUS    Collection Time: 21  2:10 PM   Result Value Ref Range    Phosphorus 2.8 2.5 - 4.9 MG/DL   CBC WITH 3 PART DIFF    Collection Time: 11/02/21  2:10 PM   Result Value Ref Range    WBC 1.8 (L) 4.5 - 13.0 K/uL    RBC 3.24 (L) 4.10 - 5.10 M/uL    HGB 10.4 (L) 12.0 - 16 g/dL    HCT 31.4 (L) 36 - 48 %    MCV 96.9 78 - 102 FL    MCH 32.1 25.0 - 35.0 PG    MCHC 33.1 31 - 37 g/dL    RDW 16.6 (H) 11.5 - 14.5 %    PLATELET 730 828 - 701 K/uL    NEUTROPHILS 70 40 - 70 %    MIXED CELLS 7 0.1 - 17 %    LYMPHOCYTES 23 14 - 44 %    ABS. NEUTROPHILS 1.3 (L) 1.8 - 9.5 K/UL    ABS. MIXED CELLS 0.1 0.0 - 2.3 K/uL    ABS. LYMPHOCYTES 0.4 (L) 1.1 - 5.9 K/UL    DF AUTOMATED       During visit Ms Mariza Marsh asked if she should recive her booster shot for covid. Notified Halie Castelan NP. She would like patient to come back and have her cbc checked to see if her WBC gets closer to normal before getting vaccine. She would like patient to return in 1 week for labs. Lab results were reviewed. Her most recent CMP, MG, & PHOS, levels from 10/26/21 were reviewed, and were all noted to be satisfactory for treatment today, and were as follows: Ca 9.3 okay to give Xgeva. Xgeva 120 mg, was administered SQ, in her left arm per , and without incident.      Per today's CBC, Retacrit was HELD per parameters.     Faslodex 500 mg Total Dose, was administered IM, in 2 equally divided doses of 250 mg,  per order, and without incident. (250 mg was administered IM, in her right gluteal muscle, and 250 mg was administered IM, in her left gluteal muscle). Banadid and gauze to sites. Ms. Mariza Marsh tolerated well, and had no complaints. Ms. Mariza Marsh was discharged from [place] [id number] in stable condition at 1425.  She is to return in 1 week, for her next appointment, for CBC recheck due to low WBC       Sushila Mccoy RN  November 2, 2021

## 2021-11-04 NOTE — TELEPHONE ENCOUNTER
Patient contacted office to report she has been diagnosed with a sinus infection since she was in our office her last. Her pcp told her this could be why her white blood count was so low. She just wanted you to be informed since you held her ibrance.

## 2021-11-12 NOTE — TELEPHONE ENCOUNTER
Medical Nutrition Therapy Note    Nutritional Assessment:  IBW: Ideal body weight: 120 lb 9.5 oz (54.7 kg)  Adjusted ideal body weight: 125 lb 15.3 oz (57.1 kg)   UBW: 155 lb (1 year ago per pt)  Wt hx:   Wt Readings from Last 20 Encounters:   11/01/21 134 lb (60.8 kg)   09/07/21 139 lb (63 kg)   09/07/21 139 lb (63 kg)   08/03/21 144 lb 3.2 oz (65.4 kg)   07/01/21 144 lb 12.8 oz (65.7 kg)   06/10/21 143 lb (64.9 kg)   06/03/21 143 lb 6.4 oz (65 kg)   05/30/21 144 lb (65.3 kg)   05/12/21 144 lb (65.3 kg)   04/16/21 142 lb (64.4 kg)   04/14/21 142 lb (64.4 kg)   04/08/21 143 lb 12.8 oz (65.2 kg)   03/31/21 141 lb (64 kg)   03/03/21 147 lb (66.7 kg)   02/02/21 149 lb (67.6 kg)   01/05/21 148 lb 3.2 oz (67.2 kg)   11/19/20 147 lb (66.7 kg)   10/22/20 146 lb (66.2 kg)   10/06/20 145 lb (65.8 kg)   08/27/20 138 lb 14.4 oz (63 kg)     Current Ht:   Ht Readings from Last 1 Encounters:   11/01/21 5' 4\" (1.626 m)       Current BMI: Estimated body mass index is 23 kg/m² as calculated from the following:    Height as of 11/1/21: 5' 4\" (1.626 m). Weight as of 11/1/21: 134 lb (60.8 kg). Wt change: 3.5% weight loss over the last 2 month(s)    Estimated nutritional needs:   Calorie Range: 7494-0055  Formula: 30-35 kcal/kg  Protein Range:   Formula: 1.5-1.7 g/kg  Fluid Needs: 1 mL/kcal    Nutrition-impact symptoms: Weight loss    10/12/21: Pt reports slowly losing wt over the last year. Pt denies any changes in meal intake over this time. Pt stated the only change has been that she drinks less diet pepsi and sprite. Pt mentioned that she's been less physically active related to concerns with COVID. Pt reports seeing her neighbors walk outside and wanting to walk as well. 10/29/21: Called pt for follow up, but no answer so message left for pt to call writer back. 10/29/21: Pt called writer back and endorsed having increased meal frequency and physical activity. Pt reports consistently eating > 3x per day.  Pt states that she been maintaining her wt since making this change. Pt mentioned that she hasn't started going on walks yet, but has started some weight lifting in her home and walking around her backyard. 11/12/21: Called pt for follow up, but no answer so message left for pt to call writer back. Nutritional Diagnosis: unintentional weight loss related to inability to consume sufficient energy intake as evidenced by pt report of 10.3% wt loss over the past 1 year    Nutritional Interventions:   1. Pt agreed to trial increasing meal frequency (either 3 or 4x per day)  2. Pt will increase physical activity    Nutritional Monitoring/Goals:   Pt's wt > 140 lb by 11/12/21 -not met  Pt's wt > 135 lb by 11/30/21 -new    Initial consult per provider    Cancer Dx: No matching staging information was found for the patient. Cancer Staging  No matching staging information was found for the patient. Treatment Plan:   Chemotherapy Flowsheet 9/7/2021   Cycle C55   Date 9/7/2021   Drug / Regimen FASLODEX   Dosage 500MG    +   Radiation Treatments     No radiation treatments to show.  (Treatments may have been administered in another system.)          Lab Results   Component Value Date/Time    Sodium 141 11/02/2021 02:10 PM    Sodium 138 10/26/2021 12:02 PM    Sodium 139 10/05/2021 09:32 AM    Sodium 142 09/07/2021 01:24 PM    Sodium 142 08/03/2021 09:30 AM    Potassium 3.7 11/02/2021 02:10 PM    Chloride 110 11/02/2021 02:10 PM    Chloride 105 10/26/2021 12:02 PM    Chloride 104 10/05/2021 09:32 AM    Chloride 109 09/07/2021 01:24 PM    Chloride 109 08/03/2021 09:30 AM    CO2 28 11/02/2021 02:10 PM    CO2 28 10/26/2021 12:02 PM    CO2 30 10/05/2021 09:32 AM    CO2 29 09/07/2021 01:24 PM    CO2 28 08/03/2021 09:30 AM    Anion gap 3 11/02/2021 02:10 PM    Anion gap 5 10/26/2021 12:02 PM    Anion gap 5 10/05/2021 09:32 AM    Anion gap 4 09/07/2021 01:24 PM    Anion gap 5 08/03/2021 09:30 AM    Glucose 118 (H) 11/02/2021 02:10 PM    Glucose 92 10/26/2021 12:02 PM    Glucose 105 (H) 10/05/2021 09:32 AM    Glucose 90 09/07/2021 01:24 PM    Glucose 103 (H) 08/03/2021 09:30 AM    BUN 21 (H) 11/02/2021 02:10 PM    BUN 23 (H) 10/26/2021 12:02 PM    BUN 17 10/05/2021 09:32 AM    BUN 16 09/07/2021 01:24 PM    BUN 16 08/03/2021 09:30 AM    Creatinine 0.93 11/02/2021 02:10 PM    Creatinine 1.15 10/26/2021 12:02 PM    Creatinine 0.92 10/05/2021 09:32 AM    Creatinine 0.86 09/07/2021 01:24 PM    Creatinine 0.84 08/03/2021 09:30 AM    BUN/Creatinine ratio 23 (H) 11/02/2021 02:10 PM    BUN/Creatinine ratio 20 10/26/2021 12:02 PM    BUN/Creatinine ratio 18 10/05/2021 09:32 AM    BUN/Creatinine ratio 19 09/07/2021 01:24 PM    BUN/Creatinine ratio 19 08/03/2021 09:30 AM    GFR est AA >60 11/02/2021 02:10 PM    GFR est AA 56 (L) 10/26/2021 12:02 PM    GFR est AA >60 10/05/2021 09:32 AM    GFR est AA >60 09/07/2021 01:24 PM    GFR est AA >60 08/03/2021 09:30 AM    GFR est non-AA 59 (L) 11/02/2021 02:10 PM    GFR est non-AA 46 (L) 10/26/2021 12:02 PM    GFR est non-AA 60 (L) 10/05/2021 09:32 AM    GFR est non-AA >60 09/07/2021 01:24 PM    GFR est non-AA >60 08/03/2021 09:30 AM    Calcium 9.3 11/02/2021 02:10 PM    Calcium 9.3 10/26/2021 12:02 PM    Calcium 9.4 10/05/2021 09:32 AM    Calcium 8.5 09/07/2021 01:24 PM    Calcium 9.0 08/03/2021 09:30 AM         Current Outpatient Medications:     DULoxetine (CYMBALTA) 30 mg capsule, Take 1 Capsule by mouth daily. For one week, then 2 capsules daily, Disp: 60 Capsule, Rfl: 5    Ibrance 100 mg cap, TAKE 1 CAPSULE DAILY FOR 21 DAYS ON AND 7 DAYS OFF, EVERY 28 DAYS, Disp: 63 Capsule, Rfl: 3    gabapentin (NEURONTIN) 100 mg capsule, TAKE 2 CAPSULES THREE TIMES A DAY, Disp: 540 Capsule, Rfl: 3    calcium citrate 200 mg (950 mg) tablet, Take  by mouth daily. , Disp: , Rfl:     Cetirizine (ZYRTEC) 10 mg cap, Take 1 Cap by mouth daily as needed. , Disp: , Rfl:     lidocaine HCl-hydrocortison ac topical cream, Apply  to affected area two (2) times a day., Disp: 85 g, Rfl: 3    TETRACYCLINE HCL (TETRACYCLINE PO), Take 250 mg by mouth two (2) times a day., Disp: , Rfl:     fexofenadine-pseudoephedrine (ALLEGRA-D 24 HOUR) 180-240 mg per tablet, Take 1 Tab by mouth daily as needed. , Disp: , Rfl:     palbociclib (IBRANCE) 125 mg cap, Take 125 mg by mouth daily. Take 1 tab po every day for 21 days on and 7 days off q 28 days  Indications: HORMONE RECEPTOR(+), HER2(-) ADVANCED BREAST CANCER, Disp: 42 Cap, Rfl: 6    Cholecalciferol, Vitamin D3, 5,000 unit Tab, Take  by mouth daily. , Disp: , Rfl:     thyroid, Pork, (ARMOUR THYROID) 60 mg tablet, Take 60 mg by mouth daily. , Disp: , Rfl:     L-Mfolate-B6 Phos-Methyl-B12 (NEURPATH-B) 3-35-2 mg Tab, Take  by mouth two (2) times a day.  , Disp: , Rfl:     warfarin (COUMADIN) 1 mg tablet, Take 1 mg by mouth every other day., Disp: , Rfl:     Diet/po intake: 2 large meals (typically morning and evening) + 1 snack (typically afternoon)

## 2021-11-17 NOTE — PROGRESS NOTES
Karthik Pena is a 68 y.o. female, evaluated via audio-only technology on 11/17/2021 for Follow-up  breast cancer. Assessment & Plan:   Metastatic breast cancer:     -- She has slowly progressive metastatic breast cancer. Patient was being followed by Dr. Byron Cruz who retired. The patient has previously completed external beam radiation therapy to lesions in her ribs and more recently she completed proton therapy to areas of bone involvement with a significant benefit with regards to pain control. The posttherapy imaging studies showed a significant decrease in the intensity of uptake on the bone scan. -- Additionally she is currently receiving systemic therapy with fulvestrant and Ibrance. She has been on Fulvestrant 500 mg subcutaneous monthly and Ibrance 100 mg by mouth daily. She is tolerating treatment fairly well without high grade toxicities. -- 4/15/2020  CT scans of the chest, abdomen, and pelvis shows that she has a loculated right pleural effusion with some pleural thickening and adjacent areas of probable rounded atelectasis all of which are stable. She has relatively stable bone lesions of the rib, pelvis, and T12 posterior element. There was no findings on CT scan to support new metastatic disease in the chest, abdomen, or pelvis. --  Mammogram 6/5 done shows No evidence of malignancy. Suggest routine follow-up. -- 12/17/2020 CBC reported hemoglobin was 10.8, hematocrit was 32.8%, total WBC was 1.9 and ANC was 1.3, platelet was 733,219.  -- 1/18/2021 CT CA/P: No new or enlarging lymphadenopathy. No solid organ lesions or ascites. -- 1/18/2021 Bone scan reported a focal uptake anterior left iliac crest without definite CT correlate.   -- 2/12/2021 US reported No abnormality is seen in the patient's area of concern inferior to the right scapula. -- 4/19/2021 Bone scan reported no gross interval change in indeterminate uptake in the left superior iliac bone.    -- 6/7/2021 Mammogram reported no evidence for malignancy. Suggest routine follow-up with annual mammographic screening.  -- 7/20/2021 CBC reported hemoglobin 11.7, hematocrit 37.1%, ANC 1.6, platelet 510,679.  -- The patient has tolerated fulvestrant and palbociclib therapy with no high graded toxicities. Recent labs reviewed with the patient today. -- 9/14/2021 CT CAP reported a new soft tissue density noted along the anterior inferior aspect of the sternum. Otherwise no significant interval change in the chest. No new or enlarging nodule. -- 9/14/2021 Bone scan reported known osseous metastatic disease. Small new focal activity proximal right forearm, concerning for new metastasis. Developing activity in the left femoral head concerning for metastasis. -- 10/14/2021 PET scan reported no definite finding for FDG avid bone disease, no definite correlate with bone scan findings. + The soft tissue mass anterior to the inferior sternum demonstrates a moderate  degree of metabolic activity. This is nonspecific. Persistent metabolically active subcarinal lymph node and new medial right infrahilar lymph node. -- 10/26/2021 WBC 1.8, ANC 0.9  -- She held Palbociclib d/t leukopenia. -- She went to PCP for sinusitis, and provided antibiotics for coverage. -- 11/15/2021 WBC 3.0, ANC 2.1, PLTs 314,000, H/H 10.7/33.7. The patient reported she has not been feeling well with current dose of Palbociclib 100 mg daily 21 days on/7 days off. She was agreeable to a reduced dose of 75 mg daily 21 days on/7 days off    Plan:   -- Patient will resumme Palbociclib at reduced dose of 75 mg daily 21 days on/7 days off. Repeat CBC in 2 weeks. -- Patient has recent follow up with [facility] - Dr. Gini Chavez, PET reviewed and there're no XRT offered at this time reportedly with the plan to repeat PET in 3 months. -- Patient will continue current regimen with fulvestrant. -- She will need to check lab before each cycle. -- She will need yearly DEXA scan.   -- Labs: CBC, CMP, CA 27-29, CA 15-3, Vitamin D    Poor appetite  Weight loss  -- Nutritionist referral    Intractable headache  -- 5/30/2021 Brain MRI reported no intracranial metastases identified. -- Clinical improved    Normocytic Anemia. Chemotherapy-related annemia  -- 2/11/2021 CBC reported hemoglobin 10.5, hematocrit 32, wbc 2.0, ANC 1.5, and platelet 016,532. chemistry reviewed  -- The patient previously received Procrit 60,000 units subcutaneous at 4 week intervals whenever her hemoglobin is below 10 g/dL and hematocrit is below 30%. Procrit SQ every 4 weeks has resumed recently. -- Recent labs reviewed. B12 275. Advised the patient to take B12 supplement  -- 7/20/2021 CBC reported hemoglobin 11.7, hematocrit 37.1%, ANC 1.6, platelet 743,748. Neuropathy associated with cancer:   -- On Neurontin at 200 mg 3 times daily. -- Continue on Cymblata          Chemotherapy-induced neutropenia/chronic leukopenia (persisting problem):   -- 2/11/2021 CBC reported hemoglobin 10.5, hematocrit 32, wbc 2.0, ANC 1.5, and platelet 440,543. chemistry reviewed. -- Clinical stable. -- If the absolute neutrophil count declines to 0.5 she will be started on Neupogen or Neulasta.            Chemotherapy-induced thrombocytopenia :   -- The patient was holding the Ibrance 140 mg at previous times D/T thrombocytopenia. -- She will resume Ibrance at above  -- Recent CBC showed improved PLTs count. -- We will see the patient back in clinic in about 6 weeks. Always sooner if required. The patient can have lab done prior our next clinic visit. 12  Subjective:   Mrs. Rosemary Orellana is a 22-year-old woman who has slowly progressive metastatic breast cancer. Patient was being followed by Dr. Carnella Rubinstein who retired. She is here today for chemo follow up.    The patient has previously completed external beam radiation therapy to lesions in her ribs and more recently she completed proton therapy to areas of bone involvement with a significant benefit with regards to pain control. Additionally she is currently receiving systemic therapy with fulvestrant and Ibrance. Patient is tolerating the systemic therapy reasonably well and has not experienced any nausea or emesis. Today the patient reported doing fairly well. She has poor appetite and weight loss. She has on-off muscle pain at the site of XRT. She has resumed Procrit at 4 week intervals whenever her hemoglobin is below 10 g/dL and hematocrit is below 30%. Her neuropathy reported with tinging and numbness which not really improved with Neurontin 200 mg TID. She denied other complaints. Prior to Admission medications    Medication Sig Start Date End Date Taking? Authorizing Provider   DULoxetine (CYMBALTA) 30 mg capsule Take 1 Capsule by mouth daily. For one week, then 2 capsules daily 8/3/21   Lesly Cooper MD   Ibrance 100 mg cap TAKE 1 CAPSULE DAILY FOR 21 DAYS ON AND 7 DAYS OFF, EVERY 28 DAYS 6/21/21   Lesly Cooper MD   gabapentin (NEURONTIN) 100 mg capsule TAKE 2 CAPSULES THREE TIMES A DAY 6/21/21   , Lesly Oneil MD   calcium citrate 200 mg (950 mg) tablet Take  by mouth daily. Provider, Historical   Cetirizine (ZYRTEC) 10 mg cap Take 1 Cap by mouth daily as needed. Provider, Historical   lidocaine HCl-hydrocortison ac topical cream Apply  to affected area two (2) times a day. 7/10/18   Laura Delaney NP   TETRACYCLINE HCL (TETRACYCLINE PO) Take 250 mg by mouth two (2) times a day. Provider, Historical   fexofenadine-pseudoephedrine (ALLEGRA-D 24 HOUR) 180-240 mg per tablet Take 1 Tab by mouth daily as needed. Provider, Historical   palbociclib (IBRANCE) 125 mg cap Take 125 mg by mouth daily. Take 1 tab po every day for 21 days on and 7 days off q 28 days  Indications: HORMONE RECEPTOR(+), HER2(-) ADVANCED BREAST CANCER 5/4/17   Suzy Heath MD   Cholecalciferol, Vitamin D3, 5,000 unit Tab Take  by mouth daily.       Provider, Historical   thyroid, Pork, (ARMOUR THYROID) 60 mg tablet Take 60 mg by mouth daily. Provider, Historical   L-Mfolate-B6 Phos-Methyl-B12 (NEURPATH-B) 3-35-2 mg Tab Take  by mouth two (2) times a day. Provider, Historical   warfarin (COUMADIN) 1 mg tablet Take 1 mg by mouth every other day. Provider, Historical         Review of Systems   Constitutional: Negative for chills, diaphoresis, fever, malaise/fatigue and weight loss. Respiratory: Negative for cough, hemoptysis, shortness of breath and wheezing. Cardiovascular: Negative for chest pain, palpitations and leg swelling. Gastrointestinal: Negative for abdominal pain, diarrhea, heartburn, nausea and vomiting. Genitourinary: Negative for dysuria, frequency, hematuria and urgency. Musculoskeletal: Negative for joint pain and myalgias. Skin: Negative for itching and rash. Neurological: Negative for dizziness, seizures, weakness and headaches. Psychiatric/Behavioral: Negative for depression. The patient does not have insomnia. Patient-Reported Vitals 9/28/2021   Patient-Reported Weight 138lbs   Patient-Reported Pulse 68   Patient-Reported Systolic  589   Patient-Reported Diastolic 64       [address], who was evaluated through a patient-initiated, synchronous (real-time) audio only encounter, and/or her healthcare decision maker, is aware that it is a billable service, with coverage as determined by her insurance carrier. She provided verbal consent to proceed: Yes. She has not had a related appointment within my department in the past 7 days or scheduled within the next 24 hours. On this date 11/17/2021 I have spent 25 minutes reviewing previous notes, test results and face to face (virtual) with the patient discussing the diagnosis and importance of compliance with the treatment plan as well as documenting on the day of the visit.     Jong Luna MD

## 2021-11-30 NOTE — TELEPHONE ENCOUNTER
Patient contacted office reporting someone contacted her she had a voicemail but was left with no name. Patient was also very upset about her ibrance medication and the provider not sending 90 days worth. Please contact patient.

## 2021-11-30 NOTE — PROGRESS NOTES
[facility] Progress Note    Date: 2021    Name: Johanne Howell    MRN: [id number]         : 1945      Ms. Figueroa arrived in the [facility] today at 1308, in stable condition, here for Monthly 100 Pin [place] Marcus, and Q 4 Week Xgeva, RETACRIT & Faslodex Injections. She was assessed and education was provided. Ms. Canelo Moreland vitals were reviewed. Visit Vitals  /78 (BP 1 Location: Left upper arm, BP Patient Position: Sitting)   Pulse 90   Temp 97.2 °F (36.2 °C)   Resp 16   SpO2 96%   Breastfeeding No     Her left chest single lumen port was accessed without incident using sterile technique, and blood was drawn from the port for ordered labs. And then, the port was flushed well per protocol and without incident, with NS & Heparin, and the carbone needle was removed and bandaid was applied. (CBC processed in house and other labs were sent out to the [facility] lab by , for processing.)    Lab results were reviewed. Her most recent CMP, MG, & PHOS, levels from 21 were reviewed, and were all noted to be satisfactory for treatment Kala Randall today. Xgeva 120 mg, was administered SQ, in her left arm per order, and without incident.      Per today's CBC, Retacrit was HELD per parameters.     Faslodex 500 mg Total Dose, was administered IM, in 2 equally divided doses of 250 mg,  per order, and without incident. (250 mg was administered IM, in her right gluteal muscle, and 250 mg was administered IM, in her left gluteal muscle). Banadid and gauze to sites. Ms. Vidhya Diana tolerated well, and had no complaints. Discharge/ follow-up instructions discussed w/ pt. Pt verbalized understanding. Armband removed and shredded. Ms. Vidhya Diana was discharged from [place] [id number] in stable condition at [phone]. She is to return on 21 at 1300, for her next appointment.      Patricia Castillo RN  2021

## 2021-11-30 NOTE — NURSE NAVIGATOR
Pt concerned today that she had a residual cough from sinus infection that she was treated for with antibiotics by PCP-not sure if she should start taking reduce dose of Ibrance. Informed pt that as per Dr. Asad Sun she should start taking Ibrance at reduce dose of 75 mg today as she is having Faslodex today as well. Pt verbalized understanding. Labs also drawn today.  Pt requesting 90 day supply of Ibrance-order placed in system by NP.

## 2021-12-07 NOTE — TELEPHONE ENCOUNTER
Medical Nutrition Therapy Note    Nutritional Assessment:  IBW: Ideal body weight: 120 lb 9.5 oz (54.7 kg)  Adjusted ideal body weight: 125 lb 15.3 oz (57.1 kg)   UBW: 155 lb (1 year ago per pt)  Wt hx:   Wt Readings from Last 20 Encounters:   11/01/21 134 lb (60.8 kg)   09/07/21 139 lb (63 kg)   09/07/21 139 lb (63 kg)   08/03/21 144 lb 3.2 oz (65.4 kg)   07/01/21 144 lb 12.8 oz (65.7 kg)   06/10/21 143 lb (64.9 kg)   06/03/21 143 lb 6.4 oz (65 kg)   05/30/21 144 lb (65.3 kg)   05/12/21 144 lb (65.3 kg)   04/16/21 142 lb (64.4 kg)   04/14/21 142 lb (64.4 kg)   04/08/21 143 lb 12.8 oz (65.2 kg)   03/31/21 141 lb (64 kg)   03/03/21 147 lb (66.7 kg)   02/02/21 149 lb (67.6 kg)   01/05/21 148 lb 3.2 oz (67.2 kg)   11/19/20 147 lb (66.7 kg)   10/22/20 146 lb (66.2 kg)   10/06/20 145 lb (65.8 kg)   08/27/20 138 lb 14.4 oz (63 kg)     Current Ht:   Ht Readings from Last 1 Encounters:   11/01/21 5' 4\" (1.626 m)       Current BMI: Estimated body mass index is 23 kg/m² as calculated from the following:    Height as of 11/1/21: 5' 4\" (1.626 m). Weight as of 11/1/21: 134 lb (60.8 kg). Wt change: 3.5% weight loss over the last 2 month(s)    Estimated nutritional needs:   Calorie Range: 2252-4573  Formula: 30-35 kcal/kg  Protein Range:   Formula: 1.5-1.7 g/kg  Fluid Needs: 1 mL/kcal    Nutrition-impact symptoms: Weight loss    10/12/21: Pt reports slowly losing wt over the last year. Pt denies any changes in meal intake over this time. Pt stated the only change has been that she drinks less diet pepsi and sprite. Pt mentioned that she's been less physically active related to concerns with COVID. Pt reports seeing her neighbors walk outside and wanting to walk as well. 10/29/21: Called pt for follow up, but no answer so message left for pt to call writer back. 10/29/21: Pt called writer back and endorsed having increased meal frequency and physical activity. Pt reports consistently eating > 3x per day.  Pt states that she been maintaining her wt since making this change. Pt mentioned that she hasn't started going on walks yet, but has started some weight lifting in her home and walking around her backyard. 11/12/21: Called pt for follow up, but no answer so message left for pt to call writer back. 12/6/21: Called pt for follow up, but again no answer so another message left for pt to call writer back. 12/7/21: Pt endorses eating well and has started walking again. Pt reports appetite is fairly good. Nutritional Diagnosis: unintentional weight loss related to inability to consume sufficient energy intake as evidenced by pt report of 10.3% wt loss over the past 1 year    Nutritional Interventions:   1. Encouraged maximal PO intake and continue to be physically active    Previously   1. Pt agreed to trial increasing meal frequency (either 3 or 4x per day)  2. Pt will increase physical activity    Nutritional Monitoring/Goals:   Pt's wt > 140 lb by 11/12/21 -not met  Pt's wt > 135 lb by 11/30/21 -unable to assess as no new weights documented in EHR since 11/1/21    Initial consult per provider    Cancer Dx: No matching staging information was found for the patient. Cancer Staging  No matching staging information was found for the patient. Treatment Plan:   Chemotherapy Flowsheet 11/30/2021   Cycle C57   Date 11/30/2021   Drug / Regimen FASLODEX   Dosage 500 mg    +   Radiation Treatments     No radiation treatments to show.  (Treatments may have been administered in another system.)          Lab Results   Component Value Date/Time    Sodium 141 11/30/2021 01:28 PM    Sodium 141 11/02/2021 02:10 PM    Sodium 138 10/26/2021 12:02 PM    Sodium 139 10/05/2021 09:32 AM    Sodium 142 09/07/2021 01:24 PM    Potassium 4.1 11/30/2021 01:28 PM    Chloride 108 11/30/2021 01:28 PM    Chloride 110 11/02/2021 02:10 PM    Chloride 105 10/26/2021 12:02 PM    Chloride 104 10/05/2021 09:32 AM    Chloride 109 09/07/2021 01:24 PM    CO2 28 11/30/2021 01:28 PM    CO2 28 11/02/2021 02:10 PM    CO2 28 10/26/2021 12:02 PM    CO2 30 10/05/2021 09:32 AM    CO2 29 09/07/2021 01:24 PM    Anion gap 5 11/30/2021 01:28 PM    Anion gap 3 11/02/2021 02:10 PM    Anion gap 5 10/26/2021 12:02 PM    Anion gap 5 10/05/2021 09:32 AM    Anion gap 4 09/07/2021 01:24 PM    Glucose 99 11/30/2021 01:28 PM    Glucose 118 (H) 11/02/2021 02:10 PM    Glucose 92 10/26/2021 12:02 PM    Glucose 105 (H) 10/05/2021 09:32 AM    Glucose 90 09/07/2021 01:24 PM    BUN 25 (H) 11/30/2021 01:28 PM    BUN 21 (H) 11/02/2021 02:10 PM    BUN 23 (H) 10/26/2021 12:02 PM    BUN 17 10/05/2021 09:32 AM    BUN 16 09/07/2021 01:24 PM    Creatinine 0.99 11/30/2021 01:28 PM    Creatinine 0.93 11/02/2021 02:10 PM    Creatinine 1.15 10/26/2021 12:02 PM    Creatinine 0.92 10/05/2021 09:32 AM    Creatinine 0.86 09/07/2021 01:24 PM    BUN/Creatinine ratio 25 (H) 11/30/2021 01:28 PM    BUN/Creatinine ratio 23 (H) 11/02/2021 02:10 PM    BUN/Creatinine ratio 20 10/26/2021 12:02 PM    BUN/Creatinine ratio 18 10/05/2021 09:32 AM    BUN/Creatinine ratio 19 09/07/2021 01:24 PM    GFR est AA >60 11/30/2021 01:28 PM    GFR est AA >60 11/02/2021 02:10 PM    GFR est AA 56 (L) 10/26/2021 12:02 PM    GFR est AA >60 10/05/2021 09:32 AM    GFR est AA >60 09/07/2021 01:24 PM    GFR est non-AA 55 (L) 11/30/2021 01:28 PM    GFR est non-AA 59 (L) 11/02/2021 02:10 PM    GFR est non-AA 46 (L) 10/26/2021 12:02 PM    GFR est non-AA 60 (L) 10/05/2021 09:32 AM    GFR est non-AA >60 09/07/2021 01:24 PM    Calcium 9.4 11/30/2021 01:28 PM    Calcium 9.3 11/02/2021 02:10 PM    Calcium 9.3 10/26/2021 12:02 PM    Calcium 9.4 10/05/2021 09:32 AM    Calcium 8.5 09/07/2021 01:24 PM         Current Outpatient Medications:     gabapentin (NEURONTIN) 100 mg capsule, TAKE 2 CAPSULES THREE TIMES A DAY, Disp: 540 Capsule, Rfl: 3    palbociclib 75 mg tab, Take 75 mg by mouth daily.  for 21 days then 7 days off, Disp: 63 Each, Rfl: 5    albuterol (PROVENTIL HFA, VENTOLIN HFA, PROAIR HFA) 90 mcg/actuation inhaler, inhale 2 puffs by mouth and INTO THE LUNGS four times a day if needed, Disp: , Rfl:     fluticasone propionate (FLONASE) 50 mcg/actuation nasal spray, instill 2 sprays into each nostril daily, Disp: , Rfl:     DULoxetine (CYMBALTA) 30 mg capsule, Take 1 Capsule by mouth daily. For one week, then 2 capsules daily, Disp: 60 Capsule, Rfl: 5    calcium citrate 200 mg (950 mg) tablet, Take  by mouth daily. , Disp: , Rfl:     Cetirizine (ZYRTEC) 10 mg cap, Take 1 Cap by mouth daily as needed. , Disp: , Rfl:     lidocaine HCl-hydrocortison ac topical cream, Apply  to affected area two (2) times a day., Disp: 85 g, Rfl: 3    TETRACYCLINE HCL (TETRACYCLINE PO), Take 250 mg by mouth two (2) times a day., Disp: , Rfl:     fexofenadine-pseudoephedrine (ALLEGRA-D 24 HOUR) 180-240 mg per tablet, Take 1 Tab by mouth daily as needed. , Disp: , Rfl:     Cholecalciferol, Vitamin D3, 5,000 unit Tab, Take  by mouth daily. , Disp: , Rfl:     thyroid, Pork, (ARMOUR THYROID) 60 mg tablet, Take 60 mg by mouth daily. , Disp: , Rfl:     L-Mfolate-B6 Phos-Methyl-B12 (NEURPATH-B) 3-35-2 mg Tab, Take  by mouth two (2) times a day.  , Disp: , Rfl:     warfarin (COUMADIN) 1 mg tablet, Take 1 mg by mouth every other day., Disp: , Rfl:     Diet/po intake: 2 large meals (typically morning and evening) + 1 snack (typically afternoon)

## 2021-12-20 NOTE — PROGRESS NOTES
Hematology/Oncology  Progress Note    Name: Rosa Velazco  Date: 2021  : 1945    PCP: Sommer James MD     Ms. Benito Humphrey is a 68 y.o.  female who was seen for management of her metastatic breast cancer with associated complications of chemotherapy. Current therapy: Fulvestrant 500 mg subcutaneous monthly and Ibrance 125 mg by mouth daily. Subjective:     Mrs. Benito Humphrey is a 66-year-old woman who has slowly progressive metastatic breast cancer. Patient was being followed by Dr. Brayden Louise who retired. She is here today for chemo follow up. She was accompanied by her . The patient has previously completed external beam radiation therapy to lesions in her ribs and more recently she completed proton therapy to areas of bone involvement with a significant benefit with regards to pain control. Additionally she is currently receiving systemic therapy with fulvestrant and Ibrance at reduced dose. Patient reported she is tolerating the systemic therapy reasonably well and has not experienced any nausea or emesis. Today the patient reported persistent headche suspect from chronic sinusitis. She has on-off muscle pain at the site of XRT. Regis Maddox She has resumed Procrit at 4 week intervals whenever her hemoglobin is below 10 g/dL and hematocrit is below 30%. Her neuropathy reported with tinging and numbness which not really improved with Neurontin 200 mg TID. She denied other complaints. Past medical history, family history, and social history: these were reviewed and remains unchanged.     Past Medical History:   Diagnosis Date    Biliary colic     Breast CA ([facility] [address] 2012    Cancer [facility])     Right Breast    Chemotherapy convalescence or palliative care     Neuropathy     Radiation therapy complication     Thyroid disease      Past Surgical History:   Procedure Laterality Date    HX LAP CHOLECYSTECTOMY  13    HX MASTECTOMY      Right    OR BREAST SURGERY PROCEDURE UNLISTED 10/2002    Right-Lesion    [place] BREAST SURGERY PROCEDURE UNLISTED  2004    Right-Lesion     Social History     Socioeconomic History    Marital status:      Spouse name: Not on file    Number of children: Not on file    Years of education: Not on file    Highest education level: Not on file   Occupational History    Not on file   Tobacco Use    Smoking status: Former Smoker     Quit date: 1991     Years since quittin.5    Smokeless tobacco: Never Used   Substance and Sexual Activity    Alcohol use: Yes     Alcohol/week: 0.0 standard drinks     Types: 1 - 2 Glasses of wine per week     Comment: socially, occassionally    Drug use: No    Sexual activity: Not on file   Other Topics Concern    Not on file   Social History Narrative    Not on file     Social Determinants of Health     Financial Resource Strain:     Difficulty of Paying Living Expenses: Not on file   Food Insecurity:     Worried About Running Out of Food in the Last Year: Not on file    Max of Food in the Last Year: Not on file   Transportation Needs:     Lack of Transportation (Medical): Not on file    Lack of Transportation (Non-Medical):  Not on file   Physical Activity:     Days of Exercise per Week: Not on file    Minutes of Exercise per Session: Not on file   Stress:     Feeling of Stress : Not on file   Social Connections:     Frequency of Communication with Friends and Family: Not on file    Frequency of Social Gatherings with Friends and Family: Not on file    Attends [employer] Services: Not on file    Active Member of Clubs or Organizations: Not on file    Attends Club or Organization Meetings: Not on file    Marital Status: Not on file   Intimate Partner Violence:     Fear of Current or Ex-Partner: Not on file    Emotionally Abused: Not on file    Physically Abused: Not on file    Sexually Abused: Not on file   Housing Stability:     Unable to Pay for Housing in the Last Year: Not on file    Number of Places Lived in the Last Year: Not on file    Unstable Housing in the Last Year: Not on file     Family History   Problem Relation Age of Onset    Cancer Maternal Aunt         Hodgkin's disease    Breast Cancer Paternal Aunt     Cancer Father         Prostate, lung, brain     Current Outpatient Medications   Medication Sig Dispense Refill    gabapentin (NEURONTIN) 100 mg capsule TAKE 2 CAPSULES THREE TIMES A  Capsule 3    palbociclib 75 mg tab Take 75 mg by mouth daily. for 21 days then 7 days off 63 Each 5    albuterol (PROVENTIL HFA, VENTOLIN HFA, PROAIR HFA) 90 mcg/actuation inhaler inhale 2 puffs by mouth and INTO THE LUNGS four times a day if needed      fluticasone propionate (FLONASE) 50 mcg/actuation nasal spray instill 2 sprays into each nostril daily      DULoxetine (CYMBALTA) 30 mg capsule Take 1 Capsule by mouth daily. For one week, then 2 capsules daily 60 Capsule 5    calcium citrate 200 mg (950 mg) tablet Take  by mouth daily.  Cetirizine (ZYRTEC) 10 mg cap Take 1 Cap by mouth daily as needed.  lidocaine HCl-hydrocortison ac topical cream Apply  to affected area two (2) times a day. 85 g 3    TETRACYCLINE HCL (TETRACYCLINE PO) Take 250 mg by mouth two (2) times a day.  fexofenadine-pseudoephedrine (ALLEGRA-D 24 HOUR) 180-240 mg per tablet Take 1 Tab by mouth daily as needed.  Cholecalciferol, Vitamin D3, 5,000 unit Tab Take  by mouth daily.  thyroid, Pork, (ARMOUR THYROID) 60 mg tablet Take 60 mg by mouth daily.  L-Mfolate-B6 Phos-Methyl-B12 (NEURPATH-B) 3-35-2 mg Tab Take  by mouth two (2) times a day.  warfarin (COUMADIN) 1 mg tablet Take 1 mg by mouth every other day. Review of Systems   Constitutional: Positive for malaise/fatigue. Negative for chills, diaphoresis, fever and weight loss. Respiratory: Negative for cough, hemoptysis, shortness of breath and wheezing.     Cardiovascular: Negative for chest pain, palpitations and leg swelling. Gastrointestinal: Negative for abdominal pain, diarrhea, heartburn, nausea and vomiting. Genitourinary: Negative for dysuria, frequency, hematuria and urgency. Musculoskeletal: Negative for joint pain and myalgias. Skin: Negative for itching and rash. Neurological: Positive for headaches. Negative for dizziness, seizures and weakness. Psychiatric/Behavioral: Negative for depression. The patient does not have insomnia. Objective:     Visit Vitals  /74   Pulse 80   Temp 98.4 °F (36.9 °C) (Temporal)   Resp 18   Ht 5' 4\" (1.626 m)   Wt 59 kg (130 lb)   SpO2 97%   BMI 22.31 kg/m²       ECOG Performance Status (grade): 1  0 - able to carry on all pre-disease activity w/out restriction  1 - restricted but able to carry out light work  2 - ambulatory and can self- care but unable to carry out work  3 - bed or chair >50% of waking hours  4 - completely disable, total care, confined to bed or chair    Physical Exam  Constitutional:       Appearance: Normal appearance. HENT:      Head: Normocephalic and atraumatic. Eyes:      Pupils: Pupils are equal, round, and reactive to light. Cardiovascular:      Rate and Rhythm: Normal rate and regular rhythm. Heart sounds: Normal heart sounds. Pulmonary:      Effort: Pulmonary effort is normal.      Breath sounds: Normal breath sounds. Abdominal:      General: Bowel sounds are normal.      Palpations: Abdomen is soft. Tenderness: There is no abdominal tenderness. There is no guarding. Musculoskeletal:         General: Normal range of motion. Cervical back: Neck supple. Right lower leg: No edema. Left lower leg: No edema. Skin:     General: Skin is warm. Neurological:      General: No focal deficit present. Mental Status: She is alert and oriented to person, place, and time. Mental status is at baseline.           Diagnostics:      No results found for this or any previous visit (from the past 96 hour(s)). Imaging:  No results found for this or any previous visit. Results for orders placed during the hospital encounter of 12/17/20    XR SPINE THORAC 2 V    Narrative  XR SPINE [facility] 2 V: 12/17/2020 2:45 PM    CLINICAL INFORMATION  back pain. History of breast cancer    COMPARISON  Thoracic spine MRI 23 June 2017, CT chest, abdomen and pelvis 17 April 2020    TECHNIQUE  2 views of the thoracic spine    FINDINGS    Levoconvex curvature of the upper lumbar spine. Mild/moderate multilevel discogenic endplate degenerative changes throughout the  thoracic spine. No fracture or destructive osseous lesions identified. Impression  IMPRESSION:  No acute osseous findings. If continued clinical concern, recommend MRI. Results for orders placed in visit on 08/03/21    CT CHEST ABD PELV W CONT    Narrative  CT CHEST, ABDOMEN AND PELVIS WITH ENHANCEMENT    INDICATION: 78-year-old female with history of metastatic breast cancer. 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 TECHNIQUE: Axial images obtained of the chest, abdomen and pelvis following the  uneventful administration 80 cc of Isovue 300 intravenous contrast.  Coronal and  sagittal reformatted images of the abdomen and pelvis were obtained. All CT scans at this facility are performed using dose optimization technique as  appropriate to a performed exam, to include automated exposure control,  adjustment of the mA and/or kV according to patient size (including appropriate  matching first site-specific examinations), or use of iterative reconstruction  technique. COMPARISON: 1/18/2021., 4/9/2019    CHEST FINDINGS:  Thyroid: Unremarkable    Lymph nodes: No lymphadenopathy. Cardiovascular: Left chest port present with tip along the SVC. Heart size  within normal limits. No pericardial effusion. Calcifications present in the  coronary arteries and thoracic aorta. Airways: Central airways are patent. Lungs/pleura.  Left lung is clear. Similar-appearing volume loss along the right  hemithorax with lenticular densities along the anterior and posterior hemithorax  is related to atelectasis versus fibrosis. Chronic small right pleural effusion  with smooth pleural thickening is noted. No new or enlarging nodule. Chest wall: Status post right breast mastectomy with implant reconstruction. Increased soft tissue density noted in the subcutaneous tissues along the  anterior inferior border of the sternum measuring approximately 2.6 x 1.6 x 2.6  cm (axial image 40). ABDOMEN FINDINGS:    Liver: Hepatic steatosis  Spleen: Unremarkable. Pancreas: Unremarkable. Biliary: Status post cholecystectomy. No biliary ductal dilatation. Bowel: No dilated loops of bowel. Colonic diverticulosis. No evidence of acute  diverticulitis. Peritoneum/ Retroperitoneum: Unremarkable. Lymph Nodes: Unremarkable. Adrenal Glands: Unremarkable. Kidneys: Unremarkable. Vessels: Vascular calcifications present in the abdominal aorta. PELVIS FINDINGS:    Bladder/ Pelvic Organs: Uterus and bilateral ovaries are present. Bladder is  decompressed    Bones: Increased sclerosis along the right sixth and seventh anterolateral ribs  is again noted. Heterogeneous expansile appearance of the T12 lamina and pedicle  again noted. Chronic appearing sclerotic density present in the left femoral  head. No new lesions. Degenerative changes in the spine. Impression  1. New nonspecific soft tissue density noted along the anterior inferior aspect  of the sternum. 2. Otherwise no significant interval change in the chest. Volume loss on the  right hemithorax with areas of fibrosis/atelectasis and chronic pleural  effusion. No new or enlarging nodule. 3. Hepatic steatosis    4. Similar-appearing of sclerotic and heterogeneous osseous foci as described  above. Assessment:     1. Metastatic breast cancer ([address]    2.  Malignant neoplasm of lower-inner quadrant of right breast of female, estrogen receptor positive ([facility] [address]    3. 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 Anemia due to chemotherapy    5. Antineoplastic chemotherapy induced anemia    6. B12 deficiency    7. Chronic intractable headache, unspecified headache type      Plan:   Metastatic breast cancer:     -- She has slowly progressive metastatic breast cancer. Patient was being followed by Dr. Brayden Louise who retired. The patient has previously completed external beam radiation therapy to lesions in her ribs and more recently she completed proton therapy to areas of bone involvement with a significant benefit with regards to pain control. The posttherapy imaging studies showed a significant decrease in the intensity of uptake on the bone scan. -- Additionally she is currently receiving systemic therapy with fulvestrant and Ibrance. She has been on Fulvestrant 500 mg subcutaneous monthly and Ibrance 100 mg by mouth daily. She is tolerating treatment fairly well without high grade toxicities. -- 4/15/2020  CT scans of the chest, abdomen, and pelvis shows that she has a loculated right pleural effusion with some pleural thickening and adjacent areas of probable rounded atelectasis all of which are stable. She has relatively stable bone lesions of the rib, pelvis, and T12 posterior element. There was no findings on CT scan to support new metastatic disease in the chest, abdomen, or pelvis. --  Mammogram 6/5 done shows No evidence of malignancy. Suggest routine follow-up. -- 12/17/2020 CBC reported hemoglobin was 10.8, hematocrit was 32.8%, total WBC was 1.9 and ANC was 1.3, platelet was 035,827.  -- 1/18/2021 CT CA/P: No new or enlarging lymphadenopathy. No solid organ lesions or ascites.   -- 1/18/2021 Bone scan reported a focal uptake anterior left iliac crest without definite CT correlate.   -- 2/12/2021 US reported No abnormality is seen in the patient's area of concern inferior to the right scapula. -- 4/19/2021 Bone scan reported no gross interval change in indeterminate uptake in the left superior iliac bone. -- 6/7/2021 Mammogram reported no evidence for malignancy. Suggest routine follow-up with annual mammographic screening.  -- 7/20/2021 CBC reported hemoglobin 11.7, hematocrit 37.1%, ANC 1.6, platelet 111,971.  -- The patient has tolerated fulvestrant and palbociclib therapy with no high graded toxicities. Recent labs reviewed with the patient today. -- 9/14/2021 CT CAP reported a new soft tissue density noted along the anterior inferior aspect of the sternum. Otherwise no significant interval change in the chest. No new or enlarging nodule. -- 9/14/2021 Bone scan reported known osseous metastatic disease. Small new focal activity proximal right forearm, concerning for new metastasis. Developing activity in the left femoral head concerning for metastasis. -- 10/14/2021 PET scan reported no definite finding for FDG avid bone disease, no definite correlate with bone scan findings. + The soft tissue mass anterior to the inferior sternum demonstrates a moderate  degree of metabolic activity which was stable compared to previous PET 6/20216. Persistent metabolically active subcarinal lymph node and new medial right infrahilar lymph node. -- 10/26/2021 WBC 1.8, ANC 0.9  -- She has been on Palbociclib 75 mg daily 21 days on/7 days off since last visit. She has tolerated therapy without high graded toxicities  -- 12/13/2021 MRI hip reported poorly defined abnormal signal in the anterior femoral head. There are 3 infiltrative areas of increased STIR signal in the pelvis, 2 on the right and one on the left, questionable metastatic disease. She has f/u with Dr. Lorena Hamilton who ordered a repeat PET schedule 1/2022    Plan:   -- Patient will continue current Palbociclib Palbociclib 75 mg daily 21 days on/7 days off  -- Patient has recent follow up with [facility] - Dr. Lorena Hamilton.  Plan to repeat PET in 1/2022. -- Patient will continue current regimen with fulvestrant. -- Intractable headache: MRI brain  -- She will need to check lab before each cycle. -- She will need yearly DEXA scan. -- Labs: CBC, CMP, CA 27-29, CA 15-3, Vitamin D    Poor appetite  Weight loss  -- Nutritionist referral  -- Megace      Intractable headache  -- 5/30/2021 Brain MRI reported no intracranial metastases identified. -- Will repeat Brain MRI with PET scan    Normocytic Anemia. Chemotherapy-related annemia  -- 2/11/2021 CBC reported hemoglobin 10.5, hematocrit 32, wbc 2.0, ANC 1.5, and platelet 740,695. chemistry reviewed  -- The patient previously received Procrit 60,000 units subcutaneous at 4 week intervals whenever her hemoglobin is below 10 g/dL and hematocrit is below 30%. Procrit SQ every 4 weeks has resumed recently. -- Recent labs reviewed. B12 275. Advised the patient to take B12 supplement  -- 7/20/2021 CBC reported hemoglobin 11.7, hematocrit 37.1%, ANC 1.6, platelet 776,199.  -- Will repeat CBC, CMP, Iron profile, ferritin, ret count, B12/folate, esr with next lab collections. Neuropathy associated with cancer:   -- On Neurontin at 200 mg 3 times daily. -- Advised to resume on Cymblata        Chemotherapy-induced neutropenia/chronic leukopenia (persisting problem):   -- 2/11/2021 CBC reported hemoglobin 10.5, hematocrit 32, wbc 2.0, ANC 1.5, and platelet 228,581. chemistry reviewed. -- Clinical stable. -- If the absolute neutrophil count declines to 0.5 she will be started on Neupogen or Neulasta.            Chemotherapy-induced thrombocytopenia :   -- The patient was holding the Ibrance 140 mg at previous times D/T thrombocytopenia. -- She presently taking Ibrance 100mg.   -- Recent CBC showed improved PLTs count. -- We will see the patient back in clinic in about 4 weeks. Always sooner if required. The patient can have lab done prior our next clinic visit.         No orders of the defined types were placed in this encounter. Ms. Rosemary Orellana has a reminder for a \"due or due soon\" health maintenance. I have asked that she contact her primary care provider for follow-up on this health maintenance. All of patient's questions answered to their apparent satisfaction. They verbally show understanding and agreement with aforementioned plan. Rodrigo Sousa MD  12/20/2021          About 30 minutes were spent for this encounter with more than 50% of the time spent in face-to-face counseling, discussing on diagnosis and management plan going forward, and co-ordination of care. Parts of this document has been produced using Dragon dictation system. Unrecognized errors in transcription may be present. Please do not hesitate to reach out for any questions or clarifications.       CC: Ashleigh Barrientos MD

## 2021-12-28 NOTE — PROGRESS NOTES
[facility] Progress Note    Date: 2021    Name: Britney Rojas    MRN: [id number]         : 1945      Ms. Figueroa arrived in the [facility] today at 1305 in stable condition, here for Monthly 100 Pin Archer Marcus, Q4 Week Xgeva, RETACRIT & Faslodex Injections. She was assessed and education was provided. Ms. Rj Galeano vitals were reviewed. Visit Vitals  BP (!) 146/78 (BP 1 Location: Left upper arm, BP Patient Position: Sitting)   Pulse 89   Temp 97.7 °F (36.5 °C)   Resp 18   Wt 58.1 kg (128 lb)   SpO2 99%   BMI 21.97 kg/m²     Left chest single lumen port was accessed without incident using sterile technique, and blood was drawn from the port for ordered labs. And then, the port was flushed well per protocol and without incident, with NS & Heparin, and the carbone needle was removed and bandaid was applied. CBC processed in house and other labs were [facility] by , for processing. Lab results were reviewed. Her most recent CMP, MG, & PHOS, levels from 21 were reviewed, and were all noted to be satisfactory for treatment Lenna Crigler) today. Xgeva 120 mg, was administered SQ, in her left arm per order, and without incident.      Per today's CBC, H/H 9.6/28.8, Retacrit 60,000 units were given SQ to her left arm per her request.      Faslodex 500 mg Total Dose, was administered IM, in 2 equally divided doses of 250 mg,  per order, and without incident. (250 mg was administered IM, in her right gluteal muscle, and 250 mg was administered IM, in her left gluteal muscle). Banadid and gauze to sites. Ms. Madeline Feldman tolerated well, and had no complaints. Discharge/ follow-up instructions discussed w/ pt. Pt verbalized understanding. Armband removed and shredded. Ms. Madeline Feldman was discharged from [place] [id number] in stable condition at [phone]. She is to return on 2022 at 1300, for her next appointment.      Andriy Pineda RN  2021

## 2022-01-01 ENCOUNTER — HOSPITAL ENCOUNTER (OUTPATIENT)
Dept: INFUSION THERAPY | Age: 77
Discharge: HOME | End: 2022-02-08
Payer: MEDICARE

## 2022-01-01 ENCOUNTER — HOSPITAL ENCOUNTER (OUTPATIENT)
Dept: GENERAL RADIOLOGY | Age: 77
Discharge: HOME | End: 2022-04-18
Attending: PHYSICIAN ASSISTANT
Payer: MEDICARE

## 2022-01-01 ENCOUNTER — HOSPITAL ENCOUNTER (OUTPATIENT)
Dept: INFUSION THERAPY | Age: 77
Discharge: HOME | End: 2022-03-01
Attending: INTERNAL MEDICINE
Payer: MEDICARE

## 2022-01-01 ENCOUNTER — HOSPITAL ENCOUNTER (OUTPATIENT)
Dept: PHYSICAL THERAPY | Age: 77
Discharge: HOME | End: 2022-04-12
Payer: MEDICARE

## 2022-01-01 ENCOUNTER — DOCUMENTATION ONLY (OUTPATIENT)
Dept: PULMONOLOGY | Age: 77
End: 2022-01-01

## 2022-01-01 ENCOUNTER — TELEPHONE (OUTPATIENT)
Dept: ONCOLOGY | Age: 77
End: 2022-01-01

## 2022-01-01 ENCOUNTER — HOSPITAL ENCOUNTER (OUTPATIENT)
Age: 77
Discharge: HOME | DRG: 542 | End: 2022-05-01
Attending: INTERNAL MEDICINE
Payer: MEDICARE

## 2022-01-01 ENCOUNTER — HOSPITAL ENCOUNTER (OUTPATIENT)
Dept: INFUSION THERAPY | Age: 77
Discharge: HOME | End: 2022-02-22
Attending: INTERNAL MEDICINE
Payer: MEDICARE

## 2022-01-01 ENCOUNTER — APPOINTMENT (OUTPATIENT)
Dept: PHYSICAL THERAPY | Age: 77
End: 2022-01-01
Payer: MEDICARE

## 2022-01-01 ENCOUNTER — HOSPITAL ENCOUNTER (OUTPATIENT)
Dept: INFUSION THERAPY | Age: 77
Discharge: HOME | End: 2022-01-25
Payer: MEDICARE

## 2022-01-01 ENCOUNTER — APPOINTMENT (OUTPATIENT)
Dept: MRI IMAGING | Age: 77
DRG: 542 | End: 2022-01-01
Attending: EMERGENCY MEDICINE
Payer: MEDICARE

## 2022-01-01 ENCOUNTER — HOSPITAL ENCOUNTER (OUTPATIENT)
Dept: INFUSION THERAPY | Age: 77
Discharge: HOME | End: 2022-01-20
Payer: MEDICARE

## 2022-01-01 ENCOUNTER — OFFICE VISIT (OUTPATIENT)
Dept: ONCOLOGY | Age: 77
End: 2022-01-01
Payer: MEDICARE

## 2022-01-01 ENCOUNTER — TELEPHONE (OUTPATIENT)
Dept: PULMONOLOGY | Age: 77
End: 2022-01-01

## 2022-01-01 ENCOUNTER — HOSPITAL ENCOUNTER (OUTPATIENT)
Dept: PET IMAGING | Age: 77
Discharge: HOME | End: 2022-01-13
Attending: RADIOLOGY
Payer: MEDICARE

## 2022-01-01 ENCOUNTER — APPOINTMENT (OUTPATIENT)
Dept: PHYSICAL THERAPY | Age: 77
End: 2022-01-01

## 2022-01-01 ENCOUNTER — HOSPITAL ENCOUNTER (OUTPATIENT)
Dept: PHYSICAL THERAPY | Age: 77
Discharge: HOME | End: 2022-04-15
Payer: MEDICARE

## 2022-01-01 ENCOUNTER — HOSPITAL ENCOUNTER (OUTPATIENT)
Dept: INFUSION THERAPY | Age: 77
Discharge: HOME | End: 2022-04-26
Payer: MEDICARE

## 2022-01-01 ENCOUNTER — HOSPITAL ENCOUNTER (OUTPATIENT)
Dept: PHYSICAL THERAPY | Age: 77
Discharge: HOME | End: 2022-04-04
Payer: MEDICARE

## 2022-01-01 ENCOUNTER — HOSPITAL ENCOUNTER (OUTPATIENT)
Dept: CT IMAGING | Age: 77
Discharge: HOME | End: 2022-02-09
Attending: STUDENT IN AN ORGANIZED HEALTH CARE EDUCATION/TRAINING PROGRAM
Payer: MEDICARE

## 2022-01-01 ENCOUNTER — OFFICE VISIT (OUTPATIENT)
Dept: CARDIOLOGY CLINIC | Age: 77
End: 2022-01-01
Payer: MEDICARE

## 2022-01-01 ENCOUNTER — HOSPITAL ENCOUNTER (INPATIENT)
Dept: RADIATION THERAPY | Age: 77
Discharge: HOME | DRG: 542 | End: 2022-05-04
Payer: MEDICARE

## 2022-01-01 ENCOUNTER — TELEPHONE (OUTPATIENT)
Dept: PHYSICAL THERAPY | Age: 77
End: 2022-01-01

## 2022-01-01 ENCOUNTER — HOSPITAL ENCOUNTER (INPATIENT)
Age: 77
LOS: 3 days | Discharge: HOSPICE | DRG: 542 | End: 2022-05-05
Attending: STUDENT IN AN ORGANIZED HEALTH CARE EDUCATION/TRAINING PROGRAM | Admitting: INTERNAL MEDICINE
Payer: MEDICARE

## 2022-01-01 ENCOUNTER — HOSPITAL ENCOUNTER (EMERGENCY)
Age: 77
Discharge: HOME | End: 2022-02-09
Attending: STUDENT IN AN ORGANIZED HEALTH CARE EDUCATION/TRAINING PROGRAM
Payer: MEDICARE

## 2022-01-01 ENCOUNTER — HOSPITAL ENCOUNTER (OUTPATIENT)
Dept: GENERAL RADIOLOGY | Age: 77
Discharge: HOME | End: 2022-03-29
Attending: INTERNAL MEDICINE
Payer: MEDICARE

## 2022-01-01 ENCOUNTER — OFFICE VISIT (OUTPATIENT)
Dept: PULMONOLOGY | Age: 77
End: 2022-01-01
Payer: MEDICARE

## 2022-01-01 ENCOUNTER — HOSPITAL ENCOUNTER (OUTPATIENT)
Dept: INFUSION THERAPY | Age: 77
Discharge: HOME | End: 2022-04-19
Attending: INTERNAL MEDICINE
Payer: MEDICARE

## 2022-01-01 ENCOUNTER — HOSPITAL ENCOUNTER (OUTPATIENT)
Dept: PHYSICAL THERAPY | Age: 77
End: 2022-01-01
Payer: MEDICARE

## 2022-01-01 ENCOUNTER — TRANSCRIBE ORDER (OUTPATIENT)
Dept: SCHEDULING | Age: 77
End: 2022-01-01

## 2022-01-01 ENCOUNTER — HOSPITAL ENCOUNTER (OUTPATIENT)
Dept: INFUSION THERAPY | Age: 77
Discharge: HOME | End: 2022-03-22
Attending: INTERNAL MEDICINE
Payer: MEDICARE

## 2022-01-01 ENCOUNTER — APPOINTMENT (OUTPATIENT)
Dept: GENERAL RADIOLOGY | Age: 77
DRG: 542 | End: 2022-01-01
Attending: STUDENT IN AN ORGANIZED HEALTH CARE EDUCATION/TRAINING PROGRAM
Payer: MEDICARE

## 2022-01-01 ENCOUNTER — APPOINTMENT (OUTPATIENT)
Dept: GENERAL RADIOLOGY | Age: 77
DRG: 542 | End: 2022-01-01
Attending: NURSE PRACTITIONER
Payer: MEDICARE

## 2022-01-01 ENCOUNTER — HOSPITAL ENCOUNTER (OUTPATIENT)
Dept: INFUSION THERAPY | Age: 77
End: 2022-01-01

## 2022-01-01 ENCOUNTER — HOSPITAL ENCOUNTER (OUTPATIENT)
Dept: VASCULAR SURGERY | Age: 77
Discharge: HOME | End: 2022-03-15
Attending: INTERNAL MEDICINE
Payer: MEDICARE

## 2022-01-01 ENCOUNTER — HOSPITAL ENCOUNTER (OUTPATIENT)
Age: 77
Discharge: HOME | End: 2022-01-03
Attending: INTERNAL MEDICINE
Payer: MEDICARE

## 2022-01-01 ENCOUNTER — HOSPITAL ENCOUNTER (OUTPATIENT)
Dept: GENERAL RADIOLOGY | Age: 77
Discharge: HOME | End: 2022-02-09
Payer: MEDICARE

## 2022-01-01 ENCOUNTER — HOSPICE ADMISSION (OUTPATIENT)
Dept: HOSPICE | Facility: HOSPICE | Age: 77
End: 2022-01-01

## 2022-01-01 ENCOUNTER — HOSPITAL ENCOUNTER (OUTPATIENT)
Dept: LAB | Age: 77
Discharge: HOME | End: 2022-04-08
Payer: MEDICARE

## 2022-01-01 ENCOUNTER — HOSPITAL ENCOUNTER (OUTPATIENT)
Dept: GENERAL RADIOLOGY | Age: 77
End: 2022-01-01
Attending: PHYSICIAN ASSISTANT
Payer: MEDICARE

## 2022-01-01 ENCOUNTER — LAB ONLY (OUTPATIENT)
Dept: ONCOLOGY | Age: 77
End: 2022-01-01

## 2022-01-01 VITALS
DIASTOLIC BLOOD PRESSURE: 47 MMHG | OXYGEN SATURATION: 96 % | RESPIRATION RATE: 18 BRPM | TEMPERATURE: 98.1 F | BODY MASS INDEX: 20.22 KG/M2 | HEART RATE: 89 BPM | HEIGHT: 64 IN | WEIGHT: 118.4 LBS | SYSTOLIC BLOOD PRESSURE: 100 MMHG

## 2022-01-01 VITALS
BODY MASS INDEX: 21.85 KG/M2 | SYSTOLIC BLOOD PRESSURE: 112 MMHG | OXYGEN SATURATION: 95 % | HEIGHT: 64 IN | WEIGHT: 128 LBS | HEART RATE: 76 BPM | DIASTOLIC BLOOD PRESSURE: 57 MMHG | RESPIRATION RATE: 18 BRPM | TEMPERATURE: 97.7 F

## 2022-01-01 VITALS
RESPIRATION RATE: 16 BRPM | DIASTOLIC BLOOD PRESSURE: 66 MMHG | SYSTOLIC BLOOD PRESSURE: 105 MMHG | HEART RATE: 79 BPM | TEMPERATURE: 97.3 F | OXYGEN SATURATION: 97 %

## 2022-01-01 VITALS
OXYGEN SATURATION: 100 % | HEIGHT: 64 IN | RESPIRATION RATE: 18 BRPM | HEART RATE: 76 BPM | SYSTOLIC BLOOD PRESSURE: 132 MMHG | BODY MASS INDEX: 22.66 KG/M2 | DIASTOLIC BLOOD PRESSURE: 70 MMHG | TEMPERATURE: 97.9 F

## 2022-01-01 VITALS
DIASTOLIC BLOOD PRESSURE: 75 MMHG | TEMPERATURE: 98 F | OXYGEN SATURATION: 100 % | HEART RATE: 97 BPM | BODY MASS INDEX: 20.83 KG/M2 | HEIGHT: 64 IN | SYSTOLIC BLOOD PRESSURE: 136 MMHG | WEIGHT: 122 LBS | RESPIRATION RATE: 18 BRPM

## 2022-01-01 VITALS
SYSTOLIC BLOOD PRESSURE: 122 MMHG | RESPIRATION RATE: 18 BRPM | TEMPERATURE: 98.1 F | OXYGEN SATURATION: 96 % | HEART RATE: 80 BPM | DIASTOLIC BLOOD PRESSURE: 58 MMHG

## 2022-01-01 VITALS
OXYGEN SATURATION: 99 % | WEIGHT: 122 LBS | DIASTOLIC BLOOD PRESSURE: 60 MMHG | HEART RATE: 89 BPM | BODY MASS INDEX: 20.83 KG/M2 | HEIGHT: 64 IN | SYSTOLIC BLOOD PRESSURE: 110 MMHG | RESPIRATION RATE: 20 BRPM | TEMPERATURE: 98.2 F

## 2022-01-01 VITALS
TEMPERATURE: 98.2 F | OXYGEN SATURATION: 96 % | HEART RATE: 96 BPM | RESPIRATION RATE: 20 BRPM | SYSTOLIC BLOOD PRESSURE: 138 MMHG | DIASTOLIC BLOOD PRESSURE: 68 MMHG

## 2022-01-01 VITALS
OXYGEN SATURATION: 95 % | HEART RATE: 87 BPM | BODY MASS INDEX: 22.53 KG/M2 | DIASTOLIC BLOOD PRESSURE: 62 MMHG | WEIGHT: 132 LBS | SYSTOLIC BLOOD PRESSURE: 114 MMHG | HEIGHT: 64 IN

## 2022-01-01 VITALS
HEART RATE: 76 BPM | RESPIRATION RATE: 16 BRPM | DIASTOLIC BLOOD PRESSURE: 76 MMHG | HEIGHT: 64 IN | BODY MASS INDEX: 20.14 KG/M2 | SYSTOLIC BLOOD PRESSURE: 126 MMHG | WEIGHT: 118 LBS | OXYGEN SATURATION: 99 %

## 2022-01-01 VITALS
RESPIRATION RATE: 16 BRPM | TEMPERATURE: 98.4 F | HEART RATE: 84 BPM | SYSTOLIC BLOOD PRESSURE: 115 MMHG | DIASTOLIC BLOOD PRESSURE: 73 MMHG

## 2022-01-01 VITALS
SYSTOLIC BLOOD PRESSURE: 146 MMHG | OXYGEN SATURATION: 96 % | RESPIRATION RATE: 16 BRPM | DIASTOLIC BLOOD PRESSURE: 70 MMHG | TEMPERATURE: 97.9 F | HEART RATE: 84 BPM

## 2022-01-01 VITALS
SYSTOLIC BLOOD PRESSURE: 108 MMHG | HEART RATE: 88 BPM | OXYGEN SATURATION: 97 % | TEMPERATURE: 97.2 F | WEIGHT: 122 LBS | DIASTOLIC BLOOD PRESSURE: 56 MMHG | BODY MASS INDEX: 20.83 KG/M2 | HEIGHT: 64 IN

## 2022-01-01 VITALS
HEART RATE: 91 BPM | OXYGEN SATURATION: 100 % | RESPIRATION RATE: 18 BRPM | DIASTOLIC BLOOD PRESSURE: 71 MMHG | DIASTOLIC BLOOD PRESSURE: 61 MMHG | HEART RATE: 90 BPM | SYSTOLIC BLOOD PRESSURE: 117 MMHG | SYSTOLIC BLOOD PRESSURE: 133 MMHG | OXYGEN SATURATION: 98 % | TEMPERATURE: 97.2 F | RESPIRATION RATE: 20 BRPM | TEMPERATURE: 97.4 F

## 2022-01-01 VITALS
TEMPERATURE: 97.2 F | HEART RATE: 90 BPM | SYSTOLIC BLOOD PRESSURE: 102 MMHG | DIASTOLIC BLOOD PRESSURE: 61 MMHG | RESPIRATION RATE: 18 BRPM | OXYGEN SATURATION: 96 %

## 2022-01-01 DIAGNOSIS — C79.52 SECONDARY MALIGNANT NEOPLASM OF BONE AND BONE MARROW (HCC): ICD-10-CM

## 2022-01-01 DIAGNOSIS — T45.1X5A ANEMIA DUE TO CHEMOTHERAPY: Primary | ICD-10-CM

## 2022-01-01 DIAGNOSIS — Z17.0 MALIGNANT NEOPLASM OF LOWER-INNER QUADRANT OF RIGHT BREAST OF FEMALE, ESTROGEN RECEPTOR POSITIVE (HCC): ICD-10-CM

## 2022-01-01 DIAGNOSIS — T45.1X5A CHEMOTHERAPY INDUCED NEUTROPENIA (HCC): ICD-10-CM

## 2022-01-01 DIAGNOSIS — C50.919 METASTATIC BREAST CANCER (HCC): ICD-10-CM

## 2022-01-01 DIAGNOSIS — C79.51 SECONDARY MALIGNANT NEOPLASM OF BONE AND BONE MARROW (HCC): ICD-10-CM

## 2022-01-01 DIAGNOSIS — C80.1 NEUROPATHY ASSOCIATED WITH CANCER (HCC): ICD-10-CM

## 2022-01-01 DIAGNOSIS — J44.9 COPD, GROUP B, BY GOLD 2017 CLASSIFICATION (HCC): ICD-10-CM

## 2022-01-01 DIAGNOSIS — T45.1X5A ANEMIA DUE TO ANTINEOPLASTIC CHEMOTHERAPY: ICD-10-CM

## 2022-01-01 DIAGNOSIS — D64.81 ANTINEOPLASTIC CHEMOTHERAPY INDUCED ANEMIA: ICD-10-CM

## 2022-01-01 DIAGNOSIS — D64.81 ANEMIA DUE TO ANTINEOPLASTIC CHEMOTHERAPY: ICD-10-CM

## 2022-01-01 DIAGNOSIS — G63 NEUROPATHY ASSOCIATED WITH CANCER (HCC): ICD-10-CM

## 2022-01-01 DIAGNOSIS — D50.8 IRON DEFICIENCY ANEMIA DUE TO DIETARY CAUSES: ICD-10-CM

## 2022-01-01 DIAGNOSIS — C50.311 MALIGNANT NEOPLASM OF LOWER-INNER QUADRANT OF RIGHT BREAST OF FEMALE, ESTROGEN RECEPTOR POSITIVE (HCC): Primary | ICD-10-CM

## 2022-01-01 DIAGNOSIS — R06.00 DYSPNEA: ICD-10-CM

## 2022-01-01 DIAGNOSIS — C50.311 MALIGNANT NEOPLASM OF LOWER-INNER QUADRANT OF RIGHT BREAST OF FEMALE, ESTROGEN RECEPTOR POSITIVE (HCC): ICD-10-CM

## 2022-01-01 DIAGNOSIS — C50.919 METASTATIC BREAST CANCER (HCC): Primary | ICD-10-CM

## 2022-01-01 DIAGNOSIS — D64.81 ANEMIA DUE TO CHEMOTHERAPY: ICD-10-CM

## 2022-01-01 DIAGNOSIS — J98.19 TRAPPED LUNG: ICD-10-CM

## 2022-01-01 DIAGNOSIS — M79.89 LEG SWELLING: ICD-10-CM

## 2022-01-01 DIAGNOSIS — D64.81 ANEMIA DUE TO CHEMOTHERAPY: Primary | ICD-10-CM

## 2022-01-01 DIAGNOSIS — T45.1X5A ANEMIA DUE TO CHEMOTHERAPY: ICD-10-CM

## 2022-01-01 DIAGNOSIS — T45.1X5A ANTINEOPLASTIC CHEMOTHERAPY INDUCED ANEMIA: ICD-10-CM

## 2022-01-01 DIAGNOSIS — R63.0 LOSS OF APPETITE: ICD-10-CM

## 2022-01-01 DIAGNOSIS — R53.81 PHYSICAL DECONDITIONING: Primary | ICD-10-CM

## 2022-01-01 DIAGNOSIS — Z17.0 MALIGNANT NEOPLASM OF LOWER-INNER QUADRANT OF RIGHT BREAST OF FEMALE, ESTROGEN RECEPTOR POSITIVE (HCC): Primary | ICD-10-CM

## 2022-01-01 DIAGNOSIS — R06.02 SHORTNESS OF BREATH: ICD-10-CM

## 2022-01-01 DIAGNOSIS — R11.2 NAUSEA WITH VOMITING: ICD-10-CM

## 2022-01-01 DIAGNOSIS — E53.8 B12 DEFICIENCY: ICD-10-CM

## 2022-01-01 DIAGNOSIS — J90 PLEURAL EFFUSION ON RIGHT: Primary | ICD-10-CM

## 2022-01-01 DIAGNOSIS — R53.83 OTHER FATIGUE: ICD-10-CM

## 2022-01-01 DIAGNOSIS — R63.4 UNINTENDED WEIGHT LOSS: ICD-10-CM

## 2022-01-01 DIAGNOSIS — R06.09 DOE (DYSPNEA ON EXERTION): Primary | ICD-10-CM

## 2022-01-01 DIAGNOSIS — R13.10 DYSPHAGIA: Primary | ICD-10-CM

## 2022-01-01 DIAGNOSIS — R13.10 DYSPHAGIA: ICD-10-CM

## 2022-01-01 DIAGNOSIS — R53.81 PHYSICAL DECONDITIONING: ICD-10-CM

## 2022-01-01 DIAGNOSIS — R13.19 OTHER DYSPHAGIA: ICD-10-CM

## 2022-01-01 DIAGNOSIS — C50.919 BREAST CANCER (HCC): ICD-10-CM

## 2022-01-01 DIAGNOSIS — E55.9 VITAMIN D DEFICIENCY: ICD-10-CM

## 2022-01-01 DIAGNOSIS — R51.9 INTRACTABLE HEADACHE, UNSPECIFIED CHRONICITY PATTERN, UNSPECIFIED HEADACHE TYPE: ICD-10-CM

## 2022-01-01 DIAGNOSIS — G61.0 ASCENDING PARALYSIS (HCC): ICD-10-CM

## 2022-01-01 DIAGNOSIS — R13.19 OTHER DYSPHAGIA: Primary | ICD-10-CM

## 2022-01-01 DIAGNOSIS — R53.81 DEBILITY: ICD-10-CM

## 2022-01-01 DIAGNOSIS — D69.59 CHEMOTHERAPY-INDUCED THROMBOCYTOPENIA: ICD-10-CM

## 2022-01-01 DIAGNOSIS — D70.1 CHEMOTHERAPY INDUCED NEUTROPENIA (HCC): ICD-10-CM

## 2022-01-01 DIAGNOSIS — Z71.89 GOALS OF CARE, COUNSELING/DISCUSSION: ICD-10-CM

## 2022-01-01 DIAGNOSIS — K21.9 LARYNGOPHARYNGEAL REFLUX (LPR): ICD-10-CM

## 2022-01-01 DIAGNOSIS — T45.1X5A CHEMOTHERAPY-INDUCED THROMBOCYTOPENIA: ICD-10-CM

## 2022-01-01 DIAGNOSIS — R06.02 SOB (SHORTNESS OF BREATH): ICD-10-CM

## 2022-01-01 DIAGNOSIS — D49.2 CERVICAL SPINE TUMOR: Primary | ICD-10-CM

## 2022-01-01 DIAGNOSIS — Z71.89 ADVANCED CARE PLANNING/COUNSELING DISCUSSION: ICD-10-CM

## 2022-01-01 DIAGNOSIS — R63.0 POOR APPETITE: ICD-10-CM

## 2022-01-01 LAB
25(OH)D3 SERPL-MCNC: 72.5 NG/ML (ref 30–100)
ALBUMIN SERPL-MCNC: 2.8 G/DL (ref 3.4–5)
ALBUMIN SERPL-MCNC: 2.9 G/DL (ref 3.4–5)
ALBUMIN SERPL-MCNC: 3.3 G/DL (ref 3.4–5)
ALBUMIN SERPL-MCNC: 3.4 G/DL (ref 3.4–5)
ALBUMIN SERPL-MCNC: 3.7 G/DL (ref 3.4–5)
ALBUMIN/GLOB SERPL: 0.8 {RATIO} (ref 0.8–1.7)
ALBUMIN/GLOB SERPL: 1 {RATIO} (ref 0.8–1.7)
ALBUMIN/GLOB SERPL: 1 {RATIO} (ref 0.8–1.7)
ALBUMIN/GLOB SERPL: 1.1 {RATIO} (ref 0.8–1.7)
ALBUMIN/GLOB SERPL: 1.2 {RATIO} (ref 0.8–1.7)
ALP SERPL-CCNC: 63 U/L (ref 45–117)
ALP SERPL-CCNC: 65 U/L (ref 45–117)
ALP SERPL-CCNC: 67 U/L (ref 45–117)
ALP SERPL-CCNC: 70 U/L (ref 45–117)
ALP SERPL-CCNC: 72 U/L (ref 45–117)
ALP SERPL-CCNC: 80 U/L (ref 45–117)
ALP SERPL-CCNC: 85 U/L (ref 45–117)
ALT SERPL-CCNC: 13 U/L (ref 13–56)
ALT SERPL-CCNC: 14 U/L (ref 13–56)
ALT SERPL-CCNC: 16 U/L (ref 13–56)
ALT SERPL-CCNC: 16 U/L (ref 13–56)
ALT SERPL-CCNC: 17 U/L (ref 13–56)
ALT SERPL-CCNC: 17 U/L (ref 13–56)
ALT SERPL-CCNC: 23 U/L (ref 13–56)
ANION GAP SERPL CALC-SCNC: 11 MMOL/L (ref 3–18)
ANION GAP SERPL CALC-SCNC: 6 MMOL/L (ref 3–18)
ANION GAP SERPL CALC-SCNC: 7 MMOL/L (ref 3–18)
ANION GAP SERPL CALC-SCNC: 7 MMOL/L (ref 3–18)
ANION GAP SERPL CALC-SCNC: 8 MMOL/L (ref 3–18)
ANION GAP SERPL CALC-SCNC: 8 MMOL/L (ref 3–18)
ANION GAP SERPL CALC-SCNC: 9 MMOL/L (ref 3–18)
APPEARANCE UR: CLEAR
AST SERPL-CCNC: 16 U/L (ref 10–38)
AST SERPL-CCNC: 16 U/L (ref 10–38)
AST SERPL-CCNC: 20 U/L (ref 10–38)
AST SERPL-CCNC: 21 U/L (ref 10–38)
AST SERPL-CCNC: 21 U/L (ref 10–38)
AST SERPL-CCNC: 27 U/L (ref 10–38)
AST SERPL-CCNC: 42 U/L (ref 10–38)
ATRIAL RATE: 69 BPM
ATRIAL RATE: 81 BPM
BACTERIA URNS QL MICRO: ABNORMAL /HPF
BASO+EOS+MONOS # BLD AUTO: 0.1 K/UL (ref 0–2.3)
BASO+EOS+MONOS # BLD AUTO: 0.2 K/UL (ref 0–2.3)
BASO+EOS+MONOS NFR BLD AUTO: 12 % (ref 0.1–17)
BASO+EOS+MONOS NFR BLD AUTO: 6 % (ref 0.1–17)
BASO+EOS+MONOS NFR BLD AUTO: 7 % (ref 0.1–17)
BASO+EOS+MONOS NFR BLD AUTO: 8 % (ref 0.1–17)
BASOPHILS # BLD: 0 K/UL (ref 0–0.1)
BASOPHILS NFR BLD: 0 % (ref 0–2)
BASOPHILS NFR BLD: 1 % (ref 0–2)
BILIRUB SERPL-MCNC: 0.4 MG/DL (ref 0.2–1)
BILIRUB SERPL-MCNC: 0.5 MG/DL (ref 0.2–1)
BILIRUB SERPL-MCNC: 0.5 MG/DL (ref 0.2–1)
BILIRUB SERPL-MCNC: 0.6 MG/DL (ref 0.2–1)
BILIRUB UR QL: NEGATIVE
BUN SERPL-MCNC: 18 MG/DL (ref 7–18)
BUN SERPL-MCNC: 19 MG/DL (ref 7–18)
BUN SERPL-MCNC: 20 MG/DL (ref 7–18)
BUN SERPL-MCNC: 21 MG/DL (ref 7–18)
BUN SERPL-MCNC: 24 MG/DL (ref 7–18)
BUN SERPL-MCNC: 27 MG/DL (ref 7–18)
BUN SERPL-MCNC: 28 MG/DL (ref 7–18)
BUN SERPL-MCNC: 29 MG/DL (ref 7–18)
BUN SERPL-MCNC: 33 MG/DL (ref 7–18)
BUN/CREAT SERPL: 16 (ref 12–20)
BUN/CREAT SERPL: 17 (ref 12–20)
BUN/CREAT SERPL: 18 (ref 12–20)
BUN/CREAT SERPL: 19 (ref 12–20)
BUN/CREAT SERPL: 20 (ref 12–20)
BUN/CREAT SERPL: 22 (ref 12–20)
BUN/CREAT SERPL: 24 (ref 12–20)
CALCIUM SERPL-MCNC: 8.7 MG/DL (ref 8.5–10.1)
CALCIUM SERPL-MCNC: 8.8 MG/DL (ref 8.5–10.1)
CALCIUM SERPL-MCNC: 9.2 MG/DL (ref 8.5–10.1)
CALCIUM SERPL-MCNC: 9.4 MG/DL (ref 8.5–10.1)
CALCULATED P AXIS, ECG09: 38 DEGREES
CALCULATED P AXIS, ECG09: 38 DEGREES
CALCULATED R AXIS, ECG10: 50 DEGREES
CALCULATED R AXIS, ECG10: 53 DEGREES
CALCULATED T AXIS, ECG11: 44 DEGREES
CALCULATED T AXIS, ECG11: 55 DEGREES
CANCER AG15-3 SERPL-ACNC: 42.3 U/ML (ref 0–25)
CANCER AG27-29 SERPL-ACNC: 52.5 U/ML (ref 0–38.6)
CHLORIDE SERPL-SCNC: 100 MMOL/L (ref 100–111)
CHLORIDE SERPL-SCNC: 100 MMOL/L (ref 100–111)
CHLORIDE SERPL-SCNC: 101 MMOL/L (ref 100–111)
CHLORIDE SERPL-SCNC: 102 MMOL/L (ref 100–111)
CHLORIDE SERPL-SCNC: 103 MMOL/L (ref 100–111)
CHLORIDE SERPL-SCNC: 105 MMOL/L (ref 100–111)
CHLORIDE SERPL-SCNC: 107 MMOL/L (ref 100–111)
CHLORIDE SERPL-SCNC: 107 MMOL/L (ref 100–111)
CHLORIDE SERPL-SCNC: 109 MMOL/L (ref 100–111)
CO2 SERPL-SCNC: 22 MMOL/L (ref 21–32)
CO2 SERPL-SCNC: 25 MMOL/L (ref 21–32)
CO2 SERPL-SCNC: 26 MMOL/L (ref 21–32)
CO2 SERPL-SCNC: 27 MMOL/L (ref 21–32)
CO2 SERPL-SCNC: 29 MMOL/L (ref 21–32)
CO2 SERPL-SCNC: 30 MMOL/L (ref 21–32)
CO2 SERPL-SCNC: 31 MMOL/L (ref 21–32)
COLOR UR: YELLOW
CREAT SERPL-MCNC: 0.91 MG/DL (ref 0.6–1.3)
CREAT SERPL-MCNC: 1.07 MG/DL (ref 0.6–1.3)
CREAT SERPL-MCNC: 1.1 MG/DL (ref 0.6–1.3)
CREAT SERPL-MCNC: 1.18 MG/DL (ref 0.6–1.3)
CREAT SERPL-MCNC: 1.35 MG/DL (ref 0.6–1.3)
CREAT SERPL-MCNC: 1.36 MG/DL (ref 0.6–1.3)
CREAT SERPL-MCNC: 1.53 MG/DL (ref 0.6–1.3)
CREAT SERPL-MCNC: 1.56 MG/DL (ref 0.6–1.3)
CREAT SERPL-MCNC: 1.56 MG/DL (ref 0.6–1.3)
DIAGNOSIS, 93000: NORMAL
DIAGNOSIS, 93000: NORMAL
DIFFERENTIAL METHOD BLD: ABNORMAL
EOSINOPHIL # BLD: 0 K/UL (ref 0–0.4)
EOSINOPHIL NFR BLD: 0 % (ref 0–5)
EPITH CASTS URNS QL MICRO: ABNORMAL /LPF (ref 0–5)
ERYTHROCYTE [DISTWIDTH] IN BLOOD BY AUTOMATED COUNT: 17.1 % (ref 11.5–14.5)
ERYTHROCYTE [DISTWIDTH] IN BLOOD BY AUTOMATED COUNT: 17.8 % (ref 11.5–14.5)
ERYTHROCYTE [DISTWIDTH] IN BLOOD BY AUTOMATED COUNT: 17.8 % (ref 11.6–14.5)
ERYTHROCYTE [DISTWIDTH] IN BLOOD BY AUTOMATED COUNT: 18.3 % (ref 11.6–14.5)
ERYTHROCYTE [DISTWIDTH] IN BLOOD BY AUTOMATED COUNT: 18.4 % (ref 11.6–14.5)
ERYTHROCYTE [DISTWIDTH] IN BLOOD BY AUTOMATED COUNT: 18.5 % (ref 11.5–14.5)
ERYTHROCYTE [DISTWIDTH] IN BLOOD BY AUTOMATED COUNT: 19.6 % (ref 11.6–14.5)
ERYTHROCYTE [DISTWIDTH] IN BLOOD BY AUTOMATED COUNT: 19.9 % (ref 11.5–14.5)
FERRITIN SERPL-MCNC: 632 NG/ML (ref 8–388)
FOLATE SERPL-MCNC: 11.1 NG/ML (ref 3.1–17.5)
FOLATE SERPL-MCNC: 5.8 NG/ML (ref 3.1–17.5)
GLOBULIN SER CALC-MCNC: 2.9 G/DL (ref 2–4)
GLOBULIN SER CALC-MCNC: 3 G/DL (ref 2–4)
GLOBULIN SER CALC-MCNC: 3.2 G/DL (ref 2–4)
GLOBULIN SER CALC-MCNC: 3.3 G/DL (ref 2–4)
GLOBULIN SER CALC-MCNC: 3.5 G/DL (ref 2–4)
GLUCOSE SERPL-MCNC: 102 MG/DL (ref 74–99)
GLUCOSE SERPL-MCNC: 103 MG/DL (ref 74–99)
GLUCOSE SERPL-MCNC: 106 MG/DL (ref 74–99)
GLUCOSE SERPL-MCNC: 114 MG/DL (ref 74–99)
GLUCOSE SERPL-MCNC: 118 MG/DL (ref 74–99)
GLUCOSE SERPL-MCNC: 140 MG/DL (ref 74–99)
GLUCOSE SERPL-MCNC: 86 MG/DL (ref 74–99)
GLUCOSE SERPL-MCNC: 94 MG/DL (ref 74–99)
GLUCOSE SERPL-MCNC: 98 MG/DL (ref 74–99)
GLUCOSE UR STRIP.AUTO-MCNC: NEGATIVE MG/DL
HCT VFR BLD AUTO: 26.4 % (ref 35–45)
HCT VFR BLD AUTO: 27 % (ref 36–48)
HCT VFR BLD AUTO: 27.6 % (ref 36–48)
HCT VFR BLD AUTO: 29.5 % (ref 35–45)
HCT VFR BLD AUTO: 29.8 % (ref 36–48)
HCT VFR BLD AUTO: 30 % (ref 35–45)
HCT VFR BLD AUTO: 30.4 % (ref 36–48)
HCT VFR BLD AUTO: 32.8 % (ref 35–45)
HGB BLD-MCNC: 10.3 G/DL (ref 12–16)
HGB BLD-MCNC: 8.1 G/DL (ref 12–16)
HGB BLD-MCNC: 8.4 G/DL (ref 12–16)
HGB BLD-MCNC: 8.5 G/DL (ref 12–16)
HGB BLD-MCNC: 9.3 G/DL (ref 12–16)
HGB BLD-MCNC: 9.4 G/DL (ref 12–16)
HGB BLD-MCNC: 9.5 G/DL (ref 12–16)
HGB BLD-MCNC: 9.7 G/DL (ref 12–16)
HGB UR QL STRIP: ABNORMAL
IMM GRANULOCYTES # BLD AUTO: 0 K/UL
IMM GRANULOCYTES # BLD AUTO: 0 K/UL (ref 0–0.04)
IMM GRANULOCYTES NFR BLD AUTO: 0 %
IMM GRANULOCYTES NFR BLD AUTO: 0 % (ref 0–0.5)
IMM GRANULOCYTES NFR BLD AUTO: 1 % (ref 0–0.5)
IMM GRANULOCYTES NFR BLD AUTO: 1 % (ref 0–0.5)
IRON SATN MFR SERPL: 14 % (ref 20–50)
IRON SERPL-MCNC: 30 UG/DL (ref 50–175)
KETONES UR QL STRIP.AUTO: ABNORMAL MG/DL
LACTATE SERPL-SCNC: 0.7 MMOL/L (ref 0.4–2)
LEUKOCYTE ESTERASE UR QL STRIP.AUTO: NEGATIVE
LYMPHOCYTES # BLD: 0.2 K/UL (ref 0.9–3.6)
LYMPHOCYTES # BLD: 0.3 K/UL (ref 0.9–3.6)
LYMPHOCYTES # BLD: 0.3 K/UL (ref 0.9–3.6)
LYMPHOCYTES # BLD: 0.3 K/UL (ref 1.1–5.9)
LYMPHOCYTES # BLD: 0.3 K/UL (ref 1.1–5.9)
LYMPHOCYTES # BLD: 0.4 K/UL (ref 0.9–3.6)
LYMPHOCYTES # BLD: 0.4 K/UL (ref 1.1–5.9)
LYMPHOCYTES # BLD: 0.5 K/UL (ref 1.1–5.9)
LYMPHOCYTES NFR BLD: 10 % (ref 21–52)
LYMPHOCYTES NFR BLD: 12 % (ref 21–52)
LYMPHOCYTES NFR BLD: 12 % (ref 21–52)
LYMPHOCYTES NFR BLD: 17 % (ref 14–44)
LYMPHOCYTES NFR BLD: 20 % (ref 14–44)
LYMPHOCYTES NFR BLD: 25 % (ref 14–44)
LYMPHOCYTES NFR BLD: 6 % (ref 21–52)
LYMPHOCYTES NFR BLD: 9 % (ref 14–44)
MAGNESIUM SERPL-MCNC: 1.3 MG/DL (ref 1.6–2.6)
MAGNESIUM SERPL-MCNC: 1.4 MG/DL (ref 1.6–2.6)
MAGNESIUM SERPL-MCNC: 1.5 MG/DL (ref 1.6–2.6)
MAGNESIUM SERPL-MCNC: 1.9 MG/DL (ref 1.6–2.6)
MAGNESIUM SERPL-MCNC: 2 MG/DL (ref 1.6–2.6)
MCH RBC QN AUTO: 28.9 PG (ref 24–34)
MCH RBC QN AUTO: 29.9 PG (ref 24–34)
MCH RBC QN AUTO: 29.9 PG (ref 25–35)
MCH RBC QN AUTO: 30.2 PG (ref 25–35)
MCH RBC QN AUTO: 30.3 PG (ref 25–35)
MCH RBC QN AUTO: 30.7 PG (ref 24–34)
MCH RBC QN AUTO: 31.4 PG (ref 25–35)
MCH RBC QN AUTO: 31.7 PG (ref 24–34)
MCHC RBC AUTO-ENTMCNC: 30.4 G/DL (ref 31–37)
MCHC RBC AUTO-ENTMCNC: 30.7 G/DL (ref 31–37)
MCHC RBC AUTO-ENTMCNC: 31 G/DL (ref 31–37)
MCHC RBC AUTO-ENTMCNC: 31.4 G/DL (ref 31–37)
MCHC RBC AUTO-ENTMCNC: 31.5 G/DL (ref 31–37)
MCHC RBC AUTO-ENTMCNC: 31.9 G/DL (ref 31–37)
MCV RBC AUTO: 102.4 FL (ref 78–100)
MCV RBC AUTO: 92.1 FL (ref 78–100)
MCV RBC AUTO: 93.7 FL (ref 78–102)
MCV RBC AUTO: 94.7 FL (ref 78–102)
MCV RBC AUTO: 96.4 FL (ref 78–100)
MCV RBC AUTO: 97.4 FL (ref 78–100)
MCV RBC AUTO: 99.6 FL (ref 78–102)
MCV RBC AUTO: 99.6 FL (ref 78–102)
METHYLMALONATE SERPL-SCNC: 425 NMOL/L (ref 0–378)
MONOCYTES # BLD: 0 K/UL (ref 0.05–1.2)
MONOCYTES # BLD: 0.2 K/UL (ref 0.05–1.2)
MONOCYTES # BLD: 0.3 K/UL (ref 0.05–1.2)
MONOCYTES # BLD: 0.3 K/UL (ref 0.05–1.2)
MONOCYTES NFR BLD: 1 % (ref 3–10)
MONOCYTES NFR BLD: 6 % (ref 3–10)
MONOCYTES NFR BLD: 9 % (ref 3–10)
MONOCYTES NFR BLD: 9 % (ref 3–10)
NEUTS SEG # BLD: 1.1 K/UL (ref 1.8–9.5)
NEUTS SEG # BLD: 1.4 K/UL (ref 1.8–9.5)
NEUTS SEG # BLD: 1.5 K/UL (ref 1.8–9.5)
NEUTS SEG # BLD: 2.1 K/UL (ref 1.8–8)
NEUTS SEG # BLD: 2.6 K/UL (ref 1.8–8)
NEUTS SEG # BLD: 2.7 K/UL (ref 1.8–8)
NEUTS SEG # BLD: 2.7 K/UL (ref 1.8–8)
NEUTS SEG # BLD: 3 K/UL (ref 1.8–9.5)
NEUTS SEG NFR BLD: 63 % (ref 40–70)
NEUTS SEG NFR BLD: 72 % (ref 40–70)
NEUTS SEG NFR BLD: 76 % (ref 40–70)
NEUTS SEG NFR BLD: 78 % (ref 40–73)
NEUTS SEG NFR BLD: 80 % (ref 40–73)
NEUTS SEG NFR BLD: 82 % (ref 40–73)
NEUTS SEG NFR BLD: 85 % (ref 40–70)
NEUTS SEG NFR BLD: 93 % (ref 40–73)
NITRITE UR QL STRIP.AUTO: NEGATIVE
NRBC # BLD: 0 K/UL (ref 0–0.01)
NRBC BLD-RTO: 0 PER 100 WBC
P-R INTERVAL, ECG05: 126 MS
P-R INTERVAL, ECG05: 152 MS
PH UR STRIP: 5.5 [PH] (ref 5–8)
PHOSPHATE SERPL-MCNC: 2.3 MG/DL (ref 2.5–4.9)
PHOSPHATE SERPL-MCNC: 2.9 MG/DL (ref 2.5–4.9)
PHOSPHATE SERPL-MCNC: 3 MG/DL (ref 2.5–4.9)
PHOSPHATE SERPL-MCNC: 3.3 MG/DL (ref 2.5–4.9)
PHOSPHATE SERPL-MCNC: 4.5 MG/DL (ref 2.5–4.9)
PLATELET # BLD AUTO: 134 K/UL (ref 135–420)
PLATELET # BLD AUTO: 159 K/UL (ref 135–420)
PLATELET # BLD AUTO: 175 K/UL (ref 135–420)
PLATELET # BLD AUTO: 207 K/UL (ref 140–440)
PLATELET # BLD AUTO: 213 K/UL (ref 140–440)
PLATELET # BLD AUTO: 243 K/UL (ref 140–440)
PLATELET # BLD AUTO: 314 K/UL (ref 140–440)
PLATELET # BLD AUTO: 355 K/UL (ref 135–420)
PLATELET COMMENTS,PCOM: ABNORMAL
PMV BLD AUTO: 9.3 FL (ref 9.2–11.8)
PMV BLD AUTO: 9.8 FL (ref 9.2–11.8)
POTASSIUM SERPL-SCNC: 3.4 MMOL/L (ref 3.5–5.5)
POTASSIUM SERPL-SCNC: 3.5 MMOL/L (ref 3.5–5.5)
POTASSIUM SERPL-SCNC: 3.6 MMOL/L (ref 3.5–5.5)
POTASSIUM SERPL-SCNC: 4.1 MMOL/L (ref 3.5–5.5)
POTASSIUM SERPL-SCNC: 4.2 MMOL/L (ref 3.5–5.5)
POTASSIUM SERPL-SCNC: 4.4 MMOL/L (ref 3.5–5.5)
POTASSIUM SERPL-SCNC: 4.7 MMOL/L (ref 3.5–5.5)
POTASSIUM SERPL-SCNC: 4.8 MMOL/L (ref 3.5–5.5)
POTASSIUM SERPL-SCNC: 4.9 MMOL/L (ref 3.5–5.5)
PROT SERPL-MCNC: 5.7 G/DL (ref 6.4–8.2)
PROT SERPL-MCNC: 6.3 G/DL (ref 6.4–8.2)
PROT SERPL-MCNC: 6.4 G/DL (ref 6.4–8.2)
PROT SERPL-MCNC: 6.7 G/DL (ref 6.4–8.2)
PROT SERPL-MCNC: 6.9 G/DL (ref 6.4–8.2)
PROT UR STRIP-MCNC: ABNORMAL MG/DL
Q-T INTERVAL, ECG07: 364 MS
Q-T INTERVAL, ECG07: 426 MS
QRS DURATION, ECG06: 72 MS
QRS DURATION, ECG06: 82 MS
QTC CALCULATION (BEZET), ECG08: 422 MS
QTC CALCULATION (BEZET), ECG08: 456 MS
RBC # BLD AUTO: 2.71 M/UL (ref 4.1–5.1)
RBC # BLD AUTO: 2.71 M/UL (ref 4.2–5.3)
RBC # BLD AUTO: 2.77 M/UL (ref 4.1–5.1)
RBC # BLD AUTO: 2.93 M/UL (ref 4.2–5.3)
RBC # BLD AUTO: 3.06 M/UL (ref 4.2–5.3)
RBC # BLD AUTO: 3.18 M/UL (ref 4.1–5.1)
RBC # BLD AUTO: 3.21 M/UL (ref 4.1–5.1)
RBC # BLD AUTO: 3.56 M/UL (ref 4.2–5.3)
RBC #/AREA URNS HPF: ABNORMAL /HPF (ref 0–5)
RBC MORPH BLD: ABNORMAL
RETICS/RBC NFR AUTO: 2 % (ref 0.5–2.5)
RETICS/RBC NFR AUTO: 2 % (ref 0.5–2.5)
SODIUM SERPL-SCNC: 135 MMOL/L (ref 136–145)
SODIUM SERPL-SCNC: 136 MMOL/L (ref 136–145)
SODIUM SERPL-SCNC: 136 MMOL/L (ref 136–145)
SODIUM SERPL-SCNC: 140 MMOL/L (ref 136–145)
SODIUM SERPL-SCNC: 141 MMOL/L (ref 136–145)
SODIUM SERPL-SCNC: 142 MMOL/L (ref 136–145)
SP GR UR REFRACTOMETRY: 1.02 (ref 1–1.03)
TIBC SERPL-MCNC: 209 UG/DL (ref 250–450)
TROPONIN-HIGH SENSITIVITY: 7 NG/L (ref 0–54)
UROBILINOGEN UR QL STRIP.AUTO: 0.2 EU/DL (ref 0.2–1)
VENTRICULAR RATE, ECG03: 69 BPM
VENTRICULAR RATE, ECG03: 81 BPM
VIT B12 SERPL-MCNC: 1115 PG/ML (ref 211–911)
VIT B12 SERPL-MCNC: 1202 PG/ML (ref 211–911)
WBC # BLD AUTO: 1.8 K/UL (ref 4.5–13)
WBC # BLD AUTO: 1.8 K/UL (ref 4.5–13)
WBC # BLD AUTO: 2.1 K/UL (ref 4.5–13)
WBC # BLD AUTO: 2.6 K/UL (ref 4.6–13.2)
WBC # BLD AUTO: 2.9 K/UL (ref 4.6–13.2)
WBC # BLD AUTO: 3.2 K/UL (ref 4.6–13.2)
WBC # BLD AUTO: 3.5 K/UL (ref 4.5–13)
WBC # BLD AUTO: 3.5 K/UL (ref 4.6–13.2)
WBC URNS QL MICRO: ABNORMAL /HPF (ref 0–4)

## 2022-01-01 PROCEDURE — 1101F PT FALLS ASSESS-DOCD LE1/YR: CPT | Performed by: INTERNAL MEDICINE

## 2022-01-01 PROCEDURE — 74011250636 HC RX REV CODE- 250/636: Performed by: INTERNAL MEDICINE

## 2022-01-01 PROCEDURE — 74011250636 HC RX REV CODE- 250/636: Performed by: EMERGENCY MEDICINE

## 2022-01-01 PROCEDURE — 81001 URINALYSIS AUTO W/SCOPE: CPT

## 2022-01-01 PROCEDURE — 92610 EVALUATE SWALLOWING FUNCTION: CPT

## 2022-01-01 PROCEDURE — 86300 IMMUNOASSAY TUMOR CA 15-3: CPT

## 2022-01-01 PROCEDURE — 96365 THER/PROPH/DIAG IV INF INIT: CPT

## 2022-01-01 PROCEDURE — 74011250636 HC RX REV CODE- 250/636: Performed by: NURSE PRACTITIONER

## 2022-01-01 PROCEDURE — 83735 ASSAY OF MAGNESIUM: CPT

## 2022-01-01 PROCEDURE — 93970 EXTREMITY STUDY: CPT

## 2022-01-01 PROCEDURE — 1090F PRES/ABSN URINE INCON ASSESS: CPT | Performed by: INTERNAL MEDICINE

## 2022-01-01 PROCEDURE — 74011000250 HC RX REV CODE- 250: Performed by: INTERNAL MEDICINE

## 2022-01-01 PROCEDURE — 93005 ELECTROCARDIOGRAM TRACING: CPT

## 2022-01-01 PROCEDURE — 96366 THER/PROPH/DIAG IV INF ADDON: CPT

## 2022-01-01 PROCEDURE — 65270000029 HC RM PRIVATE

## 2022-01-01 PROCEDURE — 74011250637 HC RX REV CODE- 250/637: Performed by: EMERGENCY MEDICINE

## 2022-01-01 PROCEDURE — APPSS60 APP SPLIT SHARED TIME 46-60 MINUTES: Performed by: NURSE PRACTITIONER

## 2022-01-01 PROCEDURE — G8432 DEP SCR NOT DOC, RNG: HCPCS | Performed by: INTERNAL MEDICINE

## 2022-01-01 PROCEDURE — 80048 BASIC METABOLIC PNL TOTAL CA: CPT

## 2022-01-01 PROCEDURE — 77030040393 HC DRSG OPTIFOAM GENT MDII -B

## 2022-01-01 PROCEDURE — 71046 X-RAY EXAM CHEST 2 VIEWS: CPT

## 2022-01-01 PROCEDURE — 99214 OFFICE O/P EST MOD 30 MIN: CPT | Performed by: INTERNAL MEDICINE

## 2022-01-01 PROCEDURE — 96360 HYDRATION IV INFUSION INIT: CPT

## 2022-01-01 PROCEDURE — 72148 MRI LUMBAR SPINE W/O DYE: CPT

## 2022-01-01 PROCEDURE — G8399 PT W/DXA RESULTS DOCUMENT: HCPCS | Performed by: INTERNAL MEDICINE

## 2022-01-01 PROCEDURE — 94760 N-INVAS EAR/PLS OXIMETRY 1: CPT

## 2022-01-01 PROCEDURE — 74248 X-RAY SM INT F-THRU STD: CPT

## 2022-01-01 PROCEDURE — G8420 CALC BMI NORM PARAMETERS: HCPCS | Performed by: INTERNAL MEDICINE

## 2022-01-01 PROCEDURE — 99211 OFF/OP EST MAY X REQ PHY/QHP: CPT

## 2022-01-01 PROCEDURE — 85025 COMPLETE CBC W/AUTO DIFF WBC: CPT

## 2022-01-01 PROCEDURE — 94640 AIRWAY INHALATION TREATMENT: CPT

## 2022-01-01 PROCEDURE — 77010033678 HC OXYGEN DAILY

## 2022-01-01 PROCEDURE — G8427 DOCREV CUR MEDS BY ELIG CLIN: HCPCS | Performed by: INTERNAL MEDICINE

## 2022-01-01 PROCEDURE — 96361 HYDRATE IV INFUSION ADD-ON: CPT

## 2022-01-01 PROCEDURE — 70551 MRI BRAIN STEM W/O DYE: CPT

## 2022-01-01 PROCEDURE — 84484 ASSAY OF TROPONIN QUANT: CPT

## 2022-01-01 PROCEDURE — 77030040392 HC DRSG OPTIFOAM MDII -A

## 2022-01-01 PROCEDURE — G0463 HOSPITAL OUTPT CLINIC VISIT: HCPCS | Performed by: INTERNAL MEDICINE

## 2022-01-01 PROCEDURE — 36415 COLL VENOUS BLD VENIPUNCTURE: CPT

## 2022-01-01 PROCEDURE — 92526 ORAL FUNCTION THERAPY: CPT

## 2022-01-01 PROCEDURE — 80053 COMPREHEN METABOLIC PANEL: CPT

## 2022-01-01 PROCEDURE — 72141 MRI NECK SPINE W/O DYE: CPT

## 2022-01-01 PROCEDURE — 77030012965 HC NDL HUBR BBMI -A

## 2022-01-01 PROCEDURE — 82728 ASSAY OF FERRITIN: CPT

## 2022-01-01 PROCEDURE — 99205 OFFICE O/P NEW HI 60 MIN: CPT | Performed by: INTERNAL MEDICINE

## 2022-01-01 PROCEDURE — 99232 SBSQ HOSP IP/OBS MODERATE 35: CPT | Performed by: EMERGENCY MEDICINE

## 2022-01-01 PROCEDURE — G8536 NO DOC ELDER MAL SCRN: HCPCS | Performed by: INTERNAL MEDICINE

## 2022-01-01 PROCEDURE — 93000 ELECTROCARDIOGRAM COMPLETE: CPT | Performed by: INTERNAL MEDICINE

## 2022-01-01 PROCEDURE — 96372 THER/PROPH/DIAG INJ SC/IM: CPT

## 2022-01-01 PROCEDURE — 99222 1ST HOSP IP/OBS MODERATE 55: CPT | Performed by: NURSE PRACTITIONER

## 2022-01-01 PROCEDURE — 84100 ASSAY OF PHOSPHORUS: CPT

## 2022-01-01 PROCEDURE — 83605 ASSAY OF LACTIC ACID: CPT

## 2022-01-01 PROCEDURE — 36591 DRAW BLOOD OFF VENOUS DEVICE: CPT

## 2022-01-01 PROCEDURE — 72146 MRI CHEST SPINE W/O DYE: CPT

## 2022-01-01 PROCEDURE — 92611 MOTION FLUOROSCOPY/SWALLOW: CPT

## 2022-01-01 PROCEDURE — 74011250637 HC RX REV CODE- 250/637: Performed by: INTERNAL MEDICINE

## 2022-01-01 PROCEDURE — 74011000258 HC RX REV CODE- 258: Performed by: NURSE PRACTITIONER

## 2022-01-01 PROCEDURE — 71045 X-RAY EXAM CHEST 1 VIEW: CPT

## 2022-01-01 PROCEDURE — 82306 VITAMIN D 25 HYDROXY: CPT

## 2022-01-01 PROCEDURE — 71275 CT ANGIOGRAPHY CHEST: CPT

## 2022-01-01 PROCEDURE — 96375 TX/PRO/DX INJ NEW DRUG ADDON: CPT

## 2022-01-01 PROCEDURE — 83921 ORGANIC ACID SINGLE QUANT: CPT

## 2022-01-01 PROCEDURE — 74011000250 HC RX REV CODE- 250: Performed by: EMERGENCY MEDICINE

## 2022-01-01 PROCEDURE — APPSS45 APP SPLIT SHARED TIME 31-45 MINUTES: Performed by: NURSE PRACTITIONER

## 2022-01-01 PROCEDURE — 99233 SBSQ HOSP IP/OBS HIGH 50: CPT | Performed by: NURSE PRACTITIONER

## 2022-01-01 PROCEDURE — 85045 AUTOMATED RETICULOCYTE COUNT: CPT

## 2022-01-01 PROCEDURE — 74011250636 HC RX REV CODE- 250/636: Performed by: STUDENT IN AN ORGANIZED HEALTH CARE EDUCATION/TRAINING PROGRAM

## 2022-01-01 PROCEDURE — 82607 VITAMIN B-12: CPT

## 2022-01-01 PROCEDURE — 96402 CHEMO HORMON ANTINEOPL SQ/IM: CPT

## 2022-01-01 PROCEDURE — A9552 F18 FDG: HCPCS

## 2022-01-01 PROCEDURE — 99221 1ST HOSP IP/OBS SF/LOW 40: CPT | Performed by: INTERNAL MEDICINE

## 2022-01-01 PROCEDURE — 74230 X-RAY XM SWLNG FUNCJ C+: CPT

## 2022-01-01 PROCEDURE — 96415 CHEMO IV INFUSION ADDL HR: CPT

## 2022-01-01 PROCEDURE — 74011000250 HC RX REV CODE- 250: Performed by: PHYSICIAN ASSISTANT

## 2022-01-01 PROCEDURE — 99282 EMERGENCY DEPT VISIT SF MDM: CPT

## 2022-01-01 PROCEDURE — 70553 MRI BRAIN STEM W/O & W/DYE: CPT

## 2022-01-01 PROCEDURE — 96413 CHEMO IV INFUSION 1 HR: CPT

## 2022-01-01 PROCEDURE — 74011000636 HC RX REV CODE- 636: Performed by: STUDENT IN AN ORGANIZED HEALTH CARE EDUCATION/TRAINING PROGRAM

## 2022-01-01 PROCEDURE — 2709999900 HC NON-CHARGEABLE SUPPLY

## 2022-01-01 PROCEDURE — 99215 OFFICE O/P EST HI 40 MIN: CPT | Performed by: INTERNAL MEDICINE

## 2022-01-01 PROCEDURE — 77030018842 HC SOL IRR SOD CL 9% BAXT -A

## 2022-01-01 PROCEDURE — 83540 ASSAY OF IRON: CPT

## 2022-01-01 PROCEDURE — A9575 INJ GADOTERATE MEGLUMI 0.1ML: HCPCS | Performed by: INTERNAL MEDICINE

## 2022-01-01 PROCEDURE — 99232 SBSQ HOSP IP/OBS MODERATE 35: CPT | Performed by: RADIOLOGY

## 2022-01-01 PROCEDURE — 87040 BLOOD CULTURE FOR BACTERIA: CPT

## 2022-01-01 PROCEDURE — G8510 SCR DEP NEG, NO PLAN REQD: HCPCS | Performed by: INTERNAL MEDICINE

## 2022-01-01 PROCEDURE — 99285 EMERGENCY DEPT VISIT HI MDM: CPT

## 2022-01-01 PROCEDURE — APPSS180 APP SPLIT SHARED TIME > 60 MINUTES: Performed by: NURSE PRACTITIONER

## 2022-01-01 PROCEDURE — 99239 HOSP IP/OBS DSCHRG MGMT >30: CPT | Performed by: EMERGENCY MEDICINE

## 2022-01-01 RX ORDER — HEPARIN 100 UNIT/ML
300-500 SYRINGE INTRAVENOUS AS NEEDED
Status: CANCELLED
Start: 2022-01-01

## 2022-01-01 RX ORDER — SODIUM CHLORIDE 9 MG/ML
50 INJECTION, SOLUTION INTRAVENOUS CONTINUOUS
Status: DISPENSED | OUTPATIENT
Start: 2022-01-01 | End: 2022-01-01

## 2022-01-01 RX ORDER — OMEPRAZOLE 40 MG/1
40 CAPSULE, DELAYED RELEASE ORAL 2 TIMES DAILY
Qty: 180 CAPSULE | Refills: 0 | Status: SHIPPED | COMMUNITY
Start: 2022-01-01

## 2022-01-01 RX ORDER — EPINEPHRINE 1 MG/ML
0.3 INJECTION, SOLUTION, CONCENTRATE INTRAVENOUS AS NEEDED
Status: CANCELLED | OUTPATIENT
Start: 2022-05-17

## 2022-01-01 RX ORDER — SODIUM CHLORIDE 9 MG/ML
10 INJECTION INTRAMUSCULAR; INTRAVENOUS; SUBCUTANEOUS AS NEEDED
Status: CANCELLED | OUTPATIENT
Start: 2022-01-01

## 2022-01-01 RX ORDER — FACIAL-BODY WIPES
10 EACH TOPICAL
Qty: 15 SUPPOSITORY | Refills: 0 | Status: SHIPPED | OUTPATIENT
Start: 2022-01-01

## 2022-01-01 RX ORDER — HYDROCORTISONE SODIUM SUCCINATE 100 MG/2ML
100 INJECTION, POWDER, FOR SOLUTION INTRAMUSCULAR; INTRAVENOUS AS NEEDED
Status: CANCELLED | OUTPATIENT
Start: 2022-01-01

## 2022-01-01 RX ORDER — EPINEPHRINE 1 MG/ML
0.3 INJECTION, SOLUTION, CONCENTRATE INTRAVENOUS AS NEEDED
Status: CANCELLED | OUTPATIENT
Start: 2022-01-01

## 2022-01-01 RX ORDER — ALBUTEROL SULFATE 0.83 MG/ML
2.5 SOLUTION RESPIRATORY (INHALATION) AS NEEDED
Status: CANCELLED
Start: 2022-01-01

## 2022-01-01 RX ORDER — SODIUM CHLORIDE 0.9 % (FLUSH) 0.9 %
10-40 SYRINGE (ML) INJECTION AS NEEDED
Status: DISCONTINUED | OUTPATIENT
Start: 2022-01-01 | End: 2022-01-01 | Stop reason: HOSPADM

## 2022-01-01 RX ORDER — LAMOTRIGINE 25 MG/1
500 TABLET ORAL ONCE
Status: CANCELLED | OUTPATIENT
Start: 2022-01-01 | End: 2022-01-01

## 2022-01-01 RX ORDER — DIPHENHYDRAMINE HYDROCHLORIDE 50 MG/ML
50 INJECTION, SOLUTION INTRAMUSCULAR; INTRAVENOUS AS NEEDED
Status: CANCELLED
Start: 2022-01-01

## 2022-01-01 RX ORDER — FUROSEMIDE 20 MG/1
20 TABLET ORAL DAILY
Qty: 7 TABLET | Refills: 0 | Status: SHIPPED | OUTPATIENT
Start: 2022-01-01 | End: 2022-01-01

## 2022-01-01 RX ORDER — SODIUM CHLORIDE 0.9 % (FLUSH) 0.9 %
10 SYRINGE (ML) INJECTION AS NEEDED
Status: CANCELLED | OUTPATIENT
Start: 2022-01-01

## 2022-01-01 RX ORDER — ACETAMINOPHEN 325 MG/1
650 TABLET ORAL AS NEEDED
Status: CANCELLED
Start: 2022-01-01

## 2022-01-01 RX ORDER — LORAZEPAM 2 MG/ML
1 CONCENTRATE ORAL
Qty: 30 ML | Refills: 0 | Status: SHIPPED | OUTPATIENT
Start: 2022-01-01

## 2022-01-01 RX ORDER — SODIUM CHLORIDE 9 MG/ML
25 INJECTION, SOLUTION INTRAVENOUS CONTINUOUS
Status: CANCELLED | OUTPATIENT
Start: 2022-01-01

## 2022-01-01 RX ORDER — DIPHENHYDRAMINE HYDROCHLORIDE 50 MG/ML
50 INJECTION, SOLUTION INTRAMUSCULAR; INTRAVENOUS AS NEEDED
Status: CANCELLED
Start: 2022-05-17

## 2022-01-01 RX ORDER — MIDODRINE HYDROCHLORIDE 5 MG/1
10 TABLET ORAL
Status: DISCONTINUED | OUTPATIENT
Start: 2022-01-01 | End: 2022-01-01 | Stop reason: HOSPADM

## 2022-01-01 RX ORDER — SODIUM CHLORIDE 9 MG/ML
25 INJECTION, SOLUTION INTRAVENOUS CONTINUOUS
Status: DISCONTINUED | OUTPATIENT
Start: 2022-01-01 | End: 2022-01-01 | Stop reason: HOSPADM

## 2022-01-01 RX ORDER — MEGESTROL ACETATE 40 MG/1
40 TABLET ORAL DAILY
Status: ON HOLD | COMMUNITY
Start: 2022-01-01 | End: 2022-01-01

## 2022-01-01 RX ORDER — ONDANSETRON 2 MG/ML
8 INJECTION INTRAMUSCULAR; INTRAVENOUS AS NEEDED
Status: CANCELLED | OUTPATIENT
Start: 2022-01-01

## 2022-01-01 RX ORDER — LAMOTRIGINE 25 MG/1
500 TABLET ORAL ONCE
Status: COMPLETED | OUTPATIENT
Start: 2022-01-01 | End: 2022-01-01

## 2022-01-01 RX ORDER — HEPARIN 100 UNIT/ML
300-500 SYRINGE INTRAVENOUS AS NEEDED
Status: CANCELLED
Start: 2022-05-17

## 2022-01-01 RX ORDER — ACETAMINOPHEN 650 MG/1
650 SUPPOSITORY RECTAL
Status: DISCONTINUED | OUTPATIENT
Start: 2022-01-01 | End: 2022-01-01 | Stop reason: HOSPADM

## 2022-01-01 RX ORDER — HYDROCORTISONE SODIUM SUCCINATE 100 MG/2ML
100 INJECTION, POWDER, FOR SOLUTION INTRAMUSCULAR; INTRAVENOUS AS NEEDED
Status: CANCELLED | OUTPATIENT
Start: 2022-05-17

## 2022-01-01 RX ORDER — HEPARIN 100 UNIT/ML
500 SYRINGE INTRAVENOUS ONCE
Status: COMPLETED | OUTPATIENT
Start: 2022-01-01 | End: 2022-01-01

## 2022-01-01 RX ORDER — FLUTICASONE FUROATE, UMECLIDINIUM BROMIDE AND VILANTEROL TRIFENATATE 200; 62.5; 25 UG/1; UG/1; UG/1
1 POWDER RESPIRATORY (INHALATION) DAILY
Qty: 1 EACH | Refills: 0 | Status: SHIPPED | COMMUNITY
Start: 2022-01-01 | End: 2022-01-01 | Stop reason: SDUPTHER

## 2022-01-01 RX ORDER — ONDANSETRON 4 MG/1
8 TABLET, FILM COATED ORAL
Status: DISCONTINUED | OUTPATIENT
Start: 2022-01-01 | End: 2022-01-01 | Stop reason: HOSPADM

## 2022-01-01 RX ORDER — POTASSIUM CHLORIDE 750 MG/1
10 TABLET, EXTENDED RELEASE ORAL
Status: CANCELLED | OUTPATIENT
Start: 2022-01-01

## 2022-01-01 RX ORDER — ALBUTEROL SULFATE 0.83 MG/ML
2.5 SOLUTION RESPIRATORY (INHALATION)
Status: DISCONTINUED | OUTPATIENT
Start: 2022-01-01 | End: 2022-01-01 | Stop reason: HOSPADM

## 2022-01-01 RX ORDER — HEPARIN 100 UNIT/ML
500 SYRINGE INTRAVENOUS AS NEEDED
Status: DISCONTINUED | OUTPATIENT
Start: 2022-01-01 | End: 2022-01-01 | Stop reason: HOSPADM

## 2022-01-01 RX ORDER — PROMETHAZINE HYDROCHLORIDE 25 MG/1
SUPPOSITORY RECTAL
COMMUNITY
Start: 2022-01-01 | End: 2022-01-01

## 2022-01-01 RX ORDER — BUDESONIDE 0.5 MG/2ML
500 INHALANT ORAL
Status: DISCONTINUED | OUTPATIENT
Start: 2022-01-01 | End: 2022-01-01 | Stop reason: HOSPADM

## 2022-01-01 RX ORDER — OMEPRAZOLE 40 MG/1
40 CAPSULE, DELAYED RELEASE ORAL DAILY
Qty: 180 CAPSULE | Refills: 0 | Status: SHIPPED | OUTPATIENT
Start: 2022-01-01 | End: 2022-01-01 | Stop reason: SDUPTHER

## 2022-01-01 RX ORDER — SODIUM CHLORIDE 9 MG/ML
10 INJECTION INTRAMUSCULAR; INTRAVENOUS; SUBCUTANEOUS AS NEEDED
Status: CANCELLED | OUTPATIENT
Start: 2022-05-17

## 2022-01-01 RX ORDER — ARFORMOTEROL TARTRATE 15 UG/2ML
15 SOLUTION RESPIRATORY (INHALATION)
Status: DISCONTINUED | OUTPATIENT
Start: 2022-01-01 | End: 2022-01-01 | Stop reason: HOSPADM

## 2022-01-01 RX ORDER — MORPHINE SULFATE 2 MG/ML
2 INJECTION, SOLUTION INTRAMUSCULAR; INTRAVENOUS
Status: DISCONTINUED | OUTPATIENT
Start: 2022-01-01 | End: 2022-01-01

## 2022-01-01 RX ORDER — SODIUM CHLORIDE 0.9 % (FLUSH) 0.9 %
10 SYRINGE (ML) INJECTION AS NEEDED
Status: DISCONTINUED | OUTPATIENT
Start: 2022-01-01 | End: 2022-01-01 | Stop reason: HOSPADM

## 2022-01-01 RX ORDER — DIPHENHYDRAMINE HYDROCHLORIDE 50 MG/ML
25 INJECTION, SOLUTION INTRAMUSCULAR; INTRAVENOUS AS NEEDED
Status: CANCELLED
Start: 2022-01-01

## 2022-01-01 RX ORDER — MAGNESIUM SULFATE HEPTAHYDRATE 40 MG/ML
2 INJECTION, SOLUTION INTRAVENOUS ONCE
Status: COMPLETED | OUTPATIENT
Start: 2022-01-01 | End: 2022-01-01

## 2022-01-01 RX ORDER — DEXAMETHASONE SODIUM PHOSPHATE 4 MG/ML
6 INJECTION, SOLUTION INTRA-ARTICULAR; INTRALESIONAL; INTRAMUSCULAR; INTRAVENOUS; SOFT TISSUE EVERY 4 HOURS
Status: DISCONTINUED | OUTPATIENT
Start: 2022-01-01 | End: 2022-01-01 | Stop reason: HOSPADM

## 2022-01-01 RX ORDER — MAGNESIUM SULFATE HEPTAHYDRATE 40 MG/ML
2 INJECTION, SOLUTION INTRAVENOUS ONCE
Status: DISCONTINUED | OUTPATIENT
Start: 2022-01-01 | End: 2022-01-01 | Stop reason: SDUPTHER

## 2022-01-01 RX ORDER — FLUDEOXYGLUCOSE F18 300 MCI/ML
10 INJECTION INTRAVENOUS ONCE
Status: COMPLETED | OUTPATIENT
Start: 2022-01-01 | End: 2022-01-01

## 2022-01-01 RX ORDER — MORPHINE SULFATE 2 MG/ML
1 INJECTION, SOLUTION INTRAMUSCULAR; INTRAVENOUS
Status: DISCONTINUED | OUTPATIENT
Start: 2022-01-01 | End: 2022-01-01 | Stop reason: HOSPADM

## 2022-01-01 RX ORDER — HEPARIN SODIUM 5000 [USP'U]/ML
5000 INJECTION, SOLUTION INTRAVENOUS; SUBCUTANEOUS EVERY 8 HOURS
Status: DISCONTINUED | OUTPATIENT
Start: 2022-01-01 | End: 2022-01-01 | Stop reason: HOSPADM

## 2022-01-01 RX ORDER — IPRATROPIUM BROMIDE 0.5 MG/2.5ML
0.5 SOLUTION RESPIRATORY (INHALATION)
Status: DISCONTINUED | OUTPATIENT
Start: 2022-01-01 | End: 2022-01-01

## 2022-01-01 RX ORDER — SODIUM CHLORIDE 9 MG/ML
500 INJECTION, SOLUTION INTRAVENOUS CONTINUOUS
Status: DISCONTINUED | OUTPATIENT
Start: 2022-01-01 | End: 2022-01-01 | Stop reason: HOSPADM

## 2022-01-01 RX ORDER — IPRATROPIUM BROMIDE AND ALBUTEROL SULFATE 2.5; .5 MG/3ML; MG/3ML
3 SOLUTION RESPIRATORY (INHALATION)
Qty: 100 NEBULE | Refills: 0 | Status: SHIPPED | OUTPATIENT
Start: 2022-01-01

## 2022-01-01 RX ORDER — HEPARIN 100 UNIT/ML
SYRINGE INTRAVENOUS
Status: DISCONTINUED
Start: 2022-01-01 | End: 2022-01-01 | Stop reason: HOSPADM

## 2022-01-01 RX ORDER — HEPARIN 100 UNIT/ML
300-500 SYRINGE INTRAVENOUS AS NEEDED
Status: DISCONTINUED | OUTPATIENT
Start: 2022-01-01 | End: 2022-01-01 | Stop reason: HOSPADM

## 2022-01-01 RX ORDER — PANTOPRAZOLE SODIUM 40 MG/1
40 TABLET, DELAYED RELEASE ORAL
Status: DISCONTINUED | OUTPATIENT
Start: 2022-01-01 | End: 2022-01-01 | Stop reason: HOSPADM

## 2022-01-01 RX ORDER — DEXAMETHASONE SODIUM PHOSPHATE 4 MG/ML
10 INJECTION, SOLUTION INTRA-ARTICULAR; INTRALESIONAL; INTRAMUSCULAR; INTRAVENOUS; SOFT TISSUE ONCE
Status: COMPLETED | OUTPATIENT
Start: 2022-01-01 | End: 2022-01-01

## 2022-01-01 RX ORDER — ONDANSETRON HYDROCHLORIDE 8 MG/1
8 TABLET, FILM COATED ORAL
Qty: 30 TABLET | Refills: 0 | Status: SHIPPED | OUTPATIENT
Start: 2022-01-01 | End: 2022-01-01 | Stop reason: SDUPTHER

## 2022-01-01 RX ORDER — FLUTICASONE FUROATE, UMECLIDINIUM BROMIDE AND VILANTEROL TRIFENATATE 200; 62.5; 25 UG/1; UG/1; UG/1
1 POWDER RESPIRATORY (INHALATION) DAILY
Qty: 3 EACH | Refills: 3 | Status: SHIPPED | OUTPATIENT
Start: 2022-01-01

## 2022-01-01 RX ORDER — POTASSIUM CHLORIDE 20 MEQ/1
20 TABLET, EXTENDED RELEASE ORAL 2 TIMES DAILY
Qty: 30 TABLET | Refills: 6 | Status: SHIPPED | OUTPATIENT
Start: 2022-01-01 | End: 2022-01-01

## 2022-01-01 RX ORDER — GABAPENTIN 300 MG/1
300 CAPSULE ORAL 2 TIMES DAILY
Status: DISCONTINUED | OUTPATIENT
Start: 2022-01-01 | End: 2022-01-01 | Stop reason: HOSPADM

## 2022-01-01 RX ORDER — SODIUM CHLORIDE 9 MG/ML
25 INJECTION, SOLUTION INTRAVENOUS CONTINUOUS
Status: DISPENSED | OUTPATIENT
Start: 2022-01-01 | End: 2022-01-01

## 2022-01-01 RX ORDER — POTASSIUM CHLORIDE 20MEQ/15ML
1 LIQUID (ML) ORAL DAILY
Qty: 35 ML | Refills: 0 | Status: SHIPPED | OUTPATIENT
Start: 2022-01-01 | End: 2022-01-01

## 2022-01-01 RX ORDER — ACETAMINOPHEN 325 MG/1
650 TABLET ORAL
Status: DISCONTINUED | OUTPATIENT
Start: 2022-01-01 | End: 2022-01-01 | Stop reason: HOSPADM

## 2022-01-01 RX ORDER — POLYETHYLENE GLYCOL 3350 17 G/17G
17 POWDER, FOR SOLUTION ORAL DAILY PRN
Status: DISCONTINUED | OUTPATIENT
Start: 2022-01-01 | End: 2022-01-01 | Stop reason: HOSPADM

## 2022-01-01 RX ORDER — POLYETHYLENE GLYCOL 3350 17 G/17G
17 POWDER, FOR SOLUTION ORAL DAILY
Qty: 30 PACKET | Refills: 0 | Status: SHIPPED | OUTPATIENT
Start: 2022-01-01

## 2022-01-01 RX ORDER — ALBUTEROL SULFATE 0.83 MG/ML
2.5 SOLUTION RESPIRATORY (INHALATION) AS NEEDED
Status: CANCELLED
Start: 2022-05-17

## 2022-01-01 RX ORDER — SODIUM CHLORIDE 0.9 % (FLUSH) 0.9 %
5-40 SYRINGE (ML) INJECTION AS NEEDED
Status: DISCONTINUED | OUTPATIENT
Start: 2022-01-01 | End: 2022-01-01 | Stop reason: HOSPADM

## 2022-01-01 RX ORDER — SODIUM CHLORIDE 0.9 % (FLUSH) 0.9 %
5-40 SYRINGE (ML) INJECTION EVERY 8 HOURS
Status: DISCONTINUED | OUTPATIENT
Start: 2022-01-01 | End: 2022-01-01 | Stop reason: HOSPADM

## 2022-01-01 RX ORDER — MIDODRINE HYDROCHLORIDE 10 MG/1
10 TABLET ORAL
Qty: 90 TABLET | Refills: 0 | Status: SHIPPED | OUTPATIENT
Start: 2022-01-01 | End: 2022-06-04

## 2022-01-01 RX ORDER — ONDANSETRON HYDROCHLORIDE 8 MG/1
8 TABLET, FILM COATED ORAL
Qty: 30 TABLET | Refills: 0 | Status: SHIPPED | OUTPATIENT
Start: 2022-01-01

## 2022-01-01 RX ORDER — ACETAMINOPHEN 325 MG/1
650 TABLET ORAL AS NEEDED
Status: CANCELLED
Start: 2022-05-17

## 2022-01-01 RX ORDER — MELATONIN
5000 DAILY
Status: DISCONTINUED | OUTPATIENT
Start: 2022-01-01 | End: 2022-01-01 | Stop reason: HOSPADM

## 2022-01-01 RX ORDER — MORPHINE SULFATE 20 MG/ML
5 SOLUTION ORAL
Qty: 30 ML | Refills: 0 | Status: SHIPPED | OUTPATIENT
Start: 2022-01-01 | End: 2022-05-15

## 2022-01-01 RX ORDER — IPRATROPIUM BROMIDE 0.5 MG/2.5ML
0.5 SOLUTION RESPIRATORY (INHALATION)
Status: DISCONTINUED | OUTPATIENT
Start: 2022-01-01 | End: 2022-01-01 | Stop reason: HOSPADM

## 2022-01-01 RX ORDER — BUMETANIDE 1 MG/1
0.5 TABLET ORAL DAILY
Status: DISCONTINUED | OUTPATIENT
Start: 2022-01-01 | End: 2022-01-01 | Stop reason: HOSPADM

## 2022-01-01 RX ORDER — SODIUM CHLORIDE 9 MG/ML
75 INJECTION, SOLUTION INTRAVENOUS CONTINUOUS
Status: DISCONTINUED | OUTPATIENT
Start: 2022-01-01 | End: 2022-01-01

## 2022-01-01 RX ORDER — FAMOTIDINE 10 MG/1
10 TABLET ORAL
COMMUNITY
End: 2022-01-01

## 2022-01-01 RX ORDER — BUMETANIDE 0.5 MG/1
0.5 TABLET ORAL DAILY
Qty: 30 TABLET | Refills: 6 | Status: SHIPPED | OUTPATIENT
Start: 2022-01-01 | End: 2022-01-01

## 2022-01-01 RX ORDER — SODIUM CHLORIDE 9 MG/ML
1000 INJECTION, SOLUTION INTRAVENOUS ONCE
Status: COMPLETED | OUTPATIENT
Start: 2022-01-01 | End: 2022-01-01

## 2022-01-01 RX ORDER — FACIAL-BODY WIPES
10 EACH TOPICAL
Status: COMPLETED | OUTPATIENT
Start: 2022-01-01 | End: 2022-01-01

## 2022-01-01 RX ORDER — FERROUS SULFATE, DRIED 160(50) MG
1 TABLET, EXTENDED RELEASE ORAL DAILY
Status: DISCONTINUED | OUTPATIENT
Start: 2022-01-01 | End: 2022-01-01 | Stop reason: HOSPADM

## 2022-01-01 RX ORDER — SODIUM CHLORIDE 0.9 % (FLUSH) 0.9 %
10 SYRINGE (ML) INJECTION AS NEEDED
Status: CANCELLED | OUTPATIENT
Start: 2022-05-17

## 2022-01-01 RX ORDER — FERROUS SULFATE, DRIED 160(50) MG
1 TABLET, EXTENDED RELEASE ORAL DAILY
COMMUNITY

## 2022-01-01 RX ORDER — DIPHENHYDRAMINE HYDROCHLORIDE 50 MG/ML
25 INJECTION, SOLUTION INTRAMUSCULAR; INTRAVENOUS AS NEEDED
Status: CANCELLED
Start: 2022-05-17

## 2022-01-01 RX ORDER — ONDANSETRON 2 MG/ML
8 INJECTION INTRAMUSCULAR; INTRAVENOUS AS NEEDED
Status: CANCELLED | OUTPATIENT
Start: 2022-05-17

## 2022-01-01 RX ADMIN — BARIUM SULFATE 176 G: 960 POWDER, FOR SUSPENSION ORAL at 11:30

## 2022-01-01 RX ADMIN — DEXAMETHASONE SODIUM PHOSPHATE 10 MG: 4 INJECTION, SOLUTION INTRAMUSCULAR; INTRAVENOUS at 18:46

## 2022-01-01 RX ADMIN — HEPARIN SODIUM 5000 UNITS: 5000 INJECTION INTRAVENOUS; SUBCUTANEOUS at 03:21

## 2022-01-01 RX ADMIN — SODIUM CHLORIDE, PRESERVATIVE FREE 10 ML: 5 INJECTION INTRAVENOUS at 22:26

## 2022-01-01 RX ADMIN — DEXAMETHASONE SODIUM PHOSPHATE 6 MG: 4 INJECTION, SOLUTION INTRA-ARTICULAR; INTRALESIONAL; INTRAMUSCULAR; INTRAVENOUS; SOFT TISSUE at 22:24

## 2022-01-01 RX ADMIN — MIDODRINE HYDROCHLORIDE 10 MG: 5 TABLET ORAL at 10:36

## 2022-01-01 RX ADMIN — DEXAMETHASONE SODIUM PHOSPHATE 6 MG: 4 INJECTION, SOLUTION INTRA-ARTICULAR; INTRALESIONAL; INTRAMUSCULAR; INTRAVENOUS; SOFT TISSUE at 07:24

## 2022-01-01 RX ADMIN — DENOSUMAB 120 MG: 120 INJECTION SUBCUTANEOUS at 14:31

## 2022-01-01 RX ADMIN — BISACODYL 10 MG: 10 SUPPOSITORY RECTAL at 13:00

## 2022-01-01 RX ADMIN — EPOETIN ALFA-EPBX 60000 UNITS: 20000 INJECTION, SOLUTION INTRAVENOUS; SUBCUTANEOUS at 10:38

## 2022-01-01 RX ADMIN — DEXAMETHASONE SODIUM PHOSPHATE 6 MG: 4 INJECTION, SOLUTION INTRA-ARTICULAR; INTRALESIONAL; INTRAMUSCULAR; INTRAVENOUS; SOFT TISSUE at 10:36

## 2022-01-01 RX ADMIN — GABAPENTIN 300 MG: 300 CAPSULE ORAL at 18:47

## 2022-01-01 RX ADMIN — Medication 5000 UNITS: at 10:35

## 2022-01-01 RX ADMIN — ARFORMOTEROL TARTRATE 15 MCG: 15 SOLUTION RESPIRATORY (INHALATION) at 07:26

## 2022-01-01 RX ADMIN — SODIUM CHLORIDE 50 ML/HR: 9 INJECTION, SOLUTION INTRAVENOUS at 18:00

## 2022-01-01 RX ADMIN — HEPARIN SODIUM 5000 UNITS: 5000 INJECTION INTRAVENOUS; SUBCUTANEOUS at 10:36

## 2022-01-01 RX ADMIN — SODIUM CHLORIDE 25 ML/HR: 9 INJECTION, SOLUTION INTRAVENOUS at 10:34

## 2022-01-01 RX ADMIN — MIDODRINE HYDROCHLORIDE 10 MG: 5 TABLET ORAL at 14:11

## 2022-01-01 RX ADMIN — HEPARIN SODIUM 5000 UNITS: 5000 INJECTION INTRAVENOUS; SUBCUTANEOUS at 11:44

## 2022-01-01 RX ADMIN — SODIUM CHLORIDE, PRESERVATIVE FREE 10 ML: 5 INJECTION INTRAVENOUS at 13:15

## 2022-01-01 RX ADMIN — HEPARIN 500 UNITS: 100 SYRINGE at 12:49

## 2022-01-01 RX ADMIN — SODIUM CHLORIDE, PRESERVATIVE FREE 10 ML: 5 INJECTION INTRAVENOUS at 13:48

## 2022-01-01 RX ADMIN — MORPHINE SULFATE 1 MG: 2 INJECTION, SOLUTION INTRAMUSCULAR; INTRAVENOUS at 17:08

## 2022-01-01 RX ADMIN — DEXAMETHASONE SODIUM PHOSPHATE 6 MG: 4 INJECTION, SOLUTION INTRA-ARTICULAR; INTRALESIONAL; INTRAMUSCULAR; INTRAVENOUS; SOFT TISSUE at 06:34

## 2022-01-01 RX ADMIN — FULVESTRANT 500 MG: 50 INJECTION, SOLUTION INTRAMUSCULAR at 14:32

## 2022-01-01 RX ADMIN — HEPARIN SODIUM 5000 UNITS: 5000 INJECTION INTRAVENOUS; SUBCUTANEOUS at 02:05

## 2022-01-01 RX ADMIN — ARFORMOTEROL TARTRATE 15 MCG: 15 SOLUTION RESPIRATORY (INHALATION) at 20:09

## 2022-01-01 RX ADMIN — IOPAMIDOL 64 ML: 755 INJECTION, SOLUTION INTRAVENOUS at 17:47

## 2022-01-01 RX ADMIN — HEPARIN 500 UNITS: 100 SYRINGE at 14:05

## 2022-01-01 RX ADMIN — Medication 5000 UNITS: at 11:43

## 2022-01-01 RX ADMIN — FULVESTRANT 500 MG: 50 INJECTION, SOLUTION INTRAMUSCULAR at 14:13

## 2022-01-01 RX ADMIN — IPRATROPIUM BROMIDE 0.5 MG: 0.5 SOLUTION RESPIRATORY (INHALATION) at 14:11

## 2022-01-01 RX ADMIN — Medication 1 TABLET: at 11:43

## 2022-01-01 RX ADMIN — Medication 1 TABLET: at 09:09

## 2022-01-01 RX ADMIN — ALBUTEROL SULFATE 2.5 MG: 2.5 SOLUTION RESPIRATORY (INHALATION) at 17:07

## 2022-01-01 RX ADMIN — FULVESTRANT 500 MG: 50 INJECTION, SOLUTION INTRAMUSCULAR at 15:02

## 2022-01-01 RX ADMIN — SODIUM CHLORIDE, PRESERVATIVE FREE 30 ML: 5 INJECTION INTRAVENOUS at 12:49

## 2022-01-01 RX ADMIN — SODIUM CHLORIDE, PRESERVATIVE FREE 10 ML: 5 INJECTION INTRAVENOUS at 13:45

## 2022-01-01 RX ADMIN — GADOTERATE MEGLUMINE 12 ML: 376.9 INJECTION INTRAVENOUS at 10:23

## 2022-01-01 RX ADMIN — BUDESONIDE 500 MCG: 0.5 SUSPENSION RESPIRATORY (INHALATION) at 10:20

## 2022-01-01 RX ADMIN — SODIUM CHLORIDE 1000 ML: 9 INJECTION, SOLUTION INTRAVENOUS at 13:25

## 2022-01-01 RX ADMIN — HEPARIN SODIUM 5000 UNITS: 5000 INJECTION INTRAVENOUS; SUBCUTANEOUS at 09:09

## 2022-01-01 RX ADMIN — DEXAMETHASONE SODIUM PHOSPHATE 6 MG: 4 INJECTION, SOLUTION INTRA-ARTICULAR; INTRALESIONAL; INTRAMUSCULAR; INTRAVENOUS; SOFT TISSUE at 14:11

## 2022-01-01 RX ADMIN — ARFORMOTEROL TARTRATE 15 MCG: 15 SOLUTION RESPIRATORY (INHALATION) at 10:20

## 2022-01-01 RX ADMIN — IPRATROPIUM BROMIDE 0.5 MG: 0.5 SOLUTION RESPIRATORY (INHALATION) at 20:09

## 2022-01-01 RX ADMIN — BARIUM SULFATE 60 G: 960 POWDER, FOR SUSPENSION ORAL at 13:15

## 2022-01-01 RX ADMIN — HEPARIN SODIUM 5000 UNITS: 5000 INJECTION INTRAVENOUS; SUBCUTANEOUS at 01:49

## 2022-01-01 RX ADMIN — DENOSUMAB 120 MG: 120 INJECTION SUBCUTANEOUS at 10:44

## 2022-01-01 RX ADMIN — HEPARIN 500 UNITS: 100 SYRINGE at 15:32

## 2022-01-01 RX ADMIN — DEXAMETHASONE SODIUM PHOSPHATE 6 MG: 4 INJECTION, SOLUTION INTRA-ARTICULAR; INTRALESIONAL; INTRAMUSCULAR; INTRAVENOUS; SOFT TISSUE at 23:38

## 2022-01-01 RX ADMIN — DEXAMETHASONE SODIUM PHOSPHATE 6 MG: 4 INJECTION, SOLUTION INTRA-ARTICULAR; INTRALESIONAL; INTRAMUSCULAR; INTRAVENOUS; SOFT TISSUE at 03:18

## 2022-01-01 RX ADMIN — SODIUM CHLORIDE, PRESERVATIVE FREE 10 ML: 5 INJECTION INTRAVENOUS at 13:20

## 2022-01-01 RX ADMIN — HEPARIN SODIUM (PORCINE) LOCK FLUSH IV SOLN 100 UNIT/ML 500 UNITS: 100 SOLUTION at 14:32

## 2022-01-01 RX ADMIN — SODIUM CHLORIDE, PRESERVATIVE FREE 30 ML: 5 INJECTION INTRAVENOUS at 14:32

## 2022-01-01 RX ADMIN — MORPHINE SULFATE 2 MG: 2 INJECTION, SOLUTION INTRAMUSCULAR; INTRAVENOUS at 06:38

## 2022-01-01 RX ADMIN — GABAPENTIN 300 MG: 300 CAPSULE ORAL at 10:35

## 2022-01-01 RX ADMIN — PANTOPRAZOLE SODIUM 40 MG: 40 TABLET, DELAYED RELEASE ORAL at 06:34

## 2022-01-01 RX ADMIN — SODIUM CHLORIDE, PRESERVATIVE FREE 10 ML: 5 INJECTION INTRAVENOUS at 07:24

## 2022-01-01 RX ADMIN — SODIUM CHLORIDE 75 ML/HR: 900 INJECTION, SOLUTION INTRAVENOUS at 06:36

## 2022-01-01 RX ADMIN — HEPARIN SODIUM 5000 UNITS: 5000 INJECTION INTRAVENOUS; SUBCUTANEOUS at 19:15

## 2022-01-01 RX ADMIN — SODIUM CHLORIDE 25 ML/HR: 9 INJECTION, SOLUTION INTRAVENOUS at 13:20

## 2022-01-01 RX ADMIN — DEXAMETHASONE SODIUM PHOSPHATE 6 MG: 4 INJECTION, SOLUTION INTRA-ARTICULAR; INTRALESIONAL; INTRAMUSCULAR; INTRAVENOUS; SOFT TISSUE at 17:09

## 2022-01-01 RX ADMIN — IPRATROPIUM BROMIDE 0.5 MG: 0.5 SOLUTION RESPIRATORY (INHALATION) at 07:26

## 2022-01-01 RX ADMIN — SODIUM CHLORIDE, PRESERVATIVE FREE 10 ML: 5 INJECTION INTRAVENOUS at 02:02

## 2022-01-01 RX ADMIN — EPOETIN ALFA-EPBX 60000 UNITS: 20000 INJECTION, SOLUTION INTRAVENOUS; SUBCUTANEOUS at 14:06

## 2022-01-01 RX ADMIN — IRON SUCROSE 200 MG: 20 INJECTION, SOLUTION INTRAVENOUS at 19:15

## 2022-01-01 RX ADMIN — PANTOPRAZOLE SODIUM 40 MG: 40 TABLET, DELAYED RELEASE ORAL at 06:44

## 2022-01-01 RX ADMIN — GABAPENTIN 300 MG: 300 CAPSULE ORAL at 09:09

## 2022-01-01 RX ADMIN — SODIUM CHLORIDE, PRESERVATIVE FREE 30 ML: 5 INJECTION INTRAVENOUS at 10:15

## 2022-01-01 RX ADMIN — DEXAMETHASONE SODIUM PHOSPHATE 6 MG: 4 INJECTION, SOLUTION INTRA-ARTICULAR; INTRALESIONAL; INTRAMUSCULAR; INTRAVENOUS; SOFT TISSUE at 11:44

## 2022-01-01 RX ADMIN — DEXAMETHASONE SODIUM PHOSPHATE 6 MG: 4 INJECTION, SOLUTION INTRA-ARTICULAR; INTRALESIONAL; INTRAMUSCULAR; INTRAVENOUS; SOFT TISSUE at 01:49

## 2022-01-01 RX ADMIN — IRON SUCROSE 300 MG: 20 INJECTION, SOLUTION INTRAVENOUS at 11:05

## 2022-01-01 RX ADMIN — SODIUM CHLORIDE 25 ML/HR: 9 INJECTION, SOLUTION INTRAVENOUS at 13:42

## 2022-01-01 RX ADMIN — BUDESONIDE 500 MCG: 0.5 SUSPENSION RESPIRATORY (INHALATION) at 20:09

## 2022-01-01 RX ADMIN — BARIUM SULFATE 700 MG: 700 TABLET ORAL at 13:15

## 2022-01-01 RX ADMIN — BARIUM SULFATE 30 ML: 400 PASTE ORAL at 13:15

## 2022-01-01 RX ADMIN — MORPHINE SULFATE 2 MG: 2 INJECTION, SOLUTION INTRAMUSCULAR; INTRAVENOUS at 02:01

## 2022-01-01 RX ADMIN — GABAPENTIN 300 MG: 300 CAPSULE ORAL at 19:15

## 2022-01-01 RX ADMIN — SODIUM CHLORIDE, PRESERVATIVE FREE 10 ML: 5 INJECTION INTRAVENOUS at 14:05

## 2022-01-01 RX ADMIN — SODIUM CHLORIDE 500 ML/HR: 0.9 INJECTION, SOLUTION INTRAVENOUS at 11:25

## 2022-01-01 RX ADMIN — PANTOPRAZOLE SODIUM 40 MG: 40 TABLET, DELAYED RELEASE ORAL at 22:23

## 2022-01-01 RX ADMIN — Medication 1 TABLET: at 10:36

## 2022-01-01 RX ADMIN — BARIUM SULFATE 135 ML: 980 POWDER, FOR SUSPENSION ORAL at 11:31

## 2022-01-01 RX ADMIN — GABAPENTIN 300 MG: 300 CAPSULE ORAL at 11:44

## 2022-01-01 RX ADMIN — HEPARIN SODIUM 5000 UNITS: 5000 INJECTION INTRAVENOUS; SUBCUTANEOUS at 18:46

## 2022-01-01 RX ADMIN — BARIUM SULFATE 60 ML: 400 SUSPENSION ORAL at 13:15

## 2022-01-01 RX ADMIN — MIDODRINE HYDROCHLORIDE 10 MG: 5 TABLET ORAL at 11:46

## 2022-01-01 RX ADMIN — IRON SUCROSE 300 MG: 20 INJECTION, SOLUTION INTRAVENOUS at 13:46

## 2022-01-01 RX ADMIN — FULVESTRANT 500 MG: 50 INJECTION, SOLUTION INTRAMUSCULAR at 10:50

## 2022-01-01 RX ADMIN — DENOSUMAB 120 MG: 120 INJECTION SUBCUTANEOUS at 14:07

## 2022-01-01 RX ADMIN — Medication 5000 UNITS: at 09:09

## 2022-01-01 RX ADMIN — SODIUM CHLORIDE, PRESERVATIVE FREE 10 ML: 5 INJECTION INTRAVENOUS at 14:00

## 2022-01-01 RX ADMIN — IRON SUCROSE 200 MG: 20 INJECTION, SOLUTION INTRAVENOUS at 18:57

## 2022-01-01 RX ADMIN — SODIUM CHLORIDE, PRESERVATIVE FREE 10 ML: 5 INJECTION INTRAVENOUS at 06:09

## 2022-01-01 RX ADMIN — MAGNESIUM SULFATE 2 G: 2 INJECTION INTRAVENOUS at 20:20

## 2022-01-01 RX ADMIN — SODIUM CHLORIDE, PRESERVATIVE FREE 10 ML: 5 INJECTION INTRAVENOUS at 14:12

## 2022-01-01 RX ADMIN — SODIUM CHLORIDE 75 ML/HR: 900 INJECTION, SOLUTION INTRAVENOUS at 02:02

## 2022-01-01 RX ADMIN — HEPARIN 500 UNITS: 100 SYRINGE at 15:00

## 2022-01-01 RX ADMIN — EPOETIN ALFA-EPBX 60000 UNITS: 20000 INJECTION, SOLUTION INTRAVENOUS; SUBCUTANEOUS at 15:10

## 2022-01-01 RX ADMIN — IRON SUCROSE 400 MG: 20 INJECTION, SOLUTION INTRAVENOUS at 13:20

## 2022-01-01 RX ADMIN — MIDODRINE HYDROCHLORIDE 10 MG: 5 TABLET ORAL at 19:15

## 2022-01-01 RX ADMIN — SODIUM CHLORIDE, PRESERVATIVE FREE 10 ML: 5 INJECTION INTRAVENOUS at 13:40

## 2022-01-01 RX ADMIN — SODIUM CHLORIDE, PRESERVATIVE FREE 10 ML: 5 INJECTION INTRAVENOUS at 23:38

## 2022-01-01 RX ADMIN — FLUDEOXYGLUCOSE F18 7.29 MILLICURIE: 300 INJECTION INTRAVENOUS at 11:39

## 2022-01-01 RX ADMIN — SODIUM CHLORIDE, PRESERVATIVE FREE 20 ML: 5 INJECTION INTRAVENOUS at 15:00

## 2022-01-01 RX ADMIN — SODIUM CHLORIDE, PRESERVATIVE FREE 10 ML: 5 INJECTION INTRAVENOUS at 11:20

## 2022-01-01 RX ADMIN — SODIUM CHLORIDE, PRESERVATIVE FREE 10 ML: 5 INJECTION INTRAVENOUS at 17:18

## 2022-01-01 RX ADMIN — HEPARIN SODIUM (PORCINE) LOCK FLUSH IV SOLN 100 UNIT/ML 500 UNITS: 100 SOLUTION at 13:48

## 2022-01-01 RX ADMIN — DENOSUMAB 120 MG: 120 INJECTION SUBCUTANEOUS at 14:37

## 2022-01-01 RX ADMIN — SODIUM CHLORIDE, PRESERVATIVE FREE 10 ML: 5 INJECTION INTRAVENOUS at 10:36

## 2022-01-01 RX ADMIN — BUDESONIDE 500 MCG: 0.5 SUSPENSION RESPIRATORY (INHALATION) at 07:26

## 2022-01-01 RX ADMIN — HEPARIN 500 UNITS: 100 SYRINGE at 16:10

## 2022-01-01 RX ADMIN — IPRATROPIUM BROMIDE 0.5 MG: 0.5 SOLUTION RESPIRATORY (INHALATION) at 10:20

## 2022-01-01 RX ADMIN — IPRATROPIUM BROMIDE 0.5 MG: 0.5 SOLUTION RESPIRATORY (INHALATION) at 14:02

## 2022-01-01 RX ADMIN — DEXAMETHASONE SODIUM PHOSPHATE 6 MG: 4 INJECTION, SOLUTION INTRA-ARTICULAR; INTRALESIONAL; INTRAMUSCULAR; INTRAVENOUS; SOFT TISSUE at 19:15

## 2022-01-14 NOTE — ADDENDUM NOTE
Encounter addended by: Galileo Shabazz RN on: 1/14/2022 11:26 AM   Actions taken: Charge Capture section accepted

## 2022-01-20 NOTE — PROGRESS NOTES
[facility] Progress Note    Date: 2022    Name: Betty Lindsey    MRN: [id number]         : 1945    Ms. Figueroa arrived in the [facility] today at 1310, just after her office visit with Dr. Alma Han, here for IV Hydration. She was assessed and education was provided. Ms. Stephen Cooley vitals were reviewed. Visit Vitals  /68 (BP 1 Location: Left upper arm, BP Patient Position: Sitting)   Pulse 90   Temp 98 °F (36.7 °C)   Resp 16   SpO2 96%   Breastfeeding No         Ms. Franci Velasco stated that she really hasn't been feeling very well lately, and has had sporadic episodes of nausea & vomiting, along with feeling very tired & weak, and having a decreased appetite. Her left chest single lumen port was accessed without incident at 1320, and brisk blood return was obtained. 1 Liter NS IV Hydration was administered over approximately 95 minutes, per order and without incident. After completion of the IV Hydration, her port was flushed well per policy with NS & Heparin, and then, the carbone needle was removed and gauze/bandaid was applied. Ms. Franci Velasco tolerated well and voiced no additional complaints. Ms. Franci Velasco was discharged from [place] [address] in stable condition at 1510. She is to return on Tuesday, 22 at 1300, for her next appointment, for Daxa Shankar, & LABS.      Sachin Williamson RN  2022  1:50 PM

## 2022-01-20 NOTE — PROGRESS NOTES
Hematology/Oncology  Progress Note    Name: Naomie Ashley  Date: 2022  : 1945    PCP: Cem Najera MD     Ms. Tenzin Wilson is a 68 y.o.  female who was seen for management of her metastatic breast cancer with associated complications of chemotherapy. Current therapy: Fulvestrant 500 mg subcutaneous monthly and Ibrance 125 mg by mouth daily. Subjective:     Mrs. Tenzin Wilson is a 49-year-old woman who has slowly progressive metastatic breast cancer. Patient was being followed by Dr. Vidya Mcdonnell who retired. She is here today for chemo follow up. She was accompanied by her . The patient has previously completed external beam radiation therapy to lesions in her ribs and more recently she completed proton therapy to areas of bone involvement with a significant benefit with regards to pain control. Additionally she is currently receiving systemic therapy with fulvestrant and Ibrance at reduced dose. Patient reported she is tolerating the systemic therapy reasonably well and has not experienced any nausea or emesis. Today the patient reported not feeling very well lately with fatigue and poor appetite. She has had sporadic episodes of nausea & vomiting. . She has resumed Procrit at 4 week intervals whenever her hemoglobin is below 10 g/dL and hematocrit is below 30%. Her neuropathy reported with tinging and numbness which not really improved with Neurontin 200 mg TID. She denied other complaints. Past medical history, family history, and social history: these were reviewed and remains unchanged.     Past Medical History:   Diagnosis Date    Biliary colic     Breast CA ([address] 2012    Cancer [facility]) 1991    Right Breast    Chemotherapy convalescence or palliative care     Neuropathy     Radiation therapy complication     Thyroid disease      Past Surgical History:   Procedure Laterality Date    HX LAP CHOLECYSTECTOMY  13    HX MASTECTOMY  1991    Right    MD BREAST SURGERY PROCEDURE UNLISTED  10/2002    Right-Lesion    [facility] BREAST SURGERY PROCEDURE UNLISTED  2004    Right-Lesion     Social History     Socioeconomic History    Marital status:      Spouse name: Not on file    Number of children: Not on file    Years of education: Not on file    Highest education level: Not on file   Occupational History    Not on file   Tobacco Use    Smoking status: Former Smoker     Quit date: 1991     Years since quittin.6    Smokeless tobacco: Never Used   Substance and Sexual Activity    Alcohol use: Yes     Alcohol/week: 0.0 standard drinks     Types: 1 - 2 Glasses of wine per week     Comment: socially, occassionally    Drug use: No    Sexual activity: Not on file   Other Topics Concern    Not on file   Social History Narrative    Not on file     Social Determinants of Health     Financial Resource Strain:     Difficulty of Paying Living Expenses: Not on file   Food Insecurity:     Worried About Running Out of Food in the Last Year: Not on file    Max of Food in the Last Year: Not on file   Transportation Needs:     Lack of Transportation (Medical): Not on file    Lack of Transportation (Non-Medical):  Not on file   Physical Activity:     Days of Exercise per Week: Not on file    Minutes of Exercise per Session: Not on file   Stress:     Feeling of Stress : Not on file   Social Connections:     Frequency of Communication with Friends and Family: Not on file    Frequency of Social Gatherings with Friends and Family: Not on file    Attends Religion Services: Not on file    Active Member of Clubs or Organizations: Not on file    Attends Club or Organization Meetings: Not on file    Marital Status: Not on file   Intimate Partner Violence:     Fear of Current or Ex-Partner: Not on file    Emotionally Abused: Not on file    Physically Abused: Not on file    Sexually Abused: Not on file   Housing Stability:     Unable to Pay for Housing in the Last Year: Not on file    Number of Places Lived in the Last Year: Not on file    Unstable Housing in the Last Year: Not on file     Family History   Problem Relation Age of Onset    Cancer Maternal Aunt         Hodgkin's disease    Breast Cancer Paternal Aunt     Cancer Father         Prostate, lung, brain     Current Outpatient Medications   Medication Sig Dispense Refill    gabapentin (NEURONTIN) 100 mg capsule TAKE 2 CAPSULES THREE TIMES A  Capsule 3    palbociclib 75 mg tab Take 75 mg by mouth daily. for 21 days then 7 days off 63 Each 5    albuterol (PROVENTIL HFA, VENTOLIN HFA, PROAIR HFA) 90 mcg/actuation inhaler inhale 2 puffs by mouth and INTO THE LUNGS four times a day if needed      fluticasone propionate (FLONASE) 50 mcg/actuation nasal spray instill 2 sprays into each nostril daily      DULoxetine (CYMBALTA) 30 mg capsule Take 1 Capsule by mouth daily. For one week, then 2 capsules daily 60 Capsule 5    calcium citrate 200 mg (950 mg) tablet Take  by mouth daily.  Cetirizine (ZYRTEC) 10 mg cap Take 1 Cap by mouth daily as needed.  lidocaine HCl-hydrocortison ac topical cream Apply  to affected area two (2) times a day. 85 g 3    TETRACYCLINE HCL (TETRACYCLINE PO) Take 250 mg by mouth two (2) times a day.  fexofenadine-pseudoephedrine (ALLEGRA-D 24 HOUR) 180-240 mg per tablet Take 1 Tab by mouth daily as needed.  Cholecalciferol, Vitamin D3, 5,000 unit Tab Take  by mouth daily.  thyroid, Pork, (ARMOUR THYROID) 60 mg tablet Take 60 mg by mouth daily.  L-Mfolate-B6 Phos-Methyl-B12 (NEURPATH-B) 3-35-2 mg Tab Take  by mouth two (2) times a day.  warfarin (COUMADIN) 1 mg tablet Take 1 mg by mouth every other day. Review of Systems   Constitutional: Positive for malaise/fatigue. Negative for chills, diaphoresis, fever and weight loss. Respiratory: Negative for cough, hemoptysis, shortness of breath and wheezing.     Cardiovascular: Negative for chest pain, palpitations and leg swelling. Gastrointestinal: Negative for abdominal pain, diarrhea, heartburn, nausea and vomiting. Genitourinary: Negative for dysuria, frequency, hematuria and urgency. Musculoskeletal: Negative for joint pain and myalgias. Skin: Negative for itching and rash. Neurological: Positive for headaches. Negative for dizziness, seizures and weakness. Psychiatric/Behavioral: Negative for depression. The patient does not have insomnia. Objective:     Visit Vitals  BP (!) 161/82 (BP 1 Location: Left upper arm, BP Patient Position: Sitting)   Pulse 99   Temp 97.2 °F (36.2 °C) (Temporal)   Ht 5' 4\" (1.626 m)   Wt 55.3 kg (122 lb)   SpO2 97%   BMI 20.94 kg/m²       ECOG Performance Status (grade): 1  0 - able to carry on all pre-disease activity w/out restriction  1 - restricted but able to carry out light work  2 - ambulatory and can self- care but unable to carry out work  3 - bed or chair >50% of waking hours  4 - completely disable, total care, confined to bed or chair    Physical Exam  Constitutional:       Appearance: Normal appearance. HENT:      Head: Normocephalic and atraumatic. Eyes:      Pupils: Pupils are equal, round, and reactive to light. Cardiovascular:      Rate and Rhythm: Normal rate and regular rhythm. Heart sounds: Normal heart sounds. Pulmonary:      Effort: Pulmonary effort is normal.      Breath sounds: Normal breath sounds. Abdominal:      General: Bowel sounds are normal.      Palpations: Abdomen is soft. Tenderness: There is no abdominal tenderness. There is no guarding. Musculoskeletal:         General: Normal range of motion. Cervical back: Neck supple. Right lower leg: No edema. Left lower leg: No edema. Skin:     General: Skin is warm. Neurological:      General: No focal deficit present. Mental Status: She is alert and oriented to person, place, and time. Mental status is at baseline. Diagnostics:      No results found for this or any previous visit (from the past 96 hour(s)). Imaging:  No results found for this or any previous visit. Results for orders placed during the hospital encounter of 12/17/20    XR SPINE THORAC 2 V    Narrative  XR SPINE [address] 2 V: 12/17/2020 2:45 PM    CLINICAL INFORMATION  back pain. History of breast cancer    COMPARISON  Thoracic spine MRI 23 June 2017, CT chest, abdomen and pelvis 17 April 2020    TECHNIQUE  2 views of the thoracic spine    FINDINGS    Levoconvex curvature of the upper lumbar spine. Mild/moderate multilevel discogenic endplate degenerative changes throughout the  thoracic spine. No fracture or destructive osseous lesions identified. Impression  IMPRESSION:  No acute osseous findings. If continued clinical concern, recommend MRI. Results for orders placed in visit on 08/03/21    CT CHEST ABD PELV W CONT    Narrative  CT CHEST, ABDOMEN AND PELVIS WITH ENHANCEMENT    INDICATION: 80-year-old female with history of metastatic breast cancer. 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 TECHNIQUE: Axial images obtained of the chest, abdomen and pelvis following the  uneventful administration 80 cc of Isovue 300 intravenous contrast.  Coronal and  sagittal reformatted images of the abdomen and pelvis were obtained. All CT scans at this facility are performed using dose optimization technique as  appropriate to a performed exam, to include automated exposure control,  adjustment of the mA and/or kV according to patient size (including appropriate  matching first site-specific examinations), or use of iterative reconstruction  technique. COMPARISON: 1/18/2021., 4/9/2019    CHEST FINDINGS:  Thyroid: Unremarkable    Lymph nodes: No lymphadenopathy. Cardiovascular: Left chest port present with tip along the SVC. Heart size  within normal limits. No pericardial effusion.  Calcifications present in the  coronary arteries and thoracic aorta. Airways: Central airways are patent. Lungs/pleura. Left lung is clear. Similar-appearing volume loss along the right  hemithorax with lenticular densities along the anterior and posterior hemithorax  is related to atelectasis versus fibrosis. Chronic small right pleural effusion  with smooth pleural thickening is noted. No new or enlarging nodule. Chest wall: Status post right breast mastectomy with implant reconstruction. Increased soft tissue density noted in the subcutaneous tissues along the  anterior inferior border of the sternum measuring approximately 2.6 x 1.6 x 2.6  cm (axial image 40). ABDOMEN FINDINGS:    Liver: Hepatic steatosis  Spleen: Unremarkable. Pancreas: Unremarkable. Biliary: Status post cholecystectomy. No biliary ductal dilatation. Bowel: No dilated loops of bowel. Colonic diverticulosis. No evidence of acute  diverticulitis. Peritoneum/ Retroperitoneum: Unremarkable. Lymph Nodes: Unremarkable. Adrenal Glands: Unremarkable. Kidneys: Unremarkable. Vessels: Vascular calcifications present in the abdominal aorta. PELVIS FINDINGS:    Bladder/ Pelvic Organs: Uterus and bilateral ovaries are present. Bladder is  decompressed    Bones: Increased sclerosis along the right sixth and seventh anterolateral ribs  is again noted. Heterogeneous expansile appearance of the T12 lamina and pedicle  again noted. Chronic appearing sclerotic density present in the left femoral  head. No new lesions. Degenerative changes in the spine. Impression  1. New nonspecific soft tissue density noted along the anterior inferior aspect  of the sternum. 2. Otherwise no significant interval change in the chest. Volume loss on the  right hemithorax with areas of fibrosis/atelectasis and chronic pleural  effusion. No new or enlarging nodule. 3. Hepatic steatosis    4. Similar-appearing of sclerotic and heterogeneous osseous foci as described  above. Assessment:     1. Metastatic breast cancer ([facility] [address]    2. Malignant neoplasm of lower-inner quadrant of right breast of female, estrogen receptor positive ([facility] [address]    3. 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 Anemia due to chemotherapy    5. Iron deficiency anemia due to dietary causes    6. Neuropathy associated with cancer ([facility] [address]    7. Chemotherapy induced neutropenia (HCC)      Plan:   Metastatic breast cancer:     -- She has slowly progressive metastatic breast cancer. Patient was being followed by Dr. Sarah Germain who retired. The patient has previously completed external beam radiation therapy to lesions in her ribs and more recently she completed proton therapy to areas of bone involvement with a significant benefit with regards to pain control. The posttherapy imaging studies showed a significant decrease in the intensity of uptake on the bone scan. -- Additionally she is currently receiving systemic therapy with fulvestrant and Ibrance. She has been on Fulvestrant 500 mg subcutaneous monthly and Ibrance 100 mg by mouth daily. She is tolerating treatment fairly well without high grade toxicities. -- 4/15/2020  CT scans of the chest, abdomen, and pelvis shows that she has a loculated right pleural effusion with some pleural thickening and adjacent areas of probable rounded atelectasis all of which are stable. She has relatively stable bone lesions of the rib, pelvis, and T12 posterior element. There was no findings on CT scan to support new metastatic disease in the chest, abdomen, or pelvis. --  Mammogram 6/5 done shows No evidence of malignancy. Suggest routine follow-up. -- 12/17/2020 CBC reported hemoglobin was 10.8, hematocrit was 32.8%, total WBC was 1.9 and ANC was 1.3, platelet was [phone].  -- 1/18/2021 CT CA/P: No new or enlarging lymphadenopathy. No solid organ lesions or ascites.   -- 1/18/2021 Bone scan reported a focal uptake anterior left iliac crest without definite CT correlate.   -- 2/12/2021 US reported No abnormality is seen in the patient's area of concern inferior to the right scapula. -- 4/19/2021 Bone scan reported no gross interval change in indeterminate uptake in the left superior iliac bone. -- 6/7/2021 Mammogram reported no evidence for malignancy. Suggest routine follow-up with annual mammographic screening.  -- 7/20/2021 CBC reported hemoglobin 11.7, hematocrit 37.1%, ANC 1.6, platelet [phone].  -- The patient has tolerated fulvestrant and palbociclib therapy with no high graded toxicities. Recent labs reviewed with the patient today. -- 9/14/2021 CT CAP reported a new soft tissue density noted along the anterior inferior aspect of the sternum. Otherwise no significant interval change in the chest. No new or enlarging nodule. -- 9/14/2021 Bone scan reported known osseous metastatic disease. Small new focal activity proximal right forearm, concerning for new metastasis. Developing activity in the left femoral head concerning for metastasis. -- 10/14/2021 PET scan reported no definite finding for FDG avid bone disease, no definite correlate with bone scan findings. + The soft tissue mass anterior to the inferior sternum demonstrates a moderate  degree of metabolic activity which was stable compared to previous PET 6/20216. Persistent metabolically active subcarinal lymph node and new medial right infrahilar lymph node. -- 10/26/2021 WBC 1.8, ANC 0.9  -- She has been on Palbociclib 75 mg daily 21 days on/7 days off since last visit. She has tolerated therapy without high graded toxicities  -- 12/13/2021 MRI hip reported poorly defined abnormal signal in the anterior femoral head.  There are 3 infiltrative areas of increased STIR signal in the pelvis, 2 on the right and one on the left, questionable metastatic disease.   -- 1/3/2022 Brain MRI reported no definite findings of metastatic brain disease.  + Newly evident right frontal skull lesion concerning for a new osseous  metastasis  -- 1/13/2022 PET scan reported although there is mildly decreased activity at the pre-xyphoid chest wall mass compared with prior study, now at blood pool, lesion has increased in size since prior study and remains highly suspicious for enlarging metastasis. No malignant activity at the left hip.  -- [facility] proton therapy of chest wall mass. Plan:   -- Patient will continue current Palbociclib 75 mg daily 21 days on/7 days off. Will repeat CBC in 2 weeks for dose modification if needed  -- Patient has recent follow up with [facility] - Dr. Osman Gomez. Plan for proton therapy of chest wall mass per patient. -- Patient will continue current regimen with fulvestrant. -- IVF supportively today  -- She will need to check lab before each cycle. -- She will need yearly DEXA scan. -- Labs: CBC, CMP, CA 27-29, CA 15-3, Vitamin D    Poor appetite  Weight loss  -- Nutritionist referral  -- Megace  -- IVF support      Intractable headache  -- 5/30/2021 Brain MRI reported no intracranial metastases identified. -- 1/3/2022 Brain MRI reported no definite findings of metastatic brain disease. Normocytic Anemia. Chemotherapy-related annemia  -- 2/11/2021 CBC reported hemoglobin 10.5, hematocrit 32, wbc 2.0, ANC 1.5, and platelet [phone]. chemistry reviewed  -- The patient previously received Procrit 60,000 units subcutaneous at 4 week intervals whenever her hemoglobin is below 10 g/dL and hematocrit is below 30%. Procrit SQ every 4 weeks has resumed recently. -- Recent labs reviewed. B12 275. Advised the patient to take B12 supplement  -- 7/20/2021 CBC reported hemoglobin 11.7, hematocrit 37.1%, ANC 1.6, platelet [phone]. Plan:  -- 12/28/2021 Recent Iron profile reviewed. Will plan for IV Venofer x 3  -- Will repeat CBC, CMP, Iron profile, ferritin, ret count, B12/folate, esr with next lab collections.       Neuropathy associated with cancer:   -- On Neurontin at 200 mg 3 times daily. -- Advised to resume on Cymblata        Chemotherapy-induced neutropenia/chronic leukopenia (persisting problem):   -- 2/11/2021 CBC reported hemoglobin 10.5, hematocrit 32, wbc 2.0, ANC 1.5, and platelet [phone]. chemistry reviewed. -- Clinical stable. -- If the absolute neutrophil count declines to 0.5 she will be started on Neupogen or Neulasta.            Chemotherapy-induced thrombocytopenia :   -- The patient was holding the Ibrance 140 mg at previous times D/T thrombocytopenia. -- She presently taking Ibrance 100mg.   -- Recent CBC showed improved PLTs count. -- We will see the patient back in clinic in about 2-3 weeks. Always sooner if required. The patient can have lab done prior our next clinic visit. No orders of the defined types were placed in this encounter. Ms. Lilli Moody has a reminder for a \"due or due soon\" health maintenance. I have asked that she contact her primary care provider for follow-up on this health maintenance. All of patient's questions answered to their apparent satisfaction. They verbally show understanding and agreement with aforementioned plan. Matt Coles MD  1/20/2022          Above times were spent for this encounter with more than 50% of the time spent in face-to-face counseling, discussing on diagnosis and management plan going forward, and co-ordination of care. Parts of this document has been produced using Dragon dictation system. Unrecognized errors in transcription may be present. Please do not hesitate to reach out for any questions or clarifications.       CC: Linda Isbell MD

## 2022-01-20 NOTE — PROGRESS NOTES
Pt presented to visit with having 1 episode of diarrhea before screening process. Pt and  stated that pt was having 9/10 left side pain on yesterday. No pain today. Pt has been having issues with coughing while eating, drinking and taking her medications. Vomits up medications. Mostly mucus. Pt states that she feels  like she has reflux.

## 2022-01-25 PROBLEM — T45.1X5A ANEMIA DUE TO CHEMOTHERAPY: Status: ACTIVE | Noted: 2022-01-01

## 2022-01-25 PROBLEM — D64.81 ANEMIA DUE TO CHEMOTHERAPY: Status: ACTIVE | Noted: 2022-01-01

## 2022-01-25 NOTE — PROGRESS NOTES
[facility] Progress Note    Date: 2022    Name: Tamara Meigs    MRN: [id number]         : 1945      Ms. Figueroa was assessed and education was provided. Ms. Shaun Bowen vitals were reviewed and patient was observed for 5 minutes prior to treatment. Visit Vitals  /76 (BP 1 Location: Left upper arm, BP Patient Position: Sitting)   Pulse 93   Temp 97.7 °F (36.5 °C)   Resp 16   Breastfeeding No       Lab results were obtained and reviewed. Recent Results (from the past 12 hour(s))   CBC WITH 3 PART DIFF    Collection Time: 22 10:21 AM   Result Value Ref Range    WBC 1.8 (L) 4.5 - 13.0 K/uL    RBC 3.18 (L) 4.10 - 5.10 M/uL    HGB 9.5 (L) 12.0 - 16 g/dL    HCT 29.8 (L) 36 - 48 %    MCV 93.7 78 - 102 FL    MCH 29.9 25.0 - 35.0 PG    MCHC 31.9 31 - 37 g/dL    RDW 17.8 (H) 11.5 - 14.5 %    PLATELET 938 139 - 218 K/uL    NEUTROPHILS 76 (H) 40 - 70 %    MIXED CELLS 7 0.1 - 17 %    LYMPHOCYTES 17 14 - 44 %    ABS. NEUTROPHILS 1.4 (L) 1.8 - 9.5 K/UL    ABS. MIXED CELLS 0.1 0.0 - 2.3 K/uL    ABS. LYMPHOCYTES 0.3 (L) 1.1 - 5.9 K/UL    DF AUTOMATED         Faslodex 500 mg IM given divided into 2 injections, one in each upper outer quadrand. Ms Ro Bonilla stated she did talk with Dr Juan Manuel Tejeda about the hard areas on her buttocks. She stated she does apply heat to her buttocks as he advised. CBC drawn from port and processed in house. CMP, mag and phos also drawn and sent to hospital for testing. All labs drawn from port. Retacrit 60,000 units SQ given for H&H 9.5/29.8 upper left arm. Xgeva 120 mg SQ given upper left arm. Venofer 300  Mg IV given via port over approx 90 min. Discharge instructions discussed with Ms Ro Bonilla and she verbalized understanding. Ms. Ro Bonilla had no complaints. Patient armband removed and shredded. Ms. Ro Bonilla was discharged from [place] [id number] in stable condition at [phone]. She is to return on 22 for her next appointment.     Lisa Encinas, MARA  January 25, 2022

## 2022-01-26 NOTE — TELEPHONE ENCOUNTER
Patient contacted office and reports she received her first dose of IV iron yesterday and that night she ended up getting sick and throwing up. Then she reports today she has also got sick again throwing up. She is requesting some nausea meds. She is going to the [facility] tomorrow at 1pm. She would really like to speak to you prior to her going to this appointment.

## 2022-02-03 NOTE — PROGRESS NOTES
Hematology/Oncology  Progress Note    Name: Phong Cyr  Date: 2/3/2022  : 1945    PCP: Woo Valentin MD     Ms. Lilli Moody is a 68 y.o.  female who was seen for management of her metastatic breast cancer with associated complications of chemotherapy. Current therapy: Fulvestrant 500 mg subcutaneous monthly and Ibrance 125 mg by mouth daily. Subjective:     Mrs. Lilli Moody is a 59-year-old woman who has slowly progressive metastatic breast cancer. Patient was being followed by Dr. Vasiliy Hurley who retired. She was accompanied by her  today. The patient has previously completed external beam radiation therapy to lesions in her ribs and more recently she completed proton therapy to areas of bone involvement with a significant benefit with regards to pain control. Additionally she is currently receiving systemic therapy with fulvestrant and Ibrance at reduced dose. Patient reported she is tolerating the systemic therapy reasonably. She report nausea and emesis during her last office visit however today she report that the N/V is being manage well with the Zofran. She also report that her appetite is stable. She report feeling much better. . She has resumed Procrit at 4 week intervals whenever her hemoglobin is below 10 g/dL and hematocrit is below 30%. Her neuropathy reported with tinging and numbness is improve with addition of Cymbalta to her Neurontin. She denied other complaints. Past medical history, family history, and social history: these were reviewed and remains unchanged.     Past Medical History:   Diagnosis Date    Biliary colic     Breast CA ([facility] [address] 2012    Cancer [facility])     Right Breast    Chemotherapy convalescence or palliative care     Neuropathy     Radiation therapy complication     Thyroid disease      Past Surgical History:   Procedure Laterality Date    HX LAP CHOLECYSTECTOMY  13    HX MASTECTOMY      Right    [place] BREAST SURGERY PROCEDURE UNLISTED 10/2002    Right-Lesion    IN BREAST SURGERY PROCEDURE UNLISTED  2004    Right-Lesion     Social History     Socioeconomic History    Marital status:      Spouse name: Not on file    Number of children: Not on file    Years of education: Not on file    Highest education level: Not on file   Occupational History    Not on file   Tobacco Use    Smoking status: Former Smoker     Quit date: 1991     Years since quittin.6    Smokeless tobacco: Never Used   Substance and Sexual Activity    Alcohol use: Yes     Alcohol/week: 0.0 standard drinks     Types: 1 - 2 Glasses of wine per week     Comment: socially, occassionally    Drug use: No    Sexual activity: Not on file   Other Topics Concern    Not on file   Social History Narrative    Not on file     Social Determinants of Health     Financial Resource Strain:     Difficulty of Paying Living Expenses: Not on file   Food Insecurity:     Worried About Running Out of Food in the Last Year: Not on file    Max of Food in the Last Year: Not on file   Transportation Needs:     Lack of Transportation (Medical): Not on file    Lack of Transportation (Non-Medical):  Not on file   Physical Activity:     Days of Exercise per Week: Not on file    Minutes of Exercise per Session: Not on file   Stress:     Feeling of Stress : Not on file   Social Connections:     Frequency of Communication with Friends and Family: Not on file    Frequency of Social Gatherings with Friends and Family: Not on file    Attends [employer] Services: Not on file    Active Member of Clubs or Organizations: Not on file    Attends Club or Organization Meetings: Not on file    Marital Status: Not on file   Intimate Partner Violence:     Fear of Current or Ex-Partner: Not on file    Emotionally Abused: Not on file    Physically Abused: Not on file    Sexually Abused: Not on file   Housing Stability:     Unable to Pay for Housing in the Last Year: Not on file    Number of Places Lived in the Last Year: Not on file    Unstable Housing in the Last Year: Not on file     Family History   Problem Relation Age of Onset    Cancer Maternal Aunt         Hodgkin's disease    Breast Cancer Paternal Aunt     Cancer Father         Prostate, lung, brain     Current Outpatient Medications   Medication Sig Dispense Refill    megestroL (MEGACE) 40 mg tablet Take 40 mg by mouth daily.  ondansetron hcl (Zofran) 8 mg tablet Take 1 Tablet by mouth every eight (8) hours as needed for Nausea or Vomiting. 30 Tablet 0    gabapentin (NEURONTIN) 100 mg capsule TAKE 2 CAPSULES THREE TIMES A  Capsule 3    palbociclib 75 mg tab Take 75 mg by mouth daily. for 21 days then 7 days off 63 Each 5    albuterol (PROVENTIL HFA, VENTOLIN HFA, PROAIR HFA) 90 mcg/actuation inhaler inhale 2 puffs by mouth and INTO THE LUNGS four times a day if needed      fluticasone propionate (FLONASE) 50 mcg/actuation nasal spray instill 2 sprays into each nostril daily      DULoxetine (CYMBALTA) 30 mg capsule Take 1 Capsule by mouth daily. For one week, then 2 capsules daily 60 Capsule 5    calcium citrate 200 mg (950 mg) tablet Take  by mouth daily.  Cetirizine (ZYRTEC) 10 mg cap Take 1 Cap by mouth daily as needed.  lidocaine HCl-hydrocortison ac topical cream Apply  to affected area two (2) times a day. 85 g 3    TETRACYCLINE HCL (TETRACYCLINE PO) Take 250 mg by mouth two (2) times a day.  fexofenadine-pseudoephedrine (ALLEGRA-D 24 HOUR) 180-240 mg per tablet Take 1 Tab by mouth daily as needed.  Cholecalciferol, Vitamin D3, 5,000 unit Tab Take  by mouth daily.  thyroid, Pork, (ARMOUR THYROID) 60 mg tablet Take 60 mg by mouth daily.  L-Mfolate-B6 Phos-Methyl-B12 (NEURPATH-B) 3-35-2 mg Tab Take  by mouth two (2) times a day.  warfarin (COUMADIN) 1 mg tablet Take 1 mg by mouth every other day. Review of Systems   Constitutional: Positive for malaise/fatigue. Negative for chills, diaphoresis, fever and weight loss. Respiratory: Negative for cough, hemoptysis, shortness of breath and wheezing. Cardiovascular: Negative for chest pain, palpitations and leg swelling. Gastrointestinal: Negative for abdominal pain, diarrhea, heartburn, nausea and vomiting. Genitourinary: Negative for dysuria, frequency, hematuria and urgency. Musculoskeletal: Negative for joint pain and myalgias. Skin: Negative for itching and rash. Neurological: Positive for headaches. Negative for dizziness, seizures and weakness. Psychiatric/Behavioral: Negative for depression. The patient does not have insomnia. Objective:     Visit Vitals  /75   Pulse 97   Temp 98 °F (36.7 °C) (Temporal)   Resp 18   Ht 5' 4\" (1.626 m)   Wt 55.3 kg (122 lb)   SpO2 100%   BMI 20.94 kg/m²       ECOG Performance Status (grade): 1  0 - able to carry on all pre-disease activity w/out restriction  1 - restricted but able to carry out light work  2 - ambulatory and can self- care but unable to carry out work  3 - bed or chair >50% of waking hours  4 - completely disable, total care, confined to bed or chair    Physical Exam  Constitutional:       Appearance: Normal appearance. HENT:      Head: Normocephalic and atraumatic. Eyes:      Pupils: Pupils are equal, round, and reactive to light. Cardiovascular:      Rate and Rhythm: Normal rate and regular rhythm. Heart sounds: Normal heart sounds. Pulmonary:      Effort: Pulmonary effort is normal.      Breath sounds: Normal breath sounds. Abdominal:      General: Bowel sounds are normal.      Palpations: Abdomen is soft. Tenderness: There is no abdominal tenderness. There is no guarding. Musculoskeletal:         General: Normal range of motion. Cervical back: Neck supple. Right lower leg: No edema. Left lower leg: No edema. Skin:     General: Skin is warm. Neurological:      General: No focal deficit present. Mental Status: She is alert and oriented to person, place, and time. Mental status is at baseline. Diagnostics:      No results found for this or any previous visit (from the past 96 hour(s)). Imaging:  No results found for this or any previous visit. Results for orders placed during the hospital encounter of 12/17/20    XR SPINE THORAC 2 V    Narrative  XR SPINE [address] 2 V: 12/17/2020 2:45 PM    CLINICAL INFORMATION  back pain. History of breast cancer    COMPARISON  Thoracic spine MRI 23 June 2017, CT chest, abdomen and pelvis 17 April 2020    TECHNIQUE  2 views of the thoracic spine    FINDINGS    Levoconvex curvature of the upper lumbar spine. Mild/moderate multilevel discogenic endplate degenerative changes throughout the  thoracic spine. No fracture or destructive osseous lesions identified. Impression  IMPRESSION:  No acute osseous findings. If continued clinical concern, recommend MRI. Results for orders placed in visit on 08/03/21    CT CHEST ABD PELV W CONT    Narrative  CT CHEST, ABDOMEN AND PELVIS WITH ENHANCEMENT    INDICATION: 27-year-old female with history of metastatic breast cancer. 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 TECHNIQUE: Axial images obtained of the chest, abdomen and pelvis following the  uneventful administration 80 cc of Isovue 300 intravenous contrast.  Coronal and  sagittal reformatted images of the abdomen and pelvis were obtained. All CT scans at this facility are performed using dose optimization technique as  appropriate to a performed exam, to include automated exposure control,  adjustment of the mA and/or kV according to patient size (including appropriate  matching first site-specific examinations), or use of iterative reconstruction  technique. COMPARISON: 1/18/2021., 4/9/2019    CHEST FINDINGS:  Thyroid: Unremarkable    Lymph nodes: No lymphadenopathy.     Cardiovascular: Left chest port present with tip along the SVC. Heart size  within normal limits. No pericardial effusion. Calcifications present in the  coronary arteries and thoracic aorta. Airways: Central airways are patent. Lungs/pleura. Left lung is clear. Similar-appearing volume loss along the right  hemithorax with lenticular densities along the anterior and posterior hemithorax  is related to atelectasis versus fibrosis. Chronic small right pleural effusion  with smooth pleural thickening is noted. No new or enlarging nodule. Chest wall: Status post right breast mastectomy with implant reconstruction. Increased soft tissue density noted in the subcutaneous tissues along the  anterior inferior border of the sternum measuring approximately 2.6 x 1.6 x 2.6  cm (axial image 40). ABDOMEN FINDINGS:    Liver: Hepatic steatosis  Spleen: Unremarkable. Pancreas: Unremarkable. Biliary: Status post cholecystectomy. No biliary ductal dilatation. Bowel: No dilated loops of bowel. Colonic diverticulosis. No evidence of acute  diverticulitis. Peritoneum/ Retroperitoneum: Unremarkable. Lymph Nodes: Unremarkable. Adrenal Glands: Unremarkable. Kidneys: Unremarkable. Vessels: Vascular calcifications present in the abdominal aorta. PELVIS FINDINGS:    Bladder/ Pelvic Organs: Uterus and bilateral ovaries are present. Bladder is  decompressed    Bones: Increased sclerosis along the right sixth and seventh anterolateral ribs  is again noted. Heterogeneous expansile appearance of the T12 lamina and pedicle  again noted. Chronic appearing sclerotic density present in the left femoral  head. No new lesions. Degenerative changes in the spine. Impression  1. New nonspecific soft tissue density noted along the anterior inferior aspect  of the sternum. 2. Otherwise no significant interval change in the chest. Volume loss on the  right hemithorax with areas of fibrosis/atelectasis and chronic pleural  effusion. No new or enlarging nodule.     3. Hepatic steatosis    4. Similar-appearing of sclerotic and heterogeneous osseous foci as described  above. Assessment:     1. Metastatic breast cancer ([facility] [address]    2. Malignant neoplasm of lower-inner quadrant of right breast of female, estrogen receptor positive ([address]    3. 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 Anemia due to chemotherapy    5. Iron deficiency anemia due to dietary causes    6. Neuropathy associated with cancer ([address]    7. B12 deficiency    8. Chemotherapy induced neutropenia (HCC)    9. Antineoplastic chemotherapy induced anemia    10. Vitamin D deficiency    11. Chemotherapy-induced thrombocytopenia      Plan:   Metastatic breast cancer:     -- She has slowly progressive metastatic breast cancer. Patient was being followed by Dr. Azra Locke who retired. The patient has previously completed external beam radiation therapy to lesions in her ribs and more recently she completed proton therapy to areas of bone involvement with a significant benefit with regards to pain control. The posttherapy imaging studies showed a significant decrease in the intensity of uptake on the bone scan. -- Additionally she is currently receiving systemic therapy with fulvestrant and Ibrance. She has been on Fulvestrant 500 mg subcutaneous monthly and Ibrance 100 mg by mouth daily. She is tolerating treatment fairly well without high grade toxicities. -- 4/15/2020  CT scans of the chest, abdomen, and pelvis shows that she has a loculated right pleural effusion with some pleural thickening and adjacent areas of probable rounded atelectasis all of which are stable. She has relatively stable bone lesions of the rib, pelvis, and T12 posterior element. There was no findings on CT scan to support new metastatic disease in the chest, abdomen, or pelvis. --  Mammogram 6/5 done shows No evidence of malignancy. Suggest routine follow-up.     -- 12/17/2020 CBC reported hemoglobin was 10.8, hematocrit was 32.8%, total WBC was 1.9 and ANC was 1.3, platelet was 366,825.  -- 1/18/2021 CT CA/P: No new or enlarging lymphadenopathy. No solid organ lesions or ascites. -- 1/18/2021 Bone scan reported a focal uptake anterior left iliac crest without definite CT correlate.   -- 2/12/2021 US reported No abnormality is seen in the patient's area of concern inferior to the right scapula. -- 4/19/2021 Bone scan reported no gross interval change in indeterminate uptake in the left superior iliac bone. -- 6/7/2021 Mammogram reported no evidence for malignancy. Suggest routine follow-up with annual mammographic screening.  -- 7/20/2021 CBC reported hemoglobin 11.7, hematocrit 37.1%, ANC 1.6, platelet 385,215.  -- The patient has tolerated fulvestrant and palbociclib therapy with no high graded toxicities. Recent labs reviewed with the patient today. -- 9/14/2021 CT CAP reported a new soft tissue density noted along the anterior inferior aspect of the sternum. Otherwise no significant interval change in the chest. No new or enlarging nodule. -- 9/14/2021 Bone scan reported known osseous metastatic disease. Small new focal activity proximal right forearm, concerning for new metastasis. Developing activity in the left femoral head concerning for metastasis. -- 10/14/2021 PET scan reported no definite finding for FDG avid bone disease, no definite correlate with bone scan findings. + The soft tissue mass anterior to the inferior sternum demonstrates a moderate  degree of metabolic activity which was stable compared to previous PET 6/20216. Persistent metabolically active subcarinal lymph node and new medial right infrahilar lymph node. -- 10/26/2021 WBC 1.8, ANC 0.9  -- She has been on Palbociclib 75 mg daily 21 days on/7 days off since last visit. She has tolerated therapy without high graded toxicities  -- 12/13/2021 MRI hip reported poorly defined abnormal signal in the anterior femoral head.  There are 3 infiltrative areas of increased STIR signal in the pelvis, 2 on the right and one on the left, questionable metastatic disease.   -- 1/3/2022 Brain MRI reported no definite findings of metastatic brain disease.  + Newly evident right frontal skull lesion concerning for a new osseous  metastasis  -- 1/13/2022 PET scan reported although there is mildly decreased activity at the pre-xyphoid chest wall mass compared with prior study, now at blood pool, lesion has increased in size since prior study and remains highly suspicious for enlarging metastasis. No malignant activity at the left hip.  -- She reported she has f/u with [facility]  -Dr. Kimberly Montero for proton therapy of chest wall mass. Plan:   -- Patient will continue current Palbociclib 75 mg daily 21 days on/7 days off. Will monitor CBC for dose modification if needed  -- Patient has recent follow up with [facility] - Dr. Kimberly Montero. Plan for proton therapy of chest wall mass per patient. Pt report that she is tentatively scheduled for 2/10/2022. -- Patient will continue current regimen with fulvestrant. -- She will need to check lab before each cycle. -- She will need yearly DEXA scan. -- Labs: CBC, CMP, CA 27-29, CA 15-3, Vitamin D    Poor appetite  Weight loss  -- Patient report this is improving recently   -- Nutritionist referral  -- Megace  -- IVF support PRN      Intractable headache  -- 5/30/2021 Brain MRI reported no intracranial metastases identified. -- 1/3/2022 Brain MRI reported no definite findings of metastatic brain disease. Normocytic Anemia. Chemotherapy-related annemia  -- 2/11/2021 CBC reported hemoglobin 10.5, hematocrit 32, wbc 2.0, ANC 1.5, and platelet 218,566. chemistry reviewed  -- The patient previously received Procrit 60,000 units subcutaneous at 4 week intervals whenever her hemoglobin is below 10 g/dL and hematocrit is below 30%. Procrit SQ every 4 weeks has resumed recently. -- Recent labs reviewed. B12 275.  Advised the patient to take B12 supplement  -- 7/20/2021 CBC reported hemoglobin 11.7, hematocrit 37.1%, ANC 1.6, platelet 022,567.  -- 12/28/2021 Recent Iron profile reviewed    Plan:  . -- IV Venofer x 3 doses ongoing   -- Will repeat CBC, CMP, Iron profile, ferritin, ret count, B12/folate, esr with next lab collections. Neuropathy associated with cancer:   -- On Neurontin at 200 mg 3 times daily. -- Continue the Cymblata        Chemotherapy-induced neutropenia/chronic leukopenia (persisting problem):   --  1/25/2022  CBC reported hemoglobin 9.5, hematocrit 29.8, Wbc 1.8, ANC 1.4, and platelet 925,848. chemistry reviewed. -- Clinical stable. -- If the absolute neutrophil count declines to 0.5 she will be started on Neupogen or Neulasta.            Chemotherapy-induced thrombocytopenia :   -- The patient previously held Ibrance 140 mg at previous times D/T thrombocytopenia. -- She presently taking Ibrance 100mg.   -- Recent CBC showed improved PLTs count at 207,000       -- We will see the patient back in clinic in about 2-3 weeks. Always sooner if required. The patient can have lab done prior our next clinic visit. Orders Placed This Encounter    megestroL (MEGACE) 40 mg tablet     Sig: Take 40 mg by mouth daily. Ms. Jeffery Mccormack has a reminder for a \"due or due soon\" health maintenance. I have asked that she contact her primary care provider for follow-up on this health maintenance. All of patient's questions answered to their apparent satisfaction. They verbally show understanding and agreement with aforementioned plan. Stan Smith MD  2/3/2022          Above times were spent for this encounter with more than 50% of the time spent in face-to-face counseling, discussing on diagnosis and management plan going forward, and co-ordination of care. Parts of this document has been produced using Dragon dictation system. Unrecognized errors in transcription may be present.  Please do not hesitate to reach out for any questions or clarifications.       CC: Gigi Ruff MD

## 2022-02-08 NOTE — PROGRESS NOTES
[facility] Progress Note    Date: 2022    Name: Betty Lindsey    MRN: [id number]         : 1945      Ms. Figueroa arrived to [facility] at , here for Venofer # 2 of 3. Pt was assessed and education was provided. Pt states she had some n/v after her last Venofer infusion and that Dr. Ivelisse Lyons prescribed Zofran PRN that she states did help. Pt also observed having SOB after walking from waiting room. Pt reports she had had worsened SOB lately and is concerned she may need a thoracentesis since she has had to have them in the past. NP, Pawel Raymond made aware. Pt advised to report symptoms at her appointment tomorrow with her PCP to get a chest x-ray. Ms. Stephen Cooley vitals were reviewed. Visit Vitals  /71 (BP 1 Location: Left upper arm, BP Patient Position: Sitting)   Pulse 91   Temp 97.2 °F (36.2 °C)   Resp 20   SpO2 100%   Breastfeeding No       Left chest Mediport assessed with 20g 1\" carbone, brisk blood return obtained and flushed well with NS.     250cc bag NS ran @ [facility] throughout treatment. Iron sucrose (Venofer) 300 mg IVPB administered via port over approx 90 min, followed by NS flush. Port flushed with heparin per protocol and de-assessed. Band-aid applied to site. Ms. Franci Velasco tolerated infusion well and voiced no complaints. Discharge/ follow-up instructions discussed w/ pt. Pt verbalized understanding. Patient armband removed and shredded. Ms. Franci Velasco was discharged from [place] [id number] in stable condition at 1540. She is to return on 22 at 1300 for her next appointment.     Mt Cummings RN  2022

## 2022-02-10 NOTE — DISCHARGE INSTRUCTIONS
Please return to the ER with any new or worsening symptoms or any other concerns. Please return to the Emergency Department if you develop a fever, chills, cannot eat or drink due to nausea or vomiting, or if any of your symptoms worsen. Also, It is extremely important that you follow-up with a primary care physician and if you do not have one currently use the contact information provided to obtain an appointment. If none was provided please call the number on the back of your insurance card to locate a Primary care doctor. Many offices have \"cancellation lists\" that you can ask to be placed on; should a patient with an earlier appointment cancel you will be notified to take their place. Please return to the Emergency Room immediately if your symptoms worsen. Please carefully read all discharge instructions    [email or web address]    What are GoodRx coupons? GoodRx coupons will help you pay less than the cash price for your prescription. Roosvelt Mirza free to use and are accepted at virtually every [place] pharmacy. Your pharmacist will know how to enter the codes on the coupon to pull up the lowest discount available.

## 2022-02-10 NOTE — ED PROVIDER NOTES
EMERGENCY DEPARTMENT HISTORY AND PHYSICAL EXAM    12:48 AM    Date: 2/9/2022  Patient Name: Liana Hummel    History of Presenting Illness     Chief Complaint   Patient presents with    Abnormal Chest X Ray       History Provided By: Patient  Location/Duration/Severity/Modifying factors   HPI  Liana Hummel is a 68 y.o. female with past medical history of metastatic breast cancer status post chemotherapy, radiation, chronic right pleural effusion presenting at the request of her primary care doctor for pleural effusion. Patient says that for the last week or so she has had worsening dyspnea with exertion, saw her primary care doctor who ordered a chest x-ray. The chest x-ray came back today showing a right pleural effusion, similar in size to prior imaging. He referred to the emergency department for further evaluation. Patient says that her shortness of breath has not worsened at all over the last 24 hours. She complains of a mild throbbing pain in the right lower part of her posterior rib cage, likely due to her pleural effusion. She has not had any anterior chest pain, fever, cough, abdominal pain, nausea or vomiting. No lower extremity swelling. No history of DVT or PE. No other medical complaints at this time    PCP: Saleem Reyes MD    Current Outpatient Medications   Medication Sig Dispense Refill    megestroL (MEGACE) 40 mg tablet Take 40 mg by mouth daily.  ondansetron hcl (Zofran) 8 mg tablet Take 1 Tablet by mouth every eight (8) hours as needed for Nausea or Vomiting. 30 Tablet 0    gabapentin (NEURONTIN) 100 mg capsule TAKE 2 CAPSULES THREE TIMES A  Capsule 3    palbociclib 75 mg tab Take 75 mg by mouth daily.  for 21 days then 7 days off 63 Each 5    albuterol (PROVENTIL HFA, VENTOLIN HFA, PROAIR HFA) 90 mcg/actuation inhaler inhale 2 puffs by mouth and INTO THE LUNGS four times a day if needed      fluticasone propionate (FLONASE) 50 mcg/actuation nasal spray instill 2 sprays into each nostril daily      DULoxetine (CYMBALTA) 30 mg capsule Take 1 Capsule by mouth daily. For one week, then 2 capsules daily 60 Capsule 5    calcium citrate 200 mg (950 mg) tablet Take  by mouth daily.  Cetirizine (ZYRTEC) 10 mg cap Take 1 Cap by mouth daily as needed.  lidocaine HCl-hydrocortison ac topical cream Apply  to affected area two (2) times a day. 85 g 3    TETRACYCLINE HCL (TETRACYCLINE PO) Take 250 mg by mouth two (2) times a day. (Patient not taking: Reported on 2022)      fexofenadine-pseudoephedrine (ALLEGRA-D 24 HOUR) 180-240 mg per tablet Take 1 Tab by mouth daily as needed.  Cholecalciferol, Vitamin D3, 5,000 unit Tab Take  by mouth daily.  thyroid, Pork, (ARMOUR THYROID) 60 mg tablet Take 60 mg by mouth daily.  L-Mfolate-B6 Phos-Methyl-B12 (NEURPATH-B) 3-35-2 mg Tab Take  by mouth two (2) times a day.  warfarin (COUMADIN) 1 mg tablet Take 1 mg by mouth every other day.          Past History     Past Medical History:  Past Medical History:   Diagnosis Date    Biliary colic     Breast CA ([facility] [address] 2012    Cancer [facility])     Right Breast    Chemotherapy convalescence or palliative care     Neuropathy     Radiation therapy complication     Thyroid disease        Past Surgical History:  Past Surgical History:   Procedure Laterality Date    HX LAP CHOLECYSTECTOMY  13    HX MASTECTOMY      Right    VT BREAST SURGERY PROCEDURE UNLISTED  10/2002    Right-Lesion    VT BREAST SURGERY PROCEDURE UNLISTED  2004    Right-Lesion       Family History:  Family History   Problem Relation Age of Onset    Cancer Maternal Aunt         Hodgkin's disease    Breast Cancer Paternal Aunt     Cancer Father         Prostate, lung, brain       Social History:  Social History     Tobacco Use    Smoking status: Former Smoker     Quit date: 1991     Years since quittin.7    Smokeless tobacco: Never Used   Substance Use Topics    Alcohol use: Yes     Alcohol/week: 0.0 standard drinks     Types: 1 - 2 Glasses of wine per week     Comment: socially, occassionally    Drug use: No       Allergies: Allergies   Allergen Reactions    Codeine Palpitations    Darvocet A500 [Propoxyphene N-Acetaminophen] Nausea and Vomiting    Darvon [Propoxyphene] Nausea and Vomiting       I reviewed and confirmed the above information with patient and updated as necessary. Review of Systems     Review of Systems   Constitutional: Negative for diaphoresis and fever. HENT: Negative for ear pain and sore throat. Eyes:        No acute change in vision   Respiratory: Positive for shortness of breath. Negative for cough. Cardiovascular: Negative for chest pain and leg swelling. Gastrointestinal: Negative for abdominal pain and vomiting. Genitourinary: Negative for dysuria. Musculoskeletal: Positive for arthralgias. Negative for neck pain. Skin: Negative for wound. Neurological: Negative for weakness and headaches. Physical Exam     Visit Vitals  /60 (BP 1 Location: Left arm)   Pulse 89   Temp 98.2 °F (36.8 °C)   Resp 20   Ht 5' 3.75\" (1.619 m)   Wt 55.3 kg (122 lb)   SpO2 99%   BMI 21.11 kg/m²     Physical Exam  Vitals and nursing note reviewed. Constitutional:       Comments: Adult female resting comfortably, nondistressed. HENT:      Mouth/Throat:      Mouth: Mucous membranes are moist.   Eyes:      Pupils: Pupils are equal, round, and reactive to light. Cardiovascular:      Rate and Rhythm: Normal rate and regular rhythm. Pulses: Normal pulses. Pulmonary:      Effort: Pulmonary effort is normal.      Comments: Diminished breath sounds at the right base  Abdominal:      Palpations: Abdomen is soft. Tenderness: There is no abdominal tenderness. Musculoskeletal:         General: No signs of injury. Normal range of motion. Cervical back: Normal range of motion. Skin:     General: Skin is warm.    Neurological: General: No focal deficit present. Mental Status: She is alert and oriented to person, place, and time. Mental status is at baseline. Diagnostic Study Results     Labs -  Recent Results (from the past 24 hour(s))   CBC WITH AUTOMATED DIFF    Collection Time: 02/09/22  4:15 PM   Result Value Ref Range    WBC 2.6 (L) 4.6 - 13.2 K/uL    RBC 3.56 (L) 4.20 - 5.30 M/uL    HGB 10.3 (L) 12.0 - 16.0 g/dL    HCT 32.8 (L) 35.0 - 45.0 %    MCV 92.1 78.0 - 100.0 FL    MCH 28.9 24.0 - 34.0 PG    MCHC 31.4 31.0 - 37.0 g/dL    RDW 18.4 (H) 11.6 - 14.5 %    PLATELET 950 033 - 433 K/uL    MPV 9.8 9.2 - 11.8 FL    NRBC 0.0 0  WBC    ABSOLUTE NRBC 0.00 0.00 - 0.01 K/uL    NEUTROPHILS 82 (H) 40 - 73 %    LYMPHOCYTES 12 (L) 21 - 52 %    MONOCYTES 6 3 - 10 %    EOSINOPHILS 0 0 - 5 %    BASOPHILS 0 0 - 2 %    IMMATURE GRANULOCYTES 0 %    ABS. NEUTROPHILS 2.1 1.8 - 8.0 K/UL    ABS. LYMPHOCYTES 0.3 (L) 0.9 - 3.6 K/UL    ABS. MONOCYTES 0.2 0.05 - 1.2 K/UL    ABS. EOSINOPHILS 0.0 0.0 - 0.4 K/UL    ABS. BASOPHILS 0.0 0.0 - 0.1 K/UL    ABS. IMM. GRANS. 0.0 K/UL    DF MANUAL      PLATELET COMMENTS ADEQUATE PLATELETS      RBC COMMENTS NORMOCYTIC, NORMOCHROMIC     METABOLIC PANEL, COMPREHENSIVE    Collection Time: 02/09/22  4:15 PM   Result Value Ref Range    Sodium 140 136 - 145 mmol/L    Potassium 4.4 3.5 - 5.5 mmol/L    Chloride 105 100 - 111 mmol/L    CO2 26 21 - 32 mmol/L    Anion gap 9 3.0 - 18 mmol/L    Glucose 98 74 - 99 mg/dL    BUN 24 (H) 7.0 - 18 MG/DL    Creatinine 1.18 0.6 - 1.3 MG/DL    BUN/Creatinine ratio 20 12 - 20      GFR est AA 54 (L) >60 ml/min/1.73m2    GFR est non-AA 45 (L) >60 ml/min/1.73m2    Calcium 9.4 8.5 - 10.1 MG/DL    Bilirubin, total 0.6 0.2 - 1.0 MG/DL    ALT (SGPT) 17 13 - 56 U/L    AST (SGOT) 27 10 - 38 U/L    Alk.  phosphatase 67 45 - 117 U/L    Protein, total 6.9 6.4 - 8.2 g/dL    Albumin 3.7 3.4 - 5.0 g/dL    Globulin 3.2 2.0 - 4.0 g/dL    A-G Ratio 1.2 0.8 - 1.7     TROPONIN-HIGH SENSITIVITY    Collection Time: 02/09/22  4:15 PM   Result Value Ref Range    Troponin-High Sensitivity 7 0 - 54 ng/L         Radiologic Studies -   CTA CHEST W OR W WO CONT   Final Result      No evidence of PE. 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 Stable sclerotic right anterior lateral ribs without malignant activity on   recent PET scan. Stable right pleural thickening with loculated pleural effusion. Stable rounded atelectasis in the right lower lobe. Stable mild post radiation   changes right hemithorax. Report provided to the emergency department at 1927 hrs. Medical Decision Making   I am the first provider for this patient. I reviewed the vital signs, available nursing notes, past medical history, past surgical history, family history and social history. Vital Signs-Reviewed the patient's vital signs. EKG: Nondiagnostic for ischemia    Records Reviewed: Nursing Notes and Old Medical Records (Time of Review: 12:48 AM)    Provider Notes (Medical Decision Making):   MDM  55-year-old female here with right pleural effusion, likely chronic. Given that she has shortness of breath we will also evaluate for pulmonary embolism, ACS, pneumonia, other reasons    ED Course: Progress Notes, Reevaluation, and Consults:  Patient afebrile hemodynamically normal  Resting comfortably, nondistressed  Exam discussed as above    We will screen labs, CTA chest at the patient and her primary care doctor is [facility]  No treatment indicated at this time    Screening labs are unremarkable  CTA chest shows no PE, recurrence of the pleural effusion however she is not hypoxic. Given that she is not hypoxic do not feel that she requires hospital admission at this time. She already has a good relationship with her pulmonary team and I think that make sense for her to continue following with them.   She likes this plan and does not want to be admitted to the hospital.  She is discharged home in stable condition with good outpatient follow-up. We discussed at length signs and symptoms that prompt her return to the emergency department           Procedures    Diagnosis     Clinical Impression:   1. Pleural effusion on right    2. SOB (shortness of breath)        Disposition: Home    Follow-up Information     Follow up With Specialties Details Why Contact Info    John Whitfield MD Internal Medicine Schedule an appointment as soon as possible for a visit   [address] [place] [address] [phone] [address] [facility] EMERGENCY DEPT Emergency Medicine  As needed, If symptoms worsen [facility]  [phone]           Discharge Medication List as of 2/9/2022  7:19 PM      CONTINUE these medications which have NOT CHANGED    Details   megestroL (MEGACE) 40 mg tablet Take 40 mg by mouth daily. , Historical Med      ondansetron hcl (Zofran) 8 mg tablet Take 1 Tablet by mouth every eight (8) hours as needed for Nausea or Vomiting., Normal, Disp-30 Tablet, R-0      gabapentin (NEURONTIN) 100 mg capsule TAKE 2 CAPSULES THREE TIMES A DAY, Normal, Disp-540 Capsule, R-3      palbociclib 75 mg tab Take 75 mg by mouth daily. for 21 days then 7 days off, Normal, Disp-63 Each, R-5      albuterol (PROVENTIL HFA, VENTOLIN HFA, PROAIR HFA) 90 mcg/actuation inhaler inhale 2 puffs by mouth and INTO THE LUNGS four times a day if needed, Historical Med      fluticasone propionate (FLONASE) 50 mcg/actuation nasal spray instill 2 sprays into each nostril daily, Historical Med      DULoxetine (CYMBALTA) 30 mg capsule Take 1 Capsule by mouth daily. For one week, then 2 capsules daily, Normal, Disp-60 Capsule, R-5      calcium citrate 200 mg (950 mg) tablet Take  by mouth daily. , Historical Med      Cetirizine (ZYRTEC) 10 mg cap Take 1 Cap by mouth daily as needed., Historical Med      lidocaine HCl-hydrocortison ac topical cream Apply  to affected area two (2) times a day., Normal, Disp-85 g, R-3      TETRACYCLINE HCL (TETRACYCLINE PO) Take 250 mg by mouth two (2) times a day., Historical Med      fexofenadine-pseudoephedrine (ALLEGRA-D 24 HOUR) 180-240 mg per tablet Take 1 Tab by mouth daily as needed., Historical Med      Cholecalciferol, Vitamin D3, 5,000 unit Tab Take  by mouth daily. , Historical Med      thyroid, Pork, (ARMOUR THYROID) 60 mg tablet Take 60 mg by mouth daily. , Historical Med      L-Mfolate-B6 Phos-Methyl-B12 (NEURPATH-B) 3-35-2 mg Tab Take  by mouth two (2) times a day.  , Historical Med      warfarin (COUMADIN) 1 mg tablet Take 1 mg by mouth every other day., Historical Med             Dariela Marin MD   Emergency Medicine   February 10, 2022, 12:48 AM     This note is dictated utilizing Dragon voice recognition software. Unfortunately this leads to occasional typographical errors using the voice recognition. I apologize in advance if the situation occurs. If questions occur please do not hesitate to contact me directly.     Naseem Douglas MD

## 2022-02-22 NOTE — PROGRESS NOTES
[facility] Progress Note    Date: 2022    Name: Tamara Meigs    MRN: [id number]         : 1945      Ms. Figueroa arrived in the [facility] today at 1300 in stable condition, here for Monthly [address] [facility], Q4 Week Xgeva, RETACRIT & Faslodex Injections. She was assessed and education was provided. Patient voiced concern about her breathing and states she had to go to the ER last week because of her shortness of breath. States she knows she needs a thoracentesis because she had to have 1 back in  and another 1 back in  when she was being treated by Dr. Ele Naranjo. I informed patient I would notify Dr Juan Manuel Tejeda and staff and she stated she had already spoke to the NP and they were aware. Meanwhile in reading her chart, there is a note there today indicating [facility] left her a message to schedule her for an appt per Dr. Lisbeth Shultz request.  Informed patient. Ms. Shaun Bowen vitals were reviewed. Visit Vitals  BP (!) 142/69 (BP 1 Location: Left upper arm, BP Patient Position: Sitting)   Pulse 98   Temp 98.2 °F (36.8 °C)   Resp 22   SpO2 96%   Breastfeeding No     Left chest single lumen port was accessed without incident using sterile technique, and blood was drawn from the port for ordered labs. And then, the port was flushed well per protocol and without incident, with NS & Heparin, and the carbone needle was removed and bandaid was applied. CBC processed in house and other labs were sent out to the [facility] lab by , for processing. H&H results indicate no need for retacrit today  9.7/30.4      Today's lab results for her Calcium (9.2), Magnesium (1.4) and Phosphorous ( and were all noted to be satisfactory for treatment Jose Huff) today.     Xgeva 120 mg, was administered SQ, in her right arm per order, and without incident.      Faslodex 500 mg Total Dose, was administered IM, in 2 equally divided doses of 250 mg,  per order, and without incident. (250 mg was administered IM, in her right gluteal muscle, and 250 mg was administered IM, in her left gluteal muscle). Banadid and gauze to sites. Patients gluteal area has many lumpy areas due to previous injections. Ms. Minh Weaver tolerated well, and had no complaints. Discharge/ follow-up instructions discussed w/ pt. Pt verbalized understanding. Armband removed and shredded. Ms. Minh Weaver was discharged from [place] [address] in stable condition at [phone]. She is to return on 3/1/2022 at 80, for her next appointment.      Charito Agee RN  February 22, 2022

## 2022-02-22 NOTE — PROGRESS NOTES
Left message to set up new patient appt per Dr. Rox Bello for dyspnea & pleural effusion, CTA & CXR 2/9/22 in CC; PET/CT 1/13/22 in cc. Ref in filing cabinet.

## 2022-03-01 NOTE — PROGRESS NOTES
[address] [facility] Progress Note    Date: 2022    Name: Yohan Cadet    MRN: [id number]         : 1945      Ms. Figueroa arrived to [address] [place] [address] at , here for Venofer # 3 of 3. Pt was assessed and education was provided. Pt states she has an appointment with Pulmonology on 3/17 to address concerns of SOB and chest x-ray results showing pleural effusion. She also reports some difficulty swallowing lately which she will discuss at her follow up appointment with Dr. Kenn Bunch this Thursday 3/3. Ms. Devaughn Ramirez vitals were reviewed. Visit Vitals  BP (!) 148/73 (BP 1 Location: Left upper arm, BP Patient Position: Sitting)   Pulse 94   Temp 97 °F (36.1 °C)   Resp 18   SpO2 95%       Left chest Mediport assessed with 20g 1\" carbone, brisk blood return obtained and flushed well with NS.     250cc bag NS ran @ [facility] throughout treatment. Iron sucrose (Venofer) 400 mg IVPB administered via port over approx 150 min, followed by NS flush. Port flushed with heparin per protocol and de-assessed. Band-aid applied to site. Ms. Judy Levin tolerated infusion well and voiced no complaints. Discharge/ follow-up instructions discussed w/ pt. Pt verbalized understanding. Patient armband removed and shredded. Ms. Judy Levin was discharged from [place] [address] in stable condition at [phone]. She is to return on 3/22/22 at 1300 for her next appointment.     Alisia Reyes RN  2022

## 2022-03-10 NOTE — PROGRESS NOTES
Hematology/Oncology  Progress Note    Name: Aniceto Griffin  Date: 3/10/2022  : 1945    PCP: Maribel Kirk MD     Ms. Sandhya Shaw is a 68 y.o.  female who was seen for management of her metastatic breast cancer. Current therapy: Fulvestrant 500 mg subcutaneous monthly and Palbociclib 75 mg daily 21 days on/7 days off. .       Subjective:     Mrs. Sandhya Shaw is a 68-year-old woman who has slowly progressive metastatic breast cancer. Patient was being followed by Dr. Kiya Lebron who retired. She was accompanied by her  today. The patient has previously completed external beam radiation therapy to lesions in her ribs and more recently she completed proton therapy to areas of bone involvement with a significant benefit with regards to pain control. Additionally she is currently receiving systemic therapy with fulvestrant and Ibrance at reduced dose. Patient reported she is tolerating the systemic therapy reasonably. She also report that her appetite is stable. She has resumed Procrit at 4 week intervals whenever her hemoglobin is below 10 g/dL and hematocrit is below 30%. Her neuropathy reported with tinging and numbness is improving with addition of Cymbalta to her Neurontin. Today she reported doing stable. She denied other complaints. Past medical history, family history, and social history: these were reviewed and remains unchanged.     Past Medical History:   Diagnosis Date    Biliary colic     Breast CA ([address] 2012    Cancer [facility])     Right Breast    Chemotherapy convalescence or palliative care     Neuropathy     Radiation therapy complication     Thyroid disease      Past Surgical History:   Procedure Laterality Date    HX LAP CHOLECYSTECTOMY  13    HX MASTECTOMY      Right    [place] BREAST SURGERY PROCEDURE UNLISTED  10/2002    Right-Lesion    [place] BREAST SURGERY PROCEDURE UNLISTED  2004    Right-Lesion     Social History     Socioeconomic History    Marital status:      Spouse name: Not on file    Number of children: Not on file    Years of education: Not on file    Highest education level: Not on file   Occupational History    Not on file   Tobacco Use    Smoking status: Former Smoker     Quit date: 1991     Years since quittin.7    Smokeless tobacco: Never Used   Substance and Sexual Activity    Alcohol use: Yes     Alcohol/week: 0.0 standard drinks     Types: 1 - 2 Glasses of wine per week     Comment: socially, occassionally    Drug use: No    Sexual activity: Not on file   Other Topics Concern    Not on file   Social History Narrative    Not on file     Social Determinants of Health     Financial Resource Strain:     Difficulty of Paying Living Expenses: Not on file   Food Insecurity:     Worried About Running Out of Food in the Last Year: Not on file    Max of Food in the Last Year: Not on file   Transportation Needs:     Lack of Transportation (Medical): Not on file    Lack of Transportation (Non-Medical):  Not on file   Physical Activity:     Days of Exercise per Week: Not on file    Minutes of Exercise per Session: Not on file   Stress:     Feeling of Stress : Not on file   Social Connections:     Frequency of Communication with Friends and Family: Not on file    Frequency of Social Gatherings with Friends and Family: Not on file    Attends Scientologist Services: Not on file    Active Member of [address] [place] [address] or Organizations: Not on file    Attends Club or Organization Meetings: Not on file    Marital Status: Not on file   Intimate Partner Violence:     Fear of Current or Ex-Partner: Not on file    Emotionally Abused: Not on file    Physically Abused: Not on file    Sexually Abused: Not on file   Housing Stability:     Unable to Pay for Housing in the Last Year: Not on file    Number of [place] in the Last Year: Not on file    Unstable Housing in the Last Year: Not on file     Family History   Problem Relation Age of Onset  Cancer Maternal Aunt         Hodgkin's disease    Breast Cancer Paternal Aunt     Cancer Father         Prostate, lung, brain     Current Outpatient Medications   Medication Sig Dispense Refill    famotidine (Pepcid AC) 10 mg tablet Take 10 mg by mouth two (2) times a day.  megestroL (MEGACE) 40 mg tablet Take 40 mg by mouth daily.  ondansetron hcl (Zofran) 8 mg tablet Take 1 Tablet by mouth every eight (8) hours as needed for Nausea or Vomiting. 30 Tablet 0    gabapentin (NEURONTIN) 100 mg capsule TAKE 2 CAPSULES THREE TIMES A  Capsule 3    palbociclib 75 mg tab Take 75 mg by mouth daily. for 21 days then 7 days off 63 Each 5    albuterol (PROVENTIL HFA, VENTOLIN HFA, PROAIR HFA) 90 mcg/actuation inhaler inhale 2 puffs by mouth and INTO THE LUNGS four times a day if needed      fluticasone propionate (FLONASE) 50 mcg/actuation nasal spray instill 2 sprays into each nostril daily      DULoxetine (CYMBALTA) 30 mg capsule Take 1 Capsule by mouth daily. For one week, then 2 capsules daily 60 Capsule 5    calcium citrate 200 mg (950 mg) tablet Take  by mouth daily.  Cetirizine (ZYRTEC) 10 mg cap Take 1 Cap by mouth daily as needed.  lidocaine HCl-hydrocortison ac topical cream Apply  to affected area two (2) times a day. 85 g 3    TETRACYCLINE HCL (TETRACYCLINE PO) Take 250 mg by mouth two (2) times a day. (Patient not taking: Reported on 2/8/2022)      fexofenadine-pseudoephedrine (ALLEGRA-D 24 HOUR) 180-240 mg per tablet Take 1 Tab by mouth daily as needed.  Cholecalciferol, Vitamin D3, 5,000 unit Tab Take  by mouth daily.  thyroid, Pork, (ARMOUR THYROID) 60 mg tablet Take 60 mg by mouth daily.  L-Mfolate-B6 Phos-Methyl-B12 (NEURPATH-B) 3-35-2 mg Tab Take  by mouth two (2) times a day.  warfarin (COUMADIN) 1 mg tablet Take 1 mg by mouth every other day. Review of Systems   Constitutional: Positive for malaise/fatigue.  Negative for chills, diaphoresis, fever and weight loss. Respiratory: Negative for cough, hemoptysis, shortness of breath and wheezing. Cardiovascular: Negative for chest pain, palpitations and leg swelling. Gastrointestinal: Negative for abdominal pain, diarrhea, heartburn, nausea and vomiting. Genitourinary: Negative for dysuria, frequency, hematuria and urgency. Musculoskeletal: Negative for joint pain and myalgias. Skin: Negative for itching and rash. Neurological: Negative for dizziness, seizures, weakness and headaches. Psychiatric/Behavioral: Negative for depression. The patient does not have insomnia. Objective:     Visit Vitals  /76   Pulse 76   Resp 16   Ht 5' 3.75\" (1.619 m)   Wt 53.5 kg (118 lb)   SpO2 99%   BMI 20.41 kg/m²       ECOG Performance Status (grade): 1  0 - able to carry on all pre-disease activity w/out restriction  1 - restricted but able to carry out light work  2 - ambulatory and can self- care but unable to carry out work  3 - bed or chair >50% of waking hours  4 - completely disable, total care, confined to bed or chair    Physical Exam  Constitutional:       Appearance: Normal appearance. HENT:      Head: Normocephalic and atraumatic. Eyes:      Pupils: Pupils are equal, round, and reactive to light. Cardiovascular:      Rate and Rhythm: Normal rate and regular rhythm. Heart sounds: Normal heart sounds. Pulmonary:      Effort: Pulmonary effort is normal.      Breath sounds: Normal breath sounds. Abdominal:      General: Bowel sounds are normal.      Palpations: Abdomen is soft. Tenderness: There is no abdominal tenderness. There is no guarding. Musculoskeletal:         General: No tenderness. Normal range of motion. Cervical back: Neck supple. Right lower leg: Edema present. Left lower leg: Edema present. Skin:     General: Skin is warm. Neurological:      General: No focal deficit present.       Mental Status: She is alert and oriented to person, place, and time. Mental status is at baseline. Diagnostics:      No results found for this or any previous visit (from the past 96 hour(s)). Imaging:  No results found for this or any previous visit. Results for orders placed during the hospital encounter of 02/09/22    XR CHEST PA LAT    Narrative  Chest PA and lateral    History: Dyspnea, history of effusion. Patient status post mastectomy in 1991  for carcinoma, had radiation in 2001, multiple coarse palliative radiation for  bone metastases and proton therapy in 2017. Comparison: 12/17/2020, CT 9/14/2021    Findings:    Cardiomediastinal silhouette is stable. Stable left chest wall brenda catheter. Pulmonary vasculature is unremarkable. Grossly stable small to moderate right  pleural effusion, with fluid seen loculated along the right lung apex and at the  right lung base. No definite pneumothorax. Lungs are hyperexpanded. Visualized  peripheral soft tissues and osseous structures are unremarkable. Cholecystectomy  clips. Impression  Grossly stable small to moderate right pleural effusion, with fluid seen  loculated along the apex and at the right lung base. Thank you for this referral.      Results for orders placed during the hospital encounter of 02/09/22    CTA CHEST W OR W WO CONT    Narrative  EXAM: CTA CHEST WITH CONTRAST PULMONARY ARTERIAL EXAM WITH MAXIMUM INTENSITY  PROJECTIONS (MIPS)    CLINICAL HISTORY/INDICATION:  PE rule out, R effusion on XR, cancer patient ,  patient referred to emergency department for abnormal chest x-ray, known slowly  progressive metastatic right lower inner quadrant right breast cancer known  metastatic chest wall and bone metastasis with prior radiation to the chest  wall, receiving systemic therapy    COMPARISON: CT chest 9/14/2021, PET/CT 1/13/2022.     TECHNIQUE: Initial localizing images were obtained without intravenous contrast.  Standard helical images were obtained from the thoracic inlet through the  adrenals at 2.5 mm thick sections following the intravenous injection of a bolus  of 100 cc nonionic contrast.  Images were reviewed on both soft tissue, lung,  and bone window settings. Coronal and sagittal MIPs  were obtained to better evaluate the pulmonary  arteries in a three dimensional angiographic method to evaluate for possible  pulmonary emboli. All CT scans at this facility are performed using dose optimization technique  as appropriate to a performed exam, to include automated exposure control,  adjustment of the mA and/or kV according to patient's size (including  appropriate matching for site-specific examinations), or use of iterative  reconstruction technique. FINDINGS:    The quality of opacification of the pulmonary arteries is excellent. There are  no filling defects . There is stable right pleural thickening with loculated small effusion. Stable  rounded atelectasis at the right lung base. Prexiphoid chest wall soft tissue mass measures 3.1 x 2.1 cm with previous  measurement of 3.4 x 2.3 by PET scan not significantly changed. .    Stable parenchymal consolidation with air bronchograms at the right 8 stable  right middle lobe anterior nodular pleural thickening with slight associated  stranding in the fat. The great vessels and thoracic aorta are unremarkable. The included portion of the of the liver is unremarkable. Stable thickening of the left adrenal.  Stable sclerotic changes of the right fifth sixth and seventh rib Prior right  mastectomy. Reconstructions: Coronal and sagittal MIPs ( maximum intensity projections) were  obtained from the axial acquisition. The pulmonary arteries branch normally. There are no filling defects. Impression  No evidence of PE. Stable prexiphoid  soft tissue metastatic mass of the chest wall unchanged from  1/13/2022.   Stable sclerotic right anterior lateral ribs without malignant activity on  recent PET scan. Stable right pleural thickening with loculated pleural effusion. Stable rounded atelectasis in the right lower lobe. Stable mild post radiation  changes right hemithorax. Report provided to the emergency department at 1927 hrs. Assessment:     1. Metastatic breast cancer ([facility] [address]    2. 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 Malignant neoplasm of lower-inner quadrant of right breast of female, estrogen receptor positive ([facility] [address]    4. Antineoplastic chemotherapy induced anemia    5. Leg swelling      Plan:   Metastatic breast cancer:     -- She has slowly progressive metastatic breast cancer. Patient was previously being followed by Dr. Beatrice Moraes who retired. The patient has previously completed external beam radiation therapy to lesions in her ribs and more recently she completed proton therapy to areas of bone involvement with a significant benefit with regards to pain control. The posttherapy imaging studies showed a significant decrease in the intensity of uptake on the bone scan. -- Additionally she has been on systemic therapy with fulvestrant and Ibrance. She has been on Fulvestrant 500 mg subcutaneous monthly and Ibrance 100 mg by mouth daily. She is tolerating treatment fairly well without high grade toxicities. -- 4/15/2020  CT scans of the chest, abdomen, and pelvis shows that she has a loculated right pleural effusion with some pleural thickening and adjacent areas of probable rounded atelectasis all of which are stable. She has relatively stable bone lesions of the rib, pelvis, and T12 posterior element. There was no findings on CT scan to support new metastatic disease in the chest, abdomen, or pelvis. --  Mammogram 6/5 done shows No evidence of malignancy. Suggest routine follow-up.     -- 12/17/2020 CBC reported hemoglobin was 10.8, hematocrit was 32.8%, total WBC was 1.9 and ANC was 1.3, platelet was 041,161.  -- 1/18/2021 CT CA/P: No new or enlarging lymphadenopathy. No solid organ lesions or ascites. -- 1/18/2021 Bone scan reported a focal uptake anterior left iliac crest without definite CT correlate.   -- 2/12/2021 US reported No abnormality is seen in the patient's area of concern inferior to the right scapula. -- 4/19/2021 Bone scan reported no gross interval change in indeterminate uptake in the left superior iliac bone. -- 6/7/2021 Mammogram reported no evidence for malignancy. Suggest routine follow-up with annual mammographic screening.  -- 7/20/2021 CBC reported hemoglobin 11.7, hematocrit 37.1%, ANC 1.6, platelet [phone].  -- The patient has tolerated fulvestrant and palbociclib therapy with no high graded toxicities. Recent labs reviewed with the patient today. -- 9/14/2021 CT CAP reported a new soft tissue density noted along the anterior inferior aspect of the sternum. Otherwise no significant interval change in the chest. No new or enlarging nodule. -- 9/14/2021 Bone scan reported known osseous metastatic disease. Small new focal activity proximal right forearm, concerning for new metastasis. Developing activity in the left femoral head concerning for metastasis. -- 10/14/2021 PET scan reported no definite finding for FDG avid bone disease, no definite correlate with bone scan findings. + The soft tissue mass anterior to the inferior sternum demonstrates a moderate  degree of metabolic activity which was stable compared to previous PET 6/20216. Persistent metabolically active subcarinal lymph node and new medial right infrahilar lymph node. -- 10/26/2021 WBC 1.8, ANC 0.9  -- She has been on Palbociclib 75 mg daily 21 days on/7 days off since last visit. She has tolerated therapy without high graded toxicities  -- 12/13/2021 MRI hip reported poorly defined abnormal signal in the anterior femoral head.  There are 3 infiltrative areas of increased STIR signal in the pelvis, 2 on the right and one on the left, questionable metastatic disease.   -- 1/3/2022 Brain MRI reported no definite findings of metastatic brain disease.  + Newly evident right frontal skull lesion concerning for a new osseous  metastasis  -- 1/13/2022 PET scan reported although there is mildly decreased activity at the pre-xyphoid chest wall mass compared with prior study, now at blood pool, lesion has increased in size since prior study and remains highly suspicious for enlarging metastasis. No malignant activity at the left hip.  -- She reported she has f/u with [facility]  -Dr. Brock Hamlin for ongoing proton therapy of chest wall mass. -- Clinical stable. Recent labs 2/22/2022 reviewed with the patient today. Plan:   -- Patient will continue current Palbociclib 75 mg daily 21 days on/7 days off. Will monitor CBC for dose modification if needed  -- Patient will continue current regimen with fulvestrant. She will need to check lab before each cycle. -- Patient has follow up with [facility] - Dr. Brock Hamlin for proton therapy of chest wall mass. Will defer repeat CT of chest to [facility]. -- She will need yearly DEXA scan. -- Labs: CBC, CMP, CA 27-29, CA 15-3, Vitamin D        B/l Leg swelling  -- will obtain Venous doppler to r/o DVT      Poor appetite  Weight loss  -- Patient report this is improving recently   -- Nutritionist referral  -- Megace  -- IVF support PRN      Intractable headache  -- 5/30/2021 Brain MRI reported no intracranial metastases identified. -- 1/3/2022 Brain MRI reported no definite findings of metastatic brain disease. Normocytic Anemia. Chemotherapy-related annemia  -- 2/11/2021 CBC reported hemoglobin 10.5, hematocrit 32, wbc 2.0, ANC 1.5, and platelet [phone],684. chemistry reviewed  -- The patient previously received Procrit 60,000 units subcutaneous at 4 week intervals whenever her hemoglobin is below 10 g/dL and hematocrit is below 30%. Procrit SQ every 4 weeks has resumed recently. -- Recent labs reviewed. B12 275.  Advised the patient to take B12 supplement  -- 7/20/2021 CBC reported hemoglobin 11.7, hematocrit 37.1%, ANC 1.6, platelet [phone].  -- 12/28/2021 Recent Iron profile reviewed    Plan:  . -- IV Venofer x 3 doses  -- Will repeat CBC, CMP, Iron profile, ferritin, ret count, B12/folate periodically. Neuropathy associated with cancer:   -- On Neurontin at 200 mg 3 times daily. -- Continue the Cymblata        Chemotherapy-induced neutropenia/chronic leukopenia (persisting problem):   --  1/25/2022  CBC reported hemoglobin 9.5, hematocrit 29.8, Wbc 1.8, ANC 1.4, and platelet 169,050. chemistry reviewed. -- Clinical stable. -- If the absolute neutrophil count declines to 0.5 she will be started on Neupogen or Neulasta.            Chemotherapy-induced thrombocytopenia :   -- The patient previously held Ibrance 140 mg at previous times D/T thrombocytopenia. -- She presently taking Ibrance 100mg.   -- Recent CBC showed improved PLTs count at 207,000       -- We will see the patient back in clinic in about 4 weeks. Always sooner if required. The patient can have lab done prior our next clinic visit. Orders Placed This Encounter    DUPLEX LOWER EXT VENOUS BILAT     Standing Status:   Future     Number of Occurrences:   1     Standing Expiration Date:   9/10/2022           Ms. Alex Rincon has a reminder for a \"due or due soon\" health maintenance. I have asked that she contact her primary care provider for follow-up on this health maintenance. All of patient's questions answered to their apparent satisfaction. They verbally show understanding and agreement with aforementioned plan. Caryle Keen, MD  3/10/2022          Above times were spent for this encounter with more than 50% of the time spent in face-to-face counseling, discussing on diagnosis and management plan going forward, and co-ordination of care. Parts of this document has been produced using Dragon dictation system. Unrecognized errors in transcription may be present. Please do not hesitate to reach out for any questions or clarifications.       CC: Blanca Hanson MD

## 2022-03-17 NOTE — PROGRESS NOTES
Phong Cyr presents today for   Chief Complaint   Patient presents with   Gallo Stager Referral / Consult     referred by Dr. Mya Blair for dyspnea 7 pleural effusion    Results     DVT (BLE) study 3/15/2022, PET/CT 10/14/2021 & 1/13/2022, MRI brain 1/3/2022, CXR & CTA 2/9/2022       Is someone accompanying this pt? Yes. Spouse    Is the patient using any DME equipment during OV? No    -DME Company N/A    Depression Screening:  3 most recent PHQ Screens 1/20/2022   Little interest or pleasure in doing things Nearly every day   Feeling down, depressed, irritable, or hopeless Nearly every day   Total Score PHQ 2 6   Trouble falling or staying asleep, or sleeping too much Not at all   Feeling tired or having little energy Nearly every day   Poor appetite, weight loss, or overeating Nearly every day       Learning Assessment:  Learning Assessment 6/8/2018   PRIMARY LEARNER Patient   BARRIERS PRIMARY LEARNER NONE   PRIMARY LANGUAGE ENGLISH   LEARNER PREFERENCE PRIMARY DEMONSTRATION     -   ANSWERED BY self   RELATIONSHIP SELF       Abuse Screening:  Abuse Screening Questionnaire 3/17/2022   Do you ever feel afraid of your partner? N   Are you in a relationship with someone who physically or mentally threatens you? N   Is it safe for you to go home? Y       Fall Risk  Fall Risk Assessment, last 12 mths 3/17/2022   Able to walk? Yes   Fall in past 12 months? 0   Do you feel unsteady? 1   Are you worried about falling 1   Is TUG test greater than 12 seconds? -   Is the gait abnormal? 1         Coordination of Care:  1. Have you been to the ER, urgent care clinic since your last visit? Hospitalized since your last visit? Yes; Where: [facility] ED, When: 2/9/2022-right pleural effusion & SOB    2. Have you seen or consulted any other health care providers outside of the [address] [place] [address] since your last visit? Include any pap smears or colon screening. Yes.  Dr. Mya Blair, PCP/referring provider

## 2022-03-17 NOTE — PROGRESS NOTES
[address] [facility]  Pulmonary, Critical Care, and Sleep Medicine    Pulmonary Office Initial Consultation  Name: Kisha Angulo 68 y.o. female  MRN: [id number]  : 1945  Service Date: 22    Referring Provider: Jamil Del Cid MD  No address on file  Chief Complaint:   Chief Complaint   Patient presents with   [facility] Referral / Consult     referred by Dr. Ijeoma Purvis for dyspnea 7 pleural effusion    Results     DVT (BLE) study 3/15/2022, PET/CT 10/14/2021 & 2022, MRI brain 1/3/2022, CXR & CTA 2022       History of Present Illness: (pt accompanied with  who provides some hx)  Kisha Angulo is a 68 y.o. female, who presents to Pulmonary clinic referred for dyspnea and pleural effusion. Pt has a hx of metastatic breast cancer, used to see Dr. Herve Rodríguez, now sees Dr. Bhavani Kidd.  Pt finished proton therapy last week  Pt remains on immunotherapy, dose adjusted due to 22lbs wt loss (140-118lbs)  Reports decreased energy  While on proton therapy, pt had issues with dysphagia -- unable to take her pills  Pt reports that she only had to take magic mouthwash once. She reports that now things are improving. Pt reports that she switched her diet to broth and ensure  Pt had a thoracentesis in  and  -- when pt had spinal lesion. Pt underwent proton therapy at that time too. Pt had pneumonia twice in the last 3 years -- last time in 2021  Quit smoking in the 1970s - smoked for about 10 years  Pt reports that she used an albuterol inhaler, reports no difference to therapy  Pt reports that she got a half dose of proton therapy in the last session. She reports that she will get another PET/CT scan in 4-5 weeks.   + reports chronic cough -- paroxysms of cough --  reports 1-2x per day  Pt reports she feels \"my saliva is so thick\"  + wheezing -- occurs with walking  Sx wax and wane   reports that when she drinks water - \"sometimes it goes down the wrong pipe\"    Past Medical History:   Diagnosis Date    Biliary colic     Breast CA ([address] 2012    Cancer [facility])     Right Breast    Chemotherapy convalescence or palliative care     Neuropathy     Radiation therapy complication     Thyroid disease      Past Surgical History:   Procedure Laterality Date    HX LAP CHOLECYSTECTOMY  13    HX MASTECTOMY      Right    ID BREAST SURGERY PROCEDURE UNLISTED  10/2002    Right-Lesion    ID BREAST SURGERY PROCEDURE UNLISTED  2004    Right-Lesion     Family History   Problem Relation Age of Onset    Cancer Maternal Aunt         Hodgkin's disease    Breast Cancer Paternal Aunt     Cancer Father         Prostate, lung, brain     Social History     Socioeconomic History    Marital status:      Spouse name: Not on file    Number of children: Not on file    Years of education: Not on file    Highest education level: Not on file   Occupational History    Not on file   Tobacco Use    Smoking status: Former Smoker     Quit date: 1991     Years since quittin.8    Smokeless tobacco: Never Used   Substance and Sexual Activity    Alcohol use: Yes     Alcohol/week: 0.0 standard drinks     Types: 1 - 2 Glasses of wine per week     Comment: socially, occassionally    Drug use: No    Sexual activity: Not on file   Other Topics Concern    Not on file   Social History Narrative    Not on file     Social Determinants of Health     Financial Resource Strain:     Difficulty of Paying Living Expenses: Not on file   Food Insecurity:     Worried About Running Out of Food in the Last Year: Not on file    Max of Food in the Last Year: Not on file   Transportation Needs:     Lack of Transportation (Medical): Not on file    Lack of Transportation (Non-Medical):  Not on file   Physical Activity:     Days of Exercise per Week: Not on file    Minutes of Exercise per Session: Not on file   Stress:     Feeling of Stress : Not on file   Social Connections:     Frequency of Communication with Friends and Family: Not on file    Frequency of Social Gatherings with Friends and Family: Not on file    Attends Quaker Services: Not on file    Active Member of [address] [place] [address] or Organizations: Not on file    Attends Club or Organization Meetings: Not on file    Marital Status: Not on file   Intimate Partner Violence:     Fear of Current or Ex-Partner: Not on file    Emotionally Abused: Not on file    Physically Abused: Not on file    Sexually Abused: Not on file   Housing Stability:     Unable to Pay for Housing in the Last Year: Not on file    Number of [place] in the Last Year: Not on file    Unstable Housing in the Last Year: Not on file     Allergies   Allergen Reactions    Codeine Palpitations    Darvocet A500 [Propoxyphene N-Acetaminophen] Nausea and Vomiting    Darvon [Propoxyphene] Nausea and Vomiting     Prior to Admission medications    Medication Sig Start Date End Date Taking? Authorizing Provider   famotidine (Pepcid AC) 10 mg tablet Take 10 mg by mouth two (2) times a day. Provider, Historical   megestroL (MEGACE) 40 mg tablet Take 40 mg by mouth daily. 1/21/22   Provider, Historical   ondansetron hcl (Zofran) 8 mg tablet Take 1 Tablet by mouth every eight (8) hours as needed for Nausea or Vomiting. 1/28/22   Beth Martini DNP   gabapentin (NEURONTIN) 100 mg capsule TAKE 2 CAPSULES THREE TIMES A DAY 12/6/21   Isabel Cooper MD   palbociclib 75 mg tab Take 75 mg by mouth daily.  for 21 days then 7 days off 11/30/21   Vince Robles, LUIS   albuterol (PROVENTIL HFA, VENTOLIN HFA, PROAIR HFA) 90 mcg/actuation inhaler inhale 2 puffs by mouth and INTO THE LUNGS four times a day if needed 11/11/21   Provider, Historical   fluticasone propionate (FLONASE) 50 mcg/actuation nasal spray instill 2 sprays into each nostril daily 11/4/21   Provider, Historical   DULoxetine (CYMBALTA) 30 mg capsule Take 1 Capsule by mouth daily. For one week, then 2 capsules daily 8/3/21   Merritt Cooper MD   calcium citrate 200 mg (950 mg) tablet Take  by mouth daily. Provider, Historical   Cetirizine (ZYRTEC) 10 mg cap Take 1 Cap by mouth daily as needed. Provider, Historical   lidocaine HCl-hydrocortison ac topical cream Apply  to affected area two (2) times a day. 7/10/18   Malini Pablo NP   TETRACYCLINE HCL (TETRACYCLINE PO) Take 250 mg by mouth two (2) times a day. Patient not taking: Reported on 2/8/2022    Provider, Historical   fexofenadine-pseudoephedrine (ALLEGRA-D 24 HOUR) 180-240 mg per tablet Take 1 Tab by mouth daily as needed. Provider, Historical   Cholecalciferol, Vitamin D3, 5,000 unit Tab Take  by mouth daily. Provider, Historical   thyroid, Pork, (ARMOUR THYROID) 60 mg tablet Take 60 mg by mouth daily. Provider, Historical   L-Mfolate-B6 Phos-Methyl-B12 (NEURPATH-B) 3-35-2 mg Tab Take  by mouth two (2) times a day. Provider, Historical   warfarin (COUMADIN) 1 mg tablet Take 1 mg by mouth every other day. Provider, Historical       Immunizations:  I have reviewed the patient's immunizations  Immunization History   Administered Date(s) Administered    COVID-19, Pfizer Purple top, DILUTE for use, 12+ yrs, 30mcg/0.3mL dose 01/05/2021, 01/21/2021, 12/22/2021    Influenza Vaccine 01/16/2013, 12/09/2013, 01/07/2015, 09/14/2015, 11/14/2016, 10/18/2017, 10/22/2018, 10/13/2019, 10/18/2021    Influenza Vaccine (Tri) Adjuvanted (>65 Yrs FLUAD TRI [id number]) 10/22/2018, 10/13/2019    Influenza Vaccine Whole 02/01/2012    Influenza, Quadrivalent, Adjuvanted (>65 Yrs FLUAD QUAD [id number]) 09/14/2020       Review of Systems:  A complete review of systems was performed as stated in the HPI, all others are negative.       Objective:    Physical Exam:  BP (!) 100/47 (BP 1 Location: Left upper arm, BP Patient Position: Sitting, BP Cuff Size: Adult long)   Pulse 89   Temp 98.1 °F (36.7 °C) (Temporal)   Resp 18   Ht 5' 3.75\" (1.619 m)   Wt 53.7 kg (118 lb 6.4 oz)   SpO2 96%   BMI 20.48 kg/m²   Vitals were personally reviewed  Gen: no acute distress, pleasant and cooperative, sitting up in chair, frail, ambulates without issue  HEENT: normocephalic/atraumatic, , EOMI, no scleral icterus,  nasal bridge midline, oral cavity shows no dental lesions, no evidence of thrush  Neck: supple, trachea midline, no JVD  CVS: regular rate rhythm, S1/S2, no murmurs/rubs/gallops  Lungs: Fair air entry B/L, CTABL, no wheezes/rales/rhonchi  Psych: normal memory, thought content, and processing    Labs: I have reviewed the patient's available labs  Lab Results   Component Value Date/Time    WBC 2.1 (L) 02/22/2022 01:00 PM    Hemoglobin, POC 9.5 (L) 01/09/2020 12:02 PM    HGB 9.7 (L) 02/22/2022 01:00 PM    Hematocrit, POC 28 (L) 01/09/2020 12:02 PM    HCT 30.4 (L) 02/22/2022 01:00 PM    PLATELET 445 30/81/6661 01:00 PM    MCV 94.7 02/22/2022 01:00 PM     Lab Results   Component Value Date/Time    Sodium 140 02/22/2022 01:15 PM    Potassium 4.2 02/22/2022 01:15 PM    Chloride 107 02/22/2022 01:15 PM    CO2 26 02/22/2022 01:15 PM    Anion gap 7 02/22/2022 01:15 PM    Glucose 114 (H) 02/22/2022 01:15 PM    BUN 19 (H) 02/22/2022 01:15 PM    Creatinine 1.10 02/22/2022 01:15 PM    BUN/Creatinine ratio 17 02/22/2022 01:15 PM    GFR est AA 59 (L) 02/22/2022 01:15 PM    GFR est non-AA 48 (L) 02/22/2022 01:15 PM    Calcium 9.2 02/22/2022 01:15 PM    Bilirubin, total 0.6 02/22/2022 01:15 PM    Alk.  phosphatase 70 02/22/2022 01:15 PM    Protein, total 6.4 02/22/2022 01:15 PM    Albumin 3.4 02/22/2022 01:15 PM    Globulin 3.0 02/22/2022 01:15 PM    A-G Ratio 1.1 02/22/2022 01:15 PM    ALT (SGPT) 16 02/22/2022 01:15 PM    AST (SGOT) 16 02/22/2022 01:15 PM       Outside records reviewed in clinic as follows:  -Last oncology progress note by Dr. Carla Staton from 3/13/2022, reports that patient has slowly progressive metastatic breast cancer, used to follow with Dr. Farida Sanabria who is retired. Patient accompanied by her , previously completed external beam radiation therapy to her ribs and recently completed proton therapy to areas of bone involvement with a significant benefit with regards to pain control additionally she is receiving systemic therapy with fulvestrant and Ibrance at reduced dose. Patient receiving Procrit every 4 weeks. Patient also has neuropathy, has Cymbalta in addition to her Neurontin. Imaging:  I have personally reviewed patient's imaging as follows: CT chest abdomen pelvis from 2/9/2022 shows very trace chronic pleural effusion with chronic reticular changes around entire pleura and pleural thickening. Chronic pleural effusion is loculated, chronic, also has right lower lobe rounded atelectasis. Patient also has some radiation fibrosis. Patient also has some scattered areas of centrilobular emphysema scattered throughout all lung fields, worse in bilateral upper zones    PFTs: None on file    TTE:  I have reviewed the patient's TTE results  No results found for this or any previous visit. 04/14/21    ECHO ADULT COMPLETE 04/16/2021 4/16/2021    Interpretation Summary  · LV: Estimated LVEF is 55 - 60%. Visually measured ejection fraction. Normal cavity size, wall thickness and systolic function (ejection fraction normal). Wall motion: normal. Age-appropriate left ventricular diastolic function. · PA: Pulmonary arterial systolic pressure is 20 mmHg. Pulmonary hypertension not suggested by Doppler findings. Signed by: Opal Epstein MD on 4/16/2021  1:09 PM        Assessment and Plan:  68 y.o. female with:    Impression:  1. Dyspnea on exertion/shortness of breath: Etiology multifactorial, mainly secondary to deconditioning in the setting of metastatic breast cancer. There is also contribution from chronic anemia as well as mild COPD.   There is less contribution from very trace pleural effusion with trapped lung with right lung fibrosis  2. Small loculated right-sided pleural effusion, chronic: Secondary to her metastatic breast cancer, present since at least 2018  3. Chronic dysphagia with likely silent aspiration: Due to above, likely secondary to mediastinal proton therapy. This is resulting in chronic cough and poor PO intake  4. Likely LPR  5. Trapped lung, mild, with right lung fibrosis  6. Deconditioning  7. Suspect COPD, gold risk category B  8. 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 No indication for thoracentesis  -Obtain modified barium swallow to rule out overt aspiration, this will not rule out silent aspiration. If testing is positive, will refer patient to SLP therapy for pharyngeal strengthening, also discussed possibility of feeding tube, however advised to discuss further with oncology pending testing  -Advised to discuss poor p.o. intake with oncology  -Refer patient to gastroenterology for further work-up of dysphagia along with severe reflux. I will start patient on empiric trial of omeprazole 40 mg p.o. twice daily (advised to take 30 minutes before breakfast and dinner) until she can be seen by GI.  -Full PFTs at next visit  -Start Trelegy Ellipta 200 mcg 1 puff once daily.  -Continue albuterol HFA 1-2puffs q4-6h PRN.   Counseled patient that this is their rescue inhaler and to carry with them at all times.  -Counseled patient on proper inhaler technique  -Advised patient to increase activity with graded exercise if possible  -Management of metastatic breast cancer per oncology and radiation oncology-Dr. Cooper and Dr. Brianna Estevez -- -repeat imaging per their discretion (pt reports repeat PET/CT on   -Immunizations reviewed, influenza and COVID vaccination up-to-date  -Counseled patient regarding lifestyle precautions in COVID-19 pandemic including wearing mask in public and confined spaces, social/physical distancing, frequent hand hygiene, etc    Follow-up and Dispositions    · Return in about 3 months (around 6/17/2022). Orders Placed This Encounter    AMB POC PFT COMPLETE W/BRONCHODILATOR    AMB POC PFT COMPLETE W/O BRONCHODILATOR    GAS DILUT/WASHOUT LUNG VOL W/WO DISTRIB VENT&VOL    DIFFUSING CAPACITY    XR SWALLOW FUNC VIDEO    REFERRAL TO GASTROENTEROLOGY    omeprazole (PRILOSEC) 40 mg capsule    fluticasone-umeclidin-vilanter (Trelegy Ellipta) 200-62.5-25 mcg inhaler    fluticasone-umeclidin-vilanter (Trelegy Ellipta) 200-62.5-25 mcg inhaler     This patient has a high complexity chronic care condition   This Visit needed High complexity medically necessary decision making and management plans. Time spent in preparing for the visit-review of history, tests done prior to arrival, additional time reviewing clinical data, imaging, outside records and test results as well as time spent in ordering tests, treatments and referring patient for further care was a total of 42 minutes. Additional time-counseling with patient and family members regarding care plan 34 minutes.   Total aggregate time was 76 minutes spent this patient encounter    Rubia Dougherty MD/MPH     Pulmonary, [address] [facility]

## 2022-03-17 NOTE — LETTER
3/17/2022    Patient: Patricia Munoz   YOB: 1945   Date of Visit: 3/17/2022     Júnior Montiel MD  Via [place] [facility]  [address] [address]  [place] [address] [place] MD Carole  Via In [facility] [address]    Dear Ellene Melody, MD Caryle Keen, MD Loetta Alcon, MD,      Thank you for referring Ms. Brock Mauricio to [address] [place] [address] for evaluation. My notes for this consultation are attached. If you have questions, please do not hesitate to call me. I look forward to following your patient along with you.       Sincerely,    Ovidio Delgado MD/MPH     Pulmonary, Critical Care Medicine  [facility]

## 2022-03-22 NOTE — PROGRESS NOTES
[facility] Progress Note    Date: 2022    Name: Wade Oviedo    MRN: [id number]         : 1945      Ms. Figueroa arrived in the [facility] today at 1315, here for Monthly [address] [place], Q4 Week Xgeva, RETACRIT & Faslodex Injections. She was assessed and education was provided. Ms. Augustine Raedr vitals were reviewed. Visit Vitals  /61 (BP 1 Location: Left upper arm, BP Patient Position: Sitting)   Pulse 90   Temp 97.4 °F (36.3 °C)   Resp 18   SpO2 98%   Breastfeeding No     Left chest single lumen port was accessed with 20g 1\" carbone after chloraprep, and blood was drawn from the port for ordered labs. And then, the port was flushed well per protocol and without incident, with NS & Heparin, and the carbone needle was removed and bandaid was applied. CBC processed in house and other labs were sent out to the [facility] hospital lab by , for processing. Lab results were reviewed. Her most recent labs from 22 were reviewed, and were all noted to be satisfactory for treatment Jean Route) today. Xgeva 120 mg, was administered SQ, in her left arm per order, and without incident.      Per today's CBC, H/H 8.5/27.0, Retacrit 60,000 units were given SQ to her left arm per her request.      Faslodex 500 mg Total Dose, was administered IM, in 2 equally divided doses of 250 mg,  per order, and without incident. (250 mg was administered IM, in her right gluteal muscle, and 250 mg was administered IM, in her left gluteal muscle). Banadid and gauze to sites. Ms. Beatris Winters tolerated well, and had no complaints. Discharge/ follow-up instructions discussed w/ pt. Pt verbalized understanding. Armband removed and shredded. Ms. Beatris Winters was discharged from [place] [address] in stable condition at [address]. She is to return on 2022 at 1300, for her next appointment.      Octavio Latham RN  2022

## 2022-03-29 NOTE — PROGRESS NOTES
[address] [facility]  SPEECH LANGUAGE PATHOLOGY OUTPATIENT MODIFIED BARIUM SWALLOW STUDY    Patient: George Gunter (25 y.o. female)  Date: 3/29/2022  Primary Diagnosis: Metastatic breast cancer ([facility] [C50.919]  Other dysphagia [R13.19]  Shortness of breath [R06.02]  COPD, group B, by GOLD 2017 classification ([facility] [facility] [J44.9]  Other fatigue [R53.83]  Physical deconditioning [R53.81]  Trapped lung [J98.19]  Laryngopharyngeal reflux (LPR) [K21.9]  Precautions: Aspiration        Assessment:  Based on the objective data described below, the patient presents with mild oral and moderate pharyngeal dysphagia. Pt demonstrating silent laryngeal penetration during and after the swallow with thin liquids, not resolved with compensatory strategies. Pt able to tolerate nectar thick liquids +/- straw, pudding, regular solids and 13 mm Ba pill with NTL wash with positive airway protection noted across multiple trials. Pt demonstrating delayed a-p transit, decreased base of tongue strength resulting in premature spillage, decreased laryngeal elevation/adduction/sensation and slowed epiglottic inversion. Pt with overall poor pharyngeal strength/motility resulting in moderate pharyngeal residuals that were partially cleared with double swallow. Pt also with decreased opening of upper esophageal sphincter; suspect esophageal dysphagia. Recommend regular diet with nectar thick liquids with added moisture to dry foods. Further recommends meds whole with NTL with aspiration precautions and follow up with outpatient SLP to address dysphagia. Results/recommendations discussed with pt and pt's  with video feedback and written handout with understanding verbalized.         Recommendations:   Regular diet with nectar thick liquids  Aspiration precautions  Added moisture to dry foods   Upright for all intake and for at least 30 min after intake   Small bites/sips; alternate liquid/solid with slow feeding rate Oral care TID  Meds with nectar thick liquids      SUBJECTIVE:   Patient stated That helps me understand what's going on    OBJECTIVE:     Past Medical History:   Diagnosis Date    Biliary colic     Breast CA ([facility] 2012    Cancer [facility]) 1991    Right Breast    Chemotherapy convalescence or palliative care     Neuropathy     Radiation therapy complication     Thyroid disease      Past Surgical History:   Procedure Laterality Date    HX LAP CHOLECYSTECTOMY  9-19-13    HX MASTECTOMY  1991    Right    NE BREAST SURGERY PROCEDURE UNLISTED  10/2002    Right-Lesion    NE BREAST SURGERY PROCEDURE UNLISTED  11/2004    Right-Lesion     Current Diet: regular/thin liquids; recommend regular/NTL   Radiology:  Film Views: Fluoro;Lateral  Patient Position: 90 in chair    Trial 1: Trial 2:   Consistency Presented: Thin liquid Consistency Presented: Nectar thick liquid;Pudding; Solid (13 mm Ba pill with NTL wash )   How Presented: Self-fed/presented;Cup/sip;Straw How Presented: Self-fed/presented;Cup/sip;Straw;Successive swallows;Spoon   Bolus Acceptance: No impairment Bolus Acceptance: No impairment   Bolus Formation/Control: Impaired: Premature spillage;Delayed Bolus Formation/Control: Impaired: Premature spillage;Delayed   Propulsion: No impairment Propulsion: Delayed (# of seconds)   Oral Residue: None Oral Residue: None   Initiation of Swallow: No impairment     Timing: No impairment Timing: No impairment   Penetration: During swallow; After swallow; To laryngeal vestibule;From initial swallow;From residual Penetration: None   Aspiration/Timing: No evidence of aspiration Aspiration/Timing: No evidence of aspiration   Pharyngeal Clearance: Vallecular residue;Pyriform residue ;10-50% Pharyngeal Clearance: Vallecular residue;Pyriform residue ;10-50%   Attempted Modifications: Effortful swallow;Cup/sip; Chin tuck; Small sips and bites Attempted Modifications: Double swallow;Effortful swallow   Effective Modifications: None Effective Modifications: Double swallow (Effortful swallow)   Cues for Modifications: Minimal Cues for Modifications: Minimal             Decreased Tongue Base Retraction?: Yes  Laryngeal Elevation: Inadequate epiglottic inversion; Incomplete laryngeal closure;Minimal movement of larynx/epiglottis  Aspiration/Penetration Score: 3 (Penetration/Visible residue-Contrast remains above the folds/cords, but is not cleared)  Pharyngeal Symmetry: Not assessed  Pharyngeal-Esophageal Segment: Decreased relaxation of upper esophageal segment; Suspected esophageal dysphagia  Pharyngeal Dysfunction: Decreased tongue base retraction;Decreased strength;Decreased elevation/closure;Crico-pharyngeal dysfunction;Decreased pharyngeal wall constriction  Oral Phase Severity: Mild  Pharyngeal Phase Severity: Moderate    8-point Penetration-Aspiration Scale: Score 3    PAIN:  Pt reports 0/10 pain or discomfort prior to MBS. Pt reports 0/10 pain or discomfort post MBS. COMMUNICATION/EDUCATION:   [x]  Education provided post diagnostic testing including oropharyngeal anatomy/physiology, MBS results, diet recommendations and        compensatory strategies/positioning. [x]  Video feedback utilized. [x]  Handout regarding diet recommendations and thickener instructions provided. [x]  Patient/family have participated as able in goal setting and plan of care. []  Patient/family agree to work toward stated goals and plan of care. []  Patient understands intent and goals of therapy, but is neutral about his/her participation. []  Patient is unable to participate in goal setting and plan of care.     Thank you for this referral,  Anthony Vee M.S., [address] [facility]  Speech-Language Pathologist

## 2022-03-31 NOTE — PROGRESS NOTES
Order placed for Speech Therapy referral, per Verbal Order from Dr. Carrillo Puga on 3/31/2022. Last office visit: 3/17/2022  Follow up Visit: due 6/2022    Provider is aware of last office visit and follow up. No further action requested from provider.

## 2022-04-04 NOTE — TELEPHONE ENCOUNTER
Returning patient's call. Reminded her of urgent referral to GI per Dr. Kenneth Ocampo. Radha stated that she never heard from the GI office. Advised to call GI clinic to set-up an appt (number given). Dr. Kenneth Ocampo has referred to Dr. Edith Bowers. Patient verbalized understanding.

## 2022-04-04 NOTE — PROGRESS NOTES
ST DAILY TREATMENT NOTE    Patient Name: Candido Bloom  Date:2022  : 1945  [x]  Patient  Verified  Payor: [place] MEDICARE / Plan: [place] MEDICARE PART A & B / Product Type: Medicare /   In time: 054  Out time:945  Total Treatment Time (min): 25  Visit #: 1 of 8    SUBJECTIVE  Pain Level (0-10 scale): 5, neck  Any medication changes, allergies to medications, adverse drug reactions, diagnosis change, or new procedure performed?: [] No    [] Yes (see summary sheet for update)  Subjective functional status/changes:   [] No changes reported  Patient was accompanied to the evaluation by her , but not to the evaluation room  Date of Onset: > 1 month  Social History: Retired, lives with   Prior Functional level: WNL regular diet  Radiology: MBS 3/29/22  Current diet: regular/nectar thick liquids, moisten dry foods  positioning: upright  Mental Status:  [x]alert []lethargic []confused  Orientation: [x]person [x]place [x]time [x]situation  AC Directions: []1-step []2-step []3-step [x]complex  Motivation: [x]excellent []good  []fair  []poor   Barriers to learning: [x]none []aphasia []? cognition []lethargy/motivation [][place]   []?vision []language []Fatigue/pain []psych factors compensate with:   Dentition: [x]normal []abnormal []edentulous []dentures   Respiratory Status: [x]WFL []SOB  []O2 L/min:  []NC []Mask               []Trach Tube:                     []Excess secretions  Lips:  [x]Symmetrical []asymmetrical  Retraction []WFL  []?min []?mod []?max  Protrusion []WFL  []?min []?mod []?max  Strength []WFL  []?min []?mod []?max  Puff  []WFL  []?min []?mod []?max  Tongue:  [x]Symmetrical []asymmetrical  Protrusion []WFL  []?min []?mod []?max  Elevation []WFL  []?min []?mod []?max  Depression []WFL  []?min []?mod []?max  Lateralization []WFL  []?min []?mod []?max  Strength []WFL  []?min []?mod []?max  Velum:  []Symmetrical []asymmetrical  Gag Reflex:  []Present []absent [x]DNT  Sensation:  [x]Intact []Diminished  Specify:   Voice:  [x]Normal []Hoarse []harsh  []Breathy []Hypernasal []hyponasal  []Gurgly Other:   Swallow:  [x]Volitional []absent  [x]Reflexive []absent  Cough   Strength : [x]WNL []Diminished  [x]Volitional []absent  [x]Reflexive []absent  OBJECTIVE    OP SWALLOW EVALUATION    The patient is a 69 y/o female who was referred for evaluation due to concerns regarding swallowing. She is a breast cancer patient and currently receives proton therapy \"close to the esophagus. \" The patient reports that she \"always feels like something is in my throat, stuck. \" She reports that she has stopped taking and has not begun taking some medications because she is having difficulty swallowing pills. Additionally, she reports that she avoiding eating due to the great discomfort it causes. She worries if she bends over \"food will come back up. \" She reports that often after she eats, she will be find, but then she \"will cough and then throw up. \" She has a hx of GERD, but is not taking PPI currently. She has reportedly lost ~30 pounds in the last 3-4 months. She has a referral to GI to further investigate esophageal function.      Patient underwent MBS on 3/29/22.  Results as follows \"Based on the objective data described below, the patient presents with mild oral and moderate pharyngeal dysphagia.  Pt demonstrating silent laryngeal penetration during and after the swallow with thin liquids, not resolved with compensatory strategies.  Pt able to tolerate nectar thick liquids +/- straw, pudding, regular solids and 13 mm Ba pill with NTL wash with positive airway protection noted across multiple trials.  Pt demonstrating delayed a-p transit, decreased base of tongue strength resulting in premature spillage, decreased laryngeal elevation/adduction/sensation and slowed epiglottic inversion.  Pt with overall poor pharyngeal strength/motility resulting in moderate pharyngeal residuals that were partially cleared with double swallow.  Pt also with decreased opening of upper esophageal sphincter; suspect esophageal dysphagia.  Recommend regular diet with nectar thick liquids with added moisture to dry foods.  Further recommends meds whole with NTL with aspiration precautions and follow up with outpatient SLP to address dysphagia. Results/recommendations discussed with pt and pt's  with video feedback and written handout with understanding verbalized. \"     Patient reports trying nectar thick liquids, but dislikes taste. Evaluating ST provided sample of Hormel Thick and Easy to try as many report it does not affect taste of food.      Oral mechanism is grossly WNL. Patient trialed cup sup of water with cued chin tuck. No s/sx of aspiration immediately noted, but coughing began shortly after clearance. Multiple swallows needed to clear. Patient reported her throat hurt/felt swollen, possibly due to inflammation from vomiting the day before. Observation of Swallow:  Thin Nectar Honey Puree  applesauce Solids  Mechanical soft fruit and grain bar/regular peanut butter cracker nabs Other  Mixed consistency canned fruit cocktail   Oral Phase         Anterior loss of bolus  (decreased labial seal [])         Decreased bolus formation  (?lingual ROM/Coord[])         Increased mastication         Increased oral transit time  (?A-P bolus transit[])         Abnormal chewing         Tongue pumping/tremors         Pocketing  (?labial/buccal tension/lingual control)         Other         Oral Pharyngeal Phase         Delayed swallow initiation         Absent swallow         Stasis on lingual surface  (?lingual movement)         Adherence to hard palate   (? lingual elevation)         Pharyngeal Phase         Multiple swallows  (residue in pharynx [])         Coughing post swallow         Throat clear post swallow         Wet vocal quality  (residue on vocal cords [])         Reduced laryngeal elevation         Nasal Regurgitation  (? velopharyngeal closure)         Other           [] No symptoms of dysphagia evidenced  [] Symptoms of dysphagia observed  [] Patient at risk for aspiration  [] Other:     Recommendations:  Diet:  [] NPO    [] Pureed [] Ground [] MechSoft [] Regular  Liquids: [] Water  [] Regular [] Thickened   [x] Nectar  [] Honeythick  [] Pudding  Presentation: [x] Cup    [x] Spoon [] Straw [] Alternate liquids and solids  Monitor: [] Sitting up at 90 deg [] Reclined to:  [] Head turned to:    [x] Chin tuck  [] Head tilt to:      [] Seated upright post meals (min):  Document: [] Coughing [] Temperatures [] Lung Sounds    Videoflouroscopy: [] Yes [] No  Dysphagia Treatment: [] Yes [] No Sessions per week:   Other:    Remediation Techniques:  C = Compensatory techniques to use during meal      F = Facilitation/treatment techniques by SLP    [] Supraglottic Swallow (c,f)    [] Oral motor exercises (f)  [] Super-supraglottic swallow (c,f)   [] Labial closure  [] Compensations for pocketing (c)   [] Lingual elevation  [] Sweep mouth with tongue    [] Lingual lateralization  [] Sweep mouth with finger    [] Lingual anterior-posterior  [] External pressure to check   [] Lingual base of tongue  [] Rinse mouth/expel after meal   [] Vocal Fold Exercises (f)  [] Alternate liquid swallows every _ bites (c)  [] Falsetto/laryngeal elevation exercises (f)  [] Discourage liquid wash between bites (c)  [] Thermal application (c,f)  [] Multiple swallows     [] Sour bolus (f)  [] Patient needs cues     [] Cold bolus (f)  [] Patient does not need cues   [] Pharyngeal exercise (f)  [] Mendelsohn Maneuver (c,f)    [] Breath hold  [] Encourage/stimulate lip closure (c)   [] Effortful Swallow (c,f)  [] Empty mouth before next bite (c)   [] Tongue base retraction  [] Cue patient to slow down (c)    [] Tongue hold  [] Encourage coughing (c)    [] Laryngeal closure  []Chin tuck (c)  []Head turn  (c)  []Head turn , chin tuck (c)    Patient/Caregiver instruction/education: [x] Review HEP    [] Progressed/Changed    HEP/Handouts given: Aspiration precautions    Pain Level (0-10 scale) post treatment: 5, neck    ASSESSMENT  [x]  See Plan of Care    Short Term Goals: To be accomplished in 3 weeks  1. Patient will recall/demonstrate knowledge of aspiration precautions and s/s of aspiration and silent aspiration across PO trials or regular/thin liquids with >90% accuracy given min cues.     2. Patient will complete oropharyngeal and laryngeal strengthening exercises both in session and as part of HEP with >90% accuracy as measured by therapist data collection and patient report.     3. Patient will tolerate regular/nectar thick liquid diet with no overt s/sx of aspiration in 7/8 trials given min-mod verbal and visual cues.     4. Patient will undergo MBS in order to objectively assess oropharyngeal function.     Long Term Goals: To be accomplished in 4 weeks   1. Patient will safely tolerate PO trials of regular solids/nectar liquids with utilization of compensatory swallowing strategies and completion of laryngeal strengthening exercises  With >90% accuracy in order to increase strength and decrease risk of aspiration/pneumonia.                   PLAN  [x]  Upgrade activities as tolerated     []  Continue plan of care  []  Discharge due to:__  [] Other:__     HAYLIE Martinez 4/4/2022  9:20 AM

## 2022-04-04 NOTE — TELEPHONE ENCOUNTER
Called patient and left message re: urgent GI referral. Advised patient to call GI provider to schedule appt (number given). Asked to call our office with any questions.

## 2022-04-05 NOTE — PROGRESS NOTES
[facility]  [employer] [facility]  [address] [place] [address]  [phone] fax    Plan of Care/ Statement of Necessity for Speech Therapy Services    Patient name: George Gunter Start of Care: 2022   Referral source: Shivani Scott MD : 1945    Medical Diagnosis: Dysphagia [R13.10]  Payor: VA MEDICARE / Plan: VA MEDICARE PART A & B / Product Type: Medicare /  Onset Date: > 1 month    Treatment Diagnosis: Dysphagia   Prior Hospitalization: see medical history Provider#: [id number]   Medications: Verified on Patient summary List    Comorbidities: Hx breast cancer, GERD, arthritis, bronchitis, pneumonia   Prior Level of Function: WNL regular diet/thin liquids    The Plan of Care and following information is based on the information from the initial evaluation. Assessment/ key information: The patient is a 67 y/o female who was referred for evaluation due to concerns regarding swallowing. She is a breast cancer patient and currently receives proton therapy \"close to the esophagus. \" The patient reports that she \"always feels like something is in my throat, stuck. \" She reports that she has stopped taking and has not begun taking some medications because she is having difficulty swallowing pills. Additionally, she reports that she avoiding eating due to the great discomfort it causes. She worries if she bends over \"food will come back up. \" She reports that often after she eats, she will be find, but then she \"will cough and then throw up. \" She has a hx of GERD, but is not taking PPI currently. She has reportedly lost ~30 pounds in the last 3-4 months. She has a referral to GI to further investigate esophageal function. Patient underwent MBS on 3/29/22. Results as follows \"Based on the objective data described below, the patient presents with mild oral and moderate pharyngeal dysphagia.   Pt demonstrating silent laryngeal penetration during and after the swallow with thin liquids, not resolved with compensatory strategies. Pt able to tolerate nectar thick liquids +/- straw, pudding, regular solids and 13 mm Ba pill with NTL wash with positive airway protection noted across multiple trials. Pt demonstrating delayed a-p transit, decreased base of tongue strength resulting in premature spillage, decreased laryngeal elevation/adduction/sensation and slowed epiglottic inversion. Pt with overall poor pharyngeal strength/motility resulting in moderate pharyngeal residuals that were partially cleared with double swallow. Pt also with decreased opening of upper esophageal sphincter; suspect esophageal dysphagia. Recommend regular diet with nectar thick liquids with added moisture to dry foods. Further recommends meds whole with NTL with aspiration precautions and follow up with outpatient SLP to address dysphagia. Results/recommendations discussed with pt and pt's  with video feedback and written handout with understanding verbalized. \"    Patient reports trying nectar thick liquids, but dislikes taste. Evaluating ST provided sample of Hormel Thick and Easy to try as many report it does not affect taste of food. Oral mechanism is grossly WNL. Patient trialed cup sup of water with cued chin tuck. No s/sx of aspiration immediately noted, but coughing began shortly after clearance. Multiple swallows needed to clear. Patient reported her throat hurt/felt swollen, possibly due to inflammation from vomiting the day before. It is recommended that the patient receive skilled therapy to address dysphagia in order to increase the strength and safety of her swallow during PO intake of regular solids and nectar thick liquids. Treatment initiated with patient education on aspiration precautions and oral care.     Problem List:      []aphasic  []dysarthric  [x]dysphagic       []alexic  []agraphic  []dysphonia       []dysfluency   []Cognitive-Linguistic Disorder []other   Treatment Plan may include any combination of the following: Dysphagia Treatment and Patient Education      Patient / Family readiness to learn indicated by: asking questions, trying to perform skills and interest    Persons(s) to be included in education:   patient (P) and family support person (FSP);list     Barriers to Learning/Limitations: None    Patient Goal (s): out of pain    Patient Self Reported Health Status: fair    Rehabilitation Potential: good  Short Term Goals: To be accomplished in 3 weeks  1. Patient will recall/demonstrate knowledge of aspiration precautions and s/s of aspiration and silent aspiration across PO trials or regular/thin liquids with >90% accuracy given min cues.     2. Patient will complete oropharyngeal and laryngeal strengthening exercises both in session and as part of HEP with >90% accuracy as measured by therapist data collection and patient report.     3. Patient will tolerate regular/nectar thick liquid diet with no overt s/sx of aspiration in 7/8 trials given min-mod verbal and visual cues.     4. Patient will undergo MBS in order to objectively assess oropharyngeal function.     Long Term Goals: To be accomplished in 4 weeks   1. Patient will safely tolerate PO trials of regular solids/nectar liquids with utilization of compensatory swallowing strategies and completion of laryngeal strengthening exercises  With >90% accuracy in order to increase strength and decrease risk of aspiration/pneumonia. Frequency / Duration: Patient to be seen 2 times per week for 4 weeks:    Patient/ Caregiver education and instruction: Diagnosis, prognosis, Swallowing Precautions    Certification Period: 4/4/22-5/3/22    Luz Asencio, SLP 4/5/2022 3:08 PM  ________________________________________________________________________    I certify that the above Therapy Services are being furnished while the patient is under my care.  I agree with the treatment plan and certify that this therapy is necessary.     [facility] Signature:____________Date:_________TIME:________     Alcides Villarreal MD  ** Signature, Date and Time must be completed for valid certification **    Please sign and return to [facility] Vamshi How  [address] [facility]  [phone] fax     Thank you

## 2022-04-07 NOTE — TELEPHONE ENCOUNTER
Patient called asking to have hydration with her appointment with [facility] tomorrow. She is having headaches, nausea, throwing up and her feet and ankles are swelling. She feels she maybe dehydrated. Patient was asked if she had nausea medication for at home , she does and will start taking it.    Please advice

## 2022-04-12 NOTE — PROGRESS NOTES
ST DAILY TREATMENT NOTE    Patient Name: Donald Nice  Date:2022  : 1945  [x]  Patient  Verified  Payor: Payor: VA MEDICARE / Plan: VA MEDICARE PART A & B / Product Type: Medicare /   In time: 345   Out time: 440  Total Treatment Time (min): 54  Visit #: 2 of 8    Treatment Diagnosis: Dysphagia [R13.10]    SUBJECTIVE  Pain Level (0-10 scale): neck and shoulder  Any medication changes, allergies to medications, adverse drug reactions, diagnosis change, or new procedure performed?: [x] No    [] Yes (see summary sheet for update)    Subjective functional status/changes:   [] No changes reported  Patient reported neck and should pain from arthritis  OBJECTIVE  Treatment provided includes:  Increase/Improve:  []  Voice Quality []  Cognitive Linguistic Skills []  Laryngeal/Pharyngeal Exercises   []  Vocal Loudness []  Reading Comprehension [x]  Swallowing Skills    []  Vocal Cord Function []  Auditory Comprehension []  Oral Motor Skills   []  Resonance []  Writing Skills [x]  Compensatory strategies    []  Speech Intelligibility []  Expressive Language []  Attention   []  Breath Support/Coord. []  Receptive language []  Memory   []  Articulation []  Safety Awareness []    []  Fluency []  Word Retrieval []        Treatment Provided:  -patient education  -compensatory strategies  -diet modification    Patient/Caregiver  Education: [x] Review HEP      HEP/Handouts given: HEP    Pain Level (0-10 scale) post treatment: not obtained    ASSESSMENT     []   Improving appropriately and progressing toward goals  []   Improving slowly and progressing toward goals  []   Approximating goals/maximum potential  []   Continues to benefit from skilled therapy to address remaining functional deficits  []   Not progressing toward goals and plan of care will be adjusted    Patient received education on causes and symptoms of dysphagia. Patient reported taking cymbalta, which has correlation with dyphagia.  Patient reported independently not taking it for last two days because she didn't like the way it made her feel. St educated patient on esophogram procedure and diet textures to avoid and and go for as far as GERD and dysphagia symptoms are concerned. Safety precautions reviewed. Patient reported rapidly losing weight. ST recommended smoothies and mashed vegetiables for nutrition. ST recommended keeping food log for symtom report. Patient verbalized understanding. Recommended speaking with MD regading Cymbalta side effects. Progress towards goals / Updated goals:  1. Patient will recall/demonstrate knowledge of aspiration precautions and s/s of aspiration and silent aspiration across PO trials or regular/thin liquids with >90% accuracy given min cues. 4/12/22 Patient reportedly thickening all liquids.     2. Patient will complete oropharyngeal and laryngeal strengthening exercises both in session and as part of HEP with >90% accuracy as measured by therapist data collection and patient report.     3.  Patient will tolerate regular/nectar thick liquid diet with no overt s/sx of aspiration in 7/8 trials given min-mod verbal and visual cues.     4. Patient will undergo MBS in order to objectively assess oropharyngeal function.       PLAN  [x]  Continue plan of care  []  Modify Goals/Treatment Plan      []  Discharge due to:  [] Other:    HAYLIE Barrios 4/12/2022  3:51 PM    Future Appointments   Date Time Provider Len Duenas   4/14/2022  2:00 PM Kenia Cooper MD [facility] BS AMB   4/15/2022  9:00 AM Teena Samuel SLP [facility]   4/18/2022  8:00 AM [facility] DX RM 1 [facility]   4/18/2022  8:30 AM [facility] DX RM 4 [facility]   4/18/2022  9:00 AM Bhavana Samuel SLP [facility]   4/19/2022  1:00 PM HBV INFUSION NURSE 2 [facility]   4/20/2022 12:00 PM HAYLIE Clements [facility]   4/26/2022 12:00 PM Bhavana Gracia [facility], SLP [facility]   4/28/2022  9:45 AM Bhavana Gracia [facility], SLP [facility]   4/29/2022  2:00 PM [facility] [place] [address] [facility] BS AMB   5/3/2022  9:45 AM HAYILE Patino [facility]   5/4/2022  2:15 PM Criss Avendaño MD [facility] BS AMB   5/5/2022  9:45 AM HAYLIE Patino [facility]

## 2022-04-15 NOTE — PROGRESS NOTES
ST DAILY TREATMENT NOTE    Patient Name: Rhea Garcia  Date:4/15/2022  : 1945  [x]  Patient  Verified  Payor: Payor: [place] / Plan: [place] MED[place]ARE PART A & B / Product Type: Medicare /   In time: 689   Out time: 950  Total Treatment Time (min): 48  Visit #: 3 of 8    Treatment Diagnosis: Dysphagia [R13.10]    SUBJECTIVE  Pain Level (0-10 scale): could not obtain  Any medication changes, allergies to medications, adverse drug reactions, diagnosis change, or new procedure performed?: [x] No    [] Yes (see summary sheet for update)    Subjective functional status/changes:   [] No changes reported  Patient reported that her liquid ibuprofen left \"a tickle\" in her throat and was causing coughing. She also reported that she fell three times yesterday. OBJECTIVE  Treatment provided includes:  Increase/Improve:  []  Voice Quality []  Cognitive Linguistic Skills []  Laryngeal/Pharyngeal Exercises   []  Vocal Loudness []  Reading Comprehension [x]  Swallowing Skills    []  Vocal Cord Function []  Auditory Comprehension []  Oral Motor Skills   []  Resonance []  Writing Skills [x]  Compensatory strategies    []  Speech Intelligibility []  Expressive Language []  Attention   []  Breath Support/Coord.  []  Receptive language []  Memory   []  Articulation []  Safety Awareness []    []  Fluency []  Word Retrieval []        Treatment Provided:  -patient education  -compensatory strategies  -diet modification    Patient/Caregiver  Education: [x] Review HEP      HEP/Handouts given: HEP    Pain Level (0-10 scale) post treatment: not obtained    ASSESSMENT     []   Improving appropriately and progressing toward goals  []   Improving slowly and progressing toward goals  []   Approximating goals/maximum potential  []   Continues to benefit from skilled therapy to address remaining functional deficits  []   Not progressing toward goals and plan of care will be adjusted    Patient returned food log with appropriate meals recorded and reported no episodes of coughing or reflux related to PO intake. She presented with a chesty cough throughout the session which she attributed to min liquid ibuprofen and allergies. Continues to report phlegm with cough, reporting it is clear color. Patient undergoing esophogram on 4/18. ST provided additional education and handouts on soft diet. Patient verbalized understanding. Progress towards goals / Updated goals:  1. Patient will recall/demonstrate knowledge of aspiration precautions and s/s of aspiration and silent aspiration across PO trials or regular/thin liquids with >90% accuracy given min cues.       2. Patient will complete oropharyngeal and laryngeal strengthening exercises both in session and as part of HEP with >90% accuracy as measured by therapist data collection and patient report.     3.  Patient will tolerate regular/nectar thick liquid diet with no overt s/sx of aspiration in 7/8 trials given min-mod verbal and visual cues.     4. Patient will undergo MBS in order to objectively assess oropharyngeal function.        PLAN  [x]  Continue plan of care  []  Modify Goals/Treatment Plan      []  Discharge due to:  [] Other:    HAYLIE Galvez 4/15/2022  3:51 PM    Future Appointments   Date Time Provider Len Duenas   4/18/2022  8:00 AM [facility] DX RM 1 [facility]   4/18/2022  8:30 AM [facility] DX RM 4 [facility]   4/19/2022 10:30 AM Teena Samuel SLP [facility]   4/19/2022  2:00 PM [facility]FUSION NURSE 2 [facility]   4/20/2022 12:00 PM Teena Samuel SLP [facility]   4/21/2022  2:00 PM Henok Cooper MD [facility] BS AMB   4/26/2022 12:00 PM Tenea Samuel SLP [facility]   4/28/2022  9:45 AM Bhavana Samuel SLP [facility]   4/29/2022  2:00 PM Jeni Aiken DO [facility] BS AMB   5/3/2022  9:45 AM Natalia Collier, SLP [facility]   5/4/2022  2:15 PM Lauren Chandra MD [facility] BS AMB   5/5/2022  9:45 AM [address], SLP [facility]   5/17/2022  1:00 PM [facility] INFUSION NURSE 2 [facility]

## 2022-04-19 NOTE — PROGRESS NOTES
[facility] Progress Note    Date: 2022    Name: Austyn Barker    MRN: [id number]         : 1945      Ms. Figueroa arrived in the [facility] today at 1410, here for Monthly 100 Pin [place], Q4 Week Xgeva, RETACRIT & Faslodex Injections. She was assessed and education was provided. Patient assessed and noted to have 2+ pitting edema in her lower extremities. Patient also stated that she has fallen 3 times in the last month. NP notified and came to see patient. Dr. Gregory Bagley also came to assess the patient. Dr. Gregory Bagley ordered to proceed with today's treatment and started patient on lasix and potassium for the next 10 days until her appointment with cardiologist on 22. Ms. Sandy Forde vitals were reviewed. Visit Vitals  BP (!) 122/58 (BP 1 Location: Left upper arm, BP Patient Position: Sitting)   Pulse 80   Temp 98.1 °F (36.7 °C)   Resp 18   SpO2 96%   Breastfeeding No     Left chest single lumen port was accessed with 20g 1\" carbone after chloraprep, and blood was drawn from the port for ordered labs. And then, the port was flushed well per protocol and without incident, with NS & Heparin, and the carbone needle was removed and bandaid was applied. CBC processed in house and other labs were sent out to the [facility] lab by , for processing. Recent Results (from the past 12 hour(s))   CBC WITH 3 PART DIFF    Collection Time: 22  2:33 PM   Result Value Ref Range    WBC 3.5 (L) 4.5 - 13.0 K/uL    RBC 2.77 (L) 4.10 - 5.10 M/uL    HGB 8.4 (L) 12.0 - 16 g/dL    HCT 27.6 (L) 36 - 48 %    MCV 99.6 78 - 102 FL    MCH 30.3 25.0 - 35.0 PG    MCHC 30.4 (L) 31 - 37 g/dL    RDW 17.1 (H) 11.5 - 14.5 %    PLATELET 332 914 - 057 K/uL    NEUTROPHILS 85 (H) 40 - 70 %    Mixed cells 6 0.1 - 17 %    LYMPHOCYTES 9 (L) 14 - 44 %    ABS. NEUTROPHILS 3.0 1.8 - 9.5 K/UL    ABS. MIXED CELLS 0.2 0.0 - 2.3 K/uL    ABS. LYMPHOCYTES 0.3 (L) 1.1 - 5.9 K/UL    DF AUTOMATED           Lab results were reviewed.   Her most recent labs from 4/08/22 were reviewed, and were all noted to be satisfactory for treatment Kranthi Bonilla today. Xgeva 120 mg, was administered SQ, in her left arm per order, and without incident.      Per today's CBC, H/H 8.4/27.6, Retacrit 60,000 units were given SQ to her left arm per her request.      Faslodex 500 mg Total Dose, was administered IM, in 2 equally divided doses of 250 mg,  per order, and without incident. (250 mg was administered IM, in her right gluteal muscle, and 250 mg was administered IM, in her left gluteal muscle). Banadid and gauze to sites. Ms. Bonilla Lozoya tolerated well, and had no complaints. Discharge/ follow-up instructions discussed w/ pt. Pt verbalized understanding. Armband removed and shredded. Ms. Bonilla Lozoya was discharged from [place] [address] in stable condition at [phone]. She is to return on 4/19/2022 at 1300, for her next appointment.      Aishwarya Miranda RN  April 19, 2022

## 2022-04-21 NOTE — PROGRESS NOTES
Hematology/Oncology  Progress Note    Name: Emerald Shultz  Date: 2022  : 1945    PCP: Gary Smalls MD     Ms. Bonilla Lozoya is a 68 y.o.  female who was seen for management of her metastatic breast cancer. Current therapy: Fulvestrant 500 mg subcutaneous monthly and Palbociclib 75 mg daily 21 days on/7 days off. .       Subjective:     Mrs. Bonilla Lozoya is a 68-year-old woman who has slowly progressive metastatic breast cancer. Patient was being followed by Dr. Melvin Tovar who retired. She was accompanied by her  today. The patient has previously completed external beam radiation therapy to lesions in her ribs and more recently she completed proton therapy to areas of bone involvement with a significant benefit with regards to pain control. Additionally she is currently receiving systemic therapy with fulvestrant and Ibrance at reduced dose. The patient reported feeling weaker and tiredness. She has neck pain and will see orthopedics/Spine surgery. She continues to have dizziness and fainting episodes. She has b/l LE swelling, just start low dose of Lasix daily. Her neuropathy reported with tinging and numbness. She is unable to take Cymbalta d/t dysphagia. She is scheduled to see GI for her dysphagia. She denied other complaints. Past medical history, family history, and social history: these were reviewed and remains unchanged.     Past Medical History:   Diagnosis Date    Biliary colic     Breast CA ([facility] [address] 2012    Cancer [facility])     Right Breast    Chemotherapy convalescence or palliative care     Neuropathy     Radiation therapy complication     Thyroid disease      Past Surgical History:   Procedure Laterality Date    HX LAP CHOLECYSTECTOMY  13    HX MASTECTOMY      Right    [place] BREAST SURGERY PROCEDURE UNLISTED  10/2002    Right-Lesion    [place] BREAST SURGERY PROCEDURE UNLISTED  2004    Right-Lesion     Social History     Socioeconomic History    Marital status:      Spouse name: Not on file    Number of children: Not on file    Years of education: Not on file    Highest education level: Not on file   Occupational History    Not on file   Tobacco Use    Smoking status: Former Smoker     Packs/day: 0.50     Years: 10.00     Pack years: 5.00     Quit date: 1991     Years since quittin.9    Smokeless tobacco: Never Used   Vaping Use    Vaping Use: Never used   Substance and Sexual Activity    Alcohol use: Yes     Alcohol/week: 0.0 standard drinks     Types: 1 - 2 Glasses of wine per week     Comment: socially, occassionally    Drug use: No    Sexual activity: Not on file   Other Topics Concern    Not on file   Social History Narrative    Not on file     Social Determinants of Health     Financial Resource Strain:     Difficulty of Paying Living Expenses: Not on file   Food Insecurity:     Worried About Running Out of Food in the Last Year: Not on file    Max of Food in the Last Year: Not on file   Transportation Needs:     Lack of Transportation (Medical): Not on file    Lack of Transportation (Non-Medical):  Not on file   Physical Activity:     Days of Exercise per Week: Not on file    Minutes of Exercise per Session: Not on file   Stress:     Feeling of Stress : Not on file   Social Connections:     Frequency of Communication with Friends and Family: Not on file    Frequency of Social Gatherings with Friends and Family: Not on file    Attends Jainism Services: Not on file    Active Member of Clubs or Organizations: Not on file    Attends Club or Organization Meetings: Not on file    Marital Status: Not on file   Intimate Partner Violence:     Fear of Current or Ex-Partner: Not on file    Emotionally Abused: Not on file    Physically Abused: Not on file    Sexually Abused: Not on file   Housing Stability:     Unable to Pay for Housing in the Last Year: Not on file    Number of [place] in the Last Year: Not on file    Unstable Housing in the Last Year: Not on file     Family History   Problem Relation Age of Onset    Cancer Maternal Aunt         Hodgkin's disease    Breast Cancer Paternal Aunt     Cancer Father         Prostate, lung, brain     Current Outpatient Medications   Medication Sig Dispense Refill    ondansetron hcl (Zofran) 8 mg tablet Take 1 Tablet by mouth every eight (8) hours as needed for Nausea or Vomiting. 30 Tablet 0    promethazine (PHENERGAN) 25 mg suppository unwrap and insert 1 suppository rectally every 8 to 12 hours if needed for nausea      furosemide (LASIX) 20 mg tablet Take 1 Tablet by mouth daily. 7 Tablet 0    potassium chloride (KAON 10%) 20 mEq/15 mL solution Take 5 mL by mouth daily for 7 days. 35 mL 0    calcium-vitamin D (OS-ADIN +D3) 500 mg-200 unit per tablet Take 1 Tablet by mouth daily.  epoetin zac-epbx (RETACRIT INJECTION) by Injection route every thirty (30) days. PRN      denosumab (XGEVA SC) by SubCUTAneous route every thirty (30) days. PRN      fulvestrant (FASLODEX IM) by IntraMUSCular route every thirty (30) days.  fluticasone-umeclidin-vilanter (Trelegy Ellipta) 200-62.5-25 mcg inhaler Take 1 Puff by inhalation daily. Rinse and gargle after each use 3 Each 3    fluticasone-umeclidin-vilanter (Trelegy Ellipta) 200-62.5-25 mcg inhaler Take 1 Puff by inhalation daily. Rinse and gargle after each use 1 Each 0    omeprazole (PRILOSEC) 40 mg capsule Take 1 Capsule by mouth two (2) times a day. Take at least 30min before breakfast and dinner. Disp: 90 day supply 180 Capsule 0    famotidine (Pepcid AC) 10 mg tablet Take 10 mg by mouth two (2) times daily as needed.  megestroL (MEGACE) 40 mg tablet Take 40 mg by mouth daily.  gabapentin (NEURONTIN) 100 mg capsule TAKE 2 CAPSULES THREE TIMES A DAY (Patient taking differently: Take 200 mg by mouth three (3) times daily.) 540 Capsule 3    palbociclib 75 mg tab Take 75 mg by mouth daily.  for 21 days then 7 days off 63 Each 5    albuterol (PROVENTIL HFA, VENTOLIN HFA, PROAIR HFA) 90 mcg/actuation inhaler Take 2 Puffs by inhalation every six (6) hours as needed.  fluticasone propionate (FLONASE) 50 mcg/actuation nasal spray 2 Sprays by Both Nostrils route daily as needed.  DULoxetine (CYMBALTA) 30 mg capsule Take 1 Capsule by mouth daily. For one week, then 2 capsules daily (Patient taking differently: Take 60 mg by mouth daily.) 60 Capsule 5    Cetirizine (ZYRTEC) 10 mg cap Take 1 Cap by mouth daily as needed.  lidocaine HCl-hydrocortison ac topical cream Apply  to affected area two (2) times a day. 85 g 3    fexofenadine-pseudoephedrine (ALLEGRA-D 24 HOUR) 180-240 mg per tablet Take 1 Tab by mouth daily as needed.  Cholecalciferol, Vitamin D3, 5,000 unit Tab Take 5,000 Units by mouth daily.  thyroid, Pork, (ARMOUR THYROID) 60 mg tablet Take 60 mg by mouth daily.  L-Mfolate-B6 Phos-Methyl-B12 (NEURPATH-B) 3-35-2 mg Tab Take  by mouth two (2) times a day. (Patient not taking: Reported on 3/17/2022)      warfarin (COUMADIN) 1 mg tablet Take 1 mg by mouth every other day. Facility-Administered Medications Ordered in Other Visits   Medication Dose Route Frequency Provider Last Rate Last Admin    sodium chloride (NS) flush 10-40 mL  10-40 mL IntraVENous PRN Yumiko Cooper MD   30 mL at 04/19/22 1432     Review of Systems   Constitutional: Positive for malaise/fatigue. Negative for chills, diaphoresis, fever and weight loss. Respiratory: Negative for cough, hemoptysis, shortness of breath and wheezing. Cardiovascular: Positive for leg swelling. Negative for chest pain and palpitations. Gastrointestinal: Negative for abdominal pain, diarrhea, heartburn, nausea and vomiting. Genitourinary: Negative for dysuria, frequency, hematuria and urgency. Musculoskeletal: Positive for neck pain. Negative for back pain, joint pain and myalgias.    Skin: Negative for itching and rash.   Neurological: Positive for dizziness, tingling, weakness and headaches. Negative for seizures. Psychiatric/Behavioral: Negative for depression. The patient does not have insomnia. Objective:     Visit Vitals  BP (!) 112/57   Pulse 76   Temp 97.7 °F (36.5 °C) (Oral)   Resp 18   Ht 5' 3.75\" (1.619 m)   Wt 58.1 kg (128 lb)   SpO2 95%   BMI 22.14 kg/m²       ECOG Performance Status (grade): 2  0 - able to carry on all pre-disease activity w/out restriction  1 - restricted but able to carry out light work  2 - ambulatory and can self- care but unable to carry out work  3 - bed or chair >50% of waking hours  4 - completely disable, total care, confined to bed or chair    Physical Exam  Constitutional:       Appearance: Normal appearance. Comments: On wheelchair   HENT:      Head: Normocephalic and atraumatic. Eyes:      Pupils: Pupils are equal, round, and reactive to light. Cardiovascular:      Rate and Rhythm: Normal rate and regular rhythm. Heart sounds: Normal heart sounds. Pulmonary:      Effort: Pulmonary effort is normal.      Breath sounds: Normal breath sounds. Abdominal:      General: Bowel sounds are normal.      Palpations: Abdomen is soft. Tenderness: There is no abdominal tenderness. There is no guarding. Musculoskeletal:         General: No tenderness. Normal range of motion. Cervical back: Neck supple. Right lower leg: Edema present. Left lower leg: Edema present. Skin:     General: Skin is warm. Neurological:      General: No focal deficit present. Mental Status: She is alert and oriented to person, place, and time. Mental status is at baseline.           Diagnostics:      Recent Results (from the past 96 hour(s))   CBC WITH 3 PART DIFF    Collection Time: 04/19/22  2:33 PM   Result Value Ref Range    WBC 3.5 (L) 4.5 - 13.0 K/uL    RBC 2.77 (L) 4.10 - 5.10 M/uL    HGB 8.4 (L) 12.0 - 16 g/dL    HCT 27.6 (L) 36 - 48 %    MCV 99.6 78 - 102 FL    MCH 30.3 25.0 - 35.0 PG    MCHC 30.4 (L) 31 - 37 g/dL    RDW 17.1 (H) 11.5 - 14.5 %    PLATELET 487 721 - 652 K/uL    NEUTROPHILS 85 (H) 40 - 70 %    Mixed cells 6 0.1 - 17 %    LYMPHOCYTES 9 (L) 14 - 44 %    ABS. NEUTROPHILS 3.0 1.8 - 9.5 K/UL    ABS. MIXED CELLS 0.2 0.0 - 2.3 K/uL    ABS. LYMPHOCYTES 0.3 (L) 1.1 - 5.9 K/UL    DF AUTOMATED     METABOLIC PANEL, COMPREHENSIVE    Collection Time: 04/19/22  2:33 PM   Result Value Ref Range    Sodium 140 136 - 145 mmol/L    Potassium 3.6 3.5 - 5.5 mmol/L    Chloride 102 100 - 111 mmol/L    CO2 31 21 - 32 mmol/L    Anion gap 7 3.0 - 18 mmol/L    Glucose 94 74 - 99 mg/dL    BUN 27 (H) 7.0 - 18 MG/DL    Creatinine 1.56 (H) 0.6 - 1.3 MG/DL    BUN/Creatinine ratio 17 12 - 20      GFR est AA 39 (L) >60 ml/min/1.73m2    GFR est non-AA 32 (L) >60 ml/min/1.73m2    Calcium 8.7 8.5 - 10.1 MG/DL    Bilirubin, total 0.4 0.2 - 1.0 MG/DL    ALT (SGPT) 13 13 - 56 U/L    AST (SGOT) 21 10 - 38 U/L    Alk. phosphatase 65 45 - 117 U/L    Protein, total 5.7 (L) 6.4 - 8.2 g/dL    Albumin 2.8 (L) 3.4 - 5.0 g/dL    Globulin 2.9 2.0 - 4.0 g/dL    A-G Ratio 1.0 0.8 - 1.7     MAGNESIUM    Collection Time: 04/19/22  2:33 PM   Result Value Ref Range    Magnesium 1.4 (L) 1.6 - 2.6 mg/dL   PHOSPHORUS    Collection Time: 04/19/22  2:33 PM   Result Value Ref Range    Phosphorus 3.0 2.5 - 4.9 MG/DL       Imaging:  No results found for this or any previous visit. Results for orders placed during the hospital encounter of 04/18/22    XR UGI/BA SWALLOW W SM BOWEL    Narrative  PROCEDURE: Upper GI Series with Small Bowel Follow-Through. CLINICAL INDICATION/HISTORY: Patient complains of chronic dysphagia, vomiting,  and weight loss. TECHNIQUE: A  radiograph of the abdomen was obtained. Following the  administration of effervescent granules and an oral barium solution, double  contrast examination of the esophagus, stomach, and duodenum was performed.   Multiple spot fluoroscopic and overhead views of the esophagus, stomach,  duodenum, and subsequently the small bowel were obtained. Spot fluoroscopic  views of the terminal ileum were also obtained. COMPARISONS: CT chest abdomen and pelvis September 14, 2021. FINDINGS:    The  radiograph of the abdomen demonstrates a normal bowel gas pattern  without evidence of obstruction or ileus. There is no evidence of free air or  organomegaly. No acute osseous abnormality is appreciated. Surgical clips  right upper quadrant. The overall quality of the study is slightly degraded by the patient's inability  to stand for the duration of the examination and use of single contrast.  This  affects in particular the ability to assess the gastric folds and mucosal  pattern. There was marked esophageal dysmotility with delayed esophageal emptying and  tertiary contractions. No focal filling defects or strictures are noted. Spontaneous gastroesophageal reflux was demonstrated to the level of the  thoracic inlet during the course of the examination. No evidence of hiatal  hernia. Use of single contrast markedly decreases the ability to assess the gastric  rugal markings and mucosal pattern. Difficult to detect gastric ulcer with  current scan. There is a small diverticulum at the fourth portion of the  duodenum. The duodenal bulb and remainder of the c-loop are unremarkable. Ligament of Treitz is in its correct position. Small bowel follow-through examination demonstrates contrast flowing from the  stomach to cecum between 60 minutes to 90 minutes. Bowel loops are within  normal limits of caliber. The mucosal fold pattern appears normal.  There is no  evidence of adhesion. There is cobblestoning appearance and mucosal  irregularity of the terminal ileum. No evidence for obstruction at the terminal  ileum. Fluoroscopy Time = 5 minutes 30 seconds  Total Number of Fluoroscopic Images saved = 53    Impression  1.   Limited single contrast study. It is difficult to exclude underlying  esophageal or gastric mucosal abnormalities. 2. Marked esophageal dysmotility. 3.  Gastroesophageal reflux. 4.  Duodenal diverticulum. 5.  Mucosal irregularity of the terminal ileum. Results for orders placed during the hospital encounter of 02/09/22    CTA CHEST W OR W WO CONT    Narrative  EXAM: CTA CHEST WITH CONTRAST PULMONARY ARTERIAL EXAM WITH MAXIMUM INTENSITY  PROJECTIONS (MIPS)    CLINICAL HISTORY/INDICATION:  PE rule out, R effusion on XR, cancer patient ,  patient referred to emergency department for abnormal chest x-ray, known slowly  progressive metastatic right lower inner quadrant right breast cancer known  metastatic chest wall and bone metastasis with prior radiation to the chest  wall, receiving systemic therapy    COMPARISON: CT chest 9/14/2021, PET/CT 1/13/2022. TECHNIQUE: Initial localizing images were obtained without intravenous contrast.  Standard helical images were obtained from the thoracic inlet through the  adrenals at 2.5 mm thick sections following the intravenous injection of a bolus  of 100 cc nonionic contrast.  Images were reviewed on both soft tissue, lung,  and bone window settings. Coronal and sagittal MIPs  were obtained to better evaluate the pulmonary  arteries in a three dimensional angiographic method to evaluate for possible  pulmonary emboli. All CT scans at this facility are performed using dose optimization technique  as appropriate to a performed exam, to include automated exposure control,  adjustment of the mA and/or kV according to patient's size (including  appropriate matching for site-specific examinations), or use of iterative  reconstruction technique. FINDINGS:    The quality of opacification of the pulmonary arteries is excellent. There are  no filling defects . There is stable right pleural thickening with loculated small effusion.  Stable  rounded atelectasis at the right lung base.  Prexiphoid chest wall soft tissue mass measures 3.1 x 2.1 cm with previous  measurement of 3.4 x 2.3 by PET scan not significantly changed. .    Stable parenchymal consolidation with air bronchograms at the right 8 stable  right middle lobe anterior nodular pleural thickening with slight associated  stranding in the fat. The great vessels and thoracic aorta are unremarkable. The included portion of the of the liver is unremarkable. Stable thickening of the left adrenal.  Stable sclerotic changes of the right fifth sixth and seventh rib Prior right  mastectomy. Reconstructions: Coronal and sagittal MIPs ( maximum intensity projections) were  obtained from the axial acquisition. The pulmonary arteries branch normally. There are no filling defects. Impression  No evidence of PE. Stable prexiphoid  soft tissue metastatic mass of the chest wall unchanged from  1/13/2022. Stable sclerotic right anterior lateral ribs without malignant activity on  recent PET scan. Stable right pleural thickening with loculated pleural effusion. Stable rounded atelectasis in the right lower lobe. Stable mild post radiation  changes right hemithorax. Report provided to the emergency department at 1927 hrs. Assessment:     1. Metastatic breast cancer ([address]    2. 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 Intractable headache, unspecified chronicity pattern, unspecified headache type    4. B12 deficiency    5. Neuropathy associated with cancer ([address]    6. Malignant neoplasm of lower-inner quadrant of right breast of female, estrogen receptor positive ([address]    7. Antineoplastic chemotherapy induced anemia    8. Iron deficiency anemia due to dietary causes    9. Leg swelling    10. Anemia due to chemotherapy    11. Chemotherapy induced neutropenia (HCC)    12. Poor appetite      Plan:   Metastatic breast cancer:     -- She has slowly progressive metastatic breast cancer. Patient was previously being followed by Dr. Davie Giron who retired. The patient has previously completed external beam radiation therapy to lesions in her ribs and more recently she completed proton therapy to areas of bone involvement with a significant benefit with regards to pain control. The posttherapy imaging studies showed a significant decrease in the intensity of uptake on the bone scan. -- Additionally she has been on systemic therapy with fulvestrant and Ibrance. She has been on Fulvestrant 500 mg subcutaneous monthly and Ibrance 100 mg by mouth daily. She is tolerating treatment fairly well without high grade toxicities. -- 4/15/2020  CT scans of the chest, abdomen, and pelvis shows that she has a loculated right pleural effusion with some pleural thickening and adjacent areas of probable rounded atelectasis all of which are stable. She has relatively stable bone lesions of the rib, pelvis, and T12 posterior element. There was no findings on CT scan to support new metastatic disease in the chest, abdomen, or pelvis. --  Mammogram 6/5 done shows No evidence of malignancy. Suggest routine follow-up. -- 12/17/2020 CBC reported hemoglobin was 10.8, hematocrit was 32.8%, total WBC was 1.9 and ANC was 1.3, platelet was [phone].  -- 1/18/2021 CT CA/P: No new or enlarging lymphadenopathy. No solid organ lesions or ascites. -- 1/18/2021 Bone scan reported a focal uptake anterior left iliac crest without definite CT correlate.   -- 2/12/2021 US reported No abnormality is seen in the patient's area of concern inferior to the right scapula. -- 4/19/2021 Bone scan reported no gross interval change in indeterminate uptake in the left superior iliac bone. -- 6/7/2021 Mammogram reported no evidence for malignancy.  Suggest routine follow-up with annual mammographic screening.  -- 7/20/2021 CBC reported hemoglobin 11.7, hematocrit 37.1%, ANC 1.6, platelet [phone].  -- The patient has tolerated fulvestrant and palbociclib therapy with no high graded toxicities. Recent labs reviewed with the patient today. -- 9/14/2021 CT CAP reported a new soft tissue density noted along the anterior inferior aspect of the sternum. Otherwise no significant interval change in the chest. No new or enlarging nodule. -- 9/14/2021 Bone scan reported known osseous metastatic disease. Small new focal activity proximal right forearm, concerning for new metastasis. Developing activity in the left femoral head concerning for metastasis. -- 10/14/2021 PET scan reported no definite finding for FDG avid bone disease, no definite correlate with bone scan findings. + The soft tissue mass anterior to the inferior sternum demonstrates a moderate  degree of metabolic activity which was stable compared to previous PET 6/20216. Persistent metabolically active subcarinal lymph node and new medial right infrahilar lymph node. -- 10/26/2021 WBC 1.8, ANC 0.9  -- She has been on Palbociclib 75 mg daily 21 days on/7 days off since last visit. She has tolerated therapy without high graded toxicities  -- 12/13/2021 MRI hip reported poorly defined abnormal signal in the anterior femoral head. There are 3 infiltrative areas of increased STIR signal in the pelvis, 2 on the right and one on the left, questionable metastatic disease.   -- 1/3/2022 Brain MRI reported no definite findings of metastatic brain disease.  + Newly evident right frontal skull lesion concerning for a new osseous  metastasis  -- 1/13/2022 PET scan reported although there is mildly decreased activity at the pre-xyphoid chest wall mass compared with prior study, now at blood pool, lesion has increased in size since prior study and remains highly suspicious for enlarging metastasis. No malignant activity at the left hip.  -- Has completed proton therapy of chest wall mass - Dr. Marjan Goode. Plan to repeat CT chest in the end of May per pt.  -- The patient reported feeling weaker and tiredness. She has neck pain and will see orthopedics/Spine surgery. She continues to have dizziness and fainting episodes. She has b/l LE swelling, just start low dose of Lasix daily. Plan:   -- Given declining functioning status, will hold Palbociclib (75 mg daily 21 days on/7 days off) for now. -- Patient will continue fulvestrant. She will need to check lab before each cycle. -- Patient has follow up with [facility] - Dr. Stacey Siddiqi for proton therapy for repeat CT of chest.  -- Brain MRI for persistent dizziness  -- She will need yearly DEXA scan. -- Labs: CBC, CMP, CA 27-29, CA 15-3, Vitamin D      B/l Leg swelling  -- Recent Venous doppler negative DVT  -- Started Lasix at low dose recently, potassium replacement. Intractable headache  Intractable dizziness  -- 5/30/2021 Brain MRI reported no intracranial metastases identified. -- 1/3/2022 Brain MRI reported no definite findings of metastatic brain disease. -- Given intractable dizziness with fall will obtain brain MRI. -- She also c/o cervical pain, will also obtain Cervical spine MRI. She will f/u later with Spine/Ortho. Normocytic Anemia. Chemotherapy-related annemia  -- 2/11/2021 CBC reported hemoglobin 10.5, hematocrit 32, wbc 2.0, ANC 1.5, and platelet [phone]. chemistry reviewed  -- The patient previously received Procrit 60,000 units subcutaneous at 4 week intervals whenever her hemoglobin is below 10 g/dL and hematocrit is below 30%. Procrit SQ every 4 weeks has resumed recently. -- Recent labs reviewed. B12 275. Advised the patient to take B12 supplement  -- 7/20/2021 CBC reported hemoglobin 11.7, hematocrit 37.1%, ANC 1.6, platelet [phone].  -- 12/28/2021 Recent Iron profile reviewed    Plan:  -- Will repeat CBC, CMP, Iron profile, ferritin, ret count, B12/folate. Neuropathy associated with cancer:   -- Was on Neurontin at 200 mg 3 times daily. Now she took reduced dose d/t dizziness. -- She is unable to take Cymbalta d/t dysphagia.  She is scheduled to see GI for her dysphagia. Poor appetite  Weight loss  -- Patient report this is improving recently   -- Nutritionist referral  -- Megace  -- IVF support PRN      Chemotherapy-induced neutropenia/chronic leukopenia (persisting problem):   --  1/25/2022  CBC reported hemoglobin 9.5, hematocrit 29.8, Wbc 1.8, ANC 1.4, and platelet [phone]. chemistry reviewed. -- Clinical stable. -- If the absolute neutrophil count declines to 0.5 she will be started on Neupogen or Neulasta.            Chemotherapy-induced thrombocytopenia :   -- The patient previously held Ibrance 140 mg at previous times D/T thrombocytopenia. -- She presently taking Ibrance 100mg.   -- Recent CBC showed improved PLTs count at 207,000       -- We will see the patient back in clinic in about 2 weeks. Always sooner if required. The patient can have lab done prior our next clinic visit. Orders Placed This Encounter    IRON PROFILE     Standing Status:   Future     Standing Expiration Date:   4/22/2023    FERRITIN     Standing Status:   Future     Standing Expiration Date:   4/21/2023    VITAMIN B12 & FOLATE     Standing Status:   Future     Standing Expiration Date:   4/21/2023    RETICULOCYTE COUNT     Standing Status:   Future     Standing Expiration Date:   4/21/2023    METHYLMALONIC ACID     Standing Status:   Future     Standing Expiration Date:   4/21/2023    promethazine (PHENERGAN) 25 mg suppository     Sig: unwrap and insert 1 suppository rectally every 8 to 12 hours if needed for nausea    ondansetron hcl (Zofran) 8 mg tablet     Sig: Take 1 Tablet by mouth every eight (8) hours as needed for Nausea or Vomiting. Dispense:  30 Tablet     Refill:  0           Ms. Karen Oquendo has a reminder for a \"due or due soon\" health maintenance. I have asked that she contact her primary care provider for follow-up on this health maintenance. All of patient's questions answered to their apparent satisfaction.  They verbally show understanding and agreement with aforementioned plan. Meera Celis MD  4/21/2022          Above times were spent for this encounter with more than 50% of the time spent in face-to-face counseling, discussing on diagnosis and management plan going forward, and co-ordination of care. Parts of this document has been produced using Dragon dictation system. Unrecognized errors in transcription may be present. Please do not hesitate to reach out for any questions or clarifications.       CC: Markus Martin MD

## 2022-04-26 NOTE — PROGRESS NOTES
[facility] Progress Note    Date: 2022    Name: Messi Shabazz    MRN: [id number]         : 1945    Hydration with labs    Ms. Figueroa arrived to [place] at 1100 via wheelchair. Pt was assessed and education was provided. Ms. Mihir Cole vitals were reviewed. Visit Vitals  /68 (BP 1 Location: Left upper arm, BP Patient Position: Sitting)   Pulse 83   Temp 98.3 °F (36.8 °C)   Resp 18   SpO2 95%       Left chest single lumen mediport assessed with 20g 1\" carbone using sterile technique. Flushed well with NS with positive blood return. Ordered labs dated 22 from Dr. Mary Welch drawn and sent to hospital via  for processing. NS 1L infused approximately over 2 hours per order. Pt tolerated well and had no complaints. Port flushed with NS and  heparin per protocol and de-assessed. Band-aid applied to site. Ms. Eri Palencia tolerated infusion well and voiced no complaints. Discharge/ follow-up instructions discussed w/ pt. Pt verbalized understanding. Patient armband removed and shredded. Ms. Eri Palencia was discharged from [place] [address] in stable condition at [phone]. She is to return on 22 at 1300 for her next Xgeva/Retacrit/Faslodex and port flush appointment.     Lion Gerber RN  2022

## 2022-04-28 NOTE — TELEPHONE ENCOUNTER
Patient contacted office and reports the lasix and pred is not working for the swelling. She is wondering what her next steps are?  Please advise

## 2022-04-29 NOTE — PATIENT INSTRUCTIONS
Start Bumex 0.5mg once daily in the morning   Start Potassium 20 mEq once daily with Bumex     If Bumex does not seem to work and you stop taking it, stop the potassium as well.

## 2022-04-29 NOTE — PROGRESS NOTES
Toya Ashley    Chief Complaint   Patient presents with    Follow-up     1 year follow up       HPI    Toya Ashley is a 68 y.o. with no known CV disease, here for follow up evaluation for LE edema and SOB. Pt has metastatic CA, s/p masectomy, radiation, chemotx, follows with Dr. Mariana Ruiz. Most recent notes, labs reviewed. She's having worsening LE edema, started LLE, now bilateral and all the way up her thighs. She was given 7 days of Lasix but swears this made it worse. Didn't feel improvement and had bladder incontinence. Sleeps in a recliner  Sees GI, also Carlene Mathisd, has MRI of neck pending  Taking NSAIDS daily for pain  Last SCr noted abn    Hx reviewed below:    Past Medical History:   Diagnosis Date    Biliary colic     Breast CA ([facility] [address] 2012    Cancer [facility]) 1991    Right Breast    Chemotherapy convalescence or palliative care     Neuropathy     Radiation therapy complication     Thyroid disease        Past Surgical History:   Procedure Laterality Date    HX LAP CHOLECYSTECTOMY  9-19-13    HX MASTECTOMY  1991    Right    LA BREAST SURGERY PROCEDURE UNLISTED  10/2002    Right-Lesion    LA BREAST SURGERY PROCEDURE UNLISTED  11/2004    Right-Lesion       Current Outpatient Medications   Medication Sig Dispense Refill    ondansetron hcl (Zofran) 8 mg tablet Take 1 Tablet by mouth every eight (8) hours as needed for Nausea or Vomiting. 30 Tablet 0    calcium-vitamin D (OS-ADIN +D3) 500 mg-200 unit per tablet Take 1 Tablet by mouth daily.  epoetin zac-epbx (RETACRIT INJECTION) by Injection route every thirty (30) days. PRN      denosumab (XGEVA SC) by SubCUTAneous route every thirty (30) days. PRN      fulvestrant (FASLODEX IM) by IntraMUSCular route every thirty (30) days.  fluticasone-umeclidin-vilanter (Trelegy Ellipta) 200-62.5-25 mcg inhaler Take 1 Puff by inhalation daily.  Rinse and gargle after each use 3 Each 3    omeprazole (PRILOSEC) 40 mg capsule Take 1 Capsule by mouth two (2) times a day. Take at least 30min before breakfast and dinner. Disp: 90 day supply 180 Capsule 0    megestroL (MEGACE) 40 mg tablet Take 40 mg by mouth daily.  gabapentin (NEURONTIN) 100 mg capsule TAKE 2 CAPSULES THREE TIMES A  Capsule 3    palbociclib 75 mg tab Take 75 mg by mouth daily. for 21 days then 7 days off 63 Each 5    fluticasone propionate (FLONASE) 50 mcg/actuation nasal spray 2 Sprays by Both Nostrils route daily as needed.  DULoxetine (CYMBALTA) 30 mg capsule Take 1 Capsule by mouth daily. For one week, then 2 capsules daily 60 Capsule 5    Cetirizine (ZYRTEC) 10 mg cap Take 1 Cap by mouth daily as needed.  lidocaine HCl-hydrocortison ac topical cream Apply  to affected area two (2) times a day. 85 g 3    Cholecalciferol, Vitamin D3, 5,000 unit Tab Take 5,000 Units by mouth daily.  thyroid, Pork, (ARMOUR THYROID) 60 mg tablet Take 60 mg by mouth daily.  warfarin (COUMADIN) 1 mg tablet Take 1 mg by mouth every other day.  albuterol (PROVENTIL HFA, VENTOLIN HFA, PROAIR HFA) 90 mcg/actuation inhaler Take 2 Puffs by inhalation every six (6) hours as needed.        Facility-Administered Medications Ordered in Other Visits   Medication Dose Route Frequency Provider Last Rate Last Admin    sodium chloride (NS) flush 10-40 mL  10-40 mL IntraVENous PRN Kristie Cooper MD   10 mL at 04/26/22 1348       Allergies   Allergen Reactions    Codeine Palpitations    Darvocet A500 [Propoxyphene N-Acetaminophen] Nausea and Vomiting    Darvon [Propoxyphene] Nausea and Vomiting       Social History     Socioeconomic History    Marital status:      Spouse name: Not on file    Number of children: Not on file    Years of education: Not on file    Highest education level: Not on file   Occupational History    Not on file   Tobacco Use    Smoking status: Former Smoker     Packs/day: 0.50     Years: 10.00     Pack years: 5.00     Quit date: 6/2/1991     Years since quittin.9    Smokeless tobacco: Never Used   Vaping Use    Vaping Use: Never used   Substance and Sexual Activity    Alcohol use: Yes     Alcohol/week: 0.0 standard drinks     Types: 1 - 2 Glasses of wine per week     Comment: socially, occassionally    Drug use: No    Sexual activity: Not on file   Other Topics Concern    Not on file   Social History Narrative    Not on file     Social Determinants of Health     Financial Resource Strain:     Difficulty of Paying Living Expenses: Not on file   Food Insecurity:     Worried About Running Out of Food in the Last Year: Not on file    Max of Food in the Last Year: Not on file   Transportation Needs:     Lack of Transportation (Medical): Not on file    Lack of Transportation (Non-Medical): Not on file   Physical Activity:     Days of Exercise per Week: Not on file    Minutes of Exercise per Session: Not on file   Stress:     Feeling of Stress : Not on file   Social Connections:     Frequency of Communication with Friends and Family: Not on file    Frequency of Social Gatherings with Friends and Family: Not on file    Attends Buddhist Services: Not on file    Active Member of [address] [place] [address] [employer] or Organizations: Not on file    Attends Club or Organization Meetings: Not on file    Marital Status: Not on file   Intimate Partner Violence:     Fear of Current or Ex-Partner: Not on file    Emotionally Abused: Not on file    Physically Abused: Not on file    Sexually Abused: Not on file   Housing Stability:     Unable to Pay for Housing in the Last Year: Not on file    Number of [place] in the Last Year: Not on file    Unstable Housing in the Last Year: Not on file     is also my pt    FH: neg for CV     Review of Systems    14 pt Review of Systems is negative unless otherwise mentioned in the HPI.     Wt Readings from Last 3 Encounters:   22 59.9 kg (132 lb)   22 58.1 kg (128 lb)   22 53.7 kg (118 lb 6.4 oz)     Temp Readings from Last 3 Encounters:   04/26/22 97.3 °F (36.3 °C)   04/21/22 97.7 °F (36.5 °C) (Oral)   04/19/22 98.1 °F (36.7 °C)     BP Readings from Last 3 Encounters:   04/29/22 114/62   04/26/22 105/66   04/21/22 (!) 112/57     Pulse Readings from Last 3 Encounters:   04/29/22 87   04/26/22 79   04/21/22 76            Physical Exam:    Visit Vitals  /62 (BP 1 Location: Left upper arm, BP Patient Position: Sitting, BP Cuff Size: Small adult)   Pulse 87   Ht 5' 3.5\" (1.613 m)   Wt 59.9 kg (132 lb)   SpO2 95%   BMI 23.02 kg/m²      Physical Exam  HENT:      Head: Normocephalic and atraumatic. Eyes:      Pupils: Pupils are equal, round, and reactive to light. Cardiovascular:      Rate and Rhythm: Normal rate and regular rhythm. Heart sounds: Normal heart sounds. No murmur heard. No friction rub. No gallop. Comments: ++TTP  Pulmonary:      Effort: Pulmonary effort is normal. No respiratory distress. Breath sounds: Normal breath sounds. No wheezing or rales. Chest:      Chest wall: No tenderness. Abdominal:      General: Bowel sounds are normal.      Palpations: Abdomen is soft. Musculoskeletal:         General: No tenderness. Skin:     General: Skin is warm and dry. Neurological:      Mental Status: She is alert and oriented to person, place, and time.    Psychiatric:         Speech: Speech is tangential.         EKG today shows: NSR, normal axis and intervals, no ST segment abnormalities    Lab Results   Component Value Date/Time    Cholesterol, total 203 (H) 06/01/2010 11:13 AM    HDL Cholesterol 59 03/18/2010 09:00 AM    LDL, calculated 96.2 03/18/2010 09:00 AM    VLDL, calculated 23.8 03/18/2010 09:00 AM    Triglyceride 119 03/18/2010 09:00 AM    CHOL/HDL Ratio 3.0 03/18/2010 09:00 AM     Lab Results   Component Value Date/Time    WBC 3.5 (L) 04/19/2022 02:33 PM    Hemoglobin, POC 9.5 (L) 01/09/2020 12:02 PM    HGB 8.4 (L) 04/19/2022 02:33 PM    Hematocrit, POC 28 (L) 01/09/2020 12:02 PM    HCT 27.6 (L) 04/19/2022 02:33 PM    PLATELET 802 01/47/3090 02:33 PM    MCV 99.6 04/19/2022 02:33 PM     Lab Results   Component Value Date/Time    Sodium 140 04/19/2022 02:33 PM    Potassium 3.6 04/19/2022 02:33 PM    Chloride 102 04/19/2022 02:33 PM    CO2 31 04/19/2022 02:33 PM    Anion gap 7 04/19/2022 02:33 PM    Glucose 94 04/19/2022 02:33 PM    BUN 27 (H) 04/19/2022 02:33 PM    Creatinine 1.56 (H) 04/19/2022 02:33 PM    BUN/Creatinine ratio 17 04/19/2022 02:33 PM    GFR est AA 39 (L) 04/19/2022 02:33 PM    GFR est non-AA 32 (L) 04/19/2022 02:33 PM    Calcium 8.7 04/19/2022 02:33 PM    Bilirubin, total 0.4 04/19/2022 02:33 PM    Alk. phosphatase 65 04/19/2022 02:33 PM    Protein, total 5.7 (L) 04/19/2022 02:33 PM    Albumin 2.8 (L) 04/19/2022 02:33 PM    Globulin 2.9 04/19/2022 02:33 PM    A-G Ratio 1.0 04/19/2022 02:33 PM    ALT (SGPT) 13 04/19/2022 02:33 PM    AST (SGOT) 21 04/19/2022 02:33 PM       Impression and Plan:  iWlson  is a 68 y.o. with:    AECHF, LE edema +JVP  Atypical CP, +TTP doubt ACS, more likely MSK etiology  metastatic breast CA s/p chemo, radiation etc  Anemia  CARLOTA on CKD? Chronic pain/ arthritis, on NSAIDS, known    Her lower extremity pitting edema does seem related to [facility]  She swears Lasix 20 mg daily made her swelling worse,  Lets try Bumex 0.5 mg daily (with KCL) and perhaps better bioavailability we'll have better results  I ordered another echo as well  RTC close follow up after testing with our NP, then 6 months with me  If Bumex not improving swelling in 3 days, please call us by Monday  45 mins spent    Thank you for allowing me to participate in the care of your patient, please do not hesitate to call with questions or concerns.     [address],    Verena Avila, DO

## 2022-04-29 NOTE — TELEPHONE ENCOUNTER
The patient is calling again today regarding the leg swelling from waist down she has been experiencing.

## 2022-04-29 NOTE — PROGRESS NOTES
Itz Lopez presents today for   Chief Complaint   Patient presents with    Follow-up     1 year follow up       Itz Lopez preferred language for health care discussion is english/other. Is someone accompanying this pt? yes    Is the patient using any DME equipment during [address]? walker    Depression Screening:  3 most recent PHQ Screens 4/29/2022   Little interest or pleasure in doing things Not at all   Feeling down, depressed, irritable, or hopeless Not at all   Total Score PHQ 2 0   Trouble falling or staying asleep, or sleeping too much -   Feeling tired or having little energy -   Poor appetite, weight loss, or overeating -       Learning Assessment:  Learning Assessment 4/29/2022   PRIMARY LEARNER Patient   BARRIERS PRIMARY LEARNER -   PRIMARY LANGUAGE ENGLISH   LEARNER PREFERENCE PRIMARY DEMONSTRATION     -   ANSWERED BY patient   RELATIONSHIP SELF       Abuse Screening:  Abuse Screening Questionnaire 4/29/2022   Do you ever feel afraid of your partner? N   Are you in a relationship with someone who physically or mentally threatens you? N   Is it safe for you to go home? Y       Fall Risk  Fall Risk Assessment, last 12 mths 4/29/2022   Able to walk? Yes   Fall in past 12 months? 0   Do you feel unsteady? 0   Are you worried about falling 0   Is TUG test greater than 12 seconds? -   Is the gait abnormal? -           Pt currently taking Anticoagulant therapy? Warfarin 1 mg every other day    Pt currently taking Antiplatelet therapy ? no      Coordination of Care:  1. Have you been to the ER, urgent care clinic since your last visit? Hospitalized since your last visit? no    2. Have you seen or consulted any other health care providers outside of the [address] [place] [address] since your last visit? Include any pap smears or colon screening.  no

## 2022-05-02 PROBLEM — D49.2 CERVICAL SPINE TUMOR: Status: ACTIVE | Noted: 2022-01-01

## 2022-05-02 NOTE — ED PROVIDER NOTES
Patient 19-year-old female with history of metastatic breast cancer currently on radiation/proton (not currently on chemotherapy) therapy with known metastasis to the brain, and hypothyroidism. Patient presents today with primary complaint of 1 week of progressive weakness worse in her lower extremities but has recently become more prominent in her upper extremity. Patient has an appointment with a spine surgeon tomorrow afternoon. Patient underwent an MRI of the brain and C-spine 1 day prior, MRI brain was read as relatively stable with slight increase in metastasis to the skull, cervical spine MRI has not been read as of presentation. Patient denies any associated fever/chills, chest pain, diarrhea, urinary complaints, abdominal pain, or nausea/vomiting.            Past Medical History:   Diagnosis Date    Biliary colic     Breast CA ([facility] [address] 2012    Cancer [facility])     Right Breast    Chemotherapy convalescence or palliative care     Neuropathy     Radiation therapy complication     Thyroid disease        Past Surgical History:   Procedure Laterality Date    HX LAP CHOLECYSTECTOMY  13    HX MASTECTOMY      Right    MI BREAST SURGERY PROCEDURE UNLISTED  10/2002    Right-Lesion    MI BREAST SURGERY PROCEDURE UNLISTED  2004    Right-Lesion         Family History:   Problem Relation Age of Onset    Cancer Maternal Aunt         Hodgkin's disease    Breast Cancer Paternal Aunt     Cancer Father         Prostate, lung, brain       Social History     Socioeconomic History    Marital status:      Spouse name: Not on file    Number of children: Not on file    Years of education: Not on file    Highest education level: Not on file   Occupational History    Not on file   Tobacco Use    Smoking status: Former Smoker     Packs/day: 0.50     Years: 10.00     Pack years: 5.00     Quit date: 1991     Years since quittin.9    Smokeless tobacco: Never Used   Vaping Use    Vaping Use: Never used   Substance and Sexual Activity    Alcohol use: Yes     Alcohol/week: 0.0 standard drinks     Types: 1 - 2 Glasses of wine per week     Comment: socially, occassionally    Drug use: No    Sexual activity: Not on file   Other Topics Concern    Not on file   Social History Narrative    Not on file     Social Determinants of Health     Financial Resource Strain:     Difficulty of Paying Living Expenses: Not on file   Food Insecurity:     Worried About Running Out of Food in the Last Year: Not on file    Max of Food in the Last Year: Not on file   Transportation Needs:     Lack of Transportation (Medical): Not on file    Lack of Transportation (Non-Medical): Not on file   Physical Activity:     Days of Exercise per Week: Not on file    Minutes of Exercise per Session: Not on file   Stress:     Feeling of Stress : Not on file   Social Connections:     Frequency of Communication with Friends and Family: Not on file    Frequency of Social Gatherings with Friends and Family: Not on file    Attends Mandaen Services: Not on file    Active Member of [address] [place] [address] or Organizations: Not on file    Attends Club or Organization Meetings: Not on file    Marital Status: Not on file   Intimate Partner Violence:     Fear of Current or Ex-Partner: Not on file    Emotionally Abused: Not on file    Physically Abused: Not on file    Sexually Abused: Not on file   Housing Stability:     Unable to Pay for Housing in the Last Year: Not on file    Number of [place] in the Last Year: Not on file    Unstable Housing in the Last Year: Not on file         ALLERGIES: Codeine, Darvocet a500 [propoxyphene n-acetaminophen], and Darvon [propoxyphene]    Review of Systems   Constitutional: Negative for chills and fever. HENT: Negative for rhinorrhea and sore throat. Eyes: Negative for discharge and redness. Respiratory: Negative for cough and shortness of breath.     Cardiovascular: Negative for chest pain and leg swelling. Gastrointestinal: Negative for abdominal pain, diarrhea, nausea and vomiting. Genitourinary: Negative for difficulty urinating and dysuria. Musculoskeletal: Negative for back pain and neck pain. Skin: Negative for rash and wound. Neurological: Positive for weakness. Negative for syncope, light-headedness and headaches. Vitals:    05/02/22 1822 05/02/22 1902   BP: 132/60 (!) 126/50   Pulse: 84 81   Resp: 20 14   Temp: 98.6 °F (37 °C)    SpO2: 92% 94%            Physical Exam  Constitutional:       General: She is not in acute distress. Appearance: She is not ill-appearing, toxic-appearing or diaphoretic. HENT:      Head: Normocephalic and atraumatic. Right Ear: External ear normal.      Left Ear: External ear normal.      Nose: No congestion or rhinorrhea. Mouth/Throat:      Mouth: Mucous membranes are moist.      Pharynx: No oropharyngeal exudate or posterior oropharyngeal erythema. Eyes:      General:         Right eye: No discharge. Left eye: No discharge. Pupils: Pupils are equal, round, and reactive to light. Neck:      Vascular: No carotid bruit. Cardiovascular:      Rate and Rhythm: Normal rate and regular rhythm. Heart sounds: No murmur heard. No friction rub. No gallop. Pulmonary:      Effort: Pulmonary effort is normal. No respiratory distress. Breath sounds: No stridor. No wheezing, rhonchi or rales. Abdominal:      General: Abdomen is flat. There is no distension. Tenderness: There is no right CVA tenderness, left CVA tenderness, guarding or rebound. Musculoskeletal:         General: No swelling, tenderness, deformity or signs of injury. Cervical back: No rigidity or tenderness. Right lower leg: No edema. Left lower leg: No edema. Lymphadenopathy:      Cervical: No cervical adenopathy. Skin:     General: Skin is warm. Capillary Refill: Capillary refill takes less than 2 seconds. Coloration: Skin is not jaundiced or pale. Findings: No bruising, erythema, lesion or rash. Neurological:      General: No focal deficit present. Mental Status: She is alert and oriented to person, place, and time. Sensory: No sensory deficit. Motor: Weakness present. Comments: Generalized weakness more prominent lower extremity, left greater than right with no associated sensory loss   Psychiatric:         Mood and Affect: Mood normal.          MDM  Number of Diagnoses or Management Options  Diagnosis management comments: Patient presents with primary complaint of progressive weakness lower extremity greater than upper extremity, concerns include infectious to include urinary infection, lesion to the spinal cord, metabolic derangement, electrolyte derangement, versus progression of known malignancy. Reached out to radiology team to get a stat read on patient's MRI of the C-spine to evaluate for any process that may be amenable to surgical decompression. Once that read has returned we will have a discussion with the family regarding goals of care whether they would wish to proceed with more aggressive treatment versus palliative care given patient's diagnosis. Disposition pending imaging and laboratory results.        Amount and/or Complexity of Data Reviewed  Clinical lab tests: reviewed  Tests in the medicine section of CPT®: reviewed  Decide to obtain previous medical records or to obtain history from someone other than the patient: yes           Procedures

## 2022-05-02 NOTE — Clinical Note
Status[de-identified] INPATIENT [101]   Type of Bed: Medical [8]   Cardiac Monitoring Required?: No   Inpatient Hospitalization Certified Necessary for the Following Reasons: 3.  Patient receiving treatment that can only be provided in an inpatient setting (further clarification in H&P documentation)   Admitting Diagnosis: Cervical spine tumor [[id number]]   Admitting Physician: Dago Thompson [[id number]]   Attending Physician: Dago Thompson [[id number]]   Estimated Length of Stay: 2 Midnights   Discharge Plan[de-identified] Home with Office Follow-up

## 2022-05-03 NOTE — H&P
Hospitalist Admission History and Physical    NAME:  Lucretia Little   :      MRN:   [id number]     PCP:  Katelyn Dupree MD  Date/Time:  2022 11:27 PM  Subjective:   CHIEF COMPLAINT:      HISTORY OF PRESENT ILLNESS:     Liz Recinos is a 68 y.o.  female progressive metastatic breast cancer, with progressive decline in functional status saw her oncologist on  and decision was made to hold Ibrance moving forward continue fulvestrant. She has previously had proton and radiation therapy and previously managed on fulvestrant and Ibrance who presents with progressive weakness of lower extremities and upper extremities. Had planned MRI for further evaluation which she completed yesterday. And follow-up with her orthopedic spine surgeon. No fevers chills chest pain diarrhea urinary complaints. Past Medical History:   Diagnosis Date    Biliary colic     Breast CA ([address] 2012    Cancer [facility])     Right Breast    Chemotherapy convalescence or palliative care     Neuropathy     Radiation therapy complication     Thyroid disease         Past Surgical History:   Procedure Laterality Date    HX LAP CHOLECYSTECTOMY  13    HX MASTECTOMY      Right    [facility]  10/2002    Right-Lesion    [facility] BREAST SURG[facility]EDURE UNLISTED  2004    Right-Lesion       Social History     Tobacco Use    Smoking status: Former Smoker     Packs/day: 0.50     Years: 10.00     Pack years: 5.00     Quit date: 1991     Years since quittin.9    Smokeless tobacco: Never Used   Substance Use Topics    Alcohol use:  Yes     Alcohol/week: 0.0 standard drinks     Types: 1 - 2 Glasses of wine per week     Comment: socially, occassionally        Family History   Problem Relation Age of Onset    Cancer Maternal Aunt         Hodgkin's disease    Breast Cancer Paternal Aunt     Cancer Father         Prostate, lung, brain        Allergies   Allergen Reactions    Codeine Palpitations    Darvocet A500 [Propoxyphene N-Acetaminophen] Nausea and Vomiting    Darvon [Propoxyphene] Nausea and Vomiting        Prior to Admission Medications   Prescriptions Last Dose Informant Patient Reported? Taking? Cetirizine (ZYRTEC) 10 mg cap Not Taking at Unknown time  Yes No   Sig: Take 1 Cap by mouth daily as needed. Patient not taking: Reported on 2022   Cholecalciferol, Vitamin D3, 5,000 unit Tab 2022 at Unknown time  Yes Yes   Sig: Take 5,000 Units by mouth daily. DULoxetine (CYMBALTA) 30 mg capsule Not Taking at Unknown time  No No   Sig: Take 1 Capsule by mouth daily. For one week, then 2 capsules daily   Patient not taking: Reported on 2022   albuterol (PROVENTIL HFA, VENTOLIN HFA, PROAIR HFA) 90 mcg/actuation inhaler   Yes No   Sig: Take 2 Puffs by inhalation every six (6) hours as needed. bumetanide (BUMEX) 0.5 mg tablet 2022 at Unknown time  No Yes   Sig: Take 1 Tablet by mouth daily. calcium-vitamin D (OS-ADIN +D3) 500 mg-200 unit per tablet 2022 at Unknown time  Yes Yes   Sig: Take 1 Tablet by mouth daily. denosumab (XGEVA SC) Not Taking at Unknown time  Yes No   Sig: by SubCUTAneous route every thirty (30) days. PRN   Patient not taking: Reported on 2022   epoetin zac-epbx (RETACRIT INJECTION) Not Taking at Unknown time  Yes No   Sig: by Injection route every thirty (30) days. PRN   Patient not taking: Reported on 2022   fluticasone propionate (FLONASE) 50 mcg/actuation nasal spray Not Taking at Unknown time  Yes No   Si Sprays by Both Nostrils route daily as needed. Patient not taking: Reported on 2022   fluticasone-umeclidin-vilanter (Trelegy Ellipta) 200-62.5-25 mcg inhaler   No No   Sig: Take 1 Puff by inhalation daily. Rinse and gargle after each use   fulvestrant (FASLODEX IM)   Yes No   Sig: by IntraMUSCular route every thirty (30) days.    gabapentin (NEURONTIN) 100 mg capsule 2022 at Unknown time  No Yes Sig: TAKE 2 CAPSULES THREE TIMES A DAY   lidocaine HCl-hydrocortison ac topical cream Not Taking at Unknown time  No No   Sig: Apply  to affected area two (2) times a day. Patient not taking: Reported on 5/2/2022   megestroL (MEGACE) 40 mg tablet Not Taking at Unknown time  Yes No   Sig: Take 40 mg by mouth daily. Patient not taking: Reported on 5/2/2022   omeprazole (PRILOSEC) 40 mg capsule 5/2/2022 at Unknown time  No Yes   Sig: Take 1 Capsule by mouth two (2) times a day. Take at least 30min before breakfast and dinner. Disp: 90 day supply   ondansetron hcl (Zofran) 8 mg tablet   No No   Sig: Take 1 Tablet by mouth every eight (8) hours as needed for Nausea or Vomiting. palbociclib 75 mg tab Not Taking at Unknown time  No No   Sig: Take 75 mg by mouth daily. for 21 days then 7 days off   Patient not taking: Reported on 5/2/2022   potassium chloride (K-DUR, KLOR-CON M20) 20 mEq tablet   No No   Sig: Take 1 Tablet by mouth two (2) times a day. thyroid, Pork, (ARMOUR THYROID) 60 mg tablet 5/2/2022 at Unknown time  Yes Yes   Sig: Take 60 mg by mouth daily. warfarin (COUMADIN) 1 mg tablet Not Taking at Unknown time  Yes No   Sig: Take 1 mg by mouth every other day.    Patient not taking: Reported on 5/2/2022      Facility-Administered Medications: None       REVIEW OF SYSTEMS:     [] Unable to obtain  ROS due to  []mental status change  []sedated   []intubated   [x]Total of 12 systems reviewed as follows:  Constitutional:  negative fever, negative chills, negative weight loss  Eyes:   negative visual changes  ENT:   negative sore throat, tongue or lip swelling  Respiratory:  negative cough, negative dyspnea  Cards:   negative for chest pain, palpitations, lower extremity edema  GI:   negative for nausea, vomiting, diarrhea, and abdominal pain  Genitourinary: negative for frequency, dysuria  Integument:  negative for rash and pruritus  Hematologic:  negative for easy bruising and gum/nose bleeding  Musculoskel: negative for myalgias,  back pain   Neurological:  negative for headaches, dizziness, vertigo  Behavl/Psych:  negative for feelings of anxiety, depression     Pertinent Positives include : Lower extremity and upper extremity weakness    Objective:   VITALS:    Visit Vitals  BP (!) 126/50   Pulse 89   Temp 97.5 °F (36.4 °C)   Resp 25   SpO2 96%     Temp (24hrs), Av.1 °F (36.7 °C), Min:97.5 °F (36.4 °C), Max:98.6 °F (37 °C)      PHYSICAL EXAM:   General:    Alert, cooperative, no distress, appears stated age. Head:   Normocephalic, without obvious abnormality, atraumatic. Eyes:   Conjunctivae clear, anicteric sclerae. Pupils are equal  Nose:  Nares normal. No drainage or sinus tenderness. Throat:    Dry mucous membranes  Neck:  Supple, symmetrical,  no adenopathy, thyroid: non tender    no carotid bruit and no JVD. Back:    Symmetric,  No CVA tenderness. Lungs:   Clear to auscultation bilaterally. No Wheezing or Rhonchi. No rales. Chest wall:  No tenderness or deformity. No Accessory muscle use. Heart:   Regular rate and rhythm,  no murmur, rub or gallop. Abdomen:   Soft, non-tender. Not distended. Bowel sounds normal. No masses  Extremities: 3+ pitting edema to knees bilaterally skin:     Texture, turgor normal. No rashes or lesions. Not Jaundiced  Lymph nodes: Cervical, supraclavicular normal.  Psych:  Good insight. Not depressed. Not anxious or agitated.   Neurologic: 1 out of 5 strength in the distal lower extremities, 2 out of 5 strength in the right upper extremity and deltoids, 1 out of 5 strength in the left deltoids, 0 out of 5 strength in flexion extension wrist and hand on left side, no sensory changes    LAB DATA REVIEWED:    No components found for: [place]  Recent Labs     22  1834   *   K 4.8      CO2 29   BUN 28*   CREA 1.53*   *   PHOS 4.5   CA 9.4   ALB 2.9*   WBC 3.5*   HGB 9.4*   HCT 29.5*        Recent Results (from the past 24 hour(s))   EKG, 12 LEAD, INITIAL    Collection Time: 05/02/22  6:30 PM   Result Value Ref Range    Ventricular Rate 81 BPM    Atrial Rate 81 BPM    P-R Interval 126 ms    QRS Duration 72 ms    Q-T Interval 364 ms    QTC Calculation (Bezet) 422 ms    Calculated P Axis 38 degrees    Calculated R Axis 50 degrees    Calculated T Axis 55 degrees    Diagnosis       Normal sinus rhythm  Normal ECG  When compared with ECG of 18-SEP-2013 16:02,  No significant change was found     CBC WITH AUTOMATED DIFF    Collection Time: 05/02/22  6:34 PM   Result Value Ref Range    WBC 3.5 (L) 4.6 - 13.2 K/uL    RBC 3.06 (L) 4.20 - 5.30 M/uL    HGB 9.4 (L) 12.0 - 16.0 g/dL    HCT 29.5 (L) 35.0 - 45.0 %    MCV 96.4 78.0 - 100.0 FL    MCH 30.7 24.0 - 34.0 PG    MCHC 31.9 31.0 - 37.0 g/dL    RDW 18.3 (H) 11.6 - 14.5 %    PLATELET 230 617 - 344 K/uL    MPV 9.3 9.2 - 11.8 FL    NRBC 0.0 0  WBC    ABSOLUTE NRBC 0.00 0.00 - 0.01 K/uL    NEUTROPHILS 78 (H) 40 - 73 %    LYMPHOCYTES 12 (L) 21 - 52 %    MONOCYTES 9 3 - 10 %    EOSINOPHILS 0 0 - 5 %    BASOPHILS 0 0 - 2 %    IMMATURE GRANULOCYTES 1 (H) 0.0 - 0.5 %    ABS. NEUTROPHILS 2.7 1.8 - 8.0 K/UL    ABS. LYMPHOCYTES 0.4 (L) 0.9 - 3.6 K/UL    ABS. MONOCYTES 0.3 0.05 - 1.2 K/UL    ABS. EOSINOPHILS 0.0 0.0 - 0.4 K/UL    ABS. BASOPHILS 0.0 0.0 - 0.1 K/UL    ABS. IMM.  GRANS. 0.0 0.00 - 0.04 K/UL    DF AUTOMATED     METABOLIC PANEL, COMPREHENSIVE    Collection Time: 05/02/22  6:34 PM   Result Value Ref Range    Sodium 135 (L) 136 - 145 mmol/L    Potassium 4.8 3.5 - 5.5 mmol/L    Chloride 100 100 - 111 mmol/L    CO2 29 21 - 32 mmol/L    Anion gap 6 3.0 - 18 mmol/L    Glucose 106 (H) 74 - 99 mg/dL    BUN 28 (H) 7.0 - 18 MG/DL    Creatinine 1.53 (H) 0.6 - 1.3 MG/DL    BUN/Creatinine ratio 18 12 - 20      GFR est AA 40 (L) >60 ml/min/1.73m2    GFR est non-AA 33 (L) >60 ml/min/1.73m2    Calcium 9.4 8.5 - 10.1 MG/DL    Bilirubin, total 0.5 0.2 - 1.0 MG/DL    ALT (SGPT) 23 13 - 56 U/L    AST (SGOT) 42 (H) 10 - 38 U/L    Alk. phosphatase 85 45 - 117 U/L    Protein, total 6.4 6.4 - 8.2 g/dL    Albumin 2.9 (L) 3.4 - 5.0 g/dL    Globulin 3.5 2.0 - 4.0 g/dL    A-G Ratio 0.8 0.8 - 1.7     MAGNESIUM    Collection Time: 05/02/22  6:34 PM   Result Value Ref Range    Magnesium 1.5 (L) 1.6 - 2.6 mg/dL   PHOSPHORUS    Collection Time: 05/02/22  6:34 PM   Result Value Ref Range    Phosphorus 4.5 2.5 - 4.9 MG/DL         IMAGING RESULTS:   []  I have personally reviewed the actual   []CXR  []CT scan    CXR Results  (Last 48 hours)               05/02/22 1949  XR CHEST PORT Final result    Impression:      Stable pleuroparenchymal densities on the right compatible with effusion and   rounded atelectasis and pleural disease seen previously. Narrative:  Portable Frontal Chest.       CLINICAL HISTORY: As above. TECHNIQUE: Single frontal view of the chest, obtained portably. COMPARISONS: CT 2/9/2022, chest x-ray 12/17/2020       FINDINGS:        There is small right effusion and haziness right lung base, similar. Stable   pleural fluid versus thickening at the apex. Left lung clear. Cardiomediastinal silhouette unremarkable. Vascularity normal.       There is Mediport catheter overlying chest wall. It has a small coil in the   brachiocephalic vein, stable since prior with tip in the superior vena cava. Narrative & Impression   Preliminary interpretation MRI cervical spine     Abnormal marrow signal C5, C6, C7 compatible with skeletal metastases. Abnormal  signal posterior to bodies C4 and C5 worrisome for epidural tumor. Likewise,  soft tissue mass inferior to the posterior spinous process C5 compatible with  tumor.     Multiple levels spinal stenosis due to degenerative disc disease most marked  C5/C6.  Large posterior osteophytes at C5 and C6 on the right also contributing  to narrowing of the canal. Multiple levels narrowing mostly right lateral recess  C3/C4, C4/C5.     IMPRESSION  Preliminary impression: Spinal stenosis due to underlying degenerative disc  disease worsened by epidural tumor posterior to C4 and C5.     Full report to follow. EXAM: MRI BRAIN WO CONT     CLINICAL INDICATION/HISTORY: Breast cancer malignant metastatic involvement to  the skull base and calvarium     TECHNIQUE: Multisequence multiplanar MR imaging acquired through the brain.      Contrast used: Paperwork indicates postcontrast but no postcontrast images are  submitted     COMPARISON: MRI January 2022     FINDINGS:     Parenchyma: No acute infarction. No acute hemorrhage.  No mass lesion.      CSF spaces: Ventricles and cisterns remain midline in position     IAC regions: Unremarkable     Parasellar region: Unremarkable     Vasculature: Appropriate flow voids within the major skull base vasculature.     Cervicomedullary junction: Patent     Orbits: Unremarkable     Paranasal sinuses: Clear      Calvarium:     Focal area of increased diffusion signal changes seen in the frontal region this  measures 3.5 x 0.88 cm correlates with previous image 27 series 4 as well as low  density changes seen in the sagittal T1 FLAIR on image 19 series 3 on the prior  exam which measured 2.2 x 0.74 cm     On today's exam there is signal involvement measures roughly 3 x 1 cm see image  20 series 3 no expansion into the cranium is noted     Postcontrast images as stated earlier are not submitted however there was  concern for enhancement in the Demerol as canal and posterior to the cavernous  sinus on the right side     On today's diffusion images there is on the right side in this approximate  region a 2.2 x 0.34 mm area of signal abnormality image 11 series 5 this was  also seen on image 12 series 4 of the prior exam and is consistent with some  subtle malignant involvement along the medial petrous apex extending toward the  cavernous sinus this does not appear to have increased in amount     IMPRESSION     Right frontal skull metastasis stable question mild enlargement     subtle right petrous bone skull base involvement     best seen today on diffusion images but similar to the January 2022 exam no  interval change in size     Neither skull lesion has any significant compressive effect on the intracranial  contents     No new lesions are seen       Assessment/Plan:      Active Problems:    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 Carlene Estrada orthopedic spine surgeon at bedside  -Long discussion with patient and  regarding prognosis and surgical intervention techniques and outcomes  -Secondary treatment options of surgery alone, surgery with radiation, radiation alone discussed in detail  -Patient and  agree surgery not best option at this time  -Plan to discuss with radiation oncology in the morning to determine if patient is eligible for radiation to cervical spine mass  -No indication at this time for steroids  -PT OT to see  -Continue to hold Ibrance, patient reports last dose of fulvestrant was at the end of last month  -Monitor for any signs of urinary retention and need for Lane catheter should progressive cord compression caused urinary retention  -Hospice consult placed, with patient's progressive disease hospice may be the best option at this time moving towards palliative care    #Hypomagnesemia  -2 g of IV magnesium  -Mag BMP in the morning    #Chronic anemia and leukopenia  -No acute changes, no need for repeat CBC in the morning  -Likely secondary to prior chemotherapy and anemia of chronic disease    #GERD  -Continue home twice daily PPI    #Mild acute kidney injury, baseline creatinine 1.07 3/22  -Creatinine 1.53 with elevated BUN indicating degree of dehydration  -Normal saline continuous infusion 75 mL/h    #Peripheral edema, negative DVT scans recently, negative cardiac evaluation  -Was previously on Lasix which was found to be ineffective  -Was started recently on Bumex 0.5 mg with some improvement  -With acute kidney injury we will hold on further diuresis at this time  -Peripheral edema compounded by hypoalbuminemia with low oncotic pressure      Prophylaxis:  []Lovenox  []Coumadin  [x]Hep SQ  []SCDs  []H2B/PPI    Disposition:  []Home w/ Family   []HH PT,OT,RN   [x]SNF/LTC   []SAH/Rehab    Discussed Code Status:    []Full Code      [x]DNR     ___________________________________________________    Care Plan discussed with:    [x]Patient   [x]Family    []ED Care Manager  []ED Doc   []Specialist :    Total Time Coordinating Admission:      45 minutes    []Total Critical Care Time:     ___________________________________________________  Admitting Physician: Adán Godinez DO

## 2022-05-03 NOTE — PROGRESS NOTES
Problem: Dysphagia (Adult)  Goal: *Acute Goals and Plan of Care (Insert Text)  Description: Patient will:  1. Tolerate PO trials with 0 s/s overt distress in 4/5 trials  2. Participate in training and education related to continued aspiration risk, diet recs and compensatory strategies     Recommend:   Regular diet with nectar thick liquids   Thin liquids ok as requested for comfort   Meds crushed in puree   Feeding assist   Aspiration precautions  HOB >45 degrees during all intake and for at least 30 min after po   Small bites/sips, Slow rate of intake     Outcome: Progressing Towards Goal    SPEECH LANGUAGE PATHOLOGY BEDSIDE SWALLOW EVALUATION/TREATMENT    Patient: Lucretia Little (74 y.o. female)  Date: 5/3/2022  Primary Diagnosis: Cervical spine tumor [D49.2]  Precautions: Aspiration      PLOF: As per H&P    ASSESSMENT :  Based on the objective data described below, the patient presents with mild oral and mild-mod pharyngeal dysphagia. Pt with hospice consult in place. Pt seen with  present at bedside for evaluation, feeding pt breakfast meal. Pt and  reporting utilizing thickened liquids at home since MBS completed at this facility on 3/29/22 showing \"silent laryngeal penetration during and after the swallow with thin liquids, not resolved with compensatory strategies\". Oral mech exam revealed structures grossly intact for speech/deglutition. Pt demo immediate weak cough with thin liquids, resolved with NTL. Demo slowed but thorough bolus manipulation with regular solids. Recommend regular, NTL with meds crushed in puree     TREATMENT :  Skilled therapy completed in room with  present. Both reporting difficulty with large pills this am, requesting large meds be crushed in puree. Pt and  educated on change of goals of care/ability to liberalize diet for comfort when transitioned to hospice; however, pt reporting mostly prefers nectar thick liquids.   Educated with regard to positioning, comp strategies with understanding verbalized. Patient will benefit from skilled intervention to address the above impairments. Patient's rehabilitation potential is considered to be Poor   Factors which may influence rehabilitation potential include:   []            None noted  []            Mental ability/status  [x]            Medical condition  []            Home/family situation and support systems  []            Safety awareness  []            Pain tolerance/management  []            Other:      PLAN :  Recommendations and Planned Interventions:  As above   Frequency/Duration: Patient will be followed by speech-language pathology 1-2 times per day/3-7 days per week to address goals. Discharge Recommendations:  To Be Determined     SUBJECTIVE:   Patient stated Sean Agostoster you so much for coming     OBJECTIVE:     Past Medical History:   Diagnosis Date    Biliary colic     Breast CA ([facility] [address] 2012    Cancer [facility]) 1991    Right Breast    Chemotherapy convalescence or palliative care     Neuropathy     Radiation therapy complication     Thyroid disease      Past Surgical History:   Procedure Laterality Date    HX LAP CHOLECYSTECTOMY  9-19-13    HX MASTECTOMY  1991    Right    OR BREAST SURGERY PROCEDURE UNLISTED  10/2002    Right-Lesion    OR BREAST SURGERY PROCEDURE UNLISTED  11/2004    Right-Lesion     Prior Level of Function/Home Situation:  Home Situation  Home Environment: Private residence  One/Two Story Residence: One story  Living Alone: No  Support Systems: Spouse/Significant Other  Patient Expects to be Discharged to[de-identified] Home  Current DME Used/Available at Home: None  Diet prior to admission: regular, NTL  Current Diet:  regular, thin liquids; recommend change to regular, NTL   Cognitive and Communication Status:  Neurologic State: Alert  Orientation Level: Oriented X4  Cognition: Follows commands  Oral Assessment:  Oral Assessment  Labial: No impairment  Dentition: Natural  Oral Hygiene: Good  Lingual: No impairment  Velum: No impairment  Mandible: No impairment  P.O. Trials:  Patient Position: [facility] at [facility]  Vocal quality prior to P.O.: Low volume  Consistency Presented: Thin liquid; Solid, Nectar thick liquids   How Presented:  ( fed)  Bolus Acceptance: No impairment  Bolus Formation/Control: Impaired  Type of Impairment: Delayed  Propulsion: Delayed (# of seconds)  Oral Residue: None  Initiation of Swallow: No impairment  Laryngeal Elevation: Decreased  Aspiration Signs/Symptoms: Immediate cough with thin liquids   Pharyngeal Phase Characteristics: Poor endurance;Easily fatigued   Effective Modifications: Small sips and bites; Alternate liquids/solids  Cues for Modifications: Minimal  Oral Phase Severity: Mild  Pharyngeal Phase Severity : Mild-Mod    PAIN:  Start of Eval: not reported  End of Eval: not reported      After treatment:   []            Patient left in no apparent distress sitting up in chair  [x]            Patient left in no apparent distress in bed  [x]            Call bell left within reach  [x]            Nursing notified  [x]            Family present  []            Caregiver present  []            Bed alarm activated    COMMUNICATION/EDUCATION:   [x]            Aspiration precautions; swallow safety; compensatory techniques. [x]            Patient/family have participated as able in goal setting and plan of care. []            Patient/family agree to work toward stated goals and plan of care. []            Patient understands intent and goals of therapy; neutral about participation. []            Patient unable to participate in goal setting/plan of care; educ ongoing with interdisciplinary staff  [x]         Posted safety precautions in patient's room.     Thank you for this referral,  Tiffanie Carl M.S., Yuan Veronica  Speech-Language Pathologist

## 2022-05-03 NOTE — CONSULTS
Hematology / Oncology Consult Note      Reason for Consultation:  Julieta Fields is a 68 y.o. female who I've been asked to consult for metastatic breast cancer . Subjective:     HPI:     Mrs. Nyla Man is a 68year old female who has progressive metastatic breast cancer, who has recently seen a progressive decline in her  functional status. She was admitted due to the progressive weakness of upper and lower extremities with some edema to lower extremities. Patient seen today. She report that her pain is manage by the morphine. She denies any distress, fevers, chills         Oncology History   -- 9/14/2021 CT CAP reported a new soft tissue density noted along the anterior inferior aspect of the sternum. Otherwise no significant interval change in the chest. No new or enlarging nodule. -- 9/14/2021 Bone scan reported known osseous metastatic disease. Small new focal activity proximal right forearm, concerning for new metastasis. Developing activity in the left femoral head concerning for metastasis. -- 10/14/2021 PET scan reported no definite finding for FDG avid bone disease, no definite correlate with bone scan findings. + The soft tissue mass anterior to the inferior sternum demonstrates a moderate  degree of metabolic activity which was stable compared to previous PET 6/20216. Persistent metabolically active subcarinal lymph node and new medial right infrahilar lymph node. -- 10/26/2021 WBC 1.8, ANC 0.9  -- She has been on Palbociclib 75 mg daily 21 days on/7 days off since last visit. She has tolerated therapy without high graded toxicities  -- 12/13/2021 MRI hip reported poorly defined abnormal signal in the anterior femoral head. There are 3 infiltrative areas of increased STIR signal in the pelvis, 2 on the right and one on the left, questionable metastatic disease.   -- 1/3/2022 Brain MRI reported no definite findings of metastatic brain disease.   + Newly evident right frontal skull lesion concerning for a new osseous  metastasis  -- 1/13/2022 PET scan reported although there is mildly decreased activity at the pre-xyphoid chest wall mass compared with prior study, now at blood pool, lesion has increased in size since prior study and remains highly suspicious for enlarging metastasis. No malignant activity at the left hip.  -- Has completed proton therapy of chest wall mass - Dr. Chyna Tran. Plan to repeat CT chest in the end of May per pt.  -- The patient reported feeling weaker and tiredness. She has neck pain and will see orthopedics/Spine surgery. She continues to have dizziness and fainting episodes. She has b/l LE swelling, just start low dose of Lasix daily. -- Given declining functioning status, Palbociclib was held (75 mg daily 21 days on/7 days off) for now. Patient will continue fulvestrant. -- Patient has follow up with [facility] - Dr. Shanna Rothman for proton therapy for repeat CT of chest.      MRI Results (most recent):  Results from [place] encounter on 05/01/22    MRI BRAIN WO CONT    Narrative  EXAM: MRI BRAIN WO CONT    CLINICAL INDICATION/HISTORY: Breast cancer malignant metastatic involvement to  the skull base and calvarium    TECHNIQUE: Multisequence multiplanar MR imaging acquired through the brain. Contrast used: Paperwork indicates postcontrast but no postcontrast images are  submitted    COMPARISON: MRI January 2022    FINDINGS:    Parenchyma: No acute infarction. No acute hemorrhage. No mass lesion. CSF spaces: Ventricles and cisterns remain midline in position    IAC regions: Unremarkable    Parasellar region: Unremarkable    Vasculature: Appropriate flow voids within the major skull base vasculature.     Cervicomedullary junction: Patent    Orbits: Unremarkable    Paranasal sinuses: Clear    Calvarium:    Focal area of increased diffusion signal changes seen in the frontal region this  measures 3.5 x 0.88 cm correlates with previous image 27 series 4 as well as low  density changes seen in the sagittal T1 FLAIR on image 19 series 3 on the prior  exam which measured 2.2 x 0.74 cm    On today's exam there is signal involvement measures roughly 3 x 1 cm see image  20 series 3 no expansion into the cranium is noted    Postcontrast images as stated earlier are not submitted however there was  concern for enhancement in the Demerol as canal and posterior to the cavernous  sinus on the right side    On today's diffusion images there is on the right side in this approximate  region a 2.2 x 0.34 mm area of signal abnormality image 11 series 5 this was  also seen on image 12 series 4 of the prior exam and is consistent with some  subtle malignant involvement along the medial petrous apex extending toward the  cavernous sinus this does not appear to have increased in amount    Impression  Right frontal skull metastasis stable question mild enlargement    subtle right petrous bone skull base involvement    best seen today on diffusion images but similar to the January 2022 exam no  interval change in size    Neither skull lesion has any significant compressive effect on the intracranial  contents    No new lesions are seen      MRI Cervical  spine   IMPRESSION  Limited exam without the use of IV contrast administration, which the patient  deferred.     Multifocal metastatic disease including:  - Infiltration of C5, C6 and C7 vertebral bodies.    - C5-C6 interspinous soft tissue metastatic implant measuring 1.9 x 1 x 2 cm.   - Suspected anterior epidural soft tissue metastatic implant centered at the C4  vertebral body level with anterior mass effect/flattening of the spinal cord.     Asymmetric right-sided ossification of the posterior longitudinal ligament  spanning the mid C5 to mid C7 vertebral body.     Severe central canal narrowing spanning the C4-C6 vertebral bodies.     Moderate and/or severe foraminal narrowing bilaterally at C4-C5, C5-C6 and  C6-C7.     Please refer to the body of the report for a comprehensive segmental analysis of  the cervical spinal column. Review of Systems:   Constitutional: Positive for fatigue. HENT: Negative for nosebleeds, congestion,  Eyes: Negative for photophobia, pain, discharge and itching. Respiratory: Negative for apnea, cough, choking, chest tightness, wheezing and stridor. Cardiovascular: Negative for chest pain, palpitations and Positive for leg swelling. Gastrointestinal: Negative for abdominal pain. Negative for nausea, diarrhea,   Genitourinary: Negative for dysuria, urgency, hematuria, flank pain and difficulty urinating. Musculoskeletal: Negative for back pain, joint swelling, . Skin: Negative for color change, pallor, rash and wound. Neurological: Positive for weakness. Negative for dizziness, facial asymmetry,   Hematological: Negative for adenopathy. Does not bruise/bleed easily. Psychiatric/Behavioral: Negative for hallucinations, confusion, disturbed wake/sleep cycle and agitation.        Physical Assessment:     Visit Vitals  /67   Pulse 75   Temp 98.3 °F (36.8 °C)   Resp 16   SpO2 93%       Temp (24hrs), Av °F (36.7 °C), Min:97.5 °F (36.4 °C), Max:98.6 °F (37 °C)      Physical Exam:    General: NAD  HEENT:  Anicteric sclerae; pink palpebral conjunctivae; mucosa moist  Resp:  Symmetrical chest expansion, no accessory muscle use;  CV:  regular rate   GI:  Abdomen soft, non-tender; (+) active bowel sounds  Extremities:  +2 pulses on all extremities; + Edema lower extremities   Skin:  Warm; no rashes/ lesions noted  Neurologic:  Non-focal  Devices:  No NGT/OGT, Central line/ PICC, ETT/tracheostomy, chest tube        Assessment/Plan    Metastatic Breast Cancer  ---  Given her decline functional status her Palbociclib was held and this will continue to be held as well as no chemotherapy treatment while inpatient   ---  Orthopedic on Board  ---- Please continue medical management  --- Recommended Palliative care consult to help with goal of care    --- Recommended Radiation Oncology to determine if patient is a candidate for evaluation of cervical spine mass         Normocytic Anemia. Chemotherapy-related anemia  -- The patient previously schedule for iron infusion outpatient however since patient is admited we will offer her iron infusion while inpatient. --- Venofer 200 mg  every 24 hours x 3 doses   -- Recent iron profile shows  iron was 30, iron saturation 14%   Hematology/Onocology will follow along           Past Medical History:   Diagnosis Date    Biliary colic     Breast CA ([facility] [address] 2012    Cancer [facility]) 1991    Right Breast    Chemotherapy convalescence or palliative care     Neuropathy     Radiation therapy complication     Thyroid disease      Past Surgical History:   Procedure Laterality Date    HX LAP CHOLECYSTECTOMY  9-19-13    HX MASTECTOMY  1991    Right    OK BREAST SURGERY PROCEDURE UNLISTED  10/2002    Right-Lesion    OK BREAST SURGERY PROCEDURE UNLISTED  11/2004    Right-Lesion       Family History   Problem Relation Age of Onset    Cancer Maternal Aunt         Hodgkin's disease    Breast Cancer Paternal Aunt     Cancer Father         Prostate, lung, brain     Allergies   Allergen Reactions    Codeine Palpitations    Darvocet A500 [Propoxyphene N-Acetaminophen] Nausea and Vomiting    Darvon [Propoxyphene] Nausea and Vomiting       Home Medications:   Home Meds reviewed with patient    Hospital Medications:   Current hospital medications reviewed    Labs:  Recent Labs     05/02/22  1834   WBC 3.5*   RBC 3.06*   HCT 29.5*   MCV 96.4   MCH 30.7   MCHC 31.9   RDW 18.3*       Recent Labs     05/03/22  0357 05/02/22  1834   CO2 27 29   BUN 29* 28*       No results for input(s): ALBUMIN in the last 72 hours. No lab exists for component: [facility], [facility]    Thank you for requesting us to consult on your patient. We appreciate the opportunity to participate in your patient's care.   Please call if you have questions.       Amy Lazcano, DNP

## 2022-05-03 NOTE — PROGRESS NOTES
Bedside and Verbal shift change report given to Chiqui Sanchez RN (oncoming nurse) by Melissa Lomas RN (offgoing nurse). Report included the following information SBAR, Kardex, Intake/Output and MAR.

## 2022-05-03 NOTE — PROGRESS NOTES
OT order received and chart reviewed. 0800, Awaiting Palliative/hospice note at this time. Patient also currently has an active bedrest complete order. Please discontinue bedrest order for full participation in skilled OT evaluation/treatment. 1350, Patient currently transferring out of room. Will continue to follow up as pt schedule allows.         Thank you for this referral,    Ap Farias MS, OTR/L

## 2022-05-03 NOTE — PROGRESS NOTES
Pulmicort + Arformoterol + Atrovent Nebs were therapeutically interchanged for Trelegy per the P&T Committee approved Therapeutic Interchanges Policy.     [address] MS, Pharmacist  5/3/2022 1:36 AM

## 2022-05-03 NOTE — PROGRESS NOTES
PT order received and chart reviewed. Patient currently has an active bedrest order. Please discontinue bedrest order for full participation in skilled PT evaluation/treatment. Will follow up as patient schedule allows. Att x2- 13:19- Palliative sitting down with patient for discussion on POC.     Thank you for the referral.    Jorge Ying, PT, DPT

## 2022-05-03 NOTE — PROGRESS NOTES
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  No concerns,   Transportation        Support system or lack thereof? , family  Living arrangements? Lives with    Self-care/ADLs/Cognition? Needs assistance        Current Advanced Directive/Advance Care Plan:   no    Healthcare Decision Maker:     Plan for utilizing home health:   TBD                    Likelihood of readmission:   HIGH    Transition of Care Plan:                    Initial assessment completed with patient. Cognitive status of patient: oriented to time, place, person and situation. Face sheet information confirmed:  yes. The patient designates  Verner Flavin to participate in her discharge plan and to receive any needed information. This patient lives in a single family home with . Patient is not able to navigate steps as needed. Prior to hospitalization, patient was considered to be independent with ADLs/IADLS : no . If not independent,  patient needs assist with : food preparation and cooking    Patient has a current ACP document on file: no  The  will be available to transport patient home upon discharge. The patient already has Rollator, wheelchair has been ordered through PCP, and  medical equipment available in the home. Patient is not currently active with home health. Patient has not stayed in a skilled nursing facility or rehab. Was  stay within last 60 days : no. This patient is on dialysis :no    Currently, the discharge plan is Home. TBD    The patient states that she can obtain her medications from the pharmacy, and take her medications as directed.     Patient's current insurance is [employer]  PCP Verified by CM: Yes  Mode of Transport at Discharge: Other (see comment) ( Sakshi Huerta)  Transition of Care Consult (CM Consult): Discharge Planning  Occupational Therapy Consult: Yes  Support Systems: Spouse/Significant Other  Confirm Follow Up Transport: Family  Discharge Location  Patient Expects to be Discharged to[de-identified] Home with family assistance       will continue to monitor and assist with transition of care needs.     JAZMINE SkaggsN, RN  Care Management  Pager # [phone]

## 2022-05-03 NOTE — HOSPICE
1152:   Met with Pt and , Rhea Oliver, at the bedside. Discussed [facility] philosophy, services, criteria, and IDT. Discussed caregiver need for round the clock care with the , Rhea Oliver. . Primary caregiver identified as Rhea Oliver, the . Caregiver concerns identified as  is not able to take care of the pt and family is either not able or lives out of town. Rhea Oliver did state that he will be willing to hire a caregiver for his wife, the pt. Answered all questions. Gave brochure with 24/7 contact information. Information to be sent to [facility] ([place], [place]) for approval of hospice services. Hospice referral received. Chart review in process. Thank you for the referral to [facility]. If we can be of further assistance please contact [phone].      Janet QUEEN, [facility] Inpatient Clinical Liaison  [facility]  [facility] [address] [facility], [facility] [address]  [phone]  Email: [email or web address]

## 2022-05-03 NOTE — PROGRESS NOTES
1+ pitting to bilateral hands and lower extremities. Educated pt on the benefit of elevating extremities. Pt decline to elevate at this time. Pt stated she was pretty comfortable at this time.

## 2022-05-03 NOTE — PROGRESS NOTES
vss  Having marimar and spsm difficult to control  Accepting  Plan  Palliative care / hospice I believe would be best

## 2022-05-03 NOTE — PALLIATIVE CARE
[address] [facility] [phone]  [phone]    Abhishek Chan, NP, Samantha Andrews, RN and this [facility] met with pt at bedside to assess. Pt awake and alert x4. Pt's spouse. Marilin Kuo, also at bedside. Palliative team and purpose of palliative care introduced. Pt is a quadriplegic, due to C-Spine tumor. Pt immediately informed palliative team of event that occurred early this morning. Pt reports her spouse was here with her until approximately 0230 hr. She reports after he left, her RN, Marilee Prieto placed call button on her chest and proceeded to leave the room, closing the door behind her. Pt reports she asked her to leave the door open, but RN told her it's too noisy and closed the door anyway. Pt reports she called out for help for 2 hours and no one came to assist her. She reports when the RN did come in, she was told \"all the patients have pain, and the nurse can't spend that much time with one patient. \"  Pt reports she was scared and crying due to having been left alone with no way to obtain assistance. She reports she had no water during the time she was left unattended. Pt's spouse verbalized he should not have left her, and began crying. Palliative team expressed apologies for this incident, and explained actions are going to be immediately taken to rectify this situation. Pt expressed that she just wants to make sure this doesn't happen to anyone else in the future. Palliative team explained to pt and her spouse that we are going to ensure this will not continue for her, either. Pt and spouse expressed thanks. Actions taken to rectify this situation:      Sign placed on door, \"Door to remain OPEN\"   Requested pt be moved to room 502, across from [address] be placed in room, so pt can be observed and heard, so that needs can be met in a timely manner. Hospitalist ordered hourly rounding.    Unit Director, Rafi Foy notified of incident. Pt assessment continued. Pt reports she has lost ability to move any of her limbs. She reports she is also having difficulty swallowing. Pt's spouse reports she had swallow eval a few weeks ago, and now has thickened liquids/pureed diet. Pt reports she has been informed of the seriousness of her condition. She reports she has been dealing with Cancer since 80, having 10 years of remission, then having repeated recurrences, since 2000. She reports she has undergone many different treatment modalities during this time. Pt reports, Dr. Diane Gutiérrez informed her he could do surgery, but was concerned about the negative impact this surgery could have on her quality of life. She reports, she does not want to pursue surgical intervention. Pt reports she is interested in seeing what Interventional Radiation can offer as a treatment option, if any. NP explained to pt, this is a very reasonable request, and we will allow Radiology to follow up with her. Palliative will follow up with her after that consultation has been accomplished. Pt and spouse agreeable with this plan. Palliative business card  provided and pt's spouse was informed he can call with questions or concerns. Thank you for this referral to Palliative Care. The palliative care team remains available to provide support for patient and her family. Goals of care will be addressed further once Radiology consult completed.       CODE STATUS:  DNR/DNI    Chhaya Young, [address] [facility]  Palliative Medicine Inpatient   [facility]  Palliative COPE Line: [phone])

## 2022-05-03 NOTE — ED NOTES
TRANSFER - OUT REPORT:    Verbal report given to receiving nurse on Candido Bloom  being transferred to [address] [facility] for routine progression of care       Report consisted of patients Situation, Background, Assessment and   Recommendations(SBAR). Information from the following report(s) SBAR and ED Summary was reviewed with the receiving nurse. Lines:   Venous Access Device left chest single lumen mediport Upper chest (subclavicular area), left (Active)       Peripheral IV 05/02/22 Left Wrist (Active)   Site Assessment Clean, dry, & intact 05/02/22 2239   Phlebitis Assessment 0 05/02/22 2239   Infiltration Assessment 0 05/02/22 2239   Dressing Status Clean, dry, & intact; New 05/02/22 2239   Hub Color/Line Status Pink;Patent; Flushed 05/02/22 2239        Opportunity for questions and clarification was provided.       Patient transported with:   [employer]

## 2022-05-03 NOTE — PROGRESS NOTES
Problem: Falls - Risk of  Goal: *Absence of Falls  Description: Document Kike Ash Fall Risk and appropriate interventions in the flowsheet. Outcome: Progressing Towards Goal  Note: Fall Risk Interventions:            Medication Interventions: Evaluate medications/consider consulting pharmacy,Patient to call before getting OOB,Teach patient to arise slowly         History of Falls Interventions: Bed/chair exit alarm,Evaluate medications/consider consulting pharmacy         Problem: Patient Education: Go to Patient Education Activity  Goal: Patient/Family Education  Outcome: Progressing Towards Goal     Problem: Pressure Injury - Risk of  Goal: *Prevention of pressure injury  Description: Document Rickey Scale and appropriate interventions in the flowsheet.   Outcome: Progressing Towards Goal  Note: Pressure Injury Interventions:       Moisture Interventions: Maintain skin hydration (lotion/cream)    Activity Interventions: Assess need for specialty bed,Increase time out of bed,Pressure redistribution bed/mattress(bed type),PT/OT evaluation    Mobility Interventions: Assess need for specialty bed,HOB 30 degrees or less,Pressure redistribution bed/mattress (bed type)    Nutrition Interventions: Document food/fluid/supplement intake    Friction and Shear Interventions: Apply protective barrier, creams and emollients,HOB 30 degrees or less                Problem: Patient Education: Go to Patient Education Activity  Goal: Patient/Family Education  Outcome: Progressing Towards Goal     Problem: Pain  Goal: *Control of Pain  Outcome: Progressing Towards Goal  Goal: *PALLIATIVE CARE:  Alleviation of Pain  Outcome: Progressing Towards Goal     Problem: Patient Education: Go to Patient Education Activity  Goal: Patient/Family Education  Outcome: Progressing Towards Goal     Problem: Patient Education: Go to Patient Education Activity  Goal: Patient/Family Education  Outcome: Progressing Towards Goal

## 2022-05-03 NOTE — CONSULTS
[id number] [facility] 54: [phone])  Formerly [facility]: [address] [place] [address] Way: [phone]    Patient Name: Zulma Eng  YOB: 1945    Date of Initial Consult: 5/3/2022   Reason for Consult: care decisions / support  Requesting Provider: Dr Jose Steven   Primary Care Physician: Sylvester Mchugh MD      SUMMARY:   Zulma Eng is a 68y.o. year old with a past history of progressive metastatic breast cancer with progressive functional decline. S/P Theola Repine treatment, RT and proton therapy, biliary colic , who was admitted on 5/2/2022 from home with a diagnosis of increased lower extremity weakness and upper extremity weakness with increased neck pain. Current medical issues leading to Palliative Medicine involvement include: 68year old female with stage IV breast cancer for many years s/p multiple treatment modalities. 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 Palliative medicine is consulted for care decisions and support. PALLIATIVE DIAGNOSES:   1. Goals of care / care decisions   2. Advanced care plan discussions   3. Metastatic breast cancer   4. Debility        PLAN:   1. Goals of care Patient seen along with Ms Timothy Miller. She is alert, oriented x 4, her  at bedside. Both have an excellent understanding of her medical illness and treatment options. Difficult night for patient. She is quadriplegic therefore she is not able to use traditional call bell. Voice is weak. It is difficult for her to make her needs know. We have asked and placed on her door letter to not close door when family not in room. Recommend camera in room for visualization of patient. This has been discussed with BSRN, attending, and unit manager. She has been moved to room closer to nursing unit to ensure patient safety.  Patient and  are interested in [address] consult to see if she is eligible for any further treatment. Patient and  understand hospice is an option however will hold on this option until Rad/ Onc consult. Affirmed goals of care as DNR/DNI. We will continue to follow with you for additional care decisions and support. 2. Advanced care plan discussions no AMD on file.  is legal next of kin. She has 2 sons one local and one in [place]. Patient is agreeable for  to make medical decisions if she is not. 3. Metastatic breast cancer MRI of cervical spine with epidural mets and diffuse cord compressions. She has completed multiple modalities of treatment. Now interested in Rad/ Onc consult   4. Debility really total quadriplegia, not able to use her hands. Needs assistance with all ADL's, including feeding PPS 40 indicating a mostly chair to bed bound state. Not sure she can sit in wheel chair. 5. Initial consult note routed to primary continuity provider  6. Communicated plan of care with: Palliative IDT      Patient/Health Care Proxy Stated Goals: Prolong life      TREATMENT PREFERENCES:   Code Status: DNR/ DNI     Advance Care Planning:  [] The [facility] Interdisciplinary Team has updated the ACP Navigator with [address] and Patient Capacity    Primary Decision Maker ([address]):    is legal next of kin   Medical Interventions: Limited additional interventions         Other:  As far as possible, the palliative care team has discussed with patient / health care proxy about goals of care / treatment preferences for patient.      HISTORY:     History obtained from: chart and patient     CHIEF COMPLAINT: metastatic breast cancer     HPI/SUBJECTIVE:    The patient is:   [x] Verbal and participatory  [] Non-participatory due to:   Please see summary      Clinical Pain Assessment (nonverbal scale for nonverbal patients): Clinical Pain Assessment  Severity: 0          Duration: for how long has pt been experiencing pain (e.g., 2 days, 1 month, years)  Frequency: how often pain is an issue (e.g., several times per day, once every few days, constant)     FUNCTIONAL ASSESSMENT:     Palliative Performance Scale (PPS):  PPS: 40    ECOG  ECOG Status : Completely disabled     PSYCHOSOCIAL/SPIRITUAL SCREENING:      Any spiritual / Confucianism concerns:  [] Yes /  [x] No    Caregiver Burnout:  [] Yes /  [x] No /  [] No Caregiver Present      Anticipatory grief assessment:   [x] Normal  / [] Maladaptive        REVIEW OF SYSTEMS:     Positive and pertinent negative findings in ROS are noted above in HPI. The following systems were [x] reviewed / [] unable to be reviewed as noted in HPI  Other findings are noted below. Systems: constitutional, ears/nose/mouth/throat, respiratory, gastrointestinal, genitourinary, musculoskeletal, integumentary, neurologic, psychiatric, endocrine. Positive findings noted below. Modified ESAS Completed by: provider   Fatigue: 5 Drowsiness: 0   Depression: 0 Pain: 0   Anxiety: 3 Nausea: 0   Anorexia: 5 Dyspnea: 1                    PHYSICAL EXAM:     Wt Readings from Last 3 Encounters:   04/29/22 59.9 kg (132 lb)   04/21/22 58.1 kg (128 lb)   03/17/22 53.7 kg (118 lb 6.4 oz)     Blood pressure 116/67, pulse 75, temperature 98.3 °F (36.8 °C), resp. rate 16, SpO2 93 %.   Last bowel movement: none recorded     Constitutional: chronically ill appearing female who is alert and oriented x 4 in NAD   Eyes: pupils equal  ENMT: moist MM   Cardiovascular: regular rhythm  Respiratory: seems slightly dyspneic with speaking   Skin: warm, dry  Neurologic: alert and oriented x 4, not really able to use her arms and legs, + sensation to touch   Psychiatric: full affect, no hallucinations         HISTORY:     Active Problems:    Cervical spine tumor (5/2/2022)      Past Medical History:   Diagnosis Date    Biliary colic     Breast CA ([facility] 2012    Cancer [facility]) 1991    Right Breast    Chemotherapy convalescence or palliative care     Neuropathy     Radiation therapy complication     Thyroid disease       Past Surgical History:   Procedure Laterality Date    HX LAP CHOLECYSTECTOMY  13    HX MASTECTOMY      Right    AR BREAST SURGERY PROCEDURE UNLISTED  10/2002    Right-Lesion    AR BREAST SURGERY PROCEDURE UNLISTED  2004    Right-Lesion      Family History   Problem Relation Age of Onset    Cancer Maternal Aunt         Hodgkin's disease    Breast Cancer Paternal Aunt     Cancer Father         Prostate, lung, brain     History reviewed, no pertinent family history. Social History     Tobacco Use    Smoking status: Former Smoker     Packs/day: 0.50     Years: 10.00     Pack years: 5.00     Quit date: 1991     Years since quittin.9    Smokeless tobacco: Never Used   Substance Use Topics    Alcohol use:  Yes     Alcohol/week: 0.0 standard drinks     Types: 1 - 2 Glasses of wine per week     Comment: socially, occassionally     Allergies   Allergen Reactions    Codeine Palpitations    Darvocet A500 [Propoxyphene N-Acetaminophen] Nausea and Vomiting    Darvon [Propoxyphene] Nausea and Vomiting      Current Facility-Administered Medications   Medication Dose Route Frequency    albuterol (PROVENTIL VENTOLIN) nebulizer solution 2.5 mg  2.5 mg Nebulization Q4H PRN    [Held by provider] bumetanide (BUMEX) tablet 0.5 mg  0.5 mg Oral DAILY    calcium-vitamin D (OS-ADIN +D3) 500 mg-200 unit per tablet 1 Tablet  1 Tablet Oral DAILY    cholecalciferol (VITAMIN D3) (1000 Units /25 mcg) tablet 5,000 Units  5,000 Units Oral DAILY    gabapentin (NEURONTIN) capsule 300 mg  300 mg Oral BID    pantoprazole (PROTONIX) tablet 40 mg  40 mg Oral ACB/HS    ondansetron hcl (ZOFRAN) tablet 8 mg  8 mg Oral Q6H PRN    sodium chloride (NS) flush 5-40 mL  5-40 mL IntraVENous Q8H    sodium chloride (NS) flush 5-40 mL  5-40 mL IntraVENous PRN    acetaminophen (TYLENOL) tablet 650 mg  650 mg Oral Q6H PRN    Or    acetaminophen (TYLENOL) suppository 650 mg  650 mg Rectal Q6H PRN    polyethylene glycol (MIRALAX) packet 17 g  17 g Oral DAILY PRN    heparin (porcine) injection 5,000 Units  5,000 Units SubCUTAneous Q8H    0.9% sodium chloride infusion  75 mL/hr IntraVENous CONTINUOUS    morphine injection 2 mg  2 mg IntraVENous Q2H PRN    budesonide (PULMICORT) 500 mcg/2 ml nebulizer suspension  500 mcg Nebulization BID RT    And    arformoteroL (BROVANA) neb solution 15 mcg  15 mcg Nebulization BID RT    ipratropium (ATROVENT) 0.02 % nebulizer solution 0.5 mg  0.5 mg Nebulization Q8H RT        LAB AND IMAGING FINDINGS:     Lab Results   Component Value Date/Time    WBC 3.5 (L) 05/02/2022 06:34 PM    HGB 9.4 (L) 05/02/2022 06:34 PM    PLATELET 656 41/03/2312 06:34 PM     Lab Results   Component Value Date/Time    Sodium 136 05/03/2022 03:57 AM    Potassium 4.7 05/03/2022 03:57 AM    Chloride 100 05/03/2022 03:57 AM    CO2 27 05/03/2022 03:57 AM    BUN 29 (H) 05/03/2022 03:57 AM    Creatinine 1.56 (H) 05/03/2022 03:57 AM    Calcium 9.2 05/03/2022 03:57 AM    Magnesium 1.9 05/03/2022 03:57 AM    Phosphorus 4.5 05/02/2022 06:34 PM      Lab Results   Component Value Date/Time    Alk.  phosphatase 85 05/02/2022 06:34 PM    Protein, total 6.4 05/02/2022 06:34 PM    Albumin 2.9 (L) 05/02/2022 06:34 PM    Globulin 3.5 05/02/2022 06:34 PM     Lab Results   Component Value Date/Time    INR 1.0 08/10/2016 08:51 AM    Prothrombin time 13.3 08/10/2016 08:51 AM    aPTT 27.1 08/10/2016 08:51 AM      Lab Results   Component Value Date/Time    Iron 30 (L) 04/26/2022 11:20 AM    TIBC 209 (L) 04/26/2022 11:20 AM    Iron % saturation 14 (L) 04/26/2022 11:20 AM    Ferritin 632 (H) 04/26/2022 11:20 AM      No results found for: PH, PCO2, PO2  No components found for: GLPOC   No results found for: CPK, CKMB           Total time: 50 minutes   Counseling / coordination time, spent as noted above:   > 50% counseling / coordination: yes with patient and , Patrick Alfaro, attending team     Prolonged service was provided for  []30 min   []75 min in face to face time in the presence of the patient, spent as noted above.   Time Start:   Time End:

## 2022-05-03 NOTE — PROGRESS NOTES
Hospice referral noted in chart. The family is not aware of the hospice consult and CM needs to speak to Dr. Farhan Khan during [facility]. CM informed  hospice liaison to NOT speak with the family until notified. 80- Dr. Farhan Khan notified of Hospice consult during IDR's. Plan to consult palliative for goals of care before discussing hospice.

## 2022-05-03 NOTE — PROGRESS NOTES
Comprehensive Nutrition Assessment    Type and Reason for Visit: Initial,Positive nutrition screen    Nutrition Recommendations/Plan:   -Continue Ensure Enlive BID d/t poor PO intake and for weight stability  -Plan to add Magic Cup once a day for weight stability   -Monitor PO intake, compliance to diet/supplements, weight, and plan of care  -Plan to add feeding assistance to meals to promote PO intake      Malnutrition Assessment:  Malnutrition Status: At risk for malnutrition (specify) (r/t poor PO intake) (05/03/22 4069)      Nutrition Assessment:    Visited pt in the room, was laying in bed weak but able to communicate. Pt stated she felt weak; documented that she is unable to move limbs at this time.  was at bedside and was able to provide information for nutrition assessment. Pt is currently under hospice care d/t C-Spine tumor. Observed lunch tray in the room and approximately 50% of meal was consumed and 75% of oral supplement. NKFA.  reported he feed her this morning and she would benefit from assistance. Pt denies any d/c/n/v. Pt willing to try Magic Cup once a day at dinner for additional protein and calories. Nutrition History and Allergies: Past medical history of:  progressive metastatic breast cancer, pallative care with progressive decline in functional status saw her oncologist on 4/21, neuropathy, thyroid disease. Weight history per chart review: CBW: 04/29/22 : 59.9 kg (132 lb), 30 DAYS: 03/17/22 : 53.7 kg (118 lb 6.4 oz), 90 DAYS: 01/20/22 : 55.3 kg (122 lb), 180 DAYS: 11/01/21 : 60.8 kg (134 lb). Possible outlier in 03/2022. Nutrition Related Findings:    Last BM 5/2-denies d/c/n/v.  Pertinent Medications: sodium chloride NS 75mL/hour, bumex, calcium-vit D, vitamin D3, Zofran, Miralax.  Wound Type: None    Current Nutrition Intake & Therapies:  Average Meal Intake: 26-50%  Average Supplement Intake: 51-75%  ADULT ORAL NUTRITION SUPPLEMENT Breakfast, Lunch; Standard High Calorie/High Protein  ADULT DIET Regular; Mildly Thick (Nectar)    Anthropometric Measures:  Height: 5' 4\" (162.6 cm)  Ideal Body Weight (IBW): 120 lbs (55 kg)  Admission Body Weight: 132 lb  Current Body Wt:  59.9 kg (132 lb), 110 % IBW. Not specified  Current BMI (kg/m2): 22.6  Usual Body Weight: 54.4 kg (120 lb)  % Weight Change (Calculated): 10  BMI Category: Normal weight (BMI 22.0-24.9) age over 72    Estimated Daily Nutrient Needs:  Energy Requirements Based On: Kcal/kg (25-35)  Weight Used for Energy Requirements: Current  Energy (kcal/day): 1420-9334  Weight Used for Protein Requirements: Current (0.8-1.1)  Protein (g/day): 48-66  Method Used for Fluid Requirements: 1 ml/kcal  Fluid (ml/day): 2393-1435    Nutrition Diagnosis:   · Inadequate protein-energy intake related to catabolic illness,increased demand for energy/nutrients as evidenced by intake 51-75%,poor intake prior to admission      Nutrition Interventions:   Food and/or Nutrient Delivery: Continue current diet,Continue oral nutrition supplement,Modify oral nutrition supplement,Mineral supplement  Nutrition Education/Counseling: Education not indicated,No recommendations at this time  Coordination of Nutrition Care: Continue to monitor while inpatient,Interdisciplinary rounds  Plan of Care discussed with: Patient and     Goals:     Goals: Meet at least 75% of estimated needs,by next RD assessment       Nutrition Monitoring and Evaluation:   Behavioral-Environmental Outcomes: None identified  Food/Nutrient Intake Outcomes: Diet advancement/tolerance,Food and nutrient intake,Supplement intake,Vitamin/mineral intake,IVF intake  Physical Signs/Symptoms Outcomes: Biochemical data,Constipation,Diarrhea,Nausea/vomiting,Meal time behavior,Nutrition focused physical findings,Weight    Discharge Planning:     Too soon to determine    Oliverio Lockhart, [address]  Contact: [phone]

## 2022-05-03 NOTE — PROGRESS NOTES
[facility] Hospitalist Group  Progress Note    Patient: Maddy Rajan Age: 68 y.o. : 1945 MR#: [id number] SSN: [id number]  Date: 5/3/2022     Subjective:     Patient denies pain or shortness of breath    Assessment/Plan:   1. Cervical spine tumor - IV steroids initiated per discussion with hem / onc, radiation oncology consulted  2. Metastatic breast cancer, stage IV progressive - hem / oncology appreciated, no therapy recommended at this point  3. Vitamin D deficiency -continue calcium and vitamin D   4. Normocytic anemia -possibly multifactorial in setting of chemotherapy, some iron deficiency, continue Venofer, no evidence of active bleeding  5. CKD stage III-IV -some gradual worsening of renal fx over the last couple months, ? Etiology, cont IV fluids and recheck in AM   6. Goals of care -palliative medicine following, continue DNR/DNI, await radiation oncology opinion if any further treatment options are available vs. appropriate to transition to comfort directed goal  7. Functional quadriplegia -needs bed close to nursing station, hourly checks by nursing    Additional Notes:      Case discussed with:  [x]Patient  []Family  [x]Nursing  []Case Management  DVT Prophylaxis:  []Lovenox  [x]Hep SQ  []SCDs  []Coumadin / Eliquis or Xarelto   []On Heparin gtt    Objective:     VS:   Visit Vitals  /67   Pulse 75   Temp 98.3 °F (36.8 °C)   Resp 16   SpO2 93%      Tmax/24hrs: Temp (24hrs), Av °F (36.7 °C), Min:97.5 °F (36.4 °C), Max:98.6 °F (37 °C)      Intake/Output Summary (Last 24 hours) at 5/3/2022 1348  Last data filed at 5/3/2022 1043  Gross per 24 hour   Intake 290 ml   Output    Net 290 ml     General:  Alert, NAD, globally weak  HEENT: Oral mucosa moist  Cardiovascular:  RRR, Nl S1/S2  Pulmonary:  LSC throughout.  Normal resp effort  GI:  +BS in all four quadrants, soft, non-tender  Extremities:  No edema; 2+ dorsalis pedis pulses bilaterally  Neuro: alert and oriented x 4, unable to move arms / legs    Labs / micro / imaging :    Recent Results (from the past 24 hour(s))   EKG, 12 LEAD, INITIAL    Collection Time: 05/02/22  6:30 PM   Result Value Ref Range    Ventricular Rate 81 BPM    Atrial Rate 81 BPM    P-R Interval 126 ms    QRS Duration 72 ms    Q-T Interval 364 ms    QTC Calculation (Bezet) 422 ms    Calculated P Axis 38 degrees    Calculated R Axis 50 degrees    Calculated T Axis 55 degrees    Diagnosis       Normal sinus rhythm  Normal ECG  When compared with ECG of 18-SEP-2013 16:02,  No significant change was found     CBC WITH AUTOMATED DIFF    Collection Time: 05/02/22  6:34 PM   Result Value Ref Range    WBC 3.5 (L) 4.6 - 13.2 K/uL    RBC 3.06 (L) 4.20 - 5.30 M/uL    HGB 9.4 (L) 12.0 - 16.0 g/dL    HCT 29.5 (L) 35.0 - 45.0 %    MCV 96.4 78.0 - 100.0 FL    MCH 30.7 24.0 - 34.0 PG    MCHC 31.9 31.0 - 37.0 g/dL    RDW 18.3 (H) 11.6 - 14.5 %    PLATELET 973 874 - 615 K/uL    MPV 9.3 9.2 - 11.8 FL    NRBC 0.0 0  WBC    ABSOLUTE NRBC 0.00 0.00 - 0.01 K/uL    NEUTROPHILS 78 (H) 40 - 73 %    LYMPHOCYTES 12 (L) 21 - 52 %    MONOCYTES 9 3 - 10 %    EOSINOPHILS 0 0 - 5 %    BASOPHILS 0 0 - 2 %    IMMATURE GRANULOCYTES 1 (H) 0.0 - 0.5 %    ABS. NEUTROPHILS 2.7 1.8 - 8.0 K/UL    ABS. LYMPHOCYTES 0.4 (L) 0.9 - 3.6 K/UL    ABS. MONOCYTES 0.3 0.05 - 1.2 K/UL    ABS. EOSINOPHILS 0.0 0.0 - 0.4 K/UL    ABS. BASOPHILS 0.0 0.0 - 0.1 K/UL    ABS. IMM.  GRANS. 0.0 0.00 - 0.04 K/UL    DF AUTOMATED     METABOLIC PANEL, COMPREHENSIVE    Collection Time: 05/02/22  6:34 PM   Result Value Ref Range    Sodium 135 (L) 136 - 145 mmol/L    Potassium 4.8 3.5 - 5.5 mmol/L    Chloride 100 100 - 111 mmol/L    CO2 29 21 - 32 mmol/L    Anion gap 6 3.0 - 18 mmol/L    Glucose 106 (H) 74 - 99 mg/dL    BUN 28 (H) 7.0 - 18 MG/DL    Creatinine 1.53 (H) 0.6 - 1.3 MG/DL    BUN/Creatinine ratio 18 12 - 20      GFR est AA 40 (L) >60 ml/min/1.73m2    GFR est non-AA 33 (L) >60 ml/min/1.73m2    Calcium 9.4 8.5 - 10.1 MG/DL    Bilirubin, total 0.5 0.2 - 1.0 MG/DL    ALT (SGPT) 23 13 - 56 U/L    AST (SGOT) 42 (H) 10 - 38 U/L    Alk. phosphatase 85 45 - 117 U/L    Protein, total 6.4 6.4 - 8.2 g/dL    Albumin 2.9 (L) 3.4 - 5.0 g/dL    Globulin 3.5 2.0 - 4.0 g/dL    A-G Ratio 0.8 0.8 - 1.7     MAGNESIUM    Collection Time: 05/02/22  6:34 PM   Result Value Ref Range    Magnesium 1.5 (L) 1.6 - 2.6 mg/dL   PHOSPHORUS    Collection Time: 05/02/22  6:34 PM   Result Value Ref Range    Phosphorus 4.5 2.5 - 4.9 MG/DL   MAGNESIUM    Collection Time: 05/03/22  3:57 AM   Result Value Ref Range    Magnesium 1.9 1.6 - 2.6 mg/dL   METABOLIC PANEL, BASIC    Collection Time: 05/03/22  3:57 AM   Result Value Ref Range    Sodium 136 136 - 145 mmol/L    Potassium 4.7 3.5 - 5.5 mmol/L    Chloride 100 100 - 111 mmol/L    CO2 27 21 - 32 mmol/L    Anion gap 9 3.0 - 18 mmol/L    Glucose 86 74 - 99 mg/dL    BUN 29 (H) 7.0 - 18 MG/DL    Creatinine 1.56 (H) 0.6 - 1.3 MG/DL    BUN/Creatinine ratio 19 12 - 20      GFR est AA 39 (L) >60 ml/min/1.73m2    GFR est non-AA 32 (L) >60 ml/min/1.73m2    Calcium 9.2 8.5 - 10.1 MG/DL     XR CHEST PORT    Result Date: 5/2/2022  Stable pleuroparenchymal densities on the right compatible with effusion and rounded atelectasis and pleural disease seen previously.      Signed By: Alexandr Alfonso NP     May 3, 2022

## 2022-05-03 NOTE — PROGRESS NOTES
TRANSFER - IN REPORT:    Verbal report received from Lin Fields RN(name) on Rhea Garcia  being received from ER(unit) for routine progression of care      Report consisted of patients Situation, Background, Assessment and   Recommendations(SBAR). Information from the following report(s) SBAR, Kardex, ED Summary, Intake/Output and MAR was reviewed with the receiving nurse. Opportunity for questions and clarification was provided. Assessment completed upon patients arrival to unit and care assumed.

## 2022-05-03 NOTE — PROGRESS NOTES
conducted an initial consultation and Spiritual Assessment for Bladimir Su, who is a 68 y.o.,female. Patients Primary Language is: [place]. According to the patients EMR [employer] Affiliation is: [employer]. The reason the Patient came to the hospital is:   Patient Active Problem List    Diagnosis Date Noted    [facility] 07/10/2015    Sinus congestion 12/02/2014    Cramps, extremity 11/04/2014    Leukopenia 01/06/2015    Cervical spine tumor 05/02/2022    Anemia due to chemotherapy 01/25/2022    Secondary malignant neoplasm of bone and bone marrow ([facility] 04/20/2018    Vitamin D deficiency 04/13/2018    Chronic leukopenia 04/11/2017    Acute nasopharyngitis 10/14/2016    Mouth sore secondary to chemotherapy 09/02/2016    Chemotherapy induced neutropenia ([facility] 08/09/2016    Anemia due to antineoplastic chemotherapy 08/09/2016    Abnormal pleural fluid 08/09/2016    Chemotherapy-induced thrombocytopenia 08/09/2016    Breast cancer of lower-inner quadrant of right female breast ([facility] 05/27/2016    Metastatic breast cancer ([facility] 05/27/2016    Personal history of malignant neoplasm of breast 11/23/2015        The  provided the following Interventions:  Initiated a relationship of care and support. Chart reviewed. Assessment:  Patient appears to be comfortably resting. DNR order in medical chart. Plan:  Chaplains will continue to follow and will provide pastoral care on an as needed/requested basis.  recommends bedside caregivers page  on duty if patient shows signs of acute spiritual or emotional distress.     [address]  [phone])

## 2022-05-03 NOTE — PROGRESS NOTES
Pt was transferred closer to nurse's station due to pt's concern from safety and wanting to be closer to staff. With permission from patient safety camera at the bedside so that staff are able to monitor her closely between rounds. All staff aware that patient is to have frequent rounds by all staff. Each verbalized understanding. Pt was assisted with a bath by this nurse and CNA on duty. Pt was repositioned on back only. Pt like to keep HOB elevated and stay on her back. Educated pt on the benefit of rotating pt verbalized understanding. Bed is in the lowest position.  is away at this moment. Fall risk band applied. Will continue to monitor.

## 2022-05-03 NOTE — CONSULTS
Consult    Patient: Bladimir Su MRN: [id number]  SSN: [id number]    YOB: 1945  Age: 68 y.o. Sex: female      Subjective:      Bladimir Su is a 68 y.o. 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 Patient has had stage IV cancer several decades now. She has been through extensive forms of radiation and chemotherapy. She has been told she is exhausted radiation therapy (both conventional and proton beam.  Is been through numerous chemotherapeutic regimens. Approximately the past week or 2 she has noticed progression initially of lower extremity weakness and upper extremity weakness. She had an MRI done yesterday. She came in her  no longer care for her. She is nonambulatory complains of neck pain mother diffuse pain history is known skull mets. They have recently taken her off some chemotherapeutic agents. She has difficulties with renal function. Creatinine of 1.5 albumin of 2.9    MRI of the cervical spine demonstrates epidural metastases with anterior and both posterior lesions were set approximately centimeter 5 6 with this diffuse cord compression across 2-3 segments with extension of the metastatic mass to the lamina and interlaminar space at C5-6. There is no imaging done of the thoracic or lumbar spine. The patient has had previous metastatic disease she reports to the lumbar spine. Patient on exam currently has posture protracted upper extremities with some volitional motion but essentially no strength of her deltoids biceps triceps some intrinsic strength sensations intact she can create small motion in her lower extremities but is no strength of her EHL tib ant hamstring quads. She has no sensory deficit she has a fulminant motor deficit in her upper and lower extremities with no more 1 out of 5 strength of any muscle groups tested.     Past Medical History:   Diagnosis Date    Biliary colic     Breast CA ([facility] [address] 2012    Cancer [facility])     Right Breast    Chemotherapy convalescence or palliative care     Neuropathy     Radiation therapy complication     Thyroid disease      Past Surgical History:   Procedure Laterality Date    HX LAP CHOLECYSTECTOMY  13    HX MASTECTOMY      Right    [place] BREAST SURGERY PROCEDURE [place]LISTED  10/2002    Right-Lesion    [place] BREAST SURGERY PROCEDURE UNLISTED  2004    Right-Lesion      Family History   Problem Relation Age of Onset    Cancer Maternal Aunt         Hodgkin's disease    Breast Cancer Paternal Aunt     Cancer Father         Prostate, lung, brain     Social History     Tobacco Use    Smoking status: Former Smoker     Packs/day: 0.50     Years: 10.00     Pack years: 5.00     Quit date: 1991     Years since quittin.9    Smokeless tobacco: Never Used   Substance Use Topics    Alcohol use: Yes     Alcohol/week: 0.0 standard drinks     Types: 1 - 2 Glasses of wine per week     Comment: socially, occassionally      Current Outpatient Medications   Medication Sig Dispense Refill    bumetanide (BUMEX) 0.5 mg tablet Take 1 Tablet by mouth daily. 30 Tablet 6    potassium chloride (K-DUR, KLOR-CON M20) 20 mEq tablet Take 1 Tablet by mouth two (2) times a day. 30 Tablet 6    ondansetron hcl (Zofran) 8 mg tablet Take 1 Tablet by mouth every eight (8) hours as needed for Nausea or Vomiting. 30 Tablet 0    calcium-vitamin D (OS-ADIN +D3) 500 mg-200 unit per tablet Take 1 Tablet by mouth daily.  epoetin zac-epbx (RETACRIT INJECTION) by Injection route every thirty (30) days. PRN      denosumab (XGEVA SC) by SubCUTAneous route every thirty (30) days. PRN      fulvestrant (FASLODEX IM) by IntraMUSCular route every thirty (30) days.  fluticasone-umeclidin-vilanter (Trelegy Ellipta) 200-62.5-25 mcg inhaler Take 1 Puff by inhalation daily.  Rinse and gargle after each use 3 Each 3    omeprazole (PRILOSEC) 40 mg capsule Take 1 Capsule by mouth two (2) times a day. Take at least 30min before breakfast and dinner. Disp: 90 day supply 180 Capsule 0    megestroL (MEGACE) 40 mg tablet Take 40 mg by mouth daily.  gabapentin (NEURONTIN) 100 mg capsule TAKE 2 CAPSULES THREE TIMES A  Capsule 3    palbociclib 75 mg tab Take 75 mg by mouth daily. for 21 days then 7 days off 63 Each 5    albuterol (PROVENTIL HFA, VENTOLIN HFA, PROAIR HFA) 90 mcg/actuation inhaler Take 2 Puffs by inhalation every six (6) hours as needed.  fluticasone propionate (FLONASE) 50 mcg/actuation nasal spray 2 Sprays by Both Nostrils route daily as needed.  DULoxetine (CYMBALTA) 30 mg capsule Take 1 Capsule by mouth daily. For one week, then 2 capsules daily 60 Capsule 5    Cetirizine (ZYRTEC) 10 mg cap Take 1 Cap by mouth daily as needed.  lidocaine HCl-hydrocortison ac topical cream Apply  to affected area two (2) times a day. 85 g 3    Cholecalciferol, Vitamin D3, 5,000 unit Tab Take 5,000 Units by mouth daily.  thyroid, Pork, (ARMOUR THYROID) 60 mg tablet Take 60 mg by mouth daily.  warfarin (COUMADIN) 1 mg tablet Take 1 mg by mouth every other day. Allergies   Allergen Reactions    Codeine Palpitations    Darvocet A500 [Propoxyphene N-Acetaminophen] Nausea and Vomiting    Darvon [Propoxyphene] Nausea and Vomiting       Review of Systems:  Pertinent items are noted in the History of Present Illness. Objective:     Vitals:    05/02/22 2157 05/02/22 2212 05/02/22 2227 05/02/22 2242   BP: (!) 122/51  (!) 131/55    Pulse: 76 85 85 87   Resp: 21 16 19 19   Temp:   97.5 °F (36.4 °C)    SpO2: 94% 95% 95% 93%        Physical Exam:  GENERAL: alert, cooperative, no distress, appears stated age    Patient's motor strength is 1 out of 5 in the intrinsics no real active motor strength in other muscle groups tested hip internal and external rotators 1 out of 5 is not wiggling toes has no focal sensory deficit. Legs are edematous edema. Patient complains of axial neck pain. Is no clonus Rodriges's or Babinski reflexes are flat          Radiographs demonstrate diffuse epidural involvement in the mid cervical region centered around C5-6 with cord compression C4-5-6 7 posterior indicated metastatic lesions causing compression traversing her lamina and to the interlaminar space at C5-6. Assessment:       I discussed the matter at length with the patient we have a patient with advanced age for cancer and has already exhausted decades of aggressive oncologic treatment. She is failed recent chemotherapy she has been followed on numerous occasions she is maximized with radiation. She is essentially quadriparetic with intact sensation however. She is having axial neck pain. I believe her prognosis is terminal.  I believe palliative care would be in her best interest.  I discussed the options of surgery which I believe would be relatively heroic and unlikely to improve her functional duration or quality of life. Certainly would cause pain. Surgery would be at least a multilevel posterior cervical decompression and fusion. She would have a hard time healing the surgery given her overall health status. Other options that she is interested in pursuing this radiation should there be more radiation available to her. Have spoken to her hospitalist and they will discuss with radiation oncology further radiation treatments but they have to gather all of her notes. The other option essentially is hospice/palliative care. I think that would be a prudent option and kind. The patient is beginning the emotional process of resigning herself to that the patient's  is with her quite supportive and they believe they both understand the difficult time that they are having and the significance of her disease right now. I have had a medical relationship with his family extending back almost 30 years.     Plan:     Our current plan is admission for symptomatic palliation    Consultation with radiation oncology to see if there are radiation options to ease the patient's pain and maybe allow improve functionality. We are on discussion we are not going to consider surgical options. I do not believe surgical options would likely lead her to her desired goal which is being an independent ambulator. Just having her having a different degree of quadriparesis with some improvement motor strength is not a successful outcome the orthopedic surgery  .     Signed By: Alexis Romero MD     May 2, 2022

## 2022-05-03 NOTE — RT PROTOCOL NOTE
Bedside shift change report given to Melissa Lomas RN (oncoming nurse) by Deidra Dhaliwal RN (offgoing nurse). Report included the following information SBAR, Kardex, Intake/Output and MAR.

## 2022-05-03 NOTE — PROGRESS NOTES
Problem: Falls - Risk of  Goal: *Absence of Falls  Description: Document Kike Ash Fall Risk and appropriate interventions in the flowsheet. Outcome: Progressing Towards Goal  Note: Fall Risk Interventions:            Medication Interventions: Evaluate medications/consider consulting pharmacy,Patient to call before getting OOB,Teach patient to arise slowly         History of Falls Interventions: Bed/chair exit alarm,Evaluate medications/consider consulting pharmacy         Problem: Patient Education: Go to Patient Education Activity  Goal: Patient/Family Education  Outcome: Progressing Towards Goal     Problem: Pressure Injury - Risk of  Goal: *Prevention of pressure injury  Description: Document Rickey Scale and appropriate interventions in the flowsheet.   Outcome: Progressing Towards Goal  Note: Pressure Injury Interventions:  Sensory Interventions: Assess changes in LOC,Assess need for specialty bed    Moisture Interventions: Minimize layers,Absorbent underpads    Activity Interventions: Assess need for specialty bed,Increase time out of bed    Mobility Interventions: Assess need for specialty bed    Nutrition Interventions: Document food/fluid/supplement intake    Friction and Shear Interventions: Apply protective barrier, creams and emollients         Problem: Pain  Goal: *Control of Pain  Outcome: Progressing Towards Goal  Goal: *PALLIATIVE CARE:  Alleviation of Pain  Outcome: Progressing Towards Goal

## 2022-05-04 NOTE — PROGRESS NOTES
Hematology / Oncology Note      Reason for Consultation:  Ramon Don is a 68 y.o. female who I've been asked to consult for metastatic breast cancer . Subjective:     HPI:     Mrs. Shawn Ramirez is a most pleasant 68year old female who has progressive metastatic breast cancer. The patient experienced worsening axial neck pain for past 2 weeks. She was admitted due to the progressive weakness of upper and lower extremities which occurred since last week. .    5/1/2022 Cervical MRI reported C5-C6 interspinous soft tissue metastatic implant measuring 1.9 x 1 x 2 cm, suspected anterior epidural soft tissue metastatic implant centered at the C4 vertebral body level with anterior mass effect/flattening of the spinal cord. She has started IV Steroids since last night. MRI Thoracic/Lumbar obtained. Patient seen today. Family at bedside. She reports that her pain is manage by the morphine. She is still waiting for [facility] consult for decision. Family also asked about hospice information. She denies any respiratory distress, fevers, chills at this time. Oncology History   -- She has history of slowly progressive metastatic breast cancer. Patient was previously being followed by Dr. Vivian Hill who retired. The patient has previously completed external beam radiation therapy to lesions in her ribs and more recently she completed proton therapy to areas of bone involvement with a significant benefit with regards to pain control. The posttherapy imaging studies showed a significant decrease in the intensity of uptake on the bone scan. -- Additionally she has been on systemic therapy with fulvestrant and Ibrance. She has been on Fulvestrant 500 mg subcutaneous monthly and Ibrance 100 mg by mouth daily. She is tolerating treatment fairly well without high grade toxicities.    -- 4/15/2020  CT scans of the chest, abdomen, and pelvis shows that she has a loculated right pleural effusion with some pleural thickening and adjacent areas of probable rounded atelectasis all of which are stable.  She has relatively stable bone lesions of the rib, pelvis, and T12 posterior element. There was no findings on CT scan to support new metastatic disease in the chest, abdomen, or pelvis. --  Mammogram 6/5 done shows No evidence of malignancy.  Suggest routine follow-up. -- 12/17/2020 CBC reported hemoglobin was 10.8, hematocrit was 32.8%, total WBC was 1.9 and ANC was 1.3, platelet was [phone].  -- 1/18/2021 CT CA/P: No new or enlarging lymphadenopathy. No solid organ lesions or ascites. -- 1/18/2021 Bone scan reported a focal uptake anterior left iliac crest without definite CT correlate.   -- 2/12/2021 US reported No abnormality is seen in the patient's area of concern inferior to the right scapula. -- 4/19/2021 Bone scan reported no gross interval change in indeterminate uptake in the left superior iliac bone. -- 6/7/2021 Mammogram reported no evidence for malignancy. Suggest routine follow-up with annual mammographic screening.  -- 7/20/2021 CBC reported hemoglobin 11.7, hematocrit 37.1%, ANC 1.6, platelet [phone],448.  -- The patient has tolerated fulvestrant and palbociclib therapy with no high graded toxicities. Recent labs reviewed with the patient today. -- 9/14/2021 CT CAP reported a new soft tissue density noted along the anterior inferior aspect of the sternum. Otherwise no significant interval change in the chest. No new or enlarging nodule. -- 9/14/2021 Bone scan reported known osseous metastatic disease. Small new focal activity proximal right forearm, concerning for new metastasis. Developing activity in the left femoral head concerning for metastasis. -- 10/14/2021 PET scan reported no definite finding for FDG avid bone disease, no definite correlate with bone scan findings.   + The soft tissue mass anterior to the inferior sternum demonstrates a moderate  degree of metabolic activity which was stable compared to previous PET 6/20216. Persistent metabolically active subcarinal lymph node and new medial right infrahilar lymph node. -- 10/26/2021 WBC 1.8, ANC 0.9  -- She has been on Palbociclib 75 mg daily 21 days on/7 days off since last visit. She has tolerated therapy without high graded toxicities  -- 12/13/2021 MRI hip reported poorly defined abnormal signal in the anterior femoral head. There are 3 infiltrative areas of increased STIR signal in the pelvis, 2 on the right and one on the left, questionable metastatic disease.   -- 1/3/2022 Brain MRI reported no definite findings of metastatic brain disease. + Newly evident right frontal skull lesion concerning for a new osseous  metastasis  -- 1/13/2022 PET scan reported although there is mildly decreased activity at the pre-xyphoid chest wall mass compared with prior study, now at blood pool, lesion has increased in size since prior study and remains highly suspicious for enlarging metastasis. No malignant activity at the left hip.  -- Has completed proton therapy of chest wall mass - Dr. Nicole Taylor which planned to repeat CT chest in the end of May per pt. MRI Results (most recent):  Results from [place] encounter on 05/02/22    MRI LUMB SPINE WO CONT    Narrative  EXAM: Lumbar MRI without contrast    CLINICAL INDICATION: Metastatic cancer    TECHNIQUE: MRI of the lumbar spine without contrast obtained. Multiplanar  multisequence MR images of the lumbar spine obtained. IV Contrast: None    COMPARISON: MRI of the lumbar spine dated 6/30/2017    FINDINGS:  All numbering assumes 5 lumbar type vertebrae. Postoperative Changes: None    Alignment:  Unremarkable. Osseous structures/Marrow: The marrow signal is mildly patchy. There is a  sclerotic focus in the posterior aspect of L1. This is new since prior MR. A  sclerotic focus in the right iliac wing is present which is been present before  and most consistent with a bone island.     The cord terminates at L1. No cord signal abnormalities appreciated. Retroperitoneum/Incidental: The abdominal aorta is without evidence of aneurysm. No paraaortic adenopathy appreciated. Hydroureteronephrosis is noted most  pronounced on the right. The bladder is distended. No discrete obstructing  process appreciated. Lower Thoracic Spine: The majority of the lower thoracic spine is only  visualized on the sagittal views. No significant canal or neural foraminal  stenosis appreciated. T12-L1: No significant canal or neural foraminal stenosis. L1-L2 level: No evidence of significant canal or foraminal stenosis. L2-L3: Facet arthropathy with mild degenerative disc disease. No significant  canal or neural foraminal stenosis. L3-L4: Facet arthropathy with broad-based disc bulge. No specific canal  stenosis. Mild bilateral neural foraminal narrowing is noted. L4-L5: Moderate facet arthropathy is noted. Broad-based disc bulge is noted. No  significant canal stenosis. Moderate bilateral neural foraminal narrowing is  noted. L5-S1: Moderate facet arthropathy is noted. Broad-based disc osteophyte  formation is present. No significant canal stenosis. Mild bilateral neural  foraminal narrowing is noted. Tarlov cysts are noted in the sacrum. These are present previously. Complicated right pleural effusion is noted. Impression  1. Hydroureteronephrosis and bladder distention. No obvious obstruction  appreciated. 2.  Sclerotic focus in L1 which is new and could represent a metastatic focus. The marrow signal is patchy. 3.  Loculated right pleural effusion. 4.  No evidence of significant canal or neural foraminal narrowing. MRI Cervical  spine   IMPRESSION  Limited exam without the use of IV contrast administration, which the patient  deferred.     Multifocal metastatic disease including:  - Infiltration of C5, C6 and C7 vertebral bodies.    - C5-C6 interspinous soft tissue metastatic implant measuring 1.9 x 1 x 2 cm.   - Suspected anterior epidural soft tissue metastatic implant centered at the C4  vertebral body level with anterior mass effect/flattening of the spinal cord.     Asymmetric right-sided ossification of the posterior longitudinal ligament  spanning the mid C5 to mid C7 vertebral body.     Severe central canal narrowing spanning the C4-C6 vertebral bodies.     Moderate and/or severe foraminal narrowing bilaterally at C4-C5, C5-C6 and  C6-C7.     Please refer to the body of the report for a comprehensive segmental analysis of  the cervical spinal column. Review of Systems:   Constitutional: Positive for fatigue. HENT: Negative for nosebleeds, congestion,  Eyes: Negative for photophobia, pain, discharge and itching. Respiratory: Negative for apnea, cough, choking, chest tightness, wheezing and stridor. Cardiovascular: Negative for chest pain, palpitations and Positive for leg swelling. Gastrointestinal: Negative for abdominal pain. Negative for nausea, diarrhea,   Genitourinary: Negative for dysuria, urgency, hematuria, flank pain and difficulty urinating. Musculoskeletal: Negative for back pain, joint swelling, . Skin: Negative for color change, pallor, rash and wound. Neurological: Positive for weakness. Negative for dizziness, facial asymmetry,   Hematological: Negative for adenopathy. Does not bruise/bleed easily. Psychiatric/Behavioral: Negative for hallucinations, confusion, disturbed wake/sleep cycle and agitation.        Physical Assessment:     Visit Vitals  BP (!) 102/47 (BP 1 Location: Left upper arm, BP Patient Position: Lying) Comment: nurse notified   Pulse 75   Temp 96.9 °F (36.1 °C)   Resp 16   Ht 5' 4\" (1.626 m)   SpO2 99%   BMI 22.66 kg/m²       Temp (24hrs), Av.9 °F (36.1 °C), Min:94.7 °F (34.8 °C), Max:97.6 °F (36.4 °C)      Physical Exam:    General: NAD, generalized weakness (+)  HEENT:  Anicteric sclerae; pink palpebral conjunctivae; mucosa moist  Resp:  Symmetrical chest expansion, no accessory muscle use;  CV:  regular rate   GI:  Abdomen soft, non-tender; (+) active bowel sounds  Extremities:  + Edema lower extremities   Skin:  Warm; no rashes/ lesions noted  Neurologic:  alert and oriented x 4, Pt's unable to move arms and legs  Devices:  No NGT/OGT, Central line/ PICC, ETT/tracheostomy, chest tube        Assessment/Plan    Cervical spine tumor  Metastatic Breast Cancer  -- Please refer to Oncology history for details. -- The patient experienced worsening axial neck pain for past 2 weeks. She was admitted due to the progressive weakness of upper and lower extremities which occurred since last week. .   -- 5/1/2022 Cervical MRI reported multifocal metastatic disease including: Infiltration of C5, C6 and C7 vertebral bodies. C5-C6 interspinous soft tissue metastatic implant measuring 1.9 x 1 x 2 cm. Suspected anterior epidural soft tissue metastatic implant centered at the C4 vertebral body level with anterior mass effect/flattening of the spinal cord. --  Orthopedic team consulted. -- 5/3/2022 Thoracic/Lumbar MRI reported no cord signal abnormality within the thoracic or lumbar spine  -- The patient and family are awaiting for [facility] consult for decision. Palliative care team consulted for goal of care. Hospice information provided. -- Continue IV Steroids. PPI prophylaxis. -- Please continue best supportive care, optimizing pain control. -- Hematology/Oncology will follow along. Normocytic Anemia/ Iron deficiency anemia  Chemotherapy-related anemia  -- The patient previously schedule for iron infusion outpatient however since patient was admited we will offer her iron infusion while inpatient. Recent iron profile shows  iron was 30, iron saturation 14%   -- Continue Venofer 200 mg  every 24 hours x 3 doses totally  -- Monitor CBC and transfusion as indicated.       DVT Prophylaxis            Past Medical History: Diagnosis Date    Biliary colic     Breast CA ([facility] [address] 2012    Cancer [facility]) 1991    Right Breast    Chemotherapy convalescence or palliative care     Neuropathy     Radiation therapy complication     Thyroid disease      Past Surgical History:   Procedure Laterality Date    HX LAP CHOLECYSTECTOMY  9-19-13    HX MASTECTOMY  1991    Right    [facility] BREAST SURGERY PROCEDURE UNLISTED  10/2002    Right-Lesion    NH BREAST SURGERY PROCEDURE UNLISTED  11/2004    Right-Lesion       Family History   Problem Relation Age of Onset    Cancer Maternal Aunt         Hodgkin's disease    Breast Cancer Paternal Aunt     Cancer Father         Prostate, lung, brain     Allergies   Allergen Reactions    Codeine Palpitations    Darvocet A500 [Propoxyphene N-Acetaminophen] Nausea and Vomiting    Darvon [Propoxyphene] Nausea and Vomiting       Home Medications:   Home Meds reviewed with patient    Hospital Medications:   Current hospital medications reviewed    Labs:  Recent Labs     05/04/22  0542 05/02/22  1834   WBC 2.9* 3.5*   RBC 2.71* 3.06*   HCT 26.4* 29.5*   MCV 97.4 96.4   MCH 29.9 30.7   MCHC 30.7* 31.9   RDW 17.8* 18.3*       Recent Labs     05/04/22  0542 05/03/22  0357 05/02/22  1834   CO2 26 27 29   BUN 33* 29* 28*       No results for input(s): ALBUMIN in the last 72 hours. No lab exists for component: [facility], [facility]    Thank you for requesting us to consult on your patient. We appreciate the opportunity to participate in your patient's care. Please call if you have questions.       Abdifatah Shah MD

## 2022-05-04 NOTE — PROGRESS NOTES
[facility] Hospitalist Group  Progress Note    Patient: Carlos Bourgeois Age: 68 y.o. : 1945 MR#: [id number] SSN: [id number]  Date: 2022     Subjective:     Reports some neck and shoulder pain earlier today which is currently improved, no current shortness of breath     Assessment/Plan:   1. Cervical spine tumor - cont IV steroids, discussed with hem/onc whom did discuss option of rad onc tx but ultimately patient deciding on hospice care. 2. Metastatic breast cancer, stage IV progressive - hem / oncology appreciated, no therapy recommended at this point, hospice consulted. 3. Vitamin D deficiency -continue calcium and vitamin D   4. Normocytic anemia -possibly multifactorial in setting of chemotherapy, some iron deficiency, continue Venofer, no evidence of active bleeding  5. CKD stage III-IV -some gradual worsening of renal fx over the last couple months, some improvement, cont gentle fluids for now  6. Hypotension - cont midodrine, resume NS @ 50ml / hr, dec morphine to 1mg PRN  7. Goals of care - palliative medicine following, continue DNR/DNI, plan for home with hospice tomorrow. No escalation of care including no use of pressors, if further clinical decline discussed transition to full comfort measures.    8. Functional quadriplegia -needs bed close to nursing station, hourly checks by nursing    Additional Notes:      Case discussed with:  [x]Patient  []Family  [x]Nursing  []Case Management  DVT Prophylaxis:  []Lovenox  [x]Hep SQ  []SCDs  []Coumadin / Eliquis or Xarelto   []On Heparin gtt    Objective:     VS:   Visit Vitals  BP (!) 95/50   Pulse 78   Temp 97.3 °F (36.3 °C)   Resp 18   Ht 5' 4\" (1.626 m)   SpO2 97%   BMI 22.66 kg/m²      Tmax/24hrs: Temp (24hrs), Av.9 °F (36.1 °C), Min:94.7 °F (34.8 °C), Max:97.6 °F (36.4 °C)      Intake/Output Summary (Last 24 hours) at 2022 1128  Last data filed at 2022 0700  Gross per 24 hour   Intake 900 ml Output 500 ml   Net 400 ml     General:  Alert, NAD, globally weak  HEENT: Oral mucosa moist  Cardiovascular:  RRR, Nl S1/S2  Pulmonary:  LSC throughout.  Normal resp effort  GI:  +BS in all four quadrants, soft, non-tender  Extremities:  No edema; 2+ dorsalis pedis pulses bilaterally  Neuro: alert and oriented x 4, unable to move arms / legs    Labs / micro / imaging :    Recent Results (from the past 24 hour(s))   EKG, 12 LEAD, SUBSEQUENT    Collection Time: 05/03/22  9:54 PM   Result Value Ref Range    Ventricular Rate 69 BPM    Atrial Rate 69 BPM    P-R Interval 152 ms    QRS Duration 82 ms    Q-T Interval 426 ms    QTC Calculation (Bezet) 456 ms    Calculated P Axis 38 degrees    Calculated R Axis 53 degrees    Calculated T Axis 44 degrees    Diagnosis       Normal sinus rhythm  Low voltage QRS  Borderline ECG  When compared with ECG of 02-MAY-2022 18:30,  No significant change was found  Confirmed by Dora Macedo ([id number]) on 5/4/2022 9:35:08 AM     CULTURE, BLOOD    Collection Time: 05/03/22 11:13 PM    Specimen: Blood   Result Value Ref Range    Special Requests: NO SPECIAL REQUESTS      Culture result: NO GROWTH AFTER 6 HOURS     LACTIC ACID    Collection Time: 05/03/22 11:13 PM   Result Value Ref Range    Lactic acid 0.7 0.4 - 2.0 MMOL/L   CULTURE, BLOOD    Collection Time: 05/03/22 11:26 PM    Specimen: Blood   Result Value Ref Range    Special Requests: NO SPECIAL REQUESTS      Culture result: NO GROWTH AFTER 6 HOURS     URINALYSIS W/ RFLX MICROSCOPIC    Collection Time: 05/04/22  5:11 AM   Result Value Ref Range    Color YELLOW      Appearance CLEAR      Specific gravity 1.021 1.005 - 1.030      pH (UA) 5.5 5.0 - 8.0      Protein TRACE (A) NEG mg/dL    Glucose Negative NEG mg/dL    Ketone TRACE (A) NEG mg/dL    Bilirubin Negative NEG      Blood LARGE (A) NEG      Urobilinogen 0.2 0.2 - 1.0 EU/dL    Nitrites Negative NEG      Leukocyte Esterase Negative NEG     URINE MICROSCOPIC ONLY    Collection Time: 05/04/22  5:11 AM   Result Value Ref Range    WBC 1 to 1 0 - 4 /hpf    RBC 0 to 1 0 - 5 /hpf    Epithelial cells FEW 0 - 5 /lpf    Bacteria FEW (A) NEG /hpf   CBC WITH AUTOMATED DIFF    Collection Time: 05/04/22  5:42 AM   Result Value Ref Range    WBC 2.9 (L) 4.6 - 13.2 K/uL    RBC 2.71 (L) 4.20 - 5.30 M/uL    HGB 8.1 (L) 12.0 - 16.0 g/dL    HCT 26.4 (L) 35.0 - 45.0 %    MCV 97.4 78.0 - 100.0 FL    MCH 29.9 24.0 - 34.0 PG    MCHC 30.7 (L) 31.0 - 37.0 g/dL    RDW 17.8 (H) 11.6 - 14.5 %    PLATELET 997 (L) 057 - 420 K/uL    MPV 9.8 9.2 - 11.8 FL    NRBC 0.0 0  WBC    ABSOLUTE NRBC 0.00 0.00 - 0.01 K/uL    NEUTROPHILS 93 (H) 40 - 73 %    LYMPHOCYTES 6 (L) 21 - 52 %    MONOCYTES 1 (L) 3 - 10 %    EOSINOPHILS 0 0 - 5 %    BASOPHILS 0 0 - 2 %    IMMATURE GRANULOCYTES 0 0.0 - 0.5 %    ABS. NEUTROPHILS 2.7 1.8 - 8.0 K/UL    ABS. LYMPHOCYTES 0.2 (L) 0.9 - 3.6 K/UL    ABS. MONOCYTES 0.0 (L) 0.05 - 1.2 K/UL    ABS. EOSINOPHILS 0.0 0.0 - 0.4 K/UL    ABS. BASOPHILS 0.0 0.0 - 0.1 K/UL    ABS. IMM. GRANS. 0.0 0.00 - 0.04 K/UL    DF AUTOMATED     METABOLIC PANEL, BASIC    Collection Time: 05/04/22  5:42 AM   Result Value Ref Range    Sodium 136 136 - 145 mmol/L    Potassium 4.9 3.5 - 5.5 mmol/L    Chloride 101 100 - 111 mmol/L    CO2 26 21 - 32 mmol/L    Anion gap 9 3.0 - 18 mmol/L    Glucose 140 (H) 74 - 99 mg/dL    BUN 33 (H) 7.0 - 18 MG/DL    Creatinine 1.36 (H) 0.6 - 1.3 MG/DL    BUN/Creatinine ratio 24 (H) 12 - 20      GFR est AA 46 (L) >60 ml/min/1.73m2    GFR est non-AA 38 (L) >60 ml/min/1.73m2    Calcium 8.8 8.5 - 10.1 MG/DL   MAGNESIUM    Collection Time: 05/04/22  5:42 AM   Result Value Ref Range    Magnesium 2.0 1.6 - 2.6 mg/dL     MRI [facility] SPINE WO CONT    Result Date: 5/4/2022  1. Nonspecific mildly patchy marrow signal of the thoracic spine. No discrete evidence of metastatic disease. There does appear to be abnormal signal within the cervical spine better appreciated on recent MR of the cervical spine.   2. Loculated right pleural effusion similar prior imaging and small left pleural effusion. 3.  Likely scarring in the right lung. 4.  Dilated esophagus which is nonspecific. 5.  No cord signal abnormality within the thoracic spine. MRI LUMB SPINE WO CONT    Result Date: 5/4/2022  1. Hydroureteronephrosis and bladder distention. No obvious obstruction appreciated. 2.  Sclerotic focus in L1 which is new and could represent a metastatic focus. The marrow signal is patchy. 3.  Loculated right pleural effusion. 4.  No evidence of significant canal or neural foraminal narrowing. XR CHEST PORT    Result Date: 5/2/2022  Stable pleuroparenchymal densities on the right compatible with effusion and rounded atelectasis and pleural disease seen previously.      Signed By: Tashia Fitzpatrick NP     May 4, 2022

## 2022-05-04 NOTE — WOUND CARE
Physical Exam   Patient: Brigid Carver   Room #: 054  Date:  05/04/22    DEEDEE: [id number]    Situation: Wound Care Consult    Background:    PMH:    Biliary colic      Breast CA ([facility] [address] 2012    Cancer [facility]) 1991     Right Breast    Chemotherapy convalescence or palliative care      Neuropathy      Radiation therapy complication      Thyroid disease                 Past Surgical History:   Procedure Laterality Date    HX LAP CHOLECYSTECTOMY   9-19-13    HX MASTECTOMY   1991     Right    SC BREAST SURGERY PROCEDURE UNLISTED   10/2002     Right-Lesion    SC BREAST SURGERY PROCEDURE UNLISTED   11/2004     Right-Lesion       Current Dx:  Cervical spine tumor (5/2/2022)   Hypomagnesemia   GERD   Metastatic breast cancer, stage IV progressive   Vitamin D deficiency   Normocytic anemia   Leukopenia   Mild hyponatremia   Acute kidney injury      Rickey Score: 12/23   BMI: 22.66   Preventive measures in place: Limited layers and Incontinence managed with Purewick    Assessment:   Patient found reclined. Patient is Oriented x person, place, time and situation. Wound(s) Description:         Wound #1    Location: Sacrum  Stage/Etiology: Pressure  Unstageable  Characteristics: shows no evidence of infection, separation, or keloid formation Wound bed is eschar clean, erythema. Drea-wound skin is clear/intact  Drainage: Serous, scant  Measurements: 3x3cm  Photo:         Wound #2  Location: Heel, Right  Stage/Etiology: Pressure  Suspected deep tissue injury  Characteristics: shows no evidence of infection, separation, or keloid formation Wound bed is dusky and purple/maroon clean. Drea-wound skin is erythema  Drainage: None   Measurements: 1x1cm  Photo: There are multiple bruises to back, head, and arms.  reports that she fell several times prior to admission. Wounds are POA, both  and patient were aware of their presence prior to admission.  At completion of assessment patient complaining of pain to neck and right shoulder. Offered pack of warm wipes to the area for relief. These were placed under the chux behind her right shoulder and patient positioned to near sitting for comfort. Recommendations:    Wound care orders as follows: Clean wound to sacrum with par room wound spray. Apply Therahoney to wound bed and cover with silicone dressing. Change every 2-3 days and prn soilage or dislodgement. Other orders: Implement \"Low Rickey Protocol\", limit layers, use TAPS for turning and positioning. Supplies Used: [facility] turned over to nursing staff at this time. Werner Chandra BSN, RN, [place] [address] Dept  [phone]

## 2022-05-04 NOTE — PROGRESS NOTES
INTERIM UPDATE - 2043 EST on 5/03/2022    Nursing Staff calls to report that Patient has Temperature of 94.7°F (~34.83°C) with WBC 3.5. Blood Pressure is noted to be 95/51 mm Hg (MAP 65). Nursing Staff reports that Patient is currently in MRI at this time. CXR was obtained upon admission. Plan: Will look for a cause and obtain an EKG to determine if Patient has changes of Hypothermia (which would constitute a Transfer to ICU for [facility]). Also ordered: UA, LA, Blood Cultures. Instructed Nursing Staff to place blanket on Patient with hot packs when Patient returns. Midodrine 10 mg was ordered for Patient now and TID. INTERIM UPDATE - 8520 EST on 5/03/2022    Temperature improved to 97.3°F upon return from imaging. EKG did not show changed of Hypothermia due to improved Temperature.

## 2022-05-04 NOTE — PROGRESS NOTES
OT orders received and medical chart review completed. Pt not medically appropriate for OT evaluation at this time. OT to sign off.

## 2022-05-04 NOTE — PALLIATIVE CARE
[address] [facility] [phone]  [facility] [phone]    NP and this LMSW consulted by unit director, Carmine Skiff. Carmine Skiff, reports concerns about pt's care have been addressed. She reports she would like to give pt a chin activated call button, to see if it will work for her. Chandler Emmanuel NP and this LMSW and Salvador Cho NP met with pt and her spouse at bedside for follow up visit. Pt notably breathless when trying to speak. Pt could only speak a word or two at a time. Pt reports her breakfast came this morning and was a regular diet, which she was not able to eat. Palliative team informed her, requesting a speech eval to do swallow eval, so appropriate diet can be ordered. Tiffanie Fritz NP (hospitalist) ordered pureed diet and a swallow eval.     Chandler Emmanuel NP addressed concerns that pt's physical condition may preclude her from going for radiation therapy, as it will require multiple trips to radiation therapy location, via stretcher and this may be difficult for her to tolerate due to spinal issues caused by tumor. Pt and spouse verbalized understanding, will take this into consideration when they speak with Radiation Oncology about possibility of palliative radiation therapy. Pt's spouse left for a short time to attend to personal issues. Pt was visited by  and received sacraments. LMSW met with pt and provided supportive counseling and supportive companionship, while spouse was gone. Chin activated call button was installed and LMSW assisted pt with testing it. Pt was able to use call button, if propped on towels so that her chin can reach it. Towels were provided to accommodate this, as required. Maintenance provided a regular t.v. remote as well. NP and this LMSW met with pt and family again . Pt's son now present at bedside.   NP again addressed concerns about pt's condition worsening and getting patient home, before her condition becomes too unstable. Pt and family all verbalized understanding; still looking forward to input from Radiation Oncology, before making any further decisions regarding going home with support of hospice. They were informed we understand and support their decision. Thank you for this referral to Palliative Care. The palliative care team remains available to provide support to patient and her family. Goals of care under discussion, pending Radiation Oncology input.       Tiffanie Abreu, [address] [facility]  Palliative Medicine Inpatient   [facility]  Palliative COPE Line: [phone])

## 2022-05-04 NOTE — WOUND CARE
Physical Exam   In for wound care consult, patient complaining of shortness of breath. Primary RN, Kwesi Ambrosio in to start breathing treatment. Will attempt consult at a later time.   Macey LUCERON,RN,[facility]

## 2022-05-04 NOTE — PROGRESS NOTES
Pt not seen for skilled PT due to:    []  Nausea/vomiting  []  Eating  []  Pain  []  Pt lethargic  []  Off Unit  Other: Pt is pending oncology/palliative recommendations for how her care is going to continue, will sign off at this time as she is not medically appropriate. Should pt have PT needs, please feel free to re-order.  Thank you for this referral. Lauryn Navarro, PT, DPT

## 2022-05-04 NOTE — PROGRESS NOTES
Problem: Falls - Risk of  Goal: *Absence of Falls  Description: Document Kimberly Marinelli Fall Risk and appropriate interventions in the flowsheet. Outcome: Progressing Towards Goal  Note: Fall Risk Interventions:            Medication Interventions: Bed/chair exit alarm    Elimination Interventions: Bed/chair exit alarm    History of Falls Interventions: Bed/chair exit alarm         Problem: Patient Education: Go to Patient Education Activity  Goal: Patient/Family Education  Outcome: Progressing Towards Goal     Problem: Pressure Injury - Risk of  Goal: *Prevention of pressure injury  Description: Document Rickey Scale and appropriate interventions in the flowsheet.   Outcome: Progressing Towards Goal  Note: Pressure Injury Interventions:  Sensory Interventions: Assess changes in LOC,Assess need for specialty bed    Moisture Interventions: Minimize layers,Absorbent underpads    Activity Interventions: Assess need for specialty bed,Increase time out of bed    Mobility Interventions: Assess need for specialty bed    Nutrition Interventions: Document food/fluid/supplement intake    Friction and Shear Interventions: Apply protective barrier, creams and emollients                Problem: Patient Education: Go to Patient Education Activity  Goal: Patient/Family Education  Outcome: Progressing Towards Goal     Problem: Pain  Goal: *Control of Pain  Outcome: Progressing Towards Goal  Goal: *PALLIATIVE CARE:  Alleviation of Pain  Outcome: Progressing Towards Goal

## 2022-05-04 NOTE — PROGRESS NOTES
Problem: Dysphagia (Adult)  Goal: *Acute Goals and Plan of Care (Insert Text)  Description: Patient will:  1. Tolerate PO trials with 0 s/s overt distress in 4/5 trials  2. Participate in training and education related to continued aspiration risk, diet recs and compensatory strategies     Recommend:   Easy to chew diet with nectar thick liquids   Thin liquids ok as requested for comfort   Meds crushed in puree   Feeding assist   Aspiration precautions  HOB >45 degrees during all intake and for at least 30 min after po   Small bites/sips, Slow rate of intake     Outcome: Progressing Towards Goal    SPEECH LANGUAGE PATHOLOGY DYSPHAGIA TREATMENT    Patient: Ramon Don (3 y.o. female)  Date: 5/4/2022  Diagnosis: Cervical spine tumor [D49.2]   Precautions: Aspiration     PLOF: As per H&P      ASSESSMENT:  Pt seen for follow up dysphagia treatment with  present at bedside. Pt/ reporting difficulty with some items from breakfast meal.  Able to tolerate easy to chew consistency with improved bolus manipulation/clearance. Continuing to tolerate NTL with no overt s/sx aspiration/distress. Educated with regard to diet recs, aspiration risk/precautions, oral care and positioning. Will follow for further dysphagia management as indicated. Progression toward goals:  []         Improving appropriately and progressing toward goals  [x]         Improving slowly and progressing toward goals  []         Not making progress toward goals and plan of care will be adjusted     PLAN:  Recommendations and Planned Interventions:  Patient continues to benefit from skilled intervention to address the above impairments. Continue treatment per established plan of care. Discharge Recommendations:   To Be Determined     SUBJECTIVE:   Patient stated I would love oatmeal    OBJECTIVE:   Cognitive and Communication Status:  Neurologic State: Alert  Orientation Level: Oriented X4  Cognition: Follows commands  Dysphagia Treatment:  Oral Assessment:  Oral Assessment  Labial: No impairment  Dentition: Natural  Oral Hygiene: Good  Lingual: No impairment  Velum: No impairment  Mandible: No impairment  P.O.  Trials:   Patient Position: 45 at [facility]   Vocal quality prior to P.O.: Low volume   Consistency Presented: Solid, NTL, Easy to chew    How Presented:  ( fed)   Bolus Acceptance: No impairment   Bolus Formation/Control: Impaired   Type of Impairment: Delayed   Propulsion: Delayed (# of seconds)   Oral Residue: None   Initiation of Swallow: No impairment   Laryngeal Elevation: Decreased   Aspiration Signs/Symptoms: None   Pharyngeal Phase Characteristics: Poor endurance,Easily fatigued    Effective Modifications: Small sips and bites,Alternate liquids/solids   Cues for Modifications: Minimal   Oral Phase Severity: Mild   Pharyngeal Phase Severity : Moderate     PAIN:  Start of Tx: not reported   End of Tx: not reported      After treatment:   []              Patient left in no apparent distress sitting up in chair  [x]              Patient left in no apparent distress in bed  [x]              Call bell left within reach  [x]              Nursing notified  []              Family present  []              Caregiver present  []              Bed alarm activated      COMMUNICATION/EDUCATION:   [x] Aspiration precautions; swallow safety; compensatory techniques  [x]        Patient/family able to participate in training and education   []  Patient unable to participate in training and education, education ongoing with staff   [] Patient understands goals and intent of therapy; neutral about participation     PANDA WillS., [id number] [facility]  Speech-Language Pathologist

## 2022-05-04 NOTE — PROGRESS NOTES
Pt's  came out the room frantic stating pt could not breath. Went in assess pt she was very SOB. O2 on room air was 90%. Connected pt to O2 at 2L. Pt requested breathing treatment. Pt feeld a lot better with O2. 97%       Reported off to primary nurse and she will take over.

## 2022-05-04 NOTE — PROGRESS NOTES
[id number] [facility] 54: [phone] [phone]  Formerly [facility]: [address] [place] [address] Way: [phone]    Patient Name: Tanya Fonseca  YOB: 1945    Date of Initial Consult: 5/3/2022   Follow up 5/4/2022  Reason for Consult: care decisions / support  Requesting Provider: Dr Neena Rodriguez   Primary Care Physician: Markus Martin MD      SUMMARY:   Tanya Fonseca is a 68y.o. year old with a past history of progressive metastatic breast cancer with progressive functional decline. S/P Viv César treatment, RT and proton therapy, biliary colic , who was admitted on 5/2/2022 from home with a diagnosis of increased lower extremity weakness and upper extremity weakness with increased neck pain. Current medical issues leading to Palliative Medicine involvement include: 68year old female with stage IV breast cancer for many years s/p multiple treatment modalities. 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 Palliative medicine is consulted for care decisions and support. 5/4/2022   Remains alert, had episode of dyspnea improved with nasal cannula oxygen. RT consult today, patient and family to decide on options moving forward including hospice care vs radiation treatment if offered. PALLIATIVE DIAGNOSES:   1. Goals of care / care decisions   2. Advanced care plan discussions   3. Metastatic breast cancer   4. Debility        PLAN:   1. 5/4/2022 Ms Marcela Nichols seen as follow up along with Ms Jovani Bearden. Patient is alert and oriented x 4, Denies pain. Voice soft. Had episode of shortness of breath this am, placed on nasal cannula with improvement. Very honest and compassionate discussion with patient and . Shared the worry that her condition is declining and we may have a narrow window to get patient home with some quality of life.  We shared some burdens to consider with transportation if there is an option of radiation. This would include stretcher to and from radiation and getting on stretcher. Appreciate radiation oncology consult, to discuss options and benefits. Have discussed hospice if they were to decide on this option. We offered support in this most difficult time. Goals of care DNR/DNI limited at present, will continue to follow with you for additional care decisions. Will need POST completion prior to d/c. Addendum: patient seen this pm with Ms Tiffany Rainey, NP, Dr Jude Summers also present. Dr Jude Summers discussed benefits and burdens of radiation. After discussing family decided on hospice at home. Will place hospice consult,  would like [facility].  have left for day. Patient would like to go home tomorrow. Will need hospital bed and oxygen. While in hospital continue current medical treatment, no escalation of care for health decline. POST introduced and signed by  ( agreed for  to sign). DNR/DNI comfort measures on d/c no feeding tubes. Copy to chart and family. BSRN aware, attending aware. ( please see below for previous notes per palliative team)      2. Goals of care Patient seen along with Ms Debora Piña. She is alert, oriented x 4, her  at bedside. Both have an excellent understanding of her medical illness and treatment options. Difficult night for patient. She is quadriplegic therefore she is not able to use traditional call bell. Voice is weak. It is difficult for her to make her needs know. We have asked and placed on her door letter to not close door when family not in room. Recommend camera in room for visualization of patient. This has been discussed with BSRN, attending, and unit manager. She has been moved to room closer to nursing unit to ensure patient safety. Patient and  are interested in [address] consult to see if she is eligible for any further treatment.  Patient and  understand hospice is an option however will hold on this option until Rad/ Onc consult. Affirmed goals of care as DNR/DNI. We will continue to follow with you for additional care decisions and support. 3. Advanced care plan discussions no AMD on file.  is legal next of kin. She has 2 sons one local and one in [place]. Patient is agreeable for  to make medical decisions if she is not. 4. Metastatic breast cancer MRI of cervical spine with epidural mets and diffuse cord compressions. She has completed multiple modalities of treatment. Now interested in Rad/ Onc consult   5. Debility really total quadriplegia, not able to use her hands. Needs assistance with all ADL's, including feeding PPS 40 indicating a mostly chair to bed bound state. Not sure she can sit in wheel chair. 6. Initial consult note routed to primary continuity provider  7. Communicated plan of care with: Palliative IDT      Patient/Health Care Proxy Stated Goals: Prolong life      TREATMENT PREFERENCES:   Code Status: DNR/ DNI     Advance Care Planning:  [] The [facility] Interdisciplinary Team has updated the ACP Navigator with [address] and Patient Capacity    Primary Decision Maker ([address]):    is legal next of kin   Medical Interventions: Limited additional interventions         Other:  As far as possible, the palliative care team has discussed with patient / health care proxy about goals of care / treatment preferences for patient.      HISTORY:     History obtained from: chart and patient     CHIEF COMPLAINT: metastatic breast cancer     HPI/SUBJECTIVE:    The patient is:   [x] Verbal and participatory  [] Non-participatory due to:   Please see summary      Clinical Pain Assessment (nonverbal scale for nonverbal patients): Clinical Pain Assessment  Severity: 0          Duration: for how long has pt been experiencing pain (e.g., 2 days, 1 month, years)  Frequency: how often pain is an issue (e.g., several times per day, once every few days, constant) FUNCTIONAL ASSESSMENT:     Palliative Performance Scale (PPS):  PPS: 40    ECOG  ECOG Status : Completely disabled     PSYCHOSOCIAL/SPIRITUAL SCREENING:      Any spiritual / Restorationist concerns:  [] Yes /  [x] No    Caregiver Burnout:  [] Yes /  [x] No /  [] No Caregiver Present      Anticipatory grief assessment:   [x] Normal  / [] Maladaptive        REVIEW OF SYSTEMS:     Positive and pertinent negative findings in ROS are noted above in HPI. The following systems were [x] reviewed / [] unable to be reviewed as noted in HPI  Other findings are noted below. Systems: constitutional, ears/nose/mouth/throat, respiratory, gastrointestinal, genitourinary, musculoskeletal, integumentary, neurologic, psychiatric, endocrine. Positive findings noted below. Modified ESAS Completed by: provider   Fatigue: 8 Drowsiness: 0   Depression: 0 Pain: 0   Anxiety: 3 Nausea: 0   Anorexia: 6 Dyspnea: 3                    PHYSICAL EXAM:     Wt Readings from Last 3 Encounters:   04/29/22 59.9 kg (132 lb)   04/21/22 58.1 kg (128 lb)   03/17/22 53.7 kg (118 lb 6.4 oz)     Blood pressure (!) 95/50, pulse 78, temperature 97.3 °F (36.3 °C), resp. rate 18, height 5' 4\" (1.626 m), SpO2 97 %. Last bowel movement: none recorded     Constitutional: chronically ill female who is lying in bed, quadriplegic in NAD  Eyes: pupils equal  ENMT: moist MM   Cardiovascular: regular rhythm  Respiratory: became dyspneic this am, improved with nasal cannula oxygen.  Sounds breathy with very soft voice   Skin: warm, dry  Neurologic: alert and oriented x 4, not really able to use her arms and legs, + sensation to touch   Psychiatric: full affect, no hallucinations         HISTORY:     Active Problems:    Cervical spine tumor (5/2/2022)      Past Medical History:   Diagnosis Date    Biliary colic     Breast CA ([facility] [address] 2012    Cancer [facility]) 1991    Right Breast    Chemotherapy convalescence or palliative care     Neuropathy     Radiation therapy complication  Thyroid disease       Past Surgical History:   Procedure Laterality Date    HX LAP CHOLECYSTECTOMY  13    HX MASTECTOMY      Right    DC BREAST SURGERY PROCEDURE UNLISTED  10/2002    Right-Lesion    DC BREAST SURGERY PROCEDURE UNLISTED  2004    Right-Lesion      Family History   Problem Relation Age of Onset    Cancer Maternal Aunt         Hodgkin's disease    Breast Cancer Paternal Aunt     Cancer Father         Prostate, lung, brain     History reviewed, no pertinent family history. Social History     Tobacco Use    Smoking status: Former Smoker     Packs/day: 0.50     Years: 10.00     Pack years: 5.00     Quit date: 1991     Years since quittin.9    Smokeless tobacco: Never Used   Substance Use Topics    Alcohol use:  Yes     Alcohol/week: 0.0 standard drinks     Types: 1 - 2 Glasses of wine per week     Comment: socially, occassionally     Allergies   Allergen Reactions    Codeine Palpitations    Darvocet A500 [Propoxyphene N-Acetaminophen] Nausea and Vomiting    Darvon [Propoxyphene] Nausea and Vomiting      Current Facility-Administered Medications   Medication Dose Route Frequency    albuterol (PROVENTIL VENTOLIN) nebulizer solution 2.5 mg  2.5 mg Nebulization Q4H PRN    [Held by provider] bumetanide (BUMEX) tablet 0.5 mg  0.5 mg Oral DAILY    calcium-vitamin D (OS-ADIN +D3) 500 mg-200 unit per tablet 1 Tablet  1 Tablet Oral DAILY    cholecalciferol (VITAMIN D3) (1000 Units /25 mcg) tablet 5,000 Units  5,000 Units Oral DAILY    gabapentin (NEURONTIN) capsule 300 mg  300 mg Oral BID    pantoprazole (PROTONIX) tablet 40 mg  40 mg Oral ACB/HS    ondansetron hcl (ZOFRAN) tablet 8 mg  8 mg Oral Q6H PRN    sodium chloride (NS) flush 5-40 mL  5-40 mL IntraVENous Q8H    sodium chloride (NS) flush 5-40 mL  5-40 mL IntraVENous PRN    acetaminophen (TYLENOL) tablet 650 mg  650 mg Oral Q6H PRN    Or    acetaminophen (TYLENOL) suppository 650 mg  650 mg Rectal Q6H PRN    polyethylene glycol (MIRALAX) packet 17 g  17 g Oral DAILY PRN    heparin (porcine) injection 5,000 Units  5,000 Units SubCUTAneous Q8H    morphine injection 2 mg  2 mg IntraVENous Q2H PRN    budesonide (PULMICORT) 500 mcg/2 ml nebulizer suspension  500 mcg Nebulization BID RT    And    arformoteroL (BROVANA) neb solution 15 mcg  15 mcg Nebulization BID RT    iron sucrose (VENOFER) 200 mg in 0.9% sodium chloride 100 mL IVPB  200 mg IntraVENous Q24H    dexamethasone (DECADRON) 4 mg/mL injection 6 mg  6 mg IntraVENous Q4H    midodrine (PROAMATINE) tablet 10 mg  10 mg Oral TID WITH MEALS    ipratropium (ATROVENT) 0.02 % nebulizer solution 0.5 mg  0.5 mg Nebulization TID RT        LAB AND IMAGING FINDINGS:     Lab Results   Component Value Date/Time    WBC 2.9 (L) 05/04/2022 05:42 AM    HGB 8.1 (L) 05/04/2022 05:42 AM    PLATELET 929 (L) 34/60/5085 05:42 AM     Lab Results   Component Value Date/Time    Sodium 136 05/04/2022 05:42 AM    Potassium 4.9 05/04/2022 05:42 AM    Chloride 101 05/04/2022 05:42 AM    CO2 26 05/04/2022 05:42 AM    BUN 33 (H) 05/04/2022 05:42 AM    Creatinine 1.36 (H) 05/04/2022 05:42 AM    Calcium 8.8 05/04/2022 05:42 AM    Magnesium 2.0 05/04/2022 05:42 AM    Phosphorus 4.5 05/02/2022 06:34 PM      Lab Results   Component Value Date/Time    Alk.  phosphatase 85 05/02/2022 06:34 PM    Protein, total 6.4 05/02/2022 06:34 PM    Albumin 2.9 (L) 05/02/2022 06:34 PM    Globulin 3.5 05/02/2022 06:34 PM     Lab Results   Component Value Date/Time    INR 1.0 08/10/2016 08:51 AM    Prothrombin time 13.3 08/10/2016 08:51 AM    aPTT 27.1 08/10/2016 08:51 AM      Lab Results   Component Value Date/Time    Iron 30 (L) 04/26/2022 11:20 AM    TIBC 209 (L) 04/26/2022 11:20 AM    Iron % saturation 14 (L) 04/26/2022 11:20 AM    Ferritin 632 (H) 04/26/2022 11:20 AM      No results found for: PH, PCO2, PO2  No components found for: GLPOC   No results found for: CPK, CKMB           Total time: 35  minutes Counseling / coordination time, spent as noted above:   > 50% counseling / coordination: yes with patient and , Irina Banuleos, attending team     Prolonged service was provided for  []30 min   []75 min in face to face time in the presence of the patient, spent as noted above.   Time Start:   Time End:

## 2022-05-04 NOTE — CONSULTS
Consult Date: 5/4/2022    IP CONSULT TO RADIATION ONCOLOGY  Consult performed by: Lorna Camacho MD  Consult ordered by: Kyle Dorman NP          Subjective  This 68year old female with a history of metastatic breast cancer s/p multiple lines of therapy including surgery, chemotherapy, and radiation therapy presented to the hospital with increasing upper and lower extremity weakness and cervical spine pain. MRI C spine revealed a lower cervical mass with cord compression. Other lesions were identified on MRI T and L spine. Ms. Janet Casillas has not received radiation to the cervical spine previously. Today, I am asked to discuss possible palliative radiotherapy to relieve her cervical spine pain and incomplete quadriparesis. Mrs. Figueroa and her family are also considering discharge to home hospice in lieu of any further active treatment. She reports cervical spine pain that is aggravated by any movement and alleviated somewhat by morphine. She reports no use of her arms or legs and endorses that this is an unpleasant experience.   Her  and son are with her at the bedside, as is the palliative care team.      Past Medical History:   Diagnosis Date    Biliary colic     Breast CA ([facility] [address] 2012    Cancer [facility]) 1991    Right Breast    Chemotherapy convalescence or palliative care     Neuropathy     Radiation therapy complication     Thyroid disease       Past Surgical History:   Procedure Laterality Date    HX LAP CHOLECYSTECTOMY  9-19-13    HX MASTECTOMY  1991    Right    ID BREAST SURGERY PROCEDURE UNLISTED  10/2002    Right-Lesion    ID BREAST SURGERY PROCEDURE UNLISTED  11/2004    Right-Lesion     Family History   Problem Relation Age of Onset    Cancer Maternal Aunt         Hodgkin's disease    Breast Cancer Paternal Aunt     Cancer Father         Prostate, lung, brain      Social History     Tobacco Use    Smoking status: Former Smoker     Packs/day: 0.50     Years: 10.00 Pack years: 5.00     Quit date: 1991     Years since quittin.9    Smokeless tobacco: Never Used   Substance Use Topics    Alcohol use:  Yes     Alcohol/week: 0.0 standard drinks     Types: 1 - 2 Glasses of wine per week     Comment: socially, occassionally       Current Facility-Administered Medications   Medication Dose Route Frequency Provider Last Rate Last Admin    albuterol (PROVENTIL VENTOLIN) nebulizer solution 2.5 mg  2.5 mg Nebulization Q4H PRN Linda Angles, DO        [Held by provider] bumetanide (BUMEX) tablet 0.5 mg  0.5 mg Oral DAILY Linda Angles, DO        calcium-vitamin D (OS-ADIN +D3) 500 mg-200 unit per tablet 1 Tablet  1 Tablet Oral DAILY Linda Angles, DO   1 Tablet at 22 1036    cholecalciferol (VITAMIN D3) (1000 Units /25 mcg) tablet 5,000 Units  5,000 Units Oral DAILY Linda Angles, DO   5,000 Units at 22 1035    gabapentin (NEURONTIN) capsule 300 mg  300 mg Oral BID Linda Angles, DO   300 mg at 22 1035    pantoprazole (PROTONIX) tablet 40 mg  40 mg Oral ACB/HS Linda Angles, DO   40 mg at 22 2120    ondansetron hcl (ZOFRAN) tablet 8 mg  8 mg Oral Q6H PRN Linda Angles, DO        sodium chloride (NS) flush 5-40 mL  5-40 mL IntraVENous Q8H Linda Angles, DO   10 mL at 22 1412    sodium chloride (NS) flush 5-40 mL  5-40 mL IntraVENous PRN Linda Angles, DO        acetaminophen (TYLENOL) tablet 650 mg  650 mg Oral Q6H PRN Linda Angles, DO        Or    acetaminophen (TYLENOL) suppository 650 mg  650 mg Rectal Q6H PRN Linda Angles, DO        polyethylene glycol (MIRALAX) packet 17 g  17 g Oral DAILY PRN Linda Angles, DO        heparin (porcine) injection 5,000 Units  5,000 Units SubCUTAneous Q8H Linda Angles, DO   5,000 Units at 22 1036    morphine injection 2 mg  2 mg IntraVENous Q2H PRN Linda Angles, DO   2 mg at 22 9493    budesonide (PULMICORT) 500 mcg/2 ml nebulizer suspension  500 mcg Nebulization BID RT Landis Dickerson, DO   500 mcg at 05/04/22 1020    And    arformoteroL (BROVANA) neb solution 15 mcg  15 mcg Nebulization BID RT Landis Dickerson, DO   15 mcg at 05/04/22 1020    iron sucrose (VENOFER) 200 mg in 0.9% sodium chloride 100 mL IVPB  200 mg IntraVENous Q24H OtooJeraldine Peggy RODRIGUEZ  mL/hr at 05/03/22 1857 200 mg at 05/03/22 1857    dexamethasone (DECADRON) 4 mg/mL injection 6 mg  6 mg IntraVENous Q4H Antonietta Marquez MD   6 mg at 05/04/22 1411    midodrine (PROAMATINE) tablet 10 mg  10 mg Oral TID WITH MEALS Juan Melvin DO   10 mg at 05/04/22 1411    ipratropium (ATROVENT) 0.02 % nebulizer solution 0.5 mg  0.5 mg Nebulization TID RT Toney Kidd MD   0.5 mg at 05/04/22 1411        Review of Systems   Constitutional: Positive for activity change. Musculoskeletal: Positive for gait problem and neck pain. Neurological: Positive for weakness and numbness. Objective     Vital signs for last 24 hours:  Visit Vitals  BP (!) 102/47 (BP 1 Location: Left upper arm, BP Patient Position: Lying) Comment: nurse notified   Pulse 75   Temp 96.9 °F (36.1 °C)   Resp 16   Ht 5' 4\" (1.626 m)   SpO2 99%   BMI 22.66 kg/m²       Intake/Output this shift:  Current Shift: No intake/output data recorded. Last 3 Shifts: 05/02 1901 - 05/04 0700  In: 7278 [P.O.:240;  I.V.:950]  Out: 500 [Urine:500]    Data Review:   Recent Results (from the past 24 hour(s))   EKG, 12 LEAD, SUBSEQUENT    Collection Time: 05/03/22  9:54 PM   Result Value Ref Range    Ventricular Rate 69 BPM    Atrial Rate 69 BPM    P-R Interval 152 ms    QRS Duration 82 ms    Q-T Interval 426 ms    QTC Calculation (Bezet) 456 ms    Calculated P Axis 38 degrees    Calculated R Axis 53 degrees    Calculated T Axis 44 degrees    Diagnosis       Normal sinus rhythm  Low voltage QRS  Borderline ECG  When compared with ECG of 02-MAY-2022 18:30,  No significant change was found  Confirmed by Carla Silva ([id number]) on 5/4/2022 9:35:08 AM CULTURE, BLOOD    Collection Time: 05/03/22 11:13 PM    Specimen: Blood   Result Value Ref Range    Special Requests: NO SPECIAL REQUESTS      Culture result: NO GROWTH AFTER 6 HOURS     LACTIC ACID    Collection Time: 05/03/22 11:13 PM   Result Value Ref Range    Lactic acid 0.7 0.4 - 2.0 MMOL/L   CULTURE, BLOOD    Collection Time: 05/03/22 11:26 PM    Specimen: Blood   Result Value Ref Range    Special Requests: NO SPECIAL REQUESTS      Culture result: NO GROWTH AFTER 6 HOURS     URINALYSIS W/ RFLX MICROSCOPIC    Collection Time: 05/04/22  5:11 AM   Result Value Ref Range    Color YELLOW      Appearance CLEAR      Specific gravity 1.021 1.005 - 1.030      pH (UA) 5.5 5.0 - 8.0      Protein TRACE (A) NEG mg/dL    Glucose Negative NEG mg/dL    Ketone TRACE (A) NEG mg/dL    Bilirubin Negative NEG      Blood LARGE (A) NEG      Urobilinogen 0.2 0.2 - 1.0 EU/dL    Nitrites Negative NEG      Leukocyte Esterase Negative NEG     URINE MICROSCOPIC ONLY    Collection Time: 05/04/22  5:11 AM   Result Value Ref Range    WBC 1 to 1 0 - 4 /hpf    RBC 0 to 1 0 - 5 /hpf    Epithelial cells FEW 0 - 5 /lpf    Bacteria FEW (A) NEG /hpf   CBC WITH AUTOMATED DIFF    Collection Time: 05/04/22  5:42 AM   Result Value Ref Range    WBC 2.9 (L) 4.6 - 13.2 K/uL    RBC 2.71 (L) 4.20 - 5.30 M/uL    HGB 8.1 (L) 12.0 - 16.0 g/dL    HCT 26.4 (L) 35.0 - 45.0 %    MCV 97.4 78.0 - 100.0 FL    MCH 29.9 24.0 - 34.0 PG    MCHC 30.7 (L) 31.0 - 37.0 g/dL    RDW 17.8 (H) 11.6 - 14.5 %    PLATELET 765 (L) 884 - 420 K/uL    MPV 9.8 9.2 - 11.8 FL    NRBC 0.0 0  WBC    ABSOLUTE NRBC 0.00 0.00 - 0.01 K/uL    NEUTROPHILS 93 (H) 40 - 73 %    LYMPHOCYTES 6 (L) 21 - 52 %    MONOCYTES 1 (L) 3 - 10 %    EOSINOPHILS 0 0 - 5 %    BASOPHILS 0 0 - 2 %    IMMATURE GRANULOCYTES 0 0.0 - 0.5 %    ABS. NEUTROPHILS 2.7 1.8 - 8.0 K/UL    ABS. LYMPHOCYTES 0.2 (L) 0.9 - 3.6 K/UL    ABS. MONOCYTES 0.0 (L) 0.05 - 1.2 K/UL    ABS. EOSINOPHILS 0.0 0.0 - 0.4 K/UL    ABS. BASOPHILS 0.0 0.0 - 0.1 K/UL    ABS. IMM. GRANS. 0.0 0.00 - 0.04 K/UL    DF AUTOMATED     METABOLIC PANEL, BASIC    Collection Time: 05/04/22  5:42 AM   Result Value Ref Range    Sodium 136 136 - 145 mmol/L    Potassium 4.9 3.5 - 5.5 mmol/L    Chloride 101 100 - 111 mmol/L    CO2 26 21 - 32 mmol/L    Anion gap 9 3.0 - 18 mmol/L    Glucose 140 (H) 74 - 99 mg/dL    BUN 33 (H) 7.0 - 18 MG/DL    Creatinine 1.36 (H) 0.6 - 1.3 MG/DL    BUN/Creatinine ratio 24 (H) 12 - 20      GFR est AA 46 (L) >60 ml/min/1.73m2    GFR est non-AA 38 (L) >60 ml/min/1.73m2    Calcium 8.8 8.5 - 10.1 MG/DL   MAGNESIUM    Collection Time: 05/04/22  5:42 AM   Result Value Ref Range    Magnesium 2.0 1.6 - 2.6 mg/dL       Physical Exam  Constitutional:       General: She is not in acute distress. HENT:      Head: Normocephalic. Eyes:      General: No scleral icterus. Conjunctiva/sclera: Conjunctivae normal.   Neurological:      Mental Status: She is alert. Psychiatric:         Mood and Affect: Mood normal.         Behavior: Behavior normal.         Thought Content: Thought content normal.         Judgment: Judgment normal.     IMPRESSION: 68year old female with widespread metastatic breast cancer, new onset incomplete quadriplegia and a lower cervical spine mass with cord compression. PLAN:  I discussed the purely palliative role of radiotherapy in this setting. We discussed the possibility for pain improvement after palliative radiotherapy to the c-spine. We also discussed the pain involved with moving Mrs. iFgueroa from her bed to our facility and positioning her on the treatment table. We discussed the possibility of a comfort care approach with pain medications administered at home. We then discussed the utility of radiotherapy to improve her motor function. Unfortunately, likelihood of return to normal function is very low and it is most likely that her loss of motor function will persist despite radiotherapy. Mrs. Figueroa and her  asked appropriate questions. Ultimately, Mrs. King Ernestina decided that she would like to be discharged tomorrow to home hospice and to forgo radiotherapy at this point. The [facility] have my business card and understand that they can call with questions or concerns. Thank you for the opportunity to participate in the care of this patient and her family.

## 2022-05-05 NOTE — PROGRESS NOTES
Requested Case Management specialist to assist with transportation to:      Patient's home address       Address is:    [address] [place] [address] Street      and phone number is:    Patient's  Patrick Noel [phone]      Patient will require BLS transport. Pt requires Stretcher If stretcher, reason: [facility] Hospice patient, [facility]  Patient is currently requiring oxygen No No  Height: 5\"4   Weight: 132 lbs  Pt is on isolation: No   Is the pt ready now? yes  Requested time: Next Available  PCS Faxed: yes  Insurance verified on face sheet: yes  Auth needed for transport: no  CM completed PCS/ Envelope and placed on chart.

## 2022-05-05 NOTE — PROGRESS NOTES
Nutrition Note    Discussed diet with SLP and MD, will updated diet for preferences and comfort.     Electronically signed by Jatin Rao RD on 5/5/2022 at 12:49 PM    Contact: [phone]

## 2022-05-05 NOTE — PROGRESS NOTES
Physician Progress Note      Storm Hickey  [facility] #:                  [id number]  :                       1945  ADMIT DATE:       2022 6:11 PM  [address] [place] DATE:  RESPONDING  PROVIDER #:        Claudine Whitman MD          QUERY TEXT:    Dear Dr. Claudene Brazen,    Patient admitted with malignancy, noted to have low RBCs, WBC count, and low platelet count. If possible, please document in progress notes and discharge summary if you are evaluating and/or treating any of the following: The medical record reflects the following:  Risk Factors: breast neoplasm, functional quadriplegia    Clinical Indicators:  \"Metastatic breast cancer, stage IV progressive - hem / oncology appreciated, no therapy recommended at this point\"  and  \"Normocytic anemia -possibly multifactorial in setting of chemotherapy, some iron deficiency, continue Venofer, no evidence of active bleeding\"  per Олег Skaggs NP, PN, 5/3/2022. WBC:  2.9  RBC:  2.71  Plts:  134    Treatment: Venofer, Decadron    Thank you,  JUAN Quick RN, [email or web address]  Options provided:  -- Antineoplastic (chemotherapy) induced pancytopenia  -- Antineoplastic (chemotherapy) induced anemia  -- Other - I will add my own diagnosis  -- Disagree - Not applicable / Not valid  -- Disagree - Clinically unable to determine / Unknown  -- Refer to Clinical Documentation Reviewer    PROVIDER RESPONSE TEXT:    Patient has antineoplastic (chemotherapy) induced anemia. Query created by: Korina Pizano on 2022 3:01 PM      QUERY TEXT:    Dear Dr. Claudene Brazen and Олег Skaggs NP,    Patient admitted with tumor of the spine. Per wound care note on 2022 by BRET Sutton, RN, [facility] , noted to also have sacral ulcer and right heel ulcer. If possible, please document in progress notes and discharge summary the type of ulcer, site of the ulcer and present on admission status of the ulcer:     The medical record reflects the following:  Risk Factors: functional quadriplegia, breast malignancy, anemia    Clinical Indicators:  \"Location: Sacrum  Stage/Etiology: Pressure  Unstageable  Location: Heel, Right  Stage/Etiology: Pressure  Suspected deep tissue injury\"   per Cem Villagomez. Prateek QUEEN, MARA, [address] [facility] CLIN [address], wound care note, 5/4/2022. Treatment:  \"Wound care orders as follows: Clean wound to sacrum with par room wound spray. Apply Therahoney to wound bed and cover with silicone dressing. Change every 2-3 days and prn soilage or dislodgement. \"  per Cem QUEEN RN, [address] [facility] CLIN II, wound care note, 5/4/2022. Thank you,  JUAN Quick RN, [email or web address]  Options provided:  -- Decubitus Pressure Ulcer to sacrum, Unstageable and deep tissue injury to right heel present on admission  -- Other - I will add my own diagnosis  -- Disagree - Not applicable / Not valid  -- Disagree - Clinically unable to determine / Unknown  -- Refer to Clinical Documentation Reviewer    PROVIDER RESPONSE TEXT:    This patient has a decubitus pressure ulcer to sacrum, Unstageable and deep tissue injury to right heel that was present on admission. Query created by:  Pipo Montoya on 5/5/2022 3:36 PM      Electronically signed by:  Vijay Laws MD 5/5/2022 4:31 PM

## 2022-05-05 NOTE — ACP (ADVANCE CARE PLANNING)
[facility] [phone]  [facility] [phone]    Pt's case discussed in palliative rounds. Pt and her family have opted to have pt [address] home with support of hospice. Family has selected [facility] for these services. LMSW left  for Care Management office informing them of this, as the normal CM for the unit is off today. POST completed last night by Katelin Perez NP. Pt will continue limited interventions until DC home. No escalation of care. LMSW met with pt and spouse at bedside for follow up visit. Pt reports she's having some neck stiffness this morning, but other that is not having significant pain at this time. LMSW informed them, has notified CM of their decision for pt to be [address] home with support of hospice, and have already made contact with CM office to ask them to work to get her home as soon as possible. They verbalized understanding and thanks. Neither pt nor spouse state any other needs or concerns at this time. Thank you for this referral to Palliative Care. The palliative care team remains available to provide support for patient and her family, while she remains hospitalized. Anticipate DC home with hospice today.     CODE STATUS:  DNR/DNI      [facility] [address] POST (Physician Orders for Scope of Treatment)       Date of conversation:  05/04/2022 Location: [facility]   Length (minutes): 20     Participants:   [x] Patient    [x] Other Surrogate Decision Maker / [facility]    Name: Katrinjeanrosaura Angelia     Relationship to Patient: spouse     Phone Number: [phone]     Other persons present:   Name: Skip Cain    Relationship to patient: Son    [facility]® ACP Facilitator: Jaye Baltazar LMSW      Conversation Topics   (If Patient does not have decision making capacity, Agent/Surrogate responds based on understanding of how patient would respond if capable)    Understanding of Medical Condition/s AND Potential Complications:    Patient response: Pt verbalized understanding of her medical condition and potential complications thereof. Healthcare Agent/Other Surrogate: Pt's spouse verbalized understanding of pt's condition and potential complications there of. Lesson [place] from Experiences Related to Serious Illness: Everyone dies eventually. God is in control. Identifies the following as important for living well: Being with family and free from pain. Hopes: Will return home and have time with family, prior to passing. Worries/Fears about Medical Condition: Will not make it home. Sources of support/comfort described as: Family, friends, and silke in [de-identified]. Cultural, Taoist, spiritual, or personal beliefs described as: Sikhism     Needs to discuss with spiritual/Taoist advisor: [] Yes  [x] No    Needs more information about illness and complications:  [] Yes  [x] No      Cardiopulmonary Resuscitation      \"What do you understand about CPR? \" Response: CPR will not return to better staet of health.       Order Elected for CPR:  []  Attempt Resuscitation [x]  Do Not Attempt Resuscitation      When NOT in Cardiopulmonary Arrest, Order Elected:      [x] Comfort Measures (at DC, no escalation of care, while hospitalized.)  [] Limited Additional Interventions  [] Full Interventions    Artificially Administered Nutrition, Order Elected:    [x] No Feeding Tube   [] Feeding Tube for a defined trial period  [] Feeding Tube long-term if indicated    Meeting Outcomes:   [x] ACP discussion completed   [x] Danie form completed  [x] Danie prepared for Provider review and signature   [x] Original placed on Chart, if in facility (form to be sent with patient at discharge)  [x] Copy given to healthcare agent    [x] Copy scanned to electronic medical record      Tiffanie Abreu, [address] [facility]   [facility] Line: [phone] ([phone])

## 2022-05-05 NOTE — PROGRESS NOTES
CM notified Joe Rebollard with [facility], that [facility] transport scheduled for 4:30pm today. CM spoke with patient's  Jenni Park [phone], and updated his that [facility] transport scheduled for 4:30pm today to transport patient home with [facility]. Patient's  is agreeable to the discharge plan for today.            Dot Christopher, RN  Case Management [phone]

## 2022-05-05 NOTE — DISCHARGE INSTRUCTIONS
Discharge Instructions    Patient: Loli Holder MRN: [id number]  CSN: [id number]    YOB: 1945  Age: 68 y.o.   Sex: female    DOA: 5/2/2022 LOS:  LOS: 3 days   Discharge Date:      DIET:  Comfort feeding (patient states generally prefers pureed nectar thick liquids)    ACTIVITY: Bedrest    Home health care for:   Hospice care / comfort goal    ADDITIONAL INFORMATION: If you experience any of the following symptoms but not limited to Fever, chills, nausea, vomiting, diarrhea, change in mentation, falling, bleeding, shortness of breath, chest pain, please call your primary care physician or return to the emergency room if you cannot get hold of your doctor:     FOLLOW UP CARE:  Per hospice    Modesto Saleh NP  5/5/2022 9:03 AM

## 2022-05-05 NOTE — PROGRESS NOTES
POST form uploaded to [facility] for [facility] per [employer].            Joe Almonte, RN  Case Management [phone]

## 2022-05-05 NOTE — PROGRESS NOTES
CM placed PCS form and 2 facesheets on front of patient's chart for [facility] transport to take patient home with [facility].    BETTY spoke with Amanda Flores RN and updated her with [facility] transport scheduled for 4:30pm.             Todd Escamilla, RN  Case Management [phone]

## 2022-05-05 NOTE — PROGRESS NOTES
Per Palliative note, patient's family requesting [facility].  uploaded patient with clinicals, and Hospice order to [place], and sent booking request to [facility].              William Kraft RN  Case Management [phone]

## 2022-05-05 NOTE — PROGRESS NOTES
[facility] accepted patient in [facility]            Mesfin Hogan, RN  Case Management [phone]

## 2022-05-05 NOTE — PROGRESS NOTES
1920 Bedside and Verbal shift change  Received from Jose Perez RN (outgoing nurse), to JOSE Stewart (oncoming)  Pt. Is AOX 4. IV patent and infusing well, Pt. denies  pain at this time. Report included the following information SBAR, Kardex, Procedure Summary, Intake/Output, MAR, Recent Lab Result. Will resume care and monitor Pt. Condition. Pt. Educated on call bell when in need of help and assistance. Pt. verbalized understanding. Pt. Head to toe Assessment Done and documented. 2025  Pt. Educated on lacy, Pt and  stated to hold lacy. Pt. Able to void per purewick, bladder scan done 0ml noted. 2040  Notified Dr Kaycee Burleson on Pt. To \"hold\" lacy and bladder scan. MD made no order at this time. 2130  Pt. Resting in bed, watching tv with  in room. Door close per Pt/. 2245  Pt. Resting in bed not in distress. 0000  Pt. Eyes closed, easily awaken. 0200  Pt. Denies pain. 0400  Pt. Made no complaints. 0540  Pt. Able to rest and sleep well throughout the shift.    0700  Pt made no complaints. Verbal and bedside Shift changed report given to Jose Perez RN (oncoming RN) on Pt. Condition. Report consisted of patients Situation, History, Activities, intake/output,  Background, Assessment and Recommendations(SBAR). Information from the following report(s) Kardex, order Summary, Lab results and MAR was reviewed with the receiving nurse. Opportunity for questions and clarification was provided.

## 2022-05-05 NOTE — PROGRESS NOTES
BETTY spoke with patient's  Derek León [phone], was able to verify home address, and let him know that CM will call him back with transport time once set up. Mahnaz Yi NP Shelbi Served BETTY, said patient needs a Nebulizer Machine too. Erick Abraham with [facility] said they can order a Nebulizer Machine for patient. BETTY Perfect Hussein Stewart NP, and updated her that [facility] will order a Nebulizer machine for patient.              Live Burrell, RN  Case Management [phone]

## 2022-05-05 NOTE — ROUTINE PROCESS
Call made to [facility] [phone], spoke with Lynne Hernandez, patient scheduled for 430 pm .

## 2022-05-05 NOTE — DISCHARGE SUMMARY
[facility] Hospitalist Group  Discharge Summary       Patient: Rhea Garcia Age: 68 y.o. : 1945 MR#: [id number] SSN: [id number]  PCP on record: Gisella Murray MD  Admit date: 2022  Discharge date: 2022    Disposition:    []Home   [x]Home with Home Hospice   []SNF/NH   []Rehab   []Home with family   []Alternate Facility:____________________    Admission Diagnoses:  Cervical spine tumor [D49.2]    Discharge Diagnoses:                             Cervical spine tumor    Metastatic breast cancer, stage IV progressive   Hydronephrosis and urinary retention  Normocytic anemia   CKD stage III-IV    Hypotension   Functional quadriplegia   Goals of care     Discharge Medications:     Current Discharge Medication List      START taking these medications    Details   midodrine (PROAMATINE) 10 mg tablet Take 1 Tablet by mouth three (3) times daily (with meals) for 30 days. Qty: 90 Tablet, Refills: 0      morphine (ROXANOL) 100 mg/5 mL (20 mg/mL) concentrated solution Take 0.25 mL by mouth every two (2) hours as needed for Pain or Shortness of Breath for up to 10 days. Max Daily Amount: 60 mg.  Qty: 30 mL, Refills: 0    Associated Diagnoses: Cervical spine tumor; Metastatic breast cancer (HCC)      LORazepam (LORazepam IntensoL) 2 mg/mL concentrated solution Take 0.5 mL by mouth every four (4) hours as needed for Anxiety or Restlessness. Max Daily Amount: 6 mg. Qty: 30 mL, Refills: 0    Associated Diagnoses: Cervical spine tumor; Metastatic breast cancer ([facility] [address]      albuterol-ipratropium (DUO-NEB) 2.5 mg-0.5 mg/3 ml nebu 3 mL by Nebulization route every four (4) hours as needed for Wheezing or Shortness of Breath. Qty: 100 Nebule, Refills: 0         CONTINUE these medications which have NOT CHANGED    Details   calcium-vitamin D (OS-ADIN +D3) 500 mg-200 unit per tablet Take 1 Tablet by mouth daily.       omeprazole (PRILOSEC) 40 mg capsule Take 1 Capsule by mouth two (2) times a day. Take at least 30min before breakfast and dinner. Disp: 90 day supply  Qty: 180 Capsule, Refills: 0      gabapentin (NEURONTIN) 100 mg capsule TAKE 2 CAPSULES THREE TIMES A DAY  Qty: 540 Capsule, Refills: 3    Associated Diagnoses: Neuropathy associated with cancer (HCC)      Cholecalciferol, Vitamin D3, 5,000 unit Tab Take 5,000 Units by mouth daily. thyroid, Pork, (ARMOUR THYROID) 60 mg tablet Take 60 mg by mouth daily. ondansetron hcl (Zofran) 8 mg tablet Take 1 Tablet by mouth every eight (8) hours as needed for Nausea or Vomiting. Qty: 30 Tablet, Refills: 0    Associated Diagnoses: Secondary malignant neoplasm of bone and bone marrow ([address] Metastatic breast cancer ([facility] [address] Intractable headache, unspecified chronicity pattern, unspecified headache type; B12 deficiency      fluticasone-umeclidin-vilanter (Trelegy Ellipta) 200-62.5-25 mcg inhaler Take 1 Puff by inhalation daily. Rinse and gargle after each use  Qty: 3 Each, Refills: 3      albuterol (PROVENTIL HFA, VENTOLIN HFA, PROAIR HFA) 90 mcg/actuation inhaler Take 2 Puffs by inhalation every six (6) hours as needed. STOP taking these medications       bumetanide (BUMEX) 0.5 mg tablet Comments:   Reason for Stopping:         potassium chloride (K-DUR, KLOR-CON M20) 20 mEq tablet Comments:   Reason for Stopping:         fulvestrant (FASLODEX IM) Comments:   Reason for Stopping:             Consults:    -Spine surgery  -Radiation oncology  -Palliative medicine  -Medical oncology    Procedures:  -   None    Significant Diagnostic Studies:   -  XR CHEST SNGL V    Result Date: 5/4/2022  Stable right pleural-parenchymal densities as seen previously, no significant interval change from last prior. MRI LUMB SPINE WO CONT on 5/3/2022  IMPRESSION  1. Hydroureteronephrosis and bladder distention. No obvious obstruction  appreciated.      2.  Sclerotic focus in L1 which is new and could represent a metastatic focus.   The marrow signal is patchy.      3. Loculated right pleural effusion.      4. No evidence of significant canal or neural foraminal narrowing. MRI [facility] SPINE WO CONT on 5/3/2022  IMPRESSION  1. Nonspecific mildly patchy marrow signal of the thoracic spine. No discrete  evidence of metastatic disease. There does appear to be abnormal signal within  the cervical spine better appreciated on recent MR of the cervical spine.       2.  Loculated right pleural effusion similar prior imaging and small left  pleural effusion.     3.  Likely scarring in the right lung.     4.  Dilated esophagus which is nonspecific.     5. No cord signal abnormality within the thoracic spine. Hospital Course by Problem   1. Cervical spine tumor -started on IV steroids during admission without changes and functionality or pain. Patient was seen by spine surgery who recommended hospice care. Patient was also seen by medical oncology and radiation oncology with ultimate choice for hospice care upon discharge. 2. Metastatic breast cancer, stage IV progressive -patient previously treated with chemotherapy, proton therapy and radiation. Current decline with functionality and pain complaints, consideration for radiation therapy was discussed with patient however due to burden / pain associated with transportation and potential limited benefit radiation was deferred. Patient and family opted for hospice care upon discharge. 3. Hydronephrosis and urinary retention -Lacy placed during admission, discussed with patient, family, hospice agency ([address] [place] [address]) regarding Lacy as comfort goal, routine lacy care upon discharge   4. CKD stage III-IV -stable during admission, all medications were renally dosed  5. Hypotension - present during admission for which patient was started on midodrine, IV fluids.   Continue midodrine upon discharge, discussed with patient and family if pills become too burdensome/difficult to swallow and truly this medication does not assist in comfort goal that this medication could be stopped under guidance of hospice service. 6. Functional quadriplegia - in setting of cervical spine tumor and progressive disease. Patient requiring total care. 7. Goals of care -further conversation with patient and  at bedside, patient wishes for DO NOT RESUSCITATE/DO NOT INTUBATE under any circumstance. With discussion of progression of metastatic cancer and further conversations with palliative medicine patient decided for comfort goal upon discharge. Post form was completed during admission to reflect comfort goal and DNR/DNI.     Today's examination of the patient revealed:     Subjective:     Objective:   VS:   Visit Vitals  BP (!) 128/58   Pulse 76   Temp 97.6 °F (36.4 °C)   Resp 18   Ht 5' 4\" (1.626 m)   SpO2 98%   BMI 22.66 kg/m²      Tmax/24hrs: Temp (24hrs), Av.4 °F (36.3 °C), Min:96.9 °F (36.1 °C), Max:97.7 °F (36.5 °C)     Input/Output:     Intake/Output Summary (Last 24 hours) at 2022 1227  Last data filed at 2022 0700  Gross per 24 hour   Intake 650 ml   Output 800 ml   Net -150 ml     General:  Alert, NAD, globally weak  Cardiovascular:  RRR  Pulmonary:  LSC throughout; respiratory effort WNL  GI:  +BS in all four quadrants, soft, non-tender  Extremities:  No edema; 2+ dorsalis pedis pulses bilaterally  Neurology: No ability to move arms or legs, patient alert and oriented x3     Condition: Stable  Follow-up Appointments:     Per hospice    >30 minutes spent coordinating this discharge (review instructions/follow-up, prescriptions, preparing report for sign off)    Signed:  Phyllis Enriquez NP  2022  12:27 PM

## 2022-05-05 NOTE — PROGRESS NOTES
Discharge order noted for today. Pt has been accepted to [facility]. Spoke with patient's  and he is agreeable to the transition plan today. Transport has been arranged through [facility] [employer] [facility] at 4:30pm. Patient's Hospice orders have been forwarded to [facility] via [facility]. Updated bedside RN, Brianda Pearl,  to the transition plan.   Discharge information has been documented on the AVS.           Jorge Bazan RN  Case Management [phone]

## 2022-05-05 NOTE — PROGRESS NOTES
Speech Pathology:     Pt transitioned to hospice care. LRD diet would be easy to chew with thin liquids. D/w dietary.      Thank you for this referral.    Alin Irizarry M.S. CCC-SLP/L  Speech-Language Pathologist

## 2022-05-05 NOTE — PROGRESS NOTES
Andrey Colby with [facility] said they have a 12 noon meeting today with family, have coordinated discharge medications with Nhi Hayes in Outpatient pharmacy, and ordered STAT DME to home. Linda Díaz asked that CM update with Medical transport to home.            Beatrice Birch, RN  Case Management [phone]

## 2022-05-05 NOTE — PROGRESS NOTES
conducted a Follow up consultation and Spiritual Assessment for Rhea Garcia, who is a 68 y.o.,female. The  provided the following Interventions:  Continued the relationship of care and support. Listened empathically. Patient and  were very pleasant and loving. Patient shared their growing family tree from their two children, six grand children and a great granddaughter on the way. Patient's marriage to her , Tracy Simpson, is going 62 years this coming September 2022. Both are active parishioners of the [employer]. Patient's  also stated that patient had been anointed by Fr. Blas and visited by Fr. Imani Patel yesterday. Offered prayer and assurance of continued prayer on patient's behalf. Patient was able to consume a small broken piece of the bread (host/wafer) without problem and her  Tracy Simpson consumed the rest. Both received holy communion. Chart reviewed. The following outcomes were achieved:  Patient expressed gratitude for 's visit and bringing holy communion. She repeatedly said, \"I love you\" to me and gave me permission to touch her face as I prayed with her for peace and comfort which she needed much at this time. Assessment:  Patient has a deep and sublime silke in the love of God. There are no further spiritual or Taoism issues which require Spiritual Care Services interventions at this time. Plan:  Chaplains will continue to follow and will provide pastoral care on an as needed/requested basis.  recommends bedside caregivers page  on duty if patient shows signs of acute spiritual or emotional distress.      Alee Butcher5 [phone]

## 2022-05-05 NOTE — HOSPICE
Pt is a non admit. Family has decided to go with [facility]. [facility] will continue to follow for safe discharge plan.      Sidney QUEEN, [facility] Inpatient Clinical Liaison  [facility] [address] [facility], [address]  [phone]  Email: [email or web address]

## 2022-05-10 LAB
BACTERIA SPEC CULT: NORMAL
BACTERIA SPEC CULT: NORMAL
SERVICE CMNT-IMP: NORMAL
SERVICE CMNT-IMP: NORMAL

## 2022-05-13 ENCOUNTER — APPOINTMENT (OUTPATIENT)
Dept: PHYSICAL THERAPY | Age: 77
End: 2022-05-13

## 2022-05-16 ENCOUNTER — APPOINTMENT (OUTPATIENT)
Dept: PHYSICAL THERAPY | Age: 77
End: 2022-05-16

## 2022-05-17 ENCOUNTER — APPOINTMENT (OUTPATIENT)
Dept: INFUSION THERAPY | Age: 77
End: 2022-05-17
Attending: INTERNAL MEDICINE

## 2022-05-19 ENCOUNTER — APPOINTMENT (OUTPATIENT)
Dept: INFUSION THERAPY | Age: 77
End: 2022-05-19

## 2022-09-03 NOTE — PROGRESS NOTES
[facility] Progress Note    Date: May 30, 2019    Name: Dominik Aguirre    MRN: [id number]         : 1945     CMP and Xegeva injection every 28 days  CBC and Retacrit injection every 14 hours        Ms. Figureoa was assessed and education was provided. Ms. Antonella Mendieta vitals were reviewed and patient was observed for 5 minutes prior to treatment. Visit Vitals  /76 (BP 1 Location: Left arm, BP Patient Position: Sitting)   Pulse 75   Temp 98.4 °F (36.9 °C)   Resp 18   SpO2 97%     CBC and CMP drawn from RAC w/25 gauge butterfly needle w/o difficulty. No irritation or drainage noted at site, gauze and band aid applied. Lab results were obtained and reviewed. Recent Results (from the past 12 hour(s))   CBC WITH 3 PART DIFF    Collection Time: 19 11:25 AM   Result Value Ref Range    WBC 1.8 (L) 4.5 - 13.0 K/uL    RBC 2.92 (L) 4.10 - 5.10 M/uL    HGB 10.7 (L) 12.0 - 16.0 g/dL    HCT 31.5 (L) 36 - 48 %    .9 (H) 78 - 102 FL    MCH 36.6 (H) 25.0 - 35.0 PG    MCHC 34.0 31 - 37 g/dL    RDW 15.9 (H) 11.5 - 14.5 %    PLATELET 647 315 - 278 K/uL    NEUTROPHILS 71 (H) 40 - 70 %    MIXED CELLS 10 0.1 - 17 %    LYMPHOCYTES 18 14 - 44 %    ABS. NEUTROPHILS 1.3 (L) 1.8 - 9.5 K/UL    ABS. MIXED CELLS 0.2 0.0 - 2.3 K/uL    ABS. LYMPHOCYTES 0.3 (L) 1.1 - 5.9 K/UL    DF AUTOMATED       Retacrit HELD for Hgb greater than 10 and Hct greater than 30. Serum Calcium from 2019 was 8.6. Xgeva 120 mg/1.7 ml was administered subcutaneously in back of upper left arm. No irritation or drainage noted from site, band aid applied. Ms. Ulysses Patel tolerated well, and had no complaints. Patient armband removed and shredded. Ms. Ulysses Amanda was discharged from [place] [id number] in stable condition at 1145. She is to return on 2019 at 1100 for her next appointment labs, port flush, Retacrit and Faslodex injections.     Todd Fitzpatrick RN  May 30, 2019  12:00 PM
none known